# Patient Record
Sex: MALE | Race: OTHER | HISPANIC OR LATINO | URBAN - METROPOLITAN AREA
[De-identification: names, ages, dates, MRNs, and addresses within clinical notes are randomized per-mention and may not be internally consistent; named-entity substitution may affect disease eponyms.]

---

## 2019-01-12 ENCOUNTER — INPATIENT (INPATIENT)
Facility: HOSPITAL | Age: 59
LOS: 1 days | Discharge: HOME | DRG: 914 | End: 2019-01-14
Attending: SURGERY | Admitting: SURGERY
Payer: COMMERCIAL

## 2019-01-12 VITALS — HEART RATE: 91 BPM | OXYGEN SATURATION: 95 % | TEMPERATURE: 96 F | RESPIRATION RATE: 18 BRPM

## 2019-01-12 DIAGNOSIS — S09.90XA UNSPECIFIED INJURY OF HEAD, INITIAL ENCOUNTER: ICD-10-CM

## 2019-01-12 LAB
ALBUMIN SERPL ELPH-MCNC: 5 G/DL — SIGNIFICANT CHANGE UP (ref 3.3–5.2)
ALP SERPL-CCNC: 65 U/L — SIGNIFICANT CHANGE UP (ref 40–120)
ALT FLD-CCNC: 33 U/L — SIGNIFICANT CHANGE UP
ANION GAP SERPL CALC-SCNC: 11 MMOL/L — SIGNIFICANT CHANGE UP (ref 5–17)
APPEARANCE UR: CLEAR — SIGNIFICANT CHANGE UP
APTT BLD: 29.2 SEC — SIGNIFICANT CHANGE UP (ref 27.5–36.3)
AST SERPL-CCNC: 34 U/L — SIGNIFICANT CHANGE UP
BASE EXCESS BLDV CALC-SCNC: 2.1 MMOL/L — HIGH (ref -2–2)
BASOPHILS # BLD AUTO: 0 K/UL — SIGNIFICANT CHANGE UP (ref 0–0.2)
BASOPHILS NFR BLD AUTO: 0.4 % — SIGNIFICANT CHANGE UP (ref 0–2)
BILIRUB SERPL-MCNC: 0.4 MG/DL — SIGNIFICANT CHANGE UP (ref 0.4–2)
BILIRUB UR-MCNC: NEGATIVE — SIGNIFICANT CHANGE UP
BLD GP AB SCN SERPL QL: SIGNIFICANT CHANGE UP
BUN SERPL-MCNC: 16 MG/DL — SIGNIFICANT CHANGE UP (ref 8–20)
CA-I SERPL-SCNC: 1.15 MMOL/L — SIGNIFICANT CHANGE UP (ref 1.15–1.33)
CALCIUM SERPL-MCNC: 9.3 MG/DL — SIGNIFICANT CHANGE UP (ref 8.6–10.2)
CHLORIDE BLDV-SCNC: 102 MMOL/L — SIGNIFICANT CHANGE UP (ref 98–107)
CHLORIDE SERPL-SCNC: 100 MMOL/L — SIGNIFICANT CHANGE UP (ref 98–107)
CK MB CFR SERPL CALC: 8.6 NG/ML — HIGH (ref 0–6.7)
CK SERPL-CCNC: 2320 U/L — HIGH (ref 30–200)
CO2 SERPL-SCNC: 26 MMOL/L — SIGNIFICANT CHANGE UP (ref 22–29)
COLOR SPEC: YELLOW — SIGNIFICANT CHANGE UP
CREAT SERPL-MCNC: 0.9 MG/DL — SIGNIFICANT CHANGE UP (ref 0.5–1.3)
DIFF PNL FLD: NEGATIVE — SIGNIFICANT CHANGE UP
EOSINOPHIL # BLD AUTO: 0.3 K/UL — SIGNIFICANT CHANGE UP (ref 0–0.5)
EOSINOPHIL NFR BLD AUTO: 2.5 % — SIGNIFICANT CHANGE UP (ref 0–5)
GAS PNL BLDV: 138 MMOL/L — SIGNIFICANT CHANGE UP (ref 135–145)
GAS PNL BLDV: SIGNIFICANT CHANGE UP
GAS PNL BLDV: SIGNIFICANT CHANGE UP
GLUCOSE BLDV-MCNC: 121 MG/DL — HIGH (ref 70–99)
GLUCOSE SERPL-MCNC: 127 MG/DL — HIGH (ref 70–115)
GLUCOSE UR QL: NEGATIVE MG/DL — SIGNIFICANT CHANGE UP
HCO3 BLDV-SCNC: 26 MMOL/L — SIGNIFICANT CHANGE UP (ref 20–26)
HCT VFR BLD CALC: 45.7 % — SIGNIFICANT CHANGE UP (ref 42–52)
HCT VFR BLDA CALC: 50 — SIGNIFICANT CHANGE UP (ref 39–50)
HGB BLD CALC-MCNC: 16.5 G/DL — SIGNIFICANT CHANGE UP (ref 13–17)
HGB BLD-MCNC: 15.9 G/DL — SIGNIFICANT CHANGE UP (ref 14–18)
INR BLD: 1.13 RATIO — SIGNIFICANT CHANGE UP (ref 0.88–1.16)
KETONES UR-MCNC: NEGATIVE — SIGNIFICANT CHANGE UP
LACTATE BLDV-MCNC: 1.1 MMOL/L — SIGNIFICANT CHANGE UP (ref 0.5–2)
LEUKOCYTE ESTERASE UR-ACNC: NEGATIVE — SIGNIFICANT CHANGE UP
LYMPHOCYTES # BLD AUTO: 2.6 K/UL — SIGNIFICANT CHANGE UP (ref 1–4.8)
LYMPHOCYTES # BLD AUTO: 24.4 % — SIGNIFICANT CHANGE UP (ref 20–55)
MCHC RBC-ENTMCNC: 32.3 PG — HIGH (ref 27–31)
MCHC RBC-ENTMCNC: 34.8 G/DL — SIGNIFICANT CHANGE UP (ref 32–36)
MCV RBC AUTO: 92.7 FL — SIGNIFICANT CHANGE UP (ref 80–94)
MONOCYTES # BLD AUTO: 1 K/UL — HIGH (ref 0–0.8)
MONOCYTES NFR BLD AUTO: 9 % — SIGNIFICANT CHANGE UP (ref 3–10)
NEUTROPHILS # BLD AUTO: 6.8 K/UL — SIGNIFICANT CHANGE UP (ref 1.8–8)
NEUTROPHILS NFR BLD AUTO: 63.1 % — SIGNIFICANT CHANGE UP (ref 37–73)
NITRITE UR-MCNC: NEGATIVE — SIGNIFICANT CHANGE UP
OTHER CELLS CSF MANUAL: 20 ML/DL — SIGNIFICANT CHANGE UP (ref 18–22)
PCO2 BLDV: 46 MMHG — SIGNIFICANT CHANGE UP (ref 35–50)
PH BLDV: 7.39 — SIGNIFICANT CHANGE UP (ref 7.32–7.43)
PH UR: 7 — SIGNIFICANT CHANGE UP (ref 5–8)
PLATELET # BLD AUTO: 215 K/UL — SIGNIFICANT CHANGE UP (ref 150–400)
PO2 BLDV: 53 MMHG — HIGH (ref 25–45)
POTASSIUM BLDV-SCNC: 3.8 MMOL/L — SIGNIFICANT CHANGE UP (ref 3.4–4.5)
POTASSIUM SERPL-MCNC: 3.9 MMOL/L — SIGNIFICANT CHANGE UP (ref 3.5–5.3)
POTASSIUM SERPL-SCNC: 3.9 MMOL/L — SIGNIFICANT CHANGE UP (ref 3.5–5.3)
PROT SERPL-MCNC: 7.5 G/DL — SIGNIFICANT CHANGE UP (ref 6.6–8.7)
PROT UR-MCNC: NEGATIVE MG/DL — SIGNIFICANT CHANGE UP
PROTHROM AB SERPL-ACNC: 13 SEC — HIGH (ref 10–12.9)
RBC # BLD: 4.93 M/UL — SIGNIFICANT CHANGE UP (ref 4.6–6.2)
RBC # FLD: 13.5 % — SIGNIFICANT CHANGE UP (ref 11–15.6)
SAO2 % BLDV: 88 % — SIGNIFICANT CHANGE UP
SODIUM SERPL-SCNC: 137 MMOL/L — SIGNIFICANT CHANGE UP (ref 135–145)
SP GR SPEC: 1 — LOW (ref 1.01–1.02)
TYPE + AB SCN PNL BLD: SIGNIFICANT CHANGE UP
UROBILINOGEN FLD QL: NEGATIVE MG/DL — SIGNIFICANT CHANGE UP
WBC # BLD: 10.8 K/UL — SIGNIFICANT CHANGE UP (ref 4.8–10.8)
WBC # FLD AUTO: 10.8 K/UL — SIGNIFICANT CHANGE UP (ref 4.8–10.8)

## 2019-01-12 PROCEDURE — 74177 CT ABD & PELVIS W/CONTRAST: CPT | Mod: 26

## 2019-01-12 PROCEDURE — 72141 MRI NECK SPINE W/O DYE: CPT | Mod: 26

## 2019-01-12 PROCEDURE — 71045 X-RAY EXAM CHEST 1 VIEW: CPT | Mod: 26

## 2019-01-12 PROCEDURE — 71260 CT THORAX DX C+: CPT | Mod: 26

## 2019-01-12 PROCEDURE — 99285 EMERGENCY DEPT VISIT HI MDM: CPT

## 2019-01-12 PROCEDURE — 72125 CT NECK SPINE W/O DYE: CPT | Mod: 26

## 2019-01-12 PROCEDURE — 70450 CT HEAD/BRAIN W/O DYE: CPT | Mod: 26

## 2019-01-12 RX ORDER — KETOROLAC TROMETHAMINE 30 MG/ML
30 SYRINGE (ML) INJECTION ONCE
Qty: 0 | Refills: 0 | Status: DISCONTINUED | OUTPATIENT
Start: 2019-01-12 | End: 2019-01-12

## 2019-01-12 RX ORDER — SODIUM CHLORIDE 9 MG/ML
1000 INJECTION, SOLUTION INTRAVENOUS
Qty: 0 | Refills: 0 | Status: DISCONTINUED | OUTPATIENT
Start: 2019-01-12 | End: 2019-01-14

## 2019-01-12 RX ORDER — METHOCARBAMOL 500 MG/1
1500 TABLET, FILM COATED ORAL ONCE
Qty: 0 | Refills: 0 | Status: COMPLETED | OUTPATIENT
Start: 2019-01-12 | End: 2019-01-12

## 2019-01-12 RX ORDER — TETANUS TOXOID, REDUCED DIPHTHERIA TOXOID AND ACELLULAR PERTUSSIS VACCINE, ADSORBED 5; 2.5; 8; 8; 2.5 [IU]/.5ML; [IU]/.5ML; UG/.5ML; UG/.5ML; UG/.5ML
0.5 SUSPENSION INTRAMUSCULAR ONCE
Qty: 0 | Refills: 0 | Status: COMPLETED | OUTPATIENT
Start: 2019-01-12 | End: 2019-01-12

## 2019-01-12 RX ADMIN — SODIUM CHLORIDE 250 MILLILITER(S): 9 INJECTION, SOLUTION INTRAVENOUS at 13:43

## 2019-01-12 RX ADMIN — Medication 30 MILLIGRAM(S): at 14:17

## 2019-01-12 RX ADMIN — TETANUS TOXOID, REDUCED DIPHTHERIA TOXOID AND ACELLULAR PERTUSSIS VACCINE, ADSORBED 0.5 MILLILITER(S): 5; 2.5; 8; 8; 2.5 SUSPENSION INTRAMUSCULAR at 13:44

## 2019-01-12 RX ADMIN — METHOCARBAMOL 1500 MILLIGRAM(S): 500 TABLET, FILM COATED ORAL at 16:52

## 2019-01-12 RX ADMIN — SODIUM CHLORIDE 250 MILLILITER(S): 9 INJECTION, SOLUTION INTRAVENOUS at 21:33

## 2019-01-12 RX ADMIN — SODIUM CHLORIDE 1000 MILLILITER(S): 9 INJECTION, SOLUTION INTRAVENOUS at 16:17

## 2019-01-12 RX ADMIN — Medication 30 MILLIGRAM(S): at 13:42

## 2019-01-12 NOTE — H&P ADULT - ATTENDING COMMENTS
Trauma activation B.  57 yo Icelandic speaking male s/p large drying machine fell on him.  Hit his head and chest.  Fell to ground.  +LOC.  C/o HA, left chest wall pain and left arm pain.  GCS 15.  Answering all questions.  Airway patent.  Primary survey intact.  Secondary survey shows abrasion on vertex of scalp and left forearm.  Moving all extremities.  Hemodynamically normal.  Base excess (2).  CXR negative for traumatic injury.  CT head, C-spine, chest/A/P negative for traumatic injury.  CK level mildly elevated (about 2300), will hydrate and repeat.  Tertiary exam.

## 2019-01-12 NOTE — ED PROVIDER NOTE - PROGRESS NOTE DETAILS
According to [facility] in [place] which pt notes as his primary pharmacy, he is only on amlodipine; is not on any anticoagulants or antiplatelets.

## 2019-01-12 NOTE — ED ADULT NURSE REASSESSMENT NOTE - NS ED NURSE REASSESS COMMENT FT1
patient returned from MRI, awaitng admission orders spoke with Trauma team, bed available, VSS, patient remains with neck brace even thou cleared states pain to area

## 2019-01-12 NOTE — ED PROVIDER NOTE - MEDICAL DECISION MAKING DETAILS
well appearing male c/o crush injury/head injury with + LOC. Neuro intact . GCS 15. Primary survey notes mild tachycardia; 2nd survey notes evidence of head injury and abrasion. Management per trauma team

## 2019-01-12 NOTE — ED ADULT TRIAGE NOTE - CHIEF COMPLAINT QUOTE
Pt brought in by ambulance for eval of traum. Pt with possible crush injuries s/p large industrial dryer falling on total body while at work. Pt with C-Collar in place by EMS. Code Trauma B activated.

## 2019-01-12 NOTE — H&P ADULT - HISTORY OF PRESENT ILLNESS
58y Male alert trauma B that came in after a drying machine fell on top of his head and chest. Patient remembers the machine falling but had LOC after the event for a couple of minutes. Patient arrived AAOx3 with a GCS of 15 and in on acute distress. Patient has a mild headache, right lower rib cage pain, and left arm pain. Denies any visual changes and dizziness. Denies fever, chills, nausea, vomiting, diarrhea, urinary incontinence, and shortness of breath.     A airway intact, C collar placed, speaking full sentences  B equal breath sounds bilaterally  C radial/DP/femoral pulses intact bilaterally   D GCS15 E4V5M6, moving all fours, no focal deficits, pupils R 3mm reactive/L 3mm reactive  E patient fully exposed, no gross deformities or bleeding, provided warm blankets    CXR no fracture or hemopneumothorax    Initial vitals: BP: 159/84, HR: 95, T: 98.4F, RR: 16, Sat: 98%    Secondary survey unremarkable     ROS: 10-system review is otherwise negative except HPI above.      PAST MEDICAL & SURGICAL HISTORY:  Hypertension  No significant past surgical history    FAMILY HISTORY: Jeanine    SOCIAL HISTORY:  Denies smoking, and use of illicit drugs. Drinks alcohol socially     ALLERGIES: Penicillin       HOME MEDICATIONS: Norvasc 5mg  --------------------------------------------------------------------------------------------    PHYSICAL EXAM:   General: NAD, Lying in bed comfortably. C-spine collar in place.   Neuro: A+Ox3  HEENT: NC/AT, EOMI, No palpable head deformities. Minor 1cm laceration on superior aspect of scalp. No maxillary deformities.   Neck: Soft, supple. C-spine collar in place  Cardio: RRR  Resp: Good effort, CTA b/l  Thorax: No chest wall tenderness  Breast: No lesions/masses, no drainage  GI/Abd: Soft, NT/ND, no rebound/guarding, no masses palpated  Vascular: All 4 extremities warm and well perfused. Palpable 2+ B/L carotid, radial, femoral, and DP pulses.   Skin: 5cm superficial laceration of left posterior forearm with no active bleeding.   Lymphatic/Nodes: No palpable lymphadenopathy  Pelvis: stable  Musculoskeletal: All 4 extremities moving spontaneously, no limitations, no spinal tenderness or stepoffs  --------------------------------------------------------------------------------------------    LABS                 15.9   10.8   )----------(  215       ( 12 Jan 2019 10:55 )               45.7              PT/INR -  13.0 sec / 1.13 ratio   ( 12 Jan 2019 10:55 )       PTT -  29.2 sec   ( 12 Jan 2019 10:55 )  CAPILLARY BLOOD GLUCOSE            10:56 - VBG - pH: 7.39  | pCO2: 46    | pO2: 53    | Lactate: 1.1      --------------------------------------------------------------------------------------------  IMAGING  CT head:  CT C-spine:  CXR:   CT C/A/P:  CT T-spine:  CT L-spine:    ASSESSMENT: Patient is a 58y old male s/p machine fell on top of patient. Patient is HDM stable with no active complaints except for a mild headache and mild right lower chest tenderness.     PLAN:    - C collar until C spine is clear.  - f/u radiology for CT neck, head read  - NPO  - IVF  - pain control  - Clean wounds and apply bacitracin   - DVT ppx  - strict bedrest/OOB/ambulate  - Patient seen/examined with attending.  - Plan to be discussed with Attending,  58y Male alert trauma B that came in after a drying machine fell on top of his head and chest. Patient remembers the machine falling but had LOC after the event for a couple of minutes. Patient arrived AAOx3 with a GCS of 15 and in on acute distress. Patient has a mild headache, right lower rib cage pain, and left arm pain. Denies any visual changes and dizziness. Denies fever, chills, nausea, vomiting, diarrhea, urinary incontinence, and shortness of breath.     A airway intact, C collar placed, speaking full sentences  B equal breath sounds bilaterally  C radial/DP/femoral pulses intact bilaterally   D GCS15 E4V5M6, moving all fours, no focal deficits, pupils R 3mm reactive/L 3mm reactive  E patient fully exposed, no gross deformities or bleeding, provided warm blankets    CXR no fracture or hemopneumothorax    Initial vitals: BP: 159/84, HR: 95, T: 98.4F, RR: 16, Sat: 98%    Secondary survey unremarkable     ROS: 10-system review is otherwise negative except HPI above.      PAST MEDICAL & SURGICAL HISTORY:  Hypertension  No significant past surgical history    FAMILY HISTORY: Jeanine    SOCIAL HISTORY:  Denies smoking, and use of illicit drugs. Drinks alcohol socially     ALLERGIES: Penicillin       HOME MEDICATIONS: Norvasc 5mg  --------------------------------------------------------------------------------------------    PHYSICAL EXAM:   General: NAD, Lying in bed comfortably. C-spine collar in place.   Neuro: A+Ox3  HEENT: NC/AT, EOMI, No palpable head deformities. Minor abrasion on superior aspect of scalp. No maxillary deformities.   Neck: Soft, supple. C-spine collar in place  Cardio: RRR  Resp: Good effort, CTA b/l  Thorax: No chest wall tenderness  Breast: No lesions/masses, no drainage  GI/Abd: Soft, NT/ND, no rebound/guarding, no masses palpated  Vascular: All 4 extremities warm and well perfused. Palpable 2+ B/L carotid, radial, femoral, and DP pulses.   Skin: 5cm superficial laceration of left posterior forearm with no active bleeding.   Lymphatic/Nodes: No palpable lymphadenopathy  Pelvis: stable  Musculoskeletal: All 4 extremities moving spontaneously, no limitations, no spinal tenderness or stepoffs  --------------------------------------------------------------------------------------------    LABS                 15.9   10.8   )----------(  215       ( 12 Jan 2019 10:55 )               45.7              PT/INR -  13.0 sec / 1.13 ratio   ( 12 Jan 2019 10:55 )       PTT -  29.2 sec   ( 12 Jan 2019 10:55 )  CAPILLARY BLOOD GLUCOSE            10:56 - VBG - pH: 7.39  | pCO2: 46    | pO2: 53    | Lactate: 1.1      --------------------------------------------------------------------------------------------  IMAGING  CT head:  CT C-spine:  CXR:   CT C/A/P:  CT T-spine:  CT L-spine:    ASSESSMENT: Patient is a 58y old male s/p machine fell on top of patient. Patient is HDM stable with no active complaints except for a mild headache and mild right lower chest tenderness.     PLAN:    - C collar until C spine is clear.  - f/u radiology for CT neck, head read  - NPO  - IVF  - pain control  - Clean wounds and apply bacitracin   - DVT ppx  - strict bedrest/OOB/ambulate  - Patient seen/examined with attending.

## 2019-01-12 NOTE — ED PROVIDER NOTE - OBJECTIVE STATEMENT
Pt is a 58yoM with h/o HTN c/o head and diffuse body pain s/p having a large industrial sized, several hundred pound dryer fall on top of him at work. + LOC.  Pt amnestic to events after he was hit.  Trauma B called by nursing staff upon arrival.

## 2019-01-13 LAB
ANION GAP SERPL CALC-SCNC: 11 MMOL/L — SIGNIFICANT CHANGE UP (ref 5–17)
BUN SERPL-MCNC: 10 MG/DL — SIGNIFICANT CHANGE UP (ref 8–20)
CALCIUM SERPL-MCNC: 8.9 MG/DL — SIGNIFICANT CHANGE UP (ref 8.6–10.2)
CHLORIDE SERPL-SCNC: 100 MMOL/L — SIGNIFICANT CHANGE UP (ref 98–107)
CK MB CFR SERPL CALC: 4.3 NG/ML — SIGNIFICANT CHANGE UP (ref 0–6.7)
CK MB CFR SERPL CALC: 4.7 NG/ML — SIGNIFICANT CHANGE UP (ref 0–6.7)
CK SERPL-CCNC: 1137 U/L — HIGH (ref 30–200)
CK SERPL-CCNC: 1264 U/L — HIGH (ref 30–200)
CO2 SERPL-SCNC: 26 MMOL/L — SIGNIFICANT CHANGE UP (ref 22–29)
CREAT SERPL-MCNC: 0.69 MG/DL — SIGNIFICANT CHANGE UP (ref 0.5–1.3)
GLUCOSE SERPL-MCNC: 104 MG/DL — SIGNIFICANT CHANGE UP (ref 70–115)
MAGNESIUM SERPL-MCNC: 2.1 MG/DL — SIGNIFICANT CHANGE UP (ref 1.6–2.6)
PHOSPHATE SERPL-MCNC: 2.4 MG/DL — SIGNIFICANT CHANGE UP (ref 2.4–4.7)
POTASSIUM SERPL-MCNC: 3.9 MMOL/L — SIGNIFICANT CHANGE UP (ref 3.5–5.3)
POTASSIUM SERPL-SCNC: 3.9 MMOL/L — SIGNIFICANT CHANGE UP (ref 3.5–5.3)
SODIUM SERPL-SCNC: 137 MMOL/L — SIGNIFICANT CHANGE UP (ref 135–145)

## 2019-01-13 RX ORDER — HEPARIN SODIUM 5000 [USP'U]/ML
5000 INJECTION INTRAVENOUS; SUBCUTANEOUS EVERY 12 HOURS
Qty: 0 | Refills: 0 | Status: DISCONTINUED | OUTPATIENT
Start: 2019-01-13 | End: 2019-01-14

## 2019-01-13 RX ORDER — ACETAMINOPHEN 500 MG
650 TABLET ORAL EVERY 6 HOURS
Qty: 0 | Refills: 0 | Status: DISCONTINUED | OUTPATIENT
Start: 2019-01-13 | End: 2019-01-14

## 2019-01-13 RX ORDER — TRAMADOL HYDROCHLORIDE 50 MG/1
50 TABLET ORAL
Qty: 0 | Refills: 0 | Status: DISCONTINUED | OUTPATIENT
Start: 2019-01-13 | End: 2019-01-14

## 2019-01-13 RX ADMIN — Medication 650 MILLIGRAM(S): at 12:33

## 2019-01-13 RX ADMIN — TRAMADOL HYDROCHLORIDE 50 MILLIGRAM(S): 50 TABLET ORAL at 06:36

## 2019-01-13 RX ADMIN — SODIUM CHLORIDE 250 MILLILITER(S): 9 INJECTION, SOLUTION INTRAVENOUS at 20:41

## 2019-01-13 RX ADMIN — HEPARIN SODIUM 5000 UNIT(S): 5000 INJECTION INTRAVENOUS; SUBCUTANEOUS at 06:37

## 2019-01-13 RX ADMIN — TRAMADOL HYDROCHLORIDE 50 MILLIGRAM(S): 50 TABLET ORAL at 17:28

## 2019-01-13 RX ADMIN — TRAMADOL HYDROCHLORIDE 50 MILLIGRAM(S): 50 TABLET ORAL at 18:27

## 2019-01-13 RX ADMIN — SODIUM CHLORIDE 250 MILLILITER(S): 9 INJECTION, SOLUTION INTRAVENOUS at 17:21

## 2019-01-13 RX ADMIN — HEPARIN SODIUM 5000 UNIT(S): 5000 INJECTION INTRAVENOUS; SUBCUTANEOUS at 17:20

## 2019-01-13 RX ADMIN — TRAMADOL HYDROCHLORIDE 50 MILLIGRAM(S): 50 TABLET ORAL at 07:15

## 2019-01-13 RX ADMIN — Medication 650 MILLIGRAM(S): at 11:35

## 2019-01-13 NOTE — PROGRESS NOTE ADULT - ASSESSMENT
57yo M s/p blunt trauma to head who is clinically improving.    -monitor cervical tenderness  -continue regular diet  -IVF  -pain management   -possible discharge today.

## 2019-01-13 NOTE — PROGRESS NOTE ADULT - SUBJECTIVE AND OBJECTIVE BOX
[facility] - Mr Marshall is post op day 1 from sigmoid colectomy for diverticulitis. Cefoxitin day 2. NPO.     HPI/OVERNIGHT EVENTS: 59yo M that presented yesterday to the trauma bay after a heavy drying machine fell on his head and chest. Imaging were negative for any acute bleed or fracture. Patient was examined this morning at bedside and found resting comfortably. No events overnight. This morning his cervical collar was removed. Patient denies fever, chills, nausea, vomiting, headache, chest pain, and shortness of breath.     Denies fever, chills, nausea, vomitting, chest pain, SOB, dizziness, abd pain or any other concerning symptoms    Vital Signs Last 24 Hrs  T(C): 36.8 (2019 04:26), Max: 37.1 (2019 22:37)  T(F): 98.2 (2019 04:26), Max: 98.8 (2019 22:37)  HR: 81 (2019 04:26) (80 - 88)  BP: 139/80 (2019 04:26) (117/85 - 139/80)  BP(mean): 97 (2019 00:56) (97 - 97)  RR: 18 (2019 04:26) (16 - 18)  SpO2: 93% (2019 04:26) (93% - 99%)    I&O's Detail    2019 07:01  -  2019 07:00  --------------------------------------------------------  IN:    lactated ringers.: 1500 mL  Total IN: 1500 mL    OUT:    Voided: 1100 mL  Total OUT: 1100 mL    Total NET: 400 mL          Constitutional: patient resting comfortably in bed, in no acute distress  HEENT: EOMI / PERRL    Neck: No JVD, full ROM with mild para cervical tenderness  Respiratory: CTAB, respirations are unlabored, no accessory muscle use, no conversational dyspnea  Cardiovascular: regular rate & rhythm  Gastrointestinal: Abdomen soft, non-tender, non-distended, no rebound tenderness / guarding    LABS:                        15.9   10.8  )-----------( 215      ( 2019 10:55 )             45.7     01-13    137  |  100  |  10.0  ----------------------------<  104  3.9   |  26.0  |  0.69    Ca    8.9      2019 07:49  Phos  2.4     -  Mg     2.1         TPro  7.5  /  Alb  5.0  /  TBili  0.4  /  DBili  x   /  AST  34  /  ALT  33  /  AlkPhos  65  -12    PT/INR - ( 2019 10:55 )   PT: 13.0 sec;   INR: 1.13 ratio         PTT - ( 2019 10:55 )  PTT:29.2 sec  Urinalysis Basic - ( 2019 19:32 )    Color: Yellow / Appearance: Clear / S.005 / pH: x  Gluc: x / Ketone: Negative  / Bili: Negative / Urobili: Negative mg/dL   Blood: x / Protein: Negative mg/dL / Nitrite: Negative   Leuk Esterase: Negative / RBC: x / WBC x   Sq Epi: x / Non Sq Epi: x / Bacteria: x        MEDICATIONS  (STANDING):  heparin  Injectable 5000 Unit(s) SubCutaneous every 12 hours  lactated ringers. 1000 milliLiter(s) (250 mL/Hr) IV Continuous <Continuous>    MEDICATIONS  (PRN):  acetaminophen   Tablet .. 650 milliGRAM(s) Oral every 6 hours PRN Mild Pain (1 - 3)  traMADol 50 milliGRAM(s) Oral four times a day PRN Moderate Pain (4 - 6)      MICRO:   Cultures     STUDIES:   EKG, CXR, U/S, CT, MRI

## 2019-01-14 ENCOUNTER — TRANSCRIPTION ENCOUNTER (OUTPATIENT)
Age: 59
End: 2019-01-14

## 2019-01-14 VITALS
TEMPERATURE: 98 F | OXYGEN SATURATION: 97 % | SYSTOLIC BLOOD PRESSURE: 132 MMHG | DIASTOLIC BLOOD PRESSURE: 88 MMHG | RESPIRATION RATE: 18 BRPM | HEART RATE: 91 BPM

## 2019-01-14 PROCEDURE — 81003 URINALYSIS AUTO W/O SCOPE: CPT

## 2019-01-14 PROCEDURE — 70450 CT HEAD/BRAIN W/O DYE: CPT

## 2019-01-14 PROCEDURE — 85014 HEMATOCRIT: CPT

## 2019-01-14 PROCEDURE — 86901 BLOOD TYPING SEROLOGIC RH(D): CPT

## 2019-01-14 PROCEDURE — 72141 MRI NECK SPINE W/O DYE: CPT

## 2019-01-14 PROCEDURE — 82553 CREATINE MB FRACTION: CPT

## 2019-01-14 PROCEDURE — 99285 EMERGENCY DEPT VISIT HI MDM: CPT | Mod: 25

## 2019-01-14 PROCEDURE — 84295 ASSAY OF SERUM SODIUM: CPT

## 2019-01-14 PROCEDURE — 82330 ASSAY OF CALCIUM: CPT

## 2019-01-14 PROCEDURE — 96361 HYDRATE IV INFUSION ADD-ON: CPT | Mod: XU

## 2019-01-14 PROCEDURE — 85610 PROTHROMBIN TIME: CPT

## 2019-01-14 PROCEDURE — 84132 ASSAY OF SERUM POTASSIUM: CPT

## 2019-01-14 PROCEDURE — 72125 CT NECK SPINE W/O DYE: CPT

## 2019-01-14 PROCEDURE — 90715 TDAP VACCINE 7 YRS/> IM: CPT

## 2019-01-14 PROCEDURE — 86900 BLOOD TYPING SEROLOGIC ABO: CPT

## 2019-01-14 PROCEDURE — 82550 ASSAY OF CK (CPK): CPT

## 2019-01-14 PROCEDURE — 85027 COMPLETE CBC AUTOMATED: CPT

## 2019-01-14 PROCEDURE — 99222 1ST HOSP IP/OBS MODERATE 55: CPT

## 2019-01-14 PROCEDURE — 85730 THROMBOPLASTIN TIME PARTIAL: CPT

## 2019-01-14 PROCEDURE — 71045 X-RAY EXAM CHEST 1 VIEW: CPT

## 2019-01-14 PROCEDURE — 83735 ASSAY OF MAGNESIUM: CPT

## 2019-01-14 PROCEDURE — 74177 CT ABD & PELVIS W/CONTRAST: CPT

## 2019-01-14 PROCEDURE — 90471 IMMUNIZATION ADMIN: CPT

## 2019-01-14 PROCEDURE — 96374 THER/PROPH/DIAG INJ IV PUSH: CPT | Mod: XU

## 2019-01-14 PROCEDURE — 86850 RBC ANTIBODY SCREEN: CPT

## 2019-01-14 PROCEDURE — 83605 ASSAY OF LACTIC ACID: CPT

## 2019-01-14 PROCEDURE — 80053 COMPREHEN METABOLIC PANEL: CPT

## 2019-01-14 PROCEDURE — 82947 ASSAY GLUCOSE BLOOD QUANT: CPT

## 2019-01-14 PROCEDURE — 82435 ASSAY OF BLOOD CHLORIDE: CPT

## 2019-01-14 PROCEDURE — 71260 CT THORAX DX C+: CPT

## 2019-01-14 PROCEDURE — 80048 BASIC METABOLIC PNL TOTAL CA: CPT

## 2019-01-14 PROCEDURE — T1013: CPT

## 2019-01-14 PROCEDURE — 82803 BLOOD GASES ANY COMBINATION: CPT

## 2019-01-14 PROCEDURE — 84100 ASSAY OF PHOSPHORUS: CPT

## 2019-01-14 PROCEDURE — 36415 COLL VENOUS BLD VENIPUNCTURE: CPT

## 2019-01-14 RX ORDER — TRAMADOL HYDROCHLORIDE 50 MG/1
1 TABLET ORAL
Qty: 12 | Refills: 0
Start: 2019-01-14 | End: 2019-01-16

## 2019-01-14 RX ADMIN — TRAMADOL HYDROCHLORIDE 50 MILLIGRAM(S): 50 TABLET ORAL at 07:05

## 2019-01-14 RX ADMIN — SODIUM CHLORIDE 250 MILLILITER(S): 9 INJECTION, SOLUTION INTRAVENOUS at 06:05

## 2019-01-14 RX ADMIN — HEPARIN SODIUM 5000 UNIT(S): 5000 INJECTION INTRAVENOUS; SUBCUTANEOUS at 06:05

## 2019-01-14 RX ADMIN — TRAMADOL HYDROCHLORIDE 50 MILLIGRAM(S): 50 TABLET ORAL at 06:05

## 2019-01-14 NOTE — DISCHARGE NOTE ADULT - MEDICATION SUMMARY - MEDICATIONS TO TAKE
I will START or STAY ON the medications listed below when I get home from the hospital:    traMADol 50 mg oral tablet  -- 1 tab(s) by mouth every 6 hours, As Needed -for severe pain MDD:4   -- Caution federal law prohibits the transfer of this drug to any person other  than the person for whom it was prescribed.  May cause drowsiness.  Alcohol may intensify this effect.  Use care when operating dangerous machinery.  Obtain medical advice before taking any non-prescription drugs as some may affect the action of this medication.    -- Indication: For Pain

## 2019-01-14 NOTE — CONSULT NOTE ADULT - SUBJECTIVE AND OBJECTIVE BOX
58yM was admitted on  after a machine that weighs about 300lbs fell on his head while at work. Had LOC for unknown period of time. In ED, GCS=15 complaining of a MILD headache, left arm pain.       Imaging showed:  HEAD CT - No acute findings  CAP CT - No acute findings  C SPINE CT - DJD  MRI C SPINE - No fracture or evidence of cord edema.   Disc degeneration and spondylosis at C3-4 through C6/7 with loss of disc height and associated degenerative endplate changes most severe at C5-6 and C6-7. There is narrowing of the LEFT 3, BILATERAL C3-4 through C6-7 neural foramina due to uncovertebral spurring and facet osteophytic hypertrophy. Mild bulges seen at C3-4. Mild degenerative cord impingement is seen at C5-6 and C6-7 complexes.      Patient visited in room, walking around with cell phone in his hand, reading papers in the other hand.   When done was soon unable to ambulate as well. Reports a mild headache and dizziness - lightheaded.     REVIEW OF SYSTEMS  Constitutional - No fever, No weight loss, No fatigue  HEENT - No eye pain, No visual disturbances, No difficulty hearing, No tinnitus, No vertigo, No neck pain  Respiratory - No cough, No wheezing, No shortness of breath  Cardiovascular - No chest pain, No palpitations  Gastrointestinal - No abdominal pain, No nausea, No vomiting, No diarrhea, No constipation  Genitourinary - No dysuria, No frequency, No hematuria, No incontinence  Neurological - +headaches, +memory loss, +loss of strength, No numbness, No tremors  Skin - No itching, No rashes, +lesions   Endocrine - No temperature intolerance  Musculoskeletal - No joint pain, No joint swelling, No muscle pain  Psychiatric - No depression, No anxiety    VITALS  T(C): 36.9 (19 @ 08:38), Max: 36.9 (19 @ 08:38)  HR: 91 (19 @ 08:38) (81 - 91)  BP: 132/88 (19 @ 08:38) (108/80 - 156/96)  RR: 18 (19 @ 08:38) (17 - 18)  SpO2: 97% (19 @ 08:38) (92% - 97%)  Wt(kg): --    PAST MEDICAL & SURGICAL HISTORY  Hypertension  No pertinent past medical history  No significant past surgical history      SOCIAL HISTORY  Smoking - Denied  EtOH - Denied   Drugs - Denied    FUNCTIONAL HISTORY  Independent    CURRENT FUNCTIONAL STATUS    Transfer: Sit to Stand:     · Level of [place]	independent	    Transfer: Stand to Sit:     · Level of [place]	independent	    Gait Skills:     · Level of [place]	independent	  · Gait Distance	200 feet	    Gait Analysis:     · Gait Pattern Used	2-point gait	    Stair Negotiation:     · Level of [place]	independent	  · Assistive Device	right rail up; right rail down	  · Stair Pattern	step over step	  · Number of Stairs	4	      FAMILY HISTORY   NC    RECENT LABS/IMAGING        137  |  100  |  10.0  ----------------------------<  104  3.9   |  26.0  |  0.69    Ca    8.9      2019 07:49  Phos  2.4       Mg     2.1           Urinalysis Basic - ( 2019 19:32 )    Color: Yellow / Appearance: Clear / S.005 / pH: x  Gluc: x / Ketone: Negative  / Bili: Negative / Urobili: Negative mg/dL   Blood: x / Protein: Negative mg/dL / Nitrite: Negative   Leuk Esterase: Negative / RBC: x / WBC x   Sq Epi: x / Non Sq Epi: x / Bacteria: x        ALLERGIES  No Known Allergies      MEDICATIONS   acetaminophen   Tablet .. 650 milliGRAM(s) Oral every 6 hours PRN  heparin  Injectable 5000 Unit(s) SubCutaneous every 12 hours  traMADol 50 milliGRAM(s) Oral four times a day PRN      ----------------------------------------------------------------------------------------  PHYSICAL EXAM  Constitutional - NAD, Comfortable  HEENT - Small abrasion on top of his head  Neck - Supple, No limited ROM  Chest - Breathing comfortably, No wheezing  Cardiovascular - S1S2   Abdomen - Soft   Extremities - Left UE abrasion - healing  Neurologic Exam -                    Cognitive - Awake, Alert, AAO to self, place, date, year, situation     Communication - Fluent, No dysarthria     Cranial Nerves - CN 2-12 intact     Motor - No focal deficits **noted cogwheeling throughout and inconsistent PE findings eg. dropped foot on exam, but able to ambulate without evidence of dropped foot                    LEFT    UE - ShAB 5/5, EF 5/5, EE 5/5, WE 5/5,  5/5                    RIGHT UE - ShAB 5/5, EF 5/5, EE 5/5, WE 5/5,  5/5                    LEFT    LE - HF 5/5, KE 5/5, DF 5/5, PF 5/5                    RIGHT LE - HF 5/5, KE 5/5, DF 5/5, PF 5/5        Sensory - Intact to LT     OculoVestibular - No saccades, No nystagmus, VOR    WNL     Balance - WNL Static and standing/dynamic  Psychiatric - Mood stable, Affect Flat  ----------------------------------------------------------------------------------------  ASSESSMENT/PLAN  58yMale with functional deficits after a head injury with no neurological deficits	  Pain - Tylenol, Tramadol  DVT PPX - SCDs, Heparin  Rehab - Patient is independent. DC HOME.

## 2019-01-14 NOTE — DISCHARGE NOTE ADULT - CARE PLAN
Statement Selected Principal Discharge DX:	Head injury  Goal:	Alleviation of pain and symptoms  Assessment and plan of treatment:	Follow up: Please call and make an appointment with your primary care provider after discharge. If you do not have a primary care provider, contact information for the [facility] has been provided below - you can follow-up with them for all of your primary care needs.  Activity: May return to normal activities as tolerated.  Diet: May continue regular diet.  Medications: Please take all home medications as prescribed by your primary care doctor. Pain medication has been prescribed for you (tramadol). Please, take it as it has been prescribed, ONLY for severe breakthrough pain, and do not drive or operate heavy machinery while taking narcotics.  Patient is advised to RETURN TO THE EMERGENCY DEPARTMENT for any of the following - worsening pain, fever/chills, nausea/vomiting, altered mental status, chest pain, shortness of breath, or any other new / worsening symptom.

## 2019-01-14 NOTE — DISCHARGE NOTE ADULT - HOSPITAL COURSE
87 y/o M BIBEMS w/o c-collar in place, as a [facility] with [facility] Bleed 2/2 to a fall on ASA & Plavix, unknown LOC. Patient was found down for an unknown period of time at home. [facility] was [facility] and Head CT showed small amount of hemorrhage left lateral ventricle at the inferior horn. Denies nausea, vomiting, fever, chest pain, dizziness, headache or SOB.    Hospital Course: CT head, cspine, chest, abd & pelvis on admission revealed no acute traumatic injury.  Patient had persistent neck pain. Admitted to trauma service & MRI cspine performed which was read as no fracture or evidence of cord edema, disc degeneration and spondylosis at C3-4 through C6/7 with loss of disc height and associated degenerative endplate changes most severe at C5-6 and C6-7, there is narrowing of the LEFT 3, BILATERAL C3-4 through C6-7 neural foramina due to uncovertebral spurring and facet osteophytic hypertrophy, mild bulges seen at C3-4. Mild degenerative cord impingement is seen at C5-6 and C6-7 complexes. Pain improved and cervical collar able to be removed by confrontation. CKs in 1000s with no worsening renal function. He was evaluated by physical therapy who stated he was safe for d/c home. At time of d/c he remains hemodynamically well, OOB ambulating, tolerating diet, pain minimal and well controlled.    Patient is advised to RETURN TO THE EMERGENCY DEPARTMENT for any of the following - worsening pain, fever/chills, nausea/vomiting, altered mental status, chest pain, shortness of breath, or any other new / worsening symptom.

## 2019-01-14 NOTE — DISCHARGE NOTE ADULT - PLAN OF CARE
Alleviation of pain and symptoms Follow up: Please call and make an appointment with your primary care provider after discharge. If you do not have a primary care provider, contact information for the [facility] has been provided below - you can follow-up with them for all of your primary care needs.  Activity: May return to normal activities as tolerated.  Diet: May continue regular diet.  Medications: Please take all home medications as prescribed by your primary care doctor. Pain medication has been prescribed for you (tramadol). Please, take it as it has been prescribed, ONLY for severe breakthrough pain, and do not drive or operate heavy machinery while taking narcotics.  Patient is advised to RETURN TO THE EMERGENCY DEPARTMENT for any of the following - worsening pain, fever/chills, nausea/vomiting, altered mental status, chest pain, shortness of breath, or any other new / worsening symptom.

## 2019-01-14 NOTE — DISCHARGE NOTE ADULT - CARE PROVIDER_API CALL
[facility],   [address] [place] [address] - 1[address] [place] [address]  Phone: [phone]  Fax: (   )    -    [facility],   [address] [place] [address]  Phone: [phone]  Fax: [phone]

## 2019-01-14 NOTE — DISCHARGE NOTE ADULT - PROVIDER TOKENS
FREE:[LAST:[[facility]],PHONE:[phone]],FAX:[phone]    -],ADDRESS:[[address] [place] [address] - [address] [place] [address]]],FREE:[LAST:[[facility]],PHONE:[phone]],FAX:[phone]],ADDRESS:[[address] [place] [address]]]

## 2019-01-14 NOTE — DISCHARGE NOTE ADULT - PATIENT PORTAL LINK FT
You can access the [facility] Patient Portal, offered by [facility], by registering with the following website: [email or web address]

## 2019-10-22 PROBLEM — I10 ESSENTIAL (PRIMARY) HYPERTENSION: Chronic | Status: ACTIVE | Noted: 2019-01-12

## 2019-11-12 ENCOUNTER — APPOINTMENT (OUTPATIENT)
Age: 59
End: 2019-11-12
Payer: OTHER MISCELLANEOUS

## 2019-11-12 DIAGNOSIS — M75.42 IMPINGEMENT SYNDROME OF LEFT SHOULDER: ICD-10-CM

## 2019-11-12 DIAGNOSIS — M54.12 RADICULOPATHY, CERVICAL REGION: ICD-10-CM

## 2019-11-12 PROCEDURE — 73030 X-RAY EXAM OF SHOULDER: CPT | Mod: LT

## 2019-11-12 PROCEDURE — 99203 OFFICE O/P NEW LOW 30 MIN: CPT

## 2019-11-12 NOTE — DISCUSSION/SUMMARY
[de-identified] : JOSUE is a 59 year old male who presents today for initial evaluation of left shoulder pain.  His injury occurred on 1/2/19 when a drier fell on his head while at work.  He presented to [facility] on 1/14/2019 and was discharged and told to followup with his PCP.  Patient reports that he was referred to Dr. Lakhani who performed surgery to remove blood from his head.  Patient was also seen at [facility] in [place] where MRIs of the cervical and lumbar spine were performed.   \par \par Currently, the patient reports cervical neck pain that radiates down the arms and to the hand.  He denies numbness/tingling to the UE.  He also reports a feeling of weakness to the UE.  Patient performs physical [facility]par \par Based on pts imaging, mild and vague symptoms of shoulder impingement on today's exam with near perfect shoulder exam with true cervical radiculopathy on exam, I believe her primary complaint is more related to her neck issues. We referred her to see my partner, Dr Eisenbegr for this. He agrees with the above plan and all questions were answered.\par

## 2019-11-12 NOTE — PHYSICAL EXAM
[de-identified] : Physical Exam:\par General: Well appearing, no acute distress\par Neurologic: A&Ox3, No focal deficits\par Head: NCAT without abrasions, lacerations, or ecchymosis to head, face, or scalp\par Eyes: No scleral icterus, no gross abnormalities\par Respiratory: Equal chest wall expansion bilaterally, no accessory muscle use\par Lymphatic: No lymphadenopathy palpated\par Skin: Warm and dry\par Psychiatric: Normal mood and affect\par \par Left Shoulder\par ·	Inspection/Palpation: no tenderness, swelling or deformities\par ·	Range of Motion: no crepitus with ROM; Active/Passive FF 0-170; ER at side 0-45; IR to T7 \par ·	Strength: forward elevation in scapular plane [4/5], internal rotation [4/5], external rotation [4/5], adduction [4/5] and abduction [4/5]\par ·	Stability: no joint instability on provocative testing\par ·	Tests: Mckeon test negative, Neer negative, drop arm test negative, bear hug test negative, Napolean sign negative, cross arm adduction negative, lift off sign negative, hornblowers sign negative, speeds test POS, Yergason's test POS, no bicipital groove tenderness, Bajwa's Active Compression test POS, whipple test NEG, bicep's load II test negative\par \par RightShoulder\par ·	Inspection/Palpation: no tenderness, swelling or deformities\par ·	Range of Motion: full and painless in all planes, no crepitus\par ·	Strength: forward elevation in scapular plane 5/5, internal rotation 5/5, external rotation 5/5, adduction 5/5 and abduction 5/5\par ·	Stability: no joint instability on provocative testing\par ·	Tests: Mckeon test negative, Neer sign negative, negative drop arm test secondary to pain, bear hug test negative, Napolean sign negative, cross arm adduction negative, lift off sign positive, hornblowers sign negative, speeds test negative, Yergason's test negative, no bicipital groove tenderness, Bajwa's Active Compression test negative [de-identified] : The following radiographs were ordered and read by me during this patients visit. I reviewed each radiograph in detail with the patient and discussed the findings as highlighted below. \par \par 4 views of the left shoulder show no fracture, dislocation or bony lesions. There is no evidence of degenerative change in the glenohumeral and acromioclavicular joints with maintenance of the joint space. Otherwise unremarkable.

## 2019-11-12 NOTE — REASON FOR VISIT
[Initial Visit] : an initial visit for [Worker's Compensation] : This visit is related to worker's compensation [[employer] ] : provided by [employer]   [FreeTextEntry1] : [id number] [FreeTextEntry2] : Ayo [TWNoteComboBox1] : [place]

## 2019-11-12 NOTE — HISTORY OF PRESENT ILLNESS
[de-identified] : JOSUE is a 59 year old male who presents today for initial evaluation of left shoulder pain.  His injury occurred on 1/2/19 when a drier fell on his head while at work.  He presented to [facility] on 1/14/2019 and was discharged and told to followup with his PCP.  Patient reports that he was referred to Dr. Lakhani who performed surgery to remove blood from his head.  Patient was also seen at [facility] in [place] where MRIs of the cervical and lumbar spine were performed.   \par \par Currently, the patient reports cervical neck pain that radiates down the arms and to the hand.  He denies numbness/tingling to the UE.  He also reports a feeling of weakness to the UE.  [place] [facility] [place]

## 2019-11-12 NOTE — CONSULT LETTER
[Consult Letter:] : I had the pleasure of evaluating your patient, [unfilled]. [Dear  ___] : Dear  [unfilled], [Sincerely,] : Sincerely, [Consult Closing:] : Thank you very much for allowing me to participate in the care of this patient.  If you have any questions, please do not hesitate to contact me. [Please see my note below.] : Please see my note below. [FreeTextEntry2] : Nicole Diana MD [FreeTextEntry3] : Taqueria Espinoza DO.\par Sports Medicine \par \par Orthopaedic Surgery\par \par [facility]\par \par [address] \par [address]\[place] [address]\par \par [address] [facility] \[address] \[place] [address]\par \par Tel [phone]\par Fax [phone]\par \par

## 2020-02-20 NOTE — ED PROVIDER NOTE - PHYSICAL EXAMINATION
GCS 15  PT in C collar; switched to Svetlana with inline stabilization of neck.   Pelvis stable.   TTP at R knee;   No gross deformities noted.   2+ pulses to all four extremities. 6

## 2021-05-28 ENCOUNTER — APPOINTMENT (OUTPATIENT)
Dept: GASTROENTEROLOGY | Facility: CLINIC | Age: 61
End: 2021-05-28
Payer: MEDICAID

## 2021-05-28 VITALS
HEIGHT: 63 IN | BODY MASS INDEX: 32.6 KG/M2 | WEIGHT: 184 LBS | RESPIRATION RATE: 14 BRPM | DIASTOLIC BLOOD PRESSURE: 100 MMHG | TEMPERATURE: 97.4 F | OXYGEN SATURATION: 98 % | HEART RATE: 91 BPM | SYSTOLIC BLOOD PRESSURE: 157 MMHG

## 2021-05-28 DIAGNOSIS — I10 ESSENTIAL (PRIMARY) HYPERTENSION: ICD-10-CM

## 2021-05-28 DIAGNOSIS — E66.9 OBESITY, UNSPECIFIED: ICD-10-CM

## 2021-05-28 LAB
ALBUMIN SERPL ELPH-MCNC: 4.7 G/DL
ALP BLD-CCNC: 61 U/L
ALT SERPL-CCNC: 37 U/L
ANION GAP SERPL CALC-SCNC: 12 MMOL/L
AST SERPL-CCNC: 24 U/L
BASOPHILS # BLD AUTO: 0.06 K/UL
BASOPHILS NFR BLD AUTO: 0.8 %
BILIRUB SERPL-MCNC: 0.4 MG/DL
BUN SERPL-MCNC: 14 MG/DL
CALCIUM SERPL-MCNC: 9.6 MG/DL
CHLORIDE SERPL-SCNC: 94 MMOL/L
CO2 SERPL-SCNC: 28 MMOL/L
CREAT SERPL-MCNC: 0.91 MG/DL
EOSINOPHIL # BLD AUTO: 0.13 K/UL
EOSINOPHIL NFR BLD AUTO: 1.7 %
GLUCOSE SERPL-MCNC: 101 MG/DL
HCT VFR BLD CALC: 48.7 %
HGB BLD-MCNC: 16.6 G/DL
IMM GRANULOCYTES NFR BLD AUTO: 0.5 %
INR PPP: 1.06 RATIO
LYMPHOCYTES # BLD AUTO: 1.94 K/UL
LYMPHOCYTES NFR BLD AUTO: 25.5 %
MAN DIFF?: NORMAL
MCHC RBC-ENTMCNC: 31.3 PG
MCHC RBC-ENTMCNC: 34.1 GM/DL
MCV RBC AUTO: 91.9 FL
MONOCYTES # BLD AUTO: 0.62 K/UL
MONOCYTES NFR BLD AUTO: 8.1 %
NEUTROPHILS # BLD AUTO: 4.82 K/UL
NEUTROPHILS NFR BLD AUTO: 63.4 %
PLATELET # BLD AUTO: 226 K/UL
POTASSIUM SERPL-SCNC: 5.2 MMOL/L
PROT SERPL-MCNC: 7.2 G/DL
PT BLD: 12.5 SEC
RBC # BLD: 5.3 M/UL
RBC # FLD: 12.9 %
SODIUM SERPL-SCNC: 135 MMOL/L
WBC # FLD AUTO: 7.61 K/UL

## 2021-05-28 PROCEDURE — 99204 OFFICE O/P NEW MOD 45 MIN: CPT

## 2021-05-28 PROCEDURE — 82270 OCCULT BLOOD FECES: CPT

## 2021-05-28 RX ORDER — METFORMIN HYDROCHLORIDE 500 MG/1
500 TABLET, COATED ORAL
Refills: 0 | Status: ACTIVE | COMMUNITY

## 2021-05-28 NOTE — HISTORY OF PRESENT ILLNESS
[FreeTextEntry1] : 60-year-old  male non-English-speaking.  History via [employer]ISLINN who serves as .\par History of hypertension and hyperlipidemia.  Only known medication Metformin.  Takes another blood pressure medication.  No prior: Cancer screening.\par Relates that bowel movements are normal regular and daily.  No rectal bleeding.  No abdominal pain weight loss or change in pattern of bowel movements.  No anemia.  No upper abdominal pain nausea or vomiting.  Last set of blood work with PCP was okay 3 months ago.  Results are not available currently.\par No cardiopulmonary symptomatology.\par No recent hospitalizations.  Patient has received the first dose of the Pfizer vaccination for Covid.  He is due for dose #2 on 6/15.  Patient has not been stricken with Covid virus.\par No other medical issues.

## 2021-05-28 NOTE — PHYSICAL EXAM
[General Appearance - Alert] : alert [General Appearance - In No Acute Distress] : in no acute distress [Sclera] : the sclera and conjunctiva were normal [PERRL With Normal Accommodation] : pupils were equal in size, round, and reactive to light [Extraocular Movements] : extraocular movements were intact [Outer Ear] : the ears and nose were normal in appearance [Neck Appearance] : the appearance of the neck was normal [Oropharynx] : the oropharynx was normal [Neck Cervical Mass (___cm)] : no neck mass was observed [Jugular Venous Distention Increased] : there was no jugular-venous distention [Thyroid Nodule] : there were no palpable thyroid nodules [Thyroid Diffuse Enlargement] : the thyroid was not enlarged [Auscultation Breath Sounds / Voice Sounds] : lungs were clear to auscultation bilaterally [Heart Rate And Rhythm] : heart rate was normal and rhythm regular [Heart Sounds] : normal S1 and S2 [Heart Sounds Gallop] : no gallops [Murmurs] : no murmurs [Heart Sounds Pericardial Friction Rub] : no pericardial rub [Bowel Sounds] : normal bowel sounds [Abdomen Soft] : soft [Abdomen Tenderness] : non-tender [Abdomen Mass (___ Cm)] : no abdominal mass palpated [Normal Sphincter Tone] : normal sphincter tone [No Rectal Mass] : no rectal mass [Internal Hemorrhoid] : no internal hemorrhoids [External Hemorrhoid] : no external hemorrhoids [Occult Blood Positive] : stool was negative for occult blood [FreeTextEntry1] : No lesions.  Normal tone.  Normal prostate.  Brown stool.  Occult blood negative. [No CVA Tenderness] : no ~M costovertebral angle tenderness [No Spinal Tenderness] : no spinal tenderness [Abnormal Walk] : normal gait [Nail Clubbing] : no clubbing  or cyanosis of the fingernails [Musculoskeletal - Swelling] : no joint swelling seen [Motor Tone] : muscle strength and tone were normal [Skin Color & Pigmentation] : normal skin color and pigmentation [Skin Turgor] : normal skin turgor [] : no rash

## 2021-05-28 NOTE — ASSESSMENT
[FreeTextEntry1] : Family history is negative for colorectal neoplasia.  Patient is asymptomatic from a GI point of view.  Physical exam is normal.  Digital rectal exam is normal.  Stool for occult blood is negative.  Therefore patient is at average risk for development of colorectal neoplasia.  Therefore a screening colonoscopy was offered to the patient.  The procedure, its risks benefits and prep, were explained to the patient, who understands and is agreeable to proceed.  ASA #2.  Blood work will be performed prior to the procedure.  Old blood work will be reviewed when available.  MiraLAX prep will be utilized.  Patient was advised to hold his Metformin on the preparation day.  Patient is advised to take his blood pressure medications as usual on prep day and procedure day.  \par Patient is in optimal medical condition to undergo planned procedure.  Arrangements made.  Results to follow.  No clearance is required.

## 2021-07-12 ENCOUNTER — TRANSCRIPTION ENCOUNTER (OUTPATIENT)
Age: 61
End: 2021-07-12

## 2021-07-13 ENCOUNTER — RESULT REVIEW (OUTPATIENT)
Age: 61
End: 2021-07-13

## 2021-07-13 ENCOUNTER — OUTPATIENT (OUTPATIENT)
Dept: OUTPATIENT SERVICES | Facility: HOSPITAL | Age: 61
LOS: 1 days | End: 2021-07-13
Payer: COMMERCIAL

## 2021-07-13 ENCOUNTER — APPOINTMENT (OUTPATIENT)
Dept: GASTROENTEROLOGY | Facility: GI CENTER | Age: 61
End: 2021-07-13
Payer: MEDICAID

## 2021-07-13 DIAGNOSIS — E78.00 PURE HYPERCHOLESTEROLEMIA, UNSPECIFIED: ICD-10-CM

## 2021-07-13 DIAGNOSIS — Z12.11 ENCOUNTER FOR SCREENING FOR MALIGNANT NEOPLASM OF COLON: ICD-10-CM

## 2021-07-13 DIAGNOSIS — E11.9 TYPE 2 DIABETES MELLITUS W/OUT COMPLICATIONS: ICD-10-CM

## 2021-07-13 DIAGNOSIS — I10 ESSENTIAL (PRIMARY) HYPERTENSION: ICD-10-CM

## 2021-07-13 LAB — GLUCOSE BLDC GLUCOMTR-MCNC: 141 MG/DL — HIGH (ref 70–99)

## 2021-07-13 PROCEDURE — 88305 TISSUE EXAM BY PATHOLOGIST: CPT

## 2021-07-13 PROCEDURE — 45385 COLONOSCOPY W/LESION REMOVAL: CPT | Mod: 33

## 2021-07-13 PROCEDURE — 45380 COLONOSCOPY AND BIOPSY: CPT | Mod: 59,33

## 2021-07-13 PROCEDURE — 45380 COLONOSCOPY AND BIOPSY: CPT | Mod: PT,XS

## 2021-07-13 PROCEDURE — 45385 COLONOSCOPY W/LESION REMOVAL: CPT | Mod: PT

## 2021-07-13 PROCEDURE — 82962 GLUCOSE BLOOD TEST: CPT

## 2021-07-13 PROCEDURE — 88305 TISSUE EXAM BY PATHOLOGIST: CPT | Mod: 26

## 2021-07-13 NOTE — HISTORY OF PRESENT ILLNESS
[FreeTextEntry1] : 61 yo Hisp Male c Htn, DM2.  On meds.  Avg risk for Crca.  Remains asymptomatic.  All recent BW is normal.\par Covid vaccinated.  No new medical issues.

## 2021-07-13 NOTE — PHYSICAL EXAM
[General Appearance - Alert] : alert [General Appearance - In No Acute Distress] : in no acute distress [Sclera] : the sclera and conjunctiva were normal [PERRL With Normal Accommodation] : pupils were equal in size, round, and reactive to light [Extraocular Movements] : extraocular movements were intact [Outer Ear] : the ears and nose were normal in appearance [Oropharynx] : the oropharynx was normal [Neck Appearance] : the appearance of the neck was normal [Neck Cervical Mass (___cm)] : no neck mass was observed [Jugular Venous Distention Increased] : there was no jugular-venous distention [Thyroid Diffuse Enlargement] : the thyroid was not enlarged [Thyroid Nodule] : there were no palpable thyroid nodules [Auscultation Breath Sounds / Voice Sounds] : lungs were clear to auscultation bilaterally [Heart Rate And Rhythm] : heart rate was normal and rhythm regular [Heart Sounds] : normal S1 and S2 [Heart Sounds Gallop] : no gallops [Murmurs] : no murmurs [Heart Sounds Pericardial Friction Rub] : no pericardial rub [Bowel Sounds] : normal bowel sounds [Abdomen Soft] : soft [Abdomen Tenderness] : non-tender [Abdomen Mass (___ Cm)] : no abdominal mass palpated [Normal Sphincter Tone] : normal sphincter tone [No Rectal Mass] : no rectal mass [Internal Hemorrhoid] : no internal hemorrhoids [External Hemorrhoid] : no external hemorrhoids [Occult Blood Positive] : stool was negative for occult blood [FreeTextEntry1] : No lesions.  Normal tone.  Normal prostate.  Brown stool.  Occult blood negative. [No CVA Tenderness] : no ~M costovertebral angle tenderness [No Spinal Tenderness] : no spinal tenderness [Abnormal Walk] : normal gait [Nail Clubbing] : no clubbing  or cyanosis of the fingernails [Musculoskeletal - Swelling] : no joint swelling seen [Motor Tone] : muscle strength and tone were normal [Skin Color & Pigmentation] : normal skin color and pigmentation [Skin Turgor] : normal skin turgor [] : no rash

## 2021-07-16 LAB — SURGICAL PATHOLOGY STUDY: SIGNIFICANT CHANGE UP

## 2022-11-01 NOTE — DISCHARGE NOTE ADULT - NS AS DC PROVIDER CONTACT Y/N MULTI
Nutrition Assessment   Assessment Type: Initial assessment  Reason for Visit: MST  Referral Requested By: Nurse  Chart Medications Lab Results Reviewed:  Yes    Nutritional Risk Factors: Low BMI and Malnutrition    Current Diet Order: Regular  Diet Tolerance: tolerating  Food Allergies: no  Priority Points: Status 2    Demographic/Anthropometrics Information  Gender: female   Patient Age: 82 year old  Height:    Ht Readings from Last 1 Encounters:   10/31/22 5' 1\" (1.549 m)      Weight:   Wt Readings from Last 1 Encounters:   11/01/22 40.9 kg      BMI:   BMI Readings from Last 1 Encounters:   11/01/22 17.04 kg/m²     Ideal Body Wt: Ideal body weight: 47.8 kg  Usual Body Weight: 49.8 kg per EMR 9/26/2022  % Weight change: 18 % wt loss x 1 month  Reason for Weight Change: Decreased intake    Physical Appearance: Emaciated, Muscle wasting and Underweight  Weight Classification: Underweight (BMI < 18.5)    Nutrition Diagnosis (PES)  Malnutrition related to Decreased intake as evidenced by Fat pad wasting, Loss of fat mass, Loss of muscle mass, Temporal wasting and Weight loss over time    Nutrition Plan  Recommended Nutrition Intervention: Coordination of nutrition care by a nutrition professional, Meals and snacks  and nutrition supplemental therapy  Monitor: Biochemical data, medical tests, procedures, Food and beverage intake and Weight    Discharge Needs: Supplements  Care Plan Discussed With: Patient  Goals: Increase oral intake to >/=50%of meals and supplements, Maintain or improve weight and Meet >/= 75% of estimated needs  Goal Progress: Initiated  Timeframe to Achieve Goal: 1-3 days    Dietitian Notes/Impressions/Recommendations:  11/1/2022: Patient presents with altered mental status. Pt recently admitted and found to be malnourished at that time. Pt receives peritoneal dialysis. The patient has severe temporal, clavicle wasting, square shoulders and has upper body wasting. The patient agreed to take a magic  cup at lunch and a chocolate high protein low carbohydrate supplement with dinner.The patient has lost 20 pounds a little over the past month.  Weight Hx: significant in timeframe NFPE: severe muscle and fat depletion throughout    TREATMENT PLAN: Monitoring & Interventions   1. Diet: Recommend General diet  2. Oral Nutrition Supplement: Recommend  magic cup at lunch and a chocolate high protein low carbohydrate supplement with dinner.   3. RD monitoring intake trends, weight, labs. Further recommendations based on clinical course.     Malnutrition Status: Pt with Chronic Severe Protein Calorie Malnutrition as evidenced by </= to 75% of estimated requirements for >/= to 1 month, wt loss >5%/month, severe body fat depletion, severe muscle mass depletion, reduced functional capacity.   Yes

## 2023-08-27 ENCOUNTER — TRANSCRIPTION ENCOUNTER (OUTPATIENT)
Age: 63
End: 2023-08-27

## 2023-08-27 ENCOUNTER — INPATIENT (INPATIENT)
Facility: HOSPITAL | Age: 63
LOS: 51 days | Discharge: NURSING, REHAB OR LONG-TERM CARE | DRG: 23 | End: 2023-10-18
Attending: STUDENT IN AN ORGANIZED HEALTH CARE EDUCATION/TRAINING PROGRAM | Admitting: SPECIALIST
Payer: COMMERCIAL

## 2023-08-27 ENCOUNTER — APPOINTMENT (OUTPATIENT)
Dept: NEUROLOGY | Facility: HOSPITAL | Age: 63
End: 2023-08-27

## 2023-08-27 VITALS
TEMPERATURE: 98 F | DIASTOLIC BLOOD PRESSURE: 85 MMHG | WEIGHT: 179.02 LBS | RESPIRATION RATE: 20 BRPM | HEIGHT: 67 IN | OXYGEN SATURATION: 97 % | SYSTOLIC BLOOD PRESSURE: 159 MMHG | HEART RATE: 106 BPM

## 2023-08-27 DIAGNOSIS — R09.89 OTHER SPECIFIED SYMPTOMS AND SIGNS INVOLVING THE CIRCULATORY AND RESPIRATORY SYSTEMS: ICD-10-CM

## 2023-08-27 DIAGNOSIS — I69.30 UNSPECIFIED SEQUELAE OF CEREBRAL INFARCTION: ICD-10-CM

## 2023-08-27 LAB
ALBUMIN SERPL ELPH-MCNC: 4.5 G/DL — SIGNIFICANT CHANGE UP (ref 3.3–5.2)
ALP SERPL-CCNC: 62 U/L — SIGNIFICANT CHANGE UP (ref 40–120)
ALT FLD-CCNC: 38 U/L — SIGNIFICANT CHANGE UP
ANION GAP SERPL CALC-SCNC: 15 MMOL/L — SIGNIFICANT CHANGE UP (ref 5–17)
APTT BLD: 31.5 SEC — SIGNIFICANT CHANGE UP (ref 24.5–35.6)
AST SERPL-CCNC: 33 U/L — SIGNIFICANT CHANGE UP
BASOPHILS # BLD AUTO: 0.07 K/UL — SIGNIFICANT CHANGE UP (ref 0–0.2)
BASOPHILS NFR BLD AUTO: 0.4 % — SIGNIFICANT CHANGE UP (ref 0–2)
BILIRUB SERPL-MCNC: 0.3 MG/DL — LOW (ref 0.4–2)
BUN SERPL-MCNC: 16.3 MG/DL — SIGNIFICANT CHANGE UP (ref 8–20)
CALCIUM SERPL-MCNC: 10 MG/DL — SIGNIFICANT CHANGE UP (ref 8.4–10.5)
CHLORIDE SERPL-SCNC: 91 MMOL/L — LOW (ref 96–108)
CK MB CFR SERPL CALC: 6.6 NG/ML — SIGNIFICANT CHANGE UP (ref 0–6.7)
CK SERPL-CCNC: 795 U/L — HIGH (ref 30–200)
CO2 SERPL-SCNC: 25 MMOL/L — SIGNIFICANT CHANGE UP (ref 22–29)
CREAT SERPL-MCNC: 0.97 MG/DL — SIGNIFICANT CHANGE UP (ref 0.5–1.3)
EGFR: 88 ML/MIN/1.73M2 — SIGNIFICANT CHANGE UP
EOSINOPHIL # BLD AUTO: 0.05 K/UL — SIGNIFICANT CHANGE UP (ref 0–0.5)
EOSINOPHIL NFR BLD AUTO: 0.3 % — SIGNIFICANT CHANGE UP (ref 0–6)
GLUCOSE SERPL-MCNC: 179 MG/DL — HIGH (ref 70–99)
HCT VFR BLD CALC: 46.1 % — SIGNIFICANT CHANGE UP (ref 39–50)
HGB BLD-MCNC: 16.4 G/DL — SIGNIFICANT CHANGE UP (ref 13–17)
IMM GRANULOCYTES NFR BLD AUTO: 0.6 % — SIGNIFICANT CHANGE UP (ref 0–0.9)
INR BLD: 1.03 RATIO — SIGNIFICANT CHANGE UP (ref 0.85–1.18)
LYMPHOCYTES # BLD AUTO: 1.34 K/UL — SIGNIFICANT CHANGE UP (ref 1–3.3)
LYMPHOCYTES # BLD AUTO: 8.4 % — LOW (ref 13–44)
MCHC RBC-ENTMCNC: 31.9 PG — SIGNIFICANT CHANGE UP (ref 27–34)
MCHC RBC-ENTMCNC: 35.6 GM/DL — SIGNIFICANT CHANGE UP (ref 32–36)
MCV RBC AUTO: 89.7 FL — SIGNIFICANT CHANGE UP (ref 80–100)
MONOCYTES # BLD AUTO: 1.15 K/UL — HIGH (ref 0–0.9)
MONOCYTES NFR BLD AUTO: 7.2 % — SIGNIFICANT CHANGE UP (ref 2–14)
MRSA PCR RESULT.: SIGNIFICANT CHANGE UP
NEUTROPHILS # BLD AUTO: 13.21 K/UL — HIGH (ref 1.8–7.4)
NEUTROPHILS NFR BLD AUTO: 83.1 % — HIGH (ref 43–77)
PLATELET # BLD AUTO: 216 K/UL — SIGNIFICANT CHANGE UP (ref 150–400)
POTASSIUM SERPL-MCNC: 3.5 MMOL/L — SIGNIFICANT CHANGE UP (ref 3.5–5.3)
POTASSIUM SERPL-SCNC: 3.5 MMOL/L — SIGNIFICANT CHANGE UP (ref 3.5–5.3)
PROT SERPL-MCNC: 7.4 G/DL — SIGNIFICANT CHANGE UP (ref 6.6–8.7)
PROTHROM AB SERPL-ACNC: 11.4 SEC — SIGNIFICANT CHANGE UP (ref 9.5–13)
RBC # BLD: 5.14 M/UL — SIGNIFICANT CHANGE UP (ref 4.2–5.8)
RBC # FLD: 12.8 % — SIGNIFICANT CHANGE UP (ref 10.3–14.5)
S AUREUS DNA NOSE QL NAA+PROBE: SIGNIFICANT CHANGE UP
SODIUM SERPL-SCNC: 131 MMOL/L — LOW (ref 135–145)
TROPONIN T SERPL-MCNC: <0.01 NG/ML — SIGNIFICANT CHANGE UP (ref 0–0.06)
WBC # BLD: 15.91 K/UL — HIGH (ref 3.8–10.5)
WBC # FLD AUTO: 15.91 K/UL — HIGH (ref 3.8–10.5)

## 2023-08-27 PROCEDURE — 71045 X-RAY EXAM CHEST 1 VIEW: CPT | Mod: 26

## 2023-08-27 PROCEDURE — 70496 CT ANGIOGRAPHY HEAD: CPT | Mod: 26

## 2023-08-27 PROCEDURE — 70496 CT ANGIOGRAPHY HEAD: CPT | Mod: 26,MA

## 2023-08-27 PROCEDURE — 61645 PERQ ART M-THROMBECT &/NFS: CPT | Mod: LT

## 2023-08-27 PROCEDURE — 93010 ELECTROCARDIOGRAM REPORT: CPT

## 2023-08-27 PROCEDURE — 70498 CT ANGIOGRAPHY NECK: CPT | Mod: 26,MA

## 2023-08-27 PROCEDURE — 70450 CT HEAD/BRAIN W/O DYE: CPT | Mod: 26,MA,59

## 2023-08-27 PROCEDURE — 99291 CRITICAL CARE FIRST HOUR: CPT

## 2023-08-27 PROCEDURE — 0042T: CPT | Mod: MA

## 2023-08-27 RX ORDER — SODIUM CHLORIDE 9 MG/ML
1000 INJECTION INTRAMUSCULAR; INTRAVENOUS; SUBCUTANEOUS
Refills: 0 | Status: DISCONTINUED | OUTPATIENT
Start: 2023-08-27 | End: 2023-08-29

## 2023-08-27 RX ORDER — CHLORHEXIDINE GLUCONATE 213 G/1000ML
1 SOLUTION TOPICAL
Refills: 0 | Status: DISCONTINUED | OUTPATIENT
Start: 2023-08-27 | End: 2023-09-29

## 2023-08-27 RX ORDER — SODIUM CHLORIDE 9 MG/ML
10 INJECTION INTRAMUSCULAR; INTRAVENOUS; SUBCUTANEOUS ONCE
Refills: 0 | Status: COMPLETED | OUTPATIENT
Start: 2023-08-27 | End: 2023-08-27

## 2023-08-27 RX ORDER — HYDRALAZINE HCL 50 MG
10 TABLET ORAL
Refills: 0 | Status: DISCONTINUED | OUTPATIENT
Start: 2023-08-27 | End: 2023-09-29

## 2023-08-27 RX ORDER — TENECTEPLASE 50 MG
20 KIT INTRAVENOUS ONCE
Refills: 0 | Status: COMPLETED | OUTPATIENT
Start: 2023-08-27 | End: 2023-08-27

## 2023-08-27 RX ORDER — LABETALOL HCL 100 MG
10 TABLET ORAL
Refills: 0 | Status: DISCONTINUED | OUTPATIENT
Start: 2023-08-27 | End: 2023-09-29

## 2023-08-27 RX ADMIN — SODIUM CHLORIDE 10 MILLILITER(S): 9 INJECTION INTRAMUSCULAR; INTRAVENOUS; SUBCUTANEOUS at 12:06

## 2023-08-27 RX ADMIN — Medication 100 MILLIGRAM(S): at 21:16

## 2023-08-27 RX ADMIN — TENECTEPLASE 2880 MILLIGRAM(S): KIT at 12:15

## 2023-08-27 NOTE — ED PROVIDER NOTE - OBJECTIVE STATEMENT
JOSUE JOHNSON is a 64yo Male with PMH Hypertension, DM on orals who presents c/o stroke-like symptoms. Per son pt was walking outside around 10:15am this morning when he suddenly fell. His son helped him up and noted that he was weak on the right side and not acting normally which prompted him to come to the ED. On arrival pt w/ obvious right sided arm weakness, confusion, dysarthria, facial droop suggestive of acute stroke. PT taken immediately to CT scan.

## 2023-08-27 NOTE — DISCHARGE NOTE NURSING/CASE MANAGEMENT/SOCIAL WORK - NSDCPEFALRISK_GEN_ALL_CORE
For information on Fall & Injury Prevention, visit: [email or web address] OR  [email or web address] OR  https://www.cdc.gov/steadi/patient.html

## 2023-08-27 NOTE — ED PROVIDER NOTE - PHYSICAL EXAMINATION
Gen: altered  Head: NC/AT  Neck: trachea midline  Resp:  No distress  Abd: soft, nd, nt  Ext: no deformities  Neuro:  Alert. Not answering questions or following commands. +OBVIOUS RIGHT SIDED ARM WEAKNESS. +RIGHT SIDED FACIAL DROOP. +HEMINEGLECT   Skin:  no rash.     NIHSS: 12 as below on initial assessment  1A: 0, alert  1B: 2, neither correct  1C: 2, does not follow commands  2: 0, pt not participating  3: 0, pt not participating  4: 2, partial paralysis.   5A: 0, no drift  5B: 2, some effort  6A: 0 no drift  6B: 1, some drift  7: 0, could not assess  8: 0, could not assess  9: 1, some aphasia  10: 1, some dysarthria  11: 1, some inattention

## 2023-08-27 NOTE — DISCHARGE NOTE NURSING/CASE MANAGEMENT/SOCIAL WORK - PATIENT PORTAL LINK FT
You can access the FollowMyHealth Patient Portal offered by [facility] by registering at the following website: [email or web address]. By joining [employer]’s FollowMyHealth portal, you will also be able to view your health information using other applications (apps) compatible with our system.

## 2023-08-27 NOTE — CONSULT NOTE ADULT - SUBJECTIVE AND OBJECTIVE BOX
Preliminary note, official note pending attending review/signature.                               [facility] Stroke Team  CC: ride sided weakness and aphasia  HPI:  Patient is a 63 year old male with PMHx Hypertension, DM on oral medications who presented c/o stroke-like symptoms. Per son pt was walking outside around 10:15am on 8/27/23 when he suddenly fell. His son helped him up and noted that he was weak on the right side and not acting normally which prompted him to come to the ED. On arrival pt w/ obvious right sided arm weakness, confusion, dysarthria, facial droop suggestive of acute stroke. Code stroke initiated, found to have LM1 occlusion, given TNK @ 1215 and was taken for thrombectomy. Unable to provide ROS 2/2 aphasia.    PAST MEDICAL & SURGICAL HISTORY:  Hypertension  DM  No significant past surgical history    MEDICATIONS  (STANDING):  chlorhexidine 2% Cloths 1 Application(s) Topical <User Schedule>  clindamycin IVPB 600 milliGRAM(s) IV Intermittent every 8 hours  potassium chloride  10 mEq/100 mL IVPB 10 milliEquivalent(s) IV Intermittent every 1 hour  sodium chloride 0.9%. 1000 milliLiter(s) (75 mL/Hr) IV Continuous <Continuous>    MEDICATIONS  (PRN):  hydrALAZINE Injectable 10 milliGRAM(s) IV Push every 2 hours PRN SBP >140  labetalol Injectable 10 milliGRAM(s) IV Push every 2 hours PRN SBP >140      Allergies- No Known Allergies    SOCIAL HISTORY:  no tob,   no alcohol   no drugs    FAMILY HISTORY:    ROS: 14 point ROS negative other than what is present in HPI or below    Vital Signs Last 24 Hrs  T(C): 37.1 (28 Aug 2023 06:45), Max: 37.4 (27 Aug 2023 19:45)  T(F): 98.8 (28 Aug 2023 06:45), Max: 99.3 (27 Aug 2023 19:45)  HR: 87 (28 Aug 2023 06:45) (87 - 106)  BP: 101/68 (28 Aug 2023 06:45) (90/64 - 182/98)  BP(mean): 78 (28 Aug 2023 06:45) (72 - 106)  RR: 12 (28 Aug 2023 06:45) (8 - 21)  SpO2: 95% (28 Aug 2023 06:45) (93% - 100%)    Parameters below as of 28 Aug 2023 04:45  Patient On (Oxygen Delivery Method): nasal cannula  O2 Flow (L/min): 2    PENDING  General: NAD    Detailed Neurologic Exam:    Mental status: The patient is awake but not oriented to place, self, month, or year. Minimal verbal output and just generates sounds. Follows some commands (wiggles left toes, lifts left leg), however could be mimicking     Cranial nerves: Pupils equal and react symmetrically to light. Right gaze palsy that does not cross midline. No blink to threat on right side. Left eye ptosis. Right sided facial asymmetry     Motor:   LUE/LLE- spontaneously moves antigravity. Squeezes hand but not on command.   RUE- extends to pain, but does not spontaneously move  RLE- extends to pain, but does not spontaneously move    Sensation: Intact to light touch on LUE and LLE. Extends to noxious stimuli on RUE.     Cerebellar: Unable to assess, but appears to be no gross ataxia    Gait : deferred  [facility] SS: 23  DATE: 8/27/23  TIME: 12:50  1A: Level of consciousness (0-3): 1  1B: Questions (0-2): 2  1C: Commands (0-2): 2  2: Gaze (0-2): 1  3: Visual fields (0-3): 2  4: Facial palsy (0-3): 2  MOTOR:  5A: Left arm motor drift (0-4): 0  5B: Right arm motor drift (0-4): 3  6A: Left leg motor drift (0-4): 0  6B: Right leg motor drift (0-4): 3  7: Limb ataxia (0-2): 0  SENSORY:  8: Sensation (0-2): 0  SPEECH:  9: Language (0-3): 3  10: Dysarthria (0-2): 2  EXTINCTION:  11: Extinction/inattention (0-2): 2    TOTAL SCORE: 23    prehospital mRS=?      LABS:                         13.2   16.12 )-----------( 188      ( 28 Aug 2023 01:30 )             36.9       08-28    135  |  96  |  15.9  ----------------------------<  155<H>  3.7   |  25.0  |  0.91    Ca    8.3<L>      28 Aug 2023 01:30  Phos  4.0     08-28  Mg     1.5     08-28    TPro  7.4  /  Alb  4.5  /  TBili  0.3<L>  /  DBili  x   /  AST  33  /  ALT  38  /  AlkPhos  62  08-27      PT/INR - ( 27 Aug 2023 11:40 )   PT: 11.4 sec;   INR: 1.03 ratio         PTT - ( 27 Aug 2023 11:40 )  PTT:31.5 sec    Lipid panel: pending    HgbA1c: pending    RADIOLOGY & ADDITIONAL STUDIES (independently reviewed unless otherwise noted):   CT Brain Stroke Protocol (08.27.23 @ 11:53)   IMPRESSION:  Wedgelike low density in the left putamen and caudate (corpus striatum)   is new from the prior scan, and consistent with infarct that may be acute   given corresponding symptoms.  No acute intracranial hemorrhage.    The study was performed at 11:47 AM on 08/27/2023 and the above findings   were discussed with Dr. Betts at 12:02 PM.    CT Angio Brain Stroke Protocol  w/ IV Cont (08.27.23 @ 12:03)   IMPRESSION:  CTA head: Focal occlusion of left MCA distal M1 segment with patent but   small caliber filling of M2 segments.    CT perfusion indicates large mismatch between Tmax and CBF consistent   with ischemic penumbra.    CTA Neck: No steno-occlusive focus.    Case findings above discussed with Dr. Hu at 12:13 PM on 08/27/2023.    CT Head No Cont (08.28.23 @ 02:34)   ******PRELIMINARY REPORT******   INTERPRETATION:  Since prior CT dated 8/27/23 at 11:47 AM, interval   development of multifocal acute infarcts involving the left   parieto-temporal, left frontal, and right temporal lobes. Redemonstrated   left basal ganglia acute infarct. No evidence of hemorrhagic conversion   or herniation.    Findings discussed with MONIK Ariza by Dr. Dover via telephone at   2:52 AM on 8/28/23 with readback verification.       Preliminary note, official note pending attending review/signature.                               [facility] Stroke Team  CC: ride sided weakness and aphasia  HPI:  Patient is a 63 year old male with PMHx Hypertension, DM on oral medications who presented c/o stroke-like symptoms. Per son pt was walking outside around 10:15am on 8/27/23 when he suddenly fell. His son helped him up and noted that he was weak on the right side and not acting normally which prompted him to come to the ED. On arrival pt w/ obvious right sided arm weakness, confusion, dysarthria, facial droop suggestive of acute stroke. Code stroke initiated, found to have LM1 occlusion, given TNK @ 1215 and was taken for thrombectomy. Unable to provide ROS 2/2 aphasia.    PAST MEDICAL & SURGICAL HISTORY:  Hypertension  DM  No significant past surgical history    MEDICATIONS  (STANDING):  chlorhexidine 2% Cloths 1 Application(s) Topical <User Schedule>  clindamycin IVPB 600 milliGRAM(s) IV Intermittent every 8 hours  potassium chloride  10 mEq/100 mL IVPB 10 milliEquivalent(s) IV Intermittent every 1 hour  sodium chloride 0.9%. 1000 milliLiter(s) (75 mL/Hr) IV Continuous <Continuous>    MEDICATIONS  (PRN):  hydrALAZINE Injectable 10 milliGRAM(s) IV Push every 2 hours PRN SBP >140  labetalol Injectable 10 milliGRAM(s) IV Push every 2 hours PRN SBP >140      Allergies- No Known Allergies    SOCIAL HISTORY:  no tob,   no alcohol   no drugs    FAMILY HISTORY:    ROS: 14 point ROS negative other than what is present in HPI or below    Vital Signs Last 24 Hrs  T(C): 37.1 (28 Aug 2023 06:45), Max: 37.4 (27 Aug 2023 19:45)  T(F): 98.8 (28 Aug 2023 06:45), Max: 99.3 (27 Aug 2023 19:45)  HR: 87 (28 Aug 2023 06:45) (87 - 106)  BP: 101/68 (28 Aug 2023 06:45) (90/64 - 182/98)  BP(mean): 78 (28 Aug 2023 06:45) (72 - 106)  RR: 12 (28 Aug 2023 06:45) (8 - 21)  SpO2: 95% (28 Aug 2023 06:45) (93% - 100%)    Parameters below as of 28 Aug 2023 04:45  Patient On (Oxygen Delivery Method): nasal cannula  O2 Flow (L/min): 2    PENDING  General: NAD    Detailed Neurologic Exam:    Mental status: The patient is awake but not oriented to place, self, month, or year. Minimal verbal output and just generates sounds. Follows few simple commands(wiggles left toes), but does mimic examiner and keeps left upper extremity up).     Cranial nerves: Pupils equal and react symmetrically to light. Right gaze palsy that does not cross midline. No blink to threat on right side. Left eye ptosis. Right sided facial asymmetry     Motor:   LUE/LLE- spontaneously moves antigravity. Squeezes hand but not on command.   RUE- extends to pain, but does not spontaneously move  RLE- extends to pain, but does not spontaneously move    Sensation: Intact to light touch on LUE and LLE. Extends to noxious stimuli on RUE.     Cerebellar: Unable to assess, but appears to be no gross ataxia    Gait : deferred  [facility] SS: 23  DATE: 8/27/23  TIME: 12:50  1A: Level of consciousness (0-3): 1  1B: Questions (0-2): 2  1C: Commands (0-2): 2  2: Gaze (0-2): 1  3: Visual fields (0-3): 2  4: Facial palsy (0-3): 2  MOTOR:  5A: Left arm motor drift (0-4): 0  5B: Right arm motor drift (0-4): 3  6A: Left leg motor drift (0-4): 0  6B: Right leg motor drift (0-4): 3  7: Limb ataxia (0-2): 0  SENSORY:  8: Sensation (0-2): 0  SPEECH:  9: Language (0-3): 3  10: Dysarthria (0-2): 2  EXTINCTION:  11: Extinction/inattention (0-2): 2    TOTAL SCORE: 23    prehospital mRS=?      LABS:                         13.2   16.12 )-----------( 188      ( 28 Aug 2023 01:30 )             36.9       08-28    135  |  96  |  15.9  ----------------------------<  155<H>  3.7   |  25.0  |  0.91    Ca    8.3<L>      28 Aug 2023 01:30  Phos  4.0     08-28  Mg     1.5     08-28    TPro  7.4  /  Alb  4.5  /  TBili  0.3<L>  /  DBili  x   /  AST  33  /  ALT  38  /  AlkPhos  62  08-27      PT/INR - ( 27 Aug 2023 11:40 )   PT: 11.4 sec;   INR: 1.03 ratio         PTT - ( 27 Aug 2023 11:40 )  PTT:31.5 sec    Lipid panel: pending    HgbA1c: pending    RADIOLOGY & ADDITIONAL STUDIES (independently reviewed unless otherwise noted):   CT Brain Stroke Protocol (08.27.23 @ 11:53)   IMPRESSION:  Wedgelike low density in the left putamen and caudate (corpus striatum)   is new from the prior scan, and consistent with infarct that may be acute   given corresponding symptoms.  No acute intracranial hemorrhage.    The study was performed at 11:47 AM on 08/27/2023 and the above findings   were discussed with Dr. Betts at 12:02 PM.    CT Angio Brain Stroke Protocol  w/ IV Cont (08.27.23 @ 12:03)   IMPRESSION:  CTA head: Focal occlusion of left MCA distal M1 segment with patent but   small caliber filling of M2 segments.    CT perfusion indicates large mismatch between Tmax and CBF consistent   with ischemic penumbra.    CTA Neck: No steno-occlusive focus.    Case findings above discussed with Dr. Hu at 12:13 PM on 08/27/2023.    CT Head No Cont (08.28.23 @ 02:34)   ******PRELIMINARY REPORT******   INTERPRETATION:  Since prior CT dated 8/27/23 at 11:47 AM, interval   development of multifocal acute infarcts involving the left   parieto-temporal, left frontal, and right temporal lobes. Redemonstrated   left basal ganglia acute infarct. No evidence of hemorrhagic conversion   or herniation.    Findings discussed with MONIK Ariza by Dr. Dover via telephone at   2:52 AM on 8/28/23 with readback verification.       Preliminary note, official note pending attending review/signature.                               [facility] Stroke Team  CC: ride sided weakness and aphasia  HPI:  Patient is a 63 year old male with PMHx Hypertension, DM on oral medications who presented c/o stroke-like symptoms. Per son pt was walking outside around 10:15am on 8/27/23 when he suddenly fell. His son helped him up and noted that he was weak on the right side and not acting normally which prompted him to come to the ED. On arrival pt w/ obvious right sided arm weakness, confusion, dysarthria, facial droop suggestive of acute stroke. Code stroke initiated, found to have LM1 occlusion, given TNK @ 1215 and was taken for thrombectomy. Unable to provide ROS 2/2 aphasia.    PAST MEDICAL & SURGICAL HISTORY:  Hypertension  DM  No significant past surgical history    MEDICATIONS  (STANDING):  chlorhexidine 2% Cloths 1 Application(s) Topical <User Schedule>  clindamycin IVPB 600 milliGRAM(s) IV Intermittent every 8 hours  potassium chloride  10 mEq/100 mL IVPB 10 milliEquivalent(s) IV Intermittent every 1 hour  sodium chloride 0.9%. 1000 milliLiter(s) (75 mL/Hr) IV Continuous <Continuous>    MEDICATIONS  (PRN):  hydrALAZINE Injectable 10 milliGRAM(s) IV Push every 2 hours PRN SBP >140  labetalol Injectable 10 milliGRAM(s) IV Push every 2 hours PRN SBP >140      Allergies- No Known Allergies    SOCIAL HISTORY:  no tob,   no alcohol   no drugs    FAMILY HISTORY:    ROS: 14 point ROS negative other than what is present in HPI or below    Vital Signs Last 24 Hrs  T(C): 37.1 (28 Aug 2023 06:45), Max: 37.4 (27 Aug 2023 19:45)  T(F): 98.8 (28 Aug 2023 06:45), Max: 99.3 (27 Aug 2023 19:45)  HR: 87 (28 Aug 2023 06:45) (87 - 106)  BP: 101/68 (28 Aug 2023 06:45) (90/64 - 182/98)  BP(mean): 78 (28 Aug 2023 06:45) (72 - 106)  RR: 12 (28 Aug 2023 06:45) (8 - 21)  SpO2: 95% (28 Aug 2023 06:45) (93% - 100%)    Parameters below as of 28 Aug 2023 04:45  Patient On (Oxygen Delivery Method): nasal cannula  O2 Flow (L/min): 2    PENDING  General: NAD    Detailed Neurologic Exam:    Mental status: The patient is awake but not oriented to place, self, month, or year. Minimal verbal output and just generates sounds. Follows few simple commands(wiggles left toes), but does mimic examiner and keeps left upper extremity up).     Cranial nerves: Pupils equal and react symmetrically to light. Right gaze palsy that does not cross midline. No blink to threat on right side. Left eye ptosis. Right sided facial asymmetry     Motor:   LUE/LLE- spontaneously moves antigravity. Squeezes hand but not on command.   RUE- extends to pain, but does not spontaneously move  RLE- triple flexion    Sensation: Intact to light touch on LUE and LLE. Extends to noxious stimuli on RUE.     Cerebellar: Unable to assess, but appears to be no gross ataxia    Gait : deferred  [facility] SS: 23  DATE: 8/27/23  TIME: 12:50  1A: Level of consciousness (0-3): 1  1B: Questions (0-2): 2  1C: Commands (0-2): 2  2: Gaze (0-2): 1  3: Visual fields (0-3): 2  4: Facial palsy (0-3): 2  MOTOR:  5A: Left arm motor drift (0-4): 0  5B: Right arm motor drift (0-4): 3  6A: Left leg motor drift (0-4): 0  6B: Right leg motor drift (0-4): 3  7: Limb ataxia (0-2): 0  SENSORY:  8: Sensation (0-2): 0  SPEECH:  9: Language (0-3): 3  10: Dysarthria (0-2): 2  EXTINCTION:  11: Extinction/inattention (0-2): 2    TOTAL SCORE: 23    prehospital mRS=?      LABS:                         13.2   16.12 )-----------( 188      ( 28 Aug 2023 01:30 )             36.9       08-28    135  |  96  |  15.9  ----------------------------<  155<H>  3.7   |  25.0  |  0.91    Ca    8.3<L>      28 Aug 2023 01:30  Phos  4.0     08-28  Mg     1.5     08-28    TPro  7.4  /  Alb  4.5  /  TBili  0.3<L>  /  DBili  x   /  AST  33  /  ALT  38  /  AlkPhos  62  08-27      PT/INR - ( 27 Aug 2023 11:40 )   PT: 11.4 sec;   INR: 1.03 ratio         PTT - ( 27 Aug 2023 11:40 )  PTT:31.5 sec    Lipid panel: pending    HgbA1c: pending    RADIOLOGY & ADDITIONAL STUDIES (independently reviewed unless otherwise noted):   CT Brain Stroke Protocol (08.27.23 @ 11:53)   IMPRESSION:  Wedgelike low density in the left putamen and caudate (corpus striatum)   is new from the prior scan, and consistent with infarct that may be acute   given corresponding symptoms.  No acute intracranial hemorrhage.    The study was performed at 11:47 AM on 08/27/2023 and the above findings   were discussed with Dr. Betts at 12:02 PM.    CT Angio Brain Stroke Protocol  w/ IV Cont (08.27.23 @ 12:03)   IMPRESSION:  CTA head: Focal occlusion of left MCA distal M1 segment with patent but   small caliber filling of M2 segments.    CT perfusion indicates large mismatch between Tmax and CBF consistent   with ischemic penumbra.    CTA Neck: No steno-occlusive focus.    Case findings above discussed with Dr. Hu at 12:13 PM on 08/27/2023.    CT Head No Cont (08.28.23 @ 02:34)   ******PRELIMINARY REPORT******   INTERPRETATION:  Since prior CT dated 8/27/23 at 11:47 AM, interval   development of multifocal acute infarcts involving the left   parieto-temporal, left frontal, and right temporal lobes. Redemonstrated   left basal ganglia acute infarct. No evidence of hemorrhagic conversion   or herniation.    Findings discussed with MONIK Ariza by Dr. Dover via telephone at   2:52 AM on 8/28/23 with readback verification.       Preliminary note, official note pending attending review/signature.                               [facility] Stroke Team  CC: ride sided weakness and aphasia  HPI:  Patient is a 63 year old male with PMHx Hypertension, DM on oral medications who presented c/o stroke-like symptoms. Per son pt was walking outside around 10:15am on 8/27/23 when he suddenly fell. His son helped him up and noted that he was weak on the right side and not acting normally which prompted him to come to the ED. On arrival pt w/ obvious right sided arm weakness, confusion, dysarthria, facial droop suggestive of acute stroke. Code stroke initiated, found to have LM1 occlusion, given TNK @ 1215 and was taken for thrombectomy. Unable to provide ROS 2/2 aphasia.    PAST MEDICAL & SURGICAL HISTORY:  Hypertension  DM  No significant past surgical history    MEDICATIONS  (STANDING):  chlorhexidine 2% Cloths 1 Application(s) Topical <User Schedule>  clindamycin IVPB 600 milliGRAM(s) IV Intermittent every 8 hours  potassium chloride  10 mEq/100 mL IVPB 10 milliEquivalent(s) IV Intermittent every 1 hour  sodium chloride 0.9%. 1000 milliLiter(s) (75 mL/Hr) IV Continuous <Continuous>    MEDICATIONS  (PRN):  hydrALAZINE Injectable 10 milliGRAM(s) IV Push every 2 hours PRN SBP >140  labetalol Injectable 10 milliGRAM(s) IV Push every 2 hours PRN SBP >140      Allergies- No Known Allergies    SOCIAL HISTORY:  no tob,   no alcohol   no drugs    FAMILY HISTORY:    ROS: 14 point ROS negative other than what is present in HPI or below    Vital Signs Last 24 Hrs  T(C): 37.1 (28 Aug 2023 06:45), Max: 37.4 (27 Aug 2023 19:45)  T(F): 98.8 (28 Aug 2023 06:45), Max: 99.3 (27 Aug 2023 19:45)  HR: 87 (28 Aug 2023 06:45) (87 - 106)  BP: 101/68 (28 Aug 2023 06:45) (90/64 - 182/98)  BP(mean): 78 (28 Aug 2023 06:45) (72 - 106)  RR: 12 (28 Aug 2023 06:45) (8 - 21)  SpO2: 95% (28 Aug 2023 06:45) (93% - 100%)    Parameters below as of 28 Aug 2023 04:45  Patient On (Oxygen Delivery Method): nasal cannula  O2 Flow (L/min): 2    PENDING  General: NAD    Detailed Neurologic Exam:    Mental status: The patient is awake but not oriented to place, self, month, or year. Minimal verbal output and just generates sounds. Follows few simple commands(wiggles left toes), but does mimic examiner and keeps left upper extremity up). Mixed aphasia    Cranial nerves: Pupils equal and react symmetrically to light. Right gaze palsy that does not cross midline. No blink to threat on right side. Left eye ptosis. Right sided facial asymmetry     Motor:   LUE/LLE- spontaneously moves antigravity. Squeezes hand but not on command.   RUE- extends to pain, but does not spontaneously move  RLE- triple flexion    Sensation: Intact to light touch on LUE and LLE. Extends to noxious stimuli on RUE.     Cerebellar: Unable to assess, but appears to be no gross ataxia    Gait : deferred  [facility] SS: 23  DATE: 8/27/23  TIME: 12:50  1A: Level of consciousness (0-3): 1  1B: Questions (0-2): 2  1C: Commands (0-2): 2  2: Gaze (0-2): 1  3: Visual fields (0-3): 2  4: Facial palsy (0-3): 2  MOTOR:  5A: Left arm motor drift (0-4): 0  5B: Right arm motor drift (0-4): 3  6A: Left leg motor drift (0-4): 0  6B: Right leg motor drift (0-4): 3  7: Limb ataxia (0-2): 0  SENSORY:  8: Sensation (0-2): 0  SPEECH:  9: Language (0-3): 3  10: Dysarthria (0-2): 2  EXTINCTION:  11: Extinction/inattention (0-2): 2    TOTAL SCORE: 23    prehospital mRS=?      LABS:                         13.2   16.12 )-----------( 188      ( 28 Aug 2023 01:30 )             36.9       08-28    135  |  96  |  15.9  ----------------------------<  155<H>  3.7   |  25.0  |  0.91    Ca    8.3<L>      28 Aug 2023 01:30  Phos  4.0     08-28  Mg     1.5     08-28    TPro  7.4  /  Alb  4.5  /  TBili  0.3<L>  /  DBili  x   /  AST  33  /  ALT  38  /  AlkPhos  62  08-27      PT/INR - ( 27 Aug 2023 11:40 )   PT: 11.4 sec;   INR: 1.03 ratio         PTT - ( 27 Aug 2023 11:40 )  PTT:31.5 sec    Lipid panel: pending    HgbA1c: pending    RADIOLOGY & ADDITIONAL STUDIES (independently reviewed unless otherwise noted):   CT Brain Stroke Protocol (08.27.23 @ 11:53)   IMPRESSION:  Wedgelike low density in the left putamen and caudate (corpus striatum)   is new from the prior scan, and consistent with infarct that may be acute   given corresponding symptoms.  No acute intracranial hemorrhage.    The study was performed at 11:47 AM on 08/27/2023 and the above findings   were discussed with Dr. Betts at 12:02 PM.    CT Angio Brain Stroke Protocol  w/ IV Cont (08.27.23 @ 12:03)   IMPRESSION:  CTA head: Focal occlusion of left MCA distal M1 segment with patent but   small caliber filling of M2 segments.    CT perfusion indicates large mismatch between Tmax and CBF consistent   with ischemic penumbra.    CTA Neck: No steno-occlusive focus.    Case findings above discussed with Dr. Hu at 12:13 PM on 08/27/2023.    CT Head No Cont (08.28.23 @ 02:34)   ******PRELIMINARY REPORT******   INTERPRETATION:  Since prior CT dated 8/27/23 at 11:47 AM, interval   development of multifocal acute infarcts involving the left   parieto-temporal, left frontal, and right temporal lobes. Redemonstrated   left basal ganglia acute infarct. No evidence of hemorrhagic conversion   or herniation.    Findings discussed with MONIK Ariza by Dr. Dover via telephone at   2:52 AM on 8/28/23 with readback verification.                                  [facility] Stroke Team  CC: ride sided weakness and aphasia  HPI:  Patient is a 63 year old male with PMHx Hypertension, DM on oral medications who presented c/o stroke-like symptoms. Per son pt was walking outside around 10:15am on 8/27/23 when he suddenly fell. His son helped him up and noted that he was weak on the right side and not acting normally which prompted him to come to the ED. On arrival pt w/ obvious right sided arm weakness, confusion, dysarthria, facial droop suggestive of acute stroke. Code stroke initiated, found to have LM1 occlusion, given TNK @ 1215 and was taken for thrombectomy. Unable to provide ROS 2/2 aphasia.    PAST MEDICAL & SURGICAL HISTORY:  Hypertension  DM  No significant past surgical history    MEDICATIONS  (STANDING):  chlorhexidine 2% Cloths 1 Application(s) Topical <User Schedule>  clindamycin IVPB 600 milliGRAM(s) IV Intermittent every 8 hours  potassium chloride  10 mEq/100 mL IVPB 10 milliEquivalent(s) IV Intermittent every 1 hour  sodium chloride 0.9%. 1000 milliLiter(s) (75 mL/Hr) IV Continuous <Continuous>    MEDICATIONS  (PRN):  hydrALAZINE Injectable 10 milliGRAM(s) IV Push every 2 hours PRN SBP >140  labetalol Injectable 10 milliGRAM(s) IV Push every 2 hours PRN SBP >140      Allergies- No Known Allergies    SOCIAL HISTORY:  no tob,   no alcohol   no drugs    FAMILY HISTORY:    ROS: 14 point ROS negative other than what is present in HPI or below    Vital Signs Last 24 Hrs  T(C): 37.1 (28 Aug 2023 06:45), Max: 37.4 (27 Aug 2023 19:45)  T(F): 98.8 (28 Aug 2023 06:45), Max: 99.3 (27 Aug 2023 19:45)  HR: 87 (28 Aug 2023 06:45) (87 - 106)  BP: 101/68 (28 Aug 2023 06:45) (90/64 - 182/98)  BP(mean): 78 (28 Aug 2023 06:45) (72 - 106)  RR: 12 (28 Aug 2023 06:45) (8 - 21)  SpO2: 95% (28 Aug 2023 06:45) (93% - 100%)    Parameters below as of 28 Aug 2023 04:45  Patient On (Oxygen Delivery Method): nasal cannula  O2 Flow (L/min): 2    PENDING  General: NAD    Detailed Neurologic Exam:    Mental status: The patient is awake but not oriented to place, self, month, or year. Minimal verbal output and just generates sounds. Follows few simple commands(wiggles left toes), but does mimic examiner and keeps left upper extremity up). Mixed aphasia    Cranial nerves: Pupils equal and react symmetrically to light. Right gaze palsy that does not cross midline. No blink to threat on right side. Left eye ptosis. Right sided facial asymmetry     Motor:   LUE/LLE- spontaneously moves antigravity. Squeezes hand but not on command.   RUE- extends to pain, but does not spontaneously move  RLE- triple flexion    Sensation: Intact to light touch on LUE and LLE. Extends to noxious stimuli on RUE.     Cerebellar: Unable to assess, but appears to be no gross ataxia    Gait : deferred  [facility] SS: 23  DATE: 8/27/23  TIME: 12:50  1A: Level of consciousness (0-3): 1  1B: Questions (0-2): 2  1C: Commands (0-2): 2  2: Gaze (0-2): 1  3: Visual fields (0-3): 2  4: Facial palsy (0-3): 2  MOTOR:  5A: Left arm motor drift (0-4): 0  5B: Right arm motor drift (0-4): 3  6A: Left leg motor drift (0-4): 0  6B: Right leg motor drift (0-4): 3  7: Limb ataxia (0-2): 0  SENSORY:  8: Sensation (0-2): 0  SPEECH:  9: Language (0-3): 3  10: Dysarthria (0-2): 2  EXTINCTION:  11: Extinction/inattention (0-2): 2    TOTAL SCORE: 23    prehospital mRS=0      LABS:                         13.2   16.12 )-----------( 188      ( 28 Aug 2023 01:30 )             36.9       08-28    135  |  96  |  15.9  ----------------------------<  155<H>  3.7   |  25.0  |  0.91    Ca    8.3<L>      28 Aug 2023 01:30  Phos  4.0     08-28  Mg     1.5     08-28    TPro  7.4  /  Alb  4.5  /  TBili  0.3<L>  /  DBili  x   /  AST  33  /  ALT  38  /  AlkPhos  62  08-27      PT/INR - ( 27 Aug 2023 11:40 )   PT: 11.4 sec;   INR: 1.03 ratio         PTT - ( 27 Aug 2023 11:40 )  PTT:31.5 sec    Lipid panel: pending    HgbA1c: pending    RADIOLOGY & ADDITIONAL STUDIES (independently reviewed unless otherwise noted):   CT Brain Stroke Protocol (08.27.23 @ 11:53)   IMPRESSION:  Wedgelike low density in the left putamen and caudate (corpus striatum)   is new from the prior scan, and consistent with infarct that may be acute   given corresponding symptoms.  No acute intracranial hemorrhage.    The study was performed at 11:47 AM on 08/27/2023 and the above findings   were discussed with Dr. Betts at 12:02 PM.    CT Angio Brain Stroke Protocol  w/ IV Cont (08.27.23 @ 12:03)   IMPRESSION:  CTA head: Focal occlusion of left MCA distal M1 segment with patent but   small caliber filling of M2 segments.    CT perfusion indicates large mismatch between Tmax and CBF consistent   with ischemic penumbra.    CTA Neck: No steno-occlusive focus.    Case findings above discussed with Dr. Hu at 12:13 PM on 08/27/2023.    CT Head No Cont (08.28.23 @ 02:34)   ******PRELIMINARY REPORT******   INTERPRETATION:  Since prior CT dated 8/27/23 at 11:47 AM, interval   development of multifocal acute infarcts involving the left   parieto-temporal, left frontal, and right temporal lobes. Redemonstrated   left basal ganglia acute infarct. No evidence of hemorrhagic conversion   or herniation.    Findings discussed with MONIK Ariza by Dr. Dover via telephone at   2:52 AM on 8/28/23 with readback verification.

## 2023-08-27 NOTE — CHART NOTE - NSCHARTNOTEFT_GEN_A_CORE
Neurointerventional Surgery  Pre-Procedure Note       HPI:  64yo Male with PMH Hypertension, DM on orals who presents c/o stroke-like symptoms. Per son pt was walking outside around 10:15am this morning when he suddenly fell. His son helped him up and noted that he was weak on the right side and not acting normally which prompted him to come to the ED. On arrival pt w/ obvious right sided arm weakness, confusion, dysarthria, facial droop suggestive of acute stroke. Code stroke initiated, found to have LM1 occlusion, given TNK @ 1215 and was taken for thrombectomy. Unable to provide ROS 2/2 aphasia     Allergies: No Known Allergies      PAST MEDICAL & SURGICAL HISTORY:  Hypertension  DM      No significant past surgical history      Physical Exam:  Constitutional: NAD, lying in bed  Neuro  * Mental Status:  EO to voice, not following commands, moving L side spontaneously, global aphasia  * Cranial Nerves: PERRL, L gaze preference but able to cross midline some, R facial droop, R tongue deviation  * Motor: LUE/LLE AG without drift, RUE/RLE WD   * Sensory: Sensation grossly intact to noxious stimuli   * Reflexes: not assessed   Cardiovascular: Regular rate and rhythm.  Eyes: See neurologic examination with detailed examination of eyes.  ENT: No JVD, Trachea Midline  Respiratory: non labored breathing   Gastrointestinal: Soft, nontender, some distension  Genitourinary: [ ] Umaña, [ x ] No Umaña.   Musculoskeletal: No muscle wasting noted, (See neurologic assessment for full muscle strength assessment)   Skin:  no wounds, no redness, no abrasions noted  Hematologic / Lymph / Immunologic: No bleeding from IV sites or wounds      [facility] SS:  DATE: 8/27/23   TIME: 1250  1A: Level of consciousness (0-3): 1  1B: Questions (0-2): 2  1C: Commands (0-2): 2  2: Gaze (0-2): 1  3: Visual fields (0-3): 2  4: Facial palsy (0-3): 2  MOTOR:  5A: Left arm motor drift (0-4): 0  5B: Right arm motor drift (0-4): 3  6A: Left leg motor drift (0-4): 0  6B: Right leg motor drift (0-4): 3  7: Limb ataxia (0-2): 0  SENSORY:  8: Sensation (0-2): 0  SPEECH:  9: Language (0-3): 3  10: Dysarthria (0-2): 2  EXTINCTION:  11: Extinction/inattention (0-2): 2    TOTAL SCORE: 23      Labs:                         16.4   15.91 )-----------( 216      ( 27 Aug 2023 11:40 )             46.1       08-27    131<L>  |  91<L>  |  16.3  ----------------------------<  179<H>  3.5   |  25.0  |  0.97    Ca    10.0      27 Aug 2023 11:40    TPro  7.4  /  Alb  4.5  /  TBili  0.3<L>  /  DBili  x   /  AST  33  /  ALT  38  /  AlkPhos  62  08-27    PT/INR - ( 27 Aug 2023 11:40 )   PT: 11.4 sec;   INR: 1.03 ratio    PTT - ( 27 Aug 2023 11:40 )  PTT:31.5 sec      Assessment/Plan:   This is a 63y  year old Male  presents with LM1 occlusion. Patient presents to neuro-IR for mechanical thrombectomy.    Procedure, goals, risks, benefits and alternatives  were discussed with patient's nephew.  All questions were answered.  Risks include but are not limited to stroke, vessel injury, hemorrhage, and or groin hematoma.  Patient's nephew demonstrates understanding  of all risks involved with this procedure and wishes to continue.   Appropriate  consent was obtained from patient's nephew and consent is in the patient's chart.

## 2023-08-27 NOTE — PATIENT PROFILE ADULT - FUNCTIONAL ASSESSMENT - DAILY ACTIVITY 6.
"Subjective:       Patient ID: Jennifer Ortiz is a 65 y.o. female.    Vitals:  height is 5' 5" (1.651 m) and weight is 88 kg (194 lb). Her oral temperature is 98.1 °F (36.7 °C). Her blood pressure is 138/75 and her pulse is 97. Her respiration is 16 and oxygen saturation is 98%.     Chief Complaint: Nausea    Pt states that Saturday night she started to vomit and diarrhea with fever.      Nausea   This is a new problem. The current episode started in the past 7 days. The problem occurs constantly. The problem has been gradually worsening. Associated symptoms include nausea and vomiting. Pertinent negatives include no arthralgias, chest pain, chills, congestion, coughing, fatigue, fever, headaches, joint swelling, myalgias, rash, sore throat, vertigo or weakness. Nothing aggravates the symptoms. She has tried nothing for the symptoms. The treatment provided no relief.       Constitution: Negative for chills, fatigue and fever.   HENT: Negative for congestion and sore throat.    Neck: Negative for painful lymph nodes.   Cardiovascular: Negative for chest pain and leg swelling.   Eyes: Negative for double vision and blurred vision.   Respiratory: Negative for cough and shortness of breath.    Gastrointestinal: Positive for nausea and vomiting. Negative for diarrhea.   Genitourinary: Negative for dysuria, frequency, urgency and history of kidney stones.   Musculoskeletal: Negative for joint pain, joint swelling, muscle cramps and muscle ache.   Skin: Negative for color change, pale, rash and bruising.   Allergic/Immunologic: Negative for seasonal allergies.   Neurological: Negative for dizziness, history of vertigo, light-headedness, passing out and headaches.   Hematologic/Lymphatic: Negative for swollen lymph nodes.   Psychiatric/Behavioral: Negative for nervous/anxious, sleep disturbance and depression. The patient is not nervous/anxious.        Objective:      Physical Exam   Constitutional: She is oriented to " person, place, and time. She appears well-developed and well-nourished.   HENT:   Head: Normocephalic and atraumatic.   Right Ear: External ear normal.   Left Ear: External ear normal.   Nose: Nose normal.   Mouth/Throat: Mucous membranes are normal.   Eyes: Conjunctivae and lids are normal.   Neck: Trachea normal and full passive range of motion without pain. Neck supple.   Cardiovascular: Normal rate, regular rhythm and normal heart sounds.   Pulmonary/Chest: Effort normal and breath sounds normal. No respiratory distress.   Abdominal: Soft. Normal appearance and bowel sounds are normal. She exhibits no distension, no abdominal bruit, no pulsatile midline mass and no mass. There is tenderness.   Musculoskeletal: Normal range of motion. She exhibits no edema.   Neurological: She is alert and oriented to person, place, and time. She has normal strength.   Skin: Skin is warm, dry, intact, not diaphoretic and not pale.   Psychiatric: She has a normal mood and affect. Her speech is normal and behavior is normal. Judgment and thought content normal. Cognition and memory are normal.   Nursing note and vitals reviewed.        Assessment:       1. Vomiting, intractability of vomiting not specified, presence of nausea not specified, unspecified vomiting type        Plan:         Vomiting, intractability of vomiting not specified, presence of nausea not specified, unspecified vomiting type  -     POCT Influenza A/B    Other orders  -     ondansetron disintegrating tablet 4 mg  -     ondansetron (ZOFRAN-ODT) 4 MG TbDL; Take 1 tablet (4 mg total) by mouth every 8 (eight) hours as needed.  Dispense: 30 tablet; Refill: 1  -     sucralfate (CARAFATE) 1 gram tablet; Take 1 tablet (1 g total) by mouth 4 (four) times daily.  Dispense: 40 tablet; Refill: 1  -     dicyclomine (BENTYL) 10 MG capsule; Take 1 capsule (10 mg total) by mouth 3 (three) times daily as needed.  Dispense: 30 capsule; Refill: 0            4 = No assist / stand by assistance

## 2023-08-27 NOTE — ED ADULT NURSE NOTE - OBJECTIVE STATEMENT
pt brought to ED by son who reports pt was walking around the house when he suddenly became weak and fell, pt with no trauma but son reports he has been unable to speak, has right side facial droop, and cannot move his right arm or leg. pt unable to ambulate. pt awake, alert, but unable to follow commands, unable to communicate. pt able to answer some yes or no questions. even and unlabored resps present, code stroke activated y triage RN and pt brought to CT immediately.

## 2023-08-27 NOTE — PATIENT PROFILE ADULT - SPECIFY REASON UNABLE TO ASSESS:
[FreeTextEntry1] : Housing provided by [facility],  is [place], [phone]. Has lived at facility since April 2018. Lives with roommate.  nonverbal

## 2023-08-27 NOTE — H&P ADULT - NSHPPHYSICALEXAM_GEN_ALL_CORE
Constitutional: NAD, lying in bed  EO to voice, not following commands, moving L side spontaneously, global aphasia, PERRL, L gaze preference but able to cross midline some, R facial droop, R tongue deviation; LUE/LLE moves AG without drift, RUE/RLE WD  to noxious; Sensation grossly intact to noxious stimuli       [facility] SS:  DATE: 8/27/23   TIME: 1250  1A: Level of consciousness (0-3): 1  1B: Questions (0-2): 2  1C: Commands (0-2): 2  2: Gaze (0-2): 1  3: Visual fields (0-3): 2  4: Facial palsy (0-3): 2  MOTOR:  5A: Left arm motor drift (0-4): 0  5B: Right arm motor drift (0-4): 3  6A: Left leg motor drift (0-4): 0  6B: Right leg motor drift (0-4): 3  7: Limb ataxia (0-2): 0  SENSORY:  8: Sensation (0-2): 0  SPEECH:  9: Language (0-3): 3  10: Dysarthria (0-2): 2  EXTINCTION:  11: Extinction/inattention (0-2): 2    TOTAL SCORE: 23

## 2023-08-27 NOTE — ED PROVIDER NOTE - NS ED ROS FT
Review of Systems  SKIN: warm, dry w/ no rash or bleeding  RESPIRATORY: no cough or SOB  CARDIAC: no chest pain & no palpitations  GI: no abd pain, nausea, vomiting, diarrhea  MUSCULOSKELETAL:  no bony defects,  NEURO: Alert. +CONFUSED +FACIAL DROOP +RIGHT SIDED WEAKNESS

## 2023-08-27 NOTE — DISCHARGE NOTE NURSING/CASE MANAGEMENT/SOCIAL WORK - NSDCVIVACCINE_GEN_ALL_CORE_FT
Tdap; 12-Jan-2019 13:44; Kya Segovia ([place]); [employer]; [id number] (Exp. Date: 02-Nov-2020); IntraMuscular; Deltoid Left.; 0.5 milliLiter(s); VIS (VIS Published: 09-May-2013, VIS Presented: 12-Jan-2019);

## 2023-08-27 NOTE — CHART NOTE - NSCHARTNOTEFT_GEN_A_CORE
Neurointerventional Surgery Post Procedure Note    Procedure: Selective Cerebral Angiography     Pre- Procedure Diagnosis: LM1 occlusion   Post- Procedure Diagnosis: LM1 occlusion, TICI 2C after 3 passes    : Dr. Ag  Physician Assistant: Doreen Patrick  Nurse: Danielle Bowser  Anesthesiologist: Dr. Lund           Radiology Tech: Gary Xavier    Sheath:  6 [place] of [place] Sheath    I/Os: estimated blood loss less than 50cc,  IV fluids 600cc, Urine output 850cc, Contrast: Omnipaque 240  48 cc, clindamycin     Vitals:  /110   HR 95  Spo2  95%    Preliminary Report:  Under a 4 [place] short sheath via the left groin under general anesthesia via left internal carotid artery, a selective cerebral angiography  was performed and reveals LM1 occlusion, TICI 2C after 3 passes. ( Official note to follow).    Patient tolerated procedure well.  Patient remains hemodynamically stable  Results were discussed with patient, patient's family and Neurosurgery.  Left groin sheath was discontinued using angioseal device at 1438. Hemostasis was obtained with approximately 7 minutes of manual compression.     No active bleeding, no hematoma, no ecchymosis.   Quick clot and safeguard balloon dressing applied at 1445  Patient transferred to neuro ICU. Neurointerventional Surgery Post Procedure Note    Procedure: Selective Cerebral Angiography     Pre- Procedure Diagnosis: LM1 occlusion   Post- Procedure Diagnosis: LM1 occlusion, TICI 2C after 3 passes    : Dr. Ag  Physician Assistant: Doreen Patrick  Nurse: Danielle Bowser  Anesthesiologist: Dr. Lund           Radiology Tech: Gary Xavier    Sheath:  6 [place] Sheath    I/Os: estimated blood loss less than 50cc,  IV fluids 600cc, Urine output 850cc, Contrast: Omnipaque 240  48 cc, clindamycin     Vitals:  /110   HR 95  Spo2  95%    Preliminary Report:  Under a 6 [place] short sheath via the left groin under general anesthesia via left internal carotid artery, a selective cerebral angiography  was performed and reveals LM1 occlusion, TICI 2C after 3 passes. ( Official note to follow).    Patient tolerated procedure well.  Patient remains hemodynamically stable  Results were discussed with patient, patient's family and Neurosurgery.  Left groin sheath was discontinued using angioseal device at 1438. Hemostasis was obtained with approximately 7 minutes of manual compression.     No active bleeding, no hematoma, no ecchymosis.   Quick clot and safeguard balloon dressing applied at 1445  Patient transferred to neuro ICU.

## 2023-08-27 NOTE — H&P ADULT - NSHPLABSRESULTS_GEN_ALL_CORE
16.4   15.91 )-----------( 216      ( 27 Aug 2023 11:40 )             46.1   08-27    131<L>  |  91<L>  |  16.3  ----------------------------<  179<H>  3.5   |  25.0  |  0.97    Ca    10.0      27 Aug 2023 11:40    TPro  7.4  /  Alb  4.5  /  TBili  0.3<L>  /  DBili  x   /  AST  33  /  ALT  38  /  AlkPhos  62  08-27          CT Brain Perfusion Maps Stroke (08.27.23 @ 12:03)    IMPRESSION:    CTA head: Focal occlusion of left MCA distal M1 segment with patent but   small caliber filling of M2 segments.    CT perfusion indicates large mismatch between Tmax and CBF consistent   with ischemic penumbra.    CTA Neck: No steno-occlusive focus.    Case findings above discussed with Dr. Hu at 12:13 PM on 08/27/2023.    --- End of Report ---      NATACHA FIORE MD; Attending Radiologist  This document has been electronically signed. Aug 27 2023 12:31PM

## 2023-08-27 NOTE — H&P ADULT - HISTORY OF PRESENT ILLNESS
Patient is a 63 year old male with PMH Hypertension, DM on oral medications who presents c/o stroke-like symptoms. Per son pt was walking outside around 10:15am this morning when he suddenly fell. His son helped him up and noted that he was weak on the right side and not acting normally which prompted him to come to the ED. On arrival pt w/ obvious right sided arm weakness, confusion, dysarthria, facial droop suggestive of acute stroke. Code stroke initiated, found to have LM1 occlusion, given TNK @ 1215 and was taken for thrombectomy. Unable to provide ROS 2/2 aphasia

## 2023-08-27 NOTE — H&P ADULT - ASSESSMENT
Assessment: Patient is a 63 year old male with LM1 occlusion s/p IV tenecteplase @12:10 s/p cerebral angiogram for LT M1 mechanical thrombectomy.     Plan:   - Admit to NSICU  - Q1 hr neuro checks  - CT head in 24 hrs or sooner if concern for neurologic change  - MRI/A head/neck when able  - Start aspirin for secondary stroke prevention if CT head stable tomorrow  - Stroke education  - s/p TNK; follow TNK protocol  - Neurology follow up  - Goal normotension after thrombectomy   - TTE  - check lipid panel & A1C  - start statin   - aspiration precautions, dysphagia screen  - PT/OT/SLP eval

## 2023-08-27 NOTE — ED ADULT TRIAGE NOTE - CHIEF COMPLAINT QUOTE
pt unable to speak, as per family pt was walking and suddenly around 1 hour ago was unable to walk, has a right sided facial droop and is not acting his normal self. as per family pt usually walks and talks without assistance. Code stroke activated pt unable to speak, as per family pt was walking and suddenly around 1.5 hour ago was unable to walk, has a right sided facial droop and is not acting his normal self. as per family pt usually walks and talks without assistance. Code stroke activated

## 2023-08-27 NOTE — PATIENT PROFILE ADULT - FALL HARM RISK - HARM RISK INTERVENTIONS

## 2023-08-27 NOTE — CONSULT NOTE ADULT - ASSESSMENT
ASSESSMENT: Patient is a 63 year old male with PMHx Hypertension, DM on oral medications who presented c/o stroke-like symptoms. Per son pt was walking outside around 10:15am on 8/27/23 when he suddenly fell. His son helped him up and noted that he was weak on the right side and not acting normally which prompted him to come to the ED. On arrival patient came in with right sided arm weakness, global aphasia, and dysarthria, right sided facial droop. Initial head CT demonstrated wedgelike low density in the left putamen and caudate (corpus striatum) consistent with infarct that may be acute and no acute intracranial hemorrhage. Tenecteplase was administered at 12:15 pm. CTA head demonstrated occlusion of left MCA distal M1 segment and CTP showed large mismatch. found to have LM1 occlusion. Patient taken for mechanical thrombectomy where he was found to have LM1 occlusion with TICI 2C recanalization.     NEURO:   -Neurologically with right sided hemiparesis and aphasia  -Continue close monitoring for neurologic deterioration    - Stroke neuro checks q 1. As per neuro ICU  -24 hour post tenecteplase CT head   - Permissive HTN up to . Avoid hypotension and rapid fluctuations    -ANTITHROMBOTIC THERAPY: Pending findings of post tenecteplase and cerebral angiogram 24 hour head CT.   -titrate statin to LDL goal less than 70  -Obtain MRI Brain w/o  -Dysphagia screen: pass/fail   -Physical therapy/OT/Speech eval/treatment.     -CARDIOVASCULAR: TTE pending, cardiac monitoring w/ telemetry for now, further evaluation pending findings of noted workup                              -HEMATOLOGY: H/H 16.4/46.1. Platelets 216. patient should have all age and risk appropriate malignancy screenings with PCP or sooner if clinically suspected      DVT ppx: Heparin s.c [] LMWH [] SCD for now. Chemo ppx pending findings of post tenecteplase and cerebral angiogram 24 hour head CT.     PULMONARY: CXR clear.  protecting airway, saturating well     RENAL: BUN/Cr 16.3/0.97. monitor urine output, maintain adequate hydration       Na Goal:  135-145    ID: afebrile, leukocytosis, monitor for si/sx of infection     OTHER:  condition and plan of care d/w patient, questions and concerns addressed.     DISPOSITION: Rehab or home depending on PT eval once stable and workup is complete      CORE MEASURES:        Admission NIHSS: 23     Tenecteplase : [x] YES [] NO      LDL/HDL/A1C: p     Depression Screen- if depression hx and/or present      Statin Therapy: p     Dysphagia Screen: [] PASS [] FAIL- pending     Smoking [] YES [] NO      Afib [] YES [] NO     Stroke Education [] YES [] NO- ordered and pending    Obtain screening lower extremity venous ultrasound in patients who meet 1 or more of the following criteria as patient is high risk for DVT/PE on admission:   [] History of DVT/PE  []Hypercoagulable states (Factor V Leiden, Cancer, OCP, etc. )  []Prolonged immobility (hemiplegia/hemiparesis/post operative or any other extended immobilization)  [] Transferred from outside facility (Rehab or Long term care)  [] Age </= to 50 ASSESSMENT: Patient is a 63 year old male with PMHx Hypertension, DM on oral medications who presented c/o stroke-like symptoms. Per son pt was walking outside around 10:15am on 8/27/23 when he suddenly fell. His son helped him up and noted that he was weak on the right side and not acting normally which prompted him to come to the ED. On arrival patient came in with right sided arm weakness, mixed aphasia, and dysarthria, right sided facial droop. Initial head CT demonstrated wedgelike low density in the left putamen and caudate (corpus striatum) consistent with infarct that may be acute and no acute intracranial hemorrhage. Tenecteplase was administered at 12:15 pm. CTA head demonstrated occlusion of left MCA distal M1 segment and CTP showed large mismatch. found to have LM1 occlusion. Patient taken for mechanical thrombectomy where he was found to have LM1 occlusion with TICI 2C recanalization.     NEURO:   -Neurologically with right sided hemiparesis and aphasia  -Continue close monitoring for neurologic deterioration    - Stroke neuro checks q 1. As per neuro ICU  -24 hour post tenecteplase CT head   - Permissive HTN up to . Avoid hypotension and rapid fluctuations    -ANTITHROMBOTIC THERAPY: Pending findings of post tenecteplase and cerebral angiogram 24 hour head CT.   -titrate statin to LDL goal less than 70  -Obtain MRI Brain w/o  -Dysphagia screen: pass/fail   -Physical therapy/OT/Speech eval/treatment.     -CARDIOVASCULAR: TTE pending, cardiac monitoring w/ telemetry for now, further evaluation pending findings of noted workup                              -HEMATOLOGY: H/H 16.4/46.1. Platelets 216. patient should have all age and risk appropriate malignancy screenings with PCP or sooner if clinically suspected      DVT ppx: Heparin s.c [] LMWH [] SCD for now. Chemo ppx pending findings of post tenecteplase and cerebral angiogram 24 hour head CT.     PULMONARY: CXR clear.  protecting airway, saturating well     RENAL: BUN/Cr 16.3/0.97. monitor urine output, maintain adequate hydration       Na Goal:  135-145    ID: afebrile, leukocytosis, monitor for si/sx of infection     OTHER:  condition and plan of care d/w patient, questions and concerns addressed.     DISPOSITION: Rehab or home depending on PT eval once stable and workup is complete      CORE MEASURES:        Admission NIHSS: 23     Tenecteplase : [x] YES [] NO      LDL/HDL/A1C: p     Depression Screen- if depression hx and/or present      Statin Therapy: p     Dysphagia Screen: [] PASS [] FAIL- pending     Smoking [] YES [] NO      Afib [] YES [] NO     Stroke Education [] YES [] NO- ordered and pending    Obtain screening lower extremity venous ultrasound in patients who meet 1 or more of the following criteria as patient is high risk for DVT/PE on admission:   [] History of DVT/PE  []Hypercoagulable states (Factor V Leiden, Cancer, OCP, etc. )  []Prolonged immobility (hemiplegia/hemiparesis/post operative or any other extended immobilization)  [] Transferred from outside facility (Rehab or Long term care)  [] Age </= to 50 ASSESSMENT: Patient is a 63 year old male with PMHx Hypertension, DM on oral medications who presented c/o stroke-like symptoms. Per son pt was walking outside around 10:15am on 8/27/23 when he suddenly fell. His son helped him up and noted that he was weak on the right side and not acting normally which prompted him to come to the ED. On arrival patient came in with right sided arm weakness, mixed aphasia, and dysarthria, right sided facial droop. Initial head CT demonstrated wedgelike low density in the left putamen and caudate (corpus striatum) consistent with infarct that may be acute and no acute intracranial hemorrhage. Tenecteplase was administered at 12:15 pm. CTA head demonstrated occlusion of left MCA distal M1 segment and CTP showed large mismatch. found to have LM1 occlusion. Patient taken for mechanical thrombectomy where he was found to have LM1 occlusion with TICI 2C recanalization.     NEURO:   -Neurologically with right sided hemiparesis and aphasia  -Continue close monitoring for neurologic deterioration    - Stroke neuro checks q 1. As per neuro ICU  -24 hour post tenecteplase CT head   - Permissive HTN up to . Avoid hypotension and rapid fluctuations    -ANTITHROMBOTIC THERAPY: Pending findings of post tenecteplase and cerebral angiogram 24 hour head CT.   -titrate statin to LDL goal less than 70  -Obtain MRI Brain w/o  -Dysphagia screen: pass/fail   -Physical therapy/OT/Speech eval/treatment.     -CARDIOVASCULAR: TTE pending, cardiac monitoring w/ telemetry for now, further evaluation pending findings of noted workup                              -HEMATOLOGY: H/H 16.4/46.1. Platelets 216. patient should have all age and risk appropriate malignancy screenings with PCP or sooner if clinically suspected      DVT ppx: Heparin s.c [] LMWH [] SCD for now. Can start lovenox if no evidence of hemorrgage on 24 hour CT scan post tenecteplase/ mechanical thrombectomy    PULMONARY: CXR clear.  protecting airway, saturating well     RENAL: BUN/Cr 16.3/0.97. monitor urine output, maintain adequate hydration       Na Goal:  135-145    ID: afebrile, leukocytosis, monitor for si/sx of infection     OTHER:  condition and plan of care d/w patient, questions and concerns addressed.     DISPOSITION: Rehab or home depending on PT eval once stable and workup is complete      CORE MEASURES:        Admission NIHSS: 23     Tenecteplase : [x] YES [] NO      LDL/HDL/A1C: p     Depression Screen- if depression hx and/or present      Statin Therapy: p     Dysphagia Screen: [] PASS [] FAIL- pending     Smoking [] YES [] NO      Afib [] YES [] NO     Stroke Education [] YES [] NO- ordered and pending    Obtain screening lower extremity venous ultrasound in patients who meet 1 or more of the following criteria as patient is high risk for DVT/PE on admission:   [] History of DVT/PE  []Hypercoagulable states (Factor V Leiden, Cancer, OCP, etc. )  []Prolonged immobility (hemiplegia/hemiparesis/post operative or any other extended immobilization)  [] Transferred from outside facility (Rehab or Long term care)  [] Age </= to 50 ASSESSMENT: Patient is a 63 year old male with PMHx Hypertension, DM on oral medications who presented c/o stroke-like symptoms. Per son pt was walking outside around 10:15am on 8/27/23 when he suddenly fell. His son helped him up and noted that he was weak on the right side and not acting normally which prompted him to come to the ED. On arrival patient came in with right sided arm weakness, mixed aphasia, and dysarthria, right sided facial droop. Initial head CT demonstrated wedgelike low density in the left putamen and caudate (corpus striatum) consistent with infarct that may be acute and no acute intracranial hemorrhage. Tenecteplase was administered at 12:15 pm. CTA head demonstrated occlusion of left MCA distal M1 segment and CTP showed large mismatch. Found to have LM1 occlusion. Patient taken for mechanical thrombectomy, three passes with TICI 2C recanalization.     NEURO:   -Neurologically with right sided hemiparesis and aphasia  -Continue close monitoring for neurologic deterioration    - Stroke neuro checks q 1. As per neuro ICU  -24 hour post tenecteplase CT head   - Permissive HTN up to . Avoid hypotension and rapid fluctuations    -ANTITHROMBOTIC THERAPY: Pending findings of post tenecteplase and cerebral angiogram 24 hour head CT.   -titrate statin to LDL goal less than 70  -Obtain MRI Brain w/o  -Dysphagia screen: pass/fail   -Physical therapy/OT/Speech eval/treatment.     -CARDIOVASCULAR: TTE pending, cardiac monitoring w/ telemetry for now, further evaluation pending findings of noted workup                              -HEMATOLOGY: H/H 16.4/46.1. Platelets 216. patient should have all age and risk appropriate malignancy screenings with PCP or sooner if clinically suspected      DVT ppx: Heparin s.c [] LMWH [] SCD for now. Can start lovenox if no evidence of hemorrgage on 24 hour CT scan post tenecteplase/ mechanical thrombectomy    PULMONARY: CXR clear.  protecting airway, saturating well     RENAL: BUN/Cr 16.3/0.97. monitor urine output, maintain adequate hydration       Na Goal:  135-145    ID: afebrile, leukocytosis, monitor for si/sx of infection     OTHER:  condition and plan of care d/w patient, questions and concerns addressed.     DISPOSITION: Rehab or home depending on PT eval once stable and workup is complete      CORE MEASURES:        Admission NIHSS: 23     Tenecteplase : [x] YES [] NO      LDL/HDL/A1C: p     Depression Screen- if depression hx and/or present      Statin Therapy: p     Dysphagia Screen: [] PASS [] FAIL- pending     Smoking [] YES [] NO      Afib [] YES [] NO     Stroke Education [] YES [] NO- ordered and pending    Obtain screening lower extremity venous ultrasound in patients who meet 1 or more of the following criteria as patient is high risk for DVT/PE on admission:   [] History of DVT/PE  []Hypercoagulable states (Factor V Leiden, Cancer, OCP, etc. )  []Prolonged immobility (hemiplegia/hemiparesis/post operative or any other extended immobilization)  [] Transferred from outside facility (Rehab or Long term care)  [] Age </= to 50

## 2023-08-27 NOTE — ED ADULT NURSE NOTE - NSFALLUNIVINTERV_ED_ALL_ED
Bed/Stretcher in lowest position, wheels locked, appropriate side rails in place/Call bell, personal items and telephone in reach/Instruct patient to call for assistance before getting out of bed/chair/stretcher/Non-slip footwear applied when patient is off stretcher/[place] to call system/Physically safe environment - no spills, clutter or unnecessary equipment/Purposeful proactive rounding/Room/bathroom lighting operational, light cord in reach

## 2023-08-27 NOTE — ED ADULT NURSE NOTE - CHIEF COMPLAINT QUOTE
pt unable to speak, as per family pt was walking and suddenly around 1.5 hour ago was unable to walk, has a right sided facial droop and is not acting his normal self. as per family pt usually walks and talks without assistance. Code stroke activated

## 2023-08-27 NOTE — ED PROVIDER NOTE - PROGRESS NOTE DETAILS
Mayte: Pt altered due to acute stroke unable to provide PMH, medications. Son bedside, denies recent surgeries. Denies any history of neurosurgery, ich. Unsure if pt take a blood thinner.   Called pts pharmacy, [facility] [address], [place]. Per pharmacy pt only takes HCTZ, Metformin. No blood thinners. Mayte: TNK administered. PT taken for thrombectomy. Mayte: No change in NIH Mayte: TNK administered. Planned thrombectomy per neuro Jon: the noted discussion with pt son included the risks and benefits of tnk administration prior to administration. I pushed the tnk after 2 person verification with ROMEO Kemp.

## 2023-08-27 NOTE — ED PROVIDER NOTE - ATTENDING CONTRIBUTION TO CARE
Jon: I performed a face to face evaluation of this patient and performed a full history and physical examination on the patient.  I agree with the resident's history, physical examination, and plan of the patient unless otherwise noted. My brief assessment is as follows: hx htn p/w 1.5 hours of confusion, right sided weakness. usual state of health, then suddenly couldn't walk/hold self up. pt arrives aphasic, right hemineglect, right arm/leg droop. code stroke activated on arrival nihss:15, ctab, rrr, abd benign, Dr. Ag bedside, fs wnl, bp below tnk threshold, with stroke on right basal ganglia but 1.5 hours of acute symptoms, tnk given and code biplane activated after discussion with pt/son. slight delay in tnk d/t difficulty confirming eligibility and pharmacy preparing tnk.

## 2023-08-27 NOTE — ED PROVIDER NOTE - CLINICAL SUMMARY MEDICAL DECISION MAKING FREE TEXT BOX
ASSESSMENT:   JOSUE JOHNSON is a 62yo M who presented with WEAKNESS/ FACIAL DROOP x 1.5 hours. Vitals stable. Physical exam w/ significant right sided deficits suggestive of acute stroke.     PLAN: Code stroke. CT imaging. TNK. NS and Neuro consult.

## 2023-08-27 NOTE — ED ADULT NURSE REASSESSMENT NOTE - NS ED NURSE REASSESS COMMENT FT1
neuro at bedside, pt to be taken to cath lab for code biplane. consent signed by family and neuro. 2 PC consent.

## 2023-08-28 LAB
ANION GAP SERPL CALC-SCNC: 14 MMOL/L — SIGNIFICANT CHANGE UP (ref 5–17)
APPEARANCE UR: CLEAR — SIGNIFICANT CHANGE UP
BACTERIA # UR AUTO: ABNORMAL
BILIRUB UR-MCNC: NEGATIVE — SIGNIFICANT CHANGE UP
BUN SERPL-MCNC: 15.9 MG/DL — SIGNIFICANT CHANGE UP (ref 8–20)
CALCIUM SERPL-MCNC: 8.3 MG/DL — LOW (ref 8.4–10.5)
CHLORIDE SERPL-SCNC: 96 MMOL/L — SIGNIFICANT CHANGE UP (ref 96–108)
CO2 SERPL-SCNC: 25 MMOL/L — SIGNIFICANT CHANGE UP (ref 22–29)
COLOR SPEC: YELLOW — SIGNIFICANT CHANGE UP
CREAT SERPL-MCNC: 0.91 MG/DL — SIGNIFICANT CHANGE UP (ref 0.5–1.3)
DIFF PNL FLD: ABNORMAL
EGFR: 95 ML/MIN/1.73M2 — SIGNIFICANT CHANGE UP
EPI CELLS # UR: SIGNIFICANT CHANGE UP
GLUCOSE BLDC GLUCOMTR-MCNC: 110 MG/DL — HIGH (ref 70–99)
GLUCOSE BLDC GLUCOMTR-MCNC: 129 MG/DL — HIGH (ref 70–99)
GLUCOSE BLDC GLUCOMTR-MCNC: 158 MG/DL — HIGH (ref 70–99)
GLUCOSE SERPL-MCNC: 155 MG/DL — HIGH (ref 70–99)
GLUCOSE UR QL: NEGATIVE MG/DL — SIGNIFICANT CHANGE UP
HCT VFR BLD CALC: 36.9 % — LOW (ref 39–50)
HCV AB S/CO SERPL IA: 0.06 S/CO — SIGNIFICANT CHANGE UP (ref 0–0.99)
HCV AB SERPL-IMP: SIGNIFICANT CHANGE UP
HGB BLD-MCNC: 13.2 G/DL — SIGNIFICANT CHANGE UP (ref 13–17)
KETONES UR-MCNC: NEGATIVE — SIGNIFICANT CHANGE UP
LEUKOCYTE ESTERASE UR-ACNC: NEGATIVE — SIGNIFICANT CHANGE UP
MAGNESIUM SERPL-MCNC: 1.5 MG/DL — LOW (ref 1.6–2.6)
MCHC RBC-ENTMCNC: 32.1 PG — SIGNIFICANT CHANGE UP (ref 27–34)
MCHC RBC-ENTMCNC: 35.8 GM/DL — SIGNIFICANT CHANGE UP (ref 32–36)
MCV RBC AUTO: 89.8 FL — SIGNIFICANT CHANGE UP (ref 80–100)
NITRITE UR-MCNC: NEGATIVE — SIGNIFICANT CHANGE UP
PH UR: 8 — SIGNIFICANT CHANGE UP (ref 5–8)
PHOSPHATE SERPL-MCNC: 4 MG/DL — SIGNIFICANT CHANGE UP (ref 2.4–4.7)
PLATELET # BLD AUTO: 188 K/UL — SIGNIFICANT CHANGE UP (ref 150–400)
POTASSIUM SERPL-MCNC: 3.7 MMOL/L — SIGNIFICANT CHANGE UP (ref 3.5–5.3)
POTASSIUM SERPL-SCNC: 3.7 MMOL/L — SIGNIFICANT CHANGE UP (ref 3.5–5.3)
PROT UR-MCNC: NEGATIVE — SIGNIFICANT CHANGE UP
RBC # BLD: 4.11 M/UL — LOW (ref 4.2–5.8)
RBC # FLD: 13.3 % — SIGNIFICANT CHANGE UP (ref 10.3–14.5)
RBC CASTS # UR COMP ASSIST: ABNORMAL /HPF (ref 0–4)
SODIUM SERPL-SCNC: 135 MMOL/L — SIGNIFICANT CHANGE UP (ref 135–145)
SP GR SPEC: 1.01 — SIGNIFICANT CHANGE UP (ref 1.01–1.02)
UROBILINOGEN FLD QL: NEGATIVE MG/DL — SIGNIFICANT CHANGE UP
WBC # BLD: 16.12 K/UL — HIGH (ref 3.8–10.5)
WBC # FLD AUTO: 16.12 K/UL — HIGH (ref 3.8–10.5)
WBC UR QL: SIGNIFICANT CHANGE UP /HPF (ref 0–5)

## 2023-08-28 PROCEDURE — 99291 CRITICAL CARE FIRST HOUR: CPT

## 2023-08-28 PROCEDURE — 99232 SBSQ HOSP IP/OBS MODERATE 35: CPT

## 2023-08-28 PROCEDURE — 70450 CT HEAD/BRAIN W/O DYE: CPT | Mod: 26

## 2023-08-28 PROCEDURE — 74177 CT ABD & PELVIS W/CONTRAST: CPT | Mod: 26

## 2023-08-28 RX ORDER — PIPERACILLIN AND TAZOBACTAM 4; .5 G/20ML; G/20ML
3.38 INJECTION, POWDER, LYOPHILIZED, FOR SOLUTION INTRAVENOUS EVERY 8 HOURS
Refills: 0 | Status: DISCONTINUED | OUTPATIENT
Start: 2023-08-28 | End: 2023-08-29

## 2023-08-28 RX ORDER — DEXTROSE 50 % IN WATER 50 %
25 SYRINGE (ML) INTRAVENOUS ONCE
Refills: 0 | Status: DISCONTINUED | OUTPATIENT
Start: 2023-08-28 | End: 2023-09-29

## 2023-08-28 RX ORDER — GLUCAGON INJECTION, SOLUTION 0.5 MG/.1ML
1 INJECTION, SOLUTION SUBCUTANEOUS ONCE
Refills: 0 | Status: DISCONTINUED | OUTPATIENT
Start: 2023-08-28 | End: 2023-08-30

## 2023-08-28 RX ORDER — SODIUM CHLORIDE 9 MG/ML
500 INJECTION INTRAMUSCULAR; INTRAVENOUS; SUBCUTANEOUS ONCE
Refills: 0 | Status: COMPLETED | OUTPATIENT
Start: 2023-08-28 | End: 2023-08-28

## 2023-08-28 RX ORDER — PIPERACILLIN AND TAZOBACTAM 4; .5 G/20ML; G/20ML
3.38 INJECTION, POWDER, LYOPHILIZED, FOR SOLUTION INTRAVENOUS ONCE
Refills: 0 | Status: COMPLETED | OUTPATIENT
Start: 2023-08-28 | End: 2023-08-28

## 2023-08-28 RX ORDER — DEXTROSE 50 % IN WATER 50 %
12.5 SYRINGE (ML) INTRAVENOUS ONCE
Refills: 0 | Status: DISCONTINUED | OUTPATIENT
Start: 2023-08-28 | End: 2023-09-29

## 2023-08-28 RX ORDER — METFORMIN HYDROCHLORIDE 850 MG/1
1 TABLET ORAL
Refills: 0 | DISCHARGE

## 2023-08-28 RX ORDER — HYDROCHLOROTHIAZIDE 25 MG
1 TABLET ORAL
Refills: 0 | DISCHARGE

## 2023-08-28 RX ORDER — MAGNESIUM SULFATE 500 MG/ML
2 VIAL (ML) INJECTION ONCE
Refills: 0 | Status: COMPLETED | OUTPATIENT
Start: 2023-08-28 | End: 2023-08-28

## 2023-08-28 RX ORDER — SODIUM CHLORIDE 9 MG/ML
1000 INJECTION, SOLUTION INTRAVENOUS
Refills: 0 | Status: DISCONTINUED | OUTPATIENT
Start: 2023-08-28 | End: 2023-08-30

## 2023-08-28 RX ORDER — DEXTROSE 50 % IN WATER 50 %
15 SYRINGE (ML) INTRAVENOUS ONCE
Refills: 0 | Status: DISCONTINUED | OUTPATIENT
Start: 2023-08-28 | End: 2023-08-30

## 2023-08-28 RX ORDER — POTASSIUM CHLORIDE 20 MEQ
10 PACKET (EA) ORAL
Refills: 0 | Status: COMPLETED | OUTPATIENT
Start: 2023-08-28 | End: 2023-08-28

## 2023-08-28 RX ORDER — INSULIN LISPRO 100/ML
VIAL (ML) SUBCUTANEOUS EVERY 6 HOURS
Refills: 0 | Status: DISCONTINUED | OUTPATIENT
Start: 2023-08-28 | End: 2023-09-03

## 2023-08-28 RX ADMIN — PIPERACILLIN AND TAZOBACTAM 200 GRAM(S): 4; .5 INJECTION, POWDER, LYOPHILIZED, FOR SOLUTION INTRAVENOUS at 12:11

## 2023-08-28 RX ADMIN — Medication 25 GRAM(S): at 06:00

## 2023-08-28 RX ADMIN — SODIUM CHLORIDE 1000 MILLILITER(S): 9 INJECTION INTRAMUSCULAR; INTRAVENOUS; SUBCUTANEOUS at 00:58

## 2023-08-28 RX ADMIN — Medication 100 MILLIEQUIVALENT(S): at 08:17

## 2023-08-28 RX ADMIN — Medication 100 MILLIEQUIVALENT(S): at 09:40

## 2023-08-28 RX ADMIN — SODIUM CHLORIDE 1000 MILLILITER(S): 9 INJECTION INTRAMUSCULAR; INTRAVENOUS; SUBCUTANEOUS at 06:50

## 2023-08-28 RX ADMIN — CHLORHEXIDINE GLUCONATE 1 APPLICATION(S): 213 SOLUTION TOPICAL at 06:40

## 2023-08-28 RX ADMIN — SODIUM CHLORIDE 75 MILLILITER(S): 9 INJECTION INTRAMUSCULAR; INTRAVENOUS; SUBCUTANEOUS at 20:51

## 2023-08-28 RX ADMIN — SODIUM CHLORIDE 75 MILLILITER(S): 9 INJECTION INTRAMUSCULAR; INTRAVENOUS; SUBCUTANEOUS at 09:40

## 2023-08-28 RX ADMIN — PIPERACILLIN AND TAZOBACTAM 25 GRAM(S): 4; .5 INJECTION, POWDER, LYOPHILIZED, FOR SOLUTION INTRAVENOUS at 20:51

## 2023-08-28 RX ADMIN — Medication 100 MILLIGRAM(S): at 06:00

## 2023-08-28 NOTE — PROGRESS NOTE ADULT - ASSESSMENT
INCOMPLETE  ASSESSMENT: Patient is a 63 year old male with PMHx Hypertension, DM on oral medications who presented c/o stroke-like symptoms. Per son pt was walking outside around 10:15am on 8/27/23 when he suddenly fell. His son helped him up and noted that he was weak on the right side and not acting normally which prompted him to come to the ED. On arrival patient came in with right sided arm weakness, global aphasia, and dysarthria, right sided facial droop. Initial head CT demonstrated wedgelike low density in the left putamen and caudate (corpus striatum) consistent with infarct that may be acute and no acute intracranial hemorrhage. Tenecteplase was administered at 12:15 pm. CTA head demonstrated occlusion of left MCA distal M1 segment and CTP showed large mismatch. found to have LM1 occlusion. Patient taken for mechanical thrombectomy where he was found to have LM1 occlusion with TICI 2C recanalization.     NEURO:   -Neurologically with right sided hemiparesis and aphasia  -Continue close monitoring for neurologic deterioration    - Stroke neuro checks q 1. As per neuro ICU  -24 hour post tenecteplase CT head   - Permissive HTN up to . Avoid hypotension and rapid fluctuations    -ANTITHROMBOTIC THERAPY: Pending findings of post tenecteplase and cerebral angiogram 24 hour head CT.   -titrate statin to LDL goal less than 70  -Obtain MRI Brain w/o  -Dysphagia screen: pass/fail   -Physical therapy/OT/Speech eval/treatment.     -CARDIOVASCULAR: TTE pending, cardiac monitoring w/ telemetry for now, further evaluation pending findings of noted workup                              -HEMATOLOGY: H/H 16.4/46.1. Platelets 216. patient should have all age and risk appropriate malignancy screenings with PCP or sooner if clinically suspected      DVT ppx: Heparin s.c [] LMWH [] SCD for now. Chemo ppx pending findings of post tenecteplase and cerebral angiogram 24 hour head CT.     PULMONARY: CXR clear.  protecting airway, saturating well     RENAL: BUN/Cr 16.3/0.97. monitor urine output, maintain adequate hydration       Na Goal:  135-145    ID: afebrile, leukocytosis, monitor for si/sx of infection     OTHER:  condition and plan of care d/w patient, questions and concerns addressed.     DISPOSITION: Rehab or home depending on PT eval once stable and workup is complete      CORE MEASURES:        Admission NIHSS: 23     Tenecteplase : [x] YES [] NO      LDL/HDL/A1C: p     Depression Screen- if depression hx and/or present      Statin Therapy: p     Dysphagia Screen: [] PASS [] FAIL- pending     Smoking [] YES [] NO      Afib [] YES [] NO     Stroke Education [] YES [] NO- ordered and pending    Obtain screening lower extremity venous ultrasound in patients who meet 1 or more of the following criteria as patient is high risk for DVT/PE on admission:   [] History of DVT/PE  []Hypercoagulable states (Factor V Leiden, Cancer, OCP, etc. )  []Prolonged immobility (hemiplegia/hemiparesis/post operative or any other extended immobilization)  [] Transferred from outside facility (Rehab or Long term care)  [] Age </= to 50 INCOMPLETE  ASSESSMENT: Patient is a 63 year old male with PMHx Hypertension, DM on oral medications who presented c/o stroke-like symptoms. Per son pt was walking outside around 10:15am on 8/27/23 when he suddenly fell. His son helped him up and noted that he was weak on the right side and not acting normally which prompted him to come to the ED. On arrival patient came in with right sided arm weakness, global aphasia, and dysarthria, right sided facial droop. Initial head CT demonstrated wedgelike low density in the left putamen and caudate (corpus striatum) consistent with infarct that may be acute and no acute intracranial hemorrhage. Tenecteplase was administered at 12:15 pm. CTA head demonstrated occlusion of left MCA distal M1 segment and CTP showed large mismatch. found to have LM1 occlusion. Patient taken for mechanical thrombectomy where he was found to have LM1 occlusion with TICI 2C recanalization.     NEURO:   -Neurologically with right sided hemiparesis and aphasia  -Continue close monitoring for neurologic deterioration    - Stroke neuro checks q 1. As per neuro ICU  -24 hour post tenecteplase CT head   - Permissive HTN up to . Avoid hypotension and rapid fluctuations    -ANTITHROMBOTIC THERAPY: Pending findings of post tenecteplase and cerebral angiogram 24 hour head CT.   -titrate statin to LDL goal less than 70  -Obtain MRI Brain w/o  -Dysphagia screen: pass/fail   -Physical therapy/OT/Speech eval/treatment.     -CARDIOVASCULAR: TTE pending, cardiac monitoring w/ telemetry for now, further evaluation pending findings of noted workup                              -HEMATOLOGY: H/H 13.2/36.9. Monitor for signs and symptoms of further anemia. Platelets 216. patient should have all age and risk appropriate malignancy screenings with PCP or sooner if clinically suspected      DVT ppx: Heparin s.c [] LMWH [] SCD for now. Chemo ppx pending findings of post tenecteplase and cerebral angiogram 24 hour head CT.     PULMONARY: CXR clear.  protecting airway, saturating well     RENAL: BUN/Cr 15.9/0.91. monitor urine output, maintain adequate hydration       Na Goal:  135-145    ID: afebrile, leukocytosis with uptrend, monitor for si/sx of infection     OTHER:  condition and plan of care d/w patient, questions and concerns addressed.     DISPOSITION: Rehab or home depending on PT eval once stable and workup is complete      CORE MEASURES:        Admission NIHSS: 23     Tenecteplase : [x] YES [] NO      LDL/HDL/A1C: p     Depression Screen- if depression hx and/or present      Statin Therapy: p     Dysphagia Screen: [] PASS [] FAIL- pending     Smoking [] YES [] NO      Afib [] YES [] NO     Stroke Education [] YES [] NO- ordered and pending    Obtain screening lower extremity venous ultrasound in patients who meet 1 or more of the following criteria as patient is high risk for DVT/PE on admission:   [] History of DVT/PE  []Hypercoagulable states (Factor V Leiden, Cancer, OCP, etc. )  []Prolonged immobility (hemiplegia/hemiparesis/post operative or any other extended immobilization)  [] Transferred from outside facility (Rehab or Long term care)  [] Age </= to 50 ASSESSMENT: Patient is a 63 year old male with PMHx Hypertension, DM on oral medications who presented c/o stroke-like symptoms. Per son pt was walking outside around 10:15am on 8/27/23 when he suddenly fell. His son helped him up and noted that he was weak on the right side and not acting normally which prompted him to come to the ED. On arrival patient came in with right sided arm weakness, global aphasia, and dysarthria, right sided facial droop. Initial head CT demonstrated wedge like low density in the left putamen and caudate (corpus striatum) consistent with infarct that may be acute and no acute intracranial hemorrhage. Tenecteplase was administered at 12:15 pm. CTA head demonstrated occlusion of left MCA distal M1 segment and CTP showed large mismatch. found to have LM1 occlusion. Patient taken for mechanical thrombectomy where he was found to have LM1 occlusion with TICI 2C recanalization.     NEURO:   -Neurologically with right sided hemiparesis and aphasia. No verbal output  -Repeat head CT demonstrated   -Continue close monitoring for neurologic deterioration    - Stroke neuro checks q 1. As per neuro ICU  -24 hour post mechanical thrombectomy CT head   - Permissive HTN up to . Avoid hypotension and rapid fluctuations    -ANTITHROMBOTIC THERAPY: Pending findings of post tenecteplase and cerebral angiogram 24 hour head CT.   -titrate statin to LDL goal less than 70  -Obtain MRI Brain w/o  -Dysphagia screen: pass/fail   -Physical therapy/OT/Speech eval/treatment.     -CARDIOVASCULAR: TTE pending, cardiac monitoring w/ telemetry for now, further evaluation pending findings of noted workup                              -HEMATOLOGY: H/H 13.2/36.9. Monitor for signs and symptoms of further anemia. Platelets 216. patient should have all age and risk appropriate malignancy screenings with PCP or sooner if clinically suspected      DVT ppx: Heparin s.c [] LMWH [] SCD for now. Chemo ppx pending findings of post tenecteplase and cerebral angiogram 24 hour head CT.     PULMONARY: CXR clear.  protecting airway, saturating well     RENAL: BUN/Cr 15.9/0.91. monitor urine output, maintain adequate hydration       Na Goal:  135-145    ID: afebrile, leukocytosis with uptrend, monitor for si/sx of infection     OTHER:  condition and plan of care d/w patient, questions and concerns addressed.     DISPOSITION: Rehab or home depending on PT eval once stable and workup is complete      CORE MEASURES:        Admission NIHSS: 23     Tenecteplase : [x] YES [] NO      LDL/HDL/A1C: p     Depression Screen- if depression hx and/or present      Statin Therapy: p     Dysphagia Screen: [] PASS [] FAIL- pending     Smoking [] YES [] NO      Afib [] YES [] NO     Stroke Education [] YES [] NO- ordered and pending    Obtain screening lower extremity venous ultrasound in patients who meet 1 or more of the following criteria as patient is high risk for DVT/PE on admission:   [] History of DVT/PE  []Hypercoagulable states (Factor V Leiden, Cancer, OCP, etc. )  []Prolonged immobility (hemiplegia/hemiparesis/post operative or any other extended immobilization)  [] Transferred from outside facility (Rehab or Long term care)  [] Age </= to 50 ASSESSMENT: Patient is a 63 year old male with PMHx Hypertension, DM on oral medications who presented c/o stroke-like symptoms. Per son pt was walking outside around 10:15am on 8/27/23 when he suddenly fell. His son helped him up and noted that he was weak on the right side and not acting normally which prompted him to come to the ED. On arrival patient came in with right sided arm weakness, global aphasia, and dysarthria, right sided facial droop. Initial head CT demonstrated wedge like low density in the left putamen and caudate (corpus striatum) consistent with infarct that may be acute and no acute intracranial hemorrhage. Tenecteplase was administered at 12:15 pm. CTA head demonstrated occlusion of left MCA distal M1 segment and CTP showed large mismatch. found to have LM1 occlusion. Patient taken for mechanical thrombectomy where he was found to have LM1 occlusion with TICI 2C recanalization.   Etiology: embolic stroke of undetermined source    NEURO:   -Neurologically with right sided hemiparesis and aphasia. No verbal output  -Repeat head CT demonstrated acute left MCA infarct. Possible acute infarct involving the right temporal region.   -Continue close monitoring for neurologic deterioration    - Stroke neuro checks q 1. As per neuro ICU  -24 hour post mechanical thrombectomy CT head   - Permissive HTN up to . Avoid hypotension and rapid fluctuations    -ANTITHROMBOTIC THERAPY: Pending findings of post tenecteplase and cerebral angiogram 24 hour head CT. If no hemorrhage found can start ASA 81mg daily  -titrate statin to LDL goal less than 70  -Obtain MRI Brain w/o  -Dysphagia screen: pass/fail   -Physical therapy/OT/Speech eval/treatment.     -CARDIOVASCULAR: TTE pending, cardiac monitoring w/ telemetry for now, further evaluation pending findings of noted workup                              -HEMATOLOGY: H/H 13.2/36.9. Monitor for signs and symptoms of further anemia. Platelets 216. patient should have all age and risk appropriate malignancy screenings with PCP or sooner if clinically suspected      DVT ppx: Heparin s.c [] LMWH [] SCD for now. If no hemorrhage found on 24 hour post mechanical thrombectomy can start lovenox.     PULMONARY: CXR clear.  protecting airway, saturating well     RENAL: BUN/Cr 15.9/0.91. monitor urine output, maintain adequate hydration. Noted fullness around right thigh. CT abdomen pelvis ruled out bladder injury.      Na Goal:  135-145    ID: afebrile, leukocytosis with uptrend, monitor for si/sx of infection. Started on Zosyn per primary team.      OTHER:  condition and plan of care d/w patient, questions and concerns addressed.     DISPOSITION: Rehab or home depending on PT eval once stable and workup is complete      CORE MEASURES:        Admission NIHSS: 23     Tenecteplase : [x] YES [] NO      LDL/HDL/A1C: p     Depression Screen- if depression hx and/or present      Statin Therapy: p     Dysphagia Screen: [] PASS [] FAIL- pending     Smoking [] YES [] NO      Afib [] YES [] NO     Stroke Education [] YES [] NO- ordered and pending    Obtain screening lower extremity venous ultrasound in patients who meet 1 or more of the following criteria as patient is high risk for DVT/PE on admission:   [] History of DVT/PE  []Hypercoagulable states (Factor V Leiden, Cancer, OCP, etc. )  []Prolonged immobility (hemiplegia/hemiparesis/post operative or any other extended immobilization)  [] Transferred from outside facility (Rehab or Long term care)  [] Age </= to 50

## 2023-08-28 NOTE — PROGRESS NOTE ADULT - ASSESSMENT
Assessment: Patient is a 63 year old male with LM1 occlusion s/p IV tenecteplase @12:10 s/p cerebral angiogram for LT M1 mechanical thrombectomy.     Neuro:   - Q1 hr neuro checks  - Stroke neurology following, appreciate recs  - CT head at 24h post-TNK -> if stable, start ASA  - MRI/A head/neck when able  - Neurology follow up  - PT/OT - evaluate  - SLP eval    CV:  TTE  start statin     Resp:  wean NC as tolerated  SpO2>92%    GI:  Pending SLP  Will likely need NGT  Then order bowel regimen    ID: Concern for evolving sepsis with tachycardia, hypoTN, and leukocytosis  Empiric zosyn for 3d  Send UA  If fever -> send BCx, lactate, procal, CXR    Renal/:  maintain Umaña today  Monitor UOP  IV fluids while awaiting SLP    Endocrine:  F/u HgbA1c and TSH  FS q6h, ISS    Heme:  CTH @24h -> then lovenox    Dispo: stays in ICU for close neurologic and hemodynamic monitoring  Family update today

## 2023-08-28 NOTE — PROGRESS NOTE ADULT - SUBJECTIVE AND OBJECTIVE BOX
Patient is a 63y old  Male who presents with a chief complaint of LT M1 Occlusion (28 Aug 2023 08:58)    HPI:  Patient is a 63 year old male with PMH Hypertension, DM on oral medications who presents c/o stroke-like symptoms. Per son pt was walking outside around 10:15am this morning when he suddenly fell. His son helped him up and noted that he was weak on the right side and not acting normally which prompted him to come to the ED. On arrival pt w/ obvious right sided arm weakness, confusion, dysarthria, facial droop suggestive of acute stroke. Code stroke initiated, found to have LM1 occlusion, given TNK @ 1215 and was taken for thrombectomy. Unable to provide ROS 2/2 aphasia  (27 Aug 2023 16:45)      Interval history:  Patient seen and examined by neuro IR team. Patient POD 1 from mechanical thrombectomy for LM1 occlusion. Patient's exam slightly improved. S/p CT A/P overnight to r/o bladder injury, negative. No other acute events reported by staff.       Vital Signs Last 24 Hrs  T(C): 36.9 (28 Aug 2023 09:45), Max: 37.4 (27 Aug 2023 19:45)  T(F): 98.4 (28 Aug 2023 09:45), Max: 99.3 (27 Aug 2023 19:45)  HR: 91 (28 Aug 2023 09:45) (83 - 106)  BP: 92/71 (28 Aug 2023 09:45) (90/64 - 182/98)  BP(mean): 80 (28 Aug 2023 09:45) (72 - 106)  RR: 17 (28 Aug 2023 09:45) (8 - 21)  SpO2: 100% (28 Aug 2023 09:45) (93% - 100%)    Parameters below as of 28 Aug 2023 09:45  Patient On (Oxygen Delivery Method): nasal cannula  O2 Flow (L/min): 2      Physical Exam:  Constitutional: NAD, lying in bed  Neuro  * Mental Status: EO spontaneously, mimicking, garbled sounds, expressive + receptive aphasia   * Cranial Nerves: PERRL, L gaze preference cannot cross midline, R facial droop, no blink to threat R side  * Motor: RUE extensor posturing, RLE TF, LUE/LLE 5/5 AG   * Sensory: Sensation grossly intact to noxious stimuli   * Reflexes: not assessed   Groins: R groin full, nontender, no hematoma, no ecchymoses; L groin soft, nontender, no hematoma       LABS:                        13.2   16.12 )-----------( 188      ( 28 Aug 2023 01:30 )             36.9     08-28    135  |  96  |  15.9  ----------------------------<  155<H>  3.7   |  25.0  |  0.91    Ca    8.3<L>      28 Aug 2023 01:30  Phos  4.0     08-28  Mg     1.5     08-28    TPro  7.4  /  Alb  4.5  /  TBili  0.3<L>  /  DBili  x   /  AST  33  /  ALT  38  /  AlkPhos  62  08-27    PT/INR - ( 27 Aug 2023 11:40 )   PT: 11.4 sec;   INR: 1.03 ratio    PTT - ( 27 Aug 2023 11:40 )  PTT:31.5 sec    Urinalysis Basic - ( 28 Aug 2023 01:30 )  Color: x / Appearance: x / SG: x / pH: x  Gluc: 155 mg/dL / Ketone: x  / Bili: x / Urobili: x   Blood: x / Protein: x / Nitrite: x   Leuk Esterase: x / RBC: x / WBC x   Sq Epi: x / Non Sq Epi: x / Bacteria: x      Medications:  MEDICATIONS  (STANDING):  chlorhexidine 2% Cloths 1 Application(s) Topical <User Schedule>  dextrose 5%. 1000 milliLiter(s) (50 mL/Hr) IV Continuous <Continuous>  dextrose 5%. 1000 milliLiter(s) (100 mL/Hr) IV Continuous <Continuous>  dextrose 50% Injectable 25 Gram(s) IV Push once  dextrose 50% Injectable 12.5 Gram(s) IV Push once  dextrose 50% Injectable 25 Gram(s) IV Push once  glucagon  Injectable 1 milliGRAM(s) IntraMuscular once  insulin lispro (ADMELOG) corrective regimen sliding scale   SubCutaneous every 6 hours  piperacillin/tazobactam IVPB. 3.375 Gram(s) IV Intermittent once  piperacillin/tazobactam IVPB.- 3.375 Gram(s) IV Intermittent once  piperacillin/tazobactam IVPB.. 3.375 Gram(s) IV Intermittent every 8 hours  sodium chloride 0.9%. 1000 milliLiter(s) (75 mL/Hr) IV Continuous <Continuous>    MEDICATIONS  (PRN):  dextrose Oral Gel 15 Gram(s) Oral once PRN Blood Glucose LESS THAN 70 milliGRAM(s)/deciliter  hydrALAZINE Injectable 10 milliGRAM(s) IV Push every 2 hours PRN SBP >140  labetalol Injectable 10 milliGRAM(s) IV Push every 2 hours PRN SBP >140      RADIOLOGY & ADDITIONAL STUDIES:  < from: CT Abdomen and Pelvis w/ IV Cont (08.28.23 @ 02:53) >  IMPRESSION:    No evidence for bladder rupture.    This study was preliminary reported by the ED radiologist on 8/28/2023,   3:06 AM.    --- End of Report ---    DAI NEWELL MD; Attending Radiologist  This document has been electronically signed. Aug 28 2023 11:05AM    < end of copied text >    < from: CT Head No Cont (08.28.23 @ 02:34) >  IMPRESSION: Acute left MCA infarct. Possible acute infarct involving the   right temporal region. This finding can be better evaluated with MRI if   there are no contraindications.    --- End of Report ---  LUNA BERRY MD; Attending Radiologist  This document has been electronically signed. Aug 28 2023  8:16AM    < end of copied text >

## 2023-08-28 NOTE — PROGRESS NOTE ADULT - SUBJECTIVE AND OBJECTIVE BOX
Preliminary note, offical recommendations pending attending review/signature   [facility] Stroke Team  Progress Note     HPI:  Patient is a 63 year old male with PMH Hypertension, DM on oral medications who presents c/o stroke-like symptoms. Per son pt was walking outside around 10:15am this morning when he suddenly fell. His son helped him up and noted that he was weak on the right side and not acting normally which prompted him to come to the ED. On arrival pt w/ obvious right sided arm weakness, confusion, dysarthria, facial droop suggestive of acute stroke. Code stroke initiated, found to have LM1 occlusion, given TNK @ 1215 and was taken for thrombectomy. Unable to provide ROS 2/2 aphasia  (27 Aug 2023 16:45)      Admission NIHSS  Pre-MRS  ICH Score (if applicable)    SUBJECTIVE: No events overnight.  No new neurologic complaints.  ROS reported negative unless otherwise noted.    chlorhexidine 2% Cloths 1 Application(s) Topical <User Schedule>  hydrALAZINE Injectable 10 milliGRAM(s) IV Push every 2 hours PRN  labetalol Injectable 10 milliGRAM(s) IV Push every 2 hours PRN  piperacillin/tazobactam IVPB. 3.375 Gram(s) IV Intermittent once  piperacillin/tazobactam IVPB.- 3.375 Gram(s) IV Intermittent once  piperacillin/tazobactam IVPB.. 3.375 Gram(s) IV Intermittent every 8 hours  potassium chloride  10 mEq/100 mL IVPB 10 milliEquivalent(s) IV Intermittent every 1 hour  sodium chloride 0.9%. 1000 milliLiter(s) IV Continuous <Continuous>      PHYSICAL EXAM:   Vital Signs Last 24 Hrs  T(C): 36.9 (28 Aug 2023 07:45), Max: 37.4 (27 Aug 2023 19:45)  T(F): 98.4 (28 Aug 2023 07:45), Max: 99.3 (27 Aug 2023 19:45)  HR: 83 (28 Aug 2023 07:45) (83 - 106)  BP: 96/66 (28 Aug 2023 07:45) (90/64 - 182/98)  BP(mean): 76 (28 Aug 2023 07:45) (72 - 106)  RR: 19 (28 Aug 2023 07:45) (8 - 21)  SpO2: 98% (28 Aug 2023 07:45) (93% - 100%)    Parameters below as of 28 Aug 2023 08:45  Patient On (Oxygen Delivery Method): nasal cannula  O2 Flow (L/min): 2      General: No acute distress    NEUROLOGICAL EXAM:  Mental status: Awake, alert, oriented x3, speech fluent, follows commands, no neglect, normal memory   Cranial Nerves: No facial asymmetry, no nystagmus, no dysarthria,  tongue midline  Motor exam: Normal tone, no drift, 5/5 RUE, 5/5 RLE, 5/5 LUE, 5/5 LLE, normal fine finger movements.  Sensation: Intact to light touch   Coordination/ Gait: No dysmetria, gait not tested    LABS:                        13.2   16.12 )-----------( 188      ( 28 Aug 2023 01:30 )             36.9    08-28    135  |  96  |  15.9  ----------------------------<  155<H>  3.7   |  25.0  |  0.91    Ca    8.3<L>      28 Aug 2023 01:30  Phos  4.0     08-28  Mg     1.5     08-28    TPro  7.4  /  Alb  4.5  /  TBili  0.3<L>  /  DBili  x   /  AST  33  /  ALT  38  /  AlkPhos  62  08-27  PT/INR - ( 27 Aug 2023 11:40 )   PT: 11.4 sec;   INR: 1.03 ratio         PTT - ( 27 Aug 2023 11:40 )  PTT:31.5 sec      IMAGING: Reviewed by me.      Preliminary note, offical recommendations pending attending review/signature   [facility] Stroke Team  Progress Note     HPI:  Patient is a 63 year old male with PMHx Hypertension, DM on oral medications who presented c/o stroke-like symptoms. Per son pt was walking outside around 10:15am on 8/27/23 when he suddenly fell. His son helped him up and noted that he was weak on the right side and not acting normally which prompted him to come to the ED. On arrival pt w/ obvious right sided arm weakness, confusion, dysarthria, facial droop suggestive of acute stroke. Code stroke initiated, found to have LM1 occlusion, given TNK @ 1215 and was taken for thrombectomy. Unable to provide ROS 2/2 aphasia.    SUBJECTIVE: No events overnight.  No new neurologic complaints.  ROS reported negative unless otherwise noted.    chlorhexidine 2% Cloths 1 Application(s) Topical <User Schedule>  hydrALAZINE Injectable 10 milliGRAM(s) IV Push every 2 hours PRN  labetalol Injectable 10 milliGRAM(s) IV Push every 2 hours PRN  piperacillin/tazobactam IVPB. 3.375 Gram(s) IV Intermittent once  piperacillin/tazobactam IVPB.- 3.375 Gram(s) IV Intermittent once  piperacillin/tazobactam IVPB.. 3.375 Gram(s) IV Intermittent every 8 hours  potassium chloride  10 mEq/100 mL IVPB 10 milliEquivalent(s) IV Intermittent every 1 hour  sodium chloride 0.9%. 1000 milliLiter(s) IV Continuous <Continuous>      PHYSICAL EXAM:   Vital Signs Last 24 Hrs  T(C): 36.9 (28 Aug 2023 07:45), Max: 37.4 (27 Aug 2023 19:45)  T(F): 98.4 (28 Aug 2023 07:45), Max: 99.3 (27 Aug 2023 19:45)  HR: 83 (28 Aug 2023 07:45) (83 - 106)  BP: 96/66 (28 Aug 2023 07:45) (90/64 - 182/98)  BP(mean): 76 (28 Aug 2023 07:45) (72 - 106)  RR: 19 (28 Aug 2023 07:45) (8 - 21)  SpO2: 98% (28 Aug 2023 07:45) (93% - 100%)    Parameters below as of 28 Aug 2023 08:45  Patient On (Oxygen Delivery Method): nasal cannula  O2 Flow (L/min): 2      PENDING  General: NAD    Detailed Neurologic Exam:    Mental status: The patient is awake but not oriented to place, self, month, or year. Minimal verbal output and just generates sounds. Follows few simple commands(wiggles left toes), but does mimic examiner and keeps left upper extremity up).     Cranial nerves: Pupils equal and react symmetrically to light. Right gaze palsy that does not cross midline. No blink to threat on right side. Left eye ptosis. Right sided facial asymmetry     Motor:   LUE/LLE- spontaneously moves antigravity. Squeezes hand but not on command.   RUE- extends to pain, but does not spontaneously move  RLE- extends to pain, but does not spontaneously move    Sensation: Intact to light touch on LUE and LLE. Extends to noxious stimuli on RUE.     Cerebellar: Unable to assess, but appears to be no gross ataxia    LABS:                        13.2   16.12 )-----------( 188      ( 28 Aug 2023 01:30 )             36.9    08-28    135  |  96  |  15.9  ----------------------------<  155<H>  3.7   |  25.0  |  0.91    Ca    8.3<L>      28 Aug 2023 01:30  Phos  4.0     08-28  Mg     1.5     08-28    TPro  7.4  /  Alb  4.5  /  TBili  0.3<L>  /  DBili  x   /  AST  33  /  ALT  38  /  AlkPhos  62  08-27  PT/INR - ( 27 Aug 2023 11:40 )   PT: 11.4 sec;   INR: 1.03 ratio         PTT - ( 27 Aug 2023 11:40 )  PTT:31.5 sec      RADIOLOGY & ADDITIONAL STUDIES (independently reviewed unless otherwise noted):   CT Brain Stroke Protocol (08.27.23 @ 11:53)   IMPRESSION:  Wedgelike low density in the left putamen and caudate (corpus striatum)   is new from the prior scan, and consistent with infarct that may be acute   given corresponding symptoms.  No acute intracranial hemorrhage.    The study was performed at 11:47 AM on 08/27/2023 and the above findings   were discussed with Dr. Betts at 12:02 PM.    CT Angio Brain Stroke Protocol  w/ IV Cont (08.27.23 @ 12:03)   IMPRESSION:  CTA head: Focal occlusion of left MCA distal M1 segment with patent but   small caliber filling of M2 segments.    CT perfusion indicates large mismatch between Tmax and CBF consistent   with ischemic penumbra.    CTA Neck: No steno-occlusive focus.    Case findings above discussed with Dr. Hu at 12:13 PM on 08/27/2023.    CT Head No Cont (08.28.23 @ 02:34)   ******PRELIMINARY REPORT******   INTERPRETATION:  Since prior CT dated 8/27/23 at 11:47 AM, interval   development of multifocal acute infarcts involving the left   parieto-temporal, left frontal, and right temporal lobes. Redemonstrated   left basal ganglia acute infarct. No evidence of hemorrhagic conversion   or herniation.    Findings discussed with MONIK Ariza by Dr. Dover via telephone at   2:52 AM on 8/28/23 with readback verification.         Preliminary note, offical recommendations pending attending review/signature   [facility] Stroke Team  Progress Note     HPI:  Patient is a 63 year old male with PMHx Hypertension, DM on oral medications who presented c/o stroke-like symptoms. Per son pt was walking outside around 10:15am on 8/27/23 when he suddenly fell. His son helped him up and noted that he was weak on the right side and not acting normally which prompted him to come to the ED. On arrival pt w/ obvious right sided arm weakness, confusion, dysarthria, facial droop suggestive of acute stroke. Code stroke initiated, found to have LM1 occlusion, given TNK @ 1215 and was taken for thrombectomy. Unable to provide ROS 2/2 aphasia.    SUBJECTIVE: No events overnight.  No new neurologic complaints.  ROS reported negative unless otherwise noted.    chlorhexidine 2% Cloths 1 Application(s) Topical <User Schedule>  hydrALAZINE Injectable 10 milliGRAM(s) IV Push every 2 hours PRN  labetalol Injectable 10 milliGRAM(s) IV Push every 2 hours PRN  piperacillin/tazobactam IVPB. 3.375 Gram(s) IV Intermittent once  piperacillin/tazobactam IVPB.- 3.375 Gram(s) IV Intermittent once  piperacillin/tazobactam IVPB.. 3.375 Gram(s) IV Intermittent every 8 hours  potassium chloride  10 mEq/100 mL IVPB 10 milliEquivalent(s) IV Intermittent every 1 hour  sodium chloride 0.9%. 1000 milliLiter(s) IV Continuous <Continuous>      PHYSICAL EXAM:   Vital Signs Last 24 Hrs  T(C): 36.9 (28 Aug 2023 07:45), Max: 37.4 (27 Aug 2023 19:45)  T(F): 98.4 (28 Aug 2023 07:45), Max: 99.3 (27 Aug 2023 19:45)  HR: 83 (28 Aug 2023 07:45) (83 - 106)  BP: 96/66 (28 Aug 2023 07:45) (90/64 - 182/98)  BP(mean): 76 (28 Aug 2023 07:45) (72 - 106)  RR: 19 (28 Aug 2023 07:45) (8 - 21)  SpO2: 98% (28 Aug 2023 07:45) (93% - 100%)    Parameters below as of 28 Aug 2023 08:45  Patient On (Oxygen Delivery Method): nasal cannula  O2 Flow (L/min): 2        General: NAD    Detailed Neurologic Exam:    Mental status: The patient is awake but not oriented to place, self, month, or year. Minimal verbal output and just generates sounds. Follows few simple commands (lift left arm up), but appears to be mimicking examiner. Unable to repeat.    Cranial nerves: Pupils equal and react symmetrically to light. Right gaze palsy that does not cross midline. No blink to threat on right side. Left eye ptosis. Right sided facial asymmetry     Motor:   LUE/LLE- spontaneously moves antigravity. Squeezes hand but not on command.   RUE- extends to pain, but does not spontaneously move  RLE- triple flexion    Sensation: Intact to light touch on LUE and LLE. Extends to noxious stimuli on RUE. Triple flexion RLE    Cerebellar: Unable to assess, but appears to be no gross ataxia    LABS:                        13.2   16.12 )-----------( 188      ( 28 Aug 2023 01:30 )             36.9    08-28    135  |  96  |  15.9  ----------------------------<  155<H>  3.7   |  25.0  |  0.91    Ca    8.3<L>      28 Aug 2023 01:30  Phos  4.0     08-28  Mg     1.5     08-28    TPro  7.4  /  Alb  4.5  /  TBili  0.3<L>  /  DBili  x   /  AST  33  /  ALT  38  /  AlkPhos  62  08-27  PT/INR - ( 27 Aug 2023 11:40 )   PT: 11.4 sec;   INR: 1.03 ratio         PTT - ( 27 Aug 2023 11:40 )  PTT:31.5 sec      RADIOLOGY & ADDITIONAL STUDIES (independently reviewed unless otherwise noted):   CT Abdomen and Pelvis w/ IV Cont (08.28.23 @ 02:53)   IMPRESSION:  No evidence for bladder rupture.    CT Brain Stroke Protocol (08.27.23 @ 11:53)   IMPRESSION:  Wedgelike low density in the left putamen and caudate (corpus striatum)   is new from the prior scan, and consistent with infarct that may be acute   given corresponding symptoms.  No acute intracranial hemorrhage.    The study was performed at 11:47 AM on 08/27/2023 and the above findings   were discussed with Dr. Betts at 12:02 PM.    CT Angio Brain Stroke Protocol  w/ IV Cont (08.27.23 @ 12:03)   IMPRESSION:  CTA head: Focal occlusion of left MCA distal M1 segment with patent but   small caliber filling of M2 segments.    CT perfusion indicates large mismatch between Tmax and CBF consistent   with ischemic penumbra.    CTA Neck: No steno-occlusive focus.    Case findings above discussed with Dr. Hu at 12:13 PM on 08/27/2023.     CT Head No Cont (08.28.23 @ 02:34)   IMPRESSION: Acute left MCA infarct. Possible acute infarct involving the   right temporal region. This finding can be better evaluated with MRI if   there are no contraindications.           Preliminary note, offical recommendations pending attending review/signature   [facility] Stroke Team  Progress Note     HPI:  Patient is a 63 year old male with PMHx Hypertension, DM on oral medications who presented c/o stroke-like symptoms. Per son pt was walking outside around 10:15am on 8/27/23 when he suddenly fell. His son helped him up and noted that he was weak on the right side and not acting normally which prompted him to come to the ED. On arrival pt w/ obvious right sided arm weakness, confusion, dysarthria, facial droop suggestive of acute stroke. Code stroke initiated, found to have LM1 occlusion, given TNK @ 1215 and was taken for thrombectomy. Unable to provide ROS 2/2 aphasia.    SUBJECTIVE: Noted to have hypotension refractory to bolus this morning. Resolved by time of examination. No events overnight.  No new neurologic complaints.  ROS reported negative unless otherwise noted.    chlorhexidine 2% Cloths 1 Application(s) Topical <User Schedule>  hydrALAZINE Injectable 10 milliGRAM(s) IV Push every 2 hours PRN  labetalol Injectable 10 milliGRAM(s) IV Push every 2 hours PRN  piperacillin/tazobactam IVPB. 3.375 Gram(s) IV Intermittent once  piperacillin/tazobactam IVPB.- 3.375 Gram(s) IV Intermittent once  piperacillin/tazobactam IVPB.. 3.375 Gram(s) IV Intermittent every 8 hours  potassium chloride  10 mEq/100 mL IVPB 10 milliEquivalent(s) IV Intermittent every 1 hour  sodium chloride 0.9%. 1000 milliLiter(s) IV Continuous <Continuous>      PHYSICAL EXAM:   Vital Signs Last 24 Hrs  T(C): 36.9 (28 Aug 2023 07:45), Max: 37.4 (27 Aug 2023 19:45)  T(F): 98.4 (28 Aug 2023 07:45), Max: 99.3 (27 Aug 2023 19:45)  HR: 83 (28 Aug 2023 07:45) (83 - 106)  BP: 96/66 (28 Aug 2023 07:45) (90/64 - 182/98)  BP(mean): 76 (28 Aug 2023 07:45) (72 - 106)  RR: 19 (28 Aug 2023 07:45) (8 - 21)  SpO2: 98% (28 Aug 2023 07:45) (93% - 100%)    Parameters below as of 28 Aug 2023 08:45  Patient On (Oxygen Delivery Method): nasal cannula  O2 Flow (L/min): 2        General: NAD    Detailed Neurologic Exam:    Mental status: The patient is awake but not oriented to place, self, month, or year. Minimal verbal output and just generates sounds. Follows few simple commands (lift left arm up), but appears to be mimicking examiner. Unable to repeat.    Cranial nerves: Pupils equal and react symmetrically to light. Right gaze palsy that does not cross midline. No blink to threat on right side. Left eye ptosis. Right sided facial asymmetry     Motor:   LUE/LLE- spontaneously moves antigravity. Squeezes hand but not on command.   RUE- extends to pain, but does not spontaneously move  RLE- triple flexion    Sensation: Intact to light touch on LUE and LLE. Extends to noxious stimuli on RUE. Triple flexion RLE    Cerebellar: Unable to assess, but appears to be no gross ataxia    Groins: R groin full, nontender, no hematoma, no ecchymoses; L groin soft, nontender, no hematoma     LABS:                        13.2   16.12 )-----------( 188      ( 28 Aug 2023 01:30 )             36.9    08-28    135  |  96  |  15.9  ----------------------------<  155<H>  3.7   |  25.0  |  0.91    Ca    8.3<L>      28 Aug 2023 01:30  Phos  4.0     08-28  Mg     1.5     08-28    TPro  7.4  /  Alb  4.5  /  TBili  0.3<L>  /  DBili  x   /  AST  33  /  ALT  38  /  AlkPhos  62  08-27  PT/INR - ( 27 Aug 2023 11:40 )   PT: 11.4 sec;   INR: 1.03 ratio         PTT - ( 27 Aug 2023 11:40 )  PTT:31.5 sec      RADIOLOGY & ADDITIONAL STUDIES (independently reviewed unless otherwise noted):   CT Abdomen and Pelvis w/ IV Cont (08.28.23 @ 02:53)   IMPRESSION:  No evidence for bladder rupture.    CT Brain Stroke Protocol (08.27.23 @ 11:53)   IMPRESSION:  Wedgelike low density in the left putamen and caudate (corpus striatum)   is new from the prior scan, and consistent with infarct that may be acute   given corresponding symptoms.  No acute intracranial hemorrhage.    The study was performed at 11:47 AM on 08/27/2023 and the above findings   were discussed with Dr. Betts at 12:02 PM.    CT Angio Brain Stroke Protocol  w/ IV Cont (08.27.23 @ 12:03)   IMPRESSION:  CTA head: Focal occlusion of left MCA distal M1 segment with patent but   small caliber filling of M2 segments.    CT perfusion indicates large mismatch between Tmax and CBF consistent   with ischemic penumbra.    CTA Neck: No steno-occlusive focus.    Case findings above discussed with Dr. Hu at 12:13 PM on 08/27/2023.     CT Head No Cont (08.28.23 @ 02:34)   IMPRESSION: Acute left MCA infarct. Possible acute infarct involving the   right temporal region. This finding can be better evaluated with MRI if   there are no contraindications.           Preliminary note, offical recommendations pending attending review/signature   [facility] Stroke Team  Progress Note     Nurse agreeable to translate  HPI:  Patient is a 63 year old male with PMHx Hypertension, DM on oral medications who presented c/o stroke-like symptoms. Per son pt was walking outside around 10:15am on 8/27/23 when he suddenly fell. His son helped him up and noted that he was weak on the right side and not acting normally which prompted him to come to the ED. On arrival pt w/ obvious right sided arm weakness, confusion, dysarthria, facial droop suggestive of acute stroke. Code stroke initiated, found to have LM1 occlusion, given TNK @ 1215 and was taken for thrombectomy. Unable to provide ROS 2/2 aphasia.    SUBJECTIVE: Noted to have hypotension refractory to bolus this morning. Resolved by time of examination. No events overnight.  No new neurologic complaints.  ROS reported negative unless otherwise noted.    chlorhexidine 2% Cloths 1 Application(s) Topical <User Schedule>  hydrALAZINE Injectable 10 milliGRAM(s) IV Push every 2 hours PRN  labetalol Injectable 10 milliGRAM(s) IV Push every 2 hours PRN  piperacillin/tazobactam IVPB. 3.375 Gram(s) IV Intermittent once  piperacillin/tazobactam IVPB.- 3.375 Gram(s) IV Intermittent once  piperacillin/tazobactam IVPB.. 3.375 Gram(s) IV Intermittent every 8 hours  potassium chloride  10 mEq/100 mL IVPB 10 milliEquivalent(s) IV Intermittent every 1 hour  sodium chloride 0.9%. 1000 milliLiter(s) IV Continuous <Continuous>      PHYSICAL EXAM:   Vital Signs Last 24 Hrs  T(C): 36.9 (28 Aug 2023 07:45), Max: 37.4 (27 Aug 2023 19:45)  T(F): 98.4 (28 Aug 2023 07:45), Max: 99.3 (27 Aug 2023 19:45)  HR: 83 (28 Aug 2023 07:45) (83 - 106)  BP: 96/66 (28 Aug 2023 07:45) (90/64 - 182/98)  BP(mean): 76 (28 Aug 2023 07:45) (72 - 106)  RR: 19 (28 Aug 2023 07:45) (8 - 21)  SpO2: 98% (28 Aug 2023 07:45) (93% - 100%)    Parameters below as of 28 Aug 2023 08:45  Patient On (Oxygen Delivery Method): nasal cannula  O2 Flow (L/min): 2        General: NAD    Detailed Neurologic Exam:    Mental status: The patient is awake but not oriented to place, self, month, or year. Minimal verbal output and just generates sounds. Follows few simple commands (lift left arm up), but appears to be mimicking examiner. Unable to repeat.    Cranial nerves: Pupils equal and react symmetrically to light. Right gaze palsy that does not cross midline. No blink to threat on right side. Left eye ptosis. Right sided facial asymmetry     Motor:   LUE/LLE- spontaneously moves antigravity. Squeezes hand but not on command.   RUE- extends to pain, but does not spontaneously move  RLE- triple flexion    Sensation: Intact to light touch on LUE and LLE. Extends to noxious stimuli on RUE. Triple flexion RLE    Cerebellar: Unable to assess, but appears to be no gross ataxia    Groins: R groin full, nontender, no hematoma, no ecchymoses; L groin soft, nontender, no hematoma     LABS:                        13.2   16.12 )-----------( 188      ( 28 Aug 2023 01:30 )             36.9    08-28    135  |  96  |  15.9  ----------------------------<  155<H>  3.7   |  25.0  |  0.91    Ca    8.3<L>      28 Aug 2023 01:30  Phos  4.0     08-28  Mg     1.5     08-28    TPro  7.4  /  Alb  4.5  /  TBili  0.3<L>  /  DBili  x   /  AST  33  /  ALT  38  /  AlkPhos  62  08-27  PT/INR - ( 27 Aug 2023 11:40 )   PT: 11.4 sec;   INR: 1.03 ratio         PTT - ( 27 Aug 2023 11:40 )  PTT:31.5 sec      RADIOLOGY & ADDITIONAL STUDIES (independently reviewed unless otherwise noted):   CT Abdomen and Pelvis w/ IV Cont (08.28.23 @ 02:53)   IMPRESSION:  No evidence for bladder rupture.    CT Brain Stroke Protocol (08.27.23 @ 11:53)   IMPRESSION:  Wedgelike low density in the left putamen and caudate (corpus striatum)   is new from the prior scan, and consistent with infarct that may be acute   given corresponding symptoms.  No acute intracranial hemorrhage.    The study was performed at 11:47 AM on 08/27/2023 and the above findings   were discussed with Dr. Betts at 12:02 PM.    CT Angio Brain Stroke Protocol  w/ IV Cont (08.27.23 @ 12:03)   IMPRESSION:  CTA head: Focal occlusion of left MCA distal M1 segment with patent but   small caliber filling of M2 segments.    CT perfusion indicates large mismatch between Tmax and CBF consistent   with ischemic penumbra.    CTA Neck: No steno-occlusive focus.    Case findings above discussed with Dr. Hu at 12:13 PM on 08/27/2023.     CT Head No Cont (08.28.23 @ 02:34)   IMPRESSION: Acute left MCA infarct. Possible acute infarct involving the   right temporal region. This finding can be better evaluated with MRI if   there are no contraindications.           [facility] Stroke Team  Progress Note     Nurse agreeable to translate  HPI:  Patient is a 63 year old male with PMHx Hypertension, DM on oral medications who presented c/o stroke-like symptoms. Per son pt was walking outside around 10:15am on 8/27/23 when he suddenly fell. His son helped him up and noted that he was weak on the right side and not acting normally which prompted him to come to the ED. On arrival pt w/ obvious right sided arm weakness, confusion, dysarthria, facial droop suggestive of acute stroke. Code stroke initiated, found to have LM1 occlusion, given TNK @ 1215 and was taken for thrombectomy. Unable to provide ROS 2/2 aphasia.    SUBJECTIVE: Noted to have hypotension refractory to bolus this morning. Resolved by time of examination. No events overnight.  No new neurologic complaints.  ROS reported negative unless otherwise noted.    chlorhexidine 2% Cloths 1 Application(s) Topical <User Schedule>  hydrALAZINE Injectable 10 milliGRAM(s) IV Push every 2 hours PRN  labetalol Injectable 10 milliGRAM(s) IV Push every 2 hours PRN  piperacillin/tazobactam IVPB. 3.375 Gram(s) IV Intermittent once  piperacillin/tazobactam IVPB.- 3.375 Gram(s) IV Intermittent once  piperacillin/tazobactam IVPB.. 3.375 Gram(s) IV Intermittent every 8 hours  potassium chloride  10 mEq/100 mL IVPB 10 milliEquivalent(s) IV Intermittent every 1 hour  sodium chloride 0.9%. 1000 milliLiter(s) IV Continuous <Continuous>      PHYSICAL EXAM:   Vital Signs Last 24 Hrs  T(C): 36.9 (28 Aug 2023 07:45), Max: 37.4 (27 Aug 2023 19:45)  T(F): 98.4 (28 Aug 2023 07:45), Max: 99.3 (27 Aug 2023 19:45)  HR: 83 (28 Aug 2023 07:45) (83 - 106)  BP: 96/66 (28 Aug 2023 07:45) (90/64 - 182/98)  BP(mean): 76 (28 Aug 2023 07:45) (72 - 106)  RR: 19 (28 Aug 2023 07:45) (8 - 21)  SpO2: 98% (28 Aug 2023 07:45) (93% - 100%)    Parameters below as of 28 Aug 2023 08:45  Patient On (Oxygen Delivery Method): nasal cannula  O2 Flow (L/min): 2        General: NAD    Detailed Neurologic Exam:    Mental status: The patient is awake but not oriented to place, self, month, or year. Minimal verbal output and just generates sounds. Follows few simple commands (lift left arm up), but appears to be mimicking examiner. Unable to repeat.    Cranial nerves: Pupils equal and react symmetrically to light. Right gaze palsy that does not cross midline. No blink to threat on right side. Left eye ptosis. Right sided facial asymmetry     Motor:   LUE/LLE- spontaneously moves antigravity. Squeezes hand but not on command.   RUE- extends to pain, but does not spontaneously move  RLE- triple flexion    Sensation: Intact to light touch on LUE and LLE. Extends to noxious stimuli on RUE. Triple flexion RLE    Cerebellar: Unable to assess, but appears to be no gross ataxia    Groins: R groin full, nontender, no hematoma, no ecchymoses; L groin soft, nontender, no hematoma     LABS:                        13.2   16.12 )-----------( 188      ( 28 Aug 2023 01:30 )             36.9    08-28    135  |  96  |  15.9  ----------------------------<  155<H>  3.7   |  25.0  |  0.91    Ca    8.3<L>      28 Aug 2023 01:30  Phos  4.0     08-28  Mg     1.5     08-28    TPro  7.4  /  Alb  4.5  /  TBili  0.3<L>  /  DBili  x   /  AST  33  /  ALT  38  /  AlkPhos  62  08-27  PT/INR - ( 27 Aug 2023 11:40 )   PT: 11.4 sec;   INR: 1.03 ratio         PTT - ( 27 Aug 2023 11:40 )  PTT:31.5 sec      RADIOLOGY & ADDITIONAL STUDIES (independently reviewed unless otherwise noted):   CT Abdomen and Pelvis w/ IV Cont (08.28.23 @ 02:53)   IMPRESSION:  No evidence for bladder rupture.    CT Brain Stroke Protocol (08.27.23 @ 11:53)   IMPRESSION:  Wedgelike low density in the left putamen and caudate (corpus striatum)   is new from the prior scan, and consistent with infarct that may be acute   given corresponding symptoms.  No acute intracranial hemorrhage.    The study was performed at 11:47 AM on 08/27/2023 and the above findings   were discussed with Dr. Betts at 12:02 PM.    CT Angio Brain Stroke Protocol  w/ IV Cont (08.27.23 @ 12:03)   IMPRESSION:  CTA head: Focal occlusion of left MCA distal M1 segment with patent but   small caliber filling of M2 segments.    CT perfusion indicates large mismatch between Tmax and CBF consistent   with ischemic penumbra.    CTA Neck: No steno-occlusive focus.    Case findings above discussed with Dr. Hu at 12:13 PM on 08/27/2023.     CT Head No Cont (08.28.23 @ 02:34)   IMPRESSION: Acute left MCA infarct. Possible acute infarct involving the   right temporal region. This finding can be better evaluated with MRI if   there are no contraindications.

## 2023-08-28 NOTE — PROGRESS NOTE ADULT - ASSESSMENT
Assessment:  63M with PMH HTN, DM who presented with acute onset R sided weakness and aphasia, found to have LM1 occlusion, s/p TNK @ 1215. Taken for mechanical thrombectomy 8/27, TICI 2c after 3 passes. Case complicated by b/l groin access concerns and hypotension during procedure.   - POD 1  - Vitals stable  - CT A/P no signs of bladder injury      Plan:  - Discussed with Dr. Ag  - Q1 hour neuro checks  - SBP goal   - MRI pending  - If 24 hour CTH shows no hemorrhage, can start ASA and lovenox  - Stroke core measures pending  - TTE pending  - Monitor R groin 2/2 signs of fullness  - PT/OT/ST   - Further plan pending stroke w/u

## 2023-08-28 NOTE — PROGRESS NOTE ADULT - SUBJECTIVE AND OBJECTIVE BOX
Chief complaint:   Patient is a 63y old  Male who presents with a chief complaint of LT M1 Occlusion (28 Aug 2023 08:58)    HPI:  Patient is a 63 year old male with PMH Hypertension, DM on oral medications who presents c/o stroke-like symptoms. Per son pt was walking outside around 10:15am this morning when he suddenly fell. His son helped him up and noted that he was weak on the right side and not acting normally which prompted him to come to the ED. On arrival pt w/ obvious right sided arm weakness, confusion, dysarthria, facial droop suggestive of acute stroke. Code stroke initiated, found to have LM1 occlusion, given TNK @ 1215 and was taken for thrombectomy. Unable to provide ROS 2/2 aphasia  (27 Aug 2023 16:45)    24hr EVENTS:  - hypotensive to SBP 80s overnight, improved with 1L bolus  - empiric zosyn for possible sepsis  - CTAP - no e/o bladder rupture    ROS: [x]  Unable to assess due to aphasia    ----------------------------------------------------------------------------------------------------  PHYSICAL EXAM:  General: Calm, intubated, sedated for vent synchrony  HEENT: MMM  Neuro:  -Mental status- opens eyes spontaneously, intermittent midline FC, mimicking, nodding but not appropriately  -CN- PERRL 3mm, L gaze, no BTT on R, R facial, tongue midline  -LUE maintains antigravity and spontaneously, LLE maintains antigravity  -RUE extensor posturing, RLE TF    CV: regular rate, intermittent hypotension  Pulm: diminished to auscultation, no accessory muscle use  Abd: Soft, nontender, nondistended; BM pending  Ext: R groin site castillo than L but soft and no bruising, pedal pulses palpable but L>R  : clear urine  Skin: warm, dry    ---------------------------------------------------------------------------------------------------  ICU Vital Signs Last 24 Hrs  T(C): 36.9 (28 Aug 2023 09:45), Max: 37.4 (27 Aug 2023 19:45)  T(F): 98.4 (28 Aug 2023 09:45), Max: 99.3 (27 Aug 2023 19:45)  HR: 91 (28 Aug 2023 09:45) (83 - 106)  BP: 92/71 (28 Aug 2023 09:45) (90/64 - 182/98)  BP(mean): 80 (28 Aug 2023 09:45) (72 - 106)  RR: 17 (28 Aug 2023 09:45) (8 - 21)  SpO2: 100% (28 Aug 2023 09:45) (93% - 100%)    O2 Parameters below as of 28 Aug 2023 09:45  Patient On (Oxygen Delivery Method): nasal cannula  O2 Flow (L/min): 2          I&O's Summary    27 Aug 2023 07:01  -  28 Aug 2023 07:00  --------------------------------------------------------  IN: 2250 mL / OUT: 1050 mL / NET: 1200 mL    28 Aug 2023 07:01  -  28 Aug 2023 11:31  --------------------------------------------------------  IN: 500 mL / OUT: 205 mL / NET: 295 mL        MEDICATIONS  (STANDING):  chlorhexidine 2% Cloths 1 Application(s) Topical <User Schedule>  piperacillin/tazobactam IVPB. 3.375 Gram(s) IV Intermittent once  piperacillin/tazobactam IVPB.- 3.375 Gram(s) IV Intermittent once  piperacillin/tazobactam IVPB.. 3.375 Gram(s) IV Intermittent every 8 hours  sodium chloride 0.9%. 1000 milliLiter(s) (75 mL/Hr) IV Continuous <Continuous>    IMAGING:   Recent imaging studies were reviewed as above    LAB RESULTS:             13.2   16.12 )-----------( 188      ( 28 Aug 2023 01:30 )             36.9       PT/INR - ( 27 Aug 2023 11:40 )   PT: 11.4 sec;   INR: 1.03 ratio         PTT - ( 27 Aug 2023 11:40 )  PTT:31.5 sec    08-28    135  |  96  |  15.9  ----------------------------<  155<H>  3.7   |  25.0  |  0.91    Ca    8.3<L>      28 Aug 2023 01:30  Phos  4.0     08-28  Mg     1.5     08-28    TPro  7.4  /  Alb  4.5  /  TBili  0.3<L>  /  DBili  x   /  AST  33  /  ALT  38  /  AlkPhos  62  08-27                -----------------------------------------------------------------------------------------------------------------------------------------------------------------------------------

## 2023-08-29 ENCOUNTER — TRANSCRIPTION ENCOUNTER (OUTPATIENT)
Age: 63
End: 2023-08-29

## 2023-08-29 LAB
A1C WITH ESTIMATED AVERAGE GLUCOSE RESULT: 6.8 % — HIGH (ref 4–5.6)
ANION GAP SERPL CALC-SCNC: 11 MMOL/L — SIGNIFICANT CHANGE UP (ref 5–17)
ANION GAP SERPL CALC-SCNC: 12 MMOL/L — SIGNIFICANT CHANGE UP (ref 5–17)
BUN SERPL-MCNC: 12.1 MG/DL — SIGNIFICANT CHANGE UP (ref 8–20)
BUN SERPL-MCNC: 14.5 MG/DL — SIGNIFICANT CHANGE UP (ref 8–20)
CALCIUM SERPL-MCNC: 7.9 MG/DL — LOW (ref 8.4–10.5)
CALCIUM SERPL-MCNC: 8.2 MG/DL — LOW (ref 8.4–10.5)
CHLORIDE SERPL-SCNC: 101 MMOL/L — SIGNIFICANT CHANGE UP (ref 96–108)
CHLORIDE SERPL-SCNC: 99 MMOL/L — SIGNIFICANT CHANGE UP (ref 96–108)
CHOLEST SERPL-MCNC: 145 MG/DL — SIGNIFICANT CHANGE UP
CO2 SERPL-SCNC: 22 MMOL/L — SIGNIFICANT CHANGE UP (ref 22–29)
CO2 SERPL-SCNC: 25 MMOL/L — SIGNIFICANT CHANGE UP (ref 22–29)
CREAT SERPL-MCNC: 0.61 MG/DL — SIGNIFICANT CHANGE UP (ref 0.5–1.3)
CREAT SERPL-MCNC: 0.77 MG/DL — SIGNIFICANT CHANGE UP (ref 0.5–1.3)
EGFR: 101 ML/MIN/1.73M2 — SIGNIFICANT CHANGE UP
EGFR: 109 ML/MIN/1.73M2 — SIGNIFICANT CHANGE UP
ESTIMATED AVERAGE GLUCOSE: 148 MG/DL — HIGH (ref 68–114)
GLUCOSE BLDC GLUCOMTR-MCNC: 126 MG/DL — HIGH (ref 70–99)
GLUCOSE BLDC GLUCOMTR-MCNC: 127 MG/DL — HIGH (ref 70–99)
GLUCOSE BLDC GLUCOMTR-MCNC: 131 MG/DL — HIGH (ref 70–99)
GLUCOSE BLDC GLUCOMTR-MCNC: 133 MG/DL — HIGH (ref 70–99)
GLUCOSE BLDC GLUCOMTR-MCNC: 140 MG/DL — HIGH (ref 70–99)
GLUCOSE SERPL-MCNC: 134 MG/DL — HIGH (ref 70–99)
GLUCOSE SERPL-MCNC: 140 MG/DL — HIGH (ref 70–99)
HCT VFR BLD CALC: 32.6 % — LOW (ref 39–50)
HCT VFR BLD CALC: 33.8 % — LOW (ref 39–50)
HCT VFR BLD CALC: 34.4 % — LOW (ref 39–50)
HDLC SERPL-MCNC: 39 MG/DL — LOW
HGB BLD-MCNC: 11.4 G/DL — LOW (ref 13–17)
HGB BLD-MCNC: 11.7 G/DL — LOW (ref 13–17)
HGB BLD-MCNC: 12 G/DL — LOW (ref 13–17)
LIPID PNL WITH DIRECT LDL SERPL: 80 MG/DL — SIGNIFICANT CHANGE UP
MAGNESIUM SERPL-MCNC: 2 MG/DL — SIGNIFICANT CHANGE UP (ref 1.6–2.6)
MCHC RBC-ENTMCNC: 32 PG — SIGNIFICANT CHANGE UP (ref 27–34)
MCHC RBC-ENTMCNC: 32 PG — SIGNIFICANT CHANGE UP (ref 27–34)
MCHC RBC-ENTMCNC: 32.3 PG — SIGNIFICANT CHANGE UP (ref 27–34)
MCHC RBC-ENTMCNC: 34.6 GM/DL — SIGNIFICANT CHANGE UP (ref 32–36)
MCHC RBC-ENTMCNC: 34.9 GM/DL — SIGNIFICANT CHANGE UP (ref 32–36)
MCHC RBC-ENTMCNC: 35 GM/DL — SIGNIFICANT CHANGE UP (ref 32–36)
MCV RBC AUTO: 91.7 FL — SIGNIFICANT CHANGE UP (ref 80–100)
MCV RBC AUTO: 92.3 FL — SIGNIFICANT CHANGE UP (ref 80–100)
MCV RBC AUTO: 92.4 FL — SIGNIFICANT CHANGE UP (ref 80–100)
NON HDL CHOLESTEROL: 106 MG/DL — SIGNIFICANT CHANGE UP
PHOSPHATE SERPL-MCNC: 2 MG/DL — LOW (ref 2.4–4.7)
PLATELET # BLD AUTO: 171 K/UL — SIGNIFICANT CHANGE UP (ref 150–400)
PLATELET # BLD AUTO: 173 K/UL — SIGNIFICANT CHANGE UP (ref 150–400)
PLATELET # BLD AUTO: 177 K/UL — SIGNIFICANT CHANGE UP (ref 150–400)
POTASSIUM SERPL-MCNC: 3.5 MMOL/L — SIGNIFICANT CHANGE UP (ref 3.5–5.3)
POTASSIUM SERPL-MCNC: 3.8 MMOL/L — SIGNIFICANT CHANGE UP (ref 3.5–5.3)
POTASSIUM SERPL-SCNC: 3.5 MMOL/L — SIGNIFICANT CHANGE UP (ref 3.5–5.3)
POTASSIUM SERPL-SCNC: 3.8 MMOL/L — SIGNIFICANT CHANGE UP (ref 3.5–5.3)
RBC # BLD: 3.53 M/UL — LOW (ref 4.2–5.8)
RBC # BLD: 3.66 M/UL — LOW (ref 4.2–5.8)
RBC # BLD: 3.75 M/UL — LOW (ref 4.2–5.8)
RBC # FLD: 13.2 % — SIGNIFICANT CHANGE UP (ref 10.3–14.5)
RBC # FLD: 13.2 % — SIGNIFICANT CHANGE UP (ref 10.3–14.5)
RBC # FLD: 13.5 % — SIGNIFICANT CHANGE UP (ref 10.3–14.5)
SODIUM SERPL-SCNC: 135 MMOL/L — SIGNIFICANT CHANGE UP (ref 135–145)
SODIUM SERPL-SCNC: 135 MMOL/L — SIGNIFICANT CHANGE UP (ref 135–145)
TRIGL SERPL-MCNC: 128 MG/DL — SIGNIFICANT CHANGE UP
TSH SERPL-MCNC: 0.88 UIU/ML — SIGNIFICANT CHANGE UP (ref 0.27–4.2)
WBC # BLD: 13.18 K/UL — HIGH (ref 3.8–10.5)
WBC # BLD: 13.57 K/UL — HIGH (ref 3.8–10.5)
WBC # BLD: 15.37 K/UL — HIGH (ref 3.8–10.5)
WBC # FLD AUTO: 13.18 K/UL — HIGH (ref 3.8–10.5)
WBC # FLD AUTO: 13.57 K/UL — HIGH (ref 3.8–10.5)
WBC # FLD AUTO: 15.37 K/UL — HIGH (ref 3.8–10.5)

## 2023-08-29 PROCEDURE — 71045 X-RAY EXAM CHEST 1 VIEW: CPT | Mod: 26,77

## 2023-08-29 PROCEDURE — 99291 CRITICAL CARE FIRST HOUR: CPT

## 2023-08-29 PROCEDURE — 71045 X-RAY EXAM CHEST 1 VIEW: CPT | Mod: 26,76

## 2023-08-29 PROCEDURE — 70450 CT HEAD/BRAIN W/O DYE: CPT | Mod: 26

## 2023-08-29 PROCEDURE — 99232 SBSQ HOSP IP/OBS MODERATE 35: CPT

## 2023-08-29 PROCEDURE — 70450 CT HEAD/BRAIN W/O DYE: CPT | Mod: 26,77

## 2023-08-29 PROCEDURE — 93306 TTE W/DOPPLER COMPLETE: CPT | Mod: 26

## 2023-08-29 PROCEDURE — 93926 LOWER EXTREMITY STUDY: CPT | Mod: 26,RT

## 2023-08-29 RX ORDER — ENOXAPARIN SODIUM 100 MG/ML
40 INJECTION SUBCUTANEOUS
Refills: 0 | Status: DISCONTINUED | OUTPATIENT
Start: 2023-08-29 | End: 2023-08-29

## 2023-08-29 RX ORDER — POTASSIUM CHLORIDE 20 MEQ
10 PACKET (EA) ORAL
Refills: 0 | Status: DISCONTINUED | OUTPATIENT
Start: 2023-08-29 | End: 2023-08-29

## 2023-08-29 RX ORDER — ASPIRIN/CALCIUM CARB/MAGNESIUM 324 MG
81 TABLET ORAL DAILY
Refills: 0 | Status: DISCONTINUED | OUTPATIENT
Start: 2023-08-29 | End: 2023-08-30

## 2023-08-29 RX ORDER — POTASSIUM PHOSPHATE, MONOBASIC POTASSIUM PHOSPHATE, DIBASIC 236; 224 MG/ML; MG/ML
30 INJECTION, SOLUTION INTRAVENOUS ONCE
Refills: 0 | Status: COMPLETED | OUTPATIENT
Start: 2023-08-29 | End: 2023-08-29

## 2023-08-29 RX ORDER — SODIUM CHLORIDE 5 G/100ML
1000 INJECTION, SOLUTION INTRAVENOUS
Refills: 0 | Status: DISCONTINUED | OUTPATIENT
Start: 2023-08-29 | End: 2023-08-30

## 2023-08-29 RX ORDER — POTASSIUM CHLORIDE 20 MEQ
40 PACKET (EA) ORAL ONCE
Refills: 0 | Status: COMPLETED | OUTPATIENT
Start: 2023-08-29 | End: 2023-08-30

## 2023-08-29 RX ORDER — DOXAZOSIN MESYLATE 4 MG
4 TABLET ORAL AT BEDTIME
Refills: 0 | Status: DISCONTINUED | OUTPATIENT
Start: 2023-08-29 | End: 2023-08-31

## 2023-08-29 RX ORDER — ENOXAPARIN SODIUM 100 MG/ML
40 INJECTION SUBCUTANEOUS EVERY 24 HOURS
Refills: 0 | Status: DISCONTINUED | OUTPATIENT
Start: 2023-08-29 | End: 2023-08-30

## 2023-08-29 RX ORDER — ATORVASTATIN CALCIUM 80 MG/1
80 TABLET, FILM COATED ORAL AT BEDTIME
Refills: 0 | Status: DISCONTINUED | OUTPATIENT
Start: 2023-08-29 | End: 2023-09-15

## 2023-08-29 RX ORDER — SODIUM CHLORIDE 9 MG/ML
1 INJECTION INTRAMUSCULAR; INTRAVENOUS; SUBCUTANEOUS THREE TIMES A DAY
Refills: 0 | Status: DISCONTINUED | OUTPATIENT
Start: 2023-08-29 | End: 2023-08-30

## 2023-08-29 RX ORDER — ASPIRIN/CALCIUM CARB/MAGNESIUM 324 MG
300 TABLET ORAL DAILY
Refills: 0 | Status: DISCONTINUED | OUTPATIENT
Start: 2023-08-29 | End: 2023-08-29

## 2023-08-29 RX ADMIN — ENOXAPARIN SODIUM 40 MILLIGRAM(S): 100 INJECTION SUBCUTANEOUS at 22:48

## 2023-08-29 RX ADMIN — SODIUM CHLORIDE 75 MILLILITER(S): 5 INJECTION, SOLUTION INTRAVENOUS at 22:48

## 2023-08-29 RX ADMIN — SODIUM CHLORIDE 75 MILLILITER(S): 5 INJECTION, SOLUTION INTRAVENOUS at 13:40

## 2023-08-29 RX ADMIN — Medication 81 MILLIGRAM(S): at 19:57

## 2023-08-29 RX ADMIN — SODIUM CHLORIDE 75 MILLILITER(S): 9 INJECTION INTRAMUSCULAR; INTRAVENOUS; SUBCUTANEOUS at 07:49

## 2023-08-29 RX ADMIN — PIPERACILLIN AND TAZOBACTAM 25 GRAM(S): 4; .5 INJECTION, POWDER, LYOPHILIZED, FOR SOLUTION INTRAVENOUS at 05:37

## 2023-08-29 RX ADMIN — POTASSIUM PHOSPHATE, MONOBASIC POTASSIUM PHOSPHATE, DIBASIC 83.33 MILLIMOLE(S): 236; 224 INJECTION, SOLUTION INTRAVENOUS at 07:48

## 2023-08-29 RX ADMIN — ATORVASTATIN CALCIUM 80 MILLIGRAM(S): 80 TABLET, FILM COATED ORAL at 22:48

## 2023-08-29 RX ADMIN — CHLORHEXIDINE GLUCONATE 1 APPLICATION(S): 213 SOLUTION TOPICAL at 05:37

## 2023-08-29 RX ADMIN — Medication 10 MILLIGRAM(S): at 16:15

## 2023-08-29 NOTE — PROGRESS NOTE ADULT - SUBJECTIVE AND OBJECTIVE BOX
Chief complaint:   Patient is a 63y old  Male who presents with a chief complaint of LT M1 Occlusion (29 Aug 2023 07:57)    HPI:  Patient is a 63 year old male with PMH Hypertension, DM on oral medications who presents c/o stroke-like symptoms. Per son pt was walking outside around 10:15am this morning when he suddenly fell. His son helped him up and noted that he was weak on the right side and not acting normally which prompted him to come to the ED. On arrival pt w/ obvious right sided arm weakness, confusion, dysarthria, facial droop suggestive of acute stroke. Code stroke initiated, found to have LM1 occlusion, given TNK @ 1215 and was taken for thrombectomy. Unable to provide ROS 2/2 aphasia  (27 Aug 2023 16:45)        24hr EVENTS:      ROS: [ ]  Unable to assess due to mental status   All other systems negative    -----------------------------------------------------------------------------------------------------------------------------------------------------------------------------------  ICU Vital Signs Last 24 Hrs  T(C): 36.8 (29 Aug 2023 08:00), Max: 37.2 (28 Aug 2023 21:00)  T(F): 98.2 (29 Aug 2023 08:00), Max: 99 (28 Aug 2023 21:00)  HR: 66 (29 Aug 2023 08:00) (66 - 91)  BP: 139/95 (29 Aug 2023 08:00) (91/66 - 140/75)  BP(mean): 107 (29 Aug 2023 08:00) (72 - 107)  ABP: --  ABP(mean): --  RR: 26 (29 Aug 2023 08:00) (11 - 26)  SpO2: 98% (29 Aug 2023 08:00) (93% - 100%)    O2 Parameters below as of 29 Aug 2023 08:00  Patient On (Oxygen Delivery Method): room air            I&O's Summary    28 Aug 2023 07:01  -  29 Aug 2023 07:00  --------------------------------------------------------  IN: 2275 mL / OUT: 1610 mL / NET: 665 mL    29 Aug 2023 07:01  -  29 Aug 2023 08:16  --------------------------------------------------------  IN: 341.6 mL / OUT: 45 mL / NET: 296.6 mL        MEDICATIONS  (STANDING):  atorvastatin 80 milliGRAM(s) Oral at bedtime  chlorhexidine 2% Cloths 1 Application(s) Topical <User Schedule>  dextrose 5%. 1000 milliLiter(s) (50 mL/Hr) IV Continuous <Continuous>  dextrose 5%. 1000 milliLiter(s) (100 mL/Hr) IV Continuous <Continuous>  dextrose 50% Injectable 25 Gram(s) IV Push once  dextrose 50% Injectable 12.5 Gram(s) IV Push once  dextrose 50% Injectable 25 Gram(s) IV Push once  glucagon  Injectable 1 milliGRAM(s) IntraMuscular once  insulin lispro (ADMELOG) corrective regimen sliding scale   SubCutaneous every 6 hours  piperacillin/tazobactam IVPB.. 3.375 Gram(s) IV Intermittent every 8 hours  sodium chloride 0.9%. 1000 milliLiter(s) (75 mL/Hr) IV Continuous <Continuous>      RESPIRATORY:        IMAGING:   Recent imaging studies were reviewed.    LAB RESULTS:                          11.4   13.18 )-----------( 171      ( 29 Aug 2023 03:05 )             32.6       PT/INR - ( 27 Aug 2023 11:40 )   PT: 11.4 sec;   INR: 1.03 ratio         PTT - ( 27 Aug 2023 11:40 )  PTT:31.5 sec    08-29    135  |  99  |  14.5  ----------------------------<  134<H>  3.8   |  25.0  |  0.77    Ca    7.9<L>      29 Aug 2023 03:05  Phos  2.0     08-29  Mg     2.0     08-29    TPro  7.4  /  Alb  4.5  /  TBili  0.3<L>  /  DBili  x   /  AST  33  /  ALT  38  /  AlkPhos  62  08-27                -----------------------------------------------------------------------------------------------------------------------------------------------------------------------------------    PHYSICAL EXAM:  General: Calm  HEENT: MMM  Neuro:  -Mental status- No acute distress  -CN- PERRL 3mm, EOMI, tongue midline, face symmetric    CV: RRR  Pulm: clear to auscultation  Abd: Soft, nontender, nondistended  Ext: no noted edema in lower ext  Skin: warm, dry       Chief complaint:   Patient is a 63y old  Male who presents with a chief complaint of LT M1 Occlusion (29 Aug 2023 07:57)    HPI:  Patient is a 63 year old male with PMH Hypertension, DM on oral medications who presents c/o stroke-like symptoms. Per son pt was walking outside around 10:15am this morning when he suddenly fell. His son helped him up and noted that he was weak on the right side and not acting normally which prompted him to come to the ED. On arrival pt w/ obvious right sided arm weakness, confusion, dysarthria, facial droop suggestive of acute stroke. Code stroke initiated, found to have LM1 occlusion, given TNK @ 1215 and was taken for thrombectomy. Unable to provide ROS 2/2 aphasia  (27 Aug 2023 16:45)    24hr EVENTS:  NIH 18 to 20 fluctuating    ROS: [ x]  Unable to assess due to mental status   All other systems negative    -----------------------------------------------------------------------------------------------------------------------------------------------------------------------------------  ICU Vital Signs Last 24 Hrs  T(C): 36.8 (29 Aug 2023 08:00), Max: 37.2 (28 Aug 2023 21:00)  T(F): 98.2 (29 Aug 2023 08:00), Max: 99 (28 Aug 2023 21:00)  HR: 66 (29 Aug 2023 08:00) (66 - 91)  BP: 139/95 (29 Aug 2023 08:00) (91/66 - 140/75)  BP(mean): 107 (29 Aug 2023 08:00) (72 - 107)  ABP: --  ABP(mean): --  RR: 26 (29 Aug 2023 08:00) (11 - 26)  SpO2: 98% (29 Aug 2023 08:00) (93% - 100%)    O2 Parameters below as of 29 Aug 2023 08:00  Patient On (Oxygen Delivery Method): room air            I&O's Summary    28 Aug 2023 07:01  -  29 Aug 2023 07:00  --------------------------------------------------------  IN: 2275 mL / OUT: 1610 mL / NET: 665 mL    29 Aug 2023 07:01  -  29 Aug 2023 08:16  --------------------------------------------------------  IN: 341.6 mL / OUT: 45 mL / NET: 296.6 mL      MEDICATIONS  (STANDING):  atorvastatin 80 milliGRAM(s) Oral at bedtime  chlorhexidine 2% Cloths 1 Application(s) Topical <User Schedule>  dextrose 5%. 1000 milliLiter(s) (50 mL/Hr) IV Continuous <Continuous>  dextrose 5%. 1000 milliLiter(s) (100 mL/Hr) IV Continuous <Continuous>  dextrose 50% Injectable 25 Gram(s) IV Push once  dextrose 50% Injectable 12.5 Gram(s) IV Push once  dextrose 50% Injectable 25 Gram(s) IV Push once  glucagon  Injectable 1 milliGRAM(s) IntraMuscular once  insulin lispro (ADMELOG) corrective regimen sliding scale   SubCutaneous every 6 hours  piperacillin/tazobactam IVPB.. 3.375 Gram(s) IV Intermittent every 8 hours  sodium chloride 0.9%. 1000 milliLiter(s) (75 mL/Hr) IV Continuous <Continuous>      IMAGING:   Recent imaging studies were reviewed.    LAB RESULTS:                          11.4   13.18 )-----------( 171      ( 29 Aug 2023 03:05 )             32.6       PT/INR - ( 27 Aug 2023 11:40 )   PT: 11.4 sec;   INR: 1.03 ratio         PTT - ( 27 Aug 2023 11:40 )  PTT:31.5 sec    08-29    135  |  99  |  14.5  ----------------------------<  134<H>  3.8   |  25.0  |  0.77    Ca    7.9<L>      29 Aug 2023 03:05  Phos  2.0     08-29  Mg     2.0     08-29    TPro  7.4  /  Alb  4.5  /  TBili  0.3<L>  /  DBili  x   /  AST  33  /  ALT  38  /  AlkPhos  62  08-27    -----------------------------------------------------------------------------------------------------------------------------------------------------------------------------------    PHYSICAL EXAM:  General: Calm  HEENT: MMM  Neuro:  -Mental status- No acute distress, follows some commands on L, approximates  expressive aphasia  -CN- PERRL 3mm, EOMI, tongue midline, face symmetric, R HH? R neglect  L gaze pref  RUE ext  RLE TF  LUE 4/5  LLE 4/5    CV: RRR  Pulm: clear to auscultation  Abd: Soft, nontender, nondistended  Ext: no noted edema in lower ext  Skin: warm, dry

## 2023-08-29 NOTE — DIETITIAN INITIAL EVALUATION ADULT - PERTINENT MEDS FT
MEDICATIONS  (STANDING):  atorvastatin 80 milliGRAM(s) Oral at bedtime  chlorhexidine 2% Cloths 1 Application(s) Topical <User Schedule>  dextrose 5%. 1000 milliLiter(s) (50 mL/Hr) IV Continuous <Continuous>  dextrose 5%. 1000 milliLiter(s) (100 mL/Hr) IV Continuous <Continuous>  dextrose 50% Injectable 25 Gram(s) IV Push once  dextrose 50% Injectable 12.5 Gram(s) IV Push once  dextrose 50% Injectable 25 Gram(s) IV Push once  glucagon  Injectable 1 milliGRAM(s) IntraMuscular once  insulin lispro (ADMELOG) corrective regimen sliding scale   SubCutaneous every 6 hours  piperacillin/tazobactam IVPB.. 3.375 Gram(s) IV Intermittent every 8 hours  sodium chloride 0.9%. 1000 milliLiter(s) (75 mL/Hr) IV Continuous <Continuous>    MEDICATIONS  (PRN):  dextrose Oral Gel 15 Gram(s) Oral once PRN Blood Glucose LESS THAN 70 milliGRAM(s)/deciliter  hydrALAZINE Injectable 10 milliGRAM(s) IV Push every 2 hours PRN SBP >140  labetalol Injectable 10 milliGRAM(s) IV Push every 2 hours PRN SBP >140

## 2023-08-29 NOTE — DIETITIAN INITIAL EVALUATION ADULT - OTHER INFO
63 year old male with LM1 occlusion s/p IV tenecteplase @12:10 s/p cerebral angiogram for LT M1 mechanical thrombectomy.  63 year old male with LM1 occlusion s/p IV tenecteplase @12:10 s/p cerebral angiogram for LT M1 mechanical thrombectomy.     Nutrition assessment completed. Pt nonverbal. Aware seen by SLP 8/28 with recommendations for NPO. Pt remains NPO at this time. RD to follow up.

## 2023-08-29 NOTE — PROGRESS NOTE ADULT - ASSESSMENT
Assessment:  63M with PMH HTN, DM who presented with acute onset R sided weakness and aphasia, found to have LM1 occlusion, s/p TNK @ 1215. Taken for mechanical thrombectomy 8/27, TICI 2c after 3 passes. Case complicated by b/l groin access concerns and hypotension during procedure.   - POD 2  - Gluteus ecchymoses, RLE arterial dopplers pending read      Plan:  - Discussed with Dr. Ag  - Q2 hour neuro checks  - SBP goal   - MRI pending  - ASA 81 for stroke prevention  - A1C 6.8%, LDL 80  - TTE pending read   - Monitor R groin, arterial doppler read pending   - DVT prophylaxis: SCDs, lovenox   - Neurosurgery consulted for hemicrani watch, hypertonic saline initiated to prevent cerebral edema   - PT/OT/ST   - Further care per primary team

## 2023-08-29 NOTE — DIETITIAN INITIAL EVALUATION ADULT - ENTERAL
Consider initiation of enteral nutrition via NGT; recommend Glucerna 1.5 @ 20 ml/hr and advance 10 ml/hr q4 hrs until goal rate of 55 ml/hr (x20 hrs) to provide 1100 ml, 1650 kcal, 91g protein, 835 ml free water, and >100% of RDIs for vitamins/minerals. Additional free water per MD discretion.

## 2023-08-29 NOTE — PROGRESS NOTE ADULT - ASSESSMENT
Assessment: Patient is a 63 year old male with LM1 occlusion s/p IV tenecteplase @12:10 s/p cerebral angiogram for LT M1 mechanical thrombectomy.     Neuro:   - Q1 hr neuro checks  - Stroke neurology following, appreciate recs  - CT head at 24h post-TNK -> if stable, start ASA  - MRI/A head/neck when able  - Neurology follow up  - PT/OT - evaluate  - SLP eval    CV:  TTE  start statin     Resp:  wean NC as tolerated  SpO2>92%    GI:  Pending SLP  Will likely need NGT  Then order bowel regimen    ID: Concern for evolving sepsis with tachycardia, hypoTN, and leukocytosis  Empiric zosyn for 3d  Send UA  If fever -> send BCx, lactate, procal, CXR    Renal/:  maintain Umaña today  Monitor UOP  IV fluids while awaiting SLP    Endocrine:  F/u HgbA1c and TSH  FS q6h, ISS    Heme:  CTH @24h -> then lovenox                        Assessment: Patient is a 63 year old male with LM1 occlusion s/p IV tenecteplase @12:10 s/p cerebral angiogram for LT M1 mechanical thrombectomy.   sleep apnea?    Neuro:   - Q2hr neurochecks  - Stroke neurology following, appreciate recs  - MRI/A head/neck when able  - Neurology follow up  - PT/OT - evaluate  - SLP eval    CV:  SBP   TTE   consult cardiology  statin and ASA    Resp:  room air  wean NC as tolerated  SpO2>92%    GI:  failed sp/sw due to lethargy  place NGT to start TF  Then order bowel regimen    ID:    stop zosyn with stable BP, HR, and improved leukocytosis  afebrile  If fever -> send BCx, lactate, procal, CXR    Renal/:  dc Umaña   Monitor UOP  IV fluids while NPO  Na goal 140-150    Endocrine:  HgbA1c 6.8    FS q6h, ISS  ISS    Heme:  start lovenox  SCDs

## 2023-08-29 NOTE — CONSULT NOTE ADULT - SUBJECTIVE AND OBJECTIVE BOX
Patient is a 63y old  Male who presents with a chief complaint of Sequelae of cerebral infarction     (29 Aug 2023 09:57)    HPI:  Patient is a 63 year old male with PMH Hypertension, DM on oral medications who presents c/o stroke-like symptoms. Per son pt was walking outside around 10:15am this morning when he suddenly fell. His son helped him up and noted that he was weak on the right side and not acting normally which prompted him to come to the ED. On arrival pt w/ obvious right sided arm weakness, confusion, dysarthria, facial droop suggestive of acute stroke. Code stroke initiated, found to have LM1 occlusion, given TNK @ 1215 and was taken for thrombectomy. Unable to provide ROS 2/2 aphasia  (27 Aug 2023 16:45)    Interval: Patient had CTH showed a large left MCA stroke with mass effect and MLS and thus neurosurgery was consulted.     PAST MEDICAL & SURGICAL HISTORY:  Hypertension    Allergies: No Known Allergies    REVIEW OF SYSTEMS  Negative except as noted in HPI  CONSTITUTIONAL: No fever, weight loss, or fatigue  EYES: No eye pain, visual disturbances, or discharge  ENMT:  No difficulty hearing, tinnitus, vertigo; No sinus or throat pain  NECK: No pain or stiffness  BREASTS: No pain, masses, or nipple discharge  RESPIRATORY: No cough, wheezing, chills or hemoptysis; No shortness of breath  CARDIOVASCULAR: No chest pain, palpitations, dizziness, or leg swelling  GASTROINTESTINAL: No abdominal or epigastric pain. No nausea, vomiting, or hematemesis; No diarrhea or constipation. No melena or hematochezia.  GENITOURINARY: No dysuria, frequency, hematuria, or incontinence  NEUROLOGICAL: No new weakness   SKIN: No itching, burning, rashes, or lesions   LYMPH NODES: No enlarged glands  ENDOCRINE: No heat or cold intolerance; No hair loss  MUSCULOSKELETAL: No joint pain or swelling; No muscle, back, or extremity pain  PSYCHIATRIC: No depression, anxiety, mood swings, or difficulty sleeping  HEME/LYMPH: No easy bruising, or bleeding gums  ALLERGY AND IMMUNOLOGIC: No hives or eczema    HOME MEDICATIONS:  Home Medications:  Flexeril 5 mg oral tablet: 1 tab(s) orally 2 times a day as needed for  muscle spasm (28 Aug 2023 07:55)  hydroCHLOROthiazide 25 mg oral tablet: 1 tab(s) orally once a day (28 Aug 2023 07:54)  metFORMIN 500 mg oral tablet: 1 tab(s) orally 2 times a day (28 Aug 2023 07:55)    MEDICATIONS:  Anticoagulation:  aspirin  chewable 81 milliGRAM(s) Oral daily  enoxaparin Injectable 40 milliGRAM(s) SubCutaneous every 24 hours    OTHER:  atorvastatin 80 milliGRAM(s) Oral at bedtime  chlorhexidine 2% Cloths 1 Application(s) Topical <User Schedule>  dextrose 50% Injectable 25 Gram(s) IV Push once  dextrose 50% Injectable 12.5 Gram(s) IV Push once  dextrose 50% Injectable 25 Gram(s) IV Push once  dextrose Oral Gel 15 Gram(s) Oral once PRN  glucagon  Injectable 1 milliGRAM(s) IntraMuscular once  hydrALAZINE Injectable 10 milliGRAM(s) IV Push every 2 hours PRN  insulin lispro (ADMELOG) corrective regimen sliding scale   SubCutaneous every 6 hours  labetalol Injectable 10 milliGRAM(s) IV Push every 2 hours PRN    IVF:  dextrose 5%. 1000 milliLiter(s) IV Continuous <Continuous>  dextrose 5%. 1000 milliLiter(s) IV Continuous <Continuous>  sodium chloride 2% . 1000 milliLiter(s) IV Continuous <Continuous>      Vital Signs Last 24 Hrs  T(C): 36.8 (29 Aug 2023 14:00), Max: 37.2 (28 Aug 2023 21:00)  T(F): 98.2 (29 Aug 2023 14:00), Max: 99 (28 Aug 2023 21:00)  HR: 64 (29 Aug 2023 14:00) (64 - 81)  BP: 117/92 (29 Aug 2023 14:00) (91/66 - 140/82)  BP(mean): 102 (29 Aug 2023 14:00) (72 - 110)  RR: 12 (29 Aug 2023 14:00) (11 - 26)  SpO2: 99% (29 Aug 2023 14:00) (93% - 100%)    Parameters below as of 29 Aug 2023 14:00  Patient On (Oxygen Delivery Method): room air    PHYSICAL EXAM:  General: Calm  HEENT: MMM  Neuro:  -Mental status- No acute distress, follows some commands on L  expressive aphasia  -CN- PERRL 3mm, EOMI, tongue midline, face symmetric, R HH? R neglect  L gaze pref  RUE ext  RLE TF  LUE 4/5  LLE 4/5    CV: RRR  Pulm: clear to auscultation  Abd: Soft, nontender, nondistended  Ext: no noted edema in lower ext  Skin: warm, dry    LABS:                        11.4   13.18 )-----------( 171      ( 29 Aug 2023 03:05 )             32.6     08-29    135  |  99  |  14.5  ----------------------------<  134<H>  3.8   |  25.0  |  0.77    Ca    7.9<L>      29 Aug 2023 03:05  Phos  2.0     08-29  Mg     2.0     08-29    Urinalysis Basic - ( 29 Aug 2023 03:05 )    Color: x / Appearance: x / SG: x / pH: x  Gluc: 134 mg/dL / Ketone: x  / Bili: x / Urobili: x   Blood: x / Protein: x / Nitrite: x   Leuk Esterase: x / RBC: x / WBC x   Sq Epi: x / Non Sq Epi: x / Bacteria: x    RADIOLOGY & ADDITIONAL STUDIES:  < from: CT Head No Cont (08.29.23 @ 00:31) >  IMPRESSION:  Continued evolution of large acute left MCA territory infarct.   Left-to-right midline shift measuring 6 mm, increased. No hydrocephalus   or effacement of basal cisterns. Curvilinear foci of hyperdensity within   the area of infarct, for which petechial hemorrhage cannot be excluded.   No large parenchymal hemorrhage.    --- End of Report ---    JAVIER EASON MD; Attending Radiologist  This document has been electronically signed. Aug 29 2023  9:21AM    < end of copied text >      CAPRINI SCORE [CLOT]:  Patient has an estimated Caprini score of greater than 5.  However, the patient's unique clinical situation will be addressed in an individual manner to determine appropriate anticoagulation treatment, if any.

## 2023-08-29 NOTE — PROGRESS NOTE ADULT - SUBJECTIVE AND OBJECTIVE BOX
Preliminary note, offical recommendations pending attending review/signature   [facility] Stroke Team  Progress Note     HPI:  Patient is a 63 year old male with PMHx Hypertension, DM on oral medications who presented c/o stroke-like symptoms. Per son pt was walking outside around 10:15am on 8/27/23 when he suddenly fell. His son helped him up and noted that he was weak on the right side and not acting normally which prompted him to come to the ED. On arrival pt w/ obvious right sided arm weakness, confusion, dysarthria, facial droop suggestive of acute stroke. Code stroke initiated, found to have LM1 occlusion, given TNK @ 1215 and was taken for thrombectomy. Unable to provide ROS 2/2 aphasia.    SUBJECTIVE: No events overnight.  No new neurologic complaints.  ROS reported negative unless otherwise noted.    atorvastatin 80 milliGRAM(s) Oral at bedtime  chlorhexidine 2% Cloths 1 Application(s) Topical <User Schedule>  dextrose 5%. 1000 milliLiter(s) IV Continuous <Continuous>  dextrose 5%. 1000 milliLiter(s) IV Continuous <Continuous>  dextrose 50% Injectable 25 Gram(s) IV Push once  dextrose 50% Injectable 12.5 Gram(s) IV Push once  dextrose 50% Injectable 25 Gram(s) IV Push once  dextrose Oral Gel 15 Gram(s) Oral once PRN  glucagon  Injectable 1 milliGRAM(s) IntraMuscular once  hydrALAZINE Injectable 10 milliGRAM(s) IV Push every 2 hours PRN  insulin lispro (ADMELOG) corrective regimen sliding scale   SubCutaneous every 6 hours  labetalol Injectable 10 milliGRAM(s) IV Push every 2 hours PRN  piperacillin/tazobactam IVPB.. 3.375 Gram(s) IV Intermittent every 8 hours  sodium chloride 0.9%. 1000 milliLiter(s) IV Continuous <Continuous>      PHYSICAL EXAM:   Vital Signs Last 24 Hrs  T(C): 36.8 (29 Aug 2023 07:00), Max: 37.2 (28 Aug 2023 21:00)  T(F): 98.2 (29 Aug 2023 07:00), Max: 99 (28 Aug 2023 21:00)  HR: 72 (29 Aug 2023 07:00) (68 - 91)  BP: 107/72 (29 Aug 2023 07:00) (91/66 - 140/75)  BP(mean): 84 (29 Aug 2023 07:00) (72 - 101)  RR: 18 (29 Aug 2023 07:00) (11 - 22)  SpO2: 97% (29 Aug 2023 07:00) (93% - 100%)    Parameters below as of 29 Aug 2023 06:00  Patient On (Oxygen Delivery Method): room air    PENDING  General: NAD    Detailed Neurologic Exam:    Mental status: The patient is awake but not oriented to place, self, month, or year. Minimal verbal output and just generates sounds. Follows few simple commands (lift left arm up), but appears to be mimicking examiner. Unable to repeat.    Cranial nerves: Pupils equal and react symmetrically to light. Right gaze palsy that does not cross midline. No blink to threat on right side. Left eye ptosis. Right sided facial asymmetry     Motor:   LUE/LLE- spontaneously moves antigravity. Squeezes hand but not on command.   RUE- extends to pain, but does not spontaneously move  RLE- triple flexion    Sensation: Intact to light touch on LUE and LLE. Extends to noxious stimuli on RUE. Triple flexion RLE    Cerebellar: Unable to assess, but appears to be no gross ataxia    Groins: R groin full, nontender, no hematoma, no ecchymoses; L groin soft, nontender, no hematoma       LABS:                        11.4   13.18 )-----------( 171      ( 29 Aug 2023 03:05 )             32.6    08-29    135  |  99  |  14.5  ----------------------------<  134<H>  3.8   |  25.0  |  0.77    Ca    7.9<L>      29 Aug 2023 03:05  Phos  2.0     08-29  Mg     2.0     08-29    TPro  7.4  /  Alb  4.5  /  TBili  0.3<L>  /  DBili  x   /  AST  33  /  ALT  38  /  AlkPhos  62  08-27  PT/INR - ( 27 Aug 2023 11:40 )   PT: 11.4 sec;   INR: 1.03 ratio         PTT - ( 27 Aug 2023 11:40 )  PTT:31.5 sec    RADIOLOGY & ADDITIONAL STUDIES (independently reviewed unless otherwise noted):         CT Abdomen and Pelvis w/ IV Cont (08.28.23 @ 02:53)   IMPRESSION:  No evidence for bladder rupture.    CT Brain Stroke Protocol (08.27.23 @ 11:53)   IMPRESSION:  Wedgelike low density in the left putamen and caudate (corpus striatum)   is new from the prior scan, and consistent with infarct that may be acute   given corresponding symptoms.  No acute intracranial hemorrhage.    The study was performed at 11:47 AM on 08/27/2023 and the above findings   were discussed with Dr. Betts at 12:02 PM.    CT Angio Brain Stroke Protocol  w/ IV Cont (08.27.23 @ 12:03)   IMPRESSION:  CTA head: Focal occlusion of left MCA distal M1 segment with patent but   small caliber filling of M2 segments.    CT perfusion indicates large mismatch between Tmax and CBF consistent   with ischemic penumbra.    CTA Neck: No steno-occlusive focus.    Case findings above discussed with Dr. Hu at 12:13 PM on 08/27/2023.     CT Head No Cont (08.28.23 @ 02:34)   IMPRESSION: Acute left MCA infarct. Possible acute infarct involving the   right temporal region. This finding can be better evaluated with MRI if   there are no contraindications.         Preliminary note, offical recommendations pending attending review/signature   [facility] Stroke Team  Progress Note     HPI:  Patient is a 63 year old male with PMHx Hypertension, DM on oral medications who presented c/o stroke-like symptoms. Per son pt was walking outside around 10:15am on 8/27/23 when he suddenly fell. His son helped him up and noted that he was weak on the right side and not acting normally which prompted him to come to the ED. On arrival pt w/ obvious right sided arm weakness, confusion, dysarthria, facial droop suggestive of acute stroke. Code stroke initiated, found to have LM1 occlusion, given TNK @ 1215 and was taken for thrombectomy. Unable to provide ROS 2/2 aphasia.    SUBJECTIVE: No events overnight.  No new neurologic complaints.  ROS reported negative unless otherwise noted.    atorvastatin 80 milliGRAM(s) Oral at bedtime  chlorhexidine 2% Cloths 1 Application(s) Topical <User Schedule>  dextrose 5%. 1000 milliLiter(s) IV Continuous <Continuous>  dextrose 5%. 1000 milliLiter(s) IV Continuous <Continuous>  dextrose 50% Injectable 25 Gram(s) IV Push once  dextrose 50% Injectable 12.5 Gram(s) IV Push once  dextrose 50% Injectable 25 Gram(s) IV Push once  dextrose Oral Gel 15 Gram(s) Oral once PRN  glucagon  Injectable 1 milliGRAM(s) IntraMuscular once  hydrALAZINE Injectable 10 milliGRAM(s) IV Push every 2 hours PRN  insulin lispro (ADMELOG) corrective regimen sliding scale   SubCutaneous every 6 hours  labetalol Injectable 10 milliGRAM(s) IV Push every 2 hours PRN  piperacillin/tazobactam IVPB.. 3.375 Gram(s) IV Intermittent every 8 hours  sodium chloride 0.9%. 1000 milliLiter(s) IV Continuous <Continuous>      PHYSICAL EXAM:   Vital Signs Last 24 Hrs  T(C): 36.8 (29 Aug 2023 07:00), Max: 37.2 (28 Aug 2023 21:00)  T(F): 98.2 (29 Aug 2023 07:00), Max: 99 (28 Aug 2023 21:00)  HR: 72 (29 Aug 2023 07:00) (68 - 91)  BP: 107/72 (29 Aug 2023 07:00) (91/66 - 140/75)  BP(mean): 84 (29 Aug 2023 07:00) (72 - 101)  RR: 18 (29 Aug 2023 07:00) (11 - 22)  SpO2: 97% (29 Aug 2023 07:00) (93% - 100%)    Parameters below as of 29 Aug 2023 06:00  Patient On (Oxygen Delivery Method): room air    PENDING  General: NAD    Detailed Neurologic Exam:    Mental status: The patient is awake but not oriented to place, self, month, or year. Minimal verbal output and just generates sounds. Follows few simple commands (lift left arm up), but appears to be mimicking examiner. Unable to repeat.    Cranial nerves: Pupils equal and react symmetrically to light. Right gaze palsy that does not cross midline. No blink to threat on right side. Left eye ptosis. Right sided facial asymmetry     Motor:   LUE/LLE- spontaneously moves antigravity. Squeezes hand but not on command.   RUE- extends to pain, but does not spontaneously move  RLE- triple flexion    Sensation: Intact to light touch on LUE and LLE. Extends to noxious stimuli on RUE. Triple flexion RLE    Cerebellar: Unable to assess, but appears to be no gross ataxia    Groins: R groin full, nontender, no hematoma, no ecchymoses; L groin soft, nontender, no hematoma       LABS:                        11.4   13.18 )-----------( 171      ( 29 Aug 2023 03:05 )             32.6    08-29    135  |  99  |  14.5  ----------------------------<  134<H>  3.8   |  25.0  |  0.77    Ca    7.9<L>      29 Aug 2023 03:05  Phos  2.0     08-29  Mg     2.0     08-29    TPro  7.4  /  Alb  4.5  /  TBili  0.3<L>  /  DBili  x   /  AST  33  /  ALT  38  /  AlkPhos  62  08-27  PT/INR - ( 27 Aug 2023 11:40 )   PT: 11.4 sec;   INR: 1.03 ratio         PTT - ( 27 Aug 2023 11:40 )  PTT:31.5 sec    RADIOLOGY & ADDITIONAL STUDIES (independently reviewed unless otherwise noted):   CT Head No Cont (08.29.23 @ 00:31)   IMPRESSION:  Continued evolution of large acute left MCA territory infarct.   Left-to-right midline shift measuring 6 mm, increased. No hydrocephalus   or effacement of basal cisterns. Curvilinear foci of hyperdensity within   the area of infarct, for which petechial hemorrhage cannot be excluded.   No large parenchymal hemorrhage.    CT Abdomen and Pelvis w/ IV Cont (08.28.23 @ 02:53)   IMPRESSION:  No evidence for bladder rupture.    CT Brain Stroke Protocol (08.27.23 @ 11:53)   IMPRESSION:  Wedgelike low density in the left putamen and caudate (corpus striatum)   is new from the prior scan, and consistent with infarct that may be acute   given corresponding symptoms.  No acute intracranial hemorrhage.    The study was performed at 11:47 AM on 08/27/2023 and the above findings   were discussed with Dr. Betts at 12:02 PM.    CT Angio Brain Stroke Protocol  w/ IV Cont (08.27.23 @ 12:03)   IMPRESSION:  CTA head: Focal occlusion of left MCA distal M1 segment with patent but   small caliber filling of M2 segments.    CT perfusion indicates large mismatch between Tmax and CBF consistent   with ischemic penumbra.    CTA Neck: No steno-occlusive focus.    Case findings above discussed with Dr. Hu at 12:13 PM on 08/27/2023.     CT Head No Cont (08.28.23 @ 02:34)   IMPRESSION: Acute left MCA infarct. Possible acute infarct involving the   right temporal region. This finding can be better evaluated with MRI if   there are no contraindications.         Preliminary note, offical recommendations pending attending review/signature   [facility] Stroke Team  Progress Note      ID: [id number]  HPI:  Patient is a 63 year old male with PMHx Hypertension, DM on oral medications who presented c/o stroke-like symptoms. Per son pt was walking outside around 10:15am on 8/27/23 when he suddenly fell. His son helped him up and noted that he was weak on the right side and not acting normally which prompted him to come to the ED. On arrival pt w/ obvious right sided arm weakness, confusion, dysarthria, facial droop suggestive of acute stroke. Code stroke initiated, found to have LM1 occlusion, given TNK @ 1215 and was taken for thrombectomy. Unable to provide ROS 2/2 aphasia.    SUBJECTIVE: No events overnight.  No new neurologic complaints.  ROS reported negative unless otherwise noted.    atorvastatin 80 milliGRAM(s) Oral at bedtime  chlorhexidine 2% Cloths 1 Application(s) Topical <User Schedule>  dextrose 5%. 1000 milliLiter(s) IV Continuous <Continuous>  dextrose 5%. 1000 milliLiter(s) IV Continuous <Continuous>  dextrose 50% Injectable 25 Gram(s) IV Push once  dextrose 50% Injectable 12.5 Gram(s) IV Push once  dextrose 50% Injectable 25 Gram(s) IV Push once  dextrose Oral Gel 15 Gram(s) Oral once PRN  glucagon  Injectable 1 milliGRAM(s) IntraMuscular once  hydrALAZINE Injectable 10 milliGRAM(s) IV Push every 2 hours PRN  insulin lispro (ADMELOG) corrective regimen sliding scale   SubCutaneous every 6 hours  labetalol Injectable 10 milliGRAM(s) IV Push every 2 hours PRN  piperacillin/tazobactam IVPB.. 3.375 Gram(s) IV Intermittent every 8 hours  sodium chloride 0.9%. 1000 milliLiter(s) IV Continuous <Continuous>      PHYSICAL EXAM:   Vital Signs Last 24 Hrs  T(C): 36.8 (29 Aug 2023 07:00), Max: 37.2 (28 Aug 2023 21:00)  T(F): 98.2 (29 Aug 2023 07:00), Max: 99 (28 Aug 2023 21:00)  HR: 72 (29 Aug 2023 07:00) (68 - 91)  BP: 107/72 (29 Aug 2023 07:00) (91/66 - 140/75)  BP(mean): 84 (29 Aug 2023 07:00) (72 - 101)  RR: 18 (29 Aug 2023 07:00) (11 - 22)  SpO2: 97% (29 Aug 2023 07:00) (93% - 100%)    Parameters below as of 29 Aug 2023 06:00  Patient On (Oxygen Delivery Method): room air    General: NAD. Appears to be a little drowsy, as patient just woke up.    Detailed Neurologic Exam:    Mental status: The patient is awake but not oriented to place, self, month, or year. Minimal verbal output and just generates sounds.. Unable to repeat. Follows some simple midline commands (sticks out tongue and touches nose), however unable to follow more complex commands. Appears to be mimicking examiner as well.     Cranial nerves: Pupils equal and react symmetrically to light. Right gaze palsy that does not cross midline. No blink to threat on right side. Left eye ptosis. Right sided facial asymmetry     Motor:   LUE/LLE- spontaneously moves antigravity. Squeezes hand but not on command.   RUE- extends and internally rotates to pain, but does not spontaneously move  RLE- triple flexion    Sensation: Intact to light touch on LUE and LLE. Extends to noxious stimuli on RUE. Triple flexion RLE    Cerebellar: Unable to assess, but appears to be no gross ataxia        LABS:                        11.4   13.18 )-----------( 171      ( 29 Aug 2023 03:05 )             32.6    08-29    135  |  99  |  14.5  ----------------------------<  134<H>  3.8   |  25.0  |  0.77    Ca    7.9<L>      29 Aug 2023 03:05  Phos  2.0     08-29  Mg     2.0     08-29    TPro  7.4  /  Alb  4.5  /  TBili  0.3<L>  /  DBili  x   /  AST  33  /  ALT  38  /  AlkPhos  62  08-27  PT/INR - ( 27 Aug 2023 11:40 )   PT: 11.4 sec;   INR: 1.03 ratio         PTT - ( 27 Aug 2023 11:40 )  PTT:31.5 sec    RADIOLOGY & ADDITIONAL STUDIES (independently reviewed unless otherwise noted):   CT Head No Cont (08.29.23 @ 00:31)   IMPRESSION:  Continued evolution of large acute left MCA territory infarct.   Left-to-right midline shift measuring 6 mm, increased. No hydrocephalus   or effacement of basal cisterns. Curvilinear foci of hyperdensity within   the area of infarct, for which petechial hemorrhage cannot be excluded.   No large parenchymal hemorrhage.    CT Abdomen and Pelvis w/ IV Cont (08.28.23 @ 02:53)   IMPRESSION:  No evidence for bladder rupture.    CT Brain Stroke Protocol (08.27.23 @ 11:53)   IMPRESSION:  Wedgelike low density in the left putamen and caudate (corpus striatum)   is new from the prior scan, and consistent with infarct that may be acute   given corresponding symptoms.  No acute intracranial hemorrhage.    The study was performed at 11:47 AM on 08/27/2023 and the above findings   were discussed with Dr. Betts at 12:02 PM.    CT Angio Brain Stroke Protocol  w/ IV Cont (08.27.23 @ 12:03)   IMPRESSION:  CTA head: Focal occlusion of left MCA distal M1 segment with patent but   small caliber filling of M2 segments.    CT perfusion indicates large mismatch between Tmax and CBF consistent   with ischemic penumbra.    CTA Neck: No steno-occlusive focus.    Case findings above discussed with Dr. Hu at 12:13 PM on 08/27/2023.     CT Head No Cont (08.28.23 @ 02:34)   IMPRESSION: Acute left MCA infarct. Possible acute infarct involving the   right temporal region. This finding can be better evaluated with MRI if   there are no contraindications.           [facility] Stroke Team  Progress Note      ID: [id number]  HPI:  Patient is a 63 year old male with PMHx Hypertension, DM on oral medications who presented c/o stroke-like symptoms. Per son pt was walking outside around 10:15am on 8/27/23 when he suddenly fell. His son helped him up and noted that he was weak on the right side and not acting normally which prompted him to come to the ED. On arrival pt w/ obvious right sided arm weakness, confusion, dysarthria, facial droop suggestive of acute stroke. Code stroke initiated, found to have LM1 occlusion, given TNK @ 1215 and was taken for thrombectomy. Unable to provide ROS 2/2 aphasia.    SUBJECTIVE: No events overnight.  No new neurologic complaints.  ROS reported negative unless otherwise noted.    atorvastatin 80 milliGRAM(s) Oral at bedtime  chlorhexidine 2% Cloths 1 Application(s) Topical <User Schedule>  dextrose 5%. 1000 milliLiter(s) IV Continuous <Continuous>  dextrose 5%. 1000 milliLiter(s) IV Continuous <Continuous>  dextrose 50% Injectable 25 Gram(s) IV Push once  dextrose 50% Injectable 12.5 Gram(s) IV Push once  dextrose 50% Injectable 25 Gram(s) IV Push once  dextrose Oral Gel 15 Gram(s) Oral once PRN  glucagon  Injectable 1 milliGRAM(s) IntraMuscular once  hydrALAZINE Injectable 10 milliGRAM(s) IV Push every 2 hours PRN  insulin lispro (ADMELOG) corrective regimen sliding scale   SubCutaneous every 6 hours  labetalol Injectable 10 milliGRAM(s) IV Push every 2 hours PRN  piperacillin/tazobactam IVPB.. 3.375 Gram(s) IV Intermittent every 8 hours  sodium chloride 0.9%. 1000 milliLiter(s) IV Continuous <Continuous>      PHYSICAL EXAM:   Vital Signs Last 24 Hrs  T(C): 36.8 (29 Aug 2023 07:00), Max: 37.2 (28 Aug 2023 21:00)  T(F): 98.2 (29 Aug 2023 07:00), Max: 99 (28 Aug 2023 21:00)  HR: 72 (29 Aug 2023 07:00) (68 - 91)  BP: 107/72 (29 Aug 2023 07:00) (91/66 - 140/75)  BP(mean): 84 (29 Aug 2023 07:00) (72 - 101)  RR: 18 (29 Aug 2023 07:00) (11 - 22)  SpO2: 97% (29 Aug 2023 07:00) (93% - 100%)    Parameters below as of 29 Aug 2023 06:00  Patient On (Oxygen Delivery Method): room air    General: NAD. Appears to be a little drowsy, as patient just woke up.    Detailed Neurologic Exam:    Mental status: The patient is awake but not oriented to place, self, month, or year. Minimal verbal output and just generates sounds.. Unable to repeat. Follows some simple midline commands (sticks out tongue and touches nose), however unable to follow more complex commands. Appears to be mimicking examiner as well.     Cranial nerves: Pupils equal and react symmetrically to light. Right gaze palsy that does not cross midline. No blink to threat on right side. Left eye ptosis. Right sided facial asymmetry     Motor:   LUE/LLE- spontaneously moves antigravity. Squeezes hand but not on command.   RUE- extends and internally rotates to pain, but does not spontaneously move  RLE- triple flexion    Sensation: Intact to light touch on LUE and LLE. Extends to noxious stimuli on RUE. Triple flexion RLE    Cerebellar: Unable to assess, but appears to be no gross ataxia        LABS:                        11.4   13.18 )-----------( 171      ( 29 Aug 2023 03:05 )             32.6    08-29    135  |  99  |  14.5  ----------------------------<  134<H>  3.8   |  25.0  |  0.77    Ca    7.9<L>      29 Aug 2023 03:05  Phos  2.0     08-29  Mg     2.0     08-29    TPro  7.4  /  Alb  4.5  /  TBili  0.3<L>  /  DBili  x   /  AST  33  /  ALT  38  /  AlkPhos  62  08-27  PT/INR - ( 27 Aug 2023 11:40 )   PT: 11.4 sec;   INR: 1.03 ratio         PTT - ( 27 Aug 2023 11:40 )  PTT:31.5 sec    RADIOLOGY & ADDITIONAL STUDIES (independently reviewed unless otherwise noted):   CT Head No Cont (08.29.23 @ 00:31)   IMPRESSION:  Continued evolution of large acute left MCA territory infarct.   Left-to-right midline shift measuring 6 mm, increased. No hydrocephalus   or effacement of basal cisterns. Curvilinear foci of hyperdensity within   the area of infarct, for which petechial hemorrhage cannot be excluded.   No large parenchymal hemorrhage.    CT Abdomen and Pelvis w/ IV Cont (08.28.23 @ 02:53)   IMPRESSION:  No evidence for bladder rupture.    CT Brain Stroke Protocol (08.27.23 @ 11:53)   IMPRESSION:  Wedgelike low density in the left putamen and caudate (corpus striatum)   is new from the prior scan, and consistent with infarct that may be acute   given corresponding symptoms.  No acute intracranial hemorrhage.    The study was performed at 11:47 AM on 08/27/2023 and the above findings   were discussed with Dr. Betts at 12:02 PM.    CT Angio Brain Stroke Protocol  w/ IV Cont (08.27.23 @ 12:03)   IMPRESSION:  CTA head: Focal occlusion of left MCA distal M1 segment with patent but   small caliber filling of M2 segments.    CT perfusion indicates large mismatch between Tmax and CBF consistent   with ischemic penumbra.    CTA Neck: No steno-occlusive focus.    Case findings above discussed with Dr. Hu at 12:13 PM on 08/27/2023.     CT Head No Cont (08.28.23 @ 02:34)   IMPRESSION: Acute left MCA infarct. Possible acute infarct involving the   right temporal region. This finding can be better evaluated with MRI if   there are no contraindications.

## 2023-08-29 NOTE — PROGRESS NOTE ADULT - SUBJECTIVE AND OBJECTIVE BOX
Patient is a 63y old  Male who presents with a chief complaint of LT M1 Occlusion (29 Aug 2023 14:03)    HPI:  Patient is a 63 year old male with PMH Hypertension, DM on oral medications who presents c/o stroke-like symptoms. Per son pt was walking outside around 10:15am this morning when he suddenly fell. His son helped him up and noted that he was weak on the right side and not acting normally which prompted him to come to the ED. On arrival pt w/ obvious right sided arm weakness, confusion, dysarthria, facial droop suggestive of acute stroke. Code stroke initiated, found to have LM1 occlusion, given TNK @ 1215 and was taken for thrombectomy. Unable to provide ROS 2/2 aphasia  (27 Aug 2023 16:45)      Interval history:  Patient seen and examined by neuro IR team. Patient POD2 from mechanical thrombectomy. Patient with R gluteus hematoma and R thigh fullness, arterial dopplers performed pending official read. CTH this am shows large acute LMCA infarct with incr MLS now 6mm. No other acute events reported.       Vital Signs Last 24 Hrs  T(C): 36.8 (29 Aug 2023 17:00), Max: 37.2 (28 Aug 2023 21:00)  T(F): 98.2 (29 Aug 2023 17:00), Max: 99 (28 Aug 2023 21:00)  HR: 73 (29 Aug 2023 17:00) (64 - 81)  BP: 128/74 (29 Aug 2023 17:00) (94/71 - 145/113)  BP(mean): 91 (29 Aug 2023 17:00) (74 - 125)  RR: 11 (29 Aug 2023 17:00) (11 - 26)  SpO2: 99% (29 Aug 2023 17:00) (93% - 100%)    Parameters below as of 29 Aug 2023 16:00  Patient On (Oxygen Delivery Method): room air      Physical Exam:  Constitutional: NAD, lying in bed  Neuro  * Mental Status: EO to voice, more lethargic, ? FC vs mimicking, A&O x1-2 with choices, expressive > receptive aphasia   * Cranial Nerves: PERRL, L gaze preference, R facial droop  * Motor: LUE/LLE AG, RUE WD, LLE WD   * Sensory: Sensation grossly intact to noxious stimuli   * Reflexes: not assessed   Groin: R groin full but no hardness, no grimacing to touch, R gluteus ecchymoses with demarcated border; L groin soft, nontender, no hematoma, no ecchymoses       LABS:                        11.7   13.57 )-----------( 173      ( 29 Aug 2023 15:12 )             33.8     08-29    135  |  99  |  14.5  ----------------------------<  134<H>  3.8   |  25.0  |  0.77    Ca    7.9<L>      29 Aug 2023 03:05  Phos  2.0     08-29  Mg     2.0     08-29    Urinalysis Basic - ( 29 Aug 2023 03:05 )  Color: x / Appearance: x / SG: x / pH: x  Gluc: 134 mg/dL / Ketone: x  / Bili: x / Urobili: x   Blood: x / Protein: x / Nitrite: x   Leuk Esterase: x / RBC: x / WBC x   Sq Epi: x / Non Sq Epi: x / Bacteria: x      Medications:  MEDICATIONS  (STANDING):  aspirin  chewable 81 milliGRAM(s) Oral daily  atorvastatin 80 milliGRAM(s) Oral at bedtime  chlorhexidine 2% Cloths 1 Application(s) Topical <User Schedule>  dextrose 5%. 1000 milliLiter(s) (50 mL/Hr) IV Continuous <Continuous>  dextrose 5%. 1000 milliLiter(s) (100 mL/Hr) IV Continuous <Continuous>  dextrose 50% Injectable 25 Gram(s) IV Push once  dextrose 50% Injectable 12.5 Gram(s) IV Push once  dextrose 50% Injectable 25 Gram(s) IV Push once  enoxaparin Injectable 40 milliGRAM(s) SubCutaneous every 24 hours  glucagon  Injectable 1 milliGRAM(s) IntraMuscular once  insulin lispro (ADMELOG) corrective regimen sliding scale   SubCutaneous every 6 hours  sodium chloride 2% . 1000 milliLiter(s) (75 mL/Hr) IV Continuous <Continuous>    MEDICATIONS  (PRN):  dextrose Oral Gel 15 Gram(s) Oral once PRN Blood Glucose LESS THAN 70 milliGRAM(s)/deciliter  hydrALAZINE Injectable 10 milliGRAM(s) IV Push every 2 hours PRN SBP >140  labetalol Injectable 10 milliGRAM(s) IV Push every 2 hours PRN SBP >140      RADIOLOGY & ADDITIONAL STUDIES:  < from: CT Head No Cont (08.29.23 @ 00:31) >  IMPRESSION:  Continued evolution of large acute left MCA territory infarct.   Left-to-right midline shift measuring 6 mm, increased. No hydrocephalus   or effacement of basal cisterns. Curvilinear foci of hyperdensity within   the area of infarct, for which petechial hemorrhage cannot be excluded.   No large parenchymal hemorrhage.    --- End of Report ---  JAVIER EASON MD; Attending Radiologist  This document has been electronically signed. Aug 29 2023  9:21AM    < end of copied text >   Patient is a 63y old  Male who presents with a chief complaint of LT M1 Occlusion (29 Aug 2023 14:03)    HPI:  Patient is a 63 year old male with PMH Hypertension, DM on oral medications who presents c/o stroke-like symptoms. Per son pt was walking outside around 10:15am this morning when he suddenly fell. His son helped him up and noted that he was weak on the right side and not acting normally which prompted him to come to the ED. On arrival pt w/ obvious right sided arm weakness, confusion, dysarthria, facial droop suggestive of acute stroke. Code stroke initiated, found to have LM1 occlusion, given TNK @ 1215 and was taken for thrombectomy. Unable to provide ROS 2/2 aphasia  (27 Aug 2023 16:45)      Interval history:  Patient seen and examined by neuro IR team. Patient POD2 from mechanical thrombectomy. Patient with R gluteus hematoma and R thigh fullness, arterial dopplers performed pending official read. CTH this am shows large acute LMCA infarct with incr MLS now 6mm. No other acute events reported.       Vital Signs Last 24 Hrs  T(C): 36.8 (29 Aug 2023 17:00), Max: 37.2 (28 Aug 2023 21:00)  T(F): 98.2 (29 Aug 2023 17:00), Max: 99 (28 Aug 2023 21:00)  HR: 73 (29 Aug 2023 17:00) (64 - 81)  BP: 128/74 (29 Aug 2023 17:00) (94/71 - 145/113)  BP(mean): 91 (29 Aug 2023 17:00) (74 - 125)  RR: 11 (29 Aug 2023 17:00) (11 - 26)  SpO2: 99% (29 Aug 2023 17:00) (93% - 100%)    Parameters below as of 29 Aug 2023 16:00  Patient On (Oxygen Delivery Method): room air      Physical Exam:  Constitutional: NAD, lying in bed  Neuro  * Mental Status: EO to voice, more lethargic, ? FC vs mimicking, A&O x1-2 with choices, expressive > receptive aphasia, followed simple commands with left UE and LE  * Cranial Nerves: PERRL, L gaze preference, R facial droop  * Motor: LUE/LLE AG, RUE WD, LLE WD   * Sensory: Sensation grossly intact to noxious stimuli   * Reflexes: not assessed   Groin: R groin full but no hardness, no grimacing to touch, R gluteus ecchymoses with demarcated border; L groin soft, nontender, no hematoma, no ecchymoses       LABS:                        11.7   13.57 )-----------( 173      ( 29 Aug 2023 15:12 )             33.8     08-29    135  |  99  |  14.5  ----------------------------<  134<H>  3.8   |  25.0  |  0.77    Ca    7.9<L>      29 Aug 2023 03:05  Phos  2.0     08-29  Mg     2.0     08-29    Urinalysis Basic - ( 29 Aug 2023 03:05 )  Color: x / Appearance: x / SG: x / pH: x  Gluc: 134 mg/dL / Ketone: x  / Bili: x / Urobili: x   Blood: x / Protein: x / Nitrite: x   Leuk Esterase: x / RBC: x / WBC x   Sq Epi: x / Non Sq Epi: x / Bacteria: x      Medications:  MEDICATIONS  (STANDING):  aspirin  chewable 81 milliGRAM(s) Oral daily  atorvastatin 80 milliGRAM(s) Oral at bedtime  chlorhexidine 2% Cloths 1 Application(s) Topical <User Schedule>  dextrose 5%. 1000 milliLiter(s) (50 mL/Hr) IV Continuous <Continuous>  dextrose 5%. 1000 milliLiter(s) (100 mL/Hr) IV Continuous <Continuous>  dextrose 50% Injectable 25 Gram(s) IV Push once  dextrose 50% Injectable 12.5 Gram(s) IV Push once  dextrose 50% Injectable 25 Gram(s) IV Push once  enoxaparin Injectable 40 milliGRAM(s) SubCutaneous every 24 hours  glucagon  Injectable 1 milliGRAM(s) IntraMuscular once  insulin lispro (ADMELOG) corrective regimen sliding scale   SubCutaneous every 6 hours  sodium chloride 2% . 1000 milliLiter(s) (75 mL/Hr) IV Continuous <Continuous>    MEDICATIONS  (PRN):  dextrose Oral Gel 15 Gram(s) Oral once PRN Blood Glucose LESS THAN 70 milliGRAM(s)/deciliter  hydrALAZINE Injectable 10 milliGRAM(s) IV Push every 2 hours PRN SBP >140  labetalol Injectable 10 milliGRAM(s) IV Push every 2 hours PRN SBP >140      RADIOLOGY & ADDITIONAL STUDIES:  < from: CT Head No Cont (08.29.23 @ 00:31) >  IMPRESSION:  Continued evolution of large acute left MCA territory infarct.   Left-to-right midline shift measuring 6 mm, increased. No hydrocephalus   or effacement of basal cisterns. Curvilinear foci of hyperdensity within   the area of infarct, for which petechial hemorrhage cannot be excluded.   No large parenchymal hemorrhage.    --- End of Report ---  JAVIER EASON MD; Attending Radiologist  This document has been electronically signed. Aug 29 2023  9:21AM    < end of copied text >

## 2023-08-29 NOTE — CONSULT NOTE ADULT - ASSESSMENT
62 yo male s/p a Left M1 occlusion s/p TNK and cerebral angiogram for left M1 mechanical thrombectomy, for which neurosurgery was consulted for a hemicraniectomy watch     Plan:  - Case and plan discussed with Dr. Bah attending on call  - Patient seen on AM rounds  - No role acute neurosurgical intervention at this time  - Medical management with hyperosmolar solutions, patient currently on 2% NaCl  - BMP q8  -Ok for Stroke ppx ASA 81mg and Lovenox  - Rpt CTH in AM  -Neurosurgery will continue to follow

## 2023-08-29 NOTE — DIETITIAN INITIAL EVALUATION ADULT - PERTINENT LABORATORY DATA
08-29    135  |  99  |  14.5  ----------------------------<  134<H>  3.8   |  25.0  |  0.77    Ca    7.9<L>      29 Aug 2023 03:05  Phos  2.0     08-29  Mg     2.0     08-29    TPro  7.4  /  Alb  4.5  /  TBili  0.3<L>  /  DBili  x   /  AST  33  /  ALT  38  /  AlkPhos  62  08-27  POCT Blood Glucose.: 133 mg/dL (08-29-23 @ 05:38)  A1C with Estimated Average Glucose Result: 6.8 % (08-29-23 @ 03:05)

## 2023-08-29 NOTE — DIETITIAN INITIAL EVALUATION ADULT - ADD RECOMMEND
SLP swallow re-evaluation as feasible/appropriate. Rx: MVI daily as feasible. Obtain daily weights to monitor trends.

## 2023-08-30 ENCOUNTER — TRANSCRIPTION ENCOUNTER (OUTPATIENT)
Age: 63
End: 2023-08-30

## 2023-08-30 LAB
ALBUMIN SERPL ELPH-MCNC: 3.3 G/DL — SIGNIFICANT CHANGE UP (ref 3.3–5.2)
ALP SERPL-CCNC: 45 U/L — SIGNIFICANT CHANGE UP (ref 40–120)
ALT FLD-CCNC: 24 U/L — SIGNIFICANT CHANGE UP
ANION GAP SERPL CALC-SCNC: 10 MMOL/L — SIGNIFICANT CHANGE UP (ref 5–17)
ANION GAP SERPL CALC-SCNC: 12 MMOL/L — SIGNIFICANT CHANGE UP (ref 5–17)
ANION GAP SERPL CALC-SCNC: 13 MMOL/L — SIGNIFICANT CHANGE UP (ref 5–17)
ANION GAP SERPL CALC-SCNC: 15 MMOL/L — SIGNIFICANT CHANGE UP (ref 5–17)
APTT BLD: 28.5 SEC — SIGNIFICANT CHANGE UP (ref 24.5–35.6)
AST SERPL-CCNC: 24 U/L — SIGNIFICANT CHANGE UP
BASE EXCESS BLDA CALC-SCNC: -1.1 MMOL/L — SIGNIFICANT CHANGE UP (ref -2–3)
BILIRUB SERPL-MCNC: 0.8 MG/DL — SIGNIFICANT CHANGE UP (ref 0.4–2)
BLD GP AB SCN SERPL QL: SIGNIFICANT CHANGE UP
BLOOD GAS COMMENTS ARTERIAL: SIGNIFICANT CHANGE UP
BUN SERPL-MCNC: 13.3 MG/DL — SIGNIFICANT CHANGE UP (ref 8–20)
BUN SERPL-MCNC: 15.4 MG/DL — SIGNIFICANT CHANGE UP (ref 8–20)
BUN SERPL-MCNC: 15.6 MG/DL — SIGNIFICANT CHANGE UP (ref 8–20)
BUN SERPL-MCNC: 16.5 MG/DL — SIGNIFICANT CHANGE UP (ref 8–20)
CALCIUM SERPL-MCNC: 7.6 MG/DL — LOW (ref 8.4–10.5)
CALCIUM SERPL-MCNC: 8 MG/DL — LOW (ref 8.4–10.5)
CALCIUM SERPL-MCNC: 8.4 MG/DL — SIGNIFICANT CHANGE UP (ref 8.4–10.5)
CALCIUM SERPL-MCNC: 8.6 MG/DL — SIGNIFICANT CHANGE UP (ref 8.4–10.5)
CHLORIDE SERPL-SCNC: 104 MMOL/L — SIGNIFICANT CHANGE UP (ref 96–108)
CHLORIDE SERPL-SCNC: 105 MMOL/L — SIGNIFICANT CHANGE UP (ref 96–108)
CHLORIDE SERPL-SCNC: 106 MMOL/L — SIGNIFICANT CHANGE UP (ref 96–108)
CHLORIDE SERPL-SCNC: 109 MMOL/L — HIGH (ref 96–108)
CO2 SERPL-SCNC: 20 MMOL/L — LOW (ref 22–29)
CO2 SERPL-SCNC: 20 MMOL/L — LOW (ref 22–29)
CO2 SERPL-SCNC: 21 MMOL/L — LOW (ref 22–29)
CO2 SERPL-SCNC: 23 MMOL/L — SIGNIFICANT CHANGE UP (ref 22–29)
CREAT SERPL-MCNC: 0.64 MG/DL — SIGNIFICANT CHANGE UP (ref 0.5–1.3)
CREAT SERPL-MCNC: 0.73 MG/DL — SIGNIFICANT CHANGE UP (ref 0.5–1.3)
CREAT SERPL-MCNC: 0.74 MG/DL — SIGNIFICANT CHANGE UP (ref 0.5–1.3)
CREAT SERPL-MCNC: 0.8 MG/DL — SIGNIFICANT CHANGE UP (ref 0.5–1.3)
EGFR: 100 ML/MIN/1.73M2 — SIGNIFICANT CHANGE UP
EGFR: 102 ML/MIN/1.73M2 — SIGNIFICANT CHANGE UP
EGFR: 103 ML/MIN/1.73M2 — SIGNIFICANT CHANGE UP
EGFR: 107 ML/MIN/1.73M2 — SIGNIFICANT CHANGE UP
GAS PNL BLDA: SIGNIFICANT CHANGE UP
GAS PNL BLDA: SIGNIFICANT CHANGE UP
GLUCOSE BLDC GLUCOMTR-MCNC: 143 MG/DL — HIGH (ref 70–99)
GLUCOSE BLDC GLUCOMTR-MCNC: 148 MG/DL — HIGH (ref 70–99)
GLUCOSE BLDC GLUCOMTR-MCNC: 200 MG/DL — HIGH (ref 70–99)
GLUCOSE BLDC GLUCOMTR-MCNC: 234 MG/DL — HIGH (ref 70–99)
GLUCOSE SERPL-MCNC: 149 MG/DL — HIGH (ref 70–99)
GLUCOSE SERPL-MCNC: 157 MG/DL — HIGH (ref 70–99)
GLUCOSE SERPL-MCNC: 193 MG/DL — HIGH (ref 70–99)
GLUCOSE SERPL-MCNC: 233 MG/DL — HIGH (ref 70–99)
HCO3 BLDA-SCNC: 23 MMOL/L — SIGNIFICANT CHANGE UP (ref 21–28)
HCT VFR BLD CALC: 28.5 % — LOW (ref 39–50)
HCT VFR BLD CALC: 34.9 % — LOW (ref 39–50)
HGB BLD-MCNC: 11.8 G/DL — LOW (ref 13–17)
HGB BLD-MCNC: 9.9 G/DL — LOW (ref 13–17)
HOROWITZ INDEX BLDA+IHG-RTO: 21 — SIGNIFICANT CHANGE UP
INR BLD: 1.18 RATIO — SIGNIFICANT CHANGE UP (ref 0.85–1.18)
MAGNESIUM SERPL-MCNC: 2 MG/DL — SIGNIFICANT CHANGE UP (ref 1.6–2.6)
MAGNESIUM SERPL-MCNC: 2 MG/DL — SIGNIFICANT CHANGE UP (ref 1.6–2.6)
MAGNESIUM SERPL-MCNC: 2 MG/DL — SIGNIFICANT CHANGE UP (ref 1.8–2.6)
MCHC RBC-ENTMCNC: 31.4 PG — SIGNIFICANT CHANGE UP (ref 27–34)
MCHC RBC-ENTMCNC: 32.2 PG — SIGNIFICANT CHANGE UP (ref 27–34)
MCHC RBC-ENTMCNC: 33.8 GM/DL — SIGNIFICANT CHANGE UP (ref 32–36)
MCHC RBC-ENTMCNC: 34.7 GM/DL — SIGNIFICANT CHANGE UP (ref 32–36)
MCV RBC AUTO: 92.8 FL — SIGNIFICANT CHANGE UP (ref 80–100)
MCV RBC AUTO: 92.8 FL — SIGNIFICANT CHANGE UP (ref 80–100)
PCO2 BLDA: 36 MMHG — SIGNIFICANT CHANGE UP (ref 35–48)
PH BLDA: 7.42 — SIGNIFICANT CHANGE UP (ref 7.35–7.45)
PHOSPHATE SERPL-MCNC: 1.8 MG/DL — LOW (ref 2.4–4.7)
PHOSPHATE SERPL-MCNC: 2.1 MG/DL — LOW (ref 2.4–4.7)
PHOSPHATE SERPL-MCNC: 3.5 MG/DL — SIGNIFICANT CHANGE UP (ref 2.4–4.7)
PLATELET # BLD AUTO: 179 K/UL — SIGNIFICANT CHANGE UP (ref 150–400)
PLATELET # BLD AUTO: 201 K/UL — SIGNIFICANT CHANGE UP (ref 150–400)
PO2 BLDA: 98 MMHG — SIGNIFICANT CHANGE UP (ref 83–108)
POTASSIUM SERPL-MCNC: 3.5 MMOL/L — SIGNIFICANT CHANGE UP (ref 3.5–5.3)
POTASSIUM SERPL-MCNC: 3.9 MMOL/L — SIGNIFICANT CHANGE UP (ref 3.5–5.3)
POTASSIUM SERPL-MCNC: 4.4 MMOL/L — SIGNIFICANT CHANGE UP (ref 3.5–5.3)
POTASSIUM SERPL-MCNC: 4.6 MMOL/L — SIGNIFICANT CHANGE UP (ref 3.5–5.3)
POTASSIUM SERPL-SCNC: 3.5 MMOL/L — SIGNIFICANT CHANGE UP (ref 3.5–5.3)
POTASSIUM SERPL-SCNC: 3.9 MMOL/L — SIGNIFICANT CHANGE UP (ref 3.5–5.3)
POTASSIUM SERPL-SCNC: 4.4 MMOL/L — SIGNIFICANT CHANGE UP (ref 3.5–5.3)
POTASSIUM SERPL-SCNC: 4.6 MMOL/L — SIGNIFICANT CHANGE UP (ref 3.5–5.3)
PROT SERPL-MCNC: 5.7 G/DL — LOW (ref 6.6–8.7)
PROTHROM AB SERPL-ACNC: 13 SEC — SIGNIFICANT CHANGE UP (ref 9.5–13)
RBC # BLD: 3.07 M/UL — LOW (ref 4.2–5.8)
RBC # BLD: 3.76 M/UL — LOW (ref 4.2–5.8)
RBC # FLD: 13.2 % — SIGNIFICANT CHANGE UP (ref 10.3–14.5)
RBC # FLD: 13.7 % — SIGNIFICANT CHANGE UP (ref 10.3–14.5)
SAO2 % BLDA: 97.9 % — SIGNIFICANT CHANGE UP (ref 94–98)
SODIUM SERPL-SCNC: 137 MMOL/L — SIGNIFICANT CHANGE UP (ref 135–145)
SODIUM SERPL-SCNC: 138 MMOL/L — SIGNIFICANT CHANGE UP (ref 135–145)
SODIUM SERPL-SCNC: 141 MMOL/L — SIGNIFICANT CHANGE UP (ref 135–145)
SODIUM SERPL-SCNC: 142 MMOL/L — SIGNIFICANT CHANGE UP (ref 135–145)
WBC # BLD: 14.46 K/UL — HIGH (ref 3.8–10.5)
WBC # BLD: 20.6 K/UL — HIGH (ref 3.8–10.5)
WBC # FLD AUTO: 14.46 K/UL — HIGH (ref 3.8–10.5)
WBC # FLD AUTO: 20.6 K/UL — HIGH (ref 3.8–10.5)

## 2023-08-30 PROCEDURE — 99291 CRITICAL CARE FIRST HOUR: CPT

## 2023-08-30 PROCEDURE — 61322 CRNEC/CRNOT DCMPRV W/O LOBEC: CPT

## 2023-08-30 PROCEDURE — 71045 X-RAY EXAM CHEST 1 VIEW: CPT | Mod: 26

## 2023-08-30 PROCEDURE — 70450 CT HEAD/BRAIN W/O DYE: CPT | Mod: 26

## 2023-08-30 PROCEDURE — 99232 SBSQ HOSP IP/OBS MODERATE 35: CPT

## 2023-08-30 DEVICE — FLOSEAL FAST PREP 10ML: Type: IMPLANTABLE DEVICE | Site: LEFT | Status: FUNCTIONAL

## 2023-08-30 DEVICE — KIT A-LINE 1LUM 20G X 12CM SAFE KIT: Type: IMPLANTABLE DEVICE | Site: LEFT | Status: FUNCTIONAL

## 2023-08-30 RX ORDER — PHENYLEPHRINE HYDROCHLORIDE 10 MG/ML
0.1 INJECTION INTRAVENOUS
Qty: 40 | Refills: 0 | Status: DISCONTINUED | OUTPATIENT
Start: 2023-08-30 | End: 2023-08-31

## 2023-08-30 RX ORDER — POTASSIUM CHLORIDE 20 MEQ
10 PACKET (EA) ORAL
Refills: 0 | Status: COMPLETED | OUTPATIENT
Start: 2023-08-30 | End: 2023-08-30

## 2023-08-30 RX ORDER — LEVETIRACETAM 250 MG/1
1000 TABLET, FILM COATED ORAL EVERY 12 HOURS
Refills: 0 | Status: DISCONTINUED | OUTPATIENT
Start: 2023-08-30 | End: 2023-09-01

## 2023-08-30 RX ORDER — CHLORHEXIDINE GLUCONATE 213 G/1000ML
15 SOLUTION TOPICAL EVERY 12 HOURS
Refills: 0 | Status: DISCONTINUED | OUTPATIENT
Start: 2023-08-30 | End: 2023-09-01

## 2023-08-30 RX ORDER — POTASSIUM PHOSPHATE, MONOBASIC POTASSIUM PHOSPHATE, DIBASIC 236; 224 MG/ML; MG/ML
30 INJECTION, SOLUTION INTRAVENOUS ONCE
Refills: 0 | Status: COMPLETED | OUTPATIENT
Start: 2023-08-30 | End: 2023-08-30

## 2023-08-30 RX ORDER — CEFAZOLIN SODIUM 1 G
2000 VIAL (EA) INJECTION EVERY 8 HOURS
Refills: 0 | Status: DISCONTINUED | OUTPATIENT
Start: 2023-08-30 | End: 2023-08-31

## 2023-08-30 RX ORDER — SENNA PLUS 8.6 MG/1
2 TABLET ORAL AT BEDTIME
Refills: 0 | Status: DISCONTINUED | OUTPATIENT
Start: 2023-08-30 | End: 2023-09-01

## 2023-08-30 RX ORDER — ACETAMINOPHEN 500 MG
1000 TABLET ORAL ONCE
Refills: 0 | Status: COMPLETED | OUTPATIENT
Start: 2023-08-30 | End: 2023-08-30

## 2023-08-30 RX ORDER — POLYETHYLENE GLYCOL 3350 17 G/17G
17 POWDER, FOR SOLUTION ORAL AT BEDTIME
Refills: 0 | Status: DISCONTINUED | OUTPATIENT
Start: 2023-08-30 | End: 2023-09-01

## 2023-08-30 RX ORDER — CEFAZOLIN SODIUM 1 G
2000 VIAL (EA) INJECTION EVERY 8 HOURS
Refills: 0 | Status: DISCONTINUED | OUTPATIENT
Start: 2023-08-30 | End: 2023-08-30

## 2023-08-30 RX ORDER — CHLORHEXIDINE GLUCONATE 213 G/1000ML
15 SOLUTION TOPICAL EVERY 12 HOURS
Refills: 0 | Status: DISCONTINUED | OUTPATIENT
Start: 2023-08-30 | End: 2023-08-30

## 2023-08-30 RX ORDER — SODIUM CHLORIDE 5 G/100ML
500 INJECTION, SOLUTION INTRAVENOUS
Refills: 0 | Status: DISCONTINUED | OUTPATIENT
Start: 2023-08-30 | End: 2023-08-31

## 2023-08-30 RX ORDER — SODIUM CHLORIDE 5 G/100ML
500 INJECTION, SOLUTION INTRAVENOUS
Refills: 0 | Status: DISCONTINUED | OUTPATIENT
Start: 2023-08-30 | End: 2023-08-30

## 2023-08-30 RX ADMIN — Medication 1: at 17:03

## 2023-08-30 RX ADMIN — Medication 2000 MILLIGRAM(S): at 14:00

## 2023-08-30 RX ADMIN — Medication 4 MILLIGRAM(S): at 00:03

## 2023-08-30 RX ADMIN — POTASSIUM PHOSPHATE, MONOBASIC POTASSIUM PHOSPHATE, DIBASIC 83.33 MILLIMOLE(S): 236; 224 INJECTION, SOLUTION INTRAVENOUS at 06:54

## 2023-08-30 RX ADMIN — Medication 1000 MILLIGRAM(S): at 12:51

## 2023-08-30 RX ADMIN — SODIUM CHLORIDE 20 MILLILITER(S): 5 INJECTION, SOLUTION INTRAVENOUS at 03:54

## 2023-08-30 RX ADMIN — Medication 2000 MILLIGRAM(S): at 21:52

## 2023-08-30 RX ADMIN — CHLORHEXIDINE GLUCONATE 15 MILLILITER(S): 213 SOLUTION TOPICAL at 17:00

## 2023-08-30 RX ADMIN — Medication 40 MILLIEQUIVALENT(S): at 00:03

## 2023-08-30 RX ADMIN — SODIUM CHLORIDE 30 MILLILITER(S): 5 INJECTION, SOLUTION INTRAVENOUS at 03:55

## 2023-08-30 RX ADMIN — Medication 100 MILLIEQUIVALENT(S): at 04:32

## 2023-08-30 RX ADMIN — SODIUM CHLORIDE 30 MILLILITER(S): 5 INJECTION, SOLUTION INTRAVENOUS at 20:01

## 2023-08-30 RX ADMIN — Medication 100 MILLIEQUIVALENT(S): at 05:27

## 2023-08-30 RX ADMIN — Medication 2: at 11:41

## 2023-08-30 RX ADMIN — PHENYLEPHRINE HYDROCHLORIDE 3.05 MICROGRAM(S)/KG/MIN: 10 INJECTION INTRAVENOUS at 18:37

## 2023-08-30 RX ADMIN — ATORVASTATIN CALCIUM 80 MILLIGRAM(S): 80 TABLET, FILM COATED ORAL at 21:52

## 2023-08-30 RX ADMIN — SODIUM CHLORIDE 1 GRAM(S): 9 INJECTION INTRAMUSCULAR; INTRAVENOUS; SUBCUTANEOUS at 00:03

## 2023-08-30 RX ADMIN — LEVETIRACETAM 400 MILLIGRAM(S): 250 TABLET, FILM COATED ORAL at 17:04

## 2023-08-30 RX ADMIN — Medication 400 MILLIGRAM(S): at 12:36

## 2023-08-30 RX ADMIN — CHLORHEXIDINE GLUCONATE 1 APPLICATION(S): 213 SOLUTION TOPICAL at 05:26

## 2023-08-30 RX ADMIN — PHENYLEPHRINE HYDROCHLORIDE 3.05 MICROGRAM(S)/KG/MIN: 10 INJECTION INTRAVENOUS at 20:01

## 2023-08-30 RX ADMIN — POLYETHYLENE GLYCOL 3350 17 GRAM(S): 17 POWDER, FOR SOLUTION ORAL at 21:52

## 2023-08-30 RX ADMIN — SENNA PLUS 2 TABLET(S): 8.6 TABLET ORAL at 21:52

## 2023-08-30 RX ADMIN — SODIUM CHLORIDE 30 MILLILITER(S): 5 INJECTION, SOLUTION INTRAVENOUS at 11:44

## 2023-08-30 NOTE — CHART NOTE - NSCHARTNOTEFT_GEN_A_CORE
HPI:    64 y/o M with PMHx significant for HTN, DM who presents with stroke-like symptoms and was found to have L M1 occlusion s/p TNK and thrombectomy with TICI 2c revascularization. Despite hypertonic therapy and medical management CT Head w/o contrast demonstrates increased L to R midline shift now 1.2cm. On exam patient waxes and wanes but at best, opens eyes to stim, not following commands, localizes in LUE, withdraws in LLE, extends in RUE, triple flexes in RLE. GIven worsening exam and midline shift patient would benefit from L decompressive craniectomy for decompression of the brain. I had a detailed discussion with next of kin nephew Fortunato Foy.    We discussed risks and benefits of surgery with family. Benefits include decompression of the brain. Risks include but are not limited to bleeding, pain, infection, motor/sensory/cognitive deficits, stroke, coma, paralysis, death and need for repeat or further surgery. Family expressed understanding of the risks and benefits and wish to proceed.    Given unclear medical proxy status and emergent need for surgical intervention, case discussed with Dr. Ida Pfeifefr for 2 physician consent who agrees with plan for surgery.    Plan:  -to OR for L decompressive craniectomy    -Kiera Bah MD HPI:    64 y/o M with PMHx significant for HTN, DM who presents with stroke-like symptoms and was found to have L M1 occlusion s/p TNK and thrombectomy with TICI 2c revascularization. Despite hypertonic therapy and medical management CT Head w/o contrast demonstrates increased L to R midline shift now 1.2cm. On exam patient waxes and wanes but at best, opens eyes to stim, not following commands, localizes in LUE, withdraws in LLE, extends in RUE, triple flexes in RLE. GIven worsening exam and midline shift patient would benefit from L decompressive craniectomy for decompression of the brain. I had a detailed discussion with next of kin nephew Fortunato Foy.    We discussed risks and benefits of surgery with family. Benefits include decompression of the brain. Risks include but are not limited to bleeding, pain, infection, motor/sensory/cognitive deficits, stroke, coma, paralysis, death and need for repeat or further surgery. Family expressed understanding of the risks and benefits and wish to proceed.    Given unclear medical proxy status and emergent need for surgical intervention, case discussed with Dr. Ida Pfeiffer for 2 physician consent who agrees with medical necessity and plan for surgery.    Plan:  -to OR for L decompressive craniectomy    -Kiera Bah MD

## 2023-08-30 NOTE — PROGRESS NOTE ADULT - SUBJECTIVE AND OBJECTIVE BOX
Chief complaint:   Patient is a 63y old  Male who presents with a chief complaint of LT M1 Occlusion (30 Aug 2023 07:09)    HPI:  Patient is a 63 year old male with PMH Hypertension, DM on oral medications who presents c/o stroke-like symptoms. Per son pt was walking outside around 10:15am this morning when he suddenly fell. His son helped him up and noted that he was weak on the right side and not acting normally which prompted him to come to the ED. On arrival pt w/ obvious right sided arm weakness, confusion, dysarthria, facial droop suggestive of acute stroke. Code stroke initiated, found to have LM1 occlusion, given TNK @ 1215 and was taken for thrombectomy. Unable to provide ROS 2/2 aphasia  (27 Aug 2023 16:45)        24hr EVENTS:      ROS: [ ]  Unable to assess due to mental status   All other systems negative    -----------------------------------------------------------------------------------------------------------------------------------------------------------------------------------  ICU Vital Signs Last 24 Hrs  T(C): 37.4 (30 Aug 2023 05:00), Max: 37.4 (30 Aug 2023 05:00)  T(F): 99.3 (30 Aug 2023 05:00), Max: 99.3 (30 Aug 2023 05:00)  HR: 97 (30 Aug 2023 05:00) (64 - 106)  BP: 133/68 (30 Aug 2023 05:00) (101/84 - 145/113)  BP(mean): 88 (30 Aug 2023 05:00) (80 - 125)  ABP: --  ABP(mean): --  RR: 29 (30 Aug 2023 05:00) (11 - 29)  SpO2: 96% (30 Aug 2023 05:00) (94% - 100%)    O2 Parameters below as of 30 Aug 2023 04:00  Patient On (Oxygen Delivery Method): room air            I&O's Summary    29 Aug 2023 07:01  -  30 Aug 2023 07:00  --------------------------------------------------------  IN: 2469.8 mL / OUT: 1920 mL / NET: 549.8 mL        MEDICATIONS  (STANDING):  atorvastatin 80 milliGRAM(s) Oral at bedtime  chlorhexidine 2% Cloths 1 Application(s) Topical <User Schedule>  dextrose 5%. 1000 milliLiter(s) (50 mL/Hr) IV Continuous <Continuous>  dextrose 5%. 1000 milliLiter(s) (100 mL/Hr) IV Continuous <Continuous>  dextrose 50% Injectable 25 Gram(s) IV Push once  dextrose 50% Injectable 12.5 Gram(s) IV Push once  dextrose 50% Injectable 25 Gram(s) IV Push once  doxazosin 4 milliGRAM(s) Oral at bedtime  glucagon  Injectable 1 milliGRAM(s) IntraMuscular once  insulin lispro (ADMELOG) corrective regimen sliding scale   SubCutaneous every 6 hours  sodium chloride 3%. 500 milliLiter(s) (30 mL/Hr) IV Continuous <Continuous>  sodium chloride 3%. 500 milliLiter(s) (20 mL/Hr) IV Continuous <Continuous>      RESPIRATORY:        IMAGING:   Recent imaging studies were reviewed.    LAB RESULTS:                          11.8   14.46 )-----------( 179      ( 30 Aug 2023 03:02 )             34.9       PT/INR - ( 30 Aug 2023 06:30 )   PT: 13.0 sec;   INR: 1.18 ratio         PTT - ( 30 Aug 2023 06:30 )  PTT:28.5 sec    08-30    141  |  106  |  13.3  ----------------------------<  157<H>  3.5   |  20.0<L>  |  0.64    Ca    8.0<L>      30 Aug 2023 03:02  Phos  1.8     08-30  Mg     2.0     08-30            ABG - ( 30 Aug 2023 02:16 )  pH, Arterial: 7.420 pH, Blood: x     /  pCO2: 36    /  pO2: 98    / HCO3: 23    / Base Excess: -1.1  /  SaO2: 97.9                  -----------------------------------------------------------------------------------------------------------------------------------------------------------------------------------    PHYSICAL EXAM:  General: Calm, intubated  HEENT: MMM  Neuro:  -Mental status- No acute distress  -CN- PERRL 3mm, EOMI, tongue midline, face symmetric    CV: RRR  Pulm: clear to auscultation  Abd: Soft, nontender, nondistended  Ext: no noted edema in lower ext  Skin: warm, dry       Chief complaint:   Patient is a 63y old  Male who presents with a chief complaint of LT M1 Occlusion (30 Aug 2023 07:09)    HPI:  Patient is a 63 year old male with PMH Hypertension, DM on oral medications who presents c/o stroke-like symptoms. Per son pt was walking outside around 10:15am this morning when he suddenly fell. His son helped him up and noted that he was weak on the right side and not acting normally which prompted him to come to the ED. On arrival pt w/ obvious right sided arm weakness, confusion, dysarthria, facial droop suggestive of acute stroke. Code stroke initiated, found to have LM1 occlusion, given TNK @ 1215 and was taken for thrombectomy. Unable to provide ROS 2/2 aphasia  (27 Aug 2023 16:45)    24hr EVENTS:  worsening lethargy, taken to OR for [facility]    ROS: [x ]  Unable to assess due to mental status   All other systems negative    -----------------------------------------------------------------------------------------------------------------------------------------------------------------------------------  ICU Vital Signs Last 24 Hrs  T(C): 37.4 (30 Aug 2023 05:00), Max: 37.4 (30 Aug 2023 05:00)  T(F): 99.3 (30 Aug 2023 05:00), Max: 99.3 (30 Aug 2023 05:00)  HR: 97 (30 Aug 2023 05:00) (64 - 106)  BP: 133/68 (30 Aug 2023 05:00) (101/84 - 145/113)  BP(mean): 88 (30 Aug 2023 05:00) (80 - 125)  ABP: --  ABP(mean): --  RR: 29 (30 Aug 2023 05:00) (11 - 29)  SpO2: 96% (30 Aug 2023 05:00) (94% - 100%)    O2 Parameters below as of 30 Aug 2023 04:00  Patient On (Oxygen Delivery Method): room air            I&O's Summary    29 Aug 2023 07:01  -  30 Aug 2023 07:00  --------------------------------------------------------  IN: 2469.8 mL / OUT: 1920 mL / NET: 549.8 mL        MEDICATIONS  (STANDING):  atorvastatin 80 milliGRAM(s) Oral at bedtime  chlorhexidine 2% Cloths 1 Application(s) Topical <User Schedule>  dextrose 5%. 1000 milliLiter(s) (50 mL/Hr) IV Continuous <Continuous>  dextrose 5%. 1000 milliLiter(s) (100 mL/Hr) IV Continuous <Continuous>  dextrose 50% Injectable 25 Gram(s) IV Push once  dextrose 50% Injectable 12.5 Gram(s) IV Push once  dextrose 50% Injectable 25 Gram(s) IV Push once  doxazosin 4 milliGRAM(s) Oral at bedtime  glucagon  Injectable 1 milliGRAM(s) IntraMuscular once  insulin lispro (ADMELOG) corrective regimen sliding scale   SubCutaneous every 6 hours  sodium chloride 3%. 500 milliLiter(s) (30 mL/Hr) IV Continuous <Continuous>  sodium chloride 3%. 500 milliLiter(s) (20 mL/Hr) IV Continuous <Continuous>      RESPIRATORY:        IMAGING:   Recent imaging studies were reviewed.    LAB RESULTS:                          11.8   14.46 )-----------( 179      ( 30 Aug 2023 03:02 )             34.9       PT/INR - ( 30 Aug 2023 06:30 )   PT: 13.0 sec;   INR: 1.18 ratio         PTT - ( 30 Aug 2023 06:30 )  PTT:28.5 sec    08-30    141  |  106  |  13.3  ----------------------------<  157<H>  3.5   |  20.0<L>  |  0.64    Ca    8.0<L>      30 Aug 2023 03:02  Phos  1.8     08-30  Mg     2.0     08-30    ABG - ( 30 Aug 2023 02:16 )  pH, Arterial: 7.420 pH, Blood: x     /  pCO2: 36    /  pO2: 98    / HCO3: 23    / Base Excess: -1.1  /  SaO2: 97.9        -----------------------------------------------------------------------------------------------------------------------------------------------------------------------------------    PHYSICAL EXAM: pre-op  General unarousable  HEENT: MMM  Neuro:  -Mental status- No acute distress, no following commands, no EO  -CN- PERRL 3mm, EOMI, tongue midline, L facial droop  RUE ext  RLE TF  LUE WD  LLE WD    CV: RRR  Pulm: clear to auscultation  Abd: Soft, nontender, nondistended  Ext: no noted edema in lower ext  R buttock ecchymosis/hematoma  Skin: warm, dry

## 2023-08-30 NOTE — PROGRESS NOTE ADULT - SUBJECTIVE AND OBJECTIVE BOX
HPI:  Patient is a 63 year old male with PMH Hypertension, DM on oral medications who presents c/o stroke-like symptoms. Per son pt was walking outside around 10:15am this morning when he suddenly fell. His son helped him up and noted that he was weak on the right side and not acting normally which prompted him to come to the ED. On arrival pt w/ obvious right sided arm weakness, confusion, dysarthria, facial droop suggestive of acute stroke. Code stroke initiated, found to have LM1 occlusion, given TNK @ 1215 and was taken for thrombectomy. Unable to provide ROS 2/2 aphasia  (27 Aug 2023 16:45)      INTERVAL HPI/OVERNIGHT EVENTS:  62 y/o male s/p TNK on 8/27 @ 1215 and mechanical thrombectomy TICI 2C POD#3 for L M1 occlusion. Patient had CTH showed a large left MCA stroke with mass effect and MLS and thus neurosurgery was consulted. Patient pending repeat CTH this am. Exam remains stable. Patient being medically treated with hypertonics goal Na 140-150.       Vital Signs Last 24 Hrs  T(C): 37.2 (30 Aug 2023 00:00), Max: 37.2 (30 Aug 2023 00:00)  T(F): 99 (30 Aug 2023 00:00), Max: 99 (30 Aug 2023 00:00)  HR: 91 (30 Aug 2023 00:00) (64 - 93)  BP: 129/95 (30 Aug 2023 00:00) (96/74 - 148/78)  BP(mean): 106 (30 Aug 2023 00:00) (83 - 125)  RR: 22 (30 Aug 2023 00:00) (0 - 26)  SpO2: 99% (30 Aug 2023 00:00) (93% - 100%)    Parameters below as of 30 Aug 2023 00:00  Patient On (Oxygen Delivery Method): room air        PHYSICAL EXAM:  General: Calm  HEENT: MMM, NGT in place   Neuro:  -Mental status- No acute distress, follows some commands on L intermittently   expressive aphasia  -CN- PERRL 3mm, EOMI, tongue midline, face symmetric, R HH? R neglect  L gaze pref  RUE ext  RLE TF  LUE 4+/5  LLE 4/5    CV: RRR  Pulm: clear to auscultation  Abd: Soft, nontender, nondistended  Ext: no noted edema in lower ext  Skin: warm, dry    LABS:                        12.0   15.37 )-----------( 177      ( 29 Aug 2023 20:02 )             34.4     08-29    135  |  101  |  12.1  ----------------------------<  140<H>  3.5   |  22.0  |  0.61    Ca    8.2<L>      29 Aug 2023 20:02  Phos  2.0     08-29  Mg     2.0     08-29        Urinalysis Basic - ( 29 Aug 2023 20:02 )    Color: x / Appearance: x / SG: x / pH: x  Gluc: 140 mg/dL / Ketone: x  / Bili: x / Urobili: x   Blood: x / Protein: x / Nitrite: x   Leuk Esterase: x / RBC: x / WBC x   Sq Epi: x / Non Sq Epi: x / Bacteria: x        08-28 @ 07:01 - 08-29 @ 07:00  --------------------------------------------------------  IN: 2275 mL / OUT: 1610 mL / NET: 665 mL    08-29 @ 07:01  -  08-30 @ 01:52  --------------------------------------------------------  IN: 2019.8 mL / OUT: 1520 mL / NET: 499.8 mL        RADIOLOGY & ADDITIONAL TESTS:  < from: CT Head No Cont (08.29.23 @ 00:31) >  IMPRESSION:  Continued evolution of large acute left MCA territory infarct.   Left-to-right midline shift measuring 6 mm, increased. No hydrocephalus   or effacement of basal cisterns. Curvilinear foci of hyperdensity within   the area of infarct, for which petechial hemorrhage cannot be excluded.   No large parenchymal hemorrhage.    --- End of Report ---    JAVIER EASON MD; Attending Radiologist  This document has been electronically signed. Aug 29 2023  9:21AM

## 2023-08-30 NOTE — PROGRESS NOTE ADULT - ASSESSMENT
INCOMPLETE  ASSESSMENT: Patient is a 63 year old male with PMHx Hypertension, DM on oral medications who presented c/o stroke-like symptoms. Per son pt was walking outside around 10:15am on 8/27/23 when he suddenly fell. His son helped him up and noted that he was weak on the right side and not acting normally which prompted him to come to the ED. On arrival patient came in with right sided arm weakness, global aphasia, and dysarthria, right sided facial droop. Initial head CT demonstrated wedge like low density in the left putamen and caudate (corpus striatum) consistent with infarct that may be acute and no acute intracranial hemorrhage. Tenecteplase was administered at 12:15 pm. CTA head demonstrated occlusion of left MCA distal M1 segment and CTP showed large mismatch. found to have LM1 occlusion. Patient taken for mechanical thrombectomy where he was found to have LM1 occlusion with TICI 2C recanalization. Patient under care in the neuro ICU for blaine crani watch in setting of sizable infarction, however neurosx stated that patient is not a surgical candidate at this time.   Etiology: embolic stroke of undetermined source    NEURO:   -Neurologically  -Repeat head CT on 8/29/23 demonstrated continued evolution of large acute left MCA infarct with increased left to right midline shift of(6mm). Petechial hemorrhage cannot be excluded, however there is no large parenchymal hemorrhage   -Continue close monitoring for neurologic deterioration    - Stroke neuro checks q 1 as per neuro ICU  - Permissive HTN up to  as per neuro ICU. Avoid hypotension and rapid fluctuations    -ANTITHROMBOTIC THERAPY: ASA 81mg daily   -titrate statin to LDL goal less than 70. suggest high intensity statin when medically optimized and no medical contraindications in setting of acute stroke and LDL of 80.  -Obtain MRI Brain w/o  -Dysphagia screen: Fail. NGT will be placed.  -Physical therapy/OT/Speech eval/treatment.     -CARDIOVASCULAR: TTE pending, cardiac monitoring w/ telemetry for now, further evaluation pending findings of noted workup                              -HEMATOLOGY: H/H 11.8/34.9. Monitor for signs and symptoms of further anemia. Platelets 179. patient should have all age and risk appropriate malignancy screenings with PCP or sooner if clinically suspected      DVT ppx: Heparin s.c [] LMWH [x] repeat head CT showed no large parenchymal hemorrhage and neuro sx cleared patient for LMWH as well.     PULMONARY: CXR clear.  Protecting airway, saturating well     RENAL: BUN/Cr 13.3/0.64. monitor urine output, maintain adequate hydration. Noted fullness on 8/28/23 physical exam around right thigh. CT abdomen pelvis ruled out bladder injury.      Na Goal:  140-150. Avoid hyponatremia and rapid fluctuations. Currently on 3% NaCl     ID: afebrile, leukocytosis 14.46. monitor for si/sx of infection. Zosyn regimen stopped per primary team.    OTHER:  A1C is 6.8%. Suggest endocrine consult. condition and plan of care d/w patient, questions and concerns addressed.     DISPOSITION: Rehab or home depending on PT eval once stable and workup is complete      CORE MEASURES:        Admission NIHSS: 23     Tenecteplase : [x] YES [] NO      LDL/HDL/A1C: 80/39/6.8     Depression Screen- if depression hx and/or present      Statin Therapy: as noted     Dysphagia Screen: [] PASS [] FAIL     Smoking [] YES [x] NO      Afib [] YES [x] NO     Stroke Education [] YES [] NO- ordered and pending    Obtain screening lower extremity venous ultrasound in patients who meet 1 or more of the following criteria as patient is high risk for DVT/PE on admission:   [] History of DVT/PE  []Hypercoagulable states (Factor V Leiden, Cancer, OCP, etc. )  []Prolonged immobility (hemiplegia/hemiparesis/post operative or any other extended immobilization)  [] Transferred from outside facility (Rehab or Long term care)  [] Age </= to 50 INCOMPLETE  ASSESSMENT: Patient is a 63 year old male with PMHx Hypertension, DM on oral medications who presented c/o stroke-like symptoms. Per son pt was walking outside around 10:15am on 8/27/23 when he suddenly fell. His son helped him up and noted that he was weak on the right side and not acting normally which prompted him to come to the ED. On arrival patient came in with right sided arm weakness, global aphasia, and dysarthria, right sided facial droop. Initial head CT demonstrated wedge like low density in the left putamen and caudate (corpus striatum) consistent with infarct that may be acute and no acute intracranial hemorrhage. Tenecteplase was administered at 12:15 pm. CTA head demonstrated occlusion of left MCA distal M1 segment and CTP showed large mismatch. found to have LM1 occlusion. Patient taken for mechanical thrombectomy where he was found to have LM1 occlusion with TICI 2C recanalization. Due to worsening neuroligcal exam and increase in midline shift patient was taken to the OR on 8/30/23 for decompressive hemicraniectomy.   Etiology: embolic stroke of undetermined source    NEURO:   -Neurologically  -Repeat head CT on 8/29/23 demonstrated continued evolution of large acute left MCA infarct with increased left to right midline shift of(6mm). Petechial hemorrhage cannot be excluded, however there is no large parenchymal hemorrhage   -Continue close monitoring for neurologic deterioration    - Stroke neuro checks q 1 as per neuro ICU  - Permissive HTN up to  as per neuro ICU. Avoid hypotension and rapid fluctuations    -ANTITHROMBOTIC THERAPY: ASA 81mg daily   -titrate statin to LDL goal less than 70. suggest high intensity statin when medically optimized and no medical contraindications in setting of acute stroke and LDL of 80.  -Obtain MRI Brain w/o  -Dysphagia screen: Fail. NGT will be placed.  -Physical therapy/OT/Speech eval/treatment.     -CARDIOVASCULAR: TTE pending, cardiac monitoring w/ telemetry for now, further evaluation pending findings of noted workup                              -HEMATOLOGY: H/H 11.8/34.9. Monitor for signs and symptoms of further anemia. Platelets 179. patient should have all age and risk appropriate malignancy screenings with PCP or sooner if clinically suspected      DVT ppx: Heparin s.c [] LMWH [x] repeat head CT showed no large parenchymal hemorrhage and neuro sx cleared patient for LMWH as well.     PULMONARY: CXR clear.  Protecting airway, saturating well     RENAL: BUN/Cr 13.3/0.64. monitor urine output, maintain adequate hydration. Noted fullness on 8/28/23 physical exam around right thigh. CT abdomen pelvis ruled out bladder injury.      Na Goal:  140-150. Avoid hyponatremia and rapid fluctuations. Currently on 3% NaCl     ID: afebrile, leukocytosis 14.46. monitor for si/sx of infection. Zosyn regimen stopped per primary team.    OTHER:  A1C is 6.8%. Suggest endocrine consult. condition and plan of care d/w patient, questions and concerns addressed.     DISPOSITION: Rehab or home depending on PT eval once stable and workup is complete      CORE MEASURES:        Admission NIHSS: 23     Tenecteplase : [x] YES [] NO      LDL/HDL/A1C: 80/39/6.8     Depression Screen- if depression hx and/or present      Statin Therapy: as noted     Dysphagia Screen: [] PASS [] FAIL     Smoking [] YES [x] NO      Afib [] YES [x] NO     Stroke Education [] YES [] NO- ordered and pending    Obtain screening lower extremity venous ultrasound in patients who meet 1 or more of the following criteria as patient is high risk for DVT/PE on admission:   [] History of DVT/PE  []Hypercoagulable states (Factor V Leiden, Cancer, OCP, etc. )  []Prolonged immobility (hemiplegia/hemiparesis/post operative or any other extended immobilization)  [] Transferred from outside facility (Rehab or Long term care)  [] Age </= to 50 INCOMPLETE  ASSESSMENT: Patient is a 63 year old male with PMHx Hypertension, DM on oral medications who presented c/o stroke-like symptoms. Per son pt was walking outside around 10:15am on 8/27/23 when he suddenly fell. His son helped him up and noted that he was weak on the right side and not acting normally which prompted him to come to the ED. On arrival patient came in with right sided arm weakness, global aphasia, and dysarthria, right sided facial droop. Initial head CT demonstrated wedge like low density in the left putamen and caudate (corpus striatum) consistent with infarct that may be acute and no acute intracranial hemorrhage. Tenecteplase was administered at 12:15 pm. CTA head demonstrated occlusion of left MCA distal M1 segment and CTP showed large mismatch. found to have LM1 occlusion. Patient taken for mechanical thrombectomy where he was found to have LM1 occlusion with TICI 2C recanalization. Due to worsening neuroligcal exam and increase in midline shift patient was taken to the OR on 8/30/23 for decompressive hemicraniectomy.   Etiology: embolic stroke of undetermined source     worse midline shift on CTH  left to right shift ~ 9.5 mm compared to ~ 5.2 mm compared to last CTH    NEURO:   -Neurologically  -Repeat head CT on 8/29/23 demonstrated continued evolution of large acute left MCA infarct with increased left to right midline shift of(6mm). Petechial hemorrhage cannot be excluded, however there is no large parenchymal hemorrhage   -Continue close monitoring for neurologic deterioration    - Stroke neuro checks q 1 as per neuro ICU  - Permissive HTN up to  as per neuro ICU. Avoid hypotension and rapid fluctuations    -ANTITHROMBOTIC THERAPY: ASA 81mg daily   -titrate statin to LDL goal less than 70. suggest high intensity statin when medically optimized and no medical contraindications in setting of acute stroke and LDL of 80.  -Obtain MRI Brain w/o  -Dysphagia screen: Fail. NGT will be placed.  -Physical therapy/OT/Speech eval/treatment.     -CARDIOVASCULAR: TTE pending, cardiac monitoring w/ telemetry for now, further evaluation pending findings of noted workup                              -HEMATOLOGY: H/H 11.8/34.9. Monitor for signs and symptoms of further anemia. Platelets 179. patient should have all age and risk appropriate malignancy screenings with PCP or sooner if clinically suspected      DVT ppx: Heparin s.c [] LMWH [x] repeat head CT showed no large parenchymal hemorrhage and neuro sx cleared patient for LMWH as well.     PULMONARY: CXR clear.  Protecting airway, saturating well     RENAL: BUN/Cr 13.3/0.64. monitor urine output, maintain adequate hydration. Noted fullness on 8/28/23 physical exam around right thigh. CT abdomen pelvis ruled out bladder injury.      Na Goal:  140-150. Avoid hyponatremia and rapid fluctuations. Currently on 3% NaCl     ID: afebrile, leukocytosis 14.46. monitor for si/sx of infection. Zosyn regimen stopped per primary team.    OTHER:  A1C is 6.8%. Suggest endocrine consult. condition and plan of care d/w patient, questions and concerns addressed.     DISPOSITION: Rehab or home depending on PT eval once stable and workup is complete      CORE MEASURES:        Admission NIHSS: 23     Tenecteplase : [x] YES [] NO      LDL/HDL/A1C: 80/39/6.8     Depression Screen- if depression hx and/or present      Statin Therapy: as noted     Dysphagia Screen: [] PASS [] FAIL     Smoking [] YES [x] NO      Afib [] YES [x] NO     Stroke Education [] YES [] NO- ordered and pending    Obtain screening lower extremity venous ultrasound in patients who meet 1 or more of the following criteria as patient is high risk for DVT/PE on admission:   [] History of DVT/PE  []Hypercoagulable states (Factor V Leiden, Cancer, OCP, etc. )  []Prolonged immobility (hemiplegia/hemiparesis/post operative or any other extended immobilization)  [] Transferred from outside facility (Rehab or Long term care)  [] Age </= to 50 INCOMPLETE  ASSESSMENT: Patient is a 63 year old male with PMHx Hypertension, DM on oral medications who presented c/o stroke-like symptoms. Per son pt was walking outside around 10:15am on 8/27/23 when he suddenly fell. His son helped him up and noted that he was weak on the right side and not acting normally which prompted him to come to the ED. On arrival patient came in with right sided arm weakness, global aphasia, and dysarthria, right sided facial droop. Initial head CT demonstrated wedge like low density in the left putamen and caudate (corpus striatum) consistent with infarct that may be acute and no acute intracranial hemorrhage. Tenecteplase was administered at 12:15 pm. CTA head demonstrated occlusion of left MCA distal M1 segment and CTP showed large mismatch. found to have LM1 occlusion. Patient taken for mechanical thrombectomy where he was found to have LM1 occlusion with TICI 2C recanalization. Due to worsening of neuroligical exam and increase in midline shift found on CT head on 8/29/23 patient was taken to the OR on 8/30/23 for decompressive hemicraniectomy.   Etiology: embolic stroke of undetermined source     worse midline shift on CTH  left to right shift ~ 9.5 mm compared to ~ 5.2 mm compared to last CTH    NEURO:   -Neurologically  -Repeat head CT head on 8/29/23 at 3:00 am demonstrated increased in left to right shift of around 9.5mm (previously was 5.2mm) with evolving acute left MCA infarct  -Continue close monitoring for neurologic deterioration    - Stroke neuro checks q 1 as per neuro ICU  - Permissive HTN up to  as per neuro ICU. Avoid hypotension and rapid fluctuations    -ANTITHROMBOTIC THERAPY: ASA 81mg daily   -titrate statin to LDL goal less than 70. suggest high intensity statin when medically optimized and no medical contraindications in setting of acute stroke and LDL of 80.  -Obtain MRI Brain w/o  -Dysphagia screen: Fail. NGT will be placed.  -Physical therapy/OT/Speech eval/treatment.     -CARDIOVASCULAR: TTE pending, cardiac monitoring w/ telemetry for now, further evaluation pending findings of noted workup                              -HEMATOLOGY: H/H 11.8/34.9. Monitor for signs and symptoms of further anemia. Platelets 179. patient should have all age and risk appropriate malignancy screenings with PCP or sooner if clinically suspected      DVT ppx: Heparin s.c [] LMWH [x] repeat head CT showed no large parenchymal hemorrhage and neuro sx cleared patient for LMWH as well.     PULMONARY: CXR clear.  Protecting airway, saturating well     RENAL: BUN/Cr 13.3/0.64. monitor urine output, maintain adequate hydration. Noted fullness on 8/28/23 physical exam around right thigh. CT abdomen pelvis ruled out bladder injury.      Na Goal:  145-155. Avoid hyponatremia and rapid fluctuations. Currently on 3% NaCl     ID: afebrile, leukocytosis 14.46. monitor for si/sx of infection. Zosyn regimen stopped per primary team.    OTHER:  A1C is 6.8%. Suggest endocrine consult. Condition and plan of care d/w patient, questions and concerns addressed.     DISPOSITION: Rehab or home depending on PT eval once stable and workup is complete      CORE MEASURES:        Admission NIHSS: 23     Tenecteplase : [x] YES [] NO      LDL/HDL/A1C: 80/39/6.8     Depression Screen- if depression hx and/or present      Statin Therapy: as noted     Dysphagia Screen: [] PASS [] FAIL     Smoking [] YES [x] NO      Afib [] YES [x] NO     Stroke Education [] YES [] NO- ordered and pending    Obtain screening lower extremity venous ultrasound in patients who meet 1 or more of the following criteria as patient is high risk for DVT/PE on admission:   [] History of DVT/PE  []Hypercoagulable states (Factor V Leiden, Cancer, OCP, etc. )  []Prolonged immobility (hemiplegia/hemiparesis/post operative or any other extended immobilization)  [] Transferred from outside facility (Rehab or Long term care)  [] Age </= to 50 INCOMPLETE  ASSESSMENT: Patient is a 63 year old male with PMHx Hypertension, DM on oral medications who presented c/o stroke-like symptoms. Per son pt was walking outside around 10:15am on 8/27/23 when he suddenly fell. His son helped him up and noted that he was weak on the right side and not acting normally which prompted him to come to the ED. On arrival patient came in with right sided arm weakness, global aphasia, and dysarthria, right sided facial droop. Initial head CT demonstrated wedge like low density in the left putamen and caudate (corpus striatum) consistent with infarct that may be acute and no acute intracranial hemorrhage. Tenecteplase was administered at 12:15 pm. CTA head demonstrated occlusion of left MCA distal M1 segment and CTP showed large mismatch. found to have LM1 occlusion. Patient taken for mechanical thrombectomy where he was found to have LM1 occlusion with TICI 2C recanalization. Due to worsening of neuroligical exam and increase in midline shift found on CT head on 8/29/23 patient was taken to the OR on 8/30/23 for decompressive hemicraniectomy.   Etiology: embolic stroke of undetermined source    NEURO:   -Neurologically  -Repeat head CT head on 8/29/23 at 3:00 am demonstrated increased in left to right shift of around 9.5mm (previously was 5.2mm) with evolving acute left MCA infarct  -Continue close monitoring for neurologic deterioration    - Stroke neuro checks q 1 as per neuro ICU  - Permissive HTN up to  as per neuro ICU. Avoid hypotension and rapid fluctuations    -ANTITHROMBOTIC THERAPY: ASA 81mg daily   -titrate statin to LDL goal less than 70. suggest high intensity statin when medically optimized and no medical contraindications in setting of acute stroke and LDL of 80.  -Obtain MRI Brain w/o  -Dysphagia screen: Fail. NGT will be placed.  -Physical therapy/OT/Speech eval/treatment.     -CARDIOVASCULAR: TTE pending, cardiac monitoring w/ telemetry for now, further evaluation pending findings of noted workup                              -HEMATOLOGY: H/H 11.8/34.9. Monitor for signs and symptoms of further anemia. Platelets 179. patient should have all age and risk appropriate malignancy screenings with PCP or sooner if clinically suspected      DVT ppx: Heparin s.c [] LMWH [x] repeat head CT showed no large parenchymal hemorrhage and neuro sx cleared patient for LMWH as well.     PULMONARY: CXR clear.  Protecting airway, saturating well     RENAL: BUN/Cr 13.3/0.64. monitor urine output, maintain adequate hydration. Noted fullness on 8/28/23 physical exam around right thigh. CT abdomen pelvis ruled out bladder injury.      Na Goal:  145-155. Avoid hyponatremia and rapid fluctuations. Currently on 3% NaCl     ID: afebrile, leukocytosis 14.46. monitor for si/sx of infection. Zosyn regimen stopped per primary team.    OTHER:  A1C is 6.8%. Suggest endocrine consult. Condition and plan of care d/w patient, questions and concerns addressed.     DISPOSITION: Rehab or home depending on PT eval once stable and workup is complete      CORE MEASURES:        Admission NIHSS: 23     Tenecteplase : [x] YES [] NO      LDL/HDL/A1C: 80/39/6.8     Depression Screen- if depression hx and/or present      Statin Therapy: as noted     Dysphagia Screen: [] PASS [] FAIL     Smoking [] YES [x] NO      Afib [] YES [x] NO     Stroke Education [] YES [] NO- ordered and pending    Obtain screening lower extremity venous ultrasound in patients who meet 1 or more of the following criteria as patient is high risk for DVT/PE on admission:   [] History of DVT/PE  []Hypercoagulable states (Factor V Leiden, Cancer, OCP, etc. )  []Prolonged immobility (hemiplegia/hemiparesis/post operative or any other extended immobilization)  [] Transferred from outside facility (Rehab or Long term care)  [] Age </= to 50 ASSESSMENT: Patient is a 63 year old male with PMHx Hypertension, DM on oral medications who presented c/o stroke-like symptoms. Per son pt was walking outside around 10:15am on 8/27/23 when he suddenly fell. His son helped him up and noted that he was weak on the right side and not acting normally which prompted him to come to the ED. On arrival patient came in with right sided arm weakness, global aphasia, and dysarthria, right sided facial droop. Initial head CT demonstrated wedge like low density in the left putamen and caudate (corpus striatum) consistent with infarct that may be acute and no acute intracranial hemorrhage. Tenecteplase was administered at 12:15 pm. CTA head demonstrated occlusion of left MCA distal M1 segment and CTP showed large mismatch. found to have LM1 occlusion. Patient taken for mechanical thrombectomy where he was found to have LM1 occlusion with TICI 2C recanalization. Due to worsening of neurological exam and increase in midline shift found on CT head on 8/29/23(increased in left to right shift of around 9.5mm (previously was 5.2mm)) patient was taken to the OR on 8/30/23 for decompressive hemicraniectomy.   Etiology: embolic stroke of undetermined source     NEURO:   - Patient was in the OR for decompressive hemicraniectomy, therefore unable to asses and evaluate. Will round on patient during AM rounds tomorrow.   -Continue close monitoring for neurologic deterioration    - Stroke neuro checks q 1 as per neuro ICU  - Permissive HTN up to  as per neuro ICU. Avoid hypotension and rapid fluctuations    -ANTITHROMBOTIC THERAPY: On hold at this time due to going into the OR. Defer to neurosx when to restart ASA 81mg daily  -titrate statin to LDL goal less than 70. suggest high intensity statin when medically optimized and no medical contraindications in setting of acute stroke and LDL of 80.  -Obtain MRI Brain w/o  -Dysphagia screen: Fail. NGT will be placed.  -Physical therapy/OT/Speech eval/treatment.     -CARDIOVASCULAR: TTE pending, cardiac monitoring w/ telemetry for now, further evaluation pending findings of noted workup                              -HEMATOLOGY: H/H 11.8/34.9. Monitor for signs and symptoms of further anemia. Platelets 179. patient should have all age and risk appropriate malignancy screenings with PCP or sooner if clinically suspected      DVT ppx: Heparin s.c [] LMWH [] SCD for now. Pharmacological dvt ppx is on hold at this time due to being in the OR. Defer to neurosx when to restart.    PULMONARY: CXR clear.  Protecting airway, saturating well     RENAL: BUN/Cr 13.3/0.64. monitor urine output, maintain adequate hydration. Noted fullness on 8/28/23 physical exam around right thigh. CT abdomen pelvis ruled out bladder injury.      Na Goal:  145-155. Avoid hyponatremia and rapid fluctuations. Currently on 3% NaCl     ID: afebrile, leukocytosis 14.46. monitor for si/sx of infection. Zosyn regimen stopped per primary team.    OTHER:  A1C is 6.8%. Suggest endocrine consult. Condition and plan of care d/w patient, questions and concerns addressed.     DISPOSITION: Rehab or home depending on PT eval once stable and workup is complete      CORE MEASURES:        Admission NIHSS: 23     Tenecteplase : [x] YES [] NO      LDL/HDL/A1C: 80/39/6.8     Depression Screen- if depression hx and/or present      Statin Therapy: as noted     Dysphagia Screen: [] PASS [] FAIL     Smoking [] YES [x] NO      Afib [] YES [x] NO     Stroke Education [] YES [] NO- ordered and pending    Obtain screening lower extremity venous ultrasound in patients who meet 1 or more of the following criteria as patient is high risk for DVT/PE on admission:   [] History of DVT/PE  []Hypercoagulable states (Factor V Leiden, Cancer, OCP, etc. )  []Prolonged immobility (hemiplegia/hemiparesis/post operative or any other extended immobilization)  [] Transferred from outside facility (Rehab or Long term care)  [] Age </= to 50

## 2023-08-30 NOTE — PROVIDER CONTACT NOTE (CHANGE IN STATUS NOTIFICATION) - ASSESSMENT
Pt unarousable to deep stimulation. Pupilometer used to assess: pupils 2mm & brisk   Pt restless moving left upper & left lower extremities spontaneously, diaphoretic, tachypenic, not opening his eyes to command, GCS 8, snoring loudly with multiple periods of apnea & a swollen/large tongue

## 2023-08-30 NOTE — PROGRESS NOTE ADULT - ASSESSMENT
63M w/ PMHX HTN, DM who presented with acute onset R sided weakness and aphasia, found to have LM1 occlusion, s/p TNK @ 1215. Taken for mechanical thrombectomy 8/27, TICI 2c after 3 passes.  s/p L craniectomy POD#0      Plan  - Q1 neuro checks  - Pain control PRN; avoid oversedation  - Keppra 1g BID  - SBP<160  - NPO  - Strict ins/outs s/p mannitol  - Record BMs; MIralax/Senna  - Epidural SHUN x 1 to full suction  - Ancef while drain in  - Sodium goal 145, continue 3%  - b/l SCDs; hold AC/AP/chemical DVT prophylaxis   - CTH 4 hours   - Post op labs  - PT/OT  - No left side of skull, avoid prolong pressure on Left side, careful w/ positioning/changing/turning   - Intubated  - Neurology/NeuroIR following  - Medical management/supportive care per NSICU  - D/w Dr. Bah  63M w/ PMHX HTN, DM who presented with acute onset R sided weakness and aphasia, found to have LM1 occlusion, s/p TNK @ 1215. Taken for mechanical thrombectomy 8/27, TICI 2c after 3 passes.  s/p L craniectomy POD#0      Plan  - Q1 neuro checks  - Pain control PRN; avoid oversedation  - Keppra 1g BID  - SBP<160  - NPO  - Strict ins/outs s/p mannitol  - Record BMs; Miralax/Senna  - Epidural SHUN x 1 to full suction  - Ancef while drain in  - Sodium goal 145, continue 3%  - b/l SCDs; hold AC/AP/chemical DVT prophylaxis   - CTH 4 hours   - Post op labs  - PT/OT  - No left side of skull, avoid prolong pressure on Left side, careful w/ positioning/changing/turning   - Intubated  - Neurology/NeuroIR following  - Medical management/supportive care per NSICU  - D/w Dr. Bah

## 2023-08-30 NOTE — PROGRESS NOTE ADULT - ASSESSMENT
Assessment: Patient is a 63 year old male with LM1 occlusion s/p IV tenecteplase @12:10 s/p cerebral angiogram for LT M1 mechanical thrombectomy.   sleep apnea?    Neuro:   - Q2hr neurochecks  - Stroke neurology following, appreciate recs  - MRI/A head/neck when able  - worsening lethargy overnight- to OR for DHC today  - PT/OT     CV:  SBP   TTE   consult cardiology  statin and ASA    Resp:  room air  wean NC as tolerated  SpO2>92%    GI:  failed sp/sw due to lethargy  place NGT to start TF  Then order bowel regimen    ID:    afebrile  If fever -> send BCx, lactate, procal, CXR    Renal/:  longo for OR  Monitor UOP  IV fluids while NPO  Na goal 140-150  cardura    Endocrine:  HgbA1c 6.8    FS q6h, ISS  ISS    Heme:  start lovenox  SCDs                        Assessment: Patient is a 63 year old male with LM1 occlusion s/p IV tenecteplase @12:10 s/p cerebral angiogram for LT M1 mechanical thrombectomy.   sleep apnea?    Neuro:   - Q1hr neurochecks  - Stroke neurology following, appreciate recs  - MRI/A head/neck when able  - worsening lethargy overnight- to OR for DHC today    CV:  SBP   TTE- 55-60%  consult cardiology  statin     Resp:  room air  SpO2>92%    GI:  failed sp/sw due to lethargy  placed NGT, NPO for procedures  bowel regimen  LBM PTA    ID:    afebrile  leukocytosis, monitor    Renal/:  longo for OR  Monitor UOP  IV fluids while NPO  Na goal 140-150  cardura    Endocrine:  HgbA1c 6.8    FS q6h, ISS    Heme:  hold lovenox due to OR  SCDs                        Assessment: Patient is a 63 year old male with LM1 occlusion s/p IV tenecteplase @12:10 s/p cerebral angiogram for LT M1 mechanical thrombectomy.   sleep apnea?    Neuro:   - Q1hr neurochecks  - Stroke neurology following, appreciate recs  - MRI/A head/neck when able  - worsening lethargy overnight- to OR for DHC today    CV:  SBP   TTE- 55-60%  consult cardiology  statin     Resp:  room air  SpO2>92%    GI:  failed sp/sw due to lethargy  placed NGT, NPO for procedures  bowel regimen  LBM PTA    ID:    afebrile  leukocytosis, monitor    Renal/:  longo for OR  Monitor UOP  IV fluids while NPO  Na goal 140-150, BMP q8h  cardura    Endocrine:  HgbA1c 6.8    FS q6h, ISS    Heme:  hold lovenox due to OR  SCDs

## 2023-08-30 NOTE — CHART NOTE - NSCHARTNOTEFT_GEN_A_CORE
Called to bedside to start CPAP on patient for potential obstructive sleep apnea. Pt noted to be minimally responsive. Concerned expressed that CPAP would not be a safe option due to patient mental status. Decision made to not put patient on CPAP at this time. ROMEO Haji updated, will cont to monitor.

## 2023-08-30 NOTE — CHART NOTE - NSCHARTNOTEFT_GEN_A_CORE
Called by NSICU due to mildly worsened neurologic exam with worse midline shift on CTH  left to right shift ~ 9.5 mm compared to ~ 5.2 mm compared to last CTH    exam:  GCS E-2-3 V-1 M-6= 9-10  mildly labored breathing  OE to voice/noxious. nonverbal. follows simple commands on left with repetitive prompting  left gaze preference with right sided neglect. PERRL. speech unable to assess  RUE extensor posturing RLE triple flexing. LUE follows commands shows 2 fingers and at least 4/5, LLE withdraws to noxious    plan:  d/w Dr. Bah  no surgical intervention at this time  continue to push Na, recommend goal 145-155, on 2% @75, repeat BMP this AM pending  airway watch per NSICU  will continue to follow, if exam worsens repeat CTH and will consider discussion of surgical intervention for craniectomy for decompression  d/w NSICU

## 2023-08-30 NOTE — CHART NOTE - NSCHARTNOTEFT_GEN_A_CORE
Neuro ICU Event Note:     Called by RN for increased lethargy and snoring. Patient has been lethargic all day but progressively getting worse. During straight cath x1 patient without eye opening. ABG order and patient seen at bedside. Patient had gotten stability scan which was reviewed with NeuroSx PA and then discussed with Dr. Bah. Other than lethargy patient neuro exam unchanged.     Call Dr. Pfeiffer to discuss possible intubation. Given O2 saturation 96-98% on RA and no resp distress. ABG with normal pH and no retention. Will hold off on intubation.   Will continue to push Na to goal of 140-150. Change IV fluids to 3% NaCl 50ml/hr. GCS [id number]= 10 therefore will hold off on emergent surgery but remains close hemicrani watch.

## 2023-08-30 NOTE — PROGRESS NOTE ADULT - SUBJECTIVE AND OBJECTIVE BOX
Preliminary note, offical recommendations pending attending review/signature   [facility] Stroke Team  Progress Note     HPI:  Patient is a 63 year old male with PMHx Hypertension, DM on oral medications who presented c/o stroke-like symptoms. Per son pt was walking outside around 10:15am on 8/27/23 when he suddenly fell. His son helped him up and noted that he was weak on the right side and not acting normally which prompted him to come to the ED. On arrival pt w/ obvious right sided arm weakness, confusion, dysarthria, facial droop suggestive of acute stroke. Code stroke initiated, found to have LM1 occlusion, given TNK @ 1215 and was taken for thrombectomy. Unable to provide ROS 2/2 aphasia.    SUBJECTIVE: Overnight noted to have worsening neurological exam with increased lethargy and increased snoring. No new neurologic complaints.  ROS reported negative unless otherwise noted.    atorvastatin 80 milliGRAM(s) Oral at bedtime  chlorhexidine 2% Cloths 1 Application(s) Topical <User Schedule>  dextrose 5%. 1000 milliLiter(s) IV Continuous <Continuous>  dextrose 5%. 1000 milliLiter(s) IV Continuous <Continuous>  dextrose 50% Injectable 25 Gram(s) IV Push once  dextrose 50% Injectable 12.5 Gram(s) IV Push once  dextrose 50% Injectable 25 Gram(s) IV Push once  dextrose Oral Gel 15 Gram(s) Oral once PRN  doxazosin 4 milliGRAM(s) Oral at bedtime  glucagon  Injectable 1 milliGRAM(s) IntraMuscular once  hydrALAZINE Injectable 10 milliGRAM(s) IV Push every 2 hours PRN  insulin lispro (ADMELOG) corrective regimen sliding scale   SubCutaneous every 6 hours  labetalol Injectable 10 milliGRAM(s) IV Push every 2 hours PRN  sodium chloride 3%. 500 milliLiter(s) IV Continuous <Continuous>  sodium chloride 3%. 500 milliLiter(s) IV Continuous <Continuous>      PHYSICAL EXAM:   Vital Signs Last 24 Hrs  T(C): 37.4 (30 Aug 2023 05:00), Max: 37.4 (30 Aug 2023 05:00)  T(F): 99.3 (30 Aug 2023 05:00), Max: 99.3 (30 Aug 2023 05:00)  HR: 97 (30 Aug 2023 05:00) (64 - 106)  BP: 133/68 (30 Aug 2023 05:00) (101/84 - 148/78)  BP(mean): 88 (30 Aug 2023 05:00) (80 - 125)  RR: 29 (30 Aug 2023 05:00) (0 - 29)  SpO2: 96% (30 Aug 2023 05:00) (94% - 100%)    Parameters below as of 30 Aug 2023 04:00  Patient On (Oxygen Delivery Method): room air    PENDING  General: NAD.     Detailed Neurologic Exam:    Mental status: The patient is awake but not oriented to place, self, month, or year. Minimal verbal output and just generates sounds.. Unable to repeat. Follows some simple midline commands (sticks out tongue and touches nose), however unable to follow more complex commands. Appears to be mimicking examiner as well.     Cranial nerves: Pupils equal and react symmetrically to light. Right gaze palsy that does not cross midline. No blink to threat on right side. Left eye ptosis. Right sided facial asymmetry     Motor:   LUE/LLE- spontaneously moves antigravity. Squeezes hand but not on command.   RUE- extends and internally rotates to pain, but does not spontaneously move  RLE- triple flexion    Sensation: Intact to light touch on LUE and LLE. Extends to noxious stimuli on RUE. Triple flexion RLE    Cerebellar: Unable to assess, but appears to be no gross ataxia  LABS:                        11.8   14.46 )-----------( 179      ( 30 Aug 2023 03:02 )             34.9    08-30    141  |  106  |  13.3  ----------------------------<  157<H>  3.5   |  20.0<L>  |  0.64    Ca    8.0<L>      30 Aug 2023 03:02  Phos  1.8     08-30  Mg     2.0     08-30    RADIOLOGY & ADDITIONAL STUDIES (independently reviewed unless otherwise noted):   CT Head No Cont (08.29.23 @ 00:31)   IMPRESSION:  Continued evolution of large acute left MCA territory infarct.   Left-to-right midline shift measuring 6 mm, increased. No hydrocephalus   or effacement of basal cisterns. Curvilinear foci of hyperdensity within   the area of infarct, for which petechial hemorrhage cannot be excluded.   No large parenchymal hemorrhage.    CT Abdomen and Pelvis w/ IV Cont (08.28.23 @ 02:53)   IMPRESSION:  No evidence for bladder rupture.    CT Brain Stroke Protocol (08.27.23 @ 11:53)   IMPRESSION:  Wedgelike low density in the left putamen and caudate (corpus striatum)   is new from the prior scan, and consistent with infarct that may be acute   given corresponding symptoms.  No acute intracranial hemorrhage.    The study was performed at 11:47 AM on 08/27/2023 and the above findings   were discussed with Dr. Betts at 12:02 PM.    CT Angio Brain Stroke Protocol  w/ IV Cont (08.27.23 @ 12:03)   IMPRESSION:  CTA head: Focal occlusion of left MCA distal M1 segment with patent but   small caliber filling of M2 segments.    CT perfusion indicates large mismatch between Tmax and CBF consistent   with ischemic penumbra.    CTA Neck: No steno-occlusive focus.    Case findings above discussed with Dr. Hu at 12:13 PM on 08/27/2023.     CT Head No Cont (08.28.23 @ 02:34)   IMPRESSION: Acute left MCA infarct. Possible acute infarct involving the   right temporal region. This finding can be better evaluated with MRI if   there are no contraindications.           Preliminary note, offical recommendations pending attending review/signature   [facility] Stroke Team  Progress Note     HPI:  Patient is a 63 year old male with PMHx Hypertension, DM on oral medications who presented c/o stroke-like symptoms. Per son pt was walking outside around 10:15am on 8/27/23 when he suddenly fell. His son helped him up and noted that he was weak on the right side and not acting normally which prompted him to come to the ED. On arrival pt w/ obvious right sided arm weakness, confusion, dysarthria, facial droop suggestive of acute stroke. Code stroke initiated, found to have LM1 occlusion, given TNK @ 1215 and was taken for thrombectomy. Unable to provide ROS 2/2 aphasia.    SUBJECTIVE: Overnight noted to have worsening neurological exam with increased lethargy and increased snoring. Taken to OR today for decompressive blaine craniectomy. No new neurologic complaints.  ROS reported negative unless otherwise noted.    atorvastatin 80 milliGRAM(s) Oral at bedtime  chlorhexidine 2% Cloths 1 Application(s) Topical <User Schedule>  dextrose 5%. 1000 milliLiter(s) IV Continuous <Continuous>  dextrose 5%. 1000 milliLiter(s) IV Continuous <Continuous>  dextrose 50% Injectable 25 Gram(s) IV Push once  dextrose 50% Injectable 12.5 Gram(s) IV Push once  dextrose 50% Injectable 25 Gram(s) IV Push once  dextrose Oral Gel 15 Gram(s) Oral once PRN  doxazosin 4 milliGRAM(s) Oral at bedtime  glucagon  Injectable 1 milliGRAM(s) IntraMuscular once  hydrALAZINE Injectable 10 milliGRAM(s) IV Push every 2 hours PRN  insulin lispro (ADMELOG) corrective regimen sliding scale   SubCutaneous every 6 hours  labetalol Injectable 10 milliGRAM(s) IV Push every 2 hours PRN  sodium chloride 3%. 500 milliLiter(s) IV Continuous <Continuous>  sodium chloride 3%. 500 milliLiter(s) IV Continuous <Continuous>      PHYSICAL EXAM:   Vital Signs Last 24 Hrs  T(C): 37.4 (30 Aug 2023 05:00), Max: 37.4 (30 Aug 2023 05:00)  T(F): 99.3 (30 Aug 2023 05:00), Max: 99.3 (30 Aug 2023 05:00)  HR: 97 (30 Aug 2023 05:00) (64 - 106)  BP: 133/68 (30 Aug 2023 05:00) (101/84 - 148/78)  BP(mean): 88 (30 Aug 2023 05:00) (80 - 125)  RR: 29 (30 Aug 2023 05:00) (0 - 29)  SpO2: 96% (30 Aug 2023 05:00) (94% - 100%)    Parameters below as of 30 Aug 2023 04:00  Patient On (Oxygen Delivery Method): room air    PENDING  General: NAD.     Detailed Neurologic Exam:    Mental status: The patient is awake but not oriented to place, self, month, or year. Minimal verbal output and just generates sounds.. Unable to repeat. Follows some simple midline commands (sticks out tongue and touches nose), however unable to follow more complex commands. Appears to be mimicking examiner as well.     Cranial nerves: Pupils equal and react symmetrically to light. Right gaze palsy that does not cross midline. No blink to threat on right side. Left eye ptosis. Right sided facial asymmetry     Motor:   LUE/LLE- spontaneously moves antigravity. Squeezes hand but not on command.   RUE- extends and internally rotates to pain, but does not spontaneously move  RLE- triple flexion    Sensation: Intact to light touch on LUE and LLE. Extends to noxious stimuli on RUE. Triple flexion RLE    Cerebellar: Unable to assess, but appears to be no gross ataxia  LABS:                        11.8   14.46 )-----------( 179      ( 30 Aug 2023 03:02 )             34.9    08-30    141  |  106  |  13.3  ----------------------------<  157<H>  3.5   |  20.0<L>  |  0.64    Ca    8.0<L>      30 Aug 2023 03:02  Phos  1.8     08-30  Mg     2.0     08-30    RADIOLOGY & ADDITIONAL STUDIES (independently reviewed unless otherwise noted):   CT Head No Cont (08.29.23 @ 00:31)   IMPRESSION:  Continued evolution of large acute left MCA territory infarct.   Left-to-right midline shift measuring 6 mm, increased. No hydrocephalus   or effacement of basal cisterns. Curvilinear foci of hyperdensity within   the area of infarct, for which petechial hemorrhage cannot be excluded.   No large parenchymal hemorrhage.    CT Abdomen and Pelvis w/ IV Cont (08.28.23 @ 02:53)   IMPRESSION:  No evidence for bladder rupture.    CT Brain Stroke Protocol (08.27.23 @ 11:53)   IMPRESSION:  Wedgelike low density in the left putamen and caudate (corpus striatum)   is new from the prior scan, and consistent with infarct that may be acute   given corresponding symptoms.  No acute intracranial hemorrhage.    The study was performed at 11:47 AM on 08/27/2023 and the above findings   were discussed with Dr. Betts at 12:02 PM.    CT Angio Brain Stroke Protocol  w/ IV Cont (08.27.23 @ 12:03)   IMPRESSION:  CTA head: Focal occlusion of left MCA distal M1 segment with patent but   small caliber filling of M2 segments.    CT perfusion indicates large mismatch between Tmax and CBF consistent   with ischemic penumbra.    CTA Neck: No steno-occlusive focus.    Case findings above discussed with Dr. Hu at 12:13 PM on 08/27/2023.     CT Head No Cont (08.28.23 @ 02:34)   IMPRESSION: Acute left MCA infarct. Possible acute infarct involving the   right temporal region. This finding can be better evaluated with MRI if   there are no contraindications.           Preliminary note, offical recommendations pending attending review/signature   [facility] Stroke Team  Progress Note     HPI:  Patient is a 63 year old male with PMHx Hypertension, DM on oral medications who presented c/o stroke-like symptoms. Per son pt was walking outside around 10:15am on 8/27/23 when he suddenly fell. His son helped him up and noted that he was weak on the right side and not acting normally which prompted him to come to the ED. On arrival pt w/ obvious right sided arm weakness, confusion, dysarthria, facial droop suggestive of acute stroke. Code stroke initiated, found to have LM1 occlusion, given TNK @ 1215 and was taken for thrombectomy. Unable to provide ROS 2/2 aphasia.    SUBJECTIVE: Overnight noted to have worsening neurological exam with increased lethargy and increased snoring. Taken to OR today for decompressive blaine craniectomy.     atorvastatin 80 milliGRAM(s) Oral at bedtime  chlorhexidine 2% Cloths 1 Application(s) Topical <User Schedule>  dextrose 5%. 1000 milliLiter(s) IV Continuous <Continuous>  dextrose 5%. 1000 milliLiter(s) IV Continuous <Continuous>  dextrose 50% Injectable 25 Gram(s) IV Push once  dextrose 50% Injectable 12.5 Gram(s) IV Push once  dextrose 50% Injectable 25 Gram(s) IV Push once  dextrose Oral Gel 15 Gram(s) Oral once PRN  doxazosin 4 milliGRAM(s) Oral at bedtime  glucagon  Injectable 1 milliGRAM(s) IntraMuscular once  hydrALAZINE Injectable 10 milliGRAM(s) IV Push every 2 hours PRN  insulin lispro (ADMELOG) corrective regimen sliding scale   SubCutaneous every 6 hours  labetalol Injectable 10 milliGRAM(s) IV Push every 2 hours PRN  sodium chloride 3%. 500 milliLiter(s) IV Continuous <Continuous>  sodium chloride 3%. 500 milliLiter(s) IV Continuous <Continuous>      PHYSICAL EXAM:   Vital Signs Last 24 Hrs  T(C): 37.4 (30 Aug 2023 05:00), Max: 37.4 (30 Aug 2023 05:00)  T(F): 99.3 (30 Aug 2023 05:00), Max: 99.3 (30 Aug 2023 05:00)  HR: 97 (30 Aug 2023 05:00) (64 - 106)  BP: 133/68 (30 Aug 2023 05:00) (101/84 - 148/78)  BP(mean): 88 (30 Aug 2023 05:00) (80 - 125)  RR: 29 (30 Aug 2023 05:00) (0 - 29)  SpO2: 96% (30 Aug 2023 05:00) (94% - 100%)    Parameters below as of 30 Aug 2023 04:00  Patient On (Oxygen Delivery Method): room air    PENDING  General: NAD.     Detailed Neurologic Exam:    Mental status: The patient is awake but not oriented to place, self, month, or year. Minimal verbal output and just generates sounds.. Unable to repeat. Follows some simple midline commands (sticks out tongue and touches nose), however unable to follow more complex commands. Appears to be mimicking examiner as well.     Cranial nerves: Pupils equal and react symmetrically to light. Right gaze palsy that does not cross midline. No blink to threat on right side. Left eye ptosis. Right sided facial asymmetry     Motor:   LUE/LLE- spontaneously moves antigravity. Squeezes hand but not on command.   RUE- extends and internally rotates to pain, but does not spontaneously move  RLE- triple flexion    Sensation: Intact to light touch on LUE and LLE. Extends to noxious stimuli on RUE. Triple flexion RLE    Cerebellar: Unable to assess, but appears to be no gross ataxia  LABS:                        11.8   14.46 )-----------( 179      ( 30 Aug 2023 03:02 )             34.9    08-30    141  |  106  |  13.3  ----------------------------<  157<H>  3.5   |  20.0<L>  |  0.64    Ca    8.0<L>      30 Aug 2023 03:02  Phos  1.8     08-30  Mg     2.0     08-30    RADIOLOGY & ADDITIONAL STUDIES (independently reviewed unless otherwise noted):  CT Head No Cont (08.29.23 @ 23:39)   IMPRESSION: Evolving acute left MCA infarct is identified.    CT Head No Cont (08.29.23 @ 00:31)   IMPRESSION:  Continued evolution of large acute left MCA territory infarct.   Left-to-right midline shift measuring 6 mm, increased. No hydrocephalus   or effacement of basal cisterns. Curvilinear foci of hyperdensity within   the area of infarct, for which petechial hemorrhage cannot be excluded.   No large parenchymal hemorrhage.    CT Abdomen and Pelvis w/ IV Cont (08.28.23 @ 02:53)   IMPRESSION:  No evidence for bladder rupture.    CT Brain Stroke Protocol (08.27.23 @ 11:53)   IMPRESSION:  Wedgelike low density in the left putamen and caudate (corpus striatum)   is new from the prior scan, and consistent with infarct that may be acute   given corresponding symptoms.  No acute intracranial hemorrhage.    The study was performed at 11:47 AM on 08/27/2023 and the above findings   were discussed with Dr. Betts at 12:02 PM.    CT Angio Brain Stroke Protocol  w/ IV Cont (08.27.23 @ 12:03)   IMPRESSION:  CTA head: Focal occlusion of left MCA distal M1 segment with patent but   small caliber filling of M2 segments.    CT perfusion indicates large mismatch between Tmax and CBF consistent   with ischemic penumbra.    CTA Neck: No steno-occlusive focus.    Case findings above discussed with Dr. uH at 12:13 PM on 08/27/2023.     CT Head No Cont (08.28.23 @ 02:34)   IMPRESSION: Acute left MCA infarct. Possible acute infarct involving the   right temporal region. This finding can be better evaluated with MRI if   there are no contraindications.           Preliminary note, offical recommendations pending attending review/signature   [facility] Stroke Team  Progress Note     HPI:  Patient is a 63 year old male with PMHx Hypertension, DM on oral medications who presented c/o stroke-like symptoms. Per son pt was walking outside around 10:15am on 8/27/23 when he suddenly fell. His son helped him up and noted that he was weak on the right side and not acting normally which prompted him to come to the ED. On arrival pt w/ obvious right sided arm weakness, confusion, dysarthria, facial droop suggestive of acute stroke. Code stroke initiated, found to have LM1 occlusion, given TNK @ 1215 and was taken for thrombectomy. Unable to provide ROS 2/2 aphasia.    SUBJECTIVE: Overnight noted to have worsening neurological exam with increased lethargy and increased snoring. Taken to OR today for decompressive blaine craniectomy.     atorvastatin 80 milliGRAM(s) Oral at bedtime  chlorhexidine 2% Cloths 1 Application(s) Topical <User Schedule>  dextrose 5%. 1000 milliLiter(s) IV Continuous <Continuous>  dextrose 5%. 1000 milliLiter(s) IV Continuous <Continuous>  dextrose 50% Injectable 25 Gram(s) IV Push once  dextrose 50% Injectable 12.5 Gram(s) IV Push once  dextrose 50% Injectable 25 Gram(s) IV Push once  dextrose Oral Gel 15 Gram(s) Oral once PRN  doxazosin 4 milliGRAM(s) Oral at bedtime  glucagon  Injectable 1 milliGRAM(s) IntraMuscular once  hydrALAZINE Injectable 10 milliGRAM(s) IV Push every 2 hours PRN  insulin lispro (ADMELOG) corrective regimen sliding scale   SubCutaneous every 6 hours  labetalol Injectable 10 milliGRAM(s) IV Push every 2 hours PRN  sodium chloride 3%. 500 milliLiter(s) IV Continuous <Continuous>  sodium chloride 3%. 500 milliLiter(s) IV Continuous <Continuous>      PHYSICAL EXAM:   Vital Signs Last 24 Hrs  T(C): 37.4 (30 Aug 2023 05:00), Max: 37.4 (30 Aug 2023 05:00)  T(F): 99.3 (30 Aug 2023 05:00), Max: 99.3 (30 Aug 2023 05:00)  HR: 97 (30 Aug 2023 05:00) (64 - 106)  BP: 133/68 (30 Aug 2023 05:00) (101/84 - 148/78)  BP(mean): 88 (30 Aug 2023 05:00) (80 - 125)  RR: 29 (30 Aug 2023 05:00) (0 - 29)  SpO2: 96% (30 Aug 2023 05:00) (94% - 100%)    Parameters below as of 30 Aug 2023 04:00  Patient On (Oxygen Delivery Method): room air    PENDING  General: NAD.     Detailed Neurologic Exam:    Mental status: The patient is awake but not oriented to place, self, month, or year. Minimal verbal output and just generates sounds.. Unable to repeat. Follows some simple midline commands (sticks out tongue and touches nose), however unable to follow more complex commands. Appears to be mimicking examiner as well.     Cranial nerves: Pupils equal and react symmetrically to light. Right gaze palsy that does not cross midline. No blink to threat on right side. Left eye ptosis. Right sided facial asymmetry     Motor:   LUE/LLE- spontaneously moves antigravity. Squeezes hand but not on command.   RUE- extends and internally rotates to pain, but does not spontaneously move  RLE- triple flexion    Sensation: Intact to light touch on LUE and LLE. Extends to noxious stimuli on RUE. Triple flexion RLE    Cerebellar: Unable to assess, but appears to be no gross ataxia  LABS:                        11.8   14.46 )-----------( 179      ( 30 Aug 2023 03:02 )             34.9    08-30    141  |  106  |  13.3  ----------------------------<  157<H>  3.5   |  20.0<L>  |  0.64    Ca    8.0<L>      30 Aug 2023 03:02  Phos  1.8     08-30  Mg     2.0     08-30    RADIOLOGY & ADDITIONAL STUDIES (independently reviewed unless otherwise noted):  CT Head No Cont (08.29.23 @ 23:39)   IMPRESSION: Evolving acute left MCA infarct is identified.   Mass effect on the left lateral ventricle is   again seen. Slight increased left-to-right shift (1.2 cm) is seen.      CT Head No Cont (08.29.23 @ 00:31)   IMPRESSION:  Continued evolution of large acute left MCA territory infarct.   Left-to-right midline shift measuring 6 mm, increased. No hydrocephalus   or effacement of basal cisterns. Curvilinear foci of hyperdensity within   the area of infarct, for which petechial hemorrhage cannot be excluded.   No large parenchymal hemorrhage.    CT Abdomen and Pelvis w/ IV Cont (08.28.23 @ 02:53)   IMPRESSION:  No evidence for bladder rupture.    CT Brain Stroke Protocol (08.27.23 @ 11:53)   IMPRESSION:  Wedgelike low density in the left putamen and caudate (corpus striatum)   is new from the prior scan, and consistent with infarct that may be acute   given corresponding symptoms.  No acute intracranial hemorrhage.    The study was performed at 11:47 AM on 08/27/2023 and the above findings   were discussed with Dr. Betts at 12:02 PM.    CT Angio Brain Stroke Protocol  w/ IV Cont (08.27.23 @ 12:03)   IMPRESSION:  CTA head: Focal occlusion of left MCA distal M1 segment with patent but   small caliber filling of M2 segments.    CT perfusion indicates large mismatch between Tmax and CBF consistent   with ischemic penumbra.    CTA Neck: No steno-occlusive focus.    Case findings above discussed with Dr. Hu at 12:13 PM on 08/27/2023.     CT Head No Cont (08.28.23 @ 02:34)   IMPRESSION: Acute left MCA infarct. Possible acute infarct involving the   right temporal region. This finding can be better evaluated with MRI if   there are no contraindications.           Preliminary note, offical recommendations pending attending review/signature   [facility] Stroke Team  Progress Note     HPI:  Patient is a 63 year old male with PMHx Hypertension, DM on oral medications who presented c/o stroke-like symptoms. Per son pt was walking outside around 10:15am on 8/27/23 when he suddenly fell. His son helped him up and noted that he was weak on the right side and not acting normally which prompted him to come to the ED. On arrival pt w/ obvious right sided arm weakness, confusion, dysarthria, facial droop suggestive of acute stroke. Code stroke initiated, found to have LM1 occlusion, given TNK @ 1215 and was taken for thrombectomy. Unable to provide ROS 2/2 aphasia.    SUBJECTIVE: Overnight noted to have worsening neurological exam with increased lethargy and increased snoring. Taken to OR today for decompressive blaine craniectomy.     atorvastatin 80 milliGRAM(s) Oral at bedtime  chlorhexidine 2% Cloths 1 Application(s) Topical <User Schedule>  dextrose 5%. 1000 milliLiter(s) IV Continuous <Continuous>  dextrose 5%. 1000 milliLiter(s) IV Continuous <Continuous>  dextrose 50% Injectable 25 Gram(s) IV Push once  dextrose 50% Injectable 12.5 Gram(s) IV Push once  dextrose 50% Injectable 25 Gram(s) IV Push once  dextrose Oral Gel 15 Gram(s) Oral once PRN  doxazosin 4 milliGRAM(s) Oral at bedtime  glucagon  Injectable 1 milliGRAM(s) IntraMuscular once  hydrALAZINE Injectable 10 milliGRAM(s) IV Push every 2 hours PRN  insulin lispro (ADMELOG) corrective regimen sliding scale   SubCutaneous every 6 hours  labetalol Injectable 10 milliGRAM(s) IV Push every 2 hours PRN  sodium chloride 3%. 500 milliLiter(s) IV Continuous <Continuous>  sodium chloride 3%. 500 milliLiter(s) IV Continuous <Continuous>      PHYSICAL EXAM:   Vital Signs Last 24 Hrs  T(C): 37.4 (30 Aug 2023 05:00), Max: 37.4 (30 Aug 2023 05:00)  T(F): 99.3 (30 Aug 2023 05:00), Max: 99.3 (30 Aug 2023 05:00)  HR: 97 (30 Aug 2023 05:00) (64 - 106)  BP: 133/68 (30 Aug 2023 05:00) (101/84 - 148/78)  BP(mean): 88 (30 Aug 2023 05:00) (80 - 125)  RR: 29 (30 Aug 2023 05:00) (0 - 29)  SpO2: 96% (30 Aug 2023 05:00) (94% - 100%)    Parameters below as of 30 Aug 2023 04:00  Patient On (Oxygen Delivery Method): room air    Physical Exam- unable to perform due to patient being in the OR for decompressive hemicraniectomy    Cerebellar: Unable to assess, but appears to be no gross ataxia  LABS:                        11.8   14.46 )-----------( 179      ( 30 Aug 2023 03:02 )             34.9    08-30    141  |  106  |  13.3  ----------------------------<  157<H>  3.5   |  20.0<L>  |  0.64    Ca    8.0<L>      30 Aug 2023 03:02  Phos  1.8     08-30  Mg     2.0     08-30    RADIOLOGY & ADDITIONAL STUDIES (independently reviewed unless otherwise noted):      CT Head No Cont (08.29.23 @ 23:39)   IMPRESSION: Evolving acute left MCA infarct is identified.   Mass effect on the left lateral ventricle is   again seen. Slight increased left-to-right shift (1.2 cm) is seen.      CT Head No Cont (08.29.23 @ 00:31)   IMPRESSION:  Continued evolution of large acute left MCA territory infarct.   Left-to-right midline shift measuring 6 mm, increased. No hydrocephalus   or effacement of basal cisterns. Curvilinear foci of hyperdensity within   the area of infarct, for which petechial hemorrhage cannot be excluded.   No large parenchymal hemorrhage.    CT Abdomen and Pelvis w/ IV Cont (08.28.23 @ 02:53)   IMPRESSION:  No evidence for bladder rupture.    CT Brain Stroke Protocol (08.27.23 @ 11:53)   IMPRESSION:  Wedgelike low density in the left putamen and caudate (corpus striatum)   is new from the prior scan, and consistent with infarct that may be acute   given corresponding symptoms.  No acute intracranial hemorrhage.    The study was performed at 11:47 AM on 08/27/2023 and the above findings   were discussed with Dr. Betts at 12:02 PM.    CT Angio Brain Stroke Protocol  w/ IV Cont (08.27.23 @ 12:03)   IMPRESSION:  CTA head: Focal occlusion of left MCA distal M1 segment with patent but   small caliber filling of M2 segments.    CT perfusion indicates large mismatch between Tmax and CBF consistent   with ischemic penumbra.    CTA Neck: No steno-occlusive focus.    Case findings above discussed with Dr. Hu at 12:13 PM on 08/27/2023.     CT Head No Cont (08.28.23 @ 02:34)   IMPRESSION: Acute left MCA infarct. Possible acute infarct involving the   right temporal region. This finding can be better evaluated with MRI if   there are no contraindications.

## 2023-08-30 NOTE — PROGRESS NOTE ADULT - SUBJECTIVE AND OBJECTIVE BOX
POST-OPERATIVE NOTE  Procedure: Left craniectomy  Diagnosis/Indication: L MCA CVA  Surgeon: Dr. Olvin Qureshi    INTERVAL HPI/ACUTE EVENTS:  63M w/ PMHX HTN, DM who presented with acute onset R sided weakness and aphasia, found to have LM1 occlusion, s/p TNK @ 1215. Taken for mechanical thrombectomy 8/27, TICI 2c after 3 passes.  s/p L craniectomy POD#0  Patient tolerated procedure well. S/p mannitol, large amount of swelling noted, dural opening, epidural SHUN x 1 placed w/ bone flap discarded. Remained intubated and transported to NSICU.      VITALS:  T(C): 37.2 (08-30-23 @ 11:40), Max: 37.4 (08-30-23 @ 05:00)  HR: 116 (08-30-23 @ 11:40) (64 - 116)  BP: 114/89 (08-30-23 @ 11:40) (103/69 - 145/113)  RR: 15 (08-30-23 @ 11:40) (11 - 30)  SpO2: 100% (08-30-23 @ 11:40) (94% - 100%)  Wt(kg): --    PHYSICAL EXAM:  GENERAL: NAD, well-groomed, well-developed  HEAD:  S/p L crani. Dressing clean, dry, intact. Dried blood noted, not fully saturated.  DRAINS: Subgaleal/epidural/subdural drain to bulb suction/thumbprint suction/gravity. Serosanguinous drainage noted.  NECK: C-collar in place. Dressing clean, dry, intact  WOUND: Dressing clean dry intact  MARQUES COMA SCORE: E- V- M- =       E: 4= opens eyes spontaneously 3= to voice 2= to noxious 1= no opening       V: 5= oriented 4= confused 3= inappropriate words 2= incomprehensible sounds 1= nonverbal 1T= intubated       M: 6= follows commands 5= localizes 4= withdraws 3= flexor posturing 2= extensor posturing 1= no movement  MENTAL STATUS: AAO x3; Awake/Comatose; Opens eyes spontaneously/to voice/to light touch/to noxious stimuli; Appropriately conversant without aphasia/Nonverbal; following simple commands/mimicking/not following commands  CRANIAL NERVES: Visual acuity normal for age, visual fields full to confrontation, PERRL. EOMI without nystagmus. Facial sensation intact V1-3 distribution b/l. Face symmetric w/ normal eye closure and smile, tongue midline. Hearing grossly intact. Speech clear. Head turning and shoulder shrug intact.   REFLEXES: PERRL. Corneals intact b/l. Gag intact. Cough intact. Oculocephalic reflex intact (Doll's eye). Negative Hernandez's b/l. Negative clonus b/l  MOTOR: strength 5/5 b/l upper and lower extremities  Uppers     Delt (C5/6)     Bicep (C5/6)     Wrist Extend (C6)     Tricep (C7)     HG (C8/T1)  R                     5/5                 5/5                         5/5                           5/5                   5/5  L                      5/5                 5/5                         5/5                           5/5                   5/5  Lowers      HF(L1/L2)     KE (L3)     DF (L4)     EHL (L5)     PF (S1)      R                     5/5              5/5           5/5           5/5            5/5  L                     5/5               5/5          5/5            5/5            5/5  SENSATION: grossly intact to light touch all extremities  COORDINATION: Gait intact; rapid alternating movements intact; heel to shin intact; no upper extremity dysmetria  CHEST/LUNG: Clear to auscultation bilaterally; no rales, rhonchi, wheezing, or rubs  HEART: +S1/+S2; Regular rate and rhythm; no murmurs, rubs, or gallops  ABDOMEN: Soft, nontender, nondistended; bowel sounds present all four quadrants  EXTREMITIES:  2+ peripheral pulses, no clubbing, cyanosis, or edema  SKIN: Warm, dry; no rashes or lesions    LABS:             11.8   14.46 )-----------( 179      ( 30 Aug 2023 03:02 )             34.9     08-30    137  |  104  |  15.6  ----------------------------<  149<H>  3.9   |  23.0  |  0.73    Ca    7.6<L>      30 Aug 2023 08:30  Phos  2.1     08-30  Mg     2.0     08-30      RADIOLOGY/OTHER:  CT Head No Cont (08.29.23 @ 23:39)  IMPRESSION:   Evolving acute left MCA infarct is identified.     POST-OPERATIVE NOTE  Procedure: Left craniectomy  Diagnosis/Indication: L MCA CVA  Surgeon: Dr. Olvin Qureshi    INTERVAL HPI/ACUTE EVENTS:  63M w/ PMHX HTN, DM who presented with acute onset R sided weakness and aphasia, found to have LM1 occlusion, s/p TNK @ 1215. Taken for mechanical thrombectomy 8/27, TICI 2c after 3 passes.  s/p L craniectomy POD#0  Patient tolerated procedure well. S/p mannitol, large amount of swelling noted, dural opening, epidural SHUN x 1 placed w/ bone flap discarded. Remained intubated and transported to NSICU.      VITALS:  T(C): 37.2 (08-30-23 @ 11:40), Max: 37.4 (08-30-23 @ 05:00)  HR: 116 (08-30-23 @ 11:40) (64 - 116)  BP: 114/89 (08-30-23 @ 11:40) (103/69 - 145/113)  RR: 15 (08-30-23 @ 11:40) (11 - 30)  SpO2: 100% (08-30-23 @ 11:40) (94% - 100%)  Wt(kg): --    PHYSICAL EXAM:  GENERAL: NAD, well-groomed, well-developed. limited 2/2 anesthesia weening   HEAD: S/p L crani. Dressing clean, dry, intact. Dried blood noted, not fully saturated.  DRAINS: Epidural SHUN x 1 to full suction. Bloody drainage in bulb  WOUND: Dressing clean dry intact  MENTAL STATUS: AAO x 0. Nonverbal; ?possible following commands w/ LUE  CRANIAL NERVES: PERRL. Left sided gaze preference. Face grossly symmetric  REFLEXES: PERRL  MOTOR/SENSATION: LUE spontaneous. RUE extending. RLE WD vs triple flex. LLE WD.  CHEST/LUNG: Intubated  SKIN: Warm, dry    LABS:             11.8   14.46 )-----------( 179      ( 30 Aug 2023 03:02 )             34.9     08-30    137  |  104  |  15.6  ----------------------------<  149<H>  3.9   |  23.0  |  0.73    Ca    7.6<L>      30 Aug 2023 08:30  Phos  2.1     08-30  Mg     2.0     08-30      RADIOLOGY/OTHER:  CT Head No Cont (08.29.23 @ 23:39)  IMPRESSION:   Evolving acute left MCA infarct is identified.

## 2023-08-30 NOTE — PROGRESS NOTE ADULT - NS ATTEND AMEND GEN_ALL_CORE FT
History and plan as documented above by PA/NP was confirmed by me.  Agree with plan as outlined above.

## 2023-08-31 DIAGNOSIS — I63.9 CEREBRAL INFARCTION, UNSPECIFIED: ICD-10-CM

## 2023-08-31 DIAGNOSIS — I95.9 HYPOTENSION, UNSPECIFIED: ICD-10-CM

## 2023-08-31 LAB
ANION GAP SERPL CALC-SCNC: 10 MMOL/L — SIGNIFICANT CHANGE UP (ref 5–17)
ANION GAP SERPL CALC-SCNC: 11 MMOL/L — SIGNIFICANT CHANGE UP (ref 5–17)
APPEARANCE UR: CLEAR — SIGNIFICANT CHANGE UP
BACTERIA # UR AUTO: ABNORMAL
BILIRUB UR-MCNC: NEGATIVE — SIGNIFICANT CHANGE UP
BUN SERPL-MCNC: 15.1 MG/DL — SIGNIFICANT CHANGE UP (ref 8–20)
BUN SERPL-MCNC: 16.6 MG/DL — SIGNIFICANT CHANGE UP (ref 8–20)
BUN SERPL-MCNC: 17.2 MG/DL — SIGNIFICANT CHANGE UP (ref 8–20)
BUN SERPL-MCNC: 18.3 MG/DL — SIGNIFICANT CHANGE UP (ref 8–20)
CA-I BLD-SCNC: 1.18 MMOL/L — SIGNIFICANT CHANGE UP (ref 1.15–1.33)
CALCIUM SERPL-MCNC: 6.8 MG/DL — LOW (ref 8.4–10.5)
CALCIUM SERPL-MCNC: 8.1 MG/DL — LOW (ref 8.4–10.5)
CALCIUM SERPL-MCNC: 8.1 MG/DL — LOW (ref 8.4–10.5)
CALCIUM SERPL-MCNC: 8.3 MG/DL — LOW (ref 8.4–10.5)
CHLORIDE SERPL-SCNC: 112 MMOL/L — HIGH (ref 96–108)
CHLORIDE SERPL-SCNC: 113 MMOL/L — HIGH (ref 96–108)
CHLORIDE SERPL-SCNC: 115 MMOL/L — HIGH (ref 96–108)
CHLORIDE SERPL-SCNC: 118 MMOL/L — HIGH (ref 96–108)
CO2 SERPL-SCNC: 20 MMOL/L — LOW (ref 22–29)
CO2 SERPL-SCNC: 22 MMOL/L — SIGNIFICANT CHANGE UP (ref 22–29)
CO2 SERPL-SCNC: 23 MMOL/L — SIGNIFICANT CHANGE UP (ref 22–29)
CO2 SERPL-SCNC: 23 MMOL/L — SIGNIFICANT CHANGE UP (ref 22–29)
COLOR SPEC: YELLOW — SIGNIFICANT CHANGE UP
CREAT SERPL-MCNC: 0.65 MG/DL — SIGNIFICANT CHANGE UP (ref 0.5–1.3)
CREAT SERPL-MCNC: 0.76 MG/DL — SIGNIFICANT CHANGE UP (ref 0.5–1.3)
CREAT SERPL-MCNC: 0.83 MG/DL — SIGNIFICANT CHANGE UP (ref 0.5–1.3)
CREAT SERPL-MCNC: 0.83 MG/DL — SIGNIFICANT CHANGE UP (ref 0.5–1.3)
DIFF PNL FLD: ABNORMAL
EGFR: 102 ML/MIN/1.73M2 — SIGNIFICANT CHANGE UP
EGFR: 107 ML/MIN/1.73M2 — SIGNIFICANT CHANGE UP
EGFR: 99 ML/MIN/1.73M2 — SIGNIFICANT CHANGE UP
EGFR: 99 ML/MIN/1.73M2 — SIGNIFICANT CHANGE UP
EPI CELLS # UR: SIGNIFICANT CHANGE UP
GLUCOSE BLDC GLUCOMTR-MCNC: 139 MG/DL — HIGH (ref 70–99)
GLUCOSE BLDC GLUCOMTR-MCNC: 140 MG/DL — HIGH (ref 70–99)
GLUCOSE BLDC GLUCOMTR-MCNC: 148 MG/DL — HIGH (ref 70–99)
GLUCOSE BLDC GLUCOMTR-MCNC: 151 MG/DL — HIGH (ref 70–99)
GLUCOSE BLDC GLUCOMTR-MCNC: 154 MG/DL — HIGH (ref 70–99)
GLUCOSE BLDC GLUCOMTR-MCNC: 87 MG/DL — SIGNIFICANT CHANGE UP (ref 70–99)
GLUCOSE SERPL-MCNC: 139 MG/DL — HIGH (ref 70–99)
GLUCOSE SERPL-MCNC: 139 MG/DL — HIGH (ref 70–99)
GLUCOSE SERPL-MCNC: 152 MG/DL — HIGH (ref 70–99)
GLUCOSE SERPL-MCNC: 155 MG/DL — HIGH (ref 70–99)
GLUCOSE UR QL: NEGATIVE MG/DL — SIGNIFICANT CHANGE UP
HCT VFR BLD CALC: 21.1 % — LOW (ref 39–50)
HCT VFR BLD CALC: 24.6 % — LOW (ref 39–50)
HCT VFR BLD CALC: 25.1 % — LOW (ref 39–50)
HCT VFR BLD CALC: 25.6 % — LOW (ref 39–50)
HGB BLD-MCNC: 7 G/DL — CRITICAL LOW (ref 13–17)
HGB BLD-MCNC: 8.3 G/DL — LOW (ref 13–17)
HGB BLD-MCNC: 8.5 G/DL — LOW (ref 13–17)
HGB BLD-MCNC: 8.6 G/DL — LOW (ref 13–17)
KETONES UR-MCNC: NEGATIVE — SIGNIFICANT CHANGE UP
LEUKOCYTE ESTERASE UR-ACNC: NEGATIVE — SIGNIFICANT CHANGE UP
MAGNESIUM SERPL-MCNC: 2.1 MG/DL — SIGNIFICANT CHANGE UP (ref 1.6–2.6)
MAGNESIUM SERPL-MCNC: 2.2 MG/DL — SIGNIFICANT CHANGE UP (ref 1.6–2.6)
MCHC RBC-ENTMCNC: 31.7 PG — SIGNIFICANT CHANGE UP (ref 27–34)
MCHC RBC-ENTMCNC: 31.7 PG — SIGNIFICANT CHANGE UP (ref 27–34)
MCHC RBC-ENTMCNC: 31.8 PG — SIGNIFICANT CHANGE UP (ref 27–34)
MCHC RBC-ENTMCNC: 32 PG — SIGNIFICANT CHANGE UP (ref 27–34)
MCHC RBC-ENTMCNC: 33.2 GM/DL — SIGNIFICANT CHANGE UP (ref 32–36)
MCHC RBC-ENTMCNC: 33.6 GM/DL — SIGNIFICANT CHANGE UP (ref 32–36)
MCHC RBC-ENTMCNC: 33.7 GM/DL — SIGNIFICANT CHANGE UP (ref 32–36)
MCHC RBC-ENTMCNC: 33.9 GM/DL — SIGNIFICANT CHANGE UP (ref 32–36)
MCV RBC AUTO: 94.3 FL — SIGNIFICANT CHANGE UP (ref 80–100)
MCV RBC AUTO: 94.4 FL — SIGNIFICANT CHANGE UP (ref 80–100)
MCV RBC AUTO: 94.5 FL — SIGNIFICANT CHANGE UP (ref 80–100)
MCV RBC AUTO: 95.5 FL — SIGNIFICANT CHANGE UP (ref 80–100)
NITRITE UR-MCNC: NEGATIVE — SIGNIFICANT CHANGE UP
PH UR: 6 — SIGNIFICANT CHANGE UP (ref 5–8)
PHOSPHATE SERPL-MCNC: 1.9 MG/DL — LOW (ref 2.4–4.7)
PHOSPHATE SERPL-MCNC: 1.9 MG/DL — LOW (ref 2.4–4.7)
PLATELET # BLD AUTO: 120 K/UL — LOW (ref 150–400)
PLATELET # BLD AUTO: 146 K/UL — LOW (ref 150–400)
PLATELET # BLD AUTO: 154 K/UL — SIGNIFICANT CHANGE UP (ref 150–400)
PLATELET # BLD AUTO: 180 K/UL — SIGNIFICANT CHANGE UP (ref 150–400)
POTASSIUM SERPL-MCNC: 3.2 MMOL/L — LOW (ref 3.5–5.3)
POTASSIUM SERPL-MCNC: 3.7 MMOL/L — SIGNIFICANT CHANGE UP (ref 3.5–5.3)
POTASSIUM SERPL-MCNC: 4 MMOL/L — SIGNIFICANT CHANGE UP (ref 3.5–5.3)
POTASSIUM SERPL-MCNC: 4.2 MMOL/L — SIGNIFICANT CHANGE UP (ref 3.5–5.3)
POTASSIUM SERPL-SCNC: 3.2 MMOL/L — LOW (ref 3.5–5.3)
POTASSIUM SERPL-SCNC: 3.7 MMOL/L — SIGNIFICANT CHANGE UP (ref 3.5–5.3)
POTASSIUM SERPL-SCNC: 4 MMOL/L — SIGNIFICANT CHANGE UP (ref 3.5–5.3)
POTASSIUM SERPL-SCNC: 4.2 MMOL/L — SIGNIFICANT CHANGE UP (ref 3.5–5.3)
PROT UR-MCNC: 15
RBC # BLD: 2.21 M/UL — LOW (ref 4.2–5.8)
RBC # BLD: 2.61 M/UL — LOW (ref 4.2–5.8)
RBC # BLD: 2.66 M/UL — LOW (ref 4.2–5.8)
RBC # BLD: 2.71 M/UL — LOW (ref 4.2–5.8)
RBC # FLD: 13.9 % — SIGNIFICANT CHANGE UP (ref 10.3–14.5)
RBC # FLD: 15.1 % — HIGH (ref 10.3–14.5)
RBC # FLD: 15.2 % — HIGH (ref 10.3–14.5)
RBC # FLD: 15.2 % — HIGH (ref 10.3–14.5)
RBC CASTS # UR COMP ASSIST: ABNORMAL /HPF (ref 0–4)
SODIUM SERPL-SCNC: 145 MMOL/L — SIGNIFICANT CHANGE UP (ref 135–145)
SODIUM SERPL-SCNC: 145 MMOL/L — SIGNIFICANT CHANGE UP (ref 135–145)
SODIUM SERPL-SCNC: 147 MMOL/L — HIGH (ref 135–145)
SODIUM SERPL-SCNC: 148 MMOL/L — HIGH (ref 135–145)
SP GR SPEC: 1.01 — SIGNIFICANT CHANGE UP (ref 1.01–1.02)
UROBILINOGEN FLD QL: NEGATIVE MG/DL — SIGNIFICANT CHANGE UP
WBC # BLD: 10.93 K/UL — HIGH (ref 3.8–10.5)
WBC # BLD: 12.23 K/UL — HIGH (ref 3.8–10.5)
WBC # BLD: 13.15 K/UL — HIGH (ref 3.8–10.5)
WBC # BLD: 16.95 K/UL — HIGH (ref 3.8–10.5)
WBC # FLD AUTO: 10.93 K/UL — HIGH (ref 3.8–10.5)
WBC # FLD AUTO: 12.23 K/UL — HIGH (ref 3.8–10.5)
WBC # FLD AUTO: 13.15 K/UL — HIGH (ref 3.8–10.5)
WBC # FLD AUTO: 16.95 K/UL — HIGH (ref 3.8–10.5)
WBC UR QL: SIGNIFICANT CHANGE UP /HPF (ref 0–5)

## 2023-08-31 PROCEDURE — 99223 1ST HOSP IP/OBS HIGH 75: CPT

## 2023-08-31 PROCEDURE — 70450 CT HEAD/BRAIN W/O DYE: CPT | Mod: 26

## 2023-08-31 PROCEDURE — 99291 CRITICAL CARE FIRST HOUR: CPT

## 2023-08-31 PROCEDURE — 71045 X-RAY EXAM CHEST 1 VIEW: CPT | Mod: 26

## 2023-08-31 PROCEDURE — 99232 SBSQ HOSP IP/OBS MODERATE 35: CPT

## 2023-08-31 PROCEDURE — 36556 INSERT NON-TUNNEL CV CATH: CPT

## 2023-08-31 RX ORDER — SODIUM CHLORIDE 5 G/100ML
1000 INJECTION, SOLUTION INTRAVENOUS
Refills: 0 | Status: DISCONTINUED | OUTPATIENT
Start: 2023-08-31 | End: 2023-09-01

## 2023-08-31 RX ORDER — TAMSULOSIN HYDROCHLORIDE 0.4 MG/1
0.4 CAPSULE ORAL AT BEDTIME
Refills: 0 | Status: DISCONTINUED | OUTPATIENT
Start: 2023-08-31 | End: 2023-09-06

## 2023-08-31 RX ORDER — CALCIUM GLUCONATE 100 MG/ML
1 VIAL (ML) INTRAVENOUS ONCE
Refills: 0 | Status: COMPLETED | OUTPATIENT
Start: 2023-08-31 | End: 2023-08-31

## 2023-08-31 RX ORDER — IPRATROPIUM/ALBUTEROL SULFATE 18-103MCG
3 AEROSOL WITH ADAPTER (GRAM) INHALATION EVERY 6 HOURS
Refills: 0 | Status: DISCONTINUED | OUTPATIENT
Start: 2023-08-31 | End: 2023-09-03

## 2023-08-31 RX ORDER — PHENYLEPHRINE HYDROCHLORIDE 10 MG/ML
0.1 INJECTION INTRAVENOUS
Qty: 40 | Refills: 0 | Status: DISCONTINUED | OUTPATIENT
Start: 2023-08-31 | End: 2023-08-31

## 2023-08-31 RX ORDER — ACETAMINOPHEN 500 MG
1000 TABLET ORAL ONCE
Refills: 0 | Status: COMPLETED | OUTPATIENT
Start: 2023-08-31 | End: 2023-08-31

## 2023-08-31 RX ORDER — SODIUM,POTASSIUM PHOSPHATES 278-250MG
1 POWDER IN PACKET (EA) ORAL ONCE
Refills: 0 | Status: COMPLETED | OUTPATIENT
Start: 2023-08-31 | End: 2023-08-31

## 2023-08-31 RX ORDER — FENTANYL CITRATE 50 UG/ML
25 INJECTION INTRAVENOUS ONCE
Refills: 0 | Status: DISCONTINUED | OUTPATIENT
Start: 2023-08-31 | End: 2023-08-31

## 2023-08-31 RX ORDER — SODIUM CHLORIDE 9 MG/ML
500 INJECTION INTRAMUSCULAR; INTRAVENOUS; SUBCUTANEOUS ONCE
Refills: 0 | Status: COMPLETED | OUTPATIENT
Start: 2023-08-31 | End: 2023-08-31

## 2023-08-31 RX ORDER — NOREPINEPHRINE BITARTRATE/D5W 8 MG/250ML
0.05 PLASTIC BAG, INJECTION (ML) INTRAVENOUS
Qty: 8 | Refills: 0 | Status: DISCONTINUED | OUTPATIENT
Start: 2023-08-31 | End: 2023-08-31

## 2023-08-31 RX ORDER — SODIUM,POTASSIUM PHOSPHATES 278-250MG
1 POWDER IN PACKET (EA) ORAL ONCE
Refills: 0 | Status: DISCONTINUED | OUTPATIENT
Start: 2023-08-31 | End: 2023-08-31

## 2023-08-31 RX ORDER — ACETYLCYSTEINE 200 MG/ML
4 VIAL (ML) MISCELLANEOUS EVERY 6 HOURS
Refills: 0 | Status: DISCONTINUED | OUTPATIENT
Start: 2023-08-31 | End: 2023-09-03

## 2023-08-31 RX ORDER — ACETAMINOPHEN 500 MG
975 TABLET ORAL EVERY 6 HOURS
Refills: 0 | Status: DISCONTINUED | OUTPATIENT
Start: 2023-08-31 | End: 2023-09-15

## 2023-08-31 RX ORDER — CALCIUM GLUCONATE 100 MG/ML
2 VIAL (ML) INTRAVENOUS ONCE
Refills: 0 | Status: COMPLETED | OUTPATIENT
Start: 2023-08-31 | End: 2023-08-31

## 2023-08-31 RX ADMIN — Medication 1000 MILLIGRAM(S): at 16:00

## 2023-08-31 RX ADMIN — Medication 200 GRAM(S): at 12:47

## 2023-08-31 RX ADMIN — SODIUM CHLORIDE 60 MILLILITER(S): 5 INJECTION, SOLUTION INTRAVENOUS at 12:47

## 2023-08-31 RX ADMIN — LEVETIRACETAM 400 MILLIGRAM(S): 250 TABLET, FILM COATED ORAL at 17:34

## 2023-08-31 RX ADMIN — PHENYLEPHRINE HYDROCHLORIDE 3.05 MICROGRAM(S)/KG/MIN: 10 INJECTION INTRAVENOUS at 04:26

## 2023-08-31 RX ADMIN — Medication 100 GRAM(S): at 22:32

## 2023-08-31 RX ADMIN — Medication 1: at 12:47

## 2023-08-31 RX ADMIN — CHLORHEXIDINE GLUCONATE 1 APPLICATION(S): 213 SOLUTION TOPICAL at 05:23

## 2023-08-31 RX ADMIN — Medication 2000 MILLIGRAM(S): at 13:04

## 2023-08-31 RX ADMIN — Medication 2000 MILLIGRAM(S): at 05:23

## 2023-08-31 RX ADMIN — Medication 1 PACKET(S): at 22:28

## 2023-08-31 RX ADMIN — LEVETIRACETAM 400 MILLIGRAM(S): 250 TABLET, FILM COATED ORAL at 05:23

## 2023-08-31 RX ADMIN — FENTANYL CITRATE 25 MICROGRAM(S): 50 INJECTION INTRAVENOUS at 04:23

## 2023-08-31 RX ADMIN — Medication 2000 MILLIGRAM(S): at 22:31

## 2023-08-31 RX ADMIN — Medication 1000 MILLIGRAM(S): at 01:59

## 2023-08-31 RX ADMIN — CHLORHEXIDINE GLUCONATE 15 MILLILITER(S): 213 SOLUTION TOPICAL at 05:23

## 2023-08-31 RX ADMIN — Medication 1: at 06:38

## 2023-08-31 RX ADMIN — Medication 85 MILLIMOLE(S): at 05:23

## 2023-08-31 RX ADMIN — TAMSULOSIN HYDROCHLORIDE 0.4 MILLIGRAM(S): 0.4 CAPSULE ORAL at 22:29

## 2023-08-31 RX ADMIN — PHENYLEPHRINE HYDROCHLORIDE 3.05 MICROGRAM(S)/KG/MIN: 10 INJECTION INTRAVENOUS at 01:11

## 2023-08-31 RX ADMIN — Medication 400 MILLIGRAM(S): at 15:33

## 2023-08-31 RX ADMIN — ATORVASTATIN CALCIUM 80 MILLIGRAM(S): 80 TABLET, FILM COATED ORAL at 22:36

## 2023-08-31 RX ADMIN — Medication 7.61 MICROGRAM(S)/KG/MIN: at 04:23

## 2023-08-31 RX ADMIN — POLYETHYLENE GLYCOL 3350 17 GRAM(S): 17 POWDER, FOR SOLUTION ORAL at 22:28

## 2023-08-31 RX ADMIN — Medication 400 MILLIGRAM(S): at 01:03

## 2023-08-31 RX ADMIN — CHLORHEXIDINE GLUCONATE 15 MILLILITER(S): 213 SOLUTION TOPICAL at 17:34

## 2023-08-31 RX ADMIN — SODIUM CHLORIDE 500 MILLILITER(S): 9 INJECTION INTRAMUSCULAR; INTRAVENOUS; SUBCUTANEOUS at 02:00

## 2023-08-31 RX ADMIN — Medication 4 MILLIGRAM(S): at 00:18

## 2023-08-31 RX ADMIN — SENNA PLUS 2 TABLET(S): 8.6 TABLET ORAL at 22:29

## 2023-08-31 RX ADMIN — FENTANYL CITRATE 25 MICROGRAM(S): 50 INJECTION INTRAVENOUS at 04:30

## 2023-08-31 NOTE — CONSULT NOTE ADULT - SUBJECTIVE AND OBJECTIVE BOX
[facility]                                              CARDIOLOGY AT [facility]                                                   [address], [place] [address]                                             Telephone: [phone]. Fax:[phone]                                                       CARDIOLOGY CONSULTATION NOTE                                                                                             History obtained by: Patient and medical record  Community Cardiologist:   Unknown   obtained: Yes [x  ] No [  ]  Reason for Consultation:   Stroke etilogy  Available out pt records reviewed: Yes [  ] No [ x ]    Chief complaint:    Patient is a 63y old  Male who presents with a chief complaint of LT M1 Occlusion.        HPI:  64 yo, M: [facility] Hypertension, DM on oral medications who presents c/o stroke-like symptoms.    Per (chart) son pt was walking outside around 10:15am this morning when he suddenly fell. His son helped him up and noted that he was weak on the right side and not acting normally which prompted him to come to the ED. On arrival pt w/ obvious right sided arm weakness, confusion, dysarthria, facial droop suggestive of acute stroke. Code stroke initiated, found to have LM1 occlusion, given TNK @ 1215 and was taken for thrombectomy on 9/27/2023, then on 8/29 on CT was noted left to right shift and was sent to OR on 8/30/2023 for decompression hemicraniotomy.   Patient on exam, vented, obtunded, as discussed with Ever (nephew) patient did not have any cardiac diease was not any cardiac medications other than htn medications.  Patient is a non smoker, has no family history of heart disease, and drinks beer monthly.  ROS - unable      CARDIAC TESTING   ECHO:  Summary:   1. Left ventricular ejection fraction, by visual estimation, is 55 to   60%.   2. Normal global left ventricular systolic function.   3. Normal right ventricular size and function.   4. Trace mitral valve regurgitation.   5. Mild aortic regurgitation.   6. Sclerotic aortic valve with normal opening.   7. There is no evidence of pericardial effusion.    STRESS:    CATH:     ELECTROPHYSIOLOGY:     PAST MEDICAL HISTORY  No pertinent past medical history  Hypertension    PAST SURGICAL HISTORY  No significant past surgical history    SOCIAL HISTORY:  Denies smoking/alcohol/drugs  CIGARETTES:     ALCOHOL:  Beers 3-5 monthly  DRUGS:    FAMILY HISTORY:  Family History of Cardiovascular Disease:  Yes [  ] No [x  ]  Coronary Artery Disease in first degree relative: Yes [  ] No [x  ]  Sudden Cardiac Death in First degree relative: Yes [  ] No [x  ]    HOME MEDICATIONS:  Flexeril 5 mg oral tablet: 1 tab(s) orally 2 times a day as needed for  muscle spasm (28 Aug 2023 07:55)  hydroCHLOROthiazide 25 mg oral tablet: 1 tab(s) orally once a day (28 Aug 2023 07:54)  metFORMIN 500 mg oral tablet: 1 tab(s) orally 2 times a day (28 Aug 2023 07:55)    CURRENT CARDIAC MEDICATIONS:  hydrALAZINE Injectable 10 milliGRAM(s) IV Push every 2 hours PRN SBP >140  labetalol Injectable 10 milliGRAM(s) IV Push every 2 hours PRN SBP >140  phenylephrine    Infusion 0.095 MICROgram(s)/kG/Min IV Continuous <Continuous>    CURRENT OTHER MEDICATIONS:  acetaminophen     Tablet .. 975 milliGRAM(s) Oral every 6 hours PRN Mild Pain (1 - 3)  levETIRAcetam  IVPB 1000 milliGRAM(s) IV Intermittent every 12 hours  polyethylene glycol 3350 17 Gram(s) Oral at bedtime  senna 2 Tablet(s) Oral at bedtime  atorvastatin 80 milliGRAM(s) Oral at bedtime  ceFAZolin  Injectable. 2000 milliGRAM(s) IV Push every 8 hours  chlorhexidine 0.12% Liquid 15 milliLiter(s) Oral Mucosa every 12 hours  insulin lispro (ADMELOG) corrective regimen sliding scale   SubCutaneous every 6 hours  sodium chloride 2% . 1000 milliLiter(s) (60 mL/Hr) IV Continuous <Continuous>  tamsulosin 0.4 milliGRAM(s) Oral at bedtime    ALLERGIES:   No Known Allergies    REVIEW OF SYMPTOMS - unable to attain    VITAL SIGNS:  T(C): 38.4 (08-31-23 @ 15:45), Max: 38.5 (08-31-23 @ 00:00)  T(F): 101.1 (08-31-23 @ 15:45), Max: 101.3 (08-31-23 @ 00:00)  HR: 94 (08-31-23 @ 16:03) (65 - 115)  BP: 109/75 (08-31-23 @ 15:45) (81/65 - 141/75)  RR: 27 (08-31-23 @ 15:45) (7 - 31)  SpO2: 100% (08-31-23 @ 16:03) (99% - 100%)    INTAKE AND OUTPUT:   08-30 @ 07:01  -  08-31 @ 07:00  --------------------------------------------------------  IN: 3580.8 mL / OUT: 2055 mL / NET: 1525.8 mL  08-31 @ 07:01  -  08-31 @ 17:08  --------------------------------------------------------  IN: 722 mL / OUT: 985 mL / NET: -263 mL    PHYSICAL EXAM:  Constitutional: Obtunded  HEENT: Atraumatic and normocephalic , neck is supple . no JVD. No carotid bruit.  CNS: A&Ox 0, opens eyes to verbal stimuli, left eye swollen and had warping dry and intact  .   Respiratory: CTAB, unlabored - vented  Cardiovascular: RRR normal s1 s2. No murmur. No rubs or gallop.  Gastrointestinal: Soft, non-tender. +Bowel sounds.   Extremities: + Peripheral Pulses, No clubbing, cyanosis, or edema  Psychiatric:  Obtunded.     Skin: Warm and dry, no ulcers on extremities     LABS:  ( 27 Aug 2023 11:40 )  Troponin T  <0.01,  CPK  795<H>, CKMB  X    , BNP X                       8.5    12.23 )-----------( 146      ( 31 Aug 2023 13:57 )             25.1     08-31    147<H>  |  118<H>  |  15.1  ----------------------------<  139<H>  3.2<L>   |  20.0<L>  |  0.65    Ca    6.8<L>      31 Aug 2023 09:15  Phos  1.9     08-31  Mg     2.2     08-31    TPro  5.7<L>  /  Alb  3.3  /  TBili  0.8  /  DBili  x   /  AST  24  /  ALT  24  /  AlkPhos  45  08-30    PT/INR - ( 30 Aug 2023 06:30 )   PT: 13.0 sec;   INR: 1.18 ratio         PTT - ( 30 Aug 2023 06:30 )  PTT:28.5 sec  Urinalysis Basic - ( 31 Aug 2023 09:15 )    Color: x / Appearance: x / SG: x / pH: x  Gluc: 139 mg/dL / Ketone: x  / Bili: x / Urobili: x   Blood: x / Protein: x / Nitrite: x   Leuk Esterase: x / RBC: x / WBC x   Sq Epi: x / Non Sq Epi: x / Bacteria: x    INTERPRETATION OF TELEMETRY:   Sr, no acute alarms noted.      ECG:    Ventricular Rate 99 BPM  Atrial Rate 99 BPM  P-R Interval 144 ms  QRS Duration 94 ms  Q-T Interval 366 ms  QTC Calculation(Bazett) 469 ms  P Axis 36 degrees  R Axis 100 degrees  T Axis 42 degrees  Diagnosis Line *** Poor data quality, interpretation may be adversely affected  Sinus rhythm with Fusion complexes  Prior ECG: Yes [  ] No [ x ]

## 2023-08-31 NOTE — CONSULT NOTE ADULT - ASSESSMENT
62 yo, M: [facility] Hypertension, DM on oral medications who presents c/o stroke-like symptoms.    Per (chart) son pt was walking outside around 10:15am this morning when he suddenly fell. His son helped him up and noted that he was weak on the right side and not acting normally which prompted him to come to the ED. On arrival pt w/ obvious right sided arm weakness, confusion, dysarthria, facial droop suggestive of acute stroke. Code stroke initiated, found to have LM1 occlusion, given TNK @ 1215 and was taken for thrombectomy on 9/27/2023, then on 8/29 on CT was noted left to right shift and was sent to OR on 8/30/2023 for decompression hemicraniotomy.   Patient on exam, vented, obtunded, as discussed with Ever (nephew) patient did not have any cardiac diease was not any cardiac medications other than htn medications.  Patient is a non smoker, has no family history of heart disease, and drinks beer monthly.  ROS - unable

## 2023-08-31 NOTE — PROGRESS NOTE ADULT - SUBJECTIVE AND OBJECTIVE BOX
HPI:  Patient is a 63 year old male with PMH Hypertension, DM on oral medications who presents c/o stroke-like symptoms. Per son pt was walking outside around 10:15am this morning when he suddenly fell. His son helped him up and noted that he was weak on the right side and not acting normally which prompted him to come to the ED. On arrival pt w/ obvious right sided arm weakness, confusion, dysarthria, facial droop suggestive of acute stroke. Code stroke initiated, found to have LM1 occlusion, given TNK @ 1215 and was taken for thrombectomy. Unable to provide ROS 2/2 aphasia  (27 Aug 2023 16:45)      INTERVAL HPI/OVERNIGHT EVENTS:  63y Male s/p urgent L craniectomy POD#1 for decompression. Patient tolerated procedure well. S/p mannitol, large amount of swelling noted, intraop with dural opening, epidural SHUN x 1 placed w/ bone flap discarded. Remained intubated and transported to NSICU. Overnight patient with increasing Liam requirements to maintain BP parameters, STAT repeat CBC obtained and patient given 500cc bolus NS. H/H 8.3/24.6 from 11.8/34.9 preop. EOL during case was 200ml. 1unit PRBC ordered.     Vital Signs Last 24 Hrs  T(C): 38.3 (31 Aug 2023 02:45), Max: 38.5 (31 Aug 2023 00:00)  T(F): 100.9 (31 Aug 2023 02:45), Max: 101.3 (31 Aug 2023 00:00)  HR: 87 (31 Aug 2023 02:45) (81 - 116)  BP: 91/67 (31 Aug 2023 02:45) (81/65 - 133/68)  BP(mean): 76 (31 Aug 2023 02:45) (71 - 109)  RR: 17 (31 Aug 2023 02:45) (12 - 30)  SpO2: 100% (31 Aug 2023 02:45) (94% - 100%)    Parameters below as of 30 Aug 2023 20:00  Patient On (Oxygen Delivery Method): BiPAP/CPAP    O2 Concentration (%): 30    PHYSICAL EXAM:  General: Calm/Intubated on CPAP  HEENT: MMM, NGT in place   Neuro:  -Mental status- No acute distress, follows some commands on L intermittently   expressive aphasia/nonverbal  -CN- PERRL 3mm, EOMI, tongue midline, face symmetric, R neglect  L gaze pref  RUE ext  RLE TF  LUE 4+/5 spont  LLE 4-/5, wiggles toes    CV: RRR  Pulm: intubated tolerating CPAP  Abd: Soft, nontender, nondistended  Ext: no noted edema in lower ext  Skin: warm, dry  Incision: Dressing in place with epidural SHUN drain to full suction with serosanguinous drainage. Dried blood noted, not fully saturated.        LABS:                        8.3    16.95 )-----------( 180      ( 31 Aug 2023 02:38 )             24.6     08-31    145  |  112<H>  |  17.2  ----------------------------<  152<H>  4.2   |  22.0  |  0.83    Ca    8.3<L>      31 Aug 2023 00:25  Phos  3.5     08-30  Mg     2.0     08-30    TPro  5.7<L>  /  Alb  3.3  /  TBili  0.8  /  DBili  x   /  AST  24  /  ALT  24  /  AlkPhos  45  08-30    PT/INR - ( 30 Aug 2023 06:30 )   PT: 13.0 sec;   INR: 1.18 ratio         PTT - ( 30 Aug 2023 06:30 )  PTT:28.5 sec  Urinalysis Basic - ( 31 Aug 2023 00:25 )    Color: x / Appearance: x / SG: x / pH: x  Gluc: 152 mg/dL / Ketone: x  / Bili: x / Urobili: x   Blood: x / Protein: x / Nitrite: x   Leuk Esterase: x / RBC: x / WBC x   Sq Epi: x / Non Sq Epi: x / Bacteria: x        08-29 @ 07:01 - 08-30 @ 07:00  --------------------------------------------------------  IN: 2736.5 mL / OUT: 1920 mL / NET: 816.5 mL    08-30 @ 07:01 - 08-31 @ 02:56  --------------------------------------------------------  IN: 2860.8 mL / OUT: 1610 mL / NET: 1250.8 mL        RADIOLOGY & ADDITIONAL TESTS:  CTH 8/29/23 @ 2332:    Abnormal low-attenuation is again seen involving the left temporal frontal parietal region with involving left basal ganglia corona radiata and centrum semiovale region. This is slightly more conspicuous when compared with the prior exam and is compatible with expected evolutionary changes of the previously noted acute left MCA infarct. Previously noted curvilinear areas of high attenuation are again seen and slightly less conspicuous. This could be compatible with areas of hemorrhage. Localized mass effect is identified. Mass effect on the left lateral ventricle is again seen. Slight increased left-to-right shift (1.2 cm) is seen.    Evaluation of the osseous structures with appropriate window appears unremarkable    The visualized paranasal sinuses mastoid and middle ear regions appear clear.    Right-sided NG tube is seen.    IMPRESSION: Evolving acute left MCA infarct is identified.    CTH 8/30/23 1543    Postop changes compatible with a left temporal frontal craniectomy is identified. There is some extension of the parenchyma through the craniectomy defect identified. There is abnormal low-attenuation again seen involving left temporal frontal parietal region involving the left basal ganglia and corona radiata region. This is compatible with an evolving acute left MCA infarct. Previously noted areas of high attenuation is less conspicuous which could be compatible with evolving areas of hemorrhage. Mass effect on the left lateral ventricle is seen. Left-to-right shift (3.1 mm) is seen.    There is extra-axial hematoma fluid and soft tissue swelling seen seen in the postop region with extra-axial drain comedies findings are compatible with postoperative changes.    The visualized paranasal sinuses mastoid and middle regions appear clear.    IMPRESSION:  New changes as described above.

## 2023-08-31 NOTE — PROGRESS NOTE ADULT - SUBJECTIVE AND OBJECTIVE BOX
Chief complaint:   Patient is a 63y old  Male who presents with a chief complaint of LT M1 Occlusion (30 Aug 2023 07:09)    HPI:  Patient is a 63 year old male with PMH Hypertension, DM on oral medications who presents c/o stroke-like symptoms. Per son pt was walking outside around 10:15am this morning when he suddenly fell. His son helped him up and noted that he was weak on the right side and not acting normally which prompted him to come to the ED. On arrival pt w/ obvious right sided arm weakness, confusion, dysarthria, facial droop suggestive of acute stroke. Code stroke initiated, found to have LM1 occlusion, given TNK @ 1215 and was taken for thrombectomy. Unable to provide ROS 2/2 aphasia  (27 Aug 2023 16:45)    24hr EVENTS:  8/30 - worsening lethargy, taken to OR for DHC. Placed on pressors for SBP >100  8/31 - placed on Pressure Support ventilation with minimal settings, doing fine    ROS: [x ]  Unable to assess due to mental status   All other systems negative    -----------------------------------------------------------------------------------------------------------------------------------------------------------------------------------    PHYSICAL EXAM: pre-op  General unarousable  HEENT: MMM  Neuro:  -Mental status- No acute distress, no following commands, no EO  -CN- PERRL 3mm, EOMI, tongue midline, L facial droop  RUE ext  RLE TF  LUE Localizing with purposeful movements  LLE WD    CV: RRR  Pulm: clear to auscultation  Abd: Soft, nontender, nondistended  Ext: no noted edema in lower ext  R buttock ecchymosis/hematoma  Skin: warm, dry    ------------------------------------------------------------------------------------------------------  ICU Vital Signs Last 24 Hrs  T(C): 37.9 (31 Aug 2023 13:00), Max: 38.5 (31 Aug 2023 00:00)  T(F): 100.2 (31 Aug 2023 13:00), Max: 101.3 (31 Aug 2023 00:00)  HR: 91 (31 Aug 2023 13:15) (65 - 115)  BP: 100/66 (31 Aug 2023 13:15) (81/65 - 141/75)  BP(mean): 77 (31 Aug 2023 13:15) (69 - 104)  ABP: 94/65 (31 Aug 2023 13:15) (65/56 - 144/128)  ABP(mean): 80 (31 Aug 2023 13:15) (59 - 135)  RR: 28 (31 Aug 2023 13:15) (7 - 31)  SpO2: 100% (31 Aug 2023 13:15) (100% - 100%)    O2 Parameters below as of 31 Aug 2023 12:00  Patient On (Oxygen Delivery Method): ventilator    O2 Concentration (%): 30        I&O's Summary    30 Aug 2023 07:01  -  31 Aug 2023 07:00  --------------------------------------------------------  IN: 3580.8 mL / OUT: 2055 mL / NET: 1525.8 mL    31 Aug 2023 07:01  -  31 Aug 2023 14:17  --------------------------------------------------------  IN: 722 mL / OUT: 985 mL / NET: -263 mL        MEDICATIONS  (STANDING):  acetaminophen   IVPB .. 1000 milliGRAM(s) IV Intermittent once  atorvastatin 80 milliGRAM(s) Oral at bedtime  ceFAZolin  Injectable. 2000 milliGRAM(s) IV Push every 8 hours  chlorhexidine 0.12% Liquid 15 milliLiter(s) Oral Mucosa every 12 hours  chlorhexidine 2% Cloths 1 Application(s) Topical <User Schedule>  dextrose 50% Injectable 25 Gram(s) IV Push once  dextrose 50% Injectable 12.5 Gram(s) IV Push once  dextrose 50% Injectable 25 Gram(s) IV Push once  insulin lispro (ADMELOG) corrective regimen sliding scale   SubCutaneous every 6 hours  levETIRAcetam  IVPB 1000 milliGRAM(s) IV Intermittent every 12 hours  phenylephrine    Infusion 0.095 MICROgram(s)/kG/Min (2.89 mL/Hr) IV Continuous <Continuous>  polyethylene glycol 3350 17 Gram(s) Oral at bedtime  senna 2 Tablet(s) Oral at bedtime  sodium chloride 2% . 1000 milliLiter(s) (60 mL/Hr) IV Continuous <Continuous>  tamsulosin 0.4 milliGRAM(s) Oral at bedtime      RESPIRATORY:  Mode: CPAP with PS  FiO2: 30  PEEP: 6  PS: 5  MAP: 9  PIP: 16      IMAGING:   Recent imaging studies were reviewed.    LAB RESULTS:                          8.3    16.95 )-----------( 180      ( 31 Aug 2023 02:38 )             24.6       PT/INR - ( 30 Aug 2023 06:30 )   PT: 13.0 sec;   INR: 1.18 ratio         PTT - ( 30 Aug 2023 06:30 )  PTT:28.5 sec    08-31    147<H>  |  118<H>  |  15.1  ----------------------------<  139<H>  3.2<L>   |  20.0<L>  |  0.65    Ca    6.8<L>      31 Aug 2023 09:15  Phos  1.9     08-31  Mg     2.2     08-31    TPro  5.7<L>  /  Alb  3.3  /  TBili  0.8  /  DBili  x   /  AST  24  /  ALT  24  /  AlkPhos  45  08-30          ABG - ( 30 Aug 2023 08:41 )  pH, Arterial: 7.390 pH, Blood: x     /  pCO2: 38    /  pO2: 298   / HCO3: 23    / Base Excess: -2.0  /  SaO2: 100.0

## 2023-08-31 NOTE — CONSULT NOTE ADULT - PROBLEM SELECTOR RECOMMENDATION 9
Presents with stroke like symptoms.   S/P witnessed fall by son with right sided weakness and facial droop noted.   Ct showing LM1 occlusion, given TNK @ and now S/P Thrombectomy on 9/27/2023; then on 8/29 on CT was noted left to right shift and was sent to OR on 8/30/2023 for decompression hemicraniotomy.  Cardiac consult called due to stroke possible concerning for embolic source.    SAMARA bedside tomorrow  Continue tele monitoring  outpatient [facility] is SAMARA is negative  NPO overnight starting at midnight  Discussed with neuro PA. L MCA Stroke  Neurologic Encephalopathy  Cerebral Edema with brain herniation    Neurology on the case.   On statin, ASA  Cardiac consult called due to stroke possible concerning for embolic source.    SAMARA bedside tomorrow  Continue tele monitoring  ILR or outpatient [facility] if SAMARA  negative  NPO overnight starting at midnight    Respiratory Failure: Continued on ventilator  Undifferentiated Shock: Maintained on pressors

## 2023-08-31 NOTE — PROGRESS NOTE ADULT - SUBJECTIVE AND OBJECTIVE BOX
Preliminary note, offical recommendations pending attending review/signature   [facility] Stroke Team  Progress Note     HPI:  Patient is a 63 year old male with PMHx Hypertension, DM on oral medications who presented c/o stroke-like symptoms. Per son pt was walking outside around 10:15am on 8/27/23 when he suddenly fell. His son helped him up and noted that he was weak on the right side and not acting normally which prompted him to come to the ED. On arrival pt w/ obvious right sided arm weakness, confusion, dysarthria, facial droop suggestive of acute stroke. Code stroke initiated, found to have LM1 occlusion, given TNK @ 1215 and was taken for thrombectomy. Unable to provide ROS 2/2 aphasia.    SUBJECTIVE: Noted anemia overnight.  No new neurologic complaints.  ROS reported negative unless otherwise noted.    atorvastatin 80 milliGRAM(s) Oral at bedtime  ceFAZolin  Injectable. 2000 milliGRAM(s) IV Push every 8 hours  chlorhexidine 0.12% Liquid 15 milliLiter(s) Oral Mucosa every 12 hours  chlorhexidine 2% Cloths 1 Application(s) Topical <User Schedule>  dextrose 50% Injectable 25 Gram(s) IV Push once  dextrose 50% Injectable 12.5 Gram(s) IV Push once  dextrose 50% Injectable 25 Gram(s) IV Push once  doxazosin 4 milliGRAM(s) Oral at bedtime  hydrALAZINE Injectable 10 milliGRAM(s) IV Push every 2 hours PRN  insulin lispro (ADMELOG) corrective regimen sliding scale   SubCutaneous every 6 hours  labetalol Injectable 10 milliGRAM(s) IV Push every 2 hours PRN  levETIRAcetam  IVPB 1000 milliGRAM(s) IV Intermittent every 12 hours  phenylephrine    Infusion 0.1 MICROgram(s)/kG/Min IV Continuous <Continuous>  polyethylene glycol 3350 17 Gram(s) Oral at bedtime  senna 2 Tablet(s) Oral at bedtime  sodium chloride 3%. 500 milliLiter(s) IV Continuous <Continuous>      PHYSICAL EXAM:   Vital Signs Last 24 Hrs  T(C): 37.6 (31 Aug 2023 06:45), Max: 38.5 (31 Aug 2023 00:00)  T(F): 99.7 (31 Aug 2023 06:45), Max: 101.3 (31 Aug 2023 00:00)  HR: 79 (31 Aug 2023 06:45) (75 - 116)  BP: 129/82 (31 Aug 2023 06:45) (81/65 - 129/82)  BP(mean): 96 (31 Aug 2023 06:45) (69 - 109)  RR: 21 (31 Aug 2023 06:45) (7 - 31)  SpO2: 100% (31 Aug 2023 06:45) (97% - 100%)    Parameters below as of 30 Aug 2023 20:00  Patient On (Oxygen Delivery Method): BiPAP/CPAP    O2 Concentration (%): 30      EXAM PENDING   General: No acute distress    NEUROLOGICAL EXAM:  Mental status: Awake, alert, oriented x3, speech fluent, follows commands, no neglect, normal memory   Cranial Nerves: No facial asymmetry, no nystagmus, no dysarthria,  tongue midline  Motor exam: Normal tone, no drift, 5/5 RUE, 5/5 RLE, 5/5 LUE, 5/5 LLE, normal fine finger movements.  Sensation: Intact to light touch   Coordination/ Gait: No dysmetria, gait not tested    LABS:                        8.3    16.95 )-----------( 180      ( 31 Aug 2023 02:38 )             24.6    08-31    145  |  113<H>  |  18.3  ----------------------------<  155<H>  4.0   |  23.0  |  0.83    Ca    8.1<L>      31 Aug 2023 02:38  Phos  1.9     08-31  Mg     2.2     08-31    TPro  5.7<L>  /  Alb  3.3  /  TBili  0.8  /  DBili  x   /  AST  24  /  ALT  24  /  AlkPhos  45  08-30  PT/INR - ( 30 Aug 2023 06:30 )   PT: 13.0 sec;   INR: 1.18 ratio         PTT - ( 30 Aug 2023 06:30 )  PTT:28.5 sec      IMAGING: Reviewed by me.   CT Head No Cont (08.30.23 @ 15:44)   Postop changes compatible with a left temporal frontal craniectomy is   identified. There is some extension of the parenchyma through the   craniectomy defect identified. There is abnormal low-attenuation again   seen involving left temporal frontal parietal region involving the left   basal ganglia and corona radiata region. This is compatible with an   evolving acute left MCA infarct. Previously noted areas of high   attenuation is less conspicuous which could be compatible with evolving   areas of hemorrhage. Mass effect on the left lateral ventricle is seen.   Left-to-right shift (3.1 mm) is seen.    There is extra-axial hematoma fluid and soft tissue swelling seen seen in   the postop region with extra-axial drain comedies findings are compatible   with postoperative changes.    The visualized paranasal sinuses mastoid andmiddle regions appear clear.    IMPRESSION:  New changes as described above.      CT Head No Cont (08.29.23 @ 23:39)   IMPRESSION: Evolving acute left MCA infarct is identified.   Mass effect on the left lateral ventricle is   again seen. Slight increased left-to-right shift (1.2 cm) is seen.      CT Head No Cont (08.29.23 @ 00:31)   IMPRESSION:  Continued evolution of large acute left MCA territory infarct.   Left-to-right midline shift measuring 6 mm, increased. No hydrocephalus   or effacement of basal cisterns. Curvilinear foci of hyperdensity within   the area of infarct, for which petechial hemorrhage cannot be excluded.   No large parenchymal hemorrhage.    CT Abdomen and Pelvis w/ IV Cont (08.28.23 @ 02:53)   IMPRESSION:  No evidence for bladder rupture.    CT Brain Stroke Protocol (08.27.23 @ 11:53)   IMPRESSION:  Wedgelike low density in the left putamen and caudate (corpus striatum)   is new from the prior scan, and consistent with infarct that may be acute   given corresponding symptoms.  No acute intracranial hemorrhage.    The study was performed at 11:47 AM on 08/27/2023 and the above findings   were discussed with Dr. Betts at 12:02 PM.    CT Angio Brain Stroke Protocol  w/ IV Cont (08.27.23 @ 12:03)   IMPRESSION:  CTA head: Focal occlusion of left MCA distal M1 segment with patent but   small caliber filling of M2 segments.    CT perfusion indicates large mismatch between Tmax and CBF consistent   with ischemic penumbra.    CTA Neck: No steno-occlusive focus.    Case findings above discussed with Dr. Hu at 12:13 PM on 08/27/2023.     CT Head No Cont (08.28.23 @ 02:34)   IMPRESSION: Acute left MCA infarct. Possible acute infarct involving the   right temporal region. This finding can be better evaluated with MRI if   there are no contraindications.    TTE Echo Complete w/o Contrast w/ Doppler (08.29.23 @ 12:03)   Summary:   1. Left ventricular ejection fraction, by visual estimation, is 55 to   60%.   2. Normal global left ventricular systolic function.   3. Normal right ventricular size and function.   4. Trace mitral valve regurgitation.   5. Mild aortic regurgitation.   6. Sclerotic aortic valve with normal opening.   7. There is no evidence of pericardial effusion.           Preliminary note, offical recommendations pending attending review/signature   [facility] Stroke Team  Progress Note     HPI:  Patient is a 63 year old male with PMHx Hypertension, DM on oral medications who presented c/o stroke-like symptoms. Per son pt was walking outside around 10:15am on 8/27/23 when he suddenly fell. His son helped him up and noted that he was weak on the right side and not acting normally which prompted him to come to the ED. On arrival pt w/ obvious right sided arm weakness, confusion, dysarthria, facial droop suggestive of acute stroke. Code stroke initiated, found to have LM1 occlusion, given TNK @ 1215 and was taken for thrombectomy. Unable to provide ROS 2/2 aphasia.    SUBJECTIVE: Noted anemia overnight.  No new neurologic complaints.  ROS reported negative unless otherwise noted.    atorvastatin 80 milliGRAM(s) Oral at bedtime  ceFAZolin  Injectable. 2000 milliGRAM(s) IV Push every 8 hours  chlorhexidine 0.12% Liquid 15 milliLiter(s) Oral Mucosa every 12 hours  chlorhexidine 2% Cloths 1 Application(s) Topical <User Schedule>  dextrose 50% Injectable 25 Gram(s) IV Push once  dextrose 50% Injectable 12.5 Gram(s) IV Push once  dextrose 50% Injectable 25 Gram(s) IV Push once  doxazosin 4 milliGRAM(s) Oral at bedtime  hydrALAZINE Injectable 10 milliGRAM(s) IV Push every 2 hours PRN  insulin lispro (ADMELOG) corrective regimen sliding scale   SubCutaneous every 6 hours  labetalol Injectable 10 milliGRAM(s) IV Push every 2 hours PRN  levETIRAcetam  IVPB 1000 milliGRAM(s) IV Intermittent every 12 hours  phenylephrine    Infusion 0.1 MICROgram(s)/kG/Min IV Continuous <Continuous>  polyethylene glycol 3350 17 Gram(s) Oral at bedtime  senna 2 Tablet(s) Oral at bedtime  sodium chloride 3%. 500 milliLiter(s) IV Continuous <Continuous>      PHYSICAL EXAM:   Vital Signs Last 24 Hrs  T(C): 37.6 (31 Aug 2023 06:45), Max: 38.5 (31 Aug 2023 00:00)  T(F): 99.7 (31 Aug 2023 06:45), Max: 101.3 (31 Aug 2023 00:00)  HR: 79 (31 Aug 2023 06:45) (75 - 116)  BP: 129/82 (31 Aug 2023 06:45) (81/65 - 129/82)  BP(mean): 96 (31 Aug 2023 06:45) (69 - 109)  RR: 21 (31 Aug 2023 06:45) (7 - 31)  SpO2: 100% (31 Aug 2023 06:45) (97% - 100%)    Parameters below as of 30 Aug 2023 20:00  Patient On (Oxygen Delivery Method): BiPAP/CPAP    O2 Concentration (%): 30         General: No acute distress    NEUROLOGICAL EXAM:  Mental status: eyes closed, opens briefly to voice, extends out left hand, no verbal output , following some simple commands., perseverates   Cranial Nerves: pupils 3mm and reactive, right gaze palsy, RHHA to threat, right facial palsy, few beats of right gaze nystagmus   Motor exam: Normal tone, right hemiplegia with extensor posturing in arm and slight triple flexion in leg,  5/5 LUE, 5/5 LLE   Sensation: Intact to noxious stimuli    Coordination/ Gait: gait not tested    LABS:                        8.3    16.95 )-----------( 180      ( 31 Aug 2023 02:38 )             24.6    08-31    145  |  113<H>  |  18.3  ----------------------------<  155<H>  4.0   |  23.0  |  0.83    Ca    8.1<L>      31 Aug 2023 02:38  Phos  1.9     08-31  Mg     2.2     08-31    TPro  5.7<L>  /  Alb  3.3  /  TBili  0.8  /  DBili  x   /  AST  24  /  ALT  24  /  AlkPhos  45  08-30  PT/INR - ( 30 Aug 2023 06:30 )   PT: 13.0 sec;   INR: 1.18 ratio         PTT - ( 30 Aug 2023 06:30 )  PTT:28.5 sec      IMAGING: Reviewed by me.   CT Head No Cont (08.30.23 @ 15:44)   Postop changes compatible with a left temporal frontal craniectomy is   identified. There is some extension of the parenchyma through the   craniectomy defect identified. There is abnormal low-attenuation again   seen involving left temporal frontal parietal region involving the left   basal ganglia and corona radiata region. This is compatible with an   evolving acute left MCA infarct. Previously noted areas of high   attenuation is less conspicuous which could be compatible with evolving   areas of hemorrhage. Mass effect on the left lateral ventricle is seen.   Left-to-right shift (3.1 mm) is seen.    There is extra-axial hematoma fluid and soft tissue swelling seen seen in   the postop region with extra-axial drain comedies findings are compatible   with postoperative changes.    The visualized paranasal sinuses mastoid andmiddle regions appear clear.    IMPRESSION:  New changes as described above.      CT Head No Cont (08.29.23 @ 23:39)   IMPRESSION: Evolving acute left MCA infarct is identified.   Mass effect on the left lateral ventricle is   again seen. Slight increased left-to-right shift (1.2 cm) is seen.      CT Head No Cont (08.29.23 @ 00:31)   IMPRESSION:  Continued evolution of large acute left MCA territory infarct.   Left-to-right midline shift measuring 6 mm, increased. No hydrocephalus   or effacement of basal cisterns. Curvilinear foci of hyperdensity within   the area of infarct, for which petechial hemorrhage cannot be excluded.   No large parenchymal hemorrhage.    CT Abdomen and Pelvis w/ IV Cont (08.28.23 @ 02:53)   IMPRESSION:  No evidence for bladder rupture.    CT Brain Stroke Protocol (08.27.23 @ 11:53)   IMPRESSION:  Wedgelike low density in the left putamen and caudate (corpus striatum)   is new from the prior scan, and consistent with infarct that may be acute   given corresponding symptoms.  No acute intracranial hemorrhage.    The study was performed at 11:47 AM on 08/27/2023 and the above findings   were discussed with Dr. Betts at 12:02 PM.    CT Angio Brain Stroke Protocol  w/ IV Cont (08.27.23 @ 12:03)   IMPRESSION:  CTA head: Focal occlusion of left MCA distal M1 segment with patent but   small caliber filling of M2 segments.    CT perfusion indicates large mismatch between Tmax and CBF consistent   with ischemic penumbra.    CTA Neck: No steno-occlusive focus.    Case findings above discussed with Dr. Hu at 12:13 PM on 08/27/2023.     CT Head No Cont (08.28.23 @ 02:34)   IMPRESSION: Acute left MCA infarct. Possible acute infarct involving the   right temporal region. This finding can be better evaluated with MRI if   there are no contraindications.    TTE Echo Complete w/o Contrast w/ Doppler (08.29.23 @ 12:03)   Summary:   1. Left ventricular ejection fraction, by visual estimation, is 55 to   60%.   2. Normal global left ventricular systolic function.   3. Normal right ventricular size and function.   4. Trace mitral valve regurgitation.   5. Mild aortic regurgitation.   6. Sclerotic aortic valve with normal opening.   7. There is no evidence of pericardial effusion.             [facility] Stroke Team  Progress Note     HPI:  Patient is a 63 year old male with PMHx Hypertension, DM on oral medications who presented c/o stroke-like symptoms. Per son pt was walking outside around 10:15am on 8/27/23 when he suddenly fell. His son helped him up and noted that he was weak on the right side and not acting normally which prompted him to come to the ED. On arrival pt w/ obvious right sided arm weakness, confusion, dysarthria, facial droop suggestive of acute stroke. Code stroke initiated, found to have LM1 occlusion, given TNK @ 1215 and was taken for thrombectomy. Unable to provide ROS 2/2 aphasia.    SUBJECTIVE: Noted anemia overnight.  No new neurologic complaints.  ROS reported negative unless otherwise noted.    atorvastatin 80 milliGRAM(s) Oral at bedtime  ceFAZolin  Injectable. 2000 milliGRAM(s) IV Push every 8 hours  chlorhexidine 0.12% Liquid 15 milliLiter(s) Oral Mucosa every 12 hours  chlorhexidine 2% Cloths 1 Application(s) Topical <User Schedule>  dextrose 50% Injectable 25 Gram(s) IV Push once  dextrose 50% Injectable 12.5 Gram(s) IV Push once  dextrose 50% Injectable 25 Gram(s) IV Push once  doxazosin 4 milliGRAM(s) Oral at bedtime  hydrALAZINE Injectable 10 milliGRAM(s) IV Push every 2 hours PRN  insulin lispro (ADMELOG) corrective regimen sliding scale   SubCutaneous every 6 hours  labetalol Injectable 10 milliGRAM(s) IV Push every 2 hours PRN  levETIRAcetam  IVPB 1000 milliGRAM(s) IV Intermittent every 12 hours  phenylephrine    Infusion 0.1 MICROgram(s)/kG/Min IV Continuous <Continuous>  polyethylene glycol 3350 17 Gram(s) Oral at bedtime  senna 2 Tablet(s) Oral at bedtime  sodium chloride 3%. 500 milliLiter(s) IV Continuous <Continuous>      PHYSICAL EXAM:   Vital Signs Last 24 Hrs  T(C): 37.6 (31 Aug 2023 06:45), Max: 38.5 (31 Aug 2023 00:00)  T(F): 99.7 (31 Aug 2023 06:45), Max: 101.3 (31 Aug 2023 00:00)  HR: 79 (31 Aug 2023 06:45) (75 - 116)  BP: 129/82 (31 Aug 2023 06:45) (81/65 - 129/82)  BP(mean): 96 (31 Aug 2023 06:45) (69 - 109)  RR: 21 (31 Aug 2023 06:45) (7 - 31)  SpO2: 100% (31 Aug 2023 06:45) (97% - 100%)    Parameters below as of 30 Aug 2023 20:00  Patient On (Oxygen Delivery Method): BiPAP/CPAP    O2 Concentration (%): 30         General: No acute distress    NEUROLOGICAL EXAM:  Mental status: eyes closed, opens briefly to voice, extends out left hand, no verbal output , following some simple commands., perseverates   Cranial Nerves: pupils 3mm and reactive, right gaze palsy, RHHA to threat, right facial palsy, few beats of right gaze nystagmus   Motor exam: Normal tone, right hemiplegia with extensor posturing in arm and slight triple flexion in leg,  5/5 LUE, 5/5 LLE   Sensation: Intact to noxious stimuli    Coordination/ Gait: gait not tested    LABS:                        8.3    16.95 )-----------( 180      ( 31 Aug 2023 02:38 )             24.6    08-31    145  |  113<H>  |  18.3  ----------------------------<  155<H>  4.0   |  23.0  |  0.83    Ca    8.1<L>      31 Aug 2023 02:38  Phos  1.9     08-31  Mg     2.2     08-31    TPro  5.7<L>  /  Alb  3.3  /  TBili  0.8  /  DBili  x   /  AST  24  /  ALT  24  /  AlkPhos  45  08-30  PT/INR - ( 30 Aug 2023 06:30 )   PT: 13.0 sec;   INR: 1.18 ratio         PTT - ( 30 Aug 2023 06:30 )  PTT:28.5 sec      IMAGING: Reviewed by me.   CT Head No Cont (08.30.23 @ 15:44)   Postop changes compatible with a left temporal frontal craniectomy is   identified. There is some extension of the parenchyma through the   craniectomy defect identified. There is abnormal low-attenuation again   seen involving left temporal frontal parietal region involving the left   basal ganglia and corona radiata region. This is compatible with an   evolving acute left MCA infarct. Previously noted areas of high   attenuation is less conspicuous which could be compatible with evolving   areas of hemorrhage. Mass effect on the left lateral ventricle is seen.   Left-to-right shift (3.1 mm) is seen.    There is extra-axial hematoma fluid and soft tissue swelling seen seen in   the postop region with extra-axial drain comedies findings are compatible   with postoperative changes.    The visualized paranasal sinuses mastoid andmiddle regions appear clear.    IMPRESSION:  New changes as described above.      CT Head No Cont (08.29.23 @ 23:39)   IMPRESSION: Evolving acute left MCA infarct is identified.   Mass effect on the left lateral ventricle is   again seen. Slight increased left-to-right shift (1.2 cm) is seen.      CT Head No Cont (08.29.23 @ 00:31)   IMPRESSION:  Continued evolution of large acute left MCA territory infarct.   Left-to-right midline shift measuring 6 mm, increased. No hydrocephalus   or effacement of basal cisterns. Curvilinear foci of hyperdensity within   the area of infarct, for which petechial hemorrhage cannot be excluded.   No large parenchymal hemorrhage.    CT Abdomen and Pelvis w/ IV Cont (08.28.23 @ 02:53)   IMPRESSION:  No evidence for bladder rupture.    CT Brain Stroke Protocol (08.27.23 @ 11:53)   IMPRESSION:  Wedgelike low density in the left putamen and caudate (corpus striatum)   is new from the prior scan, and consistent with infarct that may be acute   given corresponding symptoms.  No acute intracranial hemorrhage.    The study was performed at 11:47 AM on 08/27/2023 and the above findings   were discussed with Dr. Betts at 12:02 PM.    CT Angio Brain Stroke Protocol  w/ IV Cont (08.27.23 @ 12:03)   IMPRESSION:  CTA head: Focal occlusion of left MCA distal M1 segment with patent but   small caliber filling of M2 segments.    CT perfusion indicates large mismatch between Tmax and CBF consistent   with ischemic penumbra.    CTA Neck: No steno-occlusive focus.    Case findings above discussed with Dr. Hu at 12:13 PM on 08/27/2023.     CT Head No Cont (08.28.23 @ 02:34)   IMPRESSION: Acute left MCA infarct. Possible acute infarct involving the   right temporal region. This finding can be better evaluated with MRI if   there are no contraindications.    TTE Echo Complete w/o Contrast w/ Doppler (08.29.23 @ 12:03)   Summary:   1. Left ventricular ejection fraction, by visual estimation, is 55 to   60%.   2. Normal global left ventricular systolic function.   3. Normal right ventricular size and function.   4. Trace mitral valve regurgitation.   5. Mild aortic regurgitation.   6. Sclerotic aortic valve with normal opening.   7. There is no evidence of pericardial effusion.

## 2023-08-31 NOTE — CONSULT NOTE ADULT - NS ATTEND AMEND GEN_ALL_CORE FT
-    63M hx HTN  presents with stroke 8/27 with right sided arm weakness, confusion, dysarthria, facial droop suggestive of acute stroke.  Code stroke was initiated, found to have LM1 occlusion, given TNK  and mechanical thrombectomy. Pt developed worsening lethargy on 8/30, taken to OR for decompression hemicraniotomy. Placed on pressors for SBP >100. Remained on ventilator on 8/31     L MCA Stroke  Neurologic Encephalopathy  Cerebral Edema with brain herniation  Neurology on the case.   On statin, ASA  Cardiac consult called due to stroke possible concerning for embolic source.    SAMARA bedside tomorrow  Continue tele monitoring  ILR or outpatient MCOT if SAMARA  negative  NPO overnight starting at midnight    Respiratory Failure: Continued on ventilator  Undifferentiated Shock: Maintained on pressors
Agree with PA's assessment and plan.

## 2023-08-31 NOTE — PROGRESS NOTE ADULT - ASSESSMENT
63M w/ PMHX HTN, DM who presented with acute onset R sided weakness and aphasia, found to have LM1 occlusion, s/p TNK @ 1215. Taken for mechanical thrombectomy 8/27, TICI 2c after 3 passes.  s/p L craniectomy POD#0      Plan  - Q1 neuro checks  - Pain control PRN; avoid oversedation  - Keppra 1g BID  - SBP<160  - NPO  - Strict ins/outs s/p mannitol  - Record BMs; Miralax/Senna  - Epidural SHUN x 1 to full suction. Record output   - Ancef while drain in  - Sodium goal 145, continue 3%  - b/l SCDs; hold AC/AP/chemical DVT prophylaxis   - CTH 4 hours reviewed. MLS improved  - PT/OT  - No left side of skull, avoid prolong pressure on Left side, careful w/ positioning/changing/turning   - Intubated  - Neurology/NeuroIR following  - Medical management/supportive care per NSICU  - Will discuss with attending on am rounds

## 2023-08-31 NOTE — PROGRESS NOTE ADULT - ASSESSMENT
Assessment: Patient is a 63 year old male with LM1 occlusion s/p IV tenecteplase @12:10 s/p cerebral angiogram for LT M1 mechanical thrombectomy.     The patient is critically ill due to the following disease processes:  - Neurologic Encephalopathy  - Cerebral Edema with brain herniation  - Respiratory Failure  - Undifferentiated Shock      Neuro:   #L MCA Stroke  ESUS  - Q1->2hr neurochecks  - Stroke neurology following, appreciate recs  - MRI Brain  - Non-urgent CT Chest when stable to assess occult malignancy  - SAMARA as below  - Statin, ASA POD7  - HyperCoag Panel    CV:  Liberalize MAP >65, SBP <160  Remove IJ if able to d/c pressors  Appreciate cardiology for SAMARA  Statin     Resp:  room air  SpO2>92%    GI:  failed sp/sw due to lethargy  TF via NGT  bowel regimen    ID:    afebrile  leukocytosis, monitor    Renal/:  d/c longo   Monitor UOP  IV fluids while NPO  d/c 3%, Na goal normal  Cardura -> Flomax    Endocrine:  HgbA1c 6.8    FS q6h, ISS    Heme:  SQL POD2  SCDs    My full attention was spent providing medically necessary critical care to the patient with details documented in my note above.   Critical care time spent examining patient, reviewing vitals, labs, medications, imaging and discussing with the team goals of care   The combined critical care time provided to the patient was between 40 - 110 minutes  This time does not include bedside procedures that are documented separately.

## 2023-08-31 NOTE — CHART NOTE - NSCHARTNOTEFT_GEN_A_CORE
JOSUE [id number]      Drain type: L epidural SHUN drain    Drain was taken off of suction and removed while patient was laying supine and flat. Patient tolerated well. No complications. Exit site was secured with 2 staples.

## 2023-08-31 NOTE — PROGRESS NOTE ADULT - ASSESSMENT
INCOMPLETE - VISIT PENDING   ASSESSMENT: Patient is a 63 year old male with PMHx Hypertension, DM on oral medications who presented c/o stroke-like symptoms. Per son pt was walking outside around 10:15am on 8/27/23 when he suddenly fell. His son helped him up and noted that he was weak on the right side and not acting normally which prompted him to come to the ED. On arrival patient came in with right sided arm weakness, global aphasia, and dysarthria, right sided facial droop. Initial head CT demonstrated wedge like low density in the left putamen and caudate (corpus striatum) consistent with infarct that may be acute and no acute intracranial hemorrhage. Tenecteplase was administered at 12:15 pm. CTA head demonstrated occlusion of left MCA distal M1 segment and CTP showed large mismatch. found to have LM1 occlusion. Patient taken for mechanical thrombectomy where he was found to have LM1 occlusion with TICI 2C recanalization. Due to worsening of neurological exam and increase in midline shift found on CT head on 8/29/23(increased in left to right shift of around 9.5mm (previously was 5.2mm)) patient was taken to the OR on 8/30/23 for decompressive hemicraniectomy.   Etiology: embolic stroke of undetermined source     NEURO:   -    -Continue close monitoring for neurologic deterioration in setting of cerebral edema, mass effect, and brain compression  -Avoid hyponatremia and rapid fluctuations in serum Na     - Stroke neuro checks q 1 as per neuro ICU  - Permissive HTN up to  as per neuro ICU. Avoid hypotension and rapid fluctuations    -ANTITHROMBOTIC THERAPY: On hold at this time due to going into the OR. Defer to neurosx when to restart ASA 81mg daily  -AED in place per nsx   -titrate statin to LDL goal less than 70.  LFT monitoring.  -Obtain MRI Brain w/o  -Dysphagia screen: Fail. NGT/TF per primary team , SLP follow up when stable   -Physical therapy/OT/Speech eval/treatment.     -CARDIOVASCULAR: TTE as noted , cardiac monitoring w/ telemetry for now, further evaluation pending findings of noted workup, eventual consideration in SAMARA once stable given undetermined source at this time                              -HEMATOLOGY: H/H with anemia, Monitor for signs and symptoms of bleeding, transfusion per primary team to Hgb > 8. Platelets 180. patient should have all age and risk appropriate malignancy screenings with PCP or sooner if clinically suspected, hypercoagulation panel, pending findings - consideration in CT Chest, had abdomen/pelvic imaging- noted enlarged prostate- confirm there is  no evidence of malignancy.     DVT ppx: Heparin s.c [] LMWH [] SCD for now. Pharmacological dvt ppx when cleared by nsx     PULMONARY: vent care per ICU    RENAL: BUN/Cr without acute changemonitor urine output, maintain adequate hydration. Noted fullness on 8/28/23 physical exam around right thigh. CT abdomen pelvis ruled out bladder injury. No evidence of hematoma on US.      Na Goal:   Avoid hyponatremia and rapid fluctuations. Currently on 3% NaCl - goal pe ICU/nsx    ID: afebrile, leukocytosis 14.46. monitor for si/sx of infection.     OTHER:  A1C is 6.8%. Suggest endocrine follow up for overall long term glycemic control. Condition and plan of care d/w patient, questions and concerns addressed.     DISPOSITION: Rehab or home depending on PT eval once stable and workup is complete      CORE MEASURES:        Admission NIHSS: 23     Tenecteplase : [x] YES [] NO      LDL/HDL/A1C: 80/39/6.8     Depression Screen- if depression hx and/or present      Statin Therapy: as noted     Dysphagia Screen: [] PASS [] FAIL     Smoking [] YES [x] NO      Afib [] YES [x] NO     Stroke Education [] YES [] NO- ordered and pending    Obtain screening lower extremity venous ultrasound in patients who meet 1 or more of the following criteria as patient is high risk for DVT/PE on admission:   [] History of DVT/PE  []Hypercoagulable states (Factor V Leiden, Cancer, OCP, etc. )  []Prolonged immobility (hemiplegia/hemiparesis/post operative or any other extended immobilization)  [] Transferred from outside facility (Rehab or Long term care)  [] Age </= to 50 INCOMPLETE -     ASSESSMENT: Patient is a 63 year old male with PMHx Hypertension, DM on oral medications who presented c/o stroke-like symptoms. Per son pt was walking outside around 10:15am on 8/27/23 when he suddenly fell. His son helped him up and noted that he was weak on the right side and not acting normally which prompted him to come to the ED. On arrival patient came in with right sided arm weakness, global aphasia, and dysarthria, right sided facial droop. Initial head CT demonstrated wedge like low density in the left putamen and caudate (corpus striatum) consistent with infarct that may be acute and no acute intracranial hemorrhage. Tenecteplase was administered at 12:15 pm. CTA head demonstrated occlusion of left MCA distal M1 segment and CTP showed large mismatch. found to have LM1 occlusion. Patient taken for mechanical thrombectomy where he was found to have LM1 occlusion with TICI 2C recanalization. Due to worsening of neurological exam and increase in midline shift found on CT head on 8/29/23(increased in left to right shift of around 9.5mm (previously was 5.2mm)) patient was taken to the OR on 8/30/23 for decompressive hemicraniectomy.   Etiology: embolic stroke of undetermined source     NEURO:   -    -Continue close monitoring for neurologic deterioration in setting of cerebral edema, mass effect, and brain compression  -Avoid hyponatremia and rapid fluctuations in serum Na     - Stroke neuro checks q 1 as per neuro ICU  - Permissive HTN up to  as per neuro ICU. Avoid hypotension and rapid fluctuations    -ANTITHROMBOTIC THERAPY: On hold at this time due to going into the OR. Defer to neurosx when to restart ASA 81mg daily  -AED in place per nsx   -titrate statin to LDL goal less than 70.  LFT monitoring.  -Obtain MRI Brain w/o  -Dysphagia screen: Fail. NGT/TF per primary team , SLP follow up when stable   -Physical therapy/OT/Speech eval/treatment.     -CARDIOVASCULAR: TTE as noted , cardiac monitoring w/ telemetry for now, further evaluation pending findings of noted workup, eventual consideration in SAMARA once stable given undetermined source at this time                              -HEMATOLOGY: H/H with anemia, Monitor for signs and symptoms of bleeding, transfusion per primary team to Hgb > 8. Platelets 180. patient should have all age and risk appropriate malignancy screenings with PCP or sooner if clinically suspected, hypercoagulation panel, pending findings - consideration in CT Chest, had abdomen/pelvic imaging- noted enlarged prostate- confirm there is  no evidence of malignancy.     DVT ppx: Heparin s.c [] LMWH [] SCD for now. Pharmacological dvt ppx when cleared by nsx     PULMONARY: vent care per ICU    RENAL: BUN/Cr without acute changemonitor urine output, maintain adequate hydration. Noted fullness on 8/28/23 physical exam around right thigh. CT abdomen pelvis ruled out bladder injury. No evidence of hematoma on US.      Na Goal:   Avoid hyponatremia and rapid fluctuations. Currently on 3% NaCl - goal pe ICU/nsx    ID: afebrile, leukocytosis 14.46. monitor for si/sx of infection.     OTHER:  A1C is 6.8%. Suggest endocrine follow up for overall long term glycemic control. Condition and plan of care d/w patient, questions and concerns addressed.     DISPOSITION: Rehab or home depending on PT eval once stable and workup is complete      CORE MEASURES:        Admission NIHSS: 23     Tenecteplase : [x] YES [] NO      LDL/HDL/A1C: 80/39/6.8     Depression Screen- if depression hx and/or present      Statin Therapy: as noted     Dysphagia Screen: [] PASS [] FAIL     Smoking [] YES [x] NO      Afib [] YES [x] NO     Stroke Education [] YES [] NO- ordered and pending    Obtain screening lower extremity venous ultrasound in patients who meet 1 or more of the following criteria as patient is high risk for DVT/PE on admission:   [] History of DVT/PE  []Hypercoagulable states (Factor V Leiden, Cancer, OCP, etc. )  []Prolonged immobility (hemiplegia/hemiparesis/post operative or any other extended immobilization)  [] Transferred from outside facility (Rehab or Long term care)  [] Age </= to 50 -     ASSESSMENT: Patient is a 63 year old male with PMHx Hypertension, DM on oral medications who presented c/o stroke-like symptoms. Per son pt was walking outside around 10:15am on 8/27/23 when he suddenly fell. His son helped him up and noted that he was weak on the right side and not acting normally which prompted him to come to the ED. On arrival patient came in with right sided arm weakness, global aphasia, and dysarthria, right sided facial droop. Initial head CT demonstrated wedge like low density in the left putamen and caudate (corpus striatum) consistent with infarct that may be acute and no acute intracranial hemorrhage. Tenecteplase was administered at 12:15 pm. CTA head demonstrated occlusion of left MCA distal M1 segment and CTP showed large mismatch. found to have LM1 occlusion. Patient taken for mechanical thrombectomy where he was found to have LM1 occlusion with TICI 2C recanalization. Due to worsening of neurological exam and increase in midline shift found on CT head on 8/29/23(increased in left to right shift of around 9.5mm (previously was 5.2mm)) patient was taken to the OR on 8/30/23 for decompressive hemicraniectomy.   Etiology: embolic stroke of undetermined source     NEURO:   - neurologically appears to be more interactive vs. prior   -Repeat CT head as noted   -Continue close monitoring for neurologic deterioration in setting of cerebral edema, mass effect, and brain compression  -Avoid hyponatremia and rapid fluctuations in serum Na   , Na goal per ICU   - Stroke neuro checks q 1 as per neuro ICU  - SBP currently 110-140mmHg, avoid rapid fluctuations and further hypotension as appears to be neurologically tolerating    -ANTITHROMBOTIC THERAPY: On hold at this time, initiation pending clinical course, repeat neurological imaging, and nsx input.   -AED in place per nsx   -titrate statin to LDL goal less than 70.  LFT monitoring.  -Obtain MRI Brain w/o  -Dysphagia screen: Fail. NGT/TF per primary team , SLP follow up when stable   -Physical therapy/OT/Speech eval/treatment.     -CARDIOVASCULAR: TTE as noted , cardiac monitoring w/ telemetry for now, further evaluation pending findings of noted workup, eventual consideration in SAMARA once stable given undetermined source at this time                              -HEMATOLOGY: H/H with anemia, Monitor for signs and symptoms of bleeding, transfusion per primary team to Hgb > 8. Platelets 180. patient should have all age and risk appropriate malignancy screenings with PCP or sooner if clinically suspected, hypercoagulable panel,  consideration in CT Chest, had abdomen/pelvic imaging- noted enlarged prostate- confirm there is  no evidence of malignancy.     DVT ppx: Heparin s.c [] LMWH [] SCD for now. Pharmacological dvt ppx when cleared by nsx     PULMONARY: vent care per ICU    RENAL: BUN/Cr without acute change, monitor urine output, maintain adequate hydration. Noted fullness on 8/28/23 physical exam around right thigh. CT abdomen pelvis ruled out bladder injury. No evidence of hematoma on US.      Na Goal:   Avoid hyponatremia and rapid fluctuations. Currently on 2% NaCl - goal pe ICU/nsx    ID: afebrile, leukocytosis 14.46. monitor for si/sx of infection. Cefazolin post op ordered.    OTHER:  A1C is 6.8%. Suggest endocrine follow up for overall long term glycemic control. Condition and plan of care d/w ICU, questions and concerns addressed.     DISPOSITION: Rehab or home depending on PT eval once stable and workup is complete      CORE MEASURES:        Admission NIHSS: 23     Tenecteplase : [x] YES [] NO      LDL/HDL/A1C: 80/39/6.8     Depression Screen- if depression hx and/or present      Statin Therapy: as noted     Dysphagia Screen: [] PASS [] FAIL     Smoking [] YES [x] NO      Afib [] YES [x] NO     Stroke Education [] YES [] NO- ordered and pending    Obtain screening lower extremity venous ultrasound in patients who meet 1 or more of the following criteria as patient is high risk for DVT/PE on admission:   [] History of DVT/PE  []Hypercoagulable states (Factor V Leiden, Cancer, OCP, etc. )  []Prolonged immobility (hemiplegia/hemiparesis/post operative or any other extended immobilization)  [] Transferred from outside facility (Rehab or Long term care)  [] Age </= to 50

## 2023-08-31 NOTE — CHART NOTE - NSCHARTNOTEFT_GEN_A_CORE
Patient's Hb decreased which was to be expected from operation and blood transfusion consent covered from operative consent. Ok to give blood transfusion.

## 2023-09-01 LAB
ANION GAP SERPL CALC-SCNC: 8 MMOL/L — SIGNIFICANT CHANGE UP (ref 5–17)
ANION GAP SERPL CALC-SCNC: 8 MMOL/L — SIGNIFICANT CHANGE UP (ref 5–17)
ANION GAP SERPL CALC-SCNC: 9 MMOL/L — SIGNIFICANT CHANGE UP (ref 5–17)
BASE EXCESS BLDV CALC-SCNC: 4.6 MMOL/L — HIGH (ref -2–3)
BUN SERPL-MCNC: 16 MG/DL — SIGNIFICANT CHANGE UP (ref 8–20)
BUN SERPL-MCNC: 16.6 MG/DL — SIGNIFICANT CHANGE UP (ref 8–20)
BUN SERPL-MCNC: 16.9 MG/DL — SIGNIFICANT CHANGE UP (ref 8–20)
CA-I SERPL-SCNC: 1.13 MMOL/L — LOW (ref 1.15–1.33)
CALCIUM SERPL-MCNC: 8.2 MG/DL — LOW (ref 8.4–10.5)
CALCIUM SERPL-MCNC: 8.4 MG/DL — SIGNIFICANT CHANGE UP (ref 8.4–10.5)
CALCIUM SERPL-MCNC: 8.5 MG/DL — SIGNIFICANT CHANGE UP (ref 8.4–10.5)
CHLORIDE BLDV-SCNC: 114 MMOL/L — HIGH (ref 96–108)
CHLORIDE SERPL-SCNC: 113 MMOL/L — HIGH (ref 96–108)
CHLORIDE SERPL-SCNC: 113 MMOL/L — HIGH (ref 96–108)
CHLORIDE SERPL-SCNC: 114 MMOL/L — HIGH (ref 96–108)
CO2 SERPL-SCNC: 25 MMOL/L — SIGNIFICANT CHANGE UP (ref 22–29)
CO2 SERPL-SCNC: 26 MMOL/L — SIGNIFICANT CHANGE UP (ref 22–29)
CO2 SERPL-SCNC: 27 MMOL/L — SIGNIFICANT CHANGE UP (ref 22–29)
CREAT SERPL-MCNC: 0.65 MG/DL — SIGNIFICANT CHANGE UP (ref 0.5–1.3)
CREAT SERPL-MCNC: 0.66 MG/DL — SIGNIFICANT CHANGE UP (ref 0.5–1.3)
CREAT SERPL-MCNC: 0.69 MG/DL — SIGNIFICANT CHANGE UP (ref 0.5–1.3)
EGFR: 105 ML/MIN/1.73M2 — SIGNIFICANT CHANGE UP
EGFR: 106 ML/MIN/1.73M2 — SIGNIFICANT CHANGE UP
EGFR: 107 ML/MIN/1.73M2 — SIGNIFICANT CHANGE UP
GAS PNL BLDV: 146 MMOL/L — HIGH (ref 136–145)
GAS PNL BLDV: SIGNIFICANT CHANGE UP
GLUCOSE BLDC GLUCOMTR-MCNC: 119 MG/DL — HIGH (ref 70–99)
GLUCOSE BLDC GLUCOMTR-MCNC: 126 MG/DL — HIGH (ref 70–99)
GLUCOSE BLDC GLUCOMTR-MCNC: 138 MG/DL — HIGH (ref 70–99)
GLUCOSE BLDV-MCNC: 120 MG/DL — HIGH (ref 70–99)
GLUCOSE SERPL-MCNC: 133 MG/DL — HIGH (ref 70–99)
GLUCOSE SERPL-MCNC: 134 MG/DL — HIGH (ref 70–99)
GLUCOSE SERPL-MCNC: 138 MG/DL — HIGH (ref 70–99)
HCO3 BLDV-SCNC: 29 MMOL/L — SIGNIFICANT CHANGE UP (ref 22–29)
HCT VFR BLD CALC: 25.7 % — LOW (ref 39–50)
HCT VFR BLD CALC: 26.1 % — LOW (ref 39–50)
HCT VFR BLDA CALC: 35 % — SIGNIFICANT CHANGE UP
HCYS SERPL-MCNC: 7.5 UMOL/L — SIGNIFICANT CHANGE UP
HGB BLD CALC-MCNC: 11.5 G/DL — LOW (ref 12.6–17.4)
HGB BLD-MCNC: 8.5 G/DL — LOW (ref 13–17)
HGB BLD-MCNC: 8.6 G/DL — LOW (ref 13–17)
LACTATE BLDV-MCNC: 1.1 MMOL/L — SIGNIFICANT CHANGE UP (ref 0.5–2)
MAGNESIUM SERPL-MCNC: 2.1 MG/DL — SIGNIFICANT CHANGE UP (ref 1.6–2.6)
MCHC RBC-ENTMCNC: 31.6 PG — SIGNIFICANT CHANGE UP (ref 27–34)
MCHC RBC-ENTMCNC: 31.7 PG — SIGNIFICANT CHANGE UP (ref 27–34)
MCHC RBC-ENTMCNC: 33 GM/DL — SIGNIFICANT CHANGE UP (ref 32–36)
MCHC RBC-ENTMCNC: 33.1 GM/DL — SIGNIFICANT CHANGE UP (ref 32–36)
MCV RBC AUTO: 95.5 FL — SIGNIFICANT CHANGE UP (ref 80–100)
MCV RBC AUTO: 96.3 FL — SIGNIFICANT CHANGE UP (ref 80–100)
PCO2 BLDV: 46 MMHG — SIGNIFICANT CHANGE UP (ref 42–55)
PH BLDV: 7.41 — SIGNIFICANT CHANGE UP (ref 7.32–7.43)
PHOSPHATE SERPL-MCNC: 2 MG/DL — LOW (ref 2.4–4.7)
PLATELET # BLD AUTO: 164 K/UL — SIGNIFICANT CHANGE UP (ref 150–400)
PLATELET # BLD AUTO: 172 K/UL — SIGNIFICANT CHANGE UP (ref 150–400)
PO2 BLDV: 102 MMHG — HIGH (ref 25–45)
POTASSIUM BLDV-SCNC: 3.7 MMOL/L — SIGNIFICANT CHANGE UP (ref 3.5–5.1)
POTASSIUM SERPL-MCNC: 3.5 MMOL/L — SIGNIFICANT CHANGE UP (ref 3.5–5.3)
POTASSIUM SERPL-MCNC: 3.5 MMOL/L — SIGNIFICANT CHANGE UP (ref 3.5–5.3)
POTASSIUM SERPL-MCNC: 3.6 MMOL/L — SIGNIFICANT CHANGE UP (ref 3.5–5.3)
POTASSIUM SERPL-SCNC: 3.5 MMOL/L — SIGNIFICANT CHANGE UP (ref 3.5–5.3)
POTASSIUM SERPL-SCNC: 3.5 MMOL/L — SIGNIFICANT CHANGE UP (ref 3.5–5.3)
POTASSIUM SERPL-SCNC: 3.6 MMOL/L — SIGNIFICANT CHANGE UP (ref 3.5–5.3)
RBC # BLD: 2.69 M/UL — LOW (ref 4.2–5.8)
RBC # BLD: 2.71 M/UL — LOW (ref 4.2–5.8)
RBC # FLD: 15 % — HIGH (ref 10.3–14.5)
RBC # FLD: 15 % — HIGH (ref 10.3–14.5)
SAO2 % BLDV: 99.1 % — SIGNIFICANT CHANGE UP
SODIUM SERPL-SCNC: 146 MMOL/L — HIGH (ref 135–145)
SODIUM SERPL-SCNC: 148 MMOL/L — HIGH (ref 135–145)
SODIUM SERPL-SCNC: 149 MMOL/L — HIGH (ref 135–145)
WBC # BLD: 12.66 K/UL — HIGH (ref 3.8–10.5)
WBC # BLD: 13.1 K/UL — HIGH (ref 3.8–10.5)
WBC # FLD AUTO: 12.66 K/UL — HIGH (ref 3.8–10.5)
WBC # FLD AUTO: 13.1 K/UL — HIGH (ref 3.8–10.5)

## 2023-09-01 PROCEDURE — 71045 X-RAY EXAM CHEST 1 VIEW: CPT | Mod: 26

## 2023-09-01 PROCEDURE — 99232 SBSQ HOSP IP/OBS MODERATE 35: CPT

## 2023-09-01 PROCEDURE — 93312 ECHO TRANSESOPHAGEAL: CPT | Mod: 26

## 2023-09-01 PROCEDURE — 93325 DOPPLER ECHO COLOR FLOW MAPG: CPT | Mod: 26

## 2023-09-01 PROCEDURE — 93320 DOPPLER ECHO COMPLETE: CPT | Mod: 26

## 2023-09-01 PROCEDURE — 99291 CRITICAL CARE FIRST HOUR: CPT

## 2023-09-01 RX ORDER — POTASSIUM PHOSPHATE, MONOBASIC POTASSIUM PHOSPHATE, DIBASIC 236; 224 MG/ML; MG/ML
15 INJECTION, SOLUTION INTRAVENOUS ONCE
Refills: 0 | Status: COMPLETED | OUTPATIENT
Start: 2023-09-01 | End: 2023-09-01

## 2023-09-01 RX ORDER — SODIUM CHLORIDE 5 G/100ML
1000 INJECTION, SOLUTION INTRAVENOUS
Refills: 0 | Status: DISCONTINUED | OUTPATIENT
Start: 2023-09-01 | End: 2023-09-03

## 2023-09-01 RX ORDER — CALCIUM GLUCONATE 100 MG/ML
1 VIAL (ML) INTRAVENOUS ONCE
Refills: 0 | Status: COMPLETED | OUTPATIENT
Start: 2023-09-01 | End: 2023-09-01

## 2023-09-01 RX ORDER — ACETAMINOPHEN 500 MG
1000 TABLET ORAL ONCE
Refills: 0 | Status: COMPLETED | OUTPATIENT
Start: 2023-09-01 | End: 2023-09-02

## 2023-09-01 RX ORDER — LEVETIRACETAM 250 MG/1
500 TABLET, FILM COATED ORAL EVERY 12 HOURS
Refills: 0 | Status: DISCONTINUED | OUTPATIENT
Start: 2023-09-01 | End: 2023-09-29

## 2023-09-01 RX ORDER — POTASSIUM CHLORIDE 20 MEQ
40 PACKET (EA) ORAL ONCE
Refills: 0 | Status: COMPLETED | OUTPATIENT
Start: 2023-09-01 | End: 2023-09-01

## 2023-09-01 RX ORDER — ACETAMINOPHEN 500 MG
1000 TABLET ORAL ONCE
Refills: 0 | Status: COMPLETED | OUTPATIENT
Start: 2023-09-01 | End: 2023-09-01

## 2023-09-01 RX ORDER — SENNA PLUS 8.6 MG/1
2 TABLET ORAL AT BEDTIME
Refills: 0 | Status: DISCONTINUED | OUTPATIENT
Start: 2023-09-01 | End: 2023-09-29

## 2023-09-01 RX ORDER — SODIUM CHLORIDE 5 G/100ML
1000 INJECTION, SOLUTION INTRAVENOUS
Refills: 0 | Status: DISCONTINUED | OUTPATIENT
Start: 2023-09-01 | End: 2023-09-01

## 2023-09-01 RX ORDER — PROPOFOL 10 MG/ML
10 INJECTION, EMULSION INTRAVENOUS ONCE
Refills: 0 | Status: COMPLETED | OUTPATIENT
Start: 2023-09-01 | End: 2023-09-01

## 2023-09-01 RX ORDER — POLYETHYLENE GLYCOL 3350 17 G/17G
17 POWDER, FOR SOLUTION ORAL
Refills: 0 | Status: DISCONTINUED | OUTPATIENT
Start: 2023-09-01 | End: 2023-09-29

## 2023-09-01 RX ORDER — POTASSIUM CHLORIDE 20 MEQ
40 PACKET (EA) ORAL ONCE
Refills: 0 | Status: DISCONTINUED | OUTPATIENT
Start: 2023-09-01 | End: 2023-09-01

## 2023-09-01 RX ADMIN — POTASSIUM PHOSPHATE, MONOBASIC POTASSIUM PHOSPHATE, DIBASIC 62.5 MILLIMOLE(S): 236; 224 INJECTION, SOLUTION INTRAVENOUS at 08:34

## 2023-09-01 RX ADMIN — SODIUM CHLORIDE 75 MILLILITER(S): 5 INJECTION, SOLUTION INTRAVENOUS at 06:45

## 2023-09-01 RX ADMIN — Medication 3 MILLILITER(S): at 16:13

## 2023-09-01 RX ADMIN — LEVETIRACETAM 400 MILLIGRAM(S): 250 TABLET, FILM COATED ORAL at 05:28

## 2023-09-01 RX ADMIN — PROPOFOL 10 MILLIGRAM(S): 10 INJECTION, EMULSION INTRAVENOUS at 13:58

## 2023-09-01 RX ADMIN — TAMSULOSIN HYDROCHLORIDE 0.4 MILLIGRAM(S): 0.4 CAPSULE ORAL at 22:20

## 2023-09-01 RX ADMIN — SENNA PLUS 2 TABLET(S): 8.6 TABLET ORAL at 22:20

## 2023-09-01 RX ADMIN — Medication 3 MILLILITER(S): at 20:18

## 2023-09-01 RX ADMIN — Medication 4 MILLILITER(S): at 04:35

## 2023-09-01 RX ADMIN — CHLORHEXIDINE GLUCONATE 1 APPLICATION(S): 213 SOLUTION TOPICAL at 05:30

## 2023-09-01 RX ADMIN — CHLORHEXIDINE GLUCONATE 15 MILLILITER(S): 213 SOLUTION TOPICAL at 05:28

## 2023-09-01 RX ADMIN — Medication 100 GRAM(S): at 11:02

## 2023-09-01 RX ADMIN — ATORVASTATIN CALCIUM 80 MILLIGRAM(S): 80 TABLET, FILM COATED ORAL at 22:19

## 2023-09-01 RX ADMIN — SODIUM CHLORIDE 60 MILLILITER(S): 5 INJECTION, SOLUTION INTRAVENOUS at 17:20

## 2023-09-01 RX ADMIN — LEVETIRACETAM 400 MILLIGRAM(S): 250 TABLET, FILM COATED ORAL at 17:21

## 2023-09-01 RX ADMIN — Medication 40 MILLIEQUIVALENT(S): at 11:02

## 2023-09-01 RX ADMIN — Medication 3 MILLILITER(S): at 09:03

## 2023-09-01 RX ADMIN — POLYETHYLENE GLYCOL 3350 17 GRAM(S): 17 POWDER, FOR SOLUTION ORAL at 17:20

## 2023-09-01 RX ADMIN — Medication 400 MILLIGRAM(S): at 14:24

## 2023-09-01 RX ADMIN — Medication 4 MILLILITER(S): at 20:18

## 2023-09-01 RX ADMIN — Medication 3 MILLILITER(S): at 04:35

## 2023-09-01 RX ADMIN — Medication 4 MILLILITER(S): at 09:03

## 2023-09-01 RX ADMIN — Medication 1000 MILLIGRAM(S): at 15:00

## 2023-09-01 NOTE — PROGRESS NOTE ADULT - ASSESSMENT
ASSESSMENT:   62 y/o M with PMHx of HTN, DM presented to ED 8/27 with R arm weakness, confusion, dysarthria, R facial droop. Given tenecteplase 8/27 @ 12:15, s/p cerebral angiogram mechanical thrombectomy for L M1 occlusion TICI 2C. Worsening neurological exam, s/p emergent L hemicraniectomy 8/30.     PLAN:   - q1hr neuro checks  - Pain control PRN; avoid oversedation  - Keppra 1g BID  - SBP<140  - NPO  - bowel regimen: Miralax/Senna  - 2% @60mL/hr   - DVT ppx: SCDs  - Medical management/supportive care per NSICU  - final plan pend discussion with surgeon in AM

## 2023-09-01 NOTE — CHART NOTE - NSCHARTNOTEFT_GEN_A_CORE
Removal of left IJ central venous catheter    Patient placed Trendelenburg. Central line was removed under standard fashion. Catheter intact upon removal. Direct pressure was applied to stop bleeding and tegaderm dressing was applied after hemostasis achieved. Patient tolerated procedure well. Vitals stable during the procedure. No complications.

## 2023-09-01 NOTE — PROGRESS NOTE ADULT - SUBJECTIVE AND OBJECTIVE BOX
Chief complaint:   Patient is a 63y old  Male who presents with a chief complaint of LT M1 Occlusion (30 Aug 2023 07:09)    HPI:  Patient is a 63 year old male with PMH Hypertension, DM on oral medications who presents c/o stroke-like symptoms. Per son pt was walking outside around 10:15am this morning when he suddenly fell. His son helped him up and noted that he was weak on the right side and not acting normally which prompted him to come to the ED. On arrival pt w/ obvious right sided arm weakness, confusion, dysarthria, facial droop suggestive of acute stroke. Code stroke initiated, found to have LM1 occlusion, given TNK @ 1215 and was taken for thrombectomy. Unable to provide ROS 2/2 aphasia  (27 Aug 2023 16:45)    24hr EVENTS:  8/30 - worsening lethargy, taken to OR for DHC. Placed on pressors for SBP >100  8/31 - placed on Pressure Support ventilation with minimal settings, doing fine. CTH with extra-axial blood and worsening MLS. LMCA infarct completed  9/1 - SAMRAA today    ROS: [x ]  Unable to assess due to mental status   All other systems negative    -----------------------------------------------------------------------------------------------------------------------------------------------------------------------------------    ------------------------------------------------------------------------------------------------------  ICU Vital Signs Last 24 Hrs  T(C): 37.7 (01 Sep 2023 12:00), Max: 38.4 (31 Aug 2023 15:45)  T(F): 99.9 (01 Sep 2023 12:00), Max: 101.1 (31 Aug 2023 15:45)  HR: 94 (01 Sep 2023 12:38) (77 - 110)  BP: 135/84 (01 Sep 2023 12:00) (100/66 - 144/104)  BP(mean): 100 (01 Sep 2023 12:00) (77 - 115)  ABP: 91/69 (31 Aug 2023 20:00) (91/57 - 118/90)  ABP(mean): 72 (31 Aug 2023 20:00) (63 - 104)  RR: 21 (01 Sep 2023 12:00) (14 - 31)  SpO2: 100% (01 Sep 2023 12:38) (97% - 100%)    O2 Parameters below as of 01 Sep 2023 12:00  Patient On (Oxygen Delivery Method): ventilator, cpap    O2 Concentration (%): 30        I&O's Summary    31 Aug 2023 07:01  -  01 Sep 2023 07:00  --------------------------------------------------------  IN: 2497 mL / OUT: 2600 mL / NET: -103 mL    01 Sep 2023 07:01  -  01 Sep 2023 12:58  --------------------------------------------------------  IN: 752.5 mL / OUT: 550 mL / NET: 202.5 mL        MEDICATIONS  (STANDING):  acetylcysteine 10%  Inhalation 4 milliLiter(s) Inhalation every 6 hours  albuterol/ipratropium for Nebulization 3 milliLiter(s) Nebulizer every 6 hours  atorvastatin 80 milliGRAM(s) Oral at bedtime  chlorhexidine 0.12% Liquid 15 milliLiter(s) Oral Mucosa every 12 hours  chlorhexidine 2% Cloths 1 Application(s) Topical <User Schedule>  dextrose 50% Injectable 25 Gram(s) IV Push once  dextrose 50% Injectable 12.5 Gram(s) IV Push once  dextrose 50% Injectable 25 Gram(s) IV Push once  insulin lispro (ADMELOG) corrective regimen sliding scale   SubCutaneous every 6 hours  levETIRAcetam  IVPB 500 milliGRAM(s) IV Intermittent every 12 hours  polyethylene glycol 3350 17 Gram(s) Oral two times a day  propofol Injectable 10 milliGRAM(s) IV Push once  senna 2 Tablet(s) Oral at bedtime  sodium chloride 2% . 1000 milliLiter(s) (60 mL/Hr) IV Continuous <Continuous>  tamsulosin 0.4 milliGRAM(s) Oral at bedtime      RESPIRATORY:  Mode: CPAP with PS  FiO2: 30  PEEP: 6  PS: 10  MAP: 10      IMAGING:   Recent imaging studies were reviewed.    LAB RESULTS:                          8.6    13.10 )-----------( 164      ( 01 Sep 2023 05:50 )             26.1           09-01    148<H>  |  113<H>  |  16.6  ----------------------------<  138<H>  3.5   |  26.0  |  0.69    Ca    8.2<L>      01 Sep 2023 05:50  Phos  2.0     09-01  Mg     2.1     09-01                PHYSICAL EXAM: pre-op  General unarousable  HEENT: MMM  Neuro:  -Mental status- No acute distress, no following commands, no EO  -CN- PERRL 3mm, EOMI, tongue midline, L facial droop  RUE ext  RLE TF  LUE Localizing with purposeful movements  LLE WD    CV: RRR  Pulm: clear to auscultation  Abd: Soft, nontender, nondistended  Ext: no noted edema in lower ext  R buttock ecchymosis/hematoma  Skin: warm, dry

## 2023-09-01 NOTE — PROGRESS NOTE ADULT - ASSESSMENT
Assessment: Patient is a 63 year old male with LM1 occlusion s/p IV tenecteplase @12:10 s/p cerebral angiogram for LT M1 mechanical thrombectomy.     The patient is critically ill due to the following disease processes:  - Neurologic Encephalopathy  - Cerebral Edema with brain herniation  - Respiratory Failure  - Undifferentiated Shock      Neuro:   #L MCA Stroke  ESUS  - Q2hr neurochecks  - Stroke neurology following, appreciate recs  - MRI Brain  - Non-urgent CT Chest when stable to assess occult malignancy  - Keppra Ppx for 7 days following C  - SAMARA today  - Statin, ASA POD7  - HyperCoag Panel    CV:  Liberalize MAP >65, SBP <160  SAMARA  Statin     Resp:  #VDRF  - minimal settings  - assess for extubation following SAMARA    GI:  TF via NGT  bowel regimen    ID:    afebrile  leukocytosis, monitor    Renal/:  Monitor UOP  IV fluids while NPO  Flomax    Endocrine:  HgbA1c 6.8    FS q6h, ISS    Heme:  SQL after 24h stability of extra-axial blood  SCDs    My full attention was spent providing medically necessary critical care to the patient with details documented in my note above.   Critical care time spent examining patient, reviewing vitals, labs, medications, imaging and discussing with the team goals of care   The combined critical care time provided to the patient was between 40 - 110 minutes  This time does not include bedside procedures that are documented separately.         Assessment: Patient is a 63 year old male with LM1 occlusion s/p IV tenecteplase @12:10 s/p cerebral angiogram for LT M1 mechanical thrombectomy.     The patient is critically ill due to the following disease processes:  - Neurologic Encephalopathy  - Cerebral Edema with brain herniation  - Respiratory Failure  - Undifferentiated Shock      Neuro:   #L MCA Stroke  ESUS  - Q2hr neurochecks  - Stroke neurology following, appreciate recs  - MRI Brain  - Non-urgent CT Chest when stable to assess occult malignancy  - Keppra Ppx for 7 days following C  - SAMARA today  - Statin, ASA POD7  - HyperCoag Panel    CV:  Liberalize MAP >65, SBP <160  SAMARA  Statin     Resp:  #VDRF  - minimal settings  - assess for extubation following SAMARA    GI:  TF via NGT  bowel regimen    ID:    afebrile  leukocytosis, monitor    Renal/:  Monitor UOP  IV fluids while NPO  Flomax    Endocrine:  Na goal 145 - 155 given worsening MLS on 2%  HgbA1c 6.8    FS q6h, ISS    Heme:  SQL after 24h stability of extra-axial blood  SCDs    My full attention was spent providing medically necessary critical care to the patient with details documented in my note above.   Critical care time spent examining patient, reviewing vitals, labs, medications, imaging and discussing with the team goals of care   The combined critical care time provided to the patient was between 40 - 110 minutes  This time does not include bedside procedures that are documented separately.

## 2023-09-01 NOTE — PROGRESS NOTE ADULT - SUBJECTIVE AND OBJECTIVE BOX
[facility]                                                         CARDIOLOGY AT [address], [place] [address]                                                         Telephone: [phone]. Fax:[phone]                                                                             PROGRESS NOTE    Reason for follow up:   Stroke etilogy  Update:   Pending bedside SAMARA  Review of symptoms:   Cardiac:  No chest pain. No dyspnea. No palpitations.  Respiratory: no cough. No dyspnea  Gastrointestinal: No diarrhea. No abdominal pain. No bleeding.   Neuro: No focal neuro complaints.    Vitals:  T(C): 37.5 (09-01-23 @ 08:00), Max: 38.4 (08-31-23 @ 15:45)  HR: 79 (09-01-23 @ 08:45) (65 - 110)  BP: 144/104 (09-01-23 @ 08:00) (95/66 - 144/104)  RR: 19 (09-01-23 @ 08:00) (15 - 31)  SpO2: 100% (09-01-23 @ 08:45) (97% - 100%)  I&O's Summary    31 Aug 2023 07:01  -  01 Sep 2023 07:00  --------------------------------------------------------  IN: 2497 mL / OUT: 2600 mL / NET: -103 mL  01 Sep 2023 07:01  -  01 Sep 2023 09:37  --------------------------------------------------------  IN: 212.5 mL / OUT: 0 mL / NET: 212.5 mL  Weight (kg): 81.2 (08-27 @ 11:52)    PHYSICAL EXAM:  Appearance:  Larthagic, opens eyes  HEENT:  Atraumatic. Normocephalic.  Normal oral mucosa  Neurologic: A & O x 0,  follows simple commands.    Cardiovascular: RRR S1 S2, No murmur, no rubs/gallops. No JVD  Respiratory: Lungs clear to auscultation, unlabored   Gastrointestinal:  Soft, Non-tender, + BS  Lower Extremities:  Peripheral Pulses, No clubbing, cyanosis, or edema  Psychiatry: Patient is calm. No agitation.   Skin: warm and dry.    CURRENT CARDIAC MEDICATIONS:  hydrALAZINE Injectable 10 milliGRAM(s) IV Push every 2 hours PRN  labetalol Injectable 10 milliGRAM(s) IV Push every 2 hours PRN    CURRENT OTHER MEDICATIONS:  acetylcysteine 10%  Inhalation 4 milliLiter(s) Inhalation every 6 hours  albuterol/ipratropium for Nebulization 3 milliLiter(s) Nebulizer every 6 hours  acetaminophen     Tablet .. 975 milliGRAM(s) Oral every 6 hours PRN Mild Pain (1 - 3)  levETIRAcetam  IVPB 1000 milliGRAM(s) IV Intermittent every 12 hours  polyethylene glycol 3350 17 Gram(s) Oral at bedtime  senna 2 Tablet(s) Oral at bedtime  atorvastatin 80 milliGRAM(s) Oral at bedtime  insulin lispro (ADMELOG) corrective regimen sliding scale   SubCutaneous every 6 hours  calcium gluconate IVPB 1 Gram(s) IV Intermittent once, Stop order after: 1 Doses  chlorhexidine 0.12% Liquid 15 milliLiter(s) Oral Mucosa every 12 hours  chlorhexidine 2% Cloths 1 Application(s) Topical <User Schedule>  potassium chloride   Powder 40 milliEquivalent(s) Enteral Tube once, Stop order after: 1 Doses  tamsulosin 0.4 milliGRAM(s) Oral at bedtime    LABS:	 	  ( 27 Aug 2023 11:40 )  Troponin T  <0.01,  CPK  795<H>, CKMB  X    , BNP X                              8.6    13.10 )-----------( 164      ( 01 Sep 2023 05:50 )             26.1     09-01    148<H>  |  113<H>  |  16.6  ----------------------------<  138<H>  3.5   |  26.0  |  0.69    Ca    8.2<L>      01 Sep 2023 05:50  Phos  2.0     09-01  Mg     2.1     09-01    TPro  5.7<L>  /  Alb  3.3  /  TBili  0.8  /  DBili  x   /  AST  24  /  ALT  24  /  AlkPhos  45  08-30    PT/INR/PTT ( 30 Aug 2023 06:30 )                       :                       :      13.0         :       28.5                  .        .                   .              .           .       1.18        .                                       Lipid Profile: Date: 08-29 @ 03:05  Total cholesterol 145; Direct LDL: --; HDL: 39; Triglycerides:128    HgA1c:   TSH: Thyroid Stimulating Hormone, Serum: 0.88 uIU/mL      TELEMETRY:  Sr 80's,                                                               [facility]                                                         CARDIOLOGY AT [place] [address]                                                         Telephone: [phone]. Fax:[phone]                                                                             PROGRESS NOTE    Reason for follow up:   Stroke etiology  Update:   Pending bedside SAMARA  Review of symptoms:   Cardiac:  No chest pain. No dyspnea. No palpitations.  Respiratory: no cough. No dyspnea  Gastrointestinal: No diarrhea. No abdominal pain. No bleeding.   Neuro: No focal neuro complaints.    Vitals:  T(C): 37.5 (09-01-23 @ 08:00), Max: 38.4 (08-31-23 @ 15:45)  HR: 79 (09-01-23 @ 08:45) (65 - 110)  BP: 144/104 (09-01-23 @ 08:00) (95/66 - 144/104)  RR: 19 (09-01-23 @ 08:00) (15 - 31)  SpO2: 100% (09-01-23 @ 08:45) (97% - 100%)  I&O's Summary    31 Aug 2023 07:01  -  01 Sep 2023 07:00  --------------------------------------------------------  IN: 2497 mL / OUT: 2600 mL / NET: -103 mL  01 Sep 2023 07:01  -  01 Sep 2023 09:37  --------------------------------------------------------  IN: 212.5 mL / OUT: 0 mL / NET: 212.5 mL  Weight (kg): 81.2 (08-27 @ 11:52)    PHYSICAL EXAM:  Appearance:  Larthagic, opens eyes  HEENT:  Atraumatic. Normocephalic.  Normal oral mucosa  Neurologic: A & O x 0,  follows simple commands.    Cardiovascular: RRR S1 S2, No murmur, no rubs/gallops. No JVD  Respiratory: Lungs clear to auscultation, unlabored   Gastrointestinal:  Soft, Non-tender, + BS  Lower Extremities:  Peripheral Pulses, No clubbing, cyanosis, or edema  Psychiatry: Patient is calm. No agitation.   Skin: warm and dry.    CURRENT CARDIAC MEDICATIONS:  hydrALAZINE Injectable 10 milliGRAM(s) IV Push every 2 hours PRN  labetalol Injectable 10 milliGRAM(s) IV Push every 2 hours PRN    CURRENT OTHER MEDICATIONS:  acetylcysteine 10%  Inhalation 4 milliLiter(s) Inhalation every 6 hours  albuterol/ipratropium for Nebulization 3 milliLiter(s) Nebulizer every 6 hours  acetaminophen     Tablet .. 975 milliGRAM(s) Oral every 6 hours PRN Mild Pain (1 - 3)  levETIRAcetam  IVPB 1000 milliGRAM(s) IV Intermittent every 12 hours  polyethylene glycol 3350 17 Gram(s) Oral at bedtime  senna 2 Tablet(s) Oral at bedtime  atorvastatin 80 milliGRAM(s) Oral at bedtime  insulin lispro (ADMELOG) corrective regimen sliding scale   SubCutaneous every 6 hours  calcium gluconate IVPB 1 Gram(s) IV Intermittent once, Stop order after: 1 Doses  chlorhexidine 0.12% Liquid 15 milliLiter(s) Oral Mucosa every 12 hours  chlorhexidine 2% Cloths 1 Application(s) Topical <User Schedule>  potassium chloride   Powder 40 milliEquivalent(s) Enteral Tube once, Stop order after: 1 Doses  tamsulosin 0.4 milliGRAM(s) Oral at bedtime    LABS:	 	  ( 27 Aug 2023 11:40 )  Troponin T  <0.01,  CPK  795<H>, CKMB  X    , BNP X                              8.6    13.10 )-----------( 164      ( 01 Sep 2023 05:50 )             26.1     09-01    148<H>  |  113<H>  |  16.6  ----------------------------<  138<H>  3.5   |  26.0  |  0.69    Ca    8.2<L>      01 Sep 2023 05:50  Phos  2.0     09-01  Mg     2.1     09-01    TPro  5.7<L>  /  Alb  3.3  /  TBili  0.8  /  DBili  x   /  AST  24  /  ALT  24  /  AlkPhos  45  08-30    PT/INR/PTT ( 30 Aug 2023 06:30 )                       :                       :      13.0         :       28.5                  .        .                   .              .           .       1.18        .                                       Lipid Profile: Date: 08-29 @ 03:05  Total cholesterol 145; Direct LDL: --; HDL: 39; Triglycerides:128    HgA1c:   TSH: Thyroid Stimulating Hormone, Serum: 0.88 uIU/mL      TELEMETRY:  Sr 80's,

## 2023-09-01 NOTE — PROGRESS NOTE ADULT - NS ATTEND AMEND GEN_ALL_CORE FT
Patient presented with L MCA Stroke Neurologic Encephalopathy Cerebral Edema with brain herniation, plan for SAMARA   ILR or MCOT in the outpatient   Telemetry reviewed sinus tachycardia NSR     pending SAMARA     Brianna Cruz D.O. [facility]  Cardiology/Vascular Cardiology -[facility] Cardiology   Telephone # [phone]

## 2023-09-01 NOTE — PROGRESS NOTE ADULT - SUBJECTIVE AND OBJECTIVE BOX
HPI:  Patient is a 63 year old male with PMH Hypertension, DM on oral medications who presents c/o stroke-like symptoms. Per son pt was walking outside around 10:15am this morning when he suddenly fell. His son helped him up and noted that he was weak on the right side and not acting normally which prompted him to come to the ED. On arrival pt w/ obvious right sided arm weakness, confusion, dysarthria, facial droop suggestive of acute stroke. Code stroke initiated, found to have LM1 occlusion, given TNK @ 1215 and was taken for thrombectomy. Unable to provide ROS 2/2 aphasia  (27 Aug 2023 16:45)    OVERNIGHT EVENTS: Patient doing well, repeat CBC Hgb 8.6, remains on 2%. Neuro exam stable, CTH stable.     Vital Signs Last 24 Hrs  T(C): 37.9 (31 Aug 2023 23:00), Max: 38.5 (31 Aug 2023 00:30)  T(F): 100.2 (31 Aug 2023 23:00), Max: 101.3 (31 Aug 2023 00:30)  HR: 110 (01 Sep 2023 00:16) (65 - 110)  BP: 129/83 (31 Aug 2023 23:00) (81/65 - 141/75)  BP(mean): 97 (31 Aug 2023 23:00) (69 - 102)  RR: 26 (31 Aug 2023 23:00) (7 - 31)  SpO2: 100% (01 Sep 2023 00:16) (99% - 100%)    Parameters below as of 31 Aug 2023 23:00  Patient On (Oxygen Delivery Method): ventilator    O2 Concentration (%): 30    I&O's Summary    30 Aug 2023 07:01  -  31 Aug 2023 07:00  --------------------------------------------------------  IN: 3580.8 mL / OUT: 2055 mL / NET: 1525.8 mL    31 Aug 2023 07:01  -  01 Sep 2023 00:29  --------------------------------------------------------  IN: 1962 mL / OUT: 1870 mL / NET: 92 mL    PHYSICAL EXAM:  General: NAD, pt is comfortably sitting up in bed  HEENT: EOMI b/l, L periorbital edema  Cardiovascular: Regular rate and rhythm  Respiratory: nonlabored breathing, normal chest rise, intubated  GI: abdomen soft, nontender, nondistended  Neuro: opens eyes to voice, PERRL 3mm brisk b/l  follows commands, LUE 5/5, LLE 4/5, RUE extensor, RLE TF  Extremities: distal pulses 2+ x4  Wound/incision: hemicrani incision c/d/i    DIET:  [x] NPO  [] Mechanical  [] Tube feeds    LABS:                        8.6    13.15 )-----------( 154      ( 31 Aug 2023 19:10 )             25.6     08-31    148<H>  |  115<H>  |  16.6  ----------------------------<  139<H>  3.7   |  23.0  |  0.76    Ca    8.1<L>      31 Aug 2023 18:30  Phos  1.9     08-31  Mg     2.1     08-31  TPro  5.7<L>  /  Alb  3.3  /  TBili  0.8  /  DBili  x   /  AST  24  /  ALT  24  /  AlkPhos  45  08-30  PT/INR - ( 30 Aug 2023 06:30 )   PT: 13.0 sec;   INR: 1.18 ratio    PTT - ( 30 Aug 2023 06:30 )  PTT:28.5 sec  Urinalysis Basic - ( 31 Aug 2023 18:30 )  Color: x / Appearance: x / SG: x / pH: x  Gluc: 139 mg/dL / Ketone: x  / Bili: x / Urobili: x   Blood: x / Protein: x / Nitrite: x   Leuk Esterase: x / RBC: x / WBC x   Sq Epi: x / Non Sq Epi: x / Bacteria: x    CAPILLARY BLOOD GLUCOSE  POCT Blood Glucose.: 148 mg/dL (31 Aug 2023 23:50)  POCT Blood Glucose.: 87 mg/dL (31 Aug 2023 23:49)  POCT Blood Glucose.: 140 mg/dL (31 Aug 2023 17:33)  POCT Blood Glucose.: 151 mg/dL (31 Aug 2023 12:44)  POCT Blood Glucose.: 154 mg/dL (31 Aug 2023 06:37)    Allergies  No Known Allergies    MEDICATIONS:  acetaminophen     Tablet .. 975 milliGRAM(s) Oral every 6 hours PRN  levETIRAcetam  IVPB 1000 milliGRAM(s) IV Intermittent every 12 hours  acetylcysteine 10%  Inhalation 4 milliLiter(s) Inhalation every 6 hours  albuterol/ipratropium for Nebulization 3 milliLiter(s) Nebulizer every 6 hours  atorvastatin 80 milliGRAM(s) Oral at bedtime  chlorhexidine 0.12% Liquid 15 milliLiter(s) Oral Mucosa every 12 hours  chlorhexidine 2% Cloths 1 Application(s) Topical <User Schedule>  dextrose 50% Injectable 25 Gram(s) IV Push once  dextrose 50% Injectable 12.5 Gram(s) IV Push once  dextrose 50% Injectable 25 Gram(s) IV Push once  hydrALAZINE Injectable 10 milliGRAM(s) IV Push every 2 hours PRN  insulin lispro (ADMELOG) corrective regimen sliding scale   SubCutaneous every 6 hours  labetalol Injectable 10 milliGRAM(s) IV Push every 2 hours PRN  polyethylene glycol 3350 17 Gram(s) Oral at bedtime  senna 2 Tablet(s) Oral at bedtime  tamsulosin 0.4 milliGRAM(s) Oral at bedtime  sodium chloride 2% . 1000 milliLiter(s) IV Continuous <Continuous>      RADIOLOGY & ADDITIONAL TESTS:  < from: CT Head No Cont (08.30.23 @ 15:44) >  IMPRESSION:  New changes as described above.    < from: CT Head No Cont (08.29.23 @ 23:39) >  IMPRESSION: Evolving acute left MCA infarct is identified.

## 2023-09-01 NOTE — PROGRESS NOTE ADULT - ASSESSMENT
62 yo, M: PMH Hypertension, DM on oral medications who presents c/o stroke-like symptoms.   Code stroke initiated, found to have LM1 occlusion, given TNK @ 1215 and was taken for thrombectomy on 9/27/2023, then on 8/29 on CT was noted left to right shift and was sent to OR on 8/30/2023 for decompression hemicraniotomy.

## 2023-09-02 LAB
ANION GAP SERPL CALC-SCNC: 10 MMOL/L — SIGNIFICANT CHANGE UP (ref 5–17)
ANION GAP SERPL CALC-SCNC: 11 MMOL/L — SIGNIFICANT CHANGE UP (ref 5–17)
ANION GAP SERPL CALC-SCNC: 11 MMOL/L — SIGNIFICANT CHANGE UP (ref 5–17)
ANION GAP SERPL CALC-SCNC: 13 MMOL/L — SIGNIFICANT CHANGE UP (ref 5–17)
APPEARANCE UR: CLEAR — SIGNIFICANT CHANGE UP
B2 GLYCOPROT1 AB SER QL: NEGATIVE — SIGNIFICANT CHANGE UP
BACTERIA # UR AUTO: NEGATIVE — SIGNIFICANT CHANGE UP
BASE EXCESS BLDA CALC-SCNC: 4.9 MMOL/L — HIGH (ref -2–3)
BASE EXCESS BLDV CALC-SCNC: 6.1 MMOL/L — HIGH (ref -2–3)
BILIRUB UR-MCNC: NEGATIVE — SIGNIFICANT CHANGE UP
BLOOD GAS COMMENTS ARTERIAL: SIGNIFICANT CHANGE UP
BLOOD GAS COMMENTS, VENOUS: SIGNIFICANT CHANGE UP
BUN SERPL-MCNC: 15.7 MG/DL — SIGNIFICANT CHANGE UP (ref 8–20)
BUN SERPL-MCNC: 16.1 MG/DL — SIGNIFICANT CHANGE UP (ref 8–20)
BUN SERPL-MCNC: 17 MG/DL — SIGNIFICANT CHANGE UP (ref 8–20)
BUN SERPL-MCNC: 17.5 MG/DL — SIGNIFICANT CHANGE UP (ref 8–20)
CALCIUM SERPL-MCNC: 8.3 MG/DL — LOW (ref 8.4–10.5)
CALCIUM SERPL-MCNC: 8.4 MG/DL — SIGNIFICANT CHANGE UP (ref 8.4–10.5)
CALCIUM SERPL-MCNC: 8.5 MG/DL — SIGNIFICANT CHANGE UP (ref 8.4–10.5)
CALCIUM SERPL-MCNC: 8.5 MG/DL — SIGNIFICANT CHANGE UP (ref 8.4–10.5)
CARDIOLIPIN AB SER-ACNC: NEGATIVE — SIGNIFICANT CHANGE UP
CHLORIDE SERPL-SCNC: 110 MMOL/L — HIGH (ref 96–108)
CHLORIDE SERPL-SCNC: 112 MMOL/L — HIGH (ref 96–108)
CHLORIDE SERPL-SCNC: 112 MMOL/L — HIGH (ref 96–108)
CHLORIDE SERPL-SCNC: 113 MMOL/L — HIGH (ref 96–108)
CO2 SERPL-SCNC: 25 MMOL/L — SIGNIFICANT CHANGE UP (ref 22–29)
CO2 SERPL-SCNC: 25 MMOL/L — SIGNIFICANT CHANGE UP (ref 22–29)
CO2 SERPL-SCNC: 26 MMOL/L — SIGNIFICANT CHANGE UP (ref 22–29)
CO2 SERPL-SCNC: 26 MMOL/L — SIGNIFICANT CHANGE UP (ref 22–29)
COLOR SPEC: YELLOW — SIGNIFICANT CHANGE UP
CREAT SERPL-MCNC: 0.61 MG/DL — SIGNIFICANT CHANGE UP (ref 0.5–1.3)
CREAT SERPL-MCNC: 0.62 MG/DL — SIGNIFICANT CHANGE UP (ref 0.5–1.3)
CREAT SERPL-MCNC: 0.64 MG/DL — SIGNIFICANT CHANGE UP (ref 0.5–1.3)
CREAT SERPL-MCNC: 0.64 MG/DL — SIGNIFICANT CHANGE UP (ref 0.5–1.3)
DIFF PNL FLD: NEGATIVE — SIGNIFICANT CHANGE UP
EGFR: 107 ML/MIN/1.73M2 — SIGNIFICANT CHANGE UP
EGFR: 107 ML/MIN/1.73M2 — SIGNIFICANT CHANGE UP
EGFR: 108 ML/MIN/1.73M2 — SIGNIFICANT CHANGE UP
EGFR: 109 ML/MIN/1.73M2 — SIGNIFICANT CHANGE UP
EPI CELLS # UR: SIGNIFICANT CHANGE UP
GAS PNL BLDA: SIGNIFICANT CHANGE UP
GAS PNL BLDV: SIGNIFICANT CHANGE UP
GLUCOSE BLDC GLUCOMTR-MCNC: 117 MG/DL — HIGH (ref 70–99)
GLUCOSE BLDC GLUCOMTR-MCNC: 120 MG/DL — HIGH (ref 70–99)
GLUCOSE BLDC GLUCOMTR-MCNC: 122 MG/DL — HIGH (ref 70–99)
GLUCOSE BLDC GLUCOMTR-MCNC: 141 MG/DL — HIGH (ref 70–99)
GLUCOSE SERPL-MCNC: 118 MG/DL — HIGH (ref 70–99)
GLUCOSE SERPL-MCNC: 120 MG/DL — HIGH (ref 70–99)
GLUCOSE SERPL-MCNC: 121 MG/DL — HIGH (ref 70–99)
GLUCOSE SERPL-MCNC: 121 MG/DL — HIGH (ref 70–99)
GLUCOSE UR QL: NEGATIVE MG/DL — SIGNIFICANT CHANGE UP
HCO3 BLDA-SCNC: 28 MMOL/L — SIGNIFICANT CHANGE UP (ref 21–28)
HCO3 BLDV-SCNC: 30 MMOL/L — HIGH (ref 22–29)
HCT VFR BLD CALC: 26.5 % — LOW (ref 39–50)
HGB BLD-MCNC: 8.9 G/DL — LOW (ref 13–17)
HOROWITZ INDEX BLDA+IHG-RTO: 0.35 — SIGNIFICANT CHANGE UP
HOROWITZ INDEX BLDV+IHG-RTO: 0.4 — SIGNIFICANT CHANGE UP
KETONES UR-MCNC: ABNORMAL
LEUKOCYTE ESTERASE UR-ACNC: NEGATIVE — SIGNIFICANT CHANGE UP
MAGNESIUM SERPL-MCNC: 2.2 MG/DL — SIGNIFICANT CHANGE UP (ref 1.6–2.6)
MCHC RBC-ENTMCNC: 31.8 PG — SIGNIFICANT CHANGE UP (ref 27–34)
MCHC RBC-ENTMCNC: 33.6 GM/DL — SIGNIFICANT CHANGE UP (ref 32–36)
MCV RBC AUTO: 94.6 FL — SIGNIFICANT CHANGE UP (ref 80–100)
NITRITE UR-MCNC: NEGATIVE — SIGNIFICANT CHANGE UP
PCO2 BLDA: 35 MMHG — SIGNIFICANT CHANGE UP (ref 35–48)
PCO2 BLDV: 41 MMHG — LOW (ref 42–55)
PH BLDA: 7.51 — HIGH (ref 7.35–7.45)
PH BLDV: 7.47 — HIGH (ref 7.32–7.43)
PH UR: 7 — SIGNIFICANT CHANGE UP (ref 5–8)
PHOSPHATE SERPL-MCNC: 2.2 MG/DL — LOW (ref 2.4–4.7)
PLATELET # BLD AUTO: 187 K/UL — SIGNIFICANT CHANGE UP (ref 150–400)
PO2 BLDA: 88 MMHG — SIGNIFICANT CHANGE UP (ref 83–108)
PO2 BLDV: 73 MMHG — HIGH (ref 25–45)
POTASSIUM SERPL-MCNC: 3.5 MMOL/L — SIGNIFICANT CHANGE UP (ref 3.5–5.3)
POTASSIUM SERPL-MCNC: 3.6 MMOL/L — SIGNIFICANT CHANGE UP (ref 3.5–5.3)
POTASSIUM SERPL-MCNC: 3.8 MMOL/L — SIGNIFICANT CHANGE UP (ref 3.5–5.3)
POTASSIUM SERPL-MCNC: 3.8 MMOL/L — SIGNIFICANT CHANGE UP (ref 3.5–5.3)
POTASSIUM SERPL-SCNC: 3.5 MMOL/L — SIGNIFICANT CHANGE UP (ref 3.5–5.3)
POTASSIUM SERPL-SCNC: 3.6 MMOL/L — SIGNIFICANT CHANGE UP (ref 3.5–5.3)
POTASSIUM SERPL-SCNC: 3.8 MMOL/L — SIGNIFICANT CHANGE UP (ref 3.5–5.3)
POTASSIUM SERPL-SCNC: 3.8 MMOL/L — SIGNIFICANT CHANGE UP (ref 3.5–5.3)
PROT UR-MCNC: 15
RBC # BLD: 2.8 M/UL — LOW (ref 4.2–5.8)
RBC # FLD: 14.7 % — HIGH (ref 10.3–14.5)
RBC CASTS # UR COMP ASSIST: SIGNIFICANT CHANGE UP /HPF (ref 0–4)
SAO2 % BLDA: 100 % — HIGH (ref 94–98)
SAO2 % BLDV: 98.4 % — SIGNIFICANT CHANGE UP
SODIUM SERPL-SCNC: 148 MMOL/L — HIGH (ref 135–145)
SODIUM SERPL-SCNC: 150 MMOL/L — HIGH (ref 135–145)
SP GR SPEC: 1.01 — SIGNIFICANT CHANGE UP (ref 1.01–1.02)
UROBILINOGEN FLD QL: 4 MG/DL
WBC # BLD: 13.2 K/UL — HIGH (ref 3.8–10.5)
WBC # FLD AUTO: 13.2 K/UL — HIGH (ref 3.8–10.5)
WBC UR QL: NEGATIVE /HPF — SIGNIFICANT CHANGE UP (ref 0–5)

## 2023-09-02 PROCEDURE — 99291 CRITICAL CARE FIRST HOUR: CPT

## 2023-09-02 PROCEDURE — 70450 CT HEAD/BRAIN W/O DYE: CPT | Mod: 26

## 2023-09-02 RX ORDER — POTASSIUM CHLORIDE 20 MEQ
10 PACKET (EA) ORAL
Refills: 0 | Status: DISCONTINUED | OUTPATIENT
Start: 2023-09-02 | End: 2023-09-03

## 2023-09-02 RX ORDER — ACETAMINOPHEN 500 MG
1000 TABLET ORAL ONCE
Refills: 0 | Status: COMPLETED | OUTPATIENT
Start: 2023-09-02 | End: 2023-09-02

## 2023-09-02 RX ORDER — ENOXAPARIN SODIUM 100 MG/ML
40 INJECTION SUBCUTANEOUS EVERY 24 HOURS
Refills: 0 | Status: DISCONTINUED | OUTPATIENT
Start: 2023-09-02 | End: 2023-09-07

## 2023-09-02 RX ORDER — POTASSIUM CHLORIDE 20 MEQ
40 PACKET (EA) ORAL ONCE
Refills: 0 | Status: DISCONTINUED | OUTPATIENT
Start: 2023-09-02 | End: 2023-09-02

## 2023-09-02 RX ORDER — SODIUM,POTASSIUM PHOSPHATES 278-250MG
1 POWDER IN PACKET (EA) ORAL ONCE
Refills: 0 | Status: DISCONTINUED | OUTPATIENT
Start: 2023-09-02 | End: 2023-09-02

## 2023-09-02 RX ADMIN — Medication 4 MILLILITER(S): at 20:09

## 2023-09-02 RX ADMIN — Medication 4 MILLILITER(S): at 09:18

## 2023-09-02 RX ADMIN — LEVETIRACETAM 400 MILLIGRAM(S): 250 TABLET, FILM COATED ORAL at 17:45

## 2023-09-02 RX ADMIN — Medication 3 MILLILITER(S): at 03:07

## 2023-09-02 RX ADMIN — Medication 3 MILLILITER(S): at 20:09

## 2023-09-02 RX ADMIN — Medication 400 MILLIGRAM(S): at 01:08

## 2023-09-02 RX ADMIN — CHLORHEXIDINE GLUCONATE 1 APPLICATION(S): 213 SOLUTION TOPICAL at 17:45

## 2023-09-02 RX ADMIN — POLYETHYLENE GLYCOL 3350 17 GRAM(S): 17 POWDER, FOR SOLUTION ORAL at 05:21

## 2023-09-02 RX ADMIN — Medication 4 MILLILITER(S): at 15:15

## 2023-09-02 RX ADMIN — Medication 1000 MILLIGRAM(S): at 01:30

## 2023-09-02 RX ADMIN — SODIUM CHLORIDE 60 MILLILITER(S): 5 INJECTION, SOLUTION INTRAVENOUS at 05:21

## 2023-09-02 RX ADMIN — Medication 4 MILLILITER(S): at 03:07

## 2023-09-02 RX ADMIN — LEVETIRACETAM 400 MILLIGRAM(S): 250 TABLET, FILM COATED ORAL at 05:22

## 2023-09-02 RX ADMIN — Medication 3 MILLILITER(S): at 09:17

## 2023-09-02 RX ADMIN — Medication 3 MILLILITER(S): at 15:15

## 2023-09-02 NOTE — SWALLOW BEDSIDE ASSESSMENT ADULT - ASR SWALLOW LABIAL MOBILITY
impaired retraction/impaired pursing/impaired seal/impaired coordination
impaired retraction/impaired pursing

## 2023-09-02 NOTE — PROGRESS NOTE ADULT - SUBJECTIVE AND OBJECTIVE BOX
Chief complaint:   Patient is a 63y old  Male who presents with a chief complaint of LT M1 Occlusion (30 Aug 2023 07:09)    HPI:  Patient is a 63 year old male with PMH Hypertension, DM on oral medications who presents c/o stroke-like symptoms. Per son pt was walking outside around 10:15am this morning when he suddenly fell. His son helped him up and noted that he was weak on the right side and not acting normally which prompted him to come to the ED. On arrival pt w/ obvious right sided arm weakness, confusion, dysarthria, facial droop suggestive of acute stroke. Code stroke initiated, found to have LM1 occlusion, given TNK @ 1215 and was taken for thrombectomy. Unable to provide ROS 2/2 aphasia  (27 Aug 2023 16:45)    24hr EVENTS:  8/30 - worsening lethargy, taken to OR for DHC. Placed on pressors for SBP >100  8/31 - placed on Pressure Support ventilation with minimal settings, doing fine. CTH with extra-axial blood and worsening MLS. LMCA infarct completed  9/1 - SAMARA without vegetations. Extubated to HiFlo  9/2 - HiFlo -> N#FEver. Spiked fever, sent work-up    ROS: [x ]  Unable to assess due to mental status   All other systems negative    PHYSICAL EXAM: pre-op  General: Awake, alert  HEENT: MMM  Neuro:  -Mental status- No acute distress, following simple commands on L  -CN- PERRL 3mm, EOMI, tongue midline, L facial droop  RUE ext  RLE TF  LUE Localizing with purposeful movements  LLE WD    CV: RRR  Pulm: clear to auscultation  Abd: Soft, nontender, nondistended  Ext: no noted edema in lower ext  R buttock ecchymosis/hematoma  Skin: warm, dry    ------------------------------------------------------------------------------------------------------  ICU Vital Signs Last 24 Hrs  T(C): 37.6 (02 Sep 2023 14:00), Max: 38.3 (02 Sep 2023 01:00)  T(F): 99.7 (02 Sep 2023 14:00), Max: 100.9 (02 Sep 2023 01:00)  HR: 89 (02 Sep 2023 15:15) (87 - 109)  BP: 138/89 (02 Sep 2023 14:00) (117/70 - 155/87)  BP(mean): 102 (02 Sep 2023 14:00) (84 - 110)  ABP: --  ABP(mean): --  RR: 19 (02 Sep 2023 14:00) (13 - 30)  SpO2: 99% (02 Sep 2023 15:15) (90% - 100%)    O2 Parameters below as of 02 Sep 2023 15:15  Patient On (Oxygen Delivery Method): nasal cannula, 2            I&O's Summary    01 Sep 2023 07:01  -  02 Sep 2023 07:00  --------------------------------------------------------  IN: 2552.5 mL / OUT: 3550 mL / NET: -997.5 mL    02 Sep 2023 07:01  -  02 Sep 2023 17:17  --------------------------------------------------------  IN: 300 mL / OUT: 690 mL / NET: -390 mL        MEDICATIONS  (STANDING):  acetaminophen   IVPB .. 1000 milliGRAM(s) IV Intermittent once  acetylcysteine 10%  Inhalation 4 milliLiter(s) Inhalation every 6 hours  albuterol/ipratropium for Nebulization 3 milliLiter(s) Nebulizer every 6 hours  atorvastatin 80 milliGRAM(s) Oral at bedtime  chlorhexidine 2% Cloths 1 Application(s) Topical <User Schedule>  dextrose 50% Injectable 12.5 Gram(s) IV Push once  dextrose 50% Injectable 25 Gram(s) IV Push once  dextrose 50% Injectable 25 Gram(s) IV Push once  enoxaparin Injectable 40 milliGRAM(s) SubCutaneous every 24 hours  insulin lispro (ADMELOG) corrective regimen sliding scale   SubCutaneous every 6 hours  levETIRAcetam  IVPB 500 milliGRAM(s) IV Intermittent every 12 hours  polyethylene glycol 3350 17 Gram(s) Oral two times a day  potassium chloride   Powder 40 milliEquivalent(s) Oral once  senna 2 Tablet(s) Oral at bedtime  sodium chloride 2% . 1000 milliLiter(s) (60 mL/Hr) IV Continuous <Continuous>  tamsulosin 0.4 milliGRAM(s) Oral at bedtime      RESPIRATORY:      IMAGING:   Recent imaging studies were reviewed.    LAB RESULTS:                          8.9    13.20 )-----------( 187      ( 02 Sep 2023 00:25 )             26.5           09-02    148<H>  |  112<H>  |  17.0  ----------------------------<  121<H>  3.8   |  26.0  |  0.61    Ca    8.4      02 Sep 2023 11:00  Phos  2.2     09-02  Mg     2.2     09-02            ABG - ( 02 Sep 2023 02:50 )  pH, Arterial: 7.510 pH, Blood: x     /  pCO2: 35    /  pO2: 88    / HCO3: 28    / Base Excess: 4.9   /  SaO2: 100.0

## 2023-09-02 NOTE — PROGRESS NOTE ADULT - ASSESSMENT
64 y/o M with PMHx of HTN, DM presented to ED 8/27 with R arm weakness, confusion, dysarthria, R facial droop. Given tenecteplase 8/27 @ 12:15, s/p cerebral angiogram mechanical thrombectomy for L M1 occlusion TICI 2C. Worsening neurological exam, s/p emergent L hemicraniectomy 8/30.     PLAN:   - q1hr neuro checks  - Pain control PRN; avoid oversedation  - Keppra 1g BID  - SBP<140  - NPO  - bowel regimen: Miralax/Senna  - 2% @60mL/hr Sodium goal : 145-150  - DVT ppx: SCDs , lovenox today.   - Medical management/supportive care per NSICU  - final plan pend discussion with surgeon in AM

## 2023-09-02 NOTE — SWALLOW BEDSIDE ASSESSMENT ADULT - SLP GENERAL OBSERVATIONS
Pt recd a&a, lethargic, reduced cognition, tolerating RA, SpO2 @ 99%, NAD, 0/10 nonverbal pain scale pre/post,  # [id number] "Miri" utilized
Pt received asleep in bed, easily arousable and A&A throughout, +02 via nc, Sp02 98-99%, [place] speaking, however non-verbal throughout, language line utilized,  following some simple commands, reduced cognition, pain 0/10 pre/post eval.

## 2023-09-02 NOTE — SWALLOW BEDSIDE ASSESSMENT ADULT - ORAL PREPARATORY PHASE
reduced stripping of spoon, minimal improvement as trials progressed with some anterior loss/Reduced oral grading reduced stripping of spoon, improved as trials progressed/Reduced oral grading

## 2023-09-02 NOTE — PROGRESS NOTE ADULT - SUBJECTIVE AND OBJECTIVE BOX
HPI:  HPI:  Patient is a 63 year old male with PMH Hypertension, DM on oral medications who presents c/o stroke-like symptoms. Per son pt was walking outside around 10:15am this morning when he suddenly fell. His son helped him up and noted that he was weak on the right side and not acting normally which prompted him to come to the ED. On arrival pt w/ obvious right sided arm weakness, confusion, dysarthria, facial droop suggestive of acute stroke. Code stroke initiated, found to have LM1 occlusion, given TNK @ 1215 and was taken for thrombectomy. Unable to provide ROS 2/2 aphasia  (27 Aug 2023 16:45)        INTERVAL HPI/OVERNIGHT EVENTS:  63y Male seen and examined a bedside. Patient appeared more lethargic overnight. repeat CT head was performed which reported preliminary stable. Currently on HIgh flow, will attempt to wean down high flow today. start lovenox today.     Vital Signs Last 24 Hrs  T(C): 37.5 (02 Sep 2023 07:00), Max: 38.3 (02 Sep 2023 01:00)  T(F): 99.5 (02 Sep 2023 07:00), Max: 100.9 (02 Sep 2023 01:00)  HR: 97 (02 Sep 2023 07:00) (79 - 112)  BP: 135/84 (02 Sep 2023 07:00) (63/49 - 155/87)  BP(mean): 100 (02 Sep 2023 07:00) (56 - 115)  RR: 30 (02 Sep 2023 07:00) (13 - 33)  SpO2: 100% (02 Sep 2023 07:00) (90% - 100%)    Parameters below as of 02 Sep 2023 06:00  Patient On (Oxygen Delivery Method): nasal cannula, high flow  O2 Flow (L/min): 40  O2 Concentration (%): 40    PHYSICAL EXAM:  General: NAD, pt is comfortably sitting up in bed  HEENT: EOMI b/l, L periorbital edema  Cardiovascular: Regular rate and rhythm  Respiratory: nonlabored breathing, normal chest rise, intubated  GI: abdomen soft, nontender, nondistended  Neuro: opens eyes to voice, PERRL 3mm brisk b/l  follows commands, LUE 5/5, LLE 4/5, RUE extensor, RLE TF  Extremities: distal pulses 2+ x4  Wound/incision: hemicrani incision c/d/i        LABS:                        8.9    13.20 )-----------( 187      ( 02 Sep 2023 00:25 )             26.5     09-02    148<H>  |  112<H>  |  15.7  ----------------------------<  121<H>  3.5   |  25.0  |  0.64    Ca    8.3<L>      02 Sep 2023 00:25  Phos  2.2     09-02  Mg     2.2     09-02        Urinalysis Basic - ( 02 Sep 2023 00:25 )    Color: x / Appearance: x / SG: x / pH: x  Gluc: 121 mg/dL / Ketone: x  / Bili: x / Urobili: x   Blood: x / Protein: x / Nitrite: x   Leuk Esterase: x / RBC: x / WBC x   Sq Epi: x / Non Sq Epi: x / Bacteria: x        09-01 @ 07:01  -  09-02 @ 07:00  --------------------------------------------------------  IN: 2552.5 mL / OUT: 3550 mL / NET: -997.5 mL        RADIOLOGY & ADDITIONAL TESTS:

## 2023-09-02 NOTE — SWALLOW BEDSIDE ASSESSMENT ADULT - ASR SWALLOW LINGUAL MOBILITY
impaired protrusion/impaired anterior elevation/impaired left lateral movement/impaired right lateral movement
impaired anterior elevation/impaired left lateral movement/impaired right lateral movement

## 2023-09-02 NOTE — SWALLOW BEDSIDE ASSESSMENT ADULT - SWALLOW EVAL: DIAGNOSIS
Moderate oral dysphagia w/ puree marked by reduced oral grading w/ reduced attention to bolus w/ delayed A-P transit and intermittent bolus holding. suspect pharyngeal dysphagia marked by multiple swallows and delayed throat clear. No further trials provided 2' overt s/s w/ most conservative consistency
Mild oral dysphagia, confounded by reduced cognition nad marked by reduced oral grading (improved as trials progressed), delayed oral transit time and suspected posterior loss of thickened fluid. Suspect pharyngeal dysphagia with most conservative consistencies marked by multiple swallows and delayed throat clearing.

## 2023-09-02 NOTE — PROGRESS NOTE ADULT - ASSESSMENT
Assessment: Patient is a 63 year old male with LM1 occlusion s/p IV tenecteplase @12:10 s/p cerebral angiogram for LT M1 mechanical thrombectomy.     The patient is critically ill due to the following disease processes:  - Neurologic Encephalopathy  - Cerebral Edema with brain herniation    Neuro:   #L MCA Stroke  ESUS  - Q2hr neurochecks  - Stroke neurology following, appreciate recs  - MRI Brain  - Non-urgent CT Chest when stable to assess occult malignancy  - Keppra Ppx for 7 days following DHC  - SAMARA today  - Statin, ASA POD7  - HyperCoag Panel    CV:  MAP >65, SBP <160  Statin     Resp:  SpO2 > 92% on NC    GI:  TF via NGT  bowel regimen    ID:    #Fevers  Possible aspirations v central  - No Abx for now  - UA, CXR, BCxs  - Trend Fever/WBC    Renal/:  Monitor UOP  IV fluids while NPO  Flomax    Endocrine:  Na goal 145 - 155 given worsening MLS on 2%  HgbA1c 6.8    FS q6h, ISS    Heme:  SQL after 24h stability of extra-axial blood  SCDs    My full attention was spent providing medically necessary critical care to the patient with details documented in my note above.   Critical care time spent examining patient, reviewing vitals, labs, medications, imaging and discussing with the team goals of care   The combined critical care time provided to the patient was between 40 - 110 minutes  This time does not include bedside procedures that are documented separately.

## 2023-09-02 NOTE — PROVIDER CONTACT NOTE (CHANGE IN STATUS NOTIFICATION) - RECOMMENDATIONS
As per RYAN Hill, call EICU for stat ABG order.
PA to bedside to assess, I recommend to intubate the patient & bring him to the OR for a craniotomy

## 2023-09-02 NOTE — SWALLOW BEDSIDE ASSESSMENT ADULT - ASR SWALLOW ASPIRATION MONITOR
change of breathing pattern/oral hygiene/position upright (90Y)/cough/gurgly voice/fever/pneumonia/throat clearing
change of breathing pattern/oral hygiene/position upright (90Y)/cough/gurgly voice/fever/pneumonia/throat clearing/upper respiratory infection

## 2023-09-02 NOTE — SWALLOW BEDSIDE ASSESSMENT ADULT - PHARYNGEAL PHASE
Delayed throat clear post oral intake/Multiple swallows
Throat clear post oral intake/Multiple swallows

## 2023-09-02 NOTE — PROVIDER CONTACT NOTE (CHANGE IN STATUS NOTIFICATION) - ACTION/TREATMENT ORDERED:
CT ordered. Pt transported to CT with Neuro ICU MONIK Marquez in attendance.
Stat ABG and Urgent CT head ordered.
MONIK Chaves made aware of changes in mental status as well as urine retention.   PA recommending obtaining an ABG, performing a straight cath for retention, & placing pt on CPAP 2/2 apnea.   Respiratory to the bedside recommending intubation as well & refusing to place pt on CPAP 2/2 lack of a gag reflex, restraints, & unresponsiveness.

## 2023-09-02 NOTE — PROGRESS NOTE ADULT - CRITICAL CARE ATTENDING COMMENT
I spent 60 minutes of critical care time examining patient, reviewing vitals, labs, medications, imaging and discussing with the team goals of care to prevent life-threatening in this patient who is at high risk for deterioration or death due to:  stroke
17-Feb-2022 21:20
I spent 60 minutes of critical care time examining patient, reviewing vitals, labs, medications, imaging and discussing with the team goals of care to prevent life-threatening in this patient who is at high risk for deterioration or death due to:  stroke
I have personally provided the above mentioned minutes of critical care time including review of laboratory values, imaging, interdisciplinary care coordination, and frequent monitoring for decompensation.    Based on my personal evaluation, this patient has a high probability of imminent or life-threatening deterioration due to the presence of: stroke, hypotension, tachycardia, monitoring for post-operative complications -  which required my direct attention, intervention, and personal management. Other billable procedures, if performed, are documented separately.
I spent 60 minutes of critical care time examining patient, reviewing vitals, labs, medications, imaging and discussing with the team goals of care to prevent life-threatening in this patient who is at high risk for deterioration or death due to:  stroke
I spent 45 minutes of critical care time examining patient, reviewing vitals, labs, medications, imaging and discussing with the team goals of care to prevent life-threatening in this patient who is at high risk for deterioration or death due to:  stroke
I spent 60 minutes of critical care time examining patient, reviewing vitals, labs, medications, imaging and discussing with the team goals of care to prevent life-threatening in this patient who is at high risk for deterioration or death due to:  stroke

## 2023-09-02 NOTE — PROVIDER CONTACT NOTE (CHANGE IN STATUS NOTIFICATION) - SITUATION
Change in neuro status, pt increased lethargy.
Neuro ICU MONIK Marquez at bedside. On scheduled thrombectomy checks, Pt noted with firmness on palpation near R groin angio site.
Pt unresponsive to deep stimulation & retaining >400 cc of urine at this time.

## 2023-09-02 NOTE — SWALLOW BEDSIDE ASSESSMENT ADULT - SLP PERTINENT HISTORY OF CURRENT PROBLEM
As per H&P: "Patient is a 63 year old male with PMH Hypertension, DM on oral medications who presents c/o stroke-like symptoms. Per son pt was walking outside around 10:15am this morning when he suddenly fell. His son helped him up and noted that he was weak on the right side and not acting normally which prompted him to come to the ED. On arrival pt w/ obvious right sided arm weakness, confusion, dysarthria, facial droop suggestive of acute stroke. Code stroke initiated, found to have LM1 occlusion, given TNK @ 1215 and was taken for thrombectomy. Unable to provide ROS 2/2 aphasia"
64 y/o M with PMHx of HTN, DM presented to ED 8/27 with R arm weakness, confusion, dysarthria, R facial droop. Given tenecteplase 8/27 @ 12:15, s/p cerebral angiogram mechanical thrombectomy for L M1 occlusion TICI 2C. Worsening neurological exam, s/p emergent L hemicraniectomy 8/30.

## 2023-09-03 LAB
ANION GAP SERPL CALC-SCNC: 13 MMOL/L — SIGNIFICANT CHANGE UP (ref 5–17)
ANION GAP SERPL CALC-SCNC: 13 MMOL/L — SIGNIFICANT CHANGE UP (ref 5–17)
BUN SERPL-MCNC: 16.6 MG/DL — SIGNIFICANT CHANGE UP (ref 8–20)
BUN SERPL-MCNC: 16.7 MG/DL — SIGNIFICANT CHANGE UP (ref 8–20)
CALCIUM SERPL-MCNC: 8.6 MG/DL — SIGNIFICANT CHANGE UP (ref 8.4–10.5)
CALCIUM SERPL-MCNC: 8.6 MG/DL — SIGNIFICANT CHANGE UP (ref 8.4–10.5)
CHLORIDE SERPL-SCNC: 110 MMOL/L — HIGH (ref 96–108)
CHLORIDE SERPL-SCNC: 113 MMOL/L — HIGH (ref 96–108)
CO2 SERPL-SCNC: 24 MMOL/L — SIGNIFICANT CHANGE UP (ref 22–29)
CO2 SERPL-SCNC: 24 MMOL/L — SIGNIFICANT CHANGE UP (ref 22–29)
CREAT SERPL-MCNC: 0.59 MG/DL — SIGNIFICANT CHANGE UP (ref 0.5–1.3)
CREAT SERPL-MCNC: 0.62 MG/DL — SIGNIFICANT CHANGE UP (ref 0.5–1.3)
EGFR: 108 ML/MIN/1.73M2 — SIGNIFICANT CHANGE UP
EGFR: 110 ML/MIN/1.73M2 — SIGNIFICANT CHANGE UP
GLUCOSE BLDC GLUCOMTR-MCNC: 104 MG/DL — HIGH (ref 70–99)
GLUCOSE BLDC GLUCOMTR-MCNC: 120 MG/DL — HIGH (ref 70–99)
GLUCOSE BLDC GLUCOMTR-MCNC: 122 MG/DL — HIGH (ref 70–99)
GLUCOSE SERPL-MCNC: 118 MG/DL — HIGH (ref 70–99)
GLUCOSE SERPL-MCNC: 119 MG/DL — HIGH (ref 70–99)
HCT VFR BLD CALC: 28 % — LOW (ref 39–50)
HGB BLD-MCNC: 9.5 G/DL — LOW (ref 13–17)
MAGNESIUM SERPL-MCNC: 2.4 MG/DL — SIGNIFICANT CHANGE UP (ref 1.6–2.6)
MCHC RBC-ENTMCNC: 32.1 PG — SIGNIFICANT CHANGE UP (ref 27–34)
MCHC RBC-ENTMCNC: 33.9 GM/DL — SIGNIFICANT CHANGE UP (ref 32–36)
MCV RBC AUTO: 94.6 FL — SIGNIFICANT CHANGE UP (ref 80–100)
PHOSPHATE SERPL-MCNC: 2.2 MG/DL — LOW (ref 2.4–4.7)
PLATELET # BLD AUTO: 244 K/UL — SIGNIFICANT CHANGE UP (ref 150–400)
POTASSIUM SERPL-MCNC: 3.7 MMOL/L — SIGNIFICANT CHANGE UP (ref 3.5–5.3)
POTASSIUM SERPL-MCNC: 3.9 MMOL/L — SIGNIFICANT CHANGE UP (ref 3.5–5.3)
POTASSIUM SERPL-SCNC: 3.7 MMOL/L — SIGNIFICANT CHANGE UP (ref 3.5–5.3)
POTASSIUM SERPL-SCNC: 3.9 MMOL/L — SIGNIFICANT CHANGE UP (ref 3.5–5.3)
RBC # BLD: 2.96 M/UL — LOW (ref 4.2–5.8)
RBC # FLD: 14.3 % — SIGNIFICANT CHANGE UP (ref 10.3–14.5)
SODIUM SERPL-SCNC: 146 MMOL/L — HIGH (ref 135–145)
SODIUM SERPL-SCNC: 150 MMOL/L — HIGH (ref 135–145)
WBC # BLD: 12.54 K/UL — HIGH (ref 3.8–10.5)
WBC # FLD AUTO: 12.54 K/UL — HIGH (ref 3.8–10.5)

## 2023-09-03 PROCEDURE — 99232 SBSQ HOSP IP/OBS MODERATE 35: CPT

## 2023-09-03 PROCEDURE — 71045 X-RAY EXAM CHEST 1 VIEW: CPT | Mod: 26

## 2023-09-03 PROCEDURE — 70450 CT HEAD/BRAIN W/O DYE: CPT | Mod: 26

## 2023-09-03 PROCEDURE — 99233 SBSQ HOSP IP/OBS HIGH 50: CPT

## 2023-09-03 RX ORDER — GLUCAGON INJECTION, SOLUTION 0.5 MG/.1ML
1 INJECTION, SOLUTION SUBCUTANEOUS ONCE
Refills: 0 | Status: DISCONTINUED | OUTPATIENT
Start: 2023-09-03 | End: 2023-09-29

## 2023-09-03 RX ORDER — POTASSIUM PHOSPHATE, MONOBASIC POTASSIUM PHOSPHATE, DIBASIC 236; 224 MG/ML; MG/ML
15 INJECTION, SOLUTION INTRAVENOUS ONCE
Refills: 0 | Status: COMPLETED | OUTPATIENT
Start: 2023-09-03 | End: 2023-09-03

## 2023-09-03 RX ORDER — INSULIN LISPRO 100/ML
VIAL (ML) SUBCUTANEOUS
Refills: 0 | Status: DISCONTINUED | OUTPATIENT
Start: 2023-09-03 | End: 2023-09-29

## 2023-09-03 RX ORDER — SODIUM CHLORIDE 9 MG/ML
1000 INJECTION, SOLUTION INTRAVENOUS
Refills: 0 | Status: DISCONTINUED | OUTPATIENT
Start: 2023-09-03 | End: 2023-09-29

## 2023-09-03 RX ORDER — IPRATROPIUM/ALBUTEROL SULFATE 18-103MCG
3 AEROSOL WITH ADAPTER (GRAM) INHALATION EVERY 6 HOURS
Refills: 0 | Status: DISCONTINUED | OUTPATIENT
Start: 2023-09-03 | End: 2023-09-29

## 2023-09-03 RX ORDER — DEXTROSE 50 % IN WATER 50 %
15 SYRINGE (ML) INTRAVENOUS ONCE
Refills: 0 | Status: DISCONTINUED | OUTPATIENT
Start: 2023-09-03 | End: 2023-09-29

## 2023-09-03 RX ORDER — ASPIRIN/CALCIUM CARB/MAGNESIUM 324 MG
81 TABLET ORAL DAILY
Refills: 0 | Status: DISCONTINUED | OUTPATIENT
Start: 2023-09-03 | End: 2023-09-18

## 2023-09-03 RX ADMIN — LEVETIRACETAM 400 MILLIGRAM(S): 250 TABLET, FILM COATED ORAL at 18:11

## 2023-09-03 RX ADMIN — POTASSIUM PHOSPHATE, MONOBASIC POTASSIUM PHOSPHATE, DIBASIC 62.5 MILLIMOLE(S): 236; 224 INJECTION, SOLUTION INTRAVENOUS at 07:03

## 2023-09-03 RX ADMIN — SENNA PLUS 2 TABLET(S): 8.6 TABLET ORAL at 22:02

## 2023-09-03 RX ADMIN — TAMSULOSIN HYDROCHLORIDE 0.4 MILLIGRAM(S): 0.4 CAPSULE ORAL at 22:02

## 2023-09-03 RX ADMIN — Medication 3 MILLILITER(S): at 04:17

## 2023-09-03 RX ADMIN — Medication 10 MILLIGRAM(S): at 18:11

## 2023-09-03 RX ADMIN — CHLORHEXIDINE GLUCONATE 1 APPLICATION(S): 213 SOLUTION TOPICAL at 06:58

## 2023-09-03 RX ADMIN — Medication 975 MILLIGRAM(S): at 21:46

## 2023-09-03 RX ADMIN — Medication 975 MILLIGRAM(S): at 20:46

## 2023-09-03 RX ADMIN — Medication 81 MILLIGRAM(S): at 13:15

## 2023-09-03 RX ADMIN — Medication 4 MILLILITER(S): at 09:01

## 2023-09-03 RX ADMIN — POLYETHYLENE GLYCOL 3350 17 GRAM(S): 17 POWDER, FOR SOLUTION ORAL at 18:11

## 2023-09-03 RX ADMIN — Medication 4 MILLILITER(S): at 04:17

## 2023-09-03 RX ADMIN — Medication 3 MILLILITER(S): at 09:00

## 2023-09-03 RX ADMIN — LEVETIRACETAM 400 MILLIGRAM(S): 250 TABLET, FILM COATED ORAL at 05:59

## 2023-09-03 RX ADMIN — ENOXAPARIN SODIUM 40 MILLIGRAM(S): 100 INJECTION SUBCUTANEOUS at 10:00

## 2023-09-03 RX ADMIN — ATORVASTATIN CALCIUM 80 MILLIGRAM(S): 80 TABLET, FILM COATED ORAL at 22:02

## 2023-09-03 RX ADMIN — SODIUM CHLORIDE 60 MILLILITER(S): 5 INJECTION, SOLUTION INTRAVENOUS at 10:00

## 2023-09-03 NOTE — PROGRESS NOTE ADULT - SUBJECTIVE AND OBJECTIVE BOX
Patient is a 63 year old male with PMH Hypertension, DM on oral medications who presents c/o stroke-like symptoms. Per son pt was walking outside around 10:15am this morning when he suddenly fell. His son helped him up and noted that he was weak on the right side and not acting normally which prompted him to come to the ED. On arrival pt w/ obvious right sided arm weakness, confusion, dysarthria, facial droop suggestive of acute stroke. Code stroke initiated, found to have LM1 occlusion, given TNK @ 1215 and was taken for thrombectomy. Unable to provide ROS 2/2 aphasia  (27 Aug 2023 16:45)    INTERVAL HPI/OVERNIGHT EVENTS:  63y Male seen and examined a bedside. Neurologically improved, CTH stable yesterday. Weaned off hiflow to NC.     PHYSICAL EXAM:  General: NAD, pt is comfortably resting in bed  HEENT: EOMI b/l, L periorbital edema  Cardiovascular: Regular rate and rhythm  Respiratory: nonlabored breathing, normal chest rise, intubated  GI: abdomen soft, nontender, nondistended  Neuro: opens eyes to voice, PERRL 3mm brisk b/l  follows commands, LUE 5/5, LLE 4/5, RUE extensor, RLE TF  Extremities: distal pulses 2+ x4  Wound/incision: hemicrani incision c/d/i, flap full and soft    Vital Signs Last 24 Hrs  T(C): 37.7 (02 Sep 2023 23:00), Max: 38.3 (02 Sep 2023 01:00)  T(F): 99.9 (02 Sep 2023 23:00), Max: 100.9 (02 Sep 2023 01:00)  HR: 96 (02 Sep 2023 23:00) (86 - 107)  BP: 130/84 (02 Sep 2023 23:00) (117/88 - 149/96)  BP(mean): 99 (02 Sep 2023 23:00) (92 - 110)  RR: 20 (02 Sep 2023 23:00) (12 - 30)  SpO2: 100% (02 Sep 2023 23:00) (90% - 100%)    I&O's Summary  01 Sep 2023 07:01  -  02 Sep 2023 07:00  --------------------------------------------------------  IN: 2552.5 mL / OUT: 3550 mL / NET: -997.5 mL    02 Sep 2023 07:01  -  03 Sep 2023 00:50  --------------------------------------------------------  IN: 1020 mL / OUT: 1590 mL / NET: -570 mL    LABS:             8.9    13.20 )-----------( 187      ( 02 Sep 2023 00:25 )             26.5     148<H>  |  110<H>  |  16.1  ----------------------------<  118<H>  3.6   |  25.0  |  0.62    Ca    8.5      02 Sep 2023 22:45  Phos  2.2     09-02  Mg     2.2     09-02

## 2023-09-03 NOTE — PROGRESS NOTE ADULT - ASSESSMENT
62 yo M w/ hx DM2, HTN presents with fall, right sided weakness, dysarthria and right facial droop, Found to have LM1 occlusion,  s/p TNK in ER, subsequent mechanical thrombectomy and decompressive craniectomy for midline shift. SAMARA negative for thrombus. cardiology recommending ILR /MCOT as outpatient. Extubated on 9/1/23, monitored in ICU and transferred to medicine for further management     CVA:     c/w lipitor    aspirin when cleared by neurosurgery    SAMARA negative for thrombus, +atheroma of aortic arch and aorta    needs mcot/ILR on dc    pt/ot/speech/ PM&R    keppra until 9/6    f/u hypercoagulable workup    febrile episode 9/2/23: hold off on IV abx.    f/u blood cultures    negative ua/ cxr    dysphagia: speech following, pureed diet with thick liquids    hypernatremia: PO fluid intake    dm2: sliding scale    ppx: lovenox

## 2023-09-03 NOTE — PROGRESS NOTE ADULT - ASSESSMENT
62 y/o M with PMHx of HTN, DM presented to ED 8/27 with R arm weakness, confusion, dysarthria, R facial droop. Given tenecteplase 8/27 @ 12:15, s/p cerebral angiogram mechanical thrombectomy for L M1 occlusion TICI 2C. Worsening neurological exam, s/p emergent L hemicraniectomy 8/30.     PLAN:   - q4hr neuro checks  - Pain control PRN; avoid oversedation  - Keppra 1g BID  - SBP<140  - NPO pending rpt SLP in AM   - bowel regimen: Miralax/Senna  - 2% @60mL/hr Sodium goal : 145-150  - DVT ppx: SCDs , lovenox today.   - Medical management/supportive care per NSICU  - final plan pend discussion with surgeon in AM

## 2023-09-03 NOTE — CHART NOTE - NSCHARTNOTEFT_GEN_A_CORE
Patient was evaluated and measured for a cranial helmet. We will be back tomorrow to fit helmet. Didn't have the proper size. [facility] [phone]

## 2023-09-03 NOTE — CHART NOTE - NSCHARTNOTEFT_GEN_A_CORE
HPI:  HPI:  Patient is a 63 year old male with PMH Hypertension, DM on oral medications who presents c/o stroke-like symptoms. Per son pt was walking outside around 10:15am this morning when he suddenly fell. His son helped him up and noted that he was weak on the right side and not acting normally which prompted him to come to the ED. On arrival pt w/ obvious right sided arm weakness, confusion, dysarthria, facial droop suggestive of acute stroke. Code stroke initiated, found to have LM1 occlusion, given TNK @ 1215 and was taken for thrombectomy. Unable to provide ROS 2/2 aphasia  (27 Aug 2023 16:45)      Interval Events:   08:27: TNK: 12: 15, thrombectomy 2c.     Vital Signs Last 24 Hrs  T(C): 36.9 (03 Sep 2023 04:00), Max: 37.7 (02 Sep 2023 10:00)  T(F): 98.5 (03 Sep 2023 04:00), Max: 99.9 (02 Sep 2023 10:00)  HR: 92 (03 Sep 2023 09:04) (85 - 101)  BP: 138/84 (03 Sep 2023 09:00) (114/86 - 145/86)  BP(mean): 100 (03 Sep 2023 09:00) (92 - 110)  RR: 17 (03 Sep 2023 09:00) (12 - 26)  SpO2: 97% (03 Sep 2023 09:04) (94% - 100%)    Parameters below as of 03 Sep 2023 09:04  Patient On (Oxygen Delivery Method): room air          PHYSICAL EXAM:  GENERAL: NAD, well-groomed, well-developed  HEAD:  Atraumatic, normocephalic  DRAINS:   WOUND: Dressing clean dry intact  MENTAL STATUS: AAO x3; Awake/Comatose; Opens eyes spontaneously/to voice/to light touch/to noxious stimuli; Appropriately conversant without aphasia/Nonverbal; following simple commands/mimicking/not following commands  CRANIAL NERVES: PERRL; EOMI; corneals intact b/l; Dolls sign positive; blinks to threat b/l; no facial asymmetry; facial sensation grossly intact to light touch b/l;  tongue midline; palate rises symmetrically; gag reflex intact  MOTOR: strength 5/5 B/L upper and lower extremities; sensation grossly intact all extremities; DTRs 2+ intact and symmetric; negative Hernandez's b/l; negative clonus b/l  CHEST/LUNG: Clear to auscultation bilaterally; no rales, rhonchi, wheezing, or rubs  HEART: +S1/+S2; Regular rate and rhythm; no murmurs, rubs, or gallops  ABDOMEN: Soft, nontender, nondistended; bowel sounds present all four quadrants  EXTREMITIES:  2+ peripheral pulses, no clubbing, cyanosis, or edema  SKIN: Warm, dry; no rashes or lesions      LABS:                        9.5    12.54 )-----------( 244      ( 03 Sep 2023 05:20 )             28.0     09-03    150<H>  |  113<H>  |  16.6  ----------------------------<  118<H>  3.7   |  24.0  |  0.62    Ca    8.6      03 Sep 2023 05:20  Phos  2.2     09-03  Mg     2.4     09-03        Urinalysis Basic - ( 03 Sep 2023 05:20 )    Color: x / Appearance: x / SG: x / pH: x  Gluc: 118 mg/dL / Ketone: x  / Bili: x / Urobili: x   Blood: x / Protein: x / Nitrite: x   Leuk Esterase: x / RBC: x / WBC x   Sq Epi: x / Non Sq Epi: x / Bacteria: x        09-02 @ 07:01 - 09-03 @ 07:00  --------------------------------------------------------  IN: 1605 mL / OUT: 1765 mL / NET: -160 mL    09-03 @ 07:01  -  09-03 @ 09:32  --------------------------------------------------------  IN: 305 mL / OUT: 350 mL / NET: -45 mL        RADIOLOGY & ADDITIONAL TESTS: HPI:  HPI:  Patient is a 63 year old male with PMH Hypertension, DM on oral medications who presents c/o stroke-like symptoms. Per son pt was walking outside around 10:15am this morning when he suddenly fell. His son helped him up and noted that he was weak on the right side and not acting normally which prompted him to come to the ED. On arrival pt w/ obvious right sided arm weakness, confusion, dysarthria, facial droop suggestive of acute stroke. Code stroke initiated, found to have LM1 occlusion, given TNK @ 1215 and was taken for thrombectomy. Unable to provide ROS 2/2 aphasia  (27 Aug 2023 16:45)      Interval Events:   08/27: TNK: 12: 15, thrombectomy 2c.   08/29: Increasing R hip hematoma, arterial dopplers ordered: no significant abnormality noted. CTH showed worsening MLS. ON: patient lethargic  08/30: taken to OR for worsening Midline shift, received mannitol intraop  8/31: SHUN Removed  9/1: SAMARA negative:   9/2: Subqlovenox started.   Vital Signs Last 24 Hrs  T(C): 36.9 (03 Sep 2023 04:00), Max: 37.7 (02 Sep 2023 10:00)  T(F): 98.5 (03 Sep 2023 04:00), Max: 99.9 (02 Sep 2023 10:00)  HR: 92 (03 Sep 2023 09:04) (85 - 101)  BP: 138/84 (03 Sep 2023 09:00) (114/86 - 145/86)  BP(mean): 100 (03 Sep 2023 09:00) (92 - 110)  RR: 17 (03 Sep 2023 09:00) (12 - 26)  SpO2: 97% (03 Sep 2023 09:04) (94% - 100%)    Parameters below as of 03 Sep 2023 09:04  Patient On (Oxygen Delivery Method): room air          PHYSICAL EXAM:  GENERAL: NAD, well-groomed, well-developed  HEAD:  Atraumatic, normocephalic  DRAINS:   WOUND: Dressing clean dry intact  MENTAL STATUS: AAO x3; Awake/Comatose; Opens eyes spontaneously/to voice/to light touch/to noxious stimuli; Appropriately conversant without aphasia/Nonverbal; following simple commands/mimicking/not following commands  CRANIAL NERVES: PERRL; EOMI; corneals intact b/l; Dolls sign positive; blinks to threat b/l; no facial asymmetry; facial sensation grossly intact to light touch b/l;  tongue midline; palate rises symmetrically; gag reflex intact  MOTOR: strength 5/5 B/L upper and lower extremities; sensation grossly intact all extremities; DTRs 2+ intact and symmetric; negative Hernandez's b/l; negative clonus b/l  CHEST/LUNG: Clear to auscultation bilaterally; no rales, rhonchi, wheezing, or rubs  HEART: +S1/+S2; Regular rate and rhythm; no murmurs, rubs, or gallops  ABDOMEN: Soft, nontender, nondistended; bowel sounds present all four quadrants  EXTREMITIES:  2+ peripheral pulses, no clubbing, cyanosis, or edema  SKIN: Warm, dry; no rashes or lesions      LABS:                        9.5    12.54 )-----------( 244      ( 03 Sep 2023 05:20 )             28.0     09-03    150<H>  |  113<H>  |  16.6  ----------------------------<  118<H>  3.7   |  24.0  |  0.62    Ca    8.6      03 Sep 2023 05:20  Phos  2.2     09-03  Mg     2.4     09-03        Urinalysis Basic - ( 03 Sep 2023 05:20 )    Color: x / Appearance: x / SG: x / pH: x  Gluc: 118 mg/dL / Ketone: x  / Bili: x / Urobili: x   Blood: x / Protein: x / Nitrite: x   Leuk Esterase: x / RBC: x / WBC x   Sq Epi: x / Non Sq Epi: x / Bacteria: x        09-02 @ 07:01 - 09-03 @ 07:00  --------------------------------------------------------  IN: 1605 mL / OUT: 1765 mL / NET: -160 mL    09-03 @ 07:01  -  09-03 @ 09:32  --------------------------------------------------------  IN: 305 mL / OUT: 350 mL / NET: -45 mL        RADIOLOGY & ADDITIONAL TESTS: HPI:  HPI:  Patient is a 63 year old male with PMH Hypertension, DM on oral medications who presents c/o stroke-like symptoms. Per son pt was walking outside around 10:15am this morning when he suddenly fell. His son helped him up and noted that he was weak on the right side and not acting normally which prompted him to come to the ED. On arrival pt w/ obvious right sided arm weakness, confusion, dysarthria, facial droop suggestive of acute stroke. Code stroke initiated, found to have LM1 occlusion, given TNK @ 1215 and was taken for thrombectomy. Unable to provide ROS 2/2 aphasia  (27 Aug 2023 16:45)      Interval Events:   08/27: TNK: 12: 15, thrombectomy 2c.   08/29: Increasing R hip hematoma, arterial dopplers ordered: no significant abnormality noted. CTH showed worsening MLS. ON: patient lethargic  08/30: taken to OR for worsening Midline shift, received mannitol intraop  8/31: SHUN Removed  9/1: SAMARA negative:   9/2: Subq lovenox started.     Vital Signs Last 24 Hrs  T(C): 36.9 (03 Sep 2023 04:00), Max: 37.7 (02 Sep 2023 10:00)  T(F): 98.5 (03 Sep 2023 04:00), Max: 99.9 (02 Sep 2023 10:00)  HR: 92 (03 Sep 2023 09:04) (85 - 101)  BP: 138/84 (03 Sep 2023 09:00) (114/86 - 145/86)  BP(mean): 100 (03 Sep 2023 09:00) (92 - 110)  RR: 17 (03 Sep 2023 09:00) (12 - 26)  SpO2: 97% (03 Sep 2023 09:04) (94% - 100%)    Parameters below as of 03 Sep 2023 09:04  Patient On (Oxygen Delivery Method): room air      LABS:                        9.5    12.54 )-----------( 244      ( 03 Sep 2023 05:20 )             28.0     09-03    150<H>  |  113<H>  |  16.6  ----------------------------<  118<H>  3.7   |  24.0  |  0.62    Ca    8.6      03 Sep 2023 05:20  Phos  2.2     09-03  Mg     2.4     09-03        Urinalysis Basic - ( 03 Sep 2023 05:20 )    Color: x / Appearance: x / SG: x / pH: x  Gluc: 118 mg/dL / Ketone: x  / Bili: x / Urobili: x   Blood: x / Protein: x / Nitrite: x   Leuk Esterase: x / RBC: x / WBC x   Sq Epi: x / Non Sq Epi: x / Bacteria: x        09-02 @ 07:01  -  09-03 @ 07:00  --------------------------------------------------------  IN: 1605 mL / OUT: 1765 mL / NET: -160 mL    09-03 @ 07:01  -  09-03 @ 09:32  --------------------------------------------------------  IN: 305 mL / OUT: 350 mL / NET: -45 mL    PHYSICAL EXAM:  General: NAD, pt is comfortably resting in bed  HEENT: EOMI b/l, L periorbital edema  Cardiovascular: Regular rate and rhythm  Respiratory: nonlabored breathing, normal chest rise, intubated  GI: abdomen soft, nontender, nondistended  Neuro: opens eyes to voice, PERRL 3mm brisk b/l  follows commands, LUE 5/5, LLE 4/5, RUE extensor, RLE TF  Extremities: distal pulses 2+ x4  Wound/incision: hemicrani incision c/d/i, flap full and soft      RADIOLOGY & ADDITIONAL TESTS:  < from: CT Head No Cont (09.02.23 @ 04:01) >      IMPRESSION: Redemonstration of large left MCA territoryinfarct.   Left-to-right midline shift measures 3 mm, unchanged.    --- End of Report ---        < end of copied text >    < from: CT Head No Cont (08.31.23 @ 20:43) >      IMPRESSION:  Interval removal of subcutaneous drainage catheter with new acute   extradural broad products identified, withincreased rightward midline   shift which now measures 6 mm (previously 3 mm on 8/30/2023).    Additionally, there is increased hypoattenuation of the left   frontoparietal cortex within the known MCA territory infarct, which may   represent new areas of acute/subacute ischemia versus evolving gliosis.   Hemorrhagic conversion is not suspected at this time. Close attention on   follow-up is advised.    Diffuse scalp edema extending to the left periorbital soft tissues has   significantly increased since the prior study, likely due to evolving   subcutaneous blood products.    Critical findings above were discussed directly by telephone by the ED   radiologist on call, Fei Hebert MD, with the neurosurgical ICU   physicians assistant, Casi RUGGIERO, at 3:40 AM on 9/1/2023.    < end of copied text >    < from: CT Head No Cont (08.30.23 @ 15:44) >    IMPRESSION:    New changes as described above.    --- End of Report ---  < from: CT Head No Cont (08.29.23 @ 23:39) >    IMPRESSION: Evolving acute left MCA infarct is identified.    --- End of Report ---      < end of copied text >    Assessment:  64 y/o M with PMHx of HTN, DM presented to ED 8/27 with R arm weakness, confusion, dysarthria, R facial droop. Given tenecteplase 8/27 @ 12:15, s/p cerebral angiogram mechanical thrombectomy for L M1 occlusion TICI 2C. Worsening neurological exam, s/p emergent L hemicraniectomy 8/30.     Neuro:   #L MCA Stroke  ESUS  - Q2hr stroke checks/vital checks   - Stroke neurology following, appreciate recs  - MRI Brain pending   - Keppra Ppx for 7 days following [facility]  - SAMARA today  - Statin, ASA POD7  - HyperCoag Panel    CV:  MAP >65, SBP <160  Statin     Resp:  SpO2 > 92% on NC    GI:  TF via NGT  bowel regimen    ID:    #Fevers  Possible aspirations v central  - No Abx for now  - UA, CXR, BCxs  - Trend Fever/WBC    Renal/:  Monitor UOP  IV fluids while NPO  Flomax    Endocrine:  Na goal 145 - 155 given worsening MLS on 2%  HgbA1c 6.8    FS q6h, ISS    Heme:  SQL after 24h stability of extra-axial blood  SCDs HPI:  HPI:  Patient is a 63 year old male with PMH Hypertension, DM on oral medications who presents c/o stroke-like symptoms. Per son pt was walking outside around 10:15am this morning when he suddenly fell. His son helped him up and noted that he was weak on the right side and not acting normally which prompted him to come to the ED. On arrival pt w/ obvious right sided arm weakness, confusion, dysarthria, facial droop suggestive of acute stroke. Code stroke initiated, found to have LM1 occlusion, given TNK @ 1215 and was taken for thrombectomy. Unable to provide ROS 2/2 aphasia  (27 Aug 2023 16:45)      Interval Events:   08/27: TNK: 12: 15, thrombectomy 2c.   08/29: Increasing R hip hematoma, arterial dopplers ordered: no significant abnormality noted. CTH showed worsening MLS. ON: patient lethargic  08/30: taken to OR for worsening Midline shift, received mannitol intraop  8/31: SHUN Removed  9/1: SAMARA negative:   9/2: Subq lovenox started.     Vital Signs Last 24 Hrs  T(C): 36.9 (03 Sep 2023 04:00), Max: 37.7 (02 Sep 2023 10:00)  T(F): 98.5 (03 Sep 2023 04:00), Max: 99.9 (02 Sep 2023 10:00)  HR: 92 (03 Sep 2023 09:04) (85 - 101)  BP: 138/84 (03 Sep 2023 09:00) (114/86 - 145/86)  BP(mean): 100 (03 Sep 2023 09:00) (92 - 110)  RR: 17 (03 Sep 2023 09:00) (12 - 26)  SpO2: 97% (03 Sep 2023 09:04) (94% - 100%)    Parameters below as of 03 Sep 2023 09:04  Patient On (Oxygen Delivery Method): room air      LABS:                        9.5    12.54 )-----------( 244      ( 03 Sep 2023 05:20 )             28.0     09-03    150<H>  |  113<H>  |  16.6  ----------------------------<  118<H>  3.7   |  24.0  |  0.62    Ca    8.6      03 Sep 2023 05:20  Phos  2.2     09-03  Mg     2.4     09-03        Urinalysis Basic - ( 03 Sep 2023 05:20 )    Color: x / Appearance: x / SG: x / pH: x  Gluc: 118 mg/dL / Ketone: x  / Bili: x / Urobili: x   Blood: x / Protein: x / Nitrite: x   Leuk Esterase: x / RBC: x / WBC x   Sq Epi: x / Non Sq Epi: x / Bacteria: x        09-02 @ 07:01  -  09-03 @ 07:00  --------------------------------------------------------  IN: 1605 mL / OUT: 1765 mL / NET: -160 mL    09-03 @ 07:01  -  09-03 @ 09:32  --------------------------------------------------------  IN: 305 mL / OUT: 350 mL / NET: -45 mL    PHYSICAL EXAM:  General: NAD, pt is comfortably resting in bed  HEENT: EOMI b/l, L periorbital edema  Cardiovascular: Regular rate and rhythm  Respiratory: nonlabored breathing, normal chest rise, intubated  GI: abdomen soft, nontender, nondistended  Neuro: opens eyes to voice, PERRL 3mm brisk b/l  follows commands, LUE 5/5, LLE 4/5, RUE extensor, RLE TF  Extremities: distal pulses 2+ x4  Wound/incision: hemicrani incision c/d/i, flap full and soft      RADIOLOGY & ADDITIONAL TESTS:  < from: CT Head No Cont (09.02.23 @ 04:01) >      IMPRESSION: Redemonstration of large left MCA territoryinfarct.   Left-to-right midline shift measures 3 mm, unchanged.    --- End of Report ---        < end of copied text >    < from: CT Head No Cont (08.31.23 @ 20:43) >      IMPRESSION:  Interval removal of subcutaneous drainage catheter with new acute   extradural broad products identified, withincreased rightward midline   shift which now measures 6 mm (previously 3 mm on 8/30/2023).    Additionally, there is increased hypoattenuation of the left   frontoparietal cortex within the known MCA territory infarct, which may   represent new areas of acute/subacute ischemia versus evolving gliosis.   Hemorrhagic conversion is not suspected at this time. Close attention on   follow-up is advised.    Diffuse scalp edema extending to the left periorbital soft tissues has   significantly increased since the prior study, likely due to evolving   subcutaneous blood products.    Critical findings above were discussed directly by telephone by the ED   radiologist on call, Fei Hebert MD, with the neurosurgical ICU   physicians assistant, Casi RUGGIERO, at 3:40 AM on 9/1/2023.    < end of copied text >    < from: CT Head No Cont (08.30.23 @ 15:44) >    IMPRESSION:    New changes as described above.    --- End of Report ---  < from: CT Head No Cont (08.29.23 @ 23:39) >    IMPRESSION: Evolving acute left MCA infarct is identified.    --- End of Report ---      < end of copied text >    Assessment:  64 y/o M with PMHx of HTN, DM presented to ED 8/27 with R arm weakness, confusion, dysarthria, R facial droop. Given tenecteplase 8/27 @ 12:15, s/p cerebral angiogram mechanical thrombectomy for L M1 occlusion TICI 2C. Worsening neurological exam, s/p emergent L hemicraniectomy 8/30.     Neuro:   #L MCA Stroke  ESUS  - Q2hr stroke checks/vital checks   - Stroke neurology following, appreciate recs  - MRI Brain pending   - Keppra 500 BID Ppx for 7 days following [facility] End date: 9/6  - CT head 9/3, if stable may restart ASA 81.   CV:   - -140  - SAMARA: negative.   - Statin  CV:  SBP: 100-160  Statin 80    Resp:  SpO2 > 92% on NC  duoneb/mucomyst     GI:  - SLP evaluation today, if fails NGT   bowel regimen    ID:    #Fevers  - f/u with Pan cx, blood cx, UA: neg.   - Trend Fever/WBC    Renal/:  Monitor UOP  - voiding   IV fluids while NPO  Flomax    Endocrine:  Sodium goal <145  HgbA1c 6.8    FS q6h, ISS    Heme:  CT head if stable, May restart ASA 81  - c/w lovenox   SCDs HPI:  HPI:  Patient is a 63 year old male with PMH Hypertension, DM on oral medications who presents c/o stroke-like symptoms. Per son pt was walking outside around 10:15am this morning when he suddenly fell. His son helped him up and noted that he was weak on the right side and not acting normally which prompted him to come to the ED. On arrival pt w/ obvious right sided arm weakness, confusion, dysarthria, facial droop suggestive of acute stroke. Code stroke initiated, found to have LM1 occlusion, given TNK @ 1215 and was taken for thrombectomy. Unable to provide ROS 2/2 aphasia  (27 Aug 2023 16:45)      Interval Events:   08/27: TNK: 12: 15, thrombectomy 2c.   08/29: Increasing R hip hematoma, arterial dopplers ordered: no significant abnormality noted. CTH showed worsening MLS. ON: patient lethargic  08/30: taken to OR for worsening Midline shift, received mannitol intraop  8/31: SHUN Removed  9/1: SAMARA negative:   9/2: Subq lovenox started.     Vital Signs Last 24 Hrs  T(C): 36.9 (03 Sep 2023 04:00), Max: 37.7 (02 Sep 2023 10:00)  T(F): 98.5 (03 Sep 2023 04:00), Max: 99.9 (02 Sep 2023 10:00)  HR: 92 (03 Sep 2023 09:04) (85 - 101)  BP: 138/84 (03 Sep 2023 09:00) (114/86 - 145/86)  BP(mean): 100 (03 Sep 2023 09:00) (92 - 110)  RR: 17 (03 Sep 2023 09:00) (12 - 26)  SpO2: 97% (03 Sep 2023 09:04) (94% - 100%)    Parameters below as of 03 Sep 2023 09:04  Patient On (Oxygen Delivery Method): room air      LABS:                        9.5    12.54 )-----------( 244      ( 03 Sep 2023 05:20 )             28.0     09-03    150<H>  |  113<H>  |  16.6  ----------------------------<  118<H>  3.7   |  24.0  |  0.62    Ca    8.6      03 Sep 2023 05:20  Phos  2.2     09-03  Mg     2.4     09-03        Urinalysis Basic - ( 03 Sep 2023 05:20 )    Color: x / Appearance: x / SG: x / pH: x  Gluc: 118 mg/dL / Ketone: x  / Bili: x / Urobili: x   Blood: x / Protein: x / Nitrite: x   Leuk Esterase: x / RBC: x / WBC x   Sq Epi: x / Non Sq Epi: x / Bacteria: x        09-02 @ 07:01  -  09-03 @ 07:00  --------------------------------------------------------  IN: 1605 mL / OUT: 1765 mL / NET: -160 mL    09-03 @ 07:01  -  09-03 @ 09:32  --------------------------------------------------------  IN: 305 mL / OUT: 350 mL / NET: -45 mL    PHYSICAL EXAM:  General: NAD, pt is comfortably resting in bed  HEENT: EOMI b/l, L periorbital edema  Cardiovascular: Regular rate and rhythm  Respiratory: nonlabored breathing, normal chest rise, intubated  GI: abdomen soft, nontender, nondistended  Neuro: opens eyes to voice, PERRL 3mm brisk b/l  follows commands, LUE 5/5, LLE 4/5, RUE extensor, RLE TF  Extremities: distal pulses 2+ x4  Wound/incision: hemicrani incision c/d/i, flap full and soft      RADIOLOGY & ADDITIONAL TESTS:  < from: CT Head No Cont (09.02.23 @ 04:01) >      IMPRESSION: Redemonstration of large left MCA territoryinfarct.   Left-to-right midline shift measures 3 mm, unchanged.    --- End of Report ---        < end of copied text >    < from: CT Head No Cont (08.31.23 @ 20:43) >      IMPRESSION:  Interval removal of subcutaneous drainage catheter with new acute   extradural broad products identified, withincreased rightward midline   shift which now measures 6 mm (previously 3 mm on 8/30/2023).    Additionally, there is increased hypoattenuation of the left   frontoparietal cortex within the known MCA territory infarct, which may   represent new areas of acute/subacute ischemia versus evolving gliosis.   Hemorrhagic conversion is not suspected at this time. Close attention on   follow-up is advised.    Diffuse scalp edema extending to the left periorbital soft tissues has   significantly increased since the prior study, likely due to evolving   subcutaneous blood products.    Critical findings above were discussed directly by telephone by the ED   radiologist on call, Fei Hebert MD, with the neurosurgical ICU   physicians assistant, Casi RUGGIERO, at 3:40 AM on 9/1/2023.    < end of copied text >    < from: CT Head No Cont (08.30.23 @ 15:44) >    IMPRESSION:    New changes as described above.    --- End of Report ---  < from: CT Head No Cont (08.29.23 @ 23:39) >    IMPRESSION: Evolving acute left MCA infarct is identified.    --- End of Report ---      < end of copied text >    Assessment:  62 y/o M with PMHx of HTN, DM presented to ED 8/27 with R arm weakness, confusion, dysarthria, R facial droop. Given tenecteplase 8/27 @ 12:15, s/p cerebral angiogram mechanical thrombectomy for L M1 occlusion TICI 2C. Worsening neurological exam, s/p emergent L hemicraniectomy 8/30.     Neuro:   #L MCA Stroke  ESUS  - Q2hr stroke checks/vital checks   - Stroke neurology following, appreciate recs  - MRI Brain pending   - Keppra 500 BID Ppx for 7 days following [facility] End date: 9/6  - CT head 9/3 [id number], may restart ASA 81.   CV:   - -140  - SAMARA: negative.   - Statin  CV:  SBP: 100-160  Statin 80    Resp:  SpO2 > 92% on NC  duoneb/mucomyst     GI:  - SLP evaluation: recommended puree with mildly thick liquids with teaspoon  bowel regimen    ID:    #Fevers  - f/u with Pan cx, blood cx, UA: neg.   - Trend Fever/WBC    Renal/:  Monitor UOP  - voiding   IV fluids while NPO  Flomax    Endocrine:  Sodium goal <145  HgbA1c 6.8    FS q6h, ISS    Heme:  restart ASA 81  - c/w lovenox   SCDs

## 2023-09-03 NOTE — PROGRESS NOTE ADULT - SUBJECTIVE AND OBJECTIVE BOX
ICU transfer    seen for cva  ros limited 2/2 aphasia/dysarthria  no events per RN    MEDICATIONS  (STANDING):  aspirin  chewable 81 milliGRAM(s) Oral daily  atorvastatin 80 milliGRAM(s) Oral at bedtime  chlorhexidine 2% Cloths 1 Application(s) Topical <User Schedule>  dextrose 50% Injectable 25 Gram(s) IV Push once  dextrose 50% Injectable 12.5 Gram(s) IV Push once  dextrose 50% Injectable 25 Gram(s) IV Push once  enoxaparin Injectable 40 milliGRAM(s) SubCutaneous every 24 hours  insulin lispro (ADMELOG) corrective regimen sliding scale   SubCutaneous every 6 hours  levETIRAcetam  IVPB 500 milliGRAM(s) IV Intermittent every 12 hours  polyethylene glycol 3350 17 Gram(s) Oral two times a day  senna 2 Tablet(s) Oral at bedtime  tamsulosin 0.4 milliGRAM(s) Oral at bedtime    MEDICATIONS  (PRN):  acetaminophen     Tablet .. 975 milliGRAM(s) Oral every 6 hours PRN Mild Pain (1 - 3)  albuterol/ipratropium for Nebulization 3 milliLiter(s) Nebulizer every 6 hours PRN Shortness of Breath and/or Wheezing  hydrALAZINE Injectable 10 milliGRAM(s) IV Push every 2 hours PRN SBP >140  labetalol Injectable 10 milliGRAM(s) IV Push every 2 hours PRN SBP >140      Allergies    No Known Allergies      Vital Signs Last 24 Hrs  T(C): 37.6 (03 Sep 2023 12:00), Max: 37.7 (02 Sep 2023 23:00)  T(F): 99.7 (03 Sep 2023 12:00), Max: 99.9 (02 Sep 2023 23:00)  HR: 94 (03 Sep 2023 12:00) (85 - 101)  BP: 138/87 (03 Sep 2023 12:00) (114/86 - 145/86)  BP(mean): 116 (03 Sep 2023 12:00) (95 - 116)  RR: 18 (03 Sep 2023 12:00) (12 - 25)  SpO2: 96% (03 Sep 2023 12:00) (94% - 100%)    Parameters below as of 03 Sep 2023 12:00  Patient On (Oxygen Delivery Method): room air        PHYSICAL EXAM:    GENERAL: NAD  HEAD:  intact sutures left side   CHEST/LUNG: Clear to ausculation bilaterally  HEART: Regular rate and rhythm; S1 S2  ABDOMEN: Soft,  Bowel sounds present  EXTREMITIES:  no edema  [place] catheter   NERVOUS SYSTEM: awake, follows simple commands. right hemiparesis.     LABS:                        9.5    12.54 )-----------( 244      ( 03 Sep 2023 05:20 )             28.0     09-03    146<H>  |  110<H>  |  16.7  ----------------------------<  119<H>  3.9   |  24.0  |  0.59    Ca    8.6      03 Sep 2023 11:00  Phos  2.2     09-03  Mg     2.4     09-03        Urinalysis Basic - ( 03 Sep 2023 11:00 )    Color: x / Appearance: x / SG: x / pH: x  Gluc: 119 mg/dL / Ketone: x  / Bili: x / Urobili: x   Blood: x / Protein: x / Nitrite: x   Leuk Esterase: x / RBC: x / WBC x   Sq Epi: x / Non Sq Epi: x / Bacteria: x        CAPILLARY BLOOD GLUCOSE      POCT Blood Glucose.: 120 mg/dL (03 Sep 2023 06:02)  POCT Blood Glucose.: 117 mg/dL (02 Sep 2023 17:47)        RADIOLOGY & ADDITIONAL TESTS:

## 2023-09-04 LAB
ANION GAP SERPL CALC-SCNC: 13 MMOL/L — SIGNIFICANT CHANGE UP (ref 5–17)
AT III ACT/NOR PPP CHRO: 88 % — SIGNIFICANT CHANGE UP (ref 85–135)
BUN SERPL-MCNC: 20.1 MG/DL — HIGH (ref 8–20)
CALCIUM SERPL-MCNC: 9 MG/DL — SIGNIFICANT CHANGE UP (ref 8.4–10.5)
CHLORIDE SERPL-SCNC: 108 MMOL/L — SIGNIFICANT CHANGE UP (ref 96–108)
CO2 SERPL-SCNC: 23 MMOL/L — SIGNIFICANT CHANGE UP (ref 22–29)
CREAT SERPL-MCNC: 0.69 MG/DL — SIGNIFICANT CHANGE UP (ref 0.5–1.3)
DRVVT RATIO: 1.67 RATIO — HIGH (ref 0–1.21)
DRVVT SCREEN TO CONFIRM RATIO: ABNORMAL
EGFR: 105 ML/MIN/1.73M2 — SIGNIFICANT CHANGE UP
GLUCOSE BLDC GLUCOMTR-MCNC: 113 MG/DL — HIGH (ref 70–99)
GLUCOSE BLDC GLUCOMTR-MCNC: 128 MG/DL — HIGH (ref 70–99)
GLUCOSE BLDC GLUCOMTR-MCNC: 138 MG/DL — HIGH (ref 70–99)
GLUCOSE BLDC GLUCOMTR-MCNC: 167 MG/DL — HIGH (ref 70–99)
GLUCOSE SERPL-MCNC: 125 MG/DL — HIGH (ref 70–99)
HCT VFR BLD CALC: 32.1 % — LOW (ref 39–50)
HGB BLD-MCNC: 10.6 G/DL — LOW (ref 13–17)
MAGNESIUM SERPL-MCNC: 2.4 MG/DL — SIGNIFICANT CHANGE UP (ref 1.6–2.6)
MCHC RBC-ENTMCNC: 31.3 PG — SIGNIFICANT CHANGE UP (ref 27–34)
MCHC RBC-ENTMCNC: 33 GM/DL — SIGNIFICANT CHANGE UP (ref 32–36)
MCV RBC AUTO: 94.7 FL — SIGNIFICANT CHANGE UP (ref 80–100)
PHOSPHATE SERPL-MCNC: 2.9 MG/DL — SIGNIFICANT CHANGE UP (ref 2.4–4.7)
PLATELET # BLD AUTO: 308 K/UL — SIGNIFICANT CHANGE UP (ref 150–400)
POTASSIUM SERPL-MCNC: 3.8 MMOL/L — SIGNIFICANT CHANGE UP (ref 3.5–5.3)
POTASSIUM SERPL-SCNC: 3.8 MMOL/L — SIGNIFICANT CHANGE UP (ref 3.5–5.3)
PROT C ACT/NOR PPP: 91 % — SIGNIFICANT CHANGE UP (ref 74–150)
PROT S FREE PPP-ACNC: 57 % — LOW (ref 63–140)
RBC # BLD: 3.39 M/UL — LOW (ref 4.2–5.8)
RBC # FLD: 14.1 % — SIGNIFICANT CHANGE UP (ref 10.3–14.5)
SODIUM SERPL-SCNC: 144 MMOL/L — SIGNIFICANT CHANGE UP (ref 135–145)
WBC # BLD: 11.56 K/UL — HIGH (ref 3.8–10.5)
WBC # FLD AUTO: 11.56 K/UL — HIGH (ref 3.8–10.5)

## 2023-09-04 PROCEDURE — 99232 SBSQ HOSP IP/OBS MODERATE 35: CPT

## 2023-09-04 RX ADMIN — POLYETHYLENE GLYCOL 3350 17 GRAM(S): 17 POWDER, FOR SOLUTION ORAL at 05:03

## 2023-09-04 RX ADMIN — SENNA PLUS 2 TABLET(S): 8.6 TABLET ORAL at 22:34

## 2023-09-04 RX ADMIN — ATORVASTATIN CALCIUM 80 MILLIGRAM(S): 80 TABLET, FILM COATED ORAL at 22:34

## 2023-09-04 RX ADMIN — LEVETIRACETAM 400 MILLIGRAM(S): 250 TABLET, FILM COATED ORAL at 18:22

## 2023-09-04 RX ADMIN — Medication 10 MILLIGRAM(S): at 04:13

## 2023-09-04 RX ADMIN — LEVETIRACETAM 400 MILLIGRAM(S): 250 TABLET, FILM COATED ORAL at 05:03

## 2023-09-04 RX ADMIN — TAMSULOSIN HYDROCHLORIDE 0.4 MILLIGRAM(S): 0.4 CAPSULE ORAL at 22:35

## 2023-09-04 RX ADMIN — POLYETHYLENE GLYCOL 3350 17 GRAM(S): 17 POWDER, FOR SOLUTION ORAL at 18:24

## 2023-09-04 RX ADMIN — Medication 81 MILLIGRAM(S): at 11:28

## 2023-09-04 RX ADMIN — ENOXAPARIN SODIUM 40 MILLIGRAM(S): 100 INJECTION SUBCUTANEOUS at 11:28

## 2023-09-04 RX ADMIN — CHLORHEXIDINE GLUCONATE 1 APPLICATION(S): 213 SOLUTION TOPICAL at 05:03

## 2023-09-04 NOTE — PROVIDER CONTACT NOTE (OTHER) - ACTION/TREATMENT ORDERED:
Ofirmiv ordered and given
PA made aware  2L NC applied while asleep
500ml bolus ordered
team assessed pt- cbc and ct ordered
PA instructed RN to use cuff pressures

## 2023-09-04 NOTE — PROVIDER CONTACT NOTE (OTHER) - REASON
periods of sleep apnea
A-Line not correlating with cuff
Continued firmness in R groin angio site, and drop in BP
Temp 101.3
[facility]

## 2023-09-04 NOTE — PROGRESS NOTE ADULT - SUBJECTIVE AND OBJECTIVE BOX
[facility] Division of Hospital Medicine    SUBJECTIVE / OVERNIGHT EVENTS:  No events    Patient denies chest pain, SOB, abd pain, N/V, fever, chills, dysuria or any other complaints. All remainder ROS negative.     MEDICATIONS  (STANDING):  aspirin  chewable 81 milliGRAM(s) Oral daily  atorvastatin 80 milliGRAM(s) Oral at bedtime  chlorhexidine 2% Cloths 1 Application(s) Topical <User Schedule>  dextrose 5%. 1000 milliLiter(s) (50 mL/Hr) IV Continuous <Continuous>  dextrose 5%. 1000 milliLiter(s) (100 mL/Hr) IV Continuous <Continuous>  dextrose 50% Injectable 25 Gram(s) IV Push once  dextrose 50% Injectable 25 Gram(s) IV Push once  dextrose 50% Injectable 12.5 Gram(s) IV Push once  enoxaparin Injectable 40 milliGRAM(s) SubCutaneous every 24 hours  glucagon  Injectable 1 milliGRAM(s) IntraMuscular once  insulin lispro (ADMELOG) corrective regimen sliding scale   SubCutaneous three times a day before meals  levETIRAcetam  IVPB 500 milliGRAM(s) IV Intermittent every 12 hours  polyethylene glycol 3350 17 Gram(s) Oral two times a day  senna 2 Tablet(s) Oral at bedtime  tamsulosin 0.4 milliGRAM(s) Oral at bedtime    MEDICATIONS  (PRN):  acetaminophen     Tablet .. 975 milliGRAM(s) Oral every 6 hours PRN Mild Pain (1 - 3)  albuterol/ipratropium for Nebulization 3 milliLiter(s) Nebulizer every 6 hours PRN Shortness of Breath and/or Wheezing  dextrose Oral Gel 15 Gram(s) Oral once PRN Blood Glucose LESS THAN 70 milliGRAM(s)/deciliter  hydrALAZINE Injectable 10 milliGRAM(s) IV Push every 2 hours PRN SBP >140  labetalol Injectable 10 milliGRAM(s) IV Push every 2 hours PRN SBP >140        I&O's Summary    03 Sep 2023 07:01  -  04 Sep 2023 07:00  --------------------------------------------------------  IN: 685 mL / OUT: 2160 mL / NET: -1475 mL        PHYSICAL EXAM:  Vital Signs Last 24 Hrs  T(C): 37.8 (04 Sep 2023 12:00), Max: 37.8 (03 Sep 2023 18:00)  T(F): 100 (04 Sep 2023 12:00), Max: 100 (03 Sep 2023 18:00)  HR: 101 (04 Sep 2023 12:00) (90 - 109)  BP: 125/97 (04 Sep 2023 12:00) (111/81 - 143/93)  BP(mean): 106 (04 Sep 2023 12:00) (91 - 108)  RR: 17 (04 Sep 2023 12:00) (13 - 18)  SpO2: 100% (04 Sep 2023 12:00) (93% - 100%)    Parameters below as of 04 Sep 2023 12:00  Patient On (Oxygen Delivery Method): room air        CONSTITUTIONAL: NAD, appears stated age  ENMT: Moist oral mucosa, no pharyngeal injection or exudates; normal dentition  RESPIRATORY: Normal respiratory effort; clear to auscultation bilaterally  CARDIOVASCULAR: Regular rate and rhythm, normal S1 and S2, no murmur/rub/gallop; Peripheral pulses are 2+ bilaterally  ABDOMEN: Nontender to palpation, normoactive bowel sounds, no rebound/guarding;   MUSCLOSKELETAL:  No clubbing or cyanosis of digits; no joint swelling or tenderness to palpation  PSYCH: A+O to person, place, and time; affect appropriate  NEUROLOGY: right tongue deviation, Left upper extremity moves spontaneously   SKIN: No rashes; no palpable lesions    LABS:                        10.6   11.56 )-----------( 308      ( 04 Sep 2023 07:36 )             32.1     09-04    144  |  108  |  20.1<H>  ----------------------------<  125<H>  3.8   |  23.0  |  0.69    Ca    9.0      04 Sep 2023 07:36  Phos  2.9     09-04  Mg     2.4     09-04            Urinalysis Basic - ( 04 Sep 2023 07:36 )    Color: x / Appearance: x / SG: x / pH: x  Gluc: 125 mg/dL / Ketone: x  / Bili: x / Urobili: x   Blood: x / Protein: x / Nitrite: x   Leuk Esterase: x / RBC: x / WBC x   Sq Epi: x / Non Sq Epi: x / Bacteria: x        Culture - Blood (collected 02 Sep 2023 16:00)  Source: .Blood Blood  Preliminary Report (03 Sep 2023 22:02):    No growth at 24 hours    Culture - Blood (collected 02 Sep 2023 16:00)  Source: .Blood Blood  Preliminary Report (03 Sep 2023 22:02):    No growth at 24 hours      CAPILLARY BLOOD GLUCOSE      POCT Blood Glucose.: 113 mg/dL (04 Sep 2023 08:38)  POCT Blood Glucose.: 122 mg/dL (03 Sep 2023 21:35)  POCT Blood Glucose.: 104 mg/dL (03 Sep 2023 17:22)        RADIOLOGY & ADDITIONAL TESTS:  Results Reviewed:   Imaging Personally Reviewed:  Electrocardiogram Personally Reviewed:

## 2023-09-04 NOTE — PROGRESS NOTE ADULT - ASSESSMENT
63M s/p L MCA stroke s/p thrombectomy and subsequent L hemicraniectomy POD 5     Recommendations:   - q4 neuro checks   - Okay for ASA 82 for stroke / Lovenox for dvt ppx   - Will continue to follow for timing of cranioplasty, however, flap remains too full at this time   - Helmet when OOB   - No contraindication to patient being discharged and coming back for cranioplasty when able

## 2023-09-04 NOTE — PROGRESS NOTE ADULT - ASSESSMENT
INCOMPLETE     ASSESSMENT: Patient is a 63 year old male with PMHx Hypertension, DM on oral medications who presented c/o stroke-like symptoms. Per son pt was walking outside around 10:15am on 8/27/23 when he suddenly fell. His son helped him up and noted that he was weak on the right side and not acting normally which prompted him to come to the ED. On arrival patient came in with right sided arm weakness, global aphasia, and dysarthria, right sided facial droop. Initial head CT demonstrated wedge like low density in the left putamen and caudate (corpus striatum) consistent with infarct that may be acute and no acute intracranial hemorrhage. Tenecteplase was administered at 12:15 pm. CTA head demonstrated occlusion of left MCA distal M1 segment and CTP showed large mismatch. found to have LM1 occlusion. Patient taken for mechanical thrombectomy where he was found to have LM1 occlusion with TICI 2C recanalization. Due to worsening of neurological exam and increase in midline shift found on CT head on 8/29/23(increased in left to right shift of around 9.5mm (previously was 5.2mm)) patient was taken to the OR on 8/30/23 for decompressive hemicraniectomy.   Etiology: embolic stroke of undetermined source     NEURO:   - neurologically  -Repeat CT head as noted   -Continue close monitoring for neurologic deterioration in setting of cerebral edema, mass effect, and brain compression  -Avoid hyponatremia and rapid fluctuations in serum Na  - Stroke neuro checks q 2  - SBP currently 110-140mmHg, avoid rapid fluctuations and further hypotension as appears to be neurologically tolerating    -ANTITHROMBOTIC THERAPY: ASA 81mg daily (was cleared by neurosx)  -AED in place per nsx   -titrate statin to LDL goal less than 70.  LFT monitoring.  -Obtain MRI Brain w/o  -Dysphagia screen: Fail. NGT/TF per primary team , SLP follow up when stable   -Physical therapy/OT/Speech eval/treatment.     -CARDIOVASCULAR: TTE as noted , cardiac monitoring w/ telemetry for now, further evaluation pending findings of noted workup, eventual consideration in [facility] once stable given undetermined source at this time                              -HEMATOLOGY: H/H with anemia, Monitor for signs and symptoms of bleeding, transfusion per primary team to Hgb > 8. Platelets 308. patient should have all age and risk appropriate malignancy screenings with PCP or sooner if clinically suspected, hypercoagulable panel,  consideration in CT Chest, had abdomen/pelvic imaging- noted enlarged prostate- confirm there is  no evidence of malignancy.     DVT ppx: Heparin s.c [] LMWH [x] - was cleared by neurosx    PULMONARY: saturating well    RENAL: BUN/Cr without acute change, monitor urine output, maintain adequate hydration. Noted fullness on 8/28/23 physical exam around right thigh. CT abdomen pelvis ruled out bladder injury. No evidence of hematoma on US.      Na Goal: 135-145.  Avoid hyponatremia and rapid fluctuations.     ID: afebrile, leukocytosis 11.56. monitor for si/sx of infection.     OTHER:  A1C is 6.8%. Suggest endocrine follow up for overall long term glycemic control. Condition and plan of care d/w primary team and patient. questions and concerns addressed.     DISPOSITION: Rehab or home depending on PT eval once stable and workup is complete      CORE MEASURES:        Admission NIHSS: 23     Tenecteplase : [x] YES [] NO      LDL/HDL/A1C: 80/39/6.8     Depression Screen- if depression hx and/or present      Statin Therapy: as noted     Dysphagia Screen: [] PASS [] FAIL     Smoking [] YES [x] NO      Afib [] YES [x] NO     Stroke Education [] YES [] NO- ordered and pending    Obtain screening lower extremity venous ultrasound in patients who meet 1 or more of the following criteria as patient is high risk for DVT/PE on admission:   [] History of DVT/PE  []Hypercoagulable states (Factor V Leiden, Cancer, OCP, etc. )  []Prolonged immobility (hemiplegia/hemiparesis/post operative or any other extended immobilization)  [] Transferred from outside facility (Rehab or Long term care)  [] Age </= to 50  ASSESSMENT: Patient is a 63 year old male with PMHx Hypertension, DM on oral medications who presented c/o stroke-like symptoms. Per son pt was walking outside around 10:15am on 8/27/23 when he suddenly fell. His son helped him up and noted that he was weak on the right side and not acting normally which prompted him to come to the ED. On arrival patient came in with right sided arm weakness, global aphasia, and dysarthria, right sided facial droop. Initial head CT demonstrated wedge like low density in the left putamen and caudate (corpus striatum) consistent with infarct that may be acute and no acute intracranial hemorrhage. Tenecteplase was administered at 12:15 pm. CTA head demonstrated occlusion of left MCA distal M1 segment and CTP showed large mismatch. found to have LM1 occlusion. Patient taken for mechanical thrombectomy where he was found to have LM1 occlusion with TICI 2C recanalization. Due to worsening of neurological exam and increase in midline shift found on CT head on 8/29/23(increased in left to right shift of around 9.5mm (previously was 5.2mm)) patient was taken to the OR on 8/30/23 for decompressive hemicraniectomy.   Etiology: embolic stroke of undetermined source     NEURO:   - neurologically more awake and appears stable  - Awaiting post decompressive hemicraniectomy helmet fitting  -Repeat CT head as noted   -Continue close monitoring for neurologic deterioration in setting of cerebral edema, mass effect, and brain compression  -Avoid hyponatremia and rapid fluctuations in serum Na  - Stroke neuro checks q 2  - SBP currently 110-140mmHg, avoid rapid fluctuations and further hypotension as appears to be neurologically tolerating    -ANTITHROMBOTIC THERAPY: ASA 81mg daily (was cleared by neurosx)  -AED in place per nsx   -titrate statin to LDL goal less than 70.  LFT monitoring.  -Obtain MRI Brain w/o when medically optimized  -Dysphagia screen: Fail. NGT/TF per primary team , SLP follow up when stable   -Physical therapy/OT/Speech eval/treatment.     -CARDIOVASCULAR: TTE as noted , cardiac monitoring w/ telemetry for now, further evaluation pending findings of noted workup, eventual consideration in [facility] once stable given undetermined source at this time                              -HEMATOLOGY: H/H with anemia, Monitor for signs and symptoms of bleeding, transfusion per primary team to Hgb > 8. Platelets 308. patient should have all age and risk appropriate malignancy screenings with PCP or sooner if clinically suspected, hypercoagulable panel,  consideration in CT Chest, had abdomen/pelvic imaging- noted enlarged prostate- confirm there is  no evidence of malignancy.     DVT ppx: Heparin s.c [] LMWH [x] - was cleared by neurosx    PULMONARY: saturating well    RENAL: BUN/Cr without acute change, monitor urine output, maintain adequate hydration. Noted fullness on 8/28/23 physical exam around right thigh. CT abdomen pelvis ruled out bladder injury. No evidence of hematoma on US.      Na Goal: 135-145.  Avoid hyponatremia and rapid fluctuations.     ID: afebrile, leukocytosis 11.56. monitor for si/sx of infection.     OTHER:  A1C is 6.8%. Suggest endocrine follow up for overall long term glycemic control. Condition and plan of care d/w primary team and patient. questions and concerns addressed.     DISPOSITION: Rehab or home depending on PT eval once stable and workup is complete      CORE MEASURES:        Admission NIHSS: 23     Tenecteplase : [x] YES [] NO      LDL/HDL/A1C: 80/39/6.8     Depression Screen- if depression hx and/or present      Statin Therapy: as noted     Dysphagia Screen: [] PASS [] FAIL     Smoking [] YES [x] NO      Afib [] YES [x] NO     Stroke Education [] YES [] NO- ordered and pending    Obtain screening lower extremity venous ultrasound in patients who meet 1 or more of the following criteria as patient is high risk for DVT/PE on admission:   [] History of DVT/PE  []Hypercoagulable states (Factor V Leiden, Cancer, OCP, etc. )  []Prolonged immobility (hemiplegia/hemiparesis/post operative or any other extended immobilization)  [] Transferred from outside facility (Rehab or Long term care)  [] Age </= to 50

## 2023-09-04 NOTE — PROGRESS NOTE ADULT - SUBJECTIVE AND OBJECTIVE BOX
HPI: Patient is a 63 year old male with PMH Hypertension, DM on oral medications who presents c/o stroke-like symptoms. Per son pt was walking outside around 10:15am this morning when he suddenly fell. His son helped him up and noted that he was weak on the right side and not acting normally which prompted him to come to the ED. On arrival pt w/ obvious right sided arm weakness, confusion, dysarthria, facial droop suggestive of acute stroke. Code stroke initiated, found to have LM1 occlusion, given TNK @ 1215 and was taken for thrombectomy. Unable to provide ROS 2/2 aphasia  (27 Aug 2023 16:45)    Interval History: patient stable, helmet ordered     Vital Signs Last 24 Hrs  T(C): 37.6 (04 Sep 2023 08:00), Max: 37.8 (03 Sep 2023 18:00)  T(F): 99.7 (04 Sep 2023 08:00), Max: 100 (03 Sep 2023 18:00)  HR: 103 (04 Sep 2023 08:00) (90 - 109)  BP: 111/81 (04 Sep 2023 08:00) (111/81 - 143/93)  BP(mean): 91 (04 Sep 2023 08:00) (91 - 116)  RR: 15 (04 Sep 2023 08:00) (13 - 21)  SpO2: 95% (04 Sep 2023 08:00) (93% - 99%)    Parameters below as of 04 Sep 2023 05:28  Patient On (Oxygen Delivery Method): nasal cannula  O2 Flow (L/min): 2    PHYSICAL EXAM:  General: NAD, pt is comfortably resting in bed  Neuro: opens eyes to voice, PERRL 3mm brisk b/l  follows commands, LUE 5/5, LLE 4/5, RUE extensor, RLE TF  Cardiovascular: Regular rate and rhythm  Respiratory: nonlabored breathing  GI: abdomen soft, nontender  Wound/incision: hemicrani incision c/d/i, flap full and soft

## 2023-09-04 NOTE — PROGRESS NOTE ADULT - SUBJECTIVE AND OBJECTIVE BOX
Preliminary note, offical recommendations pending attending review/signature   [facility] Stroke Team  Progress Note     HPI:  Patient is a 63 year old male with PMHx Hypertension, DM on oral medications who presented c/o stroke-like symptoms. Per son pt was walking outside around 10:15am on 8/27/23 when he suddenly fell. His son helped him up and noted that he was weak on the right side and not acting normally which prompted him to come to the ED. On arrival pt w/ obvious right sided arm weakness, confusion, dysarthria, facial droop suggestive of acute stroke. Code stroke initiated, found to have LM1 occlusion, given TNK @ 1215 and was taken for thrombectomy. Unable to provide ROS 2/2 aphasia.      SUBJECTIVE: Downgraded to step down. No new neurologic complaints.  ROS reported negative unless otherwise noted.    acetaminophen     Tablet .. 975 milliGRAM(s) Oral every 6 hours PRN  albuterol/ipratropium for Nebulization 3 milliLiter(s) Nebulizer every 6 hours PRN  aspirin  chewable 81 milliGRAM(s) Oral daily  atorvastatin 80 milliGRAM(s) Oral at bedtime  chlorhexidine 2% Cloths 1 Application(s) Topical <User Schedule>  dextrose 5%. 1000 milliLiter(s) IV Continuous <Continuous>  dextrose 5%. 1000 milliLiter(s) IV Continuous <Continuous>  dextrose 50% Injectable 12.5 Gram(s) IV Push once  dextrose 50% Injectable 25 Gram(s) IV Push once  dextrose 50% Injectable 25 Gram(s) IV Push once  dextrose Oral Gel 15 Gram(s) Oral once PRN  enoxaparin Injectable 40 milliGRAM(s) SubCutaneous every 24 hours  glucagon  Injectable 1 milliGRAM(s) IntraMuscular once  hydrALAZINE Injectable 10 milliGRAM(s) IV Push every 2 hours PRN  insulin lispro (ADMELOG) corrective regimen sliding scale   SubCutaneous three times a day before meals  labetalol Injectable 10 milliGRAM(s) IV Push every 2 hours PRN  levETIRAcetam  IVPB 500 milliGRAM(s) IV Intermittent every 12 hours  polyethylene glycol 3350 17 Gram(s) Oral two times a day  senna 2 Tablet(s) Oral at bedtime  tamsulosin 0.4 milliGRAM(s) Oral at bedtime      PHYSICAL EXAM:   Vital Signs Last 24 Hrs  T(C): 37.6 (04 Sep 2023 08:00), Max: 37.8 (03 Sep 2023 18:00)  T(F): 99.7 (04 Sep 2023 08:00), Max: 100 (03 Sep 2023 18:00)  HR: 103 (04 Sep 2023 08:00) (90 - 109)  BP: 111/81 (04 Sep 2023 08:00) (111/81 - 143/93)  BP(mean): 91 (04 Sep 2023 08:00) (91 - 116)  RR: 15 (04 Sep 2023 08:00) (13 - 22)  SpO2: 95% (04 Sep 2023 08:00) (93% - 99%)    Parameters below as of 04 Sep 2023 05:28  Patient On (Oxygen Delivery Method): nasal cannula  O2 Flow (L/min): 2    PENDING  General: No acute distress    NEUROLOGICAL EXAM:  Mental status: eyes closed, opens briefly to voice, extends out left hand, no verbal output , following some simple commands., perseverates   Cranial Nerves: pupils 3mm and reactive, right gaze palsy, RHHA to threat, right facial palsy, few beats of right gaze nystagmus   Motor exam: Normal tone, right hemiplegia with extensor posturing in arm and slight triple flexion in leg,  5/5 LUE, 5/5 LLE   Sensation: Intact to noxious stimuli    Coordination/ Gait: gait not tested    LABS:                        10.6   11.56 )-----------( 308      ( 04 Sep 2023 07:36 )             32.1    09-04    144  |  108  |  20.1<H>  ----------------------------<  125<H>  3.8   |  23.0  |  0.69    Ca    9.0      04 Sep 2023 07:36  Phos  2.9     09-04  Mg     2.4     09-04    IMAGING: Reviewed by me.   CT Head No Cont (09.03.23 @ 11:20)   Impression:  No significant change from the previous exam performed yesterday.   Surgical site is unchanged as is the brain parenchyma changes from left   middle cerebral artery stroke. Mass effect is unchanged.    CT Head No Cont (09.02.23 @ 04:01)   IMPRESSION: Redemonstration of large left MCA territoryinfarct.   Left-to-right midline shift measures 3 mm, unchanged.    CT Head No Cont (08.31.23 @ 20:43)   IMPRESSION:  Interval removal of subcutaneous drainage catheter with new acute   extradural broad products identified, withincreased rightward midline   shift which now measures 6 mm (previously 3 mm on 8/30/2023).    Additionally, there is increased hypoattenuation of the left   frontoparietal cortex within the known MCA territory infarct, which may   represent new areas of acute/subacute ischemia versus evolving gliosis.   Hemorrhagic conversion is not suspected at this time. Close attention on   follow-up is advised.    Diffuse scalp edema extending to the left periorbital soft tissues has   significantly increased since the prior study, likely due to evolving   subcutaneous blood products.    Critical findings above were discussed directly by telephone by the ED   radiologist on call, Fei Hebert MD, with the neurosurgical ICU   physicians assistant, Casi RUGGIERO, at 3:40 AM on 9/1/2023.    CT Head No Cont (08.30.23 @ 15:44)   Postop changes compatible with a left temporal frontal craniectomy is   identified. There is some extension of the parenchyma through the   craniectomy defect identified. There is abnormal low-attenuation again   seen involving left temporal frontal parietal region involving the left   basal ganglia and corona radiata region. This is compatible with an   evolving acute left MCA infarct. Previously noted areas of high   attenuation is less conspicuous which could be compatible with evolving   areas of hemorrhage. Mass effect on the left lateral ventricle is seen.   Left-to-right shift (3.1 mm) is seen.    There is extra-axial hematoma fluid and soft tissue swelling seen seen in   the postop region with extra-axial drain comedies findings are compatible   with postoperative changes.    The visualized paranasal sinuses mastoid andmiddle regions appear clear.  IMPRESSION:  New changes as described above.    CT Head No Cont (08.29.23 @ 23:39)   IMPRESSION: Evolving acute left MCA infarct is identified.   Mass effect on the left lateral ventricle is   again seen. Slight increased left-to-right shift (1.2 cm) is seen.    CT Head No Cont (08.29.23 @ 00:31)   IMPRESSION:  Continued evolution of large acute left MCA territory infarct.   Left-to-right midline shift measuring 6 mm, increased. No hydrocephalus   or effacement of basal cisterns. Curvilinear foci of hyperdensity within   the area of infarct, for which petechial hemorrhage cannot be excluded.   No large parenchymal hemorrhage.    CT Abdomen and Pelvis w/ IV Cont (08.28.23 @ 02:53)   IMPRESSION:  No evidence for bladder rupture.    CT Brain Stroke Protocol (08.27.23 @ 11:53)   IMPRESSION:  Wedgelike low density in the left putamen and caudate (corpus striatum)   is new from the prior scan, and consistent with infarct that may be acute   given corresponding symptoms.  No acute intracranial hemorrhage.    The study was performed at 11:47 AM on 08/27/2023 and the above findings   were discussed with Dr. Betts at 12:02 PM.    CT Angio Brain Stroke Protocol  w/ IV Cont (08.27.23 @ 12:03)   IMPRESSION:  CTA head: Focal occlusion of left MCA distal M1 segment with patent but   small caliber filling of M2 segments.    CT perfusion indicates large mismatch between Tmax and CBF consistent   with ischemic penumbra.    CTA Neck: No steno-occlusive focus.    Case findings above discussed with Dr. Hu at 12:13 PM on 08/27/2023.     CT Head No Cont (08.28.23 @ 02:34)   IMPRESSION: Acute left MCA infarct. Possible acute infarct involving the   right temporal region. This finding can be better evaluated with MRI if   there are no contraindications.    TTE Echo Complete w/o Contrast w/ Doppler (08.29.23 @ 12:03)   Summary:   1. Left ventricular ejection fraction, by visual estimation, is 55 to   60%.   2. Normal global left ventricular systolic function.   3. Normal right ventricular size and function.   4. Trace mitral valve regurgitation.   5. Mild aortic regurgitation.   6. Sclerotic aortic valve with normal opening.   7. There is no evidence of pericardial effusion.             Preliminary note, offical recommendations pending attending review/signature   [facility] Stroke Team  Progress Note     HPI:  Patient is a 63 year old male with PMHx Hypertension, DM on oral medications who presented c/o stroke-like symptoms. Per son pt was walking outside around 10:15am on 8/27/23 when he suddenly fell. His son helped him up and noted that he was weak on the right side and not acting normally which prompted him to come to the ED. On arrival pt w/ obvious right sided arm weakness, confusion, dysarthria, facial droop suggestive of acute stroke. Code stroke initiated, found to have LM1 occlusion, given TNK @ 1215 and was taken for thrombectomy. Unable to provide ROS 2/2 aphasia.      SUBJECTIVE: Downgraded to step down. No new neurologic complaints.  ROS reported negative unless otherwise noted.    acetaminophen     Tablet .. 975 milliGRAM(s) Oral every 6 hours PRN  albuterol/ipratropium for Nebulization 3 milliLiter(s) Nebulizer every 6 hours PRN  aspirin  chewable 81 milliGRAM(s) Oral daily  atorvastatin 80 milliGRAM(s) Oral at bedtime  chlorhexidine 2% Cloths 1 Application(s) Topical <User Schedule>  dextrose 5%. 1000 milliLiter(s) IV Continuous <Continuous>  dextrose 5%. 1000 milliLiter(s) IV Continuous <Continuous>  dextrose 50% Injectable 12.5 Gram(s) IV Push once  dextrose 50% Injectable 25 Gram(s) IV Push once  dextrose 50% Injectable 25 Gram(s) IV Push once  dextrose Oral Gel 15 Gram(s) Oral once PRN  enoxaparin Injectable 40 milliGRAM(s) SubCutaneous every 24 hours  glucagon  Injectable 1 milliGRAM(s) IntraMuscular once  hydrALAZINE Injectable 10 milliGRAM(s) IV Push every 2 hours PRN  insulin lispro (ADMELOG) corrective regimen sliding scale   SubCutaneous three times a day before meals  labetalol Injectable 10 milliGRAM(s) IV Push every 2 hours PRN  levETIRAcetam  IVPB 500 milliGRAM(s) IV Intermittent every 12 hours  polyethylene glycol 3350 17 Gram(s) Oral two times a day  senna 2 Tablet(s) Oral at bedtime  tamsulosin 0.4 milliGRAM(s) Oral at bedtime      PHYSICAL EXAM:   Vital Signs Last 24 Hrs  T(C): 37.6 (04 Sep 2023 08:00), Max: 37.8 (03 Sep 2023 18:00)  T(F): 99.7 (04 Sep 2023 08:00), Max: 100 (03 Sep 2023 18:00)  HR: 103 (04 Sep 2023 08:00) (90 - 109)  BP: 111/81 (04 Sep 2023 08:00) (111/81 - 143/93)  BP(mean): 91 (04 Sep 2023 08:00) (91 - 116)  RR: 15 (04 Sep 2023 08:00) (13 - 22)  SpO2: 95% (04 Sep 2023 08:00) (93% - 99%)    Parameters below as of 04 Sep 2023 05:28  Patient On (Oxygen Delivery Method): nasal cannula  O2 Flow (L/min): 2       General: No acute distress    NEUROLOGICAL EXAM:  Mental status: eyes open, awake, alert, attends to examiner briskly on left, generates sounds, follows some simple commands, perseverates, mimics at times   Cranial Nerves: pupils 3mm and reactive, right gaze palsy- slightly crosses midline, RHHA to threat, right facial palsy   Motor exam: Normal tone, right hemiplegia, RLE trace hip flexion   ,  5/5 LUE, 5/5 LLE   Sensation: Intact to noxious stimuli  throughout   Coordination/ Gait: gait not tested    LABS:                        10.6   11.56 )-----------( 308      ( 04 Sep 2023 07:36 )             32.1    09-04    144  |  108  |  20.1<H>  ----------------------------<  125<H>  3.8   |  23.0  |  0.69    Ca    9.0      04 Sep 2023 07:36  Phos  2.9     09-04  Mg     2.4     09-04    IMAGING: Reviewed by me.   CT Head No Cont (09.03.23 @ 11:20)   Impression:  No significant change from the previous exam performed yesterday.   Surgical site is unchanged as is the brain parenchyma changes from left   middle cerebral artery stroke. Mass effect is unchanged.    CT Head No Cont (09.02.23 @ 04:01)   IMPRESSION: Redemonstration of large left MCA territoryinfarct.   Left-to-right midline shift measures 3 mm, unchanged.    CT Head No Cont (08.31.23 @ 20:43)   IMPRESSION:  Interval removal of subcutaneous drainage catheter with new acute   extradural broad products identified, withincreased rightward midline   shift which now measures 6 mm (previously 3 mm on 8/30/2023).    Additionally, there is increased hypoattenuation of the left   frontoparietal cortex within the known MCA territory infarct, which may   represent new areas of acute/subacute ischemia versus evolving gliosis.   Hemorrhagic conversion is not suspected at this time. Close attention on   follow-up is advised.    Diffuse scalp edema extending to the left periorbital soft tissues has   significantly increased since the prior study, likely due to evolving   subcutaneous blood products.    Critical findings above were discussed directly by telephone by the ED   radiologist on call, Fei Hebert MD, with the neurosurgical ICU   physicians assistant, Casi RUGGIERO, at 3:40 AM on 9/1/2023.    CT Head No Cont (08.30.23 @ 15:44)   Postop changes compatible with a left temporal frontal craniectomy is   identified. There is some extension of the parenchyma through the   craniectomy defect identified. There is abnormal low-attenuation again   seen involving left temporal frontal parietal region involving the left   basal ganglia and corona radiata region. This is compatible with an   evolving acute left MCA infarct. Previously noted areas of high   attenuation is less conspicuous which could be compatible with evolving   areas of hemorrhage. Mass effect on the left lateral ventricle is seen.   Left-to-right shift (3.1 mm) is seen.    There is extra-axial hematoma fluid and soft tissue swelling seen seen in   the postop region with extra-axial drain comedies findings are compatible   with postoperative changes.    The visualized paranasal sinuses mastoid andmiddle regions appear clear.  IMPRESSION:  New changes as described above.    CT Head No Cont (08.29.23 @ 23:39)   IMPRESSION: Evolving acute left MCA infarct is identified.   Mass effect on the left lateral ventricle is   again seen. Slight increased left-to-right shift (1.2 cm) is seen.    CT Head No Cont (08.29.23 @ 00:31)   IMPRESSION:  Continued evolution of large acute left MCA territory infarct.   Left-to-right midline shift measuring 6 mm, increased. No hydrocephalus   or effacement of basal cisterns. Curvilinear foci of hyperdensity within   the area of infarct, for which petechial hemorrhage cannot be excluded.   No large parenchymal hemorrhage.    CT Abdomen and Pelvis w/ IV Cont (08.28.23 @ 02:53)   IMPRESSION:  No evidence for bladder rupture.    CT Brain Stroke Protocol (08.27.23 @ 11:53)   IMPRESSION:  Wedgelike low density in the left putamen and caudate (corpus striatum)   is new from the prior scan, and consistent with infarct that may be acute   given corresponding symptoms.  No acute intracranial hemorrhage.    The study was performed at 11:47 AM on 08/27/2023 and the above findings   were discussed with Dr. Betts at 12:02 PM.    CT Angio Brain Stroke Protocol  w/ IV Cont (08.27.23 @ 12:03)   IMPRESSION:  CTA head: Focal occlusion of left MCA distal M1 segment with patent but   small caliber filling of M2 segments.    CT perfusion indicates large mismatch between Tmax and CBF consistent   with ischemic penumbra.    CTA Neck: No steno-occlusive focus.    Case findings above discussed with Dr. Hu at 12:13 PM on 08/27/2023.     CT Head No Cont (08.28.23 @ 02:34)   IMPRESSION: Acute left MCA infarct. Possible acute infarct involving the   right temporal region. This finding can be better evaluated with MRI if   there are no contraindications.    TTE Echo Complete w/o Contrast w/ Doppler (08.29.23 @ 12:03)   Summary:   1. Left ventricular ejection fraction, by visual estimation, is 55 to   60%.   2. Normal global left ventricular systolic function.   3. Normal right ventricular size and function.   4. Trace mitral valve regurgitation.   5. Mild aortic regurgitation.   6. Sclerotic aortic valve with normal opening.   7. There is no evidence of pericardial effusion.               [facility] Stroke Team  Progress Note     HPI:  Patient is a 63 year old male with PMHx Hypertension, DM on oral medications who presented c/o stroke-like symptoms. Per son pt was walking outside around 10:15am on 8/27/23 when he suddenly fell. His son helped him up and noted that he was weak on the right side and not acting normally which prompted him to come to the ED. On arrival pt w/ obvious right sided arm weakness, confusion, dysarthria, facial droop suggestive of acute stroke. Code stroke initiated, found to have LM1 occlusion, given TNK @ 1215 and was taken for thrombectomy. Unable to provide ROS 2/2 aphasia.      SUBJECTIVE: Downgraded to step down. No new neurologic complaints.  ROS reported negative unless otherwise noted.    acetaminophen     Tablet .. 975 milliGRAM(s) Oral every 6 hours PRN  albuterol/ipratropium for Nebulization 3 milliLiter(s) Nebulizer every 6 hours PRN  aspirin  chewable 81 milliGRAM(s) Oral daily  atorvastatin 80 milliGRAM(s) Oral at bedtime  chlorhexidine 2% Cloths 1 Application(s) Topical <User Schedule>  dextrose 5%. 1000 milliLiter(s) IV Continuous <Continuous>  dextrose 5%. 1000 milliLiter(s) IV Continuous <Continuous>  dextrose 50% Injectable 12.5 Gram(s) IV Push once  dextrose 50% Injectable 25 Gram(s) IV Push once  dextrose 50% Injectable 25 Gram(s) IV Push once  dextrose Oral Gel 15 Gram(s) Oral once PRN  enoxaparin Injectable 40 milliGRAM(s) SubCutaneous every 24 hours  glucagon  Injectable 1 milliGRAM(s) IntraMuscular once  hydrALAZINE Injectable 10 milliGRAM(s) IV Push every 2 hours PRN  insulin lispro (ADMELOG) corrective regimen sliding scale   SubCutaneous three times a day before meals  labetalol Injectable 10 milliGRAM(s) IV Push every 2 hours PRN  levETIRAcetam  IVPB 500 milliGRAM(s) IV Intermittent every 12 hours  polyethylene glycol 3350 17 Gram(s) Oral two times a day  senna 2 Tablet(s) Oral at bedtime  tamsulosin 0.4 milliGRAM(s) Oral at bedtime      PHYSICAL EXAM:   Vital Signs Last 24 Hrs  T(C): 37.6 (04 Sep 2023 08:00), Max: 37.8 (03 Sep 2023 18:00)  T(F): 99.7 (04 Sep 2023 08:00), Max: 100 (03 Sep 2023 18:00)  HR: 103 (04 Sep 2023 08:00) (90 - 109)  BP: 111/81 (04 Sep 2023 08:00) (111/81 - 143/93)  BP(mean): 91 (04 Sep 2023 08:00) (91 - 116)  RR: 15 (04 Sep 2023 08:00) (13 - 22)  SpO2: 95% (04 Sep 2023 08:00) (93% - 99%)    Parameters below as of 04 Sep 2023 05:28  Patient On (Oxygen Delivery Method): nasal cannula  O2 Flow (L/min): 2       General: No acute distress    NEUROLOGICAL EXAM:  Mental status: eyes open, awake, alert, attends to examiner briskly on left, generates sounds, follows some simple commands, perseverates, mimics at times   Cranial Nerves: pupils 3mm and reactive, right gaze palsy- slightly crosses midline, RHHA to threat, right facial palsy   Motor exam: Normal tone, right hemiplegia, RLE trace hip flexion   ,  5/5 LUE, 5/5 LLE   Sensation: Intact to noxious stimuli  throughout   Coordination/ Gait: gait not tested    LABS:                        10.6   11.56 )-----------( 308      ( 04 Sep 2023 07:36 )             32.1    09-04    144  |  108  |  20.1<H>  ----------------------------<  125<H>  3.8   |  23.0  |  0.69    Ca    9.0      04 Sep 2023 07:36  Phos  2.9     09-04  Mg     2.4     09-04    IMAGING: Reviewed by me.   CT Head No Cont (09.03.23 @ 11:20)   Impression:  No significant change from the previous exam performed yesterday.   Surgical site is unchanged as is the brain parenchyma changes from left   middle cerebral artery stroke. Mass effect is unchanged.    CT Head No Cont (09.02.23 @ 04:01)   IMPRESSION: Redemonstration of large left MCA territoryinfarct.   Left-to-right midline shift measures 3 mm, unchanged.    CT Head No Cont (08.31.23 @ 20:43)   IMPRESSION:  Interval removal of subcutaneous drainage catheter with new acute   extradural broad products identified, withincreased rightward midline   shift which now measures 6 mm (previously 3 mm on 8/30/2023).    Additionally, there is increased hypoattenuation of the left   frontoparietal cortex within the known MCA territory infarct, which may   represent new areas of acute/subacute ischemia versus evolving gliosis.   Hemorrhagic conversion is not suspected at this time. Close attention on   follow-up is advised.    Diffuse scalp edema extending to the left periorbital soft tissues has   significantly increased since the prior study, likely due to evolving   subcutaneous blood products.    Critical findings above were discussed directly by telephone by the ED   radiologist on call, Fei Hebert MD, with the neurosurgical ICU   physicians assistant, Casi RUGGIERO, at 3:40 AM on 9/1/2023.    CT Head No Cont (08.30.23 @ 15:44)   Postop changes compatible with a left temporal frontal craniectomy is   identified. There is some extension of the parenchyma through the   craniectomy defect identified. There is abnormal low-attenuation again   seen involving left temporal frontal parietal region involving the left   basal ganglia and corona radiata region. This is compatible with an   evolving acute left MCA infarct. Previously noted areas of high   attenuation is less conspicuous which could be compatible with evolving   areas of hemorrhage. Mass effect on the left lateral ventricle is seen.   Left-to-right shift (3.1 mm) is seen.    There is extra-axial hematoma fluid and soft tissue swelling seen seen in   the postop region with extra-axial drain comedies findings are compatible   with postoperative changes.    The visualized paranasal sinuses mastoid andmiddle regions appear clear.  IMPRESSION:  New changes as described above.    CT Head No Cont (08.29.23 @ 23:39)   IMPRESSION: Evolving acute left MCA infarct is identified.   Mass effect on the left lateral ventricle is   again seen. Slight increased left-to-right shift (1.2 cm) is seen.    CT Head No Cont (08.29.23 @ 00:31)   IMPRESSION:  Continued evolution of large acute left MCA territory infarct.   Left-to-right midline shift measuring 6 mm, increased. No hydrocephalus   or effacement of basal cisterns. Curvilinear foci of hyperdensity within   the area of infarct, for which petechial hemorrhage cannot be excluded.   No large parenchymal hemorrhage.    CT Abdomen and Pelvis w/ IV Cont (08.28.23 @ 02:53)   IMPRESSION:  No evidence for bladder rupture.    CT Brain Stroke Protocol (08.27.23 @ 11:53)   IMPRESSION:  Wedgelike low density in the left putamen and caudate (corpus striatum)   is new from the prior scan, and consistent with infarct that may be acute   given corresponding symptoms.  No acute intracranial hemorrhage.    The study was performed at 11:47 AM on 08/27/2023 and the above findings   were discussed with Dr. Betts at 12:02 PM.    CT Angio Brain Stroke Protocol  w/ IV Cont (08.27.23 @ 12:03)   IMPRESSION:  CTA head: Focal occlusion of left MCA distal M1 segment with patent but   small caliber filling of M2 segments.    CT perfusion indicates large mismatch between Tmax and CBF consistent   with ischemic penumbra.    CTA Neck: No steno-occlusive focus.    Case findings above discussed with Dr. Hu at 12:13 PM on 08/27/2023.     CT Head No Cont (08.28.23 @ 02:34)   IMPRESSION: Acute left MCA infarct. Possible acute infarct involving the   right temporal region. This finding can be better evaluated with MRI if   there are no contraindications.    TTE Echo Complete w/o Contrast w/ Doppler (08.29.23 @ 12:03)   Summary:   1. Left ventricular ejection fraction, by visual estimation, is 55 to   60%.   2. Normal global left ventricular systolic function.   3. Normal right ventricular size and function.   4. Trace mitral valve regurgitation.   5. Mild aortic regurgitation.   6. Sclerotic aortic valve with normal opening.   7. There is no evidence of pericardial effusion.

## 2023-09-04 NOTE — PROGRESS NOTE ADULT - ASSESSMENT
63M PMH of DM2, HTN presents with fall, right sided weakness, dysarthria and right facial droop found to have LM1 occlusion s/p TNK in ER subsequent mechanical thrombectomy and decompressive craniectomy for midline shift. SAMARA negative for thrombus. cardiology recommending ILR /MCOT as outpatient. Extubated on 9/1/23, monitored in ICU and transferred to medicine for further management.     CVA:     c/w lipitor    aspirin when cleared by neurosurgery    SAMARA negative for thrombus, +atheroma of aortic arch and aorta    mcot/ILR on dc    pt/ot/speech/ PM&R    keppra until 9/6    f/u hypercoagulable workup - LA + on DRVVT will consult heme    febrile episode 9/2/23: hold off on IV abx.    f/u blood cultures    negative ua/ cxr    Cx NTD    dysphagia:   pureed diet with thick liquids  aspiration precautions    hypernatremia:   PO fluid intake    dm2: sliding scale    ppx: lovenox

## 2023-09-04 NOTE — CHART NOTE - NSCHARTNOTEFT_GEN_A_CORE
Patient was awake and alert during evaluation, measurement and fitting of a cranial helmet. Add custom padding for comfort. Fit was good. Showed patient how to don and doff helmet. Provided written and verbal instructions. [facility] [phone]

## 2023-09-04 NOTE — PROVIDER CONTACT NOTE (OTHER) - SITUATION
Pt A-Line not correlating with cuff pressure and very positonal
NIH increased to 23 due to lethargy-
Pt temp 101.3 via bladder probe
Neuro ICU MONIK Marquez notified that patients R groin site is still firm. BP less than 90.
transient desaturations on RA while sleeping

## 2023-09-05 LAB
ANION GAP SERPL CALC-SCNC: 12 MMOL/L — SIGNIFICANT CHANGE UP (ref 5–17)
BASOPHILS # BLD AUTO: 0.07 K/UL — SIGNIFICANT CHANGE UP (ref 0–0.2)
BASOPHILS NFR BLD AUTO: 0.5 % — SIGNIFICANT CHANGE UP (ref 0–2)
BUN SERPL-MCNC: 22.2 MG/DL — HIGH (ref 8–20)
CALCIUM SERPL-MCNC: 8.9 MG/DL — SIGNIFICANT CHANGE UP (ref 8.4–10.5)
CHLORIDE SERPL-SCNC: 107 MMOL/L — SIGNIFICANT CHANGE UP (ref 96–108)
CO2 SERPL-SCNC: 24 MMOL/L — SIGNIFICANT CHANGE UP (ref 22–29)
CREAT SERPL-MCNC: 0.68 MG/DL — SIGNIFICANT CHANGE UP (ref 0.5–1.3)
CULTURE RESULTS: SIGNIFICANT CHANGE UP
CULTURE RESULTS: SIGNIFICANT CHANGE UP
EGFR: 105 ML/MIN/1.73M2 — SIGNIFICANT CHANGE UP
EOSINOPHIL # BLD AUTO: 0.31 K/UL — SIGNIFICANT CHANGE UP (ref 0–0.5)
EOSINOPHIL NFR BLD AUTO: 2.4 % — SIGNIFICANT CHANGE UP (ref 0–6)
GLUCOSE BLDC GLUCOMTR-MCNC: 116 MG/DL — HIGH (ref 70–99)
GLUCOSE BLDC GLUCOMTR-MCNC: 118 MG/DL — HIGH (ref 70–99)
GLUCOSE BLDC GLUCOMTR-MCNC: 128 MG/DL — HIGH (ref 70–99)
GLUCOSE BLDC GLUCOMTR-MCNC: 139 MG/DL — HIGH (ref 70–99)
GLUCOSE BLDC GLUCOMTR-MCNC: 184 MG/DL — HIGH (ref 70–99)
GLUCOSE SERPL-MCNC: 135 MG/DL — HIGH (ref 70–99)
HCT VFR BLD CALC: 33.5 % — LOW (ref 39–50)
HGB BLD-MCNC: 11 G/DL — LOW (ref 13–17)
IMM GRANULOCYTES NFR BLD AUTO: 1.2 % — HIGH (ref 0–0.9)
LYMPHOCYTES # BLD AUTO: 1.86 K/UL — SIGNIFICANT CHANGE UP (ref 1–3.3)
LYMPHOCYTES # BLD AUTO: 14.4 % — SIGNIFICANT CHANGE UP (ref 13–44)
MAGNESIUM SERPL-MCNC: 2.3 MG/DL — SIGNIFICANT CHANGE UP (ref 1.6–2.6)
MCHC RBC-ENTMCNC: 31 PG — SIGNIFICANT CHANGE UP (ref 27–34)
MCHC RBC-ENTMCNC: 32.8 GM/DL — SIGNIFICANT CHANGE UP (ref 32–36)
MCV RBC AUTO: 94.4 FL — SIGNIFICANT CHANGE UP (ref 80–100)
MONOCYTES # BLD AUTO: 0.98 K/UL — HIGH (ref 0–0.9)
MONOCYTES NFR BLD AUTO: 7.6 % — SIGNIFICANT CHANGE UP (ref 2–14)
NEUTROPHILS # BLD AUTO: 9.56 K/UL — HIGH (ref 1.8–7.4)
NEUTROPHILS NFR BLD AUTO: 73.9 % — SIGNIFICANT CHANGE UP (ref 43–77)
PHOSPHATE SERPL-MCNC: 2.9 MG/DL — SIGNIFICANT CHANGE UP (ref 2.4–4.7)
PLATELET # BLD AUTO: 339 K/UL — SIGNIFICANT CHANGE UP (ref 150–400)
POTASSIUM SERPL-MCNC: 3.7 MMOL/L — SIGNIFICANT CHANGE UP (ref 3.5–5.3)
POTASSIUM SERPL-SCNC: 3.7 MMOL/L — SIGNIFICANT CHANGE UP (ref 3.5–5.3)
RBC # BLD: 3.55 M/UL — LOW (ref 4.2–5.8)
RBC # FLD: 13.8 % — SIGNIFICANT CHANGE UP (ref 10.3–14.5)
SODIUM SERPL-SCNC: 143 MMOL/L — SIGNIFICANT CHANGE UP (ref 135–145)
SPECIMEN SOURCE: SIGNIFICANT CHANGE UP
SPECIMEN SOURCE: SIGNIFICANT CHANGE UP
WBC # BLD: 12.93 K/UL — HIGH (ref 3.8–10.5)
WBC # FLD AUTO: 12.93 K/UL — HIGH (ref 3.8–10.5)

## 2023-09-05 PROCEDURE — 99232 SBSQ HOSP IP/OBS MODERATE 35: CPT

## 2023-09-05 PROCEDURE — 99222 1ST HOSP IP/OBS MODERATE 55: CPT

## 2023-09-05 RX ADMIN — ATORVASTATIN CALCIUM 80 MILLIGRAM(S): 80 TABLET, FILM COATED ORAL at 22:09

## 2023-09-05 RX ADMIN — POLYETHYLENE GLYCOL 3350 17 GRAM(S): 17 POWDER, FOR SOLUTION ORAL at 18:56

## 2023-09-05 RX ADMIN — LEVETIRACETAM 400 MILLIGRAM(S): 250 TABLET, FILM COATED ORAL at 05:27

## 2023-09-05 RX ADMIN — Medication 2: at 11:41

## 2023-09-05 RX ADMIN — CHLORHEXIDINE GLUCONATE 1 APPLICATION(S): 213 SOLUTION TOPICAL at 05:27

## 2023-09-05 RX ADMIN — ENOXAPARIN SODIUM 40 MILLIGRAM(S): 100 INJECTION SUBCUTANEOUS at 10:13

## 2023-09-05 RX ADMIN — LEVETIRACETAM 400 MILLIGRAM(S): 250 TABLET, FILM COATED ORAL at 18:56

## 2023-09-05 RX ADMIN — Medication 81 MILLIGRAM(S): at 18:57

## 2023-09-05 RX ADMIN — TAMSULOSIN HYDROCHLORIDE 0.4 MILLIGRAM(S): 0.4 CAPSULE ORAL at 22:09

## 2023-09-05 RX ADMIN — POLYETHYLENE GLYCOL 3350 17 GRAM(S): 17 POWDER, FOR SOLUTION ORAL at 05:27

## 2023-09-05 RX ADMIN — SENNA PLUS 2 TABLET(S): 8.6 TABLET ORAL at 22:10

## 2023-09-05 NOTE — PROGRESS NOTE ADULT - ASSESSMENT
63M PMH of DM2, HTN presents with fall, right sided weakness, dysarthria and right facial droop found to have LM1 occlusion s/p TNK in ER subsequent mechanical thrombectomy and decompressive craniectomy for midline shift. SAMARA negative for thrombus. cardiology recommending ILR /MCOT as outpatient. Extubated on 9/1/23, monitored in ICU and transferred to medicine for further management.     CVA:     c/w lipitor    ASA    SAMARA negative for thrombus, +atheroma of aortic arch and aorta    mcot/ILR on dc    pt/ot/speech/ PM&R    keppra until 9/6    Discussed +LA result with neurology. Result may be abnormal as patient was already receiving lovenox. Will consult hematology to eval.    febrile episode 9/2/23: hold off on IV abx.    f/u blood cultures    negative ua/ cxr    Cx NTD    dysphagia:   pureed diet with thick liquids  aspiration precautions    hypernatremia:   PO fluid intake    dm2: sliding scale    ppx: lovenox

## 2023-09-05 NOTE — CONSULT NOTE ADULT - SUBJECTIVE AND OBJECTIVE BOX
63yM was admitted on 08-27    Patient is a 63y old  Male who presents with a chief complaint of LT M1 Occlusion (05 Sep 2023 09:35)    HPI:  Patient is a 63 year old male with PMH Hypertension, DM on oral medications who presents c/o stroke-like symptoms.  Noted that he was weak on the right side and not acting normally which prompted him to come to the ED. On arrival pt w/ obvious right sided arm weakness, confusion, dysarthria, facial droop suggestive of acute stroke. Code stroke initiated, found to have LM1 occlusion, given TNK @ 1215 and was taken for thrombectomy, decompressive craniectomy for midline shift.     Imaging reviewed showed:    ACC: [id number] EXAM:  CT BRAIN   ORDERED BY: KARLIE REYES     PROCEDURE DATE:  09/03/2023          INTERPRETATION:  STUDY:  CT HEAD    REASON FOR EXAM:   Male, 63 years old.  CT HEAD    TECHNIQUE: CT imaging of the brain was performed without the   administration of intravenous contrast. Coronal and sagittal images were   reformatted from the axial data.    Individualized dose optimization techniques were used for this CT.    COMPARISON:   Head CT dated 9/2/2023.    Findings:    As seen on prior exam patient is post left craniectomy. Underlying   subdural hyperdensities likely represent subdural hematoma and or   postsurgical material. This is unchanged from the prior exam. Large area   of hypoattenuation involves the left middle cerebral artery territory as   seen on the prior exam representing cysts acute vascular insult. Edema   causes mass effect on the left lateral ventricle as seen on the prior   exam. Ventricle size has not significantly changed from the previous   exam. Left-to-right shift is unchanged from the prior exam.    Subdural air has decreased from prior.    Impression:    No significant change from the previous exam performed yesterday.   Surgical site is unchanged as is the brain parenchyma changes from left   middle cerebral artery stroke. Mass effect is unchanged    REVIEW OF SYSTEMS  Difficult to obtain    VITALS  T(C): 37.5 (09-05-23 @ 11:05), Max: 37.9 (09-04-23 @ 16:00)  HR: 99 (09-05-23 @ 11:05) (98 - 105)  BP: 137/82 (09-05-23 @ 11:05) (93/66 - 137/82)  RR: 19 (09-05-23 @ 11:05) (17 - 20)  SpO2: 99% (09-05-23 @ 11:05) (93% - 100%)  Wt(kg): --    PAST MEDICAL & SURGICAL HISTORY  No pertinent past medical history    Hypertension    No significant past surgical history        SOCIAL HISTORY - as per documentation/history  Smoking - None  EtOH - None  Drugs - None    FUNCTIONAL HISTORY  Independent    CURRENT FUNCTIONAL STATUS  Pending evaluation      RECENT LABS - Reviewed  CBC Full  -  ( 05 Sep 2023 06:26 )  WBC Count : 12.93 K/uL  RBC Count : 3.55 M/uL  Hemoglobin : 11.0 g/dL  Hematocrit : 33.5 %  Platelet Count - Automated : 339 K/uL  Mean Cell Volume : 94.4 fl  Mean Cell Hemoglobin : 31.0 pg  Mean Cell Hemoglobin Concentration : 32.8 gm/dL  Auto Neutrophil # : 9.56 K/uL  Auto Lymphocyte # : 1.86 K/uL  Auto Monocyte # : 0.98 K/uL  Auto Eosinophil # : 0.31 K/uL  Auto Basophil # : 0.07 K/uL  Auto Neutrophil % : 73.9 %  Auto Lymphocyte % : 14.4 %  Auto Monocyte % : 7.6 %  Auto Eosinophil % : 2.4 %  Auto Basophil % : 0.5 %    09-05    143  |  107  |  22.2<H>  ----------------------------<  135<H>  3.7   |  24.0  |  0.68    Ca    8.9      05 Sep 2023 06:26  Phos  2.9     09-05  Mg     2.3     09-05      Urinalysis Basic - ( 05 Sep 2023 06:26 )    Color: x / Appearance: x / SG: x / pH: x  Gluc: 135 mg/dL / Ketone: x  / Bili: x / Urobili: x   Blood: x / Protein: x / Nitrite: x   Leuk Esterase: x / RBC: x / WBC x   Sq Epi: x / Non Sq Epi: x / Bacteria: x        ALLERGIES  No Known Allergies      MEDICATIONS   acetaminophen     Tablet .. 975 milliGRAM(s) Oral every 6 hours PRN  albuterol/ipratropium for Nebulization 3 milliLiter(s) Nebulizer every 6 hours PRN  aspirin  chewable 81 milliGRAM(s) Oral daily  atorvastatin 80 milliGRAM(s) Oral at bedtime  chlorhexidine 2% Cloths 1 Application(s) Topical <User Schedule>  dextrose 5%. 1000 milliLiter(s) IV Continuous <Continuous>  dextrose 5%. 1000 milliLiter(s) IV Continuous <Continuous>  dextrose 50% Injectable 12.5 Gram(s) IV Push once  dextrose 50% Injectable 25 Gram(s) IV Push once  dextrose 50% Injectable 25 Gram(s) IV Push once  dextrose Oral Gel 15 Gram(s) Oral once PRN  enoxaparin Injectable 40 milliGRAM(s) SubCutaneous every 24 hours  glucagon  Injectable 1 milliGRAM(s) IntraMuscular once  hydrALAZINE Injectable 10 milliGRAM(s) IV Push every 2 hours PRN  insulin lispro (ADMELOG) corrective regimen sliding scale   SubCutaneous three times a day before meals  labetalol Injectable 10 milliGRAM(s) IV Push every 2 hours PRN  levETIRAcetam  IVPB 500 milliGRAM(s) IV Intermittent every 12 hours  polyethylene glycol 3350 17 Gram(s) Oral two times a day  senna 2 Tablet(s) Oral at bedtime  tamsulosin 0.4 milliGRAM(s) Oral at bedtime      ----------------------------------------------------------------------------------------  PHYSICAL EXAM  Constitutional - NAD, Comfortable  HEENT - NCAT, EOMI  Neck - Supple  Chest - Breathing comfortably, No wheezing  Cardiovascular - No cyanosis   Abdomen - Soft   Extremities - No C/C/E, No calf tenderness   Neurologic Exam -                    Cognitive - Awake, Alert to voice     Communication - aphasic      Motor - anti-gravity movements in LUE and LLE     Sensory - appears intact in LUE     Reflexes - No clonus   Psychiatric - Mood stable, Affect WNL  ----------------------------------------------------------------------------------------  ASSESSMENT/PLAN  63yMale with functional deficits after CVA  CVA - s/p thrombectomy and subsequent L hemicraniectomy, helmet when OOB  Dysphagia- SLP  Pain - Tylenol  DVT PPX - SCDs, lovenox  Rehab/Impaired mobility - Consult PT/OT for evaluation and treatment.  Support RUE to prevent shoulder subluxation, heel lift boot to prevent ankle contracture.  Rehabilitation team will continue to follow as patients condition progresses.      Will continue to follow. Functional progress will determine ongoing rehab dispo recommendations, which may change.    Continue bedside therapy as well as OOB throughout the day with mobilization by staff to maintain cardiopulmonary function and prevention of secondary complications related to debility.

## 2023-09-05 NOTE — PROGRESS NOTE ADULT - ASSESSMENT
63M s/p L MCA stroke s/p thrombectomy and subsequent L hemicraniectomy POD 6    Recommendations:   - q4 neuro checks   - Okay for ASA 82 for stroke / Lovenox for dvt ppx   - Will continue to follow for timing of cranioplasty, however, flap remains too full at this time   - Helmet when OOB   - No contraindication to patient being discharged and coming back for cranioplasty when able

## 2023-09-05 NOTE — PROGRESS NOTE ADULT - SUBJECTIVE AND OBJECTIVE BOX
HPI: Patient is a 63 year old male with PMH Hypertension, DM on oral medications who presents c/o stroke-like symptoms. Per son pt was walking outside around 10:15am this morning when he suddenly fell. His son helped him up and noted that he was weak on the right side and not acting normally which prompted him to come to the ED. On arrival pt w/ obvious right sided arm weakness, confusion, dysarthria, facial droop suggestive of acute stroke. Code stroke initiated, found to have LM1 occlusion, given TNK @ 1215 and was taken for thrombectomy. Unable to provide ROS 2/2 aphasia  (27 Aug 2023 16:45)    Interval History: patient stable, incision looks good, helmet received     Vital Signs Last 24 Hrs  T(C): 37.4 (05 Sep 2023 08:00), Max: 37.9 (04 Sep 2023 16:00)  T(F): 99.3 (05 Sep 2023 08:00), Max: 100.2 (04 Sep 2023 16:00)  HR: 98 (05 Sep 2023 08:00) (98 - 105)  BP: 118/96 (05 Sep 2023 08:00) (93/66 - 135/95)  BP(mean): 104 (05 Sep 2023 08:00) (73 - 109)  RR: 20 (05 Sep 2023 08:00) (17 - 20)  SpO2: 99% (05 Sep 2023 08:00) (93% - 100%)    Parameters below as of 05 Sep 2023 08:00  Patient On (Oxygen Delivery Method): room air    PHYSICAL EXAM:  General: NAD, pt is comfortably resting in bed  Neuro: opens eyes to voice, PERRL 3mm brisk b/l  follows commands, LUE 5/5, LLE 4/5, RUE weak extensor, RLE TF, expressive aphasia   Wound/incision: hemicrani incision c/d/i, flap full and soft

## 2023-09-05 NOTE — PROGRESS NOTE ADULT - SUBJECTIVE AND OBJECTIVE BOX
[facility] Division of Hospital Medicine    SUBJECTIVE / OVERNIGHT EVENTS:  No events    Patient denies chest pain, SOB, abd pain, N/V, fever, chills, dysuria or any other complaints. All remainder ROS negative.     MEDICATIONS  (STANDING):  aspirin  chewable 81 milliGRAM(s) Oral daily  atorvastatin 80 milliGRAM(s) Oral at bedtime  chlorhexidine 2% Cloths 1 Application(s) Topical <User Schedule>  dextrose 5%. 1000 milliLiter(s) (100 mL/Hr) IV Continuous <Continuous>  dextrose 5%. 1000 milliLiter(s) (50 mL/Hr) IV Continuous <Continuous>  dextrose 50% Injectable 25 Gram(s) IV Push once  dextrose 50% Injectable 12.5 Gram(s) IV Push once  dextrose 50% Injectable 25 Gram(s) IV Push once  enoxaparin Injectable 40 milliGRAM(s) SubCutaneous every 24 hours  glucagon  Injectable 1 milliGRAM(s) IntraMuscular once  insulin lispro (ADMELOG) corrective regimen sliding scale   SubCutaneous three times a day before meals  levETIRAcetam  IVPB 500 milliGRAM(s) IV Intermittent every 12 hours  polyethylene glycol 3350 17 Gram(s) Oral two times a day  senna 2 Tablet(s) Oral at bedtime  tamsulosin 0.4 milliGRAM(s) Oral at bedtime    MEDICATIONS  (PRN):  acetaminophen     Tablet .. 975 milliGRAM(s) Oral every 6 hours PRN Mild Pain (1 - 3)  albuterol/ipratropium for Nebulization 3 milliLiter(s) Nebulizer every 6 hours PRN Shortness of Breath and/or Wheezing  dextrose Oral Gel 15 Gram(s) Oral once PRN Blood Glucose LESS THAN 70 milliGRAM(s)/deciliter  hydrALAZINE Injectable 10 milliGRAM(s) IV Push every 2 hours PRN SBP >140  labetalol Injectable 10 milliGRAM(s) IV Push every 2 hours PRN SBP >140        I&O's Summary    04 Sep 2023 07:01  -  05 Sep 2023 07:00  --------------------------------------------------------  IN: 460 mL / OUT: 2200 mL / NET: -1740 mL        PHYSICAL EXAM:  Vital Signs Last 24 Hrs  T(C): 36.8 (05 Sep 2023 12:10), Max: 37.9 (04 Sep 2023 16:00)  T(F): 98.3 (05 Sep 2023 12:10), Max: 100.2 (04 Sep 2023 16:00)  HR: 94 (05 Sep 2023 12:10) (94 - 105)  BP: 121/79 (05 Sep 2023 12:10) (93/66 - 137/82)  BP(mean): 92 (05 Sep 2023 11:05) (73 - 109)  RR: 18 (05 Sep 2023 12:10) (18 - 20)  SpO2: 91% (05 Sep 2023 12:10) (91% - 99%)    Parameters below as of 05 Sep 2023 11:05  Patient On (Oxygen Delivery Method): room air            CONSTITUTIONAL: NAD, appears stated age  ENMT: Moist oral mucosa, no pharyngeal injection or exudates; normal dentition  RESPIRATORY: Normal respiratory effort; clear to auscultation bilaterally  CARDIOVASCULAR: Regular rate and rhythm, normal S1 and S2, no murmur/rub/gallop; Peripheral pulses are 2+ bilaterally  ABDOMEN: Nontender to palpation, normoactive bowel sounds, no rebound/guarding;   MUSCLOSKELETAL:  No clubbing or cyanosis of digits; no joint swelling or tenderness to palpation  PSYCH: Alert to person, place  NEUROLOGY: rt tongue deviation, alert, moves left upper extremity against gravity    SKIN: No rashes; no palpable lesions    LABS:                        11.0   12.93 )-----------( 339      ( 05 Sep 2023 06:26 )             33.5     09-05    143  |  107  |  22.2<H>  ----------------------------<  135<H>  3.7   |  24.0  |  0.68    Ca    8.9      05 Sep 2023 06:26  Phos  2.9     09-05  Mg     2.3     09-05            Urinalysis Basic - ( 05 Sep 2023 06:26 )    Color: x / Appearance: x / SG: x / pH: x  Gluc: 135 mg/dL / Ketone: x  / Bili: x / Urobili: x   Blood: x / Protein: x / Nitrite: x   Leuk Esterase: x / RBC: x / WBC x   Sq Epi: x / Non Sq Epi: x / Bacteria: x        Culture - Blood (collected 02 Sep 2023 16:00)  Source: .Blood Blood  Preliminary Report (04 Sep 2023 22:02):    No growth at 48 Hours    Culture - Blood (collected 02 Sep 2023 16:00)  Source: .Blood Blood  Preliminary Report (04 Sep 2023 22:02):    No growth at 48 Hours      CAPILLARY BLOOD GLUCOSE      POCT Blood Glucose.: 118 mg/dL (05 Sep 2023 13:35)  POCT Blood Glucose.: 184 mg/dL (05 Sep 2023 11:39)  POCT Blood Glucose.: 128 mg/dL (05 Sep 2023 08:39)  POCT Blood Glucose.: 167 mg/dL (04 Sep 2023 22:31)  POCT Blood Glucose.: 128 mg/dL (04 Sep 2023 17:34)        RADIOLOGY & ADDITIONAL TESTS:  Results Reviewed:   Imaging Personally Reviewed:  Electrocardiogram Personally Reviewed:

## 2023-09-06 LAB
ANION GAP SERPL CALC-SCNC: 15 MMOL/L — SIGNIFICANT CHANGE UP (ref 5–17)
BASOPHILS # BLD AUTO: 0.05 K/UL — SIGNIFICANT CHANGE UP (ref 0–0.2)
BASOPHILS NFR BLD AUTO: 0.4 % — SIGNIFICANT CHANGE UP (ref 0–2)
BUN SERPL-MCNC: 24.9 MG/DL — HIGH (ref 8–20)
CALCIUM SERPL-MCNC: 9.2 MG/DL — SIGNIFICANT CHANGE UP (ref 8.4–10.5)
CHLORIDE SERPL-SCNC: 108 MMOL/L — SIGNIFICANT CHANGE UP (ref 96–108)
CO2 SERPL-SCNC: 23 MMOL/L — SIGNIFICANT CHANGE UP (ref 22–29)
CREAT SERPL-MCNC: 0.79 MG/DL — SIGNIFICANT CHANGE UP (ref 0.5–1.3)
EGFR: 100 ML/MIN/1.73M2 — SIGNIFICANT CHANGE UP
EOSINOPHIL # BLD AUTO: 0.29 K/UL — SIGNIFICANT CHANGE UP (ref 0–0.5)
EOSINOPHIL NFR BLD AUTO: 2 % — SIGNIFICANT CHANGE UP (ref 0–6)
GLUCOSE BLDC GLUCOMTR-MCNC: 102 MG/DL — HIGH (ref 70–99)
GLUCOSE BLDC GLUCOMTR-MCNC: 141 MG/DL — HIGH (ref 70–99)
GLUCOSE BLDC GLUCOMTR-MCNC: 212 MG/DL — HIGH (ref 70–99)
GLUCOSE SERPL-MCNC: 128 MG/DL — HIGH (ref 70–99)
HCT VFR BLD CALC: 33.6 % — LOW (ref 39–50)
HGB BLD-MCNC: 11 G/DL — LOW (ref 13–17)
IMM GRANULOCYTES NFR BLD AUTO: 1.4 % — HIGH (ref 0–0.9)
LYMPHOCYTES # BLD AUTO: 14.7 % — SIGNIFICANT CHANGE UP (ref 13–44)
LYMPHOCYTES # BLD AUTO: 2.08 K/UL — SIGNIFICANT CHANGE UP (ref 1–3.3)
MAGNESIUM SERPL-MCNC: 2.3 MG/DL — SIGNIFICANT CHANGE UP (ref 1.6–2.6)
MCHC RBC-ENTMCNC: 31 PG — SIGNIFICANT CHANGE UP (ref 27–34)
MCHC RBC-ENTMCNC: 32.7 GM/DL — SIGNIFICANT CHANGE UP (ref 32–36)
MCV RBC AUTO: 94.6 FL — SIGNIFICANT CHANGE UP (ref 80–100)
MONOCYTES # BLD AUTO: 1.06 K/UL — HIGH (ref 0–0.9)
MONOCYTES NFR BLD AUTO: 7.5 % — SIGNIFICANT CHANGE UP (ref 2–14)
NEUTROPHILS # BLD AUTO: 10.51 K/UL — HIGH (ref 1.8–7.4)
NEUTROPHILS NFR BLD AUTO: 74 % — SIGNIFICANT CHANGE UP (ref 43–77)
PHOSPHATE SERPL-MCNC: 3.4 MG/DL — SIGNIFICANT CHANGE UP (ref 2.4–4.7)
PLATELET # BLD AUTO: 379 K/UL — SIGNIFICANT CHANGE UP (ref 150–400)
POTASSIUM SERPL-MCNC: 3.9 MMOL/L — SIGNIFICANT CHANGE UP (ref 3.5–5.3)
POTASSIUM SERPL-SCNC: 3.9 MMOL/L — SIGNIFICANT CHANGE UP (ref 3.5–5.3)
RBC # BLD: 3.55 M/UL — LOW (ref 4.2–5.8)
RBC # FLD: 14.2 % — SIGNIFICANT CHANGE UP (ref 10.3–14.5)
SODIUM SERPL-SCNC: 146 MMOL/L — HIGH (ref 135–145)
WBC # BLD: 14.19 K/UL — HIGH (ref 3.8–10.5)
WBC # FLD AUTO: 14.19 K/UL — HIGH (ref 3.8–10.5)

## 2023-09-06 PROCEDURE — 99232 SBSQ HOSP IP/OBS MODERATE 35: CPT

## 2023-09-06 PROCEDURE — 99223 1ST HOSP IP/OBS HIGH 75: CPT

## 2023-09-06 RX ORDER — TAMSULOSIN HYDROCHLORIDE 0.4 MG/1
0.8 CAPSULE ORAL AT BEDTIME
Refills: 0 | Status: DISCONTINUED | OUTPATIENT
Start: 2023-09-06 | End: 2023-09-29

## 2023-09-06 RX ADMIN — POLYETHYLENE GLYCOL 3350 17 GRAM(S): 17 POWDER, FOR SOLUTION ORAL at 21:43

## 2023-09-06 RX ADMIN — SENNA PLUS 2 TABLET(S): 8.6 TABLET ORAL at 21:44

## 2023-09-06 RX ADMIN — ENOXAPARIN SODIUM 40 MILLIGRAM(S): 100 INJECTION SUBCUTANEOUS at 10:26

## 2023-09-06 RX ADMIN — CHLORHEXIDINE GLUCONATE 1 APPLICATION(S): 213 SOLUTION TOPICAL at 05:49

## 2023-09-06 RX ADMIN — Medication 10 MILLIGRAM(S): at 12:09

## 2023-09-06 RX ADMIN — TAMSULOSIN HYDROCHLORIDE 0.8 MILLIGRAM(S): 0.4 CAPSULE ORAL at 21:43

## 2023-09-06 RX ADMIN — Medication 81 MILLIGRAM(S): at 10:26

## 2023-09-06 RX ADMIN — ATORVASTATIN CALCIUM 80 MILLIGRAM(S): 80 TABLET, FILM COATED ORAL at 21:44

## 2023-09-06 RX ADMIN — LEVETIRACETAM 400 MILLIGRAM(S): 250 TABLET, FILM COATED ORAL at 05:52

## 2023-09-06 RX ADMIN — POLYETHYLENE GLYCOL 3350 17 GRAM(S): 17 POWDER, FOR SOLUTION ORAL at 05:51

## 2023-09-06 RX ADMIN — Medication 4: at 12:02

## 2023-09-06 RX ADMIN — LEVETIRACETAM 400 MILLIGRAM(S): 250 TABLET, FILM COATED ORAL at 17:13

## 2023-09-06 NOTE — PROGRESS NOTE ADULT - ASSESSMENT
ASSESSMENT: Patient is a 63 year old male with PMHx Hypertension, DM on oral medications who presented c/o stroke-like symptoms. Per son pt was walking outside around 10:15am on 8/27/23 when he suddenly fell. His son helped him up and noted that he was weak on the right side and not acting normally which prompted him to come to the ED. On arrival patient came in with right sided arm weakness, global aphasia, and dysarthria, right sided facial droop. Initial head CT demonstrated wedge like low density in the left putamen and caudate (corpus striatum) consistent with infarct that may be acute and no acute intracranial hemorrhage. Tenecteplase was administered at 12:15 pm. CTA head demonstrated occlusion of left MCA distal M1 segment and CTP showed large mismatch. found to have LM1 occlusion. Patient taken for mechanical thrombectomy where he was found to have LM1 occlusion with TICI 2C recanalization. Due to worsening of neurological exam and increase in midline shift found on CT head on 8/29/23(increased in left to right shift of around 9.5mm (previously was 5.2mm)) patient was taken to the OR on 8/30/23 for decompressive hemicraniectomy.   Etiology: embolic stroke of undetermined source     NEURO:   - neurologically more awake and appears stable  - Awaiting post decompressive hemicraniectomy helmet fitting  -Repeat CT head as noted   -Continue close monitoring for neurologic deterioration in setting of cerebral edema, mass effect, and brain compression  -Avoid hyponatremia and rapid fluctuations in serum Na  - Stroke neuro checks q 4 hour  - SBP currently 110-140mmHg, avoid rapid fluctuations and further hypotension as appears to be neurologically tolerating    -ANTITHROMBOTIC THERAPY: ASA 81mg daily (was cleared by neurosx)  -AED in place per nsx   -titrate statin to LDL goal less than 70.  LFT monitoring.  -Obtain MRI Brain w/o when medically optimized  -Dysphagia screen: passed with pureed diet, advance as per SLP  -Physical therapy/OT/Speech eval/treatment.     -CARDIOVASCULAR: TTE as noted , cardiac monitoring w/ telemetry for now, further evaluation pending findings of noted workup, eventual consideration in SAMARA once stable given undetermined source at this time                              -HEMATOLOGY: H/H with anemia, Monitor for signs and symptoms of bleeding, transfusion per primary team to Hgb > 8. Platelets 379. patient should have all age and risk appropriate malignancy screenings with PCP or sooner if clinically suspected, hypercoagulable panel, consideration in CT Chest, had abdomen/pelvic imaging- noted enlarged prostate- confirm there is  no evidence of malignancy.     DVT ppx: Heparin s.c [] LMWH [x] - was cleared by neurosx    PULMONARY: saturating well on room air    RENAL: BUN/Cr without acute change, monitor urine output, maintain adequate hydration. Noted fullness on 8/28/23 physical exam around right thigh. CT abdomen pelvis w/o bladder rupture. No evidence of hematoma on US.      Na Goal: 135-145.  Avoid hyponatremia and rapid fluctuations.     ID: afebrile, leukocytosis 14,19, screen for si/sx of infection. Trend CBC.     OTHER:  A1C is 6.8%. Suggest endocrine follow up for overall long term glycemic control. Condition and plan of care d/w primary team and patient. questions and concerns addressed.     DISPOSITION: Rehab or home depending on PT eval once stable and workup is complete      CORE MEASURES:        Admission NIHSS: 23     Tenecteplase : [x] YES [] NO      LDL/HDL/A1C: 80/39/6.8     Depression Screen- if depression hx and/or present      Statin Therapy: as noted     Dysphagia Screen: [x] PASS [] FAIL     Smoking [] YES [x] NO      Afib [] YES [x] NO     Stroke Education [] YES [] NO- ordered and pending    Obtain screening lower extremity venous ultrasound in patients who meet 1 or more of the following criteria as patient is high risk for DVT/PE on admission:   [] History of DVT/PE  []Hypercoagulable states (Factor V Leiden, Cancer, OCP, etc. )  []Prolonged immobility (hemiplegia/hemiparesis/post operative or any other extended immobilization)  [] Transferred from outside facility (Rehab or Long term care)  [] Age </= to 50 ASSESSMENT: Patient is a 63 year old male with PMHx Hypertension, DM on oral medications who presented c/o stroke-like symptoms. Per son pt was walking outside around 10:15am on 8/27/23 when he suddenly fell. His son helped him up and noted that he was weak on the right side and not acting normally which prompted him to come to the ED. On arrival patient came in with right sided arm weakness, global aphasia, and dysarthria, right sided facial droop. Initial head CT demonstrated wedge like low density in the left putamen and caudate (corpus striatum) consistent with infarct that may be acute and no acute intracranial hemorrhage. Tenecteplase was administered at 12:15 pm. CTA head demonstrated occlusion of left MCA distal M1 segment and CTP showed large mismatch. found to have LM1 occlusion. Patient taken for mechanical thrombectomy where he was found to have LM1 occlusion with TICI 2C recanalization. Due to worsening of neurological exam and increase in midline shift found on CT head on 8/29/23(increased in left to right shift of around 9.5mm (previously was 5.2mm)) patient was taken to the OR on 8/30/23 for decompressive hemicraniectomy.   Etiology: embolic stroke of undetermined source     NEURO:   - neurologically more awake and appears stable  - Awaiting post decompressive hemicraniectomy helmet fitting  -Repeat CT head as noted   -Continue close monitoring for neurologic deterioration in setting of cerebral edema, mass effect, and brain compression  -Avoid hyponatremia and rapid fluctuations in serum Na  - Stroke neuro checks q 4 hour  - SBP currently 110-140mmHg, avoid rapid fluctuations and further hypotension as appears to be neurologically tolerating    -ANTITHROMBOTIC THERAPY: ASA 81mg daily (was cleared by neurosx)  -AED in place per nsx   -titrate statin to LDL goal less than 70.  LFT monitoring.  -Obtain MRI Brain w/o when medically optimized  -Dysphagia screen: passed with pureed diet, advance as per SLP  -Physical therapy/OT/Speech eval/treatment.     -CARDIOVASCULAR: TTE as noted , SAMARA revealed moderate complex atheromas at the aortic arch and descending aorta, would clarify with cardiology on possible contribution to embolic stroke   cardiac monitoring w/ telemetry for now, further evaluation pending findings of noted workup, eventual consideration in SAMARA once stable given undetermined source at this time                              -HEMATOLOGY: H/H with anemia, Monitor for signs and symptoms of bleeding, transfusion per primary team to Hgb > 8. Platelets 379. patient should have all age and risk appropriate malignancy screenings with PCP or sooner if clinically suspected, hypercoagulable panel with LA +, consideration in CT Chest, had abdomen/pelvic imaging- noted enlarged prostate- confirm there is  no evidence of malignancy. Hematology consult.      DVT ppx: Heparin s.c [] LMWH [x] - was cleared by neurosx    PULMONARY: saturating well on room air    RENAL: BUN/Cr without acute change, monitor urine output, maintain adequate hydration. Noted fullness on 8/28/23 physical exam around right thigh. CT abdomen pelvis w/o bladder rupture. No evidence of hematoma on US.      Na Goal: 135-145.  Avoid hyponatremia and rapid fluctuations.     ID: afebrile, leukocytosis 14,19, screen for si/sx of infection. Trend CBC.     OTHER:  A1C is 6.8%. Suggest endocrine follow up for overall long term glycemic control. Condition and plan of care d/w primary team and patient. questions and concerns addressed.     DISPOSITION: Rehab or home depending on PT eval once stable and workup is complete      CORE MEASURES:        Admission NIHSS: 23     Tenecteplase : [x] YES [] NO      LDL/HDL/A1C: 80/39/6.8     Depression Screen- if depression hx and/or present      Statin Therapy: as noted     Dysphagia Screen: [x] PASS [] FAIL     Smoking [] YES [x] NO      Afib [] YES [x] NO     Stroke Education [] YES [] NO- ordered and pending    Obtain screening lower extremity venous ultrasound in patients who meet 1 or more of the following criteria as patient is high risk for DVT/PE on admission:   [] History of DVT/PE  []Hypercoagulable states (Factor V Leiden, Cancer, OCP, etc. )  []Prolonged immobility (hemiplegia/hemiparesis/post operative or any other extended immobilization)  [] Transferred from outside facility (Rehab or Long term care)  [] Age </= to 50 ASSESSMENT: Patient is a 63 year old male with PMHx Hypertension, DM on oral medications who presented c/o stroke-like symptoms. Per son pt was walking outside around 10:15am on 8/27/23 when he suddenly fell. His son helped him up and noted that he was weak on the right side and not acting normally which prompted him to come to the ED. On arrival patient came in with right sided arm weakness, global aphasia, and dysarthria, right sided facial droop. Initial head CT demonstrated wedge like low density in the left putamen and caudate (corpus striatum) consistent with infarct that may be acute and no acute intracranial hemorrhage. Tenecteplase was administered at 12:15 pm. CTA head demonstrated occlusion of left MCA distal M1 segment and CTP showed large mismatch. found to have LM1 occlusion. Patient taken for mechanical thrombectomy where he was found to have LM1 occlusion with TICI 2C recanalization. Due to worsening of neurological exam and increase in midline shift found on CT head on 8/29/23(increased in left to right shift of around 9.5mm (previously was 5.2mm)) patient was taken to the OR on 8/30/23 for decompressive hemicraniectomy.   Etiology: embolic stroke of undetermined source     NEURO:   - neurologically more awake and appears stable  - Awaiting post decompressive hemicraniectomy helmet fitting  -Repeat CT head as noted   -Continue close monitoring for neurologic deterioration in setting of cerebral edema, mass effect, and brain compression  -Avoid hyponatremia and rapid fluctuations in serum Na  - Stroke neuro checks q 4 hour  - SBP currently 110-140mmHg, avoid rapid fluctuations and further hypotension as appears to be neurologically tolerating    -ANTITHROMBOTIC THERAPY: ASA 81mg daily (was cleared by neurosx)  -AED in place per nsx   -titrate statin to LDL goal less than 70.  LFT monitoring.  -Obtain MRI Brain w/o when medically optimized  -Dysphagia screen: passed with pureed diet, advance as per SLP  -Physical therapy/OT/Speech eval/treatment.     -CARDIOVASCULAR: TTE as noted , SAMARA revealed moderate complex atheromas at the aortic arch and descending aorta, would clarify with cardiology on possible contribution to embolic stroke   cardiac monitoring w/ telemetry for now, further evaluation pending findings of noted workup                             -HEMATOLOGY: H/H with anemia, Monitor for signs and symptoms of bleeding, transfusion per primary team to Hgb > 8. Platelets 379. patient should have all age and risk appropriate malignancy screenings with PCP or sooner if clinically suspected, hypercoagulable panel with LA +, consideration in CT Chest, had abdomen/pelvic imaging- noted enlarged prostate- confirm there is  no evidence of malignancy. Hematology consult.      DVT ppx: Heparin s.c [] LMWH [x] - was cleared by neurosx    PULMONARY: saturating well on room air    RENAL: BUN/Cr without acute change, monitor urine output, maintain adequate hydration. Noted fullness on 8/28/23 physical exam around right thigh. CT abdomen pelvis w/o bladder rupture. No evidence of hematoma on US.      Na Goal: 135-145.  Avoid hyponatremia and rapid fluctuations.     ID: afebrile, leukocytosis 14,19, screen for si/sx of infection. Trend CBC.     OTHER:  A1C is 6.8%. Suggest endocrine follow up for overall long term glycemic control. Condition and plan of care d/w primary team and patient. questions and concerns addressed.     DISPOSITION: Rehab or home depending on PT eval once stable and workup is complete      CORE MEASURES:        Admission NIHSS: 23     Tenecteplase : [x] YES [] NO      LDL/HDL/A1C: 80/39/6.8     Depression Screen- if depression hx and/or present      Statin Therapy: as noted     Dysphagia Screen: [x] PASS [] FAIL     Smoking [] YES [x] NO      Afib [] YES [x] NO     Stroke Education [] YES [] NO- ordered and pending    Obtain screening lower extremity venous ultrasound in patients who meet 1 or more of the following criteria as patient is high risk for DVT/PE on admission:   [] History of DVT/PE  []Hypercoagulable states (Factor V Leiden, Cancer, OCP, etc. )  []Prolonged immobility (hemiplegia/hemiparesis/post operative or any other extended immobilization)  [] Transferred from outside facility (Rehab or Long term care)  [] Age </= to 50 ASSESSMENT: Patient is a 63 year old male with PMHx Hypertension, DM on oral medications who presented c/o stroke-like symptoms. Per son pt was walking outside around 10:15am on 8/27/23 when he suddenly fell. His son helped him up and noted that he was weak on the right side and not acting normally which prompted him to come to the ED. On arrival patient came in with right sided arm weakness, global aphasia, and dysarthria, right sided facial droop. Initial head CT demonstrated wedge like low density in the left putamen and caudate (corpus striatum) consistent with infarct that may be acute and no acute intracranial hemorrhage. Tenecteplase was administered at 12:15 pm. CTA head demonstrated occlusion of left MCA distal M1 segment and CTP showed large mismatch. found to have LM1 occlusion. Patient taken for mechanical thrombectomy where he was found to have LM1 occlusion with TICI 2C recanalization. Due to worsening of neurological exam and increase in midline shift found on CT head on 8/29/23(increased in left to right shift of around 9.5mm (previously was 5.2mm)) patient was taken to the OR on 8/30/23 for decompressive hemicraniectomy.   Etiology: embolic stroke of undetermined source.    NEURO:   - neurologically more awake and appears stable  - Awaiting post decompressive hemicraniectomy helmet fitting  -Repeat CT head as noted   -Continue close monitoring for neurologic deterioration in setting of cerebral edema, mass effect, and brain compression  -Avoid hyponatremia and rapid fluctuations in serum Na  - Stroke neuro checks q 4 hour  - SBP currently 110-140mmHg, avoid rapid fluctuations and further hypotension as appears to be neurologically tolerating    -ANTITHROMBOTIC THERAPY: ASA 81mg daily (was cleared by neurosx)  -AED in place per nsx   -titrate statin to LDL goal less than 70.  LFT monitoring.  -Obtain MRI Brain w/o when medically optimized  -Dysphagia screen: passed with pureed diet, advance as per SLP  -Physical therapy/OT/Speech eval/treatment.     -CARDIOVASCULAR: TTE as noted , SAMARA revealed moderate complex atheromas at the aortic arch and descending aorta, would clarify with cardiology on possible contribution to embolic stroke   cardiac monitoring w/ telemetry for now, further evaluation pending findings of noted workup                             -HEMATOLOGY: H/H with anemia, Monitor for signs and symptoms of bleeding, transfusion per primary team to Hgb > 8. Platelets 379. patient should have all age and risk appropriate malignancy screenings with PCP or sooner if clinically suspected, hypercoagulable panel with LA +, consideration in CT Chest, had abdomen/pelvic imaging- noted enlarged prostate- confirm there is  no evidence of malignancy. Hematology consult appreciated.      DVT ppx: Heparin s.c [] LMWH [x] - was cleared by neurosx    PULMONARY: saturating well on room air    RENAL: BUN/Cr without acute change, monitor urine output, maintain adequate hydration. Noted fullness on 8/28/23 physical exam around right thigh. CT abdomen pelvis w/o bladder rupture. No evidence of hematoma on US.      Na Goal: 135-145.  Avoid hyponatremia and rapid fluctuations.     ID: afebrile, leukocytosis 14,19, screen for si/sx of infection. Trend CBC.     OTHER:  A1C is 6.8%. Suggest endocrine follow up for overall long term glycemic control. Condition and plan of care d/w primary team and patient. questions and concerns addressed.     DISPOSITION: Rehab or home depending on PT eval once stable and workup is complete      CORE MEASURES:        Admission NIHSS: 23     Tenecteplase : [x] YES [] NO      LDL/HDL/A1C: 80/39/6.8     Depression Screen- if depression hx and/or present      Statin Therapy: as noted     Dysphagia Screen: [x] PASS [] FAIL     Smoking [] YES [x] NO      Afib [] YES [x] NO     Stroke Education [] YES [] NO- ordered and pending    Obtain screening lower extremity venous ultrasound in patients who meet 1 or more of the following criteria as patient is high risk for DVT/PE on admission:   [] History of DVT/PE  []Hypercoagulable states (Factor V Leiden, Cancer, OCP, etc. )  []Prolonged immobility (hemiplegia/hemiparesis/post operative or any other extended immobilization)  [] Transferred from outside facility (Rehab or Long term care)  [] Age </= to 50

## 2023-09-06 NOTE — CONSULT NOTE ADULT - ASSESSMENT
63 year old male with PMH Hypertension, DM on oral medications who presents c/o stroke-like symptoms. On arrival pt w/ obvious right sided arm weakness, confusion, dysarthria, facial droop suggestive of acute stroke. Code stroke initiated, on CTA have LM1 occlusion, given TNK @ 1215 and was taken for thrombectomy, with TICI 2 recanalization. Due to worsening in neuro exam, CT head done which showed midline shift from left to right --> went to OR for decompressive hemicraniotomy on 8/30/23.   S/p SAMARA on 9/1/23 - showed no left atrial appendage thrombus and normal left atrial appendage   velocities,  intact intra atrial septum, and Moderate complex atheromas at the aortic arch and descending aorta.  Patient extubated and transferred to medicine on 9/4/23.    9/1/23 anticardiolipin ab negative, B2g protein ab - negative.  lupus anticoagulant positive  AT III - 88  Protein C functional assay - 91  Protein S free activity - 57 (low)    IN PROGRESS       63 year old male with PMH Hypertension, DM on oral medications who presents c/o stroke-like symptoms. On arrival pt w/ obvious right sided arm weakness, confusion, dysarthria, facial droop suggestive of acute stroke. Code stroke initiated, on CTA have LM1 occlusion, given TNK @ 1215 and was taken for thrombectomy, with TICI 2 recanalization. Due to worsening in neuro exam, CT head done which showed midline shift from left to right --> went to OR for decompressive hemicraniotomy on 8/30/23.   CTA Neck: No steno-occlusive focus.  S/p SAMARA on 9/1/23 - showed no left atrial appendage thrombus and normal left atrial appendage velocities,  intact intra atrial septum, and Moderate complex atheromas at the aortic arch and descending aorta.  Patient extubated and transferred to medicine on 9/4/23.    9/1/23   anticardiolipin ab negative, B2g protein ab - negative.  lupus anticoagulant positive DRVVT Ratio: 1.67 Ratio (0-1.21)  AT III - 88  Protein C functional assay - 91  Protein S free activity - 57 (low)      Plan:  No clear source for embolic stroke at this time. No significant occlusive disease on CT neck.  SAMARA negative for PFO, atrial thrombus.  Lupus anticoagulant is positive with  normal ACL and B2G antibodies. Given no other clear source, would be reasonable to do Eliquis BID at this time if okay be neuro/neurosurgery.  Plan for repeat LA testing in 12 weeks.  He will also be getting ILR per cardiology.   D/w neuro.

## 2023-09-06 NOTE — OCCUPATIONAL THERAPY INITIAL EVALUATION ADULT - PERTINENT HX OF CURRENT PROBLEM, REHAB EVAL
As per MD note: 63M PMH of DM2, HTN presents with fall, right sided weakness, dysarthria and right facial droop found to have LM1 occlusion s/p TNK in ER subsequent mechanical thrombectomy and decompressive craniectomy for midline shift. SAMARA negative for thrombus. Cardiology recommending ILR /MCOT as outpatient. Extubated on 9/1/23, monitored in ICU and transferred to medicine for further management.

## 2023-09-06 NOTE — CONSULT NOTE ADULT - SUBJECTIVE AND OBJECTIVE BOX
REASON FOR CONSULTATION:     HPI:  63 year old male with PMH Hypertension, DM on oral medications who presents c/o stroke-like symptoms. On arrival pt w/ obvious right sided arm weakness, confusion, dysarthria, facial droop suggestive of acute stroke. Code stroke initiated, on CTA have LM1 occlusion, given TNK @ 1215 and was taken for thrombectomy, with TICI 2 recanalization. Due to worsening in neuro exam, CT head done which showed midline shift from left to right --> went to OR for decompressive hemicraniotomy on 8/30/23.   S/p SAMARA on 9/1/23 - showed no left atrial appendage thrombus and normal left atrial appendage   velocities,  intact intra atrial septum, and Moderate complex atheromas at the aortic arch and descending aorta.  Patient extubated and transferred to medicine on 9/4/23.        REVIEW OF SYSTEMS:  Constitutional, Eyes, ENT, Cardiovascular, Respiratory, Gastrointestinal, Genitourinary, Musculoskeletal, Integumentary, Neurological, Psychiatric, Endocrine, Heme/Lymph, and Allergic/Immunologic review of systems are otherwise negative except as noted in the HPI.    PAST MEDICAL & SURGICAL HISTORY:  Hypertension      No significant past surgical history          FAMILY HISTORY:      SOCIAL HISTORY:    Allergies    No Known Allergies    Intolerances        MEDICATIONS  (STANDING):  aspirin  chewable 81 milliGRAM(s) Oral daily  atorvastatin 80 milliGRAM(s) Oral at bedtime  bisacodyl Suppository 10 milliGRAM(s) Rectal once  chlorhexidine 2% Cloths 1 Application(s) Topical <User Schedule>  dextrose 5%. 1000 milliLiter(s) (50 mL/Hr) IV Continuous <Continuous>  dextrose 5%. 1000 milliLiter(s) (100 mL/Hr) IV Continuous <Continuous>  dextrose 50% Injectable 12.5 Gram(s) IV Push once  dextrose 50% Injectable 25 Gram(s) IV Push once  dextrose 50% Injectable 25 Gram(s) IV Push once  enoxaparin Injectable 40 milliGRAM(s) SubCutaneous every 24 hours  glucagon  Injectable 1 milliGRAM(s) IntraMuscular once  insulin lispro (ADMELOG) corrective regimen sliding scale   SubCutaneous three times a day before meals  levETIRAcetam  IVPB 500 milliGRAM(s) IV Intermittent every 12 hours  polyethylene glycol 3350 17 Gram(s) Oral two times a day  senna 2 Tablet(s) Oral at bedtime  tamsulosin 0.4 milliGRAM(s) Oral at bedtime    MEDICATIONS  (PRN):  acetaminophen     Tablet .. 975 milliGRAM(s) Oral every 6 hours PRN Mild Pain (1 - 3)  albuterol/ipratropium for Nebulization 3 milliLiter(s) Nebulizer every 6 hours PRN Shortness of Breath and/or Wheezing  dextrose Oral Gel 15 Gram(s) Oral once PRN Blood Glucose LESS THAN 70 milliGRAM(s)/deciliter  hydrALAZINE Injectable 10 milliGRAM(s) IV Push every 2 hours PRN SBP >140  labetalol Injectable 10 milliGRAM(s) IV Push every 2 hours PRN SBP >140      Vital Signs Last 24 Hrs  T(C): 37.6 (06 Sep 2023 04:32), Max: 37.6 (06 Sep 2023 04:32)  T(F): 99.6 (06 Sep 2023 04:32), Max: 99.6 (06 Sep 2023 04:32)  HR: 110 (06 Sep 2023 04:32) (94 - 110)  BP: 104/73 (06 Sep 2023 04:32) (104/73 - 139/85)  BP(mean): 92 (05 Sep 2023 11:05) (92 - 92)  RR: 18 (06 Sep 2023 04:32) (18 - 19)  SpO2: 94% (06 Sep 2023 04:32) (91% - 99%)    Parameters below as of 05 Sep 2023 20:38  Patient On (Oxygen Delivery Method): room air        PHYSICAL EXAM:    GENERAL: NAD, well-groomed, well-developed  HEAD:  Atraumatic, Normocephalic  EYES: EOMI, PERRLA, conjunctiva and sclera clear  ENMT: No tonsillar erythema, exudates, or enlargement; Moist mucous membranes, Good dentition, No lesions  NECK: Supple, No JVD, Normal thyroid  NERVOUS SYSTEM:  Alert & Oriented X3, Good concentration; Motor Strength 5/5 B/L upper and lower extremities; DTRs 2+ intact and symmetric  CHEST/LUNG: Clear to auscultation bilaterally; No rales, rhonchi, wheezing, or rubs  HEART: Regular rate and rhythm; No murmurs, rubs, or gallops  ABDOMEN: Soft, Nontender, Nondistended; Bowel sounds present  EXTREMITIES:  2+ Peripheral Pulses, No clubbing, cyanosis, or edema  LYMPH: No lymphadenopathy noted  SKIN: No rashes or lesions      LABS:                        11.0   14.19 )-----------( 379      ( 06 Sep 2023 06:09 )             33.6     09-06    146<H>  |  108  |  24.9<H>  ----------------------------<  128<H>  3.9   |  23.0  |  0.79    Ca    9.2      06 Sep 2023 06:09  Phos  3.4     09-06  Mg     2.3     09-06        Urinalysis Basic - ( 06 Sep 2023 06:09 )    Color: x / Appearance: x / SG: x / pH: x  Gluc: 128 mg/dL / Ketone: x  / Bili: x / Urobili: x   Blood: x / Protein: x / Nitrite: x   Leuk Esterase: x / RBC: x / WBC x   Sq Epi: x / Non Sq Epi: x / Bacteria: x          RADIOLOGY & ADDITIONAL STUDIES:  < from: CT Brain Stroke Protocol (08.27.23 @ 11:53) >    IMPRESSION:    Wedgelike low density in the left putamen andcaudate (corpus striatum)   is new from the prior scan, and consistent with infarct that may be acute   given corresponding symptoms.  No acute intracranial hemorrhage.    The study was performed at 11:47 AM on 08/27/2023 and the above findings   were discussed with Dr. Betts at 12:02 PM.    < end of copied text >    < from: CT Angio Brain Stroke Protocol  w/ IV Cont (08.27.23 @ 12:03) >  IMPRESSION:    CTA head: Focal occlusion of left MCA distal M1 segment with patent but   small caliber filling of M2 segments.    CT perfusion indicates large mismatch between Tmax and CBF consistent   with ischemic penumbra.    CTA Neck: No steno-occlusive focus.    Case findings above discussed with Dr. Hu at 12:13 PM on 08/27/2023.    --- End of Report ---    < end of copied text >      < from: CT Abdomen and Pelvis w/ IV Cont (08.28.23 @ 02:53) >    ACC: [id number] EXAM:  CT ABDOMEN AND PELVIS IC   ORDERED BY: AMAYA STREETER     PROCEDURE DATE:  08/28/2023          INTERPRETATION:  CLINICAL INFORMATION: Evaluate for bladder perforation.  Right hemiparesis. Status post mechanical thrombectomy.    COMPARISON: CT scan abdomen pelvis 1/12/2019.    CONTRAST/COMPLICATIONS:  IV Contrast: NONE  Oral Contrast: None.  Complications: None reported at time of study completion    PROCEDURE:  CT of the abdomen and pelvis was performed (CT Cystogram).  Precontrast and postcontrast images were obtained through the abdomen and   pelvis followed by imaging after the installation of a dilute mixture of   [id number] via a Umaña catheter.  Sagittal and coronal reformats were performed.    FINDINGS:    BLADDER:  There is mild circumferential bladder wall thickening.  No extraluminal extravasation of bladder contrast is noted.    REPRODUCTIVE ORGANS: Prostate is enlarged.  LYMPH NODES: No pelvic lymphadenopathy.    VISUALIZED PORTIONS:    ABDOMINAL ORGANS:   Vicarious excretion within gallbladder.  Minimal retained contrast within the renal collecting systems.    BOWEL: Within normal limits.  PERITONEUM: No ascites.  No free intraperitoneal air.    VESSELS: Atherosclerotic changes.  Retroaortic renal vein.  ABDOMINAL WALL:  Tiny fat-containing umbilical hernia.  No subcutaneous fluid collection.    BONES:  Posterior spinal fusion L4-5 and L5-S1.    IMPRESSION:    No evidence for bladder rupture.    This study was preliminary reported by the ED radiologist on 8/28/2023,   3:06 AM.    --- End of Report ---    < end of copied text >       REASON FOR CONSULTATION:     HPI:  63 year old male with PMH Hypertension, DM on oral medications who presents c/o stroke-like symptoms. On arrival pt w/ obvious right sided arm weakness, confusion, dysarthria, facial droop suggestive of acute stroke. Code stroke initiated, on CTA have LM1 occlusion, given TNK @ 1215 and was taken for thrombectomy, with TICI 2 recanalization. Due to worsening in neuro exam, CT head done which showed midline shift from left to right --> went to OR for decompressive hemicraniotomy on 8/30/23.   S/p SAMARA on 9/1/23 - showed no left atrial appendage thrombus and normal left atrial appendage   velocities,  intact intra atrial septum, and Moderate complex atheromas at the aortic arch and descending aorta.  Patient extubated and transferred to medicine on 9/4/23.        REVIEW OF SYSTEMS:  Constitutional, Eyes, ENT, Cardiovascular, Respiratory, Gastrointestinal, Genitourinary, Musculoskeletal, Integumentary, Neurological, Psychiatric, Endocrine, Heme/Lymph, and Allergic/Immunologic review of systems are otherwise negative except as noted in the HPI.    PAST MEDICAL & SURGICAL HISTORY:  Hypertension      No significant past surgical history          FAMILY HISTORY:      SOCIAL HISTORY:    Allergies    No Known Allergies    Intolerances        MEDICATIONS  (STANDING):  aspirin  chewable 81 milliGRAM(s) Oral daily  atorvastatin 80 milliGRAM(s) Oral at bedtime  bisacodyl Suppository 10 milliGRAM(s) Rectal once  chlorhexidine 2% Cloths 1 Application(s) Topical <User Schedule>  dextrose 5%. 1000 milliLiter(s) (50 mL/Hr) IV Continuous <Continuous>  dextrose 5%. 1000 milliLiter(s) (100 mL/Hr) IV Continuous <Continuous>  dextrose 50% Injectable 12.5 Gram(s) IV Push once  dextrose 50% Injectable 25 Gram(s) IV Push once  dextrose 50% Injectable 25 Gram(s) IV Push once  enoxaparin Injectable 40 milliGRAM(s) SubCutaneous every 24 hours  glucagon  Injectable 1 milliGRAM(s) IntraMuscular once  insulin lispro (ADMELOG) corrective regimen sliding scale   SubCutaneous three times a day before meals  levETIRAcetam  IVPB 500 milliGRAM(s) IV Intermittent every 12 hours  polyethylene glycol 3350 17 Gram(s) Oral two times a day  senna 2 Tablet(s) Oral at bedtime  tamsulosin 0.4 milliGRAM(s) Oral at bedtime    MEDICATIONS  (PRN):  acetaminophen     Tablet .. 975 milliGRAM(s) Oral every 6 hours PRN Mild Pain (1 - 3)  albuterol/ipratropium for Nebulization 3 milliLiter(s) Nebulizer every 6 hours PRN Shortness of Breath and/or Wheezing  dextrose Oral Gel 15 Gram(s) Oral once PRN Blood Glucose LESS THAN 70 milliGRAM(s)/deciliter  hydrALAZINE Injectable 10 milliGRAM(s) IV Push every 2 hours PRN SBP >140  labetalol Injectable 10 milliGRAM(s) IV Push every 2 hours PRN SBP >140      Vital Signs Last 24 Hrs  T(C): 37.6 (06 Sep 2023 04:32), Max: 37.6 (06 Sep 2023 04:32)  T(F): 99.6 (06 Sep 2023 04:32), Max: 99.6 (06 Sep 2023 04:32)  HR: 110 (06 Sep 2023 04:32) (94 - 110)  BP: 104/73 (06 Sep 2023 04:32) (104/73 - 139/85)  BP(mean): 92 (05 Sep 2023 11:05) (92 - 92)  RR: 18 (06 Sep 2023 04:32) (18 - 19)  SpO2: 94% (06 Sep 2023 04:32) (91% - 99%)    Parameters below as of 05 Sep 2023 20:38  Patient On (Oxygen Delivery Method): room air        PHYSICAL EXAM:    GENERAL: NAD,   HEAD: crani scar  NECK: Supple, No JVD, Normal thyroid  NERVOUS SYSTEM:  awake, aphasic, right hemiplegia  CHEST/LUNG: Clear to auscultation bilaterally  HEART: Regular rate and rhythm;   ABDOMEN: Soft, Nontender,  EXTREMITIES:  no edema  LYMPH: No lymphadenopathy noted  SKIN: No rashes or lesions      LABS:                        11.0   14.19 )-----------( 379      ( 06 Sep 2023 06:09 )             33.6     09-06    146<H>  |  108  |  24.9<H>  ----------------------------<  128<H>  3.9   |  23.0  |  0.79    Ca    9.2      06 Sep 2023 06:09  Phos  3.4     09-06  Mg     2.3     09-06        Urinalysis Basic - ( 06 Sep 2023 06:09 )    Color: x / Appearance: x / SG: x / pH: x  Gluc: 128 mg/dL / Ketone: x  / Bili: x / Urobili: x   Blood: x / Protein: x / Nitrite: x   Leuk Esterase: x / RBC: x / WBC x   Sq Epi: x / Non Sq Epi: x / Bacteria: x          RADIOLOGY & ADDITIONAL STUDIES:  < from: CT Brain Stroke Protocol (08.27.23 @ 11:53) >    IMPRESSION:    Wedgelike low density in the left putamen andcaudate (corpus striatum)   is new from the prior scan, and consistent with infarct that may be acute   given corresponding symptoms.  No acute intracranial hemorrhage.    The study was performed at 11:47 AM on 08/27/2023 and the above findings   were discussed with Dr. Betts at 12:02 PM.    < end of copied text >    < from: CT Angio Brain Stroke Protocol  w/ IV Cont (08.27.23 @ 12:03) >  IMPRESSION:    CTA head: Focal occlusion of left MCA distal M1 segment with patent but   small caliber filling of M2 segments.    CT perfusion indicates large mismatch between Tmax and CBF consistent   with ischemic penumbra.    CTA Neck: No steno-occlusive focus.    Case findings above discussed with Dr. Hu at 12:13 PM on 08/27/2023.    --- End of Report ---    < end of copied text >      < from: CT Abdomen and Pelvis w/ IV Cont (08.28.23 @ 02:53) >    ACC: [id number] EXAM:  CT ABDOMEN AND PELVIS IC   ORDERED BY: AMAYA STREETER     PROCEDURE DATE:  08/28/2023          INTERPRETATION:  CLINICAL INFORMATION: Evaluate for bladder perforation.  Right hemiparesis. Status post mechanical thrombectomy.    COMPARISON: CT scan abdomen pelvis 1/12/2019.    CONTRAST/COMPLICATIONS:  IV Contrast: NONE  Oral Contrast: None.  Complications: None reported at time of study completion    PROCEDURE:  CT of the abdomen and pelvis was performed (CT Cystogram).  Precontrast and postcontrast images were obtained through the abdomen and   pelvis followed by imaging after the installation of a dilute mixture of   [id number] via a Umaña catheter.  Sagittal and coronal reformats were performed.    FINDINGS:    BLADDER:  There is mild circumferential bladder wall thickening.  No extraluminal extravasation of bladder contrast is noted.    REPRODUCTIVE ORGANS: Prostate is enlarged.  LYMPH NODES: No pelvic lymphadenopathy.    VISUALIZED PORTIONS:    ABDOMINAL ORGANS:   Vicarious excretion within gallbladder.  Minimal retained contrast within the renal collecting systems.    BOWEL: Within normal limits.  PERITONEUM: No ascites.  No free intraperitoneal air.    VESSELS: Atherosclerotic changes.  Retroaortic renal vein.  ABDOMINAL WALL:  Tiny fat-containing umbilical hernia.  No subcutaneous fluid collection.    BONES:  Posterior spinal fusion L4-5 and L5-S1.    IMPRESSION:    No evidence for bladder rupture.    This study was preliminary reported by the ED radiologist on 8/28/2023,   3:06 AM.    --- End of Report ---    < end of copied text >

## 2023-09-06 NOTE — PROGRESS NOTE ADULT - ASSESSMENT
63M PMH of DM2, HTN presents with fall, right sided weakness, dysarthria and right facial droop found to have LM1 occlusion s/p TNK in ER subsequent mechanical thrombectomy and decompressive craniectomy for midline shift. SAMARA negative for thrombus. Cardiology recommending ILR /MCOT as outpatient. Extubated on 9/1/23, monitored in ICU and transferred to medicine for further management.     CVA:     c/w lipitor    ASA    SAMARA negative for thrombus, +atheroma of aortic arch and aorta    mcot/ILR on dc    pt/ot/speech/ PM&R    keppra until 9/6    Discussed +LA result with neurology. Result may be abnormal as patient was already receiving lovenox. Hematology consulted.    febrile episode 9/2/23: hold off on IV abx.    blood cultures negative    negative ua/ cxr    urinary retention  longo placed 9/6/23  flomax increased to 0.8mg po daily  bowel regimen    dysphagia:   pureed diet with thick liquids  aspiration precautions    hypernatremia:   PO fluid intake    dm2: sliding scale    ppx: lovenox 63M PMH of DM2, HTN presents with fall, right sided weakness, dysarthria and right facial droop found to have LM1 occlusion s/p TNK in ER subsequent mechanical thrombectomy and decompressive craniectomy for midline shift. SAMARA negative for thrombus. Cardiology recommending ILR /MCOT as outpatient. Extubated on 9/1/23, monitored in ICU and transferred to medicine for further management.     CVA:     c/w lipitor    ASA    SAMARA negative for thrombus, +atheroma of aortic arch and aorta    mcot/ILR on dc    pt/ot/speech/ PM&R    keppra until 9/6    Discussed +LA result with neurology. Result may be abnormal as patient was already receiving lovenox. Hematology consulted.    febrile episode 9/2/23: hold off on IV abx.    blood cultures negative    negative ua/ cxr    urinary retention  longo placed 9/6/23  flomax increased to 0.8mg po daily  bowel regimen    Constipation  miralax/senna  add dulcolax suppos      dysphagia:   pureed diet with thick liquids  aspiration precautions    hypernatremia:   PO fluid intake    dm2: sliding scale    ppx: lovenox

## 2023-09-06 NOTE — PROGRESS NOTE ADULT - SUBJECTIVE AND OBJECTIVE BOX
Preliminary note, offical recommendations pending attending review/signature     [facility] Stroke Team Progress Note      #[id number]    HPI:  Patient is a 63 year old male with PMHx Hypertension, DM on oral medications who presented c/o stroke-like symptoms. Per son pt was walking outside around 10:15am on 8/27/23 when he suddenly fell. His son helped him up and noted that he was weak on the right side and not acting normally which prompted him to come to the ED. On arrival pt w/ obvious right sided arm weakness, confusion, dysarthria, facial droop suggestive of acute stroke. Code stroke initiated, found to have LM1 occlusion, given TNK @ 1215 and was taken for thrombectomy. Unable to provide ROS 2/2 aphasia.      SUBJECTIVE: No events overnight.  No new neurologic complaints.  ROS reported negative unless otherwise noted.    acetaminophen     Tablet .. 975 milliGRAM(s) Oral every 6 hours PRN  albuterol/ipratropium for Nebulization 3 milliLiter(s) Nebulizer every 6 hours PRN  aspirin  chewable 81 milliGRAM(s) Oral daily  atorvastatin 80 milliGRAM(s) Oral at bedtime  chlorhexidine 2% Cloths 1 Application(s) Topical <User Schedule>  dextrose 5%. 1000 milliLiter(s) IV Continuous <Continuous>  dextrose 5%. 1000 milliLiter(s) IV Continuous <Continuous>  dextrose 50% Injectable 25 Gram(s) IV Push once  dextrose 50% Injectable 12.5 Gram(s) IV Push once  dextrose 50% Injectable 25 Gram(s) IV Push once  dextrose Oral Gel 15 Gram(s) Oral once PRN  enoxaparin Injectable 40 milliGRAM(s) SubCutaneous every 24 hours  glucagon  Injectable 1 milliGRAM(s) IntraMuscular once  hydrALAZINE Injectable 10 milliGRAM(s) IV Push every 2 hours PRN  insulin lispro (ADMELOG) corrective regimen sliding scale   SubCutaneous three times a day before meals  labetalol Injectable 10 milliGRAM(s) IV Push every 2 hours PRN  levETIRAcetam  IVPB 500 milliGRAM(s) IV Intermittent every 12 hours  polyethylene glycol 3350 17 Gram(s) Oral two times a day  senna 2 Tablet(s) Oral at bedtime  tamsulosin 0.8 milliGRAM(s) Oral at bedtime      PHYSICAL EXAM:   Vital Signs Last 24 Hrs  T(C): 36.7 (06 Sep 2023 10:40), Max: 37.6 (06 Sep 2023 04:32)  T(F): 98 (06 Sep 2023 10:40), Max: 99.6 (06 Sep 2023 04:32)  HR: 65 (06 Sep 2023 10:40) (65 - 110)  BP: 112/78 (06 Sep 2023 10:40) (104/73 - 139/85)  BP(mean): --  RR: 18 (06 Sep 2023 10:40) (18 - 18)  SpO2: 92% (06 Sep 2023 10:40) (92% - 97%)    Parameters below as of 06 Sep 2023 10:40  Patient On (Oxygen Delivery Method): room air      General: No acute distress    NEUROLOGICAL EXAM:  Mental status: eyes open, awake, alert, attends to examiner briskly on left, smiles, generates sounds, follows some simple commands, perseverates, mimics at times   Cranial Nerves: pupils 3mm and reactive, crosses midline, RHHA to threat, right facial palsy   Motor exam: Normal tone, right hemiplegia, RLE trace hip flexion;   5/5 LUE, 5/5 LLE, no drift   Sensation: Intact to noxious stimuli  throughout   Coordination/ Gait: gait not tested          LABS:                        11.0   14.19 )-----------( 379      ( 06 Sep 2023 06:09 )             33.6    09-06    146<H>  |  108  |  24.9<H>  ----------------------------<  128<H>  3.9   |  23.0  |  0.79    Ca    9.2      06 Sep 2023 06:09  Phos  3.4     09-06  Mg     2.3     09-06        IMAGING: Reviewed by me.   CT Head No Cont (09.03.23 @ 11:20)   Impression:  No significant change from the previous exam performed yesterday.   Surgical site is unchanged as is the brain parenchyma changes from left   middle cerebral artery stroke. Mass effect is unchanged.    CT Head No Cont (09.02.23 @ 04:01)   IMPRESSION: Redemonstration of large left MCA territoryinfarct.   Left-to-right midline shift measures 3 mm, unchanged.    CT Head No Cont (08.31.23 @ 20:43)   IMPRESSION:  Interval removal of subcutaneous drainage catheter with new acute   extradural broad products identified, withincreased rightward midline   shift which now measures 6 mm (previously 3 mm on 8/30/2023).    Additionally, there is increased hypoattenuation of the left   frontoparietal cortex within the known MCA territory infarct, which may   represent new areas of acute/subacute ischemia versus evolving gliosis.   Hemorrhagic conversion is not suspected at this time. Close attention on   follow-up is advised.    Diffuse scalp edema extending to the left periorbital soft tissues has   significantly increased since the prior study, likely due to evolving   subcutaneous blood products.    Critical findings above were discussed directly by telephone by the ED   radiologist on call, Fei Hebert MD, with the neurosurgical ICU   physicians assistant, Casi RUGGIERO, at 3:40 AM on 9/1/2023.    CT Head No Cont (08.30.23 @ 15:44)   Postop changes compatible with a left temporal frontal craniectomy is   identified. There is some extension of the parenchyma through the   craniectomy defect identified. There is abnormal low-attenuation again   seen involving left temporal frontal parietal region involving the left   basal ganglia and corona radiata region. This is compatible with an   evolving acute left MCA infarct. Previously noted areas of high   attenuation is less conspicuous which could be compatible with evolving   areas of hemorrhage. Mass effect on the left lateral ventricle is seen.   Left-to-right shift (3.1 mm) is seen.    There is extra-axial hematoma fluid and soft tissue swelling seen seen in   the postop region with extra-axial drain comedies findings are compatible   with postoperative changes.    The visualized paranasal sinuses mastoid andmiddle regions appear clear.  IMPRESSION:  New changes as described above.    CT Head No Cont (08.29.23 @ 23:39)   IMPRESSION: Evolving acute left MCA infarct is identified.   Mass effect on the left lateral ventricle is   again seen. Slight increased left-to-right shift (1.2 cm) is seen.    CT Head No Cont (08.29.23 @ 00:31)   IMPRESSION:  Continued evolution of large acute left MCA territory infarct.   Left-to-right midline shift measuring 6 mm, increased. No hydrocephalus   or effacement of basal cisterns. Curvilinear foci of hyperdensity within   the area of infarct, for which petechial hemorrhage cannot be excluded.   No large parenchymal hemorrhage.    CT Abdomen and Pelvis w/ IV Cont (08.28.23 @ 02:53)   IMPRESSION:  No evidence for bladder rupture.    CT Brain Stroke Protocol (08.27.23 @ 11:53)   IMPRESSION:  Wedgelike low density in the left putamen and caudate (corpus striatum)   is new from the prior scan, and consistent with infarct that may be acute   given corresponding symptoms.  No acute intracranial hemorrhage.    The study was performed at 11:47 AM on 08/27/2023 and the above findings   were discussed with Dr. Betts at 12:02 PM.    CT Angio Brain Stroke Protocol  w/ IV Cont (08.27.23 @ 12:03)   IMPRESSION:  CTA head: Focal occlusion of left MCA distal M1 segment with patent but   small caliber filling of M2 segments.    CT perfusion indicates large mismatch between Tmax and CBF consistent   with ischemic penumbra.    CTA Neck: No steno-occlusive focus.    Case findings above discussed with Dr. Hu at 12:13 PM on 08/27/2023.    CT Head No Cont (08.28.23 @ 02:34)   IMPRESSION: Acute left MCA infarct. Possible acute infarct involving the   right temporal region. This finding can be better evaluated with MRI if   there are no contraindications.    TTE Echo Complete w/o Contrast w/ Doppler (08.29.23 @ 12:03)   Summary:   1. Left ventricular ejection fraction, by visual estimation, is 55 to   60%.   2. Normal global left ventricular systolic function.   3. Normal right ventricular size and function.   4. Trace mitral valve regurgitation.   5. Mild aortic regurgitation.   6. Sclerotic aortic valve with normal opening.   7. There is no evidence of pericardial effusion.             Preliminary note, offical recommendations pending attending review/signature     [facility] Stroke Team Progress Note      #[id number]    HPI:  Patient is a 63 year old male with PMHx Hypertension, DM on oral medications who presented c/o stroke-like symptoms. Per son pt was walking outside around 10:15am on 8/27/23 when he suddenly fell. His son helped him up and noted that he was weak on the right side and not acting normally which prompted him to come to the ED. On arrival pt w/ obvious right sided arm weakness, confusion, dysarthria, facial droop suggestive of acute stroke. Code stroke initiated, found to have LM1 occlusion, given TNK @ 1215 and was taken for thrombectomy. Unable to provide ROS 2/2 aphasia.      SUBJECTIVE: No events overnight.  No new neurologic complaints.  ROS reported negative unless otherwise noted.    acetaminophen     Tablet .. 975 milliGRAM(s) Oral every 6 hours PRN  albuterol/ipratropium for Nebulization 3 milliLiter(s) Nebulizer every 6 hours PRN  aspirin  chewable 81 milliGRAM(s) Oral daily  atorvastatin 80 milliGRAM(s) Oral at bedtime  chlorhexidine 2% Cloths 1 Application(s) Topical <User Schedule>  dextrose 5%. 1000 milliLiter(s) IV Continuous <Continuous>  dextrose 5%. 1000 milliLiter(s) IV Continuous <Continuous>  dextrose 50% Injectable 25 Gram(s) IV Push once  dextrose 50% Injectable 12.5 Gram(s) IV Push once  dextrose 50% Injectable 25 Gram(s) IV Push once  dextrose Oral Gel 15 Gram(s) Oral once PRN  enoxaparin Injectable 40 milliGRAM(s) SubCutaneous every 24 hours  glucagon  Injectable 1 milliGRAM(s) IntraMuscular once  hydrALAZINE Injectable 10 milliGRAM(s) IV Push every 2 hours PRN  insulin lispro (ADMELOG) corrective regimen sliding scale   SubCutaneous three times a day before meals  labetalol Injectable 10 milliGRAM(s) IV Push every 2 hours PRN  levETIRAcetam  IVPB 500 milliGRAM(s) IV Intermittent every 12 hours  polyethylene glycol 3350 17 Gram(s) Oral two times a day  senna 2 Tablet(s) Oral at bedtime  tamsulosin 0.8 milliGRAM(s) Oral at bedtime      PHYSICAL EXAM:   Vital Signs Last 24 Hrs  T(C): 36.7 (06 Sep 2023 10:40), Max: 37.6 (06 Sep 2023 04:32)  T(F): 98 (06 Sep 2023 10:40), Max: 99.6 (06 Sep 2023 04:32)  HR: 65 (06 Sep 2023 10:40) (65 - 110)  BP: 112/78 (06 Sep 2023 10:40) (104/73 - 139/85)  BP(mean): --  RR: 18 (06 Sep 2023 10:40) (18 - 18)  SpO2: 92% (06 Sep 2023 10:40) (92% - 97%)    Parameters below as of 06 Sep 2023 10:40  Patient On (Oxygen Delivery Method): room air      General: No acute distress    NEUROLOGICAL EXAM:  Mental status: eyes open, awake, alert, attends to examiner briskly on left, smiles, generates sounds, follows some simple commands, perseverates, mimics at times   Cranial Nerves: pupils 3mm and reactive, crosses midline, RHHA to threat, right facial palsy   Motor exam: Normal tone, right hemiplegia, RLE trace hip flexion;   5/5 LUE, 5/5 LLE, no drift   Sensation: Intact to noxious stimuli  throughout   Coordination/ Gait: gait not tested          LABS:                        11.0   14.19 )-----------( 379      ( 06 Sep 2023 06:09 )             33.6    09-06    146<H>  |  108  |  24.9<H>  ----------------------------<  128<H>  3.9   |  23.0  |  0.79    Ca    9.2      06 Sep 2023 06:09  Phos  3.4     09-06  Mg     2.3     09-06        IMAGING: Reviewed by me.   SAMARA Echo Doppler (09.01.23 @ 12:52)    1. Left ventricular ejection fraction, by visual estimation, is >75%.   2. Normal global left ventricular systolic function.   3. Normal right ventricular size and function.   4. Normal left atrial size.   5. No left atrial appendage thrombus and normal left atrial appendage   velocities.   6. Trace mitral valve regurgitation.   7. Color flow doppler and intravenous injection of agitated saline   demonstrates the presence of an intact intra atrial septum.   8. Mobile intra-atrial septum with lipomatous hypertrophy.   9. Moderate complex atheromas at the aortic arch and descending aorta.  10. There is no evidence of pericardial effusion.      CT Head No Cont (09.03.23 @ 11:20)   Impression:  No significant change from the previous exam performed yesterday.   Surgical site is unchanged as is the brain parenchyma changes from left   middle cerebral artery stroke. Mass effect is unchanged.    CT Head No Cont (09.02.23 @ 04:01)   IMPRESSION: Redemonstration of large left MCA territoryinfarct.   Left-to-right midline shift measures 3 mm, unchanged.    CT Head No Cont (08.31.23 @ 20:43)   IMPRESSION:  Interval removal of subcutaneous drainage catheter with new acute   extradural broad products identified, withincreased rightward midline   shift which now measures 6 mm (previously 3 mm on 8/30/2023).    Additionally, there is increased hypoattenuation of the left   frontoparietal cortex within the known MCA territory infarct, which may   represent new areas of acute/subacute ischemia versus evolving gliosis.   Hemorrhagic conversion is not suspected at this time. Close attention on   follow-up is advised.    Diffuse scalp edema extending to the left periorbital soft tissues has   significantly increased since the prior study, likely due to evolving   subcutaneous blood products.    Critical findings above were discussed directly by telephone by the ED   radiologist on call, Fei Hebert MD, with the neurosurgical ICU   physicians assistant, Casi RUGGIERO, at 3:40 AM on 9/1/2023.    CT Head No Cont (08.30.23 @ 15:44)   Postop changes compatible with a left temporal frontal craniectomy is   identified. There is some extension of the parenchyma through the   craniectomy defect identified. There is abnormal low-attenuation again   seen involving left temporal frontal parietal region involving the left   basal ganglia and corona radiata region. This is compatible with an   evolving acute left MCA infarct. Previously noted areas of high   attenuation is less conspicuous which could be compatible with evolving   areas of hemorrhage. Mass effect on the left lateral ventricle is seen.   Left-to-right shift (3.1 mm) is seen.    There is extra-axial hematoma fluid and soft tissue swelling seen seen in   the postop region with extra-axial drain comedies findings are compatible   with postoperative changes.    The visualized paranasal sinuses mastoid andmiddle regions appear clear.  IMPRESSION:  New changes as described above.    CT Head No Cont (08.29.23 @ 23:39)   IMPRESSION: Evolving acute left MCA infarct is identified.   Mass effect on the left lateral ventricle is   again seen. Slight increased left-to-right shift (1.2 cm) is seen.    CT Head No Cont (08.29.23 @ 00:31)   IMPRESSION:  Continued evolution of large acute left MCA territory infarct.   Left-to-right midline shift measuring 6 mm, increased. No hydrocephalus   or effacement of basal cisterns. Curvilinear foci of hyperdensity within   the area of infarct, for which petechial hemorrhage cannot be excluded.   No large parenchymal hemorrhage.    CT Abdomen and Pelvis w/ IV Cont (08.28.23 @ 02:53)   IMPRESSION:  No evidence for bladder rupture.    CT Brain Stroke Protocol (08.27.23 @ 11:53)   IMPRESSION:  Wedgelike low density in the left putamen and caudate (corpus striatum)   is new from the prior scan, and consistent with infarct that may be acute   given corresponding symptoms.  No acute intracranial hemorrhage.    The study was performed at 11:47 AM on 08/27/2023 and the above findings   were discussed with Dr. Betts at 12:02 PM.    CT Angio Brain Stroke Protocol  w/ IV Cont (08.27.23 @ 12:03)   IMPRESSION:  CTA head: Focal occlusion of left MCA distal M1 segment with patent but   small caliber filling of M2 segments.    CT perfusion indicates large mismatch between Tmax and CBF consistent   with ischemic penumbra.    CTA Neck: No steno-occlusive focus.    Case findings above discussed with Dr. Hu at 12:13 PM on 08/27/2023.    CT Head No Cont (08.28.23 @ 02:34)   IMPRESSION: Acute left MCA infarct. Possible acute infarct involving the   right temporal region. This finding can be better evaluated with MRI if   there are no contraindications.    TTE Echo Complete w/o Contrast w/ Doppler (08.29.23 @ 12:03)   Summary:   1. Left ventricular ejection fraction, by visual estimation, is 55 to   60%.   2. Normal global left ventricular systolic function.   3. Normal right ventricular size and function.   4. Trace mitral valve regurgitation.   5. Mild aortic regurgitation.   6. Sclerotic aortic valve with normal opening.   7. There is no evidence of pericardial effusion.                 [facility] Stroke Team Progress Note      #[id number]    HPI:  Patient is a 63 year old male with PMHx Hypertension, DM on oral medications who presented c/o stroke-like symptoms. Per son pt was walking outside around 10:15am on 8/27/23 when he suddenly fell. His son helped him up and noted that he was weak on the right side and not acting normally which prompted him to come to the ED. On arrival pt w/ obvious right sided arm weakness, confusion, dysarthria, facial droop suggestive of acute stroke. Code stroke initiated, found to have LM1 occlusion, given TNK @ 1215 and was taken for thrombectomy. Unable to provide ROS 2/2 aphasia.      SUBJECTIVE: No events overnight.  No new neurologic complaints.  ROS reported negative unless otherwise noted.    acetaminophen     Tablet .. 975 milliGRAM(s) Oral every 6 hours PRN  albuterol/ipratropium for Nebulization 3 milliLiter(s) Nebulizer every 6 hours PRN  aspirin  chewable 81 milliGRAM(s) Oral daily  atorvastatin 80 milliGRAM(s) Oral at bedtime  chlorhexidine 2% Cloths 1 Application(s) Topical <User Schedule>  dextrose 5%. 1000 milliLiter(s) IV Continuous <Continuous>  dextrose 5%. 1000 milliLiter(s) IV Continuous <Continuous>  dextrose 50% Injectable 25 Gram(s) IV Push once  dextrose 50% Injectable 12.5 Gram(s) IV Push once  dextrose 50% Injectable 25 Gram(s) IV Push once  dextrose Oral Gel 15 Gram(s) Oral once PRN  enoxaparin Injectable 40 milliGRAM(s) SubCutaneous every 24 hours  glucagon  Injectable 1 milliGRAM(s) IntraMuscular once  hydrALAZINE Injectable 10 milliGRAM(s) IV Push every 2 hours PRN  insulin lispro (ADMELOG) corrective regimen sliding scale   SubCutaneous three times a day before meals  labetalol Injectable 10 milliGRAM(s) IV Push every 2 hours PRN  levETIRAcetam  IVPB 500 milliGRAM(s) IV Intermittent every 12 hours  polyethylene glycol 3350 17 Gram(s) Oral two times a day  senna 2 Tablet(s) Oral at bedtime  tamsulosin 0.8 milliGRAM(s) Oral at bedtime      PHYSICAL EXAM:   Vital Signs Last 24 Hrs  T(C): 36.7 (06 Sep 2023 10:40), Max: 37.6 (06 Sep 2023 04:32)  T(F): 98 (06 Sep 2023 10:40), Max: 99.6 (06 Sep 2023 04:32)  HR: 65 (06 Sep 2023 10:40) (65 - 110)  BP: 112/78 (06 Sep 2023 10:40) (104/73 - 139/85)  BP(mean): --  RR: 18 (06 Sep 2023 10:40) (18 - 18)  SpO2: 92% (06 Sep 2023 10:40) (92% - 97%)    Parameters below as of 06 Sep 2023 10:40  Patient On (Oxygen Delivery Method): room air      General: No acute distress    NEUROLOGICAL EXAM:  Mental status: eyes open, awake, alert, attends to examiner briskly on left, smiles, generates sounds, follows some simple commands, perseverates, mimics at times   Cranial Nerves: pupils 3mm and reactive, crosses midline, RHHA to threat, right facial palsy   Motor exam: Normal tone, right hemiplegia, RLE trace hip flexion;   5/5 LUE, 5/5 LLE, no drift   Sensation: Intact to noxious stimuli  throughout   Coordination/ Gait: gait not tested          LABS:                        11.0   14.19 )-----------( 379      ( 06 Sep 2023 06:09 )             33.6    09-06    146<H>  |  108  |  24.9<H>  ----------------------------<  128<H>  3.9   |  23.0  |  0.79    Ca    9.2      06 Sep 2023 06:09  Phos  3.4     09-06  Mg     2.3     09-06        IMAGING: Reviewed by me.   SAMARA Echo Doppler (09.01.23 @ 12:52)    1. Left ventricular ejection fraction, by visual estimation, is >75%.   2. Normal global left ventricular systolic function.   3. Normal right ventricular size and function.   4. Normal left atrial size.   5. No left atrial appendage thrombus and normal left atrial appendage   velocities.   6. Trace mitral valve regurgitation.   7. Color flow doppler and intravenous injection of agitated saline   demonstrates the presence of an intact intra atrial septum.   8. Mobile intra-atrial septum with lipomatous hypertrophy.   9. Moderate complex atheromas at the aortic arch and descending aorta.  10. There is no evidence of pericardial effusion.      CT Head No Cont (09.03.23 @ 11:20)   Impression:  No significant change from the previous exam performed yesterday.   Surgical site is unchanged as is the brain parenchyma changes from left   middle cerebral artery stroke. Mass effect is unchanged.    CT Head No Cont (09.02.23 @ 04:01)   IMPRESSION: Redemonstration of large left MCA territoryinfarct.   Left-to-right midline shift measures 3 mm, unchanged.    CT Head No Cont (08.31.23 @ 20:43)   IMPRESSION:  Interval removal of subcutaneous drainage catheter with new acute   extradural broad products identified, withincreased rightward midline   shift which now measures 6 mm (previously 3 mm on 8/30/2023).    Additionally, there is increased hypoattenuation of the left   frontoparietal cortex within the known MCA territory infarct, which may   represent new areas of acute/subacute ischemia versus evolving gliosis.   Hemorrhagic conversion is not suspected at this time. Close attention on   follow-up is advised.    Diffuse scalp edema extending to the left periorbital soft tissues has   significantly increased since the prior study, likely due to evolving   subcutaneous blood products.    Critical findings above were discussed directly by telephone by the ED   radiologist on call, Fei Hebert MD, with the neurosurgical ICU   physicians assistant, Casi RUGGIERO, at 3:40 AM on 9/1/2023.    CT Head No Cont (08.30.23 @ 15:44)   Postop changes compatible with a left temporal frontal craniectomy is   identified. There is some extension of the parenchyma through the   craniectomy defect identified. There is abnormal low-attenuation again   seen involving left temporal frontal parietal region involving the left   basal ganglia and corona radiata region. This is compatible with an   evolving acute left MCA infarct. Previously noted areas of high   attenuation is less conspicuous which could be compatible with evolving   areas of hemorrhage. Mass effect on the left lateral ventricle is seen.   Left-to-right shift (3.1 mm) is seen.    There is extra-axial hematoma fluid and soft tissue swelling seen seen in   the postop region with extra-axial drain comedies findings are compatible   with postoperative changes.    The visualized paranasal sinuses mastoid andmiddle regions appear clear.  IMPRESSION:  New changes as described above.    CT Head No Cont (08.29.23 @ 23:39)   IMPRESSION: Evolving acute left MCA infarct is identified.   Mass effect on the left lateral ventricle is   again seen. Slight increased left-to-right shift (1.2 cm) is seen.    CT Head No Cont (08.29.23 @ 00:31)   IMPRESSION:  Continued evolution of large acute left MCA territory infarct.   Left-to-right midline shift measuring 6 mm, increased. No hydrocephalus   or effacement of basal cisterns. Curvilinear foci of hyperdensity within   the area of infarct, for which petechial hemorrhage cannot be excluded.   No large parenchymal hemorrhage.    CT Abdomen and Pelvis w/ IV Cont (08.28.23 @ 02:53)   IMPRESSION:  No evidence for bladder rupture.    CT Brain Stroke Protocol (08.27.23 @ 11:53)   IMPRESSION:  Wedgelike low density in the left putamen and caudate (corpus striatum)   is new from the prior scan, and consistent with infarct that may be acute   given corresponding symptoms.  No acute intracranial hemorrhage.    The study was performed at 11:47 AM on 08/27/2023 and the above findings   were discussed with Dr. Betts at 12:02 PM.    CT Angio Brain Stroke Protocol  w/ IV Cont (08.27.23 @ 12:03)   IMPRESSION:  CTA head: Focal occlusion of left MCA distal M1 segment with patent but   small caliber filling of M2 segments.    CT perfusion indicates large mismatch between Tmax and CBF consistent   with ischemic penumbra.    CTA Neck: No steno-occlusive focus.    Case findings above discussed with Dr. Hu at 12:13 PM on 08/27/2023.    CT Head No Cont (08.28.23 @ 02:34)   IMPRESSION: Acute left MCA infarct. Possible acute infarct involving the   right temporal region. This finding can be better evaluated with MRI if   there are no contraindications.    TTE Echo Complete w/o Contrast w/ Doppler (08.29.23 @ 12:03)   Summary:   1. Left ventricular ejection fraction, by visual estimation, is 55 to   60%.   2. Normal global left ventricular systolic function.   3. Normal right ventricular size and function.   4. Trace mitral valve regurgitation.   5. Mild aortic regurgitation.   6. Sclerotic aortic valve with normal opening.   7. There is no evidence of pericardial effusion.                 [facility] Stroke Team Progress Note      #[id number]    HPI:  Patient is a 63 year old male with PMHx Hypertension, DM on oral medications who presented c/o stroke-like symptoms. Per son pt was walking outside around 10:15am on 8/27/23 when he suddenly fell. His son helped him up and noted that he was weak on the right side and not acting normally which prompted him to come to the ED. On arrival pt w/ obvious right sided arm weakness, confusion, dysarthria, facial droop suggestive of acute stroke. Code stroke initiated, found to have LM1 occlusion, given TNK @ 1215 and was taken for thrombectomy. Unable to provide ROS 2/2 aphasia.      SUBJECTIVE: No events overnight.  No new neurologic complaints.  ROS reported negative unless otherwise noted.    acetaminophen     Tablet .. 975 milliGRAM(s) Oral every 6 hours PRN  albuterol/ipratropium for Nebulization 3 milliLiter(s) Nebulizer every 6 hours PRN  aspirin  chewable 81 milliGRAM(s) Oral daily  atorvastatin 80 milliGRAM(s) Oral at bedtime  chlorhexidine 2% Cloths 1 Application(s) Topical <User Schedule>  dextrose 5%. 1000 milliLiter(s) IV Continuous <Continuous>  dextrose 5%. 1000 milliLiter(s) IV Continuous <Continuous>  dextrose 50% Injectable 25 Gram(s) IV Push once  dextrose 50% Injectable 12.5 Gram(s) IV Push once  dextrose 50% Injectable 25 Gram(s) IV Push once  dextrose Oral Gel 15 Gram(s) Oral once PRN  enoxaparin Injectable 40 milliGRAM(s) SubCutaneous every 24 hours  glucagon  Injectable 1 milliGRAM(s) IntraMuscular once  hydrALAZINE Injectable 10 milliGRAM(s) IV Push every 2 hours PRN  insulin lispro (ADMELOG) corrective regimen sliding scale   SubCutaneous three times a day before meals  labetalol Injectable 10 milliGRAM(s) IV Push every 2 hours PRN  levETIRAcetam  IVPB 500 milliGRAM(s) IV Intermittent every 12 hours  polyethylene glycol 3350 17 Gram(s) Oral two times a day  senna 2 Tablet(s) Oral at bedtime  tamsulosin 0.8 milliGRAM(s) Oral at bedtime      PHYSICAL EXAM:   Vital Signs Last 24 Hrs  T(C): 36.7 (06 Sep 2023 10:40), Max: 37.6 (06 Sep 2023 04:32)  T(F): 98 (06 Sep 2023 10:40), Max: 99.6 (06 Sep 2023 04:32)  HR: 65 (06 Sep 2023 10:40) (65 - 110)  BP: 112/78 (06 Sep 2023 10:40) (104/73 - 139/85)  BP(mean): --  RR: 18 (06 Sep 2023 10:40) (18 - 18)  SpO2: 92% (06 Sep 2023 10:40) (92% - 97%)    Parameters below as of 06 Sep 2023 10:40  Patient On (Oxygen Delivery Method): room air      General: No acute distress.    NEUROLOGICAL EXAM:  Mental status: eyes open, awake, alert, attends to examiner briskly on left, smiles, generates sounds, follows some simple commands, perseverates, mimics at times   Cranial Nerves: pupils 3mm and reactive, crosses midline, RHHA to threat, right facial palsy   Motor exam: Normal tone, right hemiplegia, RLE trace hip flexion;   5/5 LUE, 5/5 LLE, no drift   Sensation: Intact to noxious stimuli  throughout   Coordination/ Gait: gait not tested          LABS:                        11.0   14.19 )-----------( 379      ( 06 Sep 2023 06:09 )             33.6    09-06    146<H>  |  108  |  24.9<H>  ----------------------------<  128<H>  3.9   |  23.0  |  0.79    Ca    9.2      06 Sep 2023 06:09  Phos  3.4     09-06  Mg     2.3     09-06        IMAGING: Reviewed by me.   SAMARA Echo Doppler (09.01.23 @ 12:52)    1. Left ventricular ejection fraction, by visual estimation, is >75%.   2. Normal global left ventricular systolic function.   3. Normal right ventricular size and function.   4. Normal left atrial size.   5. No left atrial appendage thrombus and normal left atrial appendage   velocities.   6. Trace mitral valve regurgitation.   7. Color flow doppler and intravenous injection of agitated saline   demonstrates the presence of an intact intra atrial septum.   8. Mobile intra-atrial septum with lipomatous hypertrophy.   9. Moderate complex atheromas at the aortic arch and descending aorta.  10. There is no evidence of pericardial effusion.      CT Head No Cont (09.03.23 @ 11:20)   Impression:  No significant change from the previous exam performed yesterday.   Surgical site is unchanged as is the brain parenchyma changes from left   middle cerebral artery stroke. Mass effect is unchanged.    CT Head No Cont (09.02.23 @ 04:01)   IMPRESSION: Redemonstration of large left MCA territoryinfarct.   Left-to-right midline shift measures 3 mm, unchanged.    CT Head No Cont (08.31.23 @ 20:43)   IMPRESSION:  Interval removal of subcutaneous drainage catheter with new acute   extradural broad products identified, withincreased rightward midline   shift which now measures 6 mm (previously 3 mm on 8/30/2023).    Additionally, there is increased hypoattenuation of the left   frontoparietal cortex within the known MCA territory infarct, which may   represent new areas of acute/subacute ischemia versus evolving gliosis.   Hemorrhagic conversion is not suspected at this time. Close attention on   follow-up is advised.    Diffuse scalp edema extending to the left periorbital soft tissues has   significantly increased since the prior study, likely due to evolving   subcutaneous blood products.    Critical findings above were discussed directly by telephone by the ED   radiologist on call, Fei Hebert MD, with the neurosurgical ICU   physicians assistant, Casi RUGGIERO, at 3:40 AM on 9/1/2023.    CT Head No Cont (08.30.23 @ 15:44)   Postop changes compatible with a left temporal frontal craniectomy is   identified. There is some extension of the parenchyma through the   craniectomy defect identified. There is abnormal low-attenuation again   seen involving left temporal frontal parietal region involving the left   basal ganglia and corona radiata region. This is compatible with an   evolving acute left MCA infarct. Previously noted areas of high   attenuation is less conspicuous which could be compatible with evolving   areas of hemorrhage. Mass effect on the left lateral ventricle is seen.   Left-to-right shift (3.1 mm) is seen.    There is extra-axial hematoma fluid and soft tissue swelling seen seen in   the postop region with extra-axial drain comedies findings are compatible   with postoperative changes.    The visualized paranasal sinuses mastoid andmiddle regions appear clear.  IMPRESSION:  New changes as described above.    CT Head No Cont (08.29.23 @ 23:39)   IMPRESSION: Evolving acute left MCA infarct is identified.   Mass effect on the left lateral ventricle is   again seen. Slight increased left-to-right shift (1.2 cm) is seen.    CT Head No Cont (08.29.23 @ 00:31)   IMPRESSION:  Continued evolution of large acute left MCA territory infarct.   Left-to-right midline shift measuring 6 mm, increased. No hydrocephalus   or effacement of basal cisterns. Curvilinear foci of hyperdensity within   the area of infarct, for which petechial hemorrhage cannot be excluded.   No large parenchymal hemorrhage.    CT Abdomen and Pelvis w/ IV Cont (08.28.23 @ 02:53)   IMPRESSION:  No evidence for bladder rupture.    CT Brain Stroke Protocol (08.27.23 @ 11:53)   IMPRESSION:  Wedgelike low density in the left putamen and caudate (corpus striatum)   is new from the prior scan, and consistent with infarct that may be acute   given corresponding symptoms.  No acute intracranial hemorrhage.    The study was performed at 11:47 AM on 08/27/2023 and the above findings   were discussed with Dr. Betts at 12:02 PM.    CT Angio Brain Stroke Protocol  w/ IV Cont (08.27.23 @ 12:03)   IMPRESSION:  CTA head: Focal occlusion of left MCA distal M1 segment with patent but   small caliber filling of M2 segments.    CT perfusion indicates large mismatch between Tmax and CBF consistent   with ischemic penumbra.    CTA Neck: No steno-occlusive focus.    Case findings above discussed with Dr. Hu at 12:13 PM on 08/27/2023.    CT Head No Cont (08.28.23 @ 02:34)   IMPRESSION: Acute left MCA infarct. Possible acute infarct involving the   right temporal region. This finding can be better evaluated with MRI if   there are no contraindications.    TTE Echo Complete w/o Contrast w/ Doppler (08.29.23 @ 12:03)   Summary:   1. Left ventricular ejection fraction, by visual estimation, is 55 to   60%.   2. Normal global left ventricular systolic function.   3. Normal right ventricular size and function.   4. Trace mitral valve regurgitation.   5. Mild aortic regurgitation.   6. Sclerotic aortic valve with normal opening.   7. There is no evidence of pericardial effusion.

## 2023-09-06 NOTE — OCCUPATIONAL THERAPY INITIAL EVALUATION ADULT - ADDITIONAL COMMENTS
Pt unable to provide social history secondary to cognitive status- as per chart: Pt lives with nephew, independent PTA

## 2023-09-06 NOTE — PROGRESS NOTE ADULT - SUBJECTIVE AND OBJECTIVE BOX
CC: LT M1 Occlusion (06 Sep 2023 10:17)    HPI:  Patient is a 63 year old male with PMH Hypertension, DM on oral medications who presents c/o stroke-like symptoms.     INTERVAL HPI/OVERNIGHT EVENTS: Patient seen and examined sitting up in bed with  #[id number].  Patient denies pain.  ROS limited. Noted by RN with urinary retention >400mL, longo placed.    Vital Signs Last 24 Hrs  T(C): 36.7 (06 Sep 2023 10:40), Max: 37.6 (06 Sep 2023 04:32)  T(F): 98 (06 Sep 2023 10:40), Max: 99.6 (06 Sep 2023 04:32)  HR: 65 (06 Sep 2023 10:40) (65 - 110)  BP: 112/78 (06 Sep 2023 10:40) (104/73 - 139/85)  BP(mean): --  RR: 18 (06 Sep 2023 10:40) (18 - 18)  SpO2: 92% (06 Sep 2023 10:40) (92% - 97%)    Parameters below as of 06 Sep 2023 10:40  Patient On (Oxygen Delivery Method): room air      I&O's Detail    05 Sep 2023 07:01  -  06 Sep 2023 07:00  --------------------------------------------------------  IN:  Total IN: 0 mL    OUT:    Intermittent Catheterization - Urethral (mL): 600 mL  Total OUT: 600 mL    Total NET: -600 mL      PHYSICAL EXAM:  GENERAL: NAD  HEAD:  Left scalp with staples WILLIAM  NECK: Supple, No JVD, Normal thyroid  NERVOUS SYSTEM:  Alert, limited speech, Right hemiparesis  CHEST/LUNG: Clear to auscultation bilaterally  HEART: Regular rate and rhythm; No murmurs, rubs, or gallops  ABDOMEN: Soft, Nontender, Nondistended; Bowel sounds present  EXTREMITIES:  2+ Peripheral Pulses, No clubbing, cyanosis, or edema  SKIN: No rashes or lesions                          11.0   14.19 )-----------( 379      ( 06 Sep 2023 06:09 )             33.6     06 Sep 2023 06:09    146    |  108    |  24.9   ----------------------------<  128    3.9     |  23.0   |  0.79     Ca    9.2        06 Sep 2023 06:09  Phos  3.4       06 Sep 2023 06:09  Mg     2.3       06 Sep 2023 06:09        CAPILLARY BLOOD GLUCOSE  POCT Blood Glucose.: 212 mg/dL (06 Sep 2023 11:49)  POCT Blood Glucose.: 141 mg/dL (06 Sep 2023 07:52)  POCT Blood Glucose.: 139 mg/dL (05 Sep 2023 20:48)  POCT Blood Glucose.: 116 mg/dL (05 Sep 2023 17:44)  POCT Blood Glucose.: 118 mg/dL (05 Sep 2023 13:35)      Urinalysis Basic - ( 06 Sep 2023 06:09 )    Color: x / Appearance: x / SG: x / pH: x  Gluc: 128 mg/dL / Ketone: x  / Bili: x / Urobili: x   Blood: x / Protein: x / Nitrite: x   Leuk Esterase: x / RBC: x / WBC x   Sq Epi: x / Non Sq Epi: x / Bacteria: x        MEDICATIONS  (STANDING):  aspirin  chewable 81 milliGRAM(s) Oral daily  atorvastatin 80 milliGRAM(s) Oral at bedtime  chlorhexidine 2% Cloths 1 Application(s) Topical <User Schedule>  dextrose 5%. 1000 milliLiter(s) (50 mL/Hr) IV Continuous <Continuous>  dextrose 5%. 1000 milliLiter(s) (100 mL/Hr) IV Continuous <Continuous>  dextrose 50% Injectable 25 Gram(s) IV Push once  dextrose 50% Injectable 25 Gram(s) IV Push once  dextrose 50% Injectable 12.5 Gram(s) IV Push once  enoxaparin Injectable 40 milliGRAM(s) SubCutaneous every 24 hours  glucagon  Injectable 1 milliGRAM(s) IntraMuscular once  insulin lispro (ADMELOG) corrective regimen sliding scale   SubCutaneous three times a day before meals  levETIRAcetam  IVPB 500 milliGRAM(s) IV Intermittent every 12 hours  polyethylene glycol 3350 17 Gram(s) Oral two times a day  senna 2 Tablet(s) Oral at bedtime  tamsulosin 0.4 milliGRAM(s) Oral at bedtime    MEDICATIONS  (PRN):  acetaminophen     Tablet .. 975 milliGRAM(s) Oral every 6 hours PRN Mild Pain (1 - 3)  albuterol/ipratropium for Nebulization 3 milliLiter(s) Nebulizer every 6 hours PRN Shortness of Breath and/or Wheezing  dextrose Oral Gel 15 Gram(s) Oral once PRN Blood Glucose LESS THAN 70 milliGRAM(s)/deciliter  hydrALAZINE Injectable 10 milliGRAM(s) IV Push every 2 hours PRN SBP >140  labetalol Injectable 10 milliGRAM(s) IV Push every 2 hours PRN SBP >140

## 2023-09-07 ENCOUNTER — TRANSCRIPTION ENCOUNTER (OUTPATIENT)
Age: 63
End: 2023-09-07

## 2023-09-07 LAB
ANION GAP SERPL CALC-SCNC: 14 MMOL/L — SIGNIFICANT CHANGE UP (ref 5–17)
BASOPHILS # BLD AUTO: 0.07 K/UL — SIGNIFICANT CHANGE UP (ref 0–0.2)
BASOPHILS NFR BLD AUTO: 0.5 % — SIGNIFICANT CHANGE UP (ref 0–2)
BUN SERPL-MCNC: 25.3 MG/DL — HIGH (ref 8–20)
CALCIUM SERPL-MCNC: 9.3 MG/DL — SIGNIFICANT CHANGE UP (ref 8.4–10.5)
CHLORIDE SERPL-SCNC: 108 MMOL/L — SIGNIFICANT CHANGE UP (ref 96–108)
CO2 SERPL-SCNC: 24 MMOL/L — SIGNIFICANT CHANGE UP (ref 22–29)
CREAT SERPL-MCNC: 0.83 MG/DL — SIGNIFICANT CHANGE UP (ref 0.5–1.3)
CULTURE RESULTS: SIGNIFICANT CHANGE UP
CULTURE RESULTS: SIGNIFICANT CHANGE UP
EGFR: 99 ML/MIN/1.73M2 — SIGNIFICANT CHANGE UP
EOSINOPHIL # BLD AUTO: 0.34 K/UL — SIGNIFICANT CHANGE UP (ref 0–0.5)
EOSINOPHIL NFR BLD AUTO: 2.2 % — SIGNIFICANT CHANGE UP (ref 0–6)
GLUCOSE BLDC GLUCOMTR-MCNC: 129 MG/DL — HIGH (ref 70–99)
GLUCOSE BLDC GLUCOMTR-MCNC: 148 MG/DL — HIGH (ref 70–99)
GLUCOSE BLDC GLUCOMTR-MCNC: 155 MG/DL — HIGH (ref 70–99)
GLUCOSE SERPL-MCNC: 122 MG/DL — HIGH (ref 70–99)
HCT VFR BLD CALC: 34.7 % — LOW (ref 39–50)
HGB BLD-MCNC: 11.6 G/DL — LOW (ref 13–17)
IMM GRANULOCYTES NFR BLD AUTO: 1.5 % — HIGH (ref 0–0.9)
LYMPHOCYTES # BLD AUTO: 13.1 % — SIGNIFICANT CHANGE UP (ref 13–44)
LYMPHOCYTES # BLD AUTO: 2.03 K/UL — SIGNIFICANT CHANGE UP (ref 1–3.3)
MAGNESIUM SERPL-MCNC: 2.1 MG/DL — SIGNIFICANT CHANGE UP (ref 1.6–2.6)
MCHC RBC-ENTMCNC: 31.7 PG — SIGNIFICANT CHANGE UP (ref 27–34)
MCHC RBC-ENTMCNC: 33.4 GM/DL — SIGNIFICANT CHANGE UP (ref 32–36)
MCV RBC AUTO: 94.8 FL — SIGNIFICANT CHANGE UP (ref 80–100)
MONOCYTES # BLD AUTO: 1.42 K/UL — HIGH (ref 0–0.9)
MONOCYTES NFR BLD AUTO: 9.1 % — SIGNIFICANT CHANGE UP (ref 2–14)
NEUTROPHILS # BLD AUTO: 11.45 K/UL — HIGH (ref 1.8–7.4)
NEUTROPHILS NFR BLD AUTO: 73.6 % — SIGNIFICANT CHANGE UP (ref 43–77)
PHOSPHATE SERPL-MCNC: 3.8 MG/DL — SIGNIFICANT CHANGE UP (ref 2.4–4.7)
PLATELET # BLD AUTO: 407 K/UL — HIGH (ref 150–400)
POTASSIUM SERPL-MCNC: 4 MMOL/L — SIGNIFICANT CHANGE UP (ref 3.5–5.3)
POTASSIUM SERPL-SCNC: 4 MMOL/L — SIGNIFICANT CHANGE UP (ref 3.5–5.3)
RBC # BLD: 3.66 M/UL — LOW (ref 4.2–5.8)
RBC # FLD: 14 % — SIGNIFICANT CHANGE UP (ref 10.3–14.5)
SODIUM SERPL-SCNC: 146 MMOL/L — HIGH (ref 135–145)
SPECIMEN SOURCE: SIGNIFICANT CHANGE UP
SPECIMEN SOURCE: SIGNIFICANT CHANGE UP
WBC # BLD: 15.55 K/UL — HIGH (ref 3.8–10.5)
WBC # FLD AUTO: 15.55 K/UL — HIGH (ref 3.8–10.5)

## 2023-09-07 PROCEDURE — 99232 SBSQ HOSP IP/OBS MODERATE 35: CPT

## 2023-09-07 RX ORDER — APIXABAN 2.5 MG/1
5 TABLET, FILM COATED ORAL
Refills: 0 | Status: DISCONTINUED | OUTPATIENT
Start: 2023-09-07 | End: 2023-09-07

## 2023-09-07 RX ADMIN — Medication 2: at 11:59

## 2023-09-07 RX ADMIN — LEVETIRACETAM 400 MILLIGRAM(S): 250 TABLET, FILM COATED ORAL at 05:31

## 2023-09-07 RX ADMIN — SENNA PLUS 2 TABLET(S): 8.6 TABLET ORAL at 22:30

## 2023-09-07 RX ADMIN — ENOXAPARIN SODIUM 40 MILLIGRAM(S): 100 INJECTION SUBCUTANEOUS at 12:00

## 2023-09-07 RX ADMIN — POLYETHYLENE GLYCOL 3350 17 GRAM(S): 17 POWDER, FOR SOLUTION ORAL at 05:31

## 2023-09-07 RX ADMIN — ATORVASTATIN CALCIUM 80 MILLIGRAM(S): 80 TABLET, FILM COATED ORAL at 22:30

## 2023-09-07 RX ADMIN — CHLORHEXIDINE GLUCONATE 1 APPLICATION(S): 213 SOLUTION TOPICAL at 05:28

## 2023-09-07 RX ADMIN — LEVETIRACETAM 400 MILLIGRAM(S): 250 TABLET, FILM COATED ORAL at 18:37

## 2023-09-07 RX ADMIN — Medication 81 MILLIGRAM(S): at 11:59

## 2023-09-07 RX ADMIN — TAMSULOSIN HYDROCHLORIDE 0.8 MILLIGRAM(S): 0.4 CAPSULE ORAL at 22:30

## 2023-09-07 RX ADMIN — POLYETHYLENE GLYCOL 3350 17 GRAM(S): 17 POWDER, FOR SOLUTION ORAL at 18:37

## 2023-09-07 NOTE — DISCHARGE NOTE PROVIDER - NSDCCPCAREPLAN_GEN_ALL_CORE_FT
PRINCIPAL DISCHARGE DIAGNOSIS  Diagnosis: Stroke  Assessment and Plan of Treatment:       SECONDARY DISCHARGE DIAGNOSES  Diagnosis: Urinary retention  Assessment and Plan of Treatment:     Diagnosis: Dysphagia  Assessment and Plan of Treatment:      PRINCIPAL DISCHARGE DIAGNOSIS  Diagnosis: Stroke  Assessment and Plan of Treatment: - Continue Eliquis  - Sutures will resorb. Apply Bacitracin daily.  - Repeat Lupus Anticoagulant testing week of November 27, 2023      SECONDARY DISCHARGE DIAGNOSES  Diagnosis: Urinary retention  Assessment and Plan of Treatment: - Continue Bactrim through 10/25/2023.  - Continue Flomax    Diagnosis: Dysphagia  Assessment and Plan of Treatment: - Continue Easy to Chew diet    Diagnosis: Hypertension  Assessment and Plan of Treatment: - Maintain blood pressures at normal levels  - Continue Labetalol, Hydrochlorothiazide, and Midodrine as needed to keep blood pressures within normal limits

## 2023-09-07 NOTE — DISCHARGE NOTE PROVIDER - DISCHARGE DIET
Consistent Carbohydrate Diabetic Diets/Pureed Diet/Moderately Thick Liquids DASH Diet/Consistent Carbohydrate Diabetic Diets/Moderately Thick Liquids/Easy to Chew Diet

## 2023-09-07 NOTE — DISCHARGE NOTE PROVIDER - CARE PROVIDER_API CALL
Pramod Altamirano  Hematology/Oncology  [address] [place] [address]  Phone: [phone]  Fax: [phone]  Follow Up Time:     Kenn Pacheco  Neurology  [address] [facility], [place] [address]  Phone: [phone]  Fax: [phone]  Follow Up Time:    Pramod Altamirano  Hematology/Oncology  [address] [place] [address]  Phone: [phone]  Fax: [phone]  Follow Up Time:     Kenn Pacheco  Neurology  [address] [facility], [facility] [address]  [place] [address]  Phone: [phone]  Fax: [phone]  Follow Up Time:     Outpatient, PCP  Phone: (   )    -  Fax: (   )    -  Follow Up Time:

## 2023-09-07 NOTE — DISCHARGE NOTE PROVIDER - NSDCCONDITION_GEN_ALL_CORE
*******Please document Modified Viktoriya Score on the Barthel Index Scale flow sheet before discharge. ****** Stable

## 2023-09-07 NOTE — DISCHARGE NOTE PROVIDER - PROVIDER TOKENS
PROVIDER:[TOKEN:[[id number]]],PROVIDER:[TOKEN:[[id number]]] PROVIDER:[TOKEN:[[id number]]],PROVIDER:[TOKEN:[[id number]]],FREE:[LAST:[Outpatient],FIRST:[PCP],PHONE:[(   )    -],FAX:[(   )    -]]

## 2023-09-07 NOTE — DISCHARGE NOTE PROVIDER - HOSPITAL COURSE
63M PMH of DM2, HTN presents with fall, right sided weakness, dysarthria and right facial droop found to have LM1 occlusion s/p TNK in ER subsequent mechanical thrombectomy and decompressive craniectomy for midline shift. SAMARA negative for thrombus. Cardiology recommending ILR /MCOT as outpatient. Extubated on 9/1/23, monitored in ICU and transferred to medicine for further management. With regard to CVA: s/p cranioplasty. To c/w lipitor, ASA, SAMARA negative for thrombus, +atheroma of aortic arch and aorta. seen by heme/onc, concerning for possible hypercoag state, started on Eliquis BID. WIll need outpatient heme/onc f/u for repeat hypercoag workup within 2-3 months of discharge. Patient also to f/u neurosx outpatient with regard to cranioplasty timing.   Course c/b febrile episode 9/2/23, patient appears to have baseline leukocytosis ~13-14, WBC stably elevated , blood cultures negative, negative ua/ cxr, pt monitored off abx.   Course further c/b urinary retention, s/p longo placed 9/6/23, flomax increased to 0.8mg po daily and started on BM regimen, TOV ___     63 year old male with PMHx Hypertension, T2DM on oral medications who presented w/ R arm weakness, confusion,   dysarthria, and facial droop suggestive of acute stroke. Code stroke initiated, and patient was found to have LM1  occlusion. Tenecteplase was administered at 12:15 PM. Patient was taken for mechanical thrombectomy and   TICI 2c recanalization was achieved. CT head on 8/30 revealed large left MCA stroke with mass effect and MLS;  Neurosurgery was consulted, and patient underwent decompressive craniectomy that day. SHUN drain was removed on 8/31.  SAMARA on 9/1 revealed no vegetations, but atheroma of aortic arch and aorta. Patient was extubated and placed on   HFNC. Lovenox was started. Patient was then weaned off HFNC and placed on NC. Patient was cleared to begin aspirin 81 mg.  On 9/6, lupus anticoagulant was positive with normal ACL and B2G antibodies, but repeat testing was negative. Patient  was started on Eliquis BID due to no clear source for stroke. On 9/12, patient was found to have Klebsiella bacteremia,  with likely UTI in setting of urinary retention. Patient received Ceftriaxone, Zosyn, Vancomycin and Cefazolin.   On 9/29, left cranioplasty was done with complex closure with plastic surgery, and SHUN drains were placed.   On 10/2, one SHUN drain was removed, and was not cleared for anticoagulation per neurosurgery. The remaining SHUN drain  was removed on 10/6. CT head on 10/7 revealed slight increase in epidural fluid colection and repeat CT head on 10/9  revealed posterior margin not completely flush with posterior craniectomy margin. On 10/10, patient was cleared   to start heparin gtt with goal PTT 50-70; CT head was stable on 10/11. Patient was then cleared by neurosurgery to   transition to Eliquis. Course was complicated by Klebsiella pneumoniae UTI and was started on Ceftriaxone. Had urinary  retention likely secondary to UTI, so longo was placed. Cultures were sensitive to Bactrim.   Patient is medically stable for discharge to [facility].    63 year old male with PMHx Hypertension, T2DM on oral medications who presented w/ R arm weakness, confusion,   dysarthria, and facial droop suggestive of acute stroke. Code stroke initiated, and patient was found to have LM1  occlusion. Tenecteplase was administered at 12:15 PM. Patient was taken for mechanical thrombectomy and   TICI 2c recanalization was achieved. CT head on 8/30 revealed large left MCA stroke with mass effect and MLS;  Neurosurgery was consulted, and patient underwent decompressive craniectomy that day. SHUN drain was removed on 8/31.  SAMARA on 9/1 revealed no vegetations, but atheroma of aortic arch and aorta. Patient was extubated and placed on   HFNC. Lovenox was started. Patient was then weaned off HFNC and placed on NC. Patient was cleared to begin aspirin 81 mg.  On 9/6, lupus anticoagulant was positive with normal ACL and B2G antibodies, but repeat testing was negative. Patient  was started on Eliquis BID due to no clear source for stroke. On 9/12, patient was found to have Klebsiella bacteremia,  with likely UTI in setting of urinary retention. Patient received Ceftriaxone, Zosyn, Vancomycin and Cefazolin.   On 9/29, left cranioplasty was done with complex closure with plastic surgery, and SHUN drains were placed.   On 10/2, one SHUN drain was removed, and was not cleared for anticoagulation per neurosurgery. The remaining SHUN drain  was removed on 10/6. CT head on 10/7 revealed slight increase in epidural fluid collection and repeat CT head on 10/9  revealed posterior margin not completely flush with posterior craniectomy margin. On 10/10, patient was cleared   to start heparin gtt with goal PTT 50-70; CT head was stable on 10/11. Patient was then cleared by neurosurgery to   transition to Eliquis. Course was complicated by Klebsiella pneumoniae UTI and was started on Ceftriaxone. Had urinary  retention likely secondary to UTI, so longo was placed. Cultures were sensitive to Bactrim.   Patient is medically stable for discharge to [facility].

## 2023-09-07 NOTE — DISCHARGE NOTE PROVIDER - ATTENDING DISCHARGE PHYSICAL EXAMINATION:
Vitals:  T(C): 36.7 (18 Oct 2023 09:55), Max: 36.9 (17 Oct 2023 20:10)  T(F): 98 (18 Oct 2023 09:55), Max: 98.4 (17 Oct 2023 20:10)  HR: 98 (18 Oct 2023 10:00) (94 - 108)  BP: 123/79 (18 Oct 2023 09:55) (102/68 - 130/88)  RR: 19 (18 Oct 2023 09:55) (18 - 19)  SpO2: 98% (18 Oct 2023 09:55) (94% - 98%)    O2 Parameters below as of 18 Oct 2023 09:55  Patient On (Oxygen Delivery Method): room air    Physical Exam:    PHYSICAL EXAM:  GENERAL: lying comfortably in bed, pleasant  HEAD:  Atraumatic, Normocephalic  EYES: EOMI, PERRLA, conjunctiva and sclera clear  NECK: Supple, No JVD  NERVOUS SYSTEM: alert, awake, + expressive aphasia, follows commands, + mild right facial droop, 5/5 strength in LUE and LLE, + R hemiplegia, + total sensory loss in LUE and LLE, sensation intact in RUE and RLE.   CHEST/LUNG: Clear to auscultation bilaterally, no wheezes  HEART: Regular rate and rhythm; No murmurs  ABDOMEN: Soft, Nontender; Bowel sounds present  EXTREMITIES:  No cyanosis, or edema  SKIN: No rashes, + sutures on scalp clean, dry, intact

## 2023-09-07 NOTE — PROGRESS NOTE ADULT - SUBJECTIVE AND OBJECTIVE BOX
SUBJECTIVE:    Patient is a 63 year old male with PMH Hypertension, DM on oral medications who presents c/o stroke-like symptoms. Per son pt was walking outside around 10:15am this morning when he suddenly fell. His son helped him up and noted that he was weak on the right side and not acting normally which prompted him to come to the ED. On arrival pt w/ obvious right sided arm weakness, confusion, dysarthria, facial droop suggestive of acute stroke. Code stroke initiated, found to have LM1 occlusion, given TNK @ 1215 and was taken for thrombectomy. Unable to provide ROS 2/2 aphasia  (27 Aug 2023 16:45)    INTERVAL HX/OVERNIGHT EVENTS:  Pt is POD#8 s/p L hemicraniectomy. Patient seen and examined at bedside. Exam remains stable    Vital Signs Last 24 Hrs  T(C): 36.9 (09-07-23 @ 05:10), Max: 36.9 (09-07-23 @ 00:00)  T(F): 98.4 (09-07-23 @ 05:10), Max: 98.5 (09-07-23 @ 00:00)  HR: 104 (09-07-23 @ 05:10) (65 - 104)  BP: 110/75 (09-07-23 @ 05:10) (110/75 - 142/85)  BP(mean): --  RR: 18 (09-07-23 @ 05:10) (18 - 18)  SpO2: 93% (09-07-23 @ 05:10) (92% - 96%)    PHYSICAL EXAM:    GENERAL: NAD    INCISION: S/p L craniectomy, flap full and soft, left incision site w/ staples in place, no signs of infection     MENTAL STATUS: Opens eyes spontaneously, following simple commands, expressive aphasia    CRANIAL NERVES: PERRL  MOTOR: strength LUE 5/5, LLE 4/5, RUE weak extension, RLE TF   SKIN: Warm, dry    LABS:                          11.6   15.55 )-----------( 407      ( 07 Sep 2023 04:56 )             34.7    09-07    146<H>  |  108  |  25.3<H>  ----------------------------<  122<H>  4.0   |  24.0  |  0.83    Ca    9.3      07 Sep 2023 04:56  Phos  3.8     09-07  Mg     2.1     09-07 09-06 @ 07:01  -  09-07 @ 07:00  --------------------------------------------------------  IN: 0 mL / OUT: 400 mL / NET: -400 mL        IMAGING:     CT Head No Cont (09.03.23 @ 11:20)   No significant change from the previous exam performed yesterday.   Surgical site is unchanged as is the brain parenchyma changes from left   middle cerebral artery stroke. Mass effect is unchanged.

## 2023-09-07 NOTE — DISCHARGE NOTE PROVIDER - CARE PROVIDERS DIRECT ADDRESSES
,DirectAddress_Unknown,[email or web address] ,DirectAddress_Unknown,[email or web address],DirectAddress_Unknown

## 2023-09-07 NOTE — DISCHARGE NOTE PROVIDER - NSDCMRMEDTOKEN_GEN_ALL_CORE_FT
Flexeril 5 mg oral tablet: 1 tab(s) orally 2 times a day as needed for  muscle spasm  hydroCHLOROthiazide 25 mg oral tablet: 1 tab(s) orally once a day  metFORMIN 500 mg oral tablet: 1 tab(s) orally 2 times a day   apixaban 5 mg oral tablet: 1 tab(s) orally 2 times a day  ascorbic acid 500 mg oral tablet: 1 tab(s) orally once a day  atorvastatin 80 mg oral tablet: 1 tab(s) orally once a day (at bedtime)  Bactrim  mg-160 mg oral tablet: 1 tab(s) orally 2 times a day  hydroCHLOROthiazide 25 mg oral tablet: 1 tab(s) orally once a day  labetalol 5 mg/mL intravenous solution: 2 milliliter(s) intravenous every 2 hours As needed Systolic blood pressure &gt;160  metFORMIN 500 mg oral tablet: 1 tab(s) orally 2 times a day  midodrine 5 mg oral tablet: 1 tab(s) orally every 8 hours As needed for sbp &lt;100  Multiple Vitamins oral tablet: 1 tab(s) orally once a day  polyethylene glycol 3350 oral powder for reconstitution: 17 gram(s) orally once a day  senna leaf extract oral tablet: 1 tab(s) orally every 24 hours  tamsulosin 0.4 mg oral capsule: 2 cap(s) orally once a day (at bedtime)

## 2023-09-07 NOTE — PROGRESS NOTE ADULT - ASSESSMENT
ASSESSMENT:   63M s/p L MCA stroke s/p thrombectomy and subsequent L hemicraniectomy POD#8    PLAN:    -Continue q4h neuro checks  -Helmet when OOB  -Okay for ASA 81 for stroke, lovenox for DVT prophylaxis  -Flap too full for cranioplasty at this time, will continue to follow  -No contraindication to patient being discharged and coming back for cranioplasty when able   -Further medical management per primary team  -Discussed case with Dr. Bah   ASSESSMENT:   63M s/p L MCA stroke s/p thrombectomy and subsequent L hemicraniectomy POD#8    PLAN:    -Continue q4h neuro checks  -Helmet when OOB  -Okay for ASA 81 for stroke, lovenox for DVT prophylaxis  -Okay from neurosurgical standpoint to start Eliquis BID, per heme/onc reccs  -Flap too full for cranioplasty at this time, will continue to follow  -No contraindication to patient being discharged and coming back for cranioplasty when able   -Further medical management per primary team  -Discussed case with Dr. Bah

## 2023-09-07 NOTE — DISCHARGE NOTE PROVIDER - DETAILS OF MALNUTRITION DIAGNOSIS/DIAGNOSES
This patient has been assessed with a concern for Malnutrition and was treated during this hospitalization for the following Nutrition diagnosis/diagnoses:     -  10/03/2023: Moderate protein-calorie malnutrition

## 2023-09-07 NOTE — PROGRESS NOTE ADULT - SUBJECTIVE AND OBJECTIVE BOX
Kelly Joshi M.D.    Patient is a 63y old  Male who presents with a chief complaint of LT M1 Occlusion (07 Sep 2023 12:30)      SUBJECTIVE / OVERNIGHT EVENTS: NO event overnight.  Susie translated no concerns.     MEDICATIONS  (STANDING):  apixaban 5 milliGRAM(s) Oral two times a day  aspirin  chewable 81 milliGRAM(s) Oral daily  atorvastatin 80 milliGRAM(s) Oral at bedtime  chlorhexidine 2% Cloths 1 Application(s) Topical <User Schedule>  dextrose 5%. 1000 milliLiter(s) (100 mL/Hr) IV Continuous <Continuous>  dextrose 5%. 1000 milliLiter(s) (50 mL/Hr) IV Continuous <Continuous>  dextrose 50% Injectable 25 Gram(s) IV Push once  dextrose 50% Injectable 25 Gram(s) IV Push once  dextrose 50% Injectable 12.5 Gram(s) IV Push once  glucagon  Injectable 1 milliGRAM(s) IntraMuscular once  insulin lispro (ADMELOG) corrective regimen sliding scale   SubCutaneous three times a day before meals  levETIRAcetam  IVPB 500 milliGRAM(s) IV Intermittent every 12 hours  polyethylene glycol 3350 17 Gram(s) Oral two times a day  senna 2 Tablet(s) Oral at bedtime  tamsulosin 0.8 milliGRAM(s) Oral at bedtime    MEDICATIONS  (PRN):  acetaminophen     Tablet .. 975 milliGRAM(s) Oral every 6 hours PRN Mild Pain (1 - 3)  albuterol/ipratropium for Nebulization 3 milliLiter(s) Nebulizer every 6 hours PRN Shortness of Breath and/or Wheezing  dextrose Oral Gel 15 Gram(s) Oral once PRN Blood Glucose LESS THAN 70 milliGRAM(s)/deciliter  hydrALAZINE Injectable 10 milliGRAM(s) IV Push every 2 hours PRN SBP >140  labetalol Injectable 10 milliGRAM(s) IV Push every 2 hours PRN SBP >140      I&O's Summary    06 Sep 2023 07:01  -  07 Sep 2023 07:00  --------------------------------------------------------  IN: 0 mL / OUT: 400 mL / NET: -400 mL        PHYSICAL EXAM:  Vital Signs Last 24 Hrs  T(C): 37.4 (07 Sep 2023 10:40), Max: 37.4 (07 Sep 2023 10:40)  T(F): 99.3 (07 Sep 2023 10:40), Max: 99.3 (07 Sep 2023 10:40)  HR: 105 (07 Sep 2023 10:40) (75 - 105)  BP: 97/62 (07 Sep 2023 10:40) (97/62 - 142/85)  BP(mean): --  RR: 18 (07 Sep 2023 10:40) (18 - 18)  SpO2: 93% (07 Sep 2023 10:40) (93% - 96%)    Parameters below as of 07 Sep 2023 10:40  Patient On (Oxygen Delivery Method): room air    GENERAL: NAD  HEAD:  Left scalp with staples WILLIAM  NECK: Supple, No JVD, Normal thyroid  NERVOUS SYSTEM:  Alert, limited speech, Right hemiparesis  CHEST/LUNG: Clear to auscultation bilaterally  HEART: Regular rate and rhythm; No murmurs, rubs, or gallops  ABDOMEN: Soft, Nontender, Nondistended; Bowel sounds present  EXTREMITIES:  2+ Peripheral Pulses, No clubbing, cyanosis, or edema  SKIN: No rashes or lesions      LABS:                        11.6   15.55 )-----------( 407      ( 07 Sep 2023 04:56 )             34.7     09-07    146<H>  |  108  |  25.3<H>  ----------------------------<  122<H>  4.0   |  24.0  |  0.83    Ca    9.3      07 Sep 2023 04:56  Phos  3.8     09-07  Mg     2.1     09-07            Urinalysis Basic - ( 07 Sep 2023 04:56 )    Color: x / Appearance: x / SG: x / pH: x  Gluc: 122 mg/dL / Ketone: x  / Bili: x / Urobili: x   Blood: x / Protein: x / Nitrite: x   Leuk Esterase: x / RBC: x / WBC x   Sq Epi: x / Non Sq Epi: x / Bacteria: x        CAPILLARY BLOOD GLUCOSE      POCT Blood Glucose.: 155 mg/dL (07 Sep 2023 11:55)  POCT Blood Glucose.: 129 mg/dL (07 Sep 2023 08:54)  POCT Blood Glucose.: 102 mg/dL (06 Sep 2023 17:02)      RADIOLOGY & ADDITIONAL TESTS:  Results Reviewed:   Imaging Personally Reviewed:  Electrocardiogram Personally Reviewed:

## 2023-09-07 NOTE — CHART NOTE - NSCHARTNOTEFT_GEN_A_CORE
Source: Patient [ ]  Family [ ]   other [ x]EMR, rounds    Current Diet: pureed, consistent CHO with mod thick liquids    PO intake:  < 50% [ ]   50-75%  [ x]   %  [ ]  other : Total feed    Source for PO intake [ ] Patient [ ] family [x ] chart [ ] staff [ ] other    Enteral /Parenteral Nutrition:     Current Weight: 176.8# 9/1  179# admit,  possible few pound weight loss if accurate.   no edema as per flowsheets    % Weight Change     Pertinent Medications: MEDICATIONS  (STANDING):  apixaban 5 milliGRAM(s) Oral two times a day  aspirin  chewable 81 milliGRAM(s) Oral daily  atorvastatin 80 milliGRAM(s) Oral at bedtime  chlorhexidine 2% Cloths 1 Application(s) Topical <User Schedule>  dextrose 5%. 1000 milliLiter(s) (50 mL/Hr) IV Continuous <Continuous>  dextrose 5%. 1000 milliLiter(s) (100 mL/Hr) IV Continuous <Continuous>  dextrose 50% Injectable 25 Gram(s) IV Push once  dextrose 50% Injectable 25 Gram(s) IV Push once  dextrose 50% Injectable 12.5 Gram(s) IV Push once  glucagon  Injectable 1 milliGRAM(s) IntraMuscular once  insulin lispro (ADMELOG) corrective regimen sliding scale   SubCutaneous three times a day before meals  levETIRAcetam  IVPB 500 milliGRAM(s) IV Intermittent every 12 hours  polyethylene glycol 3350 17 Gram(s) Oral two times a day  senna 2 Tablet(s) Oral at bedtime  tamsulosin 0.8 milliGRAM(s) Oral at bedtime    MEDICATIONS  (PRN):  acetaminophen     Tablet .. 975 milliGRAM(s) Oral every 6 hours PRN Mild Pain (1 - 3)  albuterol/ipratropium for Nebulization 3 milliLiter(s) Nebulizer every 6 hours PRN Shortness of Breath and/or Wheezing  dextrose Oral Gel 15 Gram(s) Oral once PRN Blood Glucose LESS THAN 70 milliGRAM(s)/deciliter  hydrALAZINE Injectable 10 milliGRAM(s) IV Push every 2 hours PRN SBP >140  labetalol Injectable 10 milliGRAM(s) IV Push every 2 hours PRN SBP >140    Pertinent Labs: CBC Full  -  ( 07 Sep 2023 04:56 )  WBC Count : 15.55 K/uL  RBC Count : 3.66 M/uL  Hemoglobin : 11.6 g/dL  Hematocrit : 34.7 %  Platelet Count - Automated : 407 K/uL  Mean Cell Volume : 94.8 fl  Mean Cell Hemoglobin : 31.7 pg  Mean Cell Hemoglobin Concentration : 33.4 gm/dL  Auto Neutrophil # : 11.45 K/uL  Auto Lymphocyte # : 2.03 K/uL  Auto Monocyte # : 1.42 K/uL  Auto Eosinophil # : 0.34 K/uL  Auto Basophil # : 0.07 K/uL  Auto Neutrophil % : 73.6 %  Auto Lymphocyte % : 13.1 %  Auto Monocyte % : 9.1 %  Auto Eosinophil % : 2.2 %  Auto Basophil % : 0.5 %      09-07 Na146 mmol/L<H> Glu 122 mg/dL<H> K+ 4.0 mmol/L Cr  0.83 mg/dL BUN 25.3 mg/dL<H> Phos 3.8 mg/dL Alb n/a   PAB n/a           Skin: head staples    Nutrition focused physical exam conducted - found signs of malnutrition [ ]absent [ ]present    Subcutaneous fat loss: [ ] Orbital fat pads region, [ ]Buccal fat region, [ ]Triceps region,  [ ]Ribs region    Muscle wasting: [ ]Temples region, [ ]Clavicle region, [ ]Shoulder region, [ ]Scapula region, [ ]Interosseous region,  [ ]thigh region, [ ]Calf region    Estimated Needs:   [x ] no change since previous assessment  [ ] recalculated:     Current Nutrition Diagnosis: Pt with increased nutrient needs related to increased physiological demand to promote healing as evidenced by left acute MCA. Pt eats fair to good and is a total feed. Plan is [facility].     Recommendations:   Diet as per speech tx  Provide plain greek yogurt for increased protein    Monitoring and Evaluation:   [ x] PO intake [ x] Tolerance to diet prescription [X] Weights  [X] Follow up per protocol [X] Labs:

## 2023-09-07 NOTE — PROGRESS NOTE ADULT - ASSESSMENT
63M PMH of DM2, HTN presents with fall, right sided weakness, dysarthria and right facial droop found to have LM1 occlusion s/p TNK in ER subsequent mechanical thrombectomy and decompressive craniectomy for midline shift. SAMARA negative for thrombus. Cardiology recommending ILR /MCOT as outpatient. Extubated on 9/1/23, monitored in ICU and transferred to medicine for further management.     #CVA: s/p cranioplasty  c/w lipitor  ASA  SAMARA negative for thrombus, +atheroma of aortic arch and aorta  mcot/ILR on dc  pt/ot/speech/ PM&R  s/p keppra  Discussed +LA result with neurology. Result may be abnormal as patient was already receiving lovenox. Hematology consulted - start Eliquis given for hypercoag state (cleared by neurosx) - monitor   Cranioplasty with neurosx outpatient     #febrile episode 9/2/23  hold off on IV abx  baseline leukocytosis ~13-14, WBC stably elevated   blood cultures negative  negative ua/ cxr    #urinary retention  longo placed 9/6/23  flomax increased to 0.8mg po daily  bowel regimen  TOV in AM after BM    #Constipation  miralax/senna  add dulcolax suppos    #dysphagia  pureed diet with thick liquids  aspiration precautions    #hypernatremia  PO fluid intake    #dm2  sliding scale    ppx: lovenox  Dispo: [facility] in 1-2 days after TOV

## 2023-09-07 NOTE — PROGRESS NOTE ADULT - SUBJECTIVE AND OBJECTIVE BOX
Resting comfortably.      REVIEW OF SYSTEMS  Difficulty to obtain    FUNCTIONAL PROGRESS    Bed Mobility: Rolling/Turning:     · Level of [place]	maximum assist (25% patients effort)  · Physical Assist/Nonphysical Assist	1 person assist    Bed Mobility: Sit to Supine:     · Level of [place]	maximum assist (25% patients effort)  · Physical Assist/Nonphysical Assist	1 person assist    Bed Mobility: Supine to Sit:     · Level of [place]	maximum assist (25% patients effort)  · Physical Assist/Nonphysical Assist	1 person assist    Bed Mobility Analysis:     · Bed Mobility Limitations	decreased ability to use arms for pushing/pulling; impaired ability to control trunk for mobility; decreased ability to use legs for bridging/pushing  · Impairments Contributing to Impaired Bed Mobility	impaired balance; decreased strength    Transfer: Sit to Stand:     · Level of [place]	maximum assist (25% patients effort)  · Physical Assist/Nonphysical Assist	1 person assist  · Assistive Device	PT assist, donning helmet. Pt with right knee blocked    Transfer: Stand to Sit:     · Level of [place]	maximum assist (25% patients effort)  · Assistive Device	PT assist, donning helmet. Pt with right knee blocked    Sit/Stand Transfer Safety Analysis:     · Transfer Safety Concerns Noted	decreased weight-shifting ability  · Impairments Contributing to Impaired Transfers	impaired balance; decreased strength; cognition; impaired coordination; impaired motor control; impaired postural control    Gait Skills:     · Level of [place]	dependent (less than 25% patients effort)  · Assistive Device	PT assist, donning helmet. Pt with right knee blocked  · Gait Distance	to slides left foot backward toward bed during standing        VITALS  T(C): 37.4 (09-07-23 @ 10:40), Max: 37.4 (09-07-23 @ 10:40)  HR: 105 (09-07-23 @ 10:40) (75 - 105)  BP: 97/62 (09-07-23 @ 10:40) (97/62 - 142/85)  RR: 18 (09-07-23 @ 10:40) (18 - 18)  SpO2: 93% (09-07-23 @ 10:40) (93% - 96%)  Wt(kg): --    MEDICATIONS   acetaminophen     Tablet .. 975 milliGRAM(s) every 6 hours PRN  albuterol/ipratropium for Nebulization 3 milliLiter(s) every 6 hours PRN  aspirin  chewable 81 milliGRAM(s) daily  atorvastatin 80 milliGRAM(s) at bedtime  chlorhexidine 2% Cloths 1 Application(s) <User Schedule>  dextrose 5%. 1000 milliLiter(s) <Continuous>  dextrose 5%. 1000 milliLiter(s) <Continuous>  dextrose 50% Injectable 25 Gram(s) once  dextrose 50% Injectable 25 Gram(s) once  dextrose 50% Injectable 12.5 Gram(s) once  dextrose Oral Gel 15 Gram(s) once PRN  enoxaparin Injectable 40 milliGRAM(s) every 24 hours  glucagon  Injectable 1 milliGRAM(s) once  hydrALAZINE Injectable 10 milliGRAM(s) every 2 hours PRN  insulin lispro (ADMELOG) corrective regimen sliding scale   three times a day before meals  labetalol Injectable 10 milliGRAM(s) every 2 hours PRN  levETIRAcetam  IVPB 500 milliGRAM(s) every 12 hours  polyethylene glycol 3350 17 Gram(s) two times a day  senna 2 Tablet(s) at bedtime  tamsulosin 0.8 milliGRAM(s) at bedtime      RECENT LABS/IMAGING - Reviewed                        11.6   15.55 )-----------( 407      ( 07 Sep 2023 04:56 )             34.7     09-07    146<H>  |  108  |  25.3<H>  ----------------------------<  122<H>  4.0   |  24.0  |  0.83    Ca    9.3      07 Sep 2023 04:56  Phos  3.8     09-07  Mg     2.1     09-07        Urinalysis Basic - ( 07 Sep 2023 04:56 )    Color: x / Appearance: x / SG: x / pH: x  Gluc: 122 mg/dL / Ketone: x  / Bili: x / Urobili: x   Blood: x / Protein: x / Nitrite: x   Leuk Esterase: x / RBC: x / WBC x   Sq Epi: x / Non Sq Epi: x / Bacteria: x      ----------------------------------------------------------------------------------------  PHYSICAL EXAM  Constitutional - NAD, Comfortable  HEENT - NCAT, EOMI  Neck - Supple  Chest - Breathing comfortably, No wheezing  Cardiovascular - No cyanosis   Abdomen - Soft   Extremities - No C/C/E, No calf tenderness   Neurologic Exam -                    Cognitive - Awake, Alert to voice     Communication - aphasic      Motor - anti-gravity movements in LUE and LLE     Sensory - appears intact in LUE     Reflexes - No clonus   Psychiatric - Mood stable, Affect WNL  ----------------------------------------------------------------------------------------  ASSESSMENT/PLAN  63yMale with functional deficits after CVA  CVA - s/p thrombectomy and subsequent L hemicraniectomy, helmet when OOB  HTN - hydralazine  Dysphagia- SLP  Pain - Tylenol  DVT PPX - SCDs, lovenox  Rehab/Impaired mobility - Continue PT/OT.  Recommend [facility] for continued rehabilitation. Rehabilitation team will continue to follow as patients condition progresses.      Will continue to follow. Functional progress will determine ongoing rehab dispo recommendations, which may change.    Continue bedside therapy as well as OOB throughout the day with mobilization by staff to maintain cardiopulmonary function and prevention of secondary complications related to debility.

## 2023-09-08 LAB
ANION GAP SERPL CALC-SCNC: 13 MMOL/L — SIGNIFICANT CHANGE UP (ref 5–17)
APPEARANCE UR: ABNORMAL
BACTERIA # UR AUTO: ABNORMAL
BASOPHILS # BLD AUTO: 0.07 K/UL — SIGNIFICANT CHANGE UP (ref 0–0.2)
BASOPHILS NFR BLD AUTO: 0.3 % — SIGNIFICANT CHANGE UP (ref 0–2)
BILIRUB UR-MCNC: ABNORMAL
BUN SERPL-MCNC: 24.1 MG/DL — HIGH (ref 8–20)
CALCIUM SERPL-MCNC: 8.9 MG/DL — SIGNIFICANT CHANGE UP (ref 8.4–10.5)
CHLORIDE SERPL-SCNC: 105 MMOL/L — SIGNIFICANT CHANGE UP (ref 96–108)
CO2 SERPL-SCNC: 23 MMOL/L — SIGNIFICANT CHANGE UP (ref 22–29)
COLOR SPEC: YELLOW — SIGNIFICANT CHANGE UP
COMMENT - URINE: SIGNIFICANT CHANGE UP
CREAT SERPL-MCNC: 0.82 MG/DL — SIGNIFICANT CHANGE UP (ref 0.5–1.3)
DIFF PNL FLD: ABNORMAL
EGFR: 99 ML/MIN/1.73M2 — SIGNIFICANT CHANGE UP
EOSINOPHIL # BLD AUTO: 0.23 K/UL — SIGNIFICANT CHANGE UP (ref 0–0.5)
EOSINOPHIL NFR BLD AUTO: 1.1 % — SIGNIFICANT CHANGE UP (ref 0–6)
EPI CELLS # UR: SIGNIFICANT CHANGE UP
GLUCOSE BLDC GLUCOMTR-MCNC: 130 MG/DL — HIGH (ref 70–99)
GLUCOSE BLDC GLUCOMTR-MCNC: 134 MG/DL — HIGH (ref 70–99)
GLUCOSE BLDC GLUCOMTR-MCNC: 153 MG/DL — HIGH (ref 70–99)
GLUCOSE BLDC GLUCOMTR-MCNC: 171 MG/DL — HIGH (ref 70–99)
GLUCOSE SERPL-MCNC: 150 MG/DL — HIGH (ref 70–99)
GLUCOSE UR QL: NEGATIVE — SIGNIFICANT CHANGE UP
GRAN CASTS # UR COMP ASSIST: ABNORMAL /LPF
HCT VFR BLD CALC: 34 % — LOW (ref 39–50)
HGB BLD-MCNC: 10.8 G/DL — LOW (ref 13–17)
HYALINE CASTS # UR AUTO: ABNORMAL /LPF
IMM GRANULOCYTES NFR BLD AUTO: 1.7 % — HIGH (ref 0–0.9)
KETONES UR-MCNC: ABNORMAL
LACTATE BLDV-MCNC: 1.4 MMOL/L — SIGNIFICANT CHANGE UP (ref 0.5–2)
LEUKOCYTE ESTERASE UR-ACNC: ABNORMAL
LYMPHOCYTES # BLD AUTO: 2.06 K/UL — SIGNIFICANT CHANGE UP (ref 1–3.3)
LYMPHOCYTES # BLD AUTO: 9.9 % — LOW (ref 13–44)
MAGNESIUM SERPL-MCNC: 2 MG/DL — SIGNIFICANT CHANGE UP (ref 1.6–2.6)
MCHC RBC-ENTMCNC: 30.3 PG — SIGNIFICANT CHANGE UP (ref 27–34)
MCHC RBC-ENTMCNC: 31.8 GM/DL — LOW (ref 32–36)
MCV RBC AUTO: 95.2 FL — SIGNIFICANT CHANGE UP (ref 80–100)
MONOCYTES # BLD AUTO: 1.84 K/UL — HIGH (ref 0–0.9)
MONOCYTES NFR BLD AUTO: 8.9 % — SIGNIFICANT CHANGE UP (ref 2–14)
NEUTROPHILS # BLD AUTO: 16.21 K/UL — HIGH (ref 1.8–7.4)
NEUTROPHILS NFR BLD AUTO: 78.1 % — HIGH (ref 43–77)
NITRITE UR-MCNC: POSITIVE
PH UR: 5 — SIGNIFICANT CHANGE UP (ref 5–8)
PHOSPHATE SERPL-MCNC: 3 MG/DL — SIGNIFICANT CHANGE UP (ref 2.4–4.7)
PLATELET # BLD AUTO: 411 K/UL — HIGH (ref 150–400)
POTASSIUM SERPL-MCNC: 4 MMOL/L — SIGNIFICANT CHANGE UP (ref 3.5–5.3)
POTASSIUM SERPL-SCNC: 4 MMOL/L — SIGNIFICANT CHANGE UP (ref 3.5–5.3)
PROT UR-MCNC: 100
RBC # BLD: 3.57 M/UL — LOW (ref 4.2–5.8)
RBC # FLD: 14.1 % — SIGNIFICANT CHANGE UP (ref 10.3–14.5)
RBC CASTS # UR COMP ASSIST: ABNORMAL /HPF (ref 0–4)
SODIUM SERPL-SCNC: 141 MMOL/L — SIGNIFICANT CHANGE UP (ref 135–145)
SP GR SPEC: 1.02 — SIGNIFICANT CHANGE UP (ref 1.01–1.02)
TROPONIN T SERPL-MCNC: <0.01 NG/ML — SIGNIFICANT CHANGE UP (ref 0–0.06)
UROBILINOGEN FLD QL: 12
WBC # BLD: 20.77 K/UL — HIGH (ref 3.8–10.5)
WBC # FLD AUTO: 20.77 K/UL — HIGH (ref 3.8–10.5)
WBC UR QL: ABNORMAL /HPF (ref 0–5)

## 2023-09-08 PROCEDURE — 93010 ELECTROCARDIOGRAM REPORT: CPT

## 2023-09-08 PROCEDURE — 99232 SBSQ HOSP IP/OBS MODERATE 35: CPT | Mod: 25

## 2023-09-08 PROCEDURE — 99233 SBSQ HOSP IP/OBS HIGH 50: CPT

## 2023-09-08 RX ORDER — PIPERACILLIN AND TAZOBACTAM 4; .5 G/20ML; G/20ML
3.38 INJECTION, POWDER, LYOPHILIZED, FOR SOLUTION INTRAVENOUS EVERY 8 HOURS
Refills: 0 | Status: DISCONTINUED | OUTPATIENT
Start: 2023-09-08 | End: 2023-09-12

## 2023-09-08 RX ORDER — PIPERACILLIN AND TAZOBACTAM 4; .5 G/20ML; G/20ML
3.38 INJECTION, POWDER, LYOPHILIZED, FOR SOLUTION INTRAVENOUS ONCE
Refills: 0 | Status: COMPLETED | OUTPATIENT
Start: 2023-09-08 | End: 2023-09-08

## 2023-09-08 RX ORDER — SODIUM CHLORIDE 9 MG/ML
500 INJECTION, SOLUTION INTRAVENOUS ONCE
Refills: 0 | Status: COMPLETED | OUTPATIENT
Start: 2023-09-08 | End: 2023-09-08

## 2023-09-08 RX ADMIN — CHLORHEXIDINE GLUCONATE 1 APPLICATION(S): 213 SOLUTION TOPICAL at 05:24

## 2023-09-08 RX ADMIN — ATORVASTATIN CALCIUM 80 MILLIGRAM(S): 80 TABLET, FILM COATED ORAL at 22:33

## 2023-09-08 RX ADMIN — POLYETHYLENE GLYCOL 3350 17 GRAM(S): 17 POWDER, FOR SOLUTION ORAL at 05:29

## 2023-09-08 RX ADMIN — PIPERACILLIN AND TAZOBACTAM 25 GRAM(S): 4; .5 INJECTION, POWDER, LYOPHILIZED, FOR SOLUTION INTRAVENOUS at 22:32

## 2023-09-08 RX ADMIN — POLYETHYLENE GLYCOL 3350 17 GRAM(S): 17 POWDER, FOR SOLUTION ORAL at 18:43

## 2023-09-08 RX ADMIN — PIPERACILLIN AND TAZOBACTAM 200 GRAM(S): 4; .5 INJECTION, POWDER, LYOPHILIZED, FOR SOLUTION INTRAVENOUS at 10:13

## 2023-09-08 RX ADMIN — SODIUM CHLORIDE 1000 MILLILITER(S): 9 INJECTION, SOLUTION INTRAVENOUS at 07:02

## 2023-09-08 RX ADMIN — Medication 2: at 08:54

## 2023-09-08 RX ADMIN — Medication 81 MILLIGRAM(S): at 13:23

## 2023-09-08 RX ADMIN — TAMSULOSIN HYDROCHLORIDE 0.8 MILLIGRAM(S): 0.4 CAPSULE ORAL at 22:34

## 2023-09-08 RX ADMIN — LEVETIRACETAM 400 MILLIGRAM(S): 250 TABLET, FILM COATED ORAL at 18:43

## 2023-09-08 RX ADMIN — LEVETIRACETAM 400 MILLIGRAM(S): 250 TABLET, FILM COATED ORAL at 05:29

## 2023-09-08 RX ADMIN — PIPERACILLIN AND TAZOBACTAM 25 GRAM(S): 4; .5 INJECTION, POWDER, LYOPHILIZED, FOR SOLUTION INTRAVENOUS at 13:23

## 2023-09-08 RX ADMIN — Medication 975 MILLIGRAM(S): at 09:51

## 2023-09-08 RX ADMIN — SENNA PLUS 2 TABLET(S): 8.6 TABLET ORAL at 22:34

## 2023-09-08 RX ADMIN — Medication 975 MILLIGRAM(S): at 07:56

## 2023-09-08 NOTE — PROGRESS NOTE ADULT - SUBJECTIVE AND OBJECTIVE BOX
Kelly Joshi M.D.    Patient is a 63y old  Male who presents with a chief complaint of LT M1 Occlusion (07 Sep 2023 15:04)      SUBJECTIVE / OVERNIGHT EVENTS: Febrile up to 100.2 overnight. PA also noted different BP in the arm, CT angio ordered.    Patient denies chest pain, SOB, abd pain, N/V, fever, chills, dysuria or any other complaints. All remainder ROS negative.     MEDICATIONS  (STANDING):  aspirin  chewable 81 milliGRAM(s) Oral daily  atorvastatin 80 milliGRAM(s) Oral at bedtime  chlorhexidine 2% Cloths 1 Application(s) Topical <User Schedule>  dextrose 5%. 1000 milliLiter(s) (50 mL/Hr) IV Continuous <Continuous>  dextrose 5%. 1000 milliLiter(s) (100 mL/Hr) IV Continuous <Continuous>  dextrose 50% Injectable 25 Gram(s) IV Push once  dextrose 50% Injectable 12.5 Gram(s) IV Push once  dextrose 50% Injectable 25 Gram(s) IV Push once  glucagon  Injectable 1 milliGRAM(s) IntraMuscular once  insulin lispro (ADMELOG) corrective regimen sliding scale   SubCutaneous three times a day before meals  levETIRAcetam  IVPB 500 milliGRAM(s) IV Intermittent every 12 hours  piperacillin/tazobactam IVPB.- 3.375 Gram(s) IV Intermittent once  piperacillin/tazobactam IVPB.. 3.375 Gram(s) IV Intermittent every 8 hours  polyethylene glycol 3350 17 Gram(s) Oral two times a day  senna 2 Tablet(s) Oral at bedtime  tamsulosin 0.8 milliGRAM(s) Oral at bedtime    MEDICATIONS  (PRN):  acetaminophen     Tablet .. 975 milliGRAM(s) Oral every 6 hours PRN Mild Pain (1 - 3)  albuterol/ipratropium for Nebulization 3 milliLiter(s) Nebulizer every 6 hours PRN Shortness of Breath and/or Wheezing  dextrose Oral Gel 15 Gram(s) Oral once PRN Blood Glucose LESS THAN 70 milliGRAM(s)/deciliter  hydrALAZINE Injectable 10 milliGRAM(s) IV Push every 2 hours PRN SBP >140  labetalol Injectable 10 milliGRAM(s) IV Push every 2 hours PRN SBP >140      I&O's Summary      PHYSICAL EXAM:  Vital Signs Last 24 Hrs  T(C): 36.7 (08 Sep 2023 09:09), Max: 37.9 (08 Sep 2023 06:00)  T(F): 98.1 (08 Sep 2023 09:09), Max: 100.2 (08 Sep 2023 06:00)  HR: 106 (08 Sep 2023 09:09) (106 - 114)  BP: 95/65 (08 Sep 2023 09:10) (80/65 - 132/84)  BP(mean): --  RR: 18 (08 Sep 2023 09:09) (16 - 18)  SpO2: 95% (08 Sep 2023 09:09) (95% - 97%)    Parameters below as of 08 Sep 2023 09:09  Patient On (Oxygen Delivery Method): room air    GENERAL: NAD, not verbal, not awake and respond occasionally  HEAD:  Left scalp with staples WILLIAM  NECK: Supple, No JVD, Normal thyroid  NERVOUS SYSTEM:  Alert, limited speech, Right hemiparesis  CHEST/LUNG: Clear to auscultation bilaterally  HEART: Regular rate and rhythm; No murmurs, rubs, or gallops  ABDOMEN: Soft, Nontender, Nondistended; Bowel sounds present  SKIN: No rashes or lesions    LABS:                        10.8   20.77 )-----------( 411      ( 08 Sep 2023 06:13 )             34.0     09-08    141  |  105  |  24.1<H>  ----------------------------<  150<H>  4.0   |  23.0  |  0.82    Ca    8.9      08 Sep 2023 06:13  Phos  3.0     09-08  Mg     2.0     09-08        CARDIAC MARKERS ( 08 Sep 2023 06:13 )  x     / <0.01 ng/mL / x     / x     / x          Urinalysis Basic - ( 08 Sep 2023 10:27 )    Color: Yellow / Appearance: Slightly Turbid / S.025 / pH: x  Gluc: x / Ketone: Trace  / Bili: Small / Urobili: 12   Blood: x / Protein: 100 / Nitrite: Positive   Leuk Esterase: Moderate / RBC: 25-50 /HPF / WBC 11-25 /HPF   Sq Epi: x / Non Sq Epi: x / Bacteria: Moderate        CAPILLARY BLOOD GLUCOSE      POCT Blood Glucose.: 153 mg/dL (08 Sep 2023 08:52)  POCT Blood Glucose.: 148 mg/dL (07 Sep 2023 18:23)  POCT Blood Glucose.: 155 mg/dL (07 Sep 2023 11:55)      RADIOLOGY & ADDITIONAL TESTS:  Results Reviewed:   Imaging Personally Reviewed:  Electrocardiogram Personally Reviewed:

## 2023-09-08 NOTE — PROGRESS NOTE ADULT - ASSESSMENT
63M PMH of DM2, HTN presents with fall, right sided weakness, dysarthria and right facial droop found to have LM1 occlusion s/p TNK in ER subsequent mechanical thrombectomy and decompressive craniectomy for midline shift. SAMARA negative for thrombus.

## 2023-09-08 NOTE — PROGRESS NOTE ADULT - SUBJECTIVE AND OBJECTIVE BOX
[facility]                                                         CARDIOLOGY AT [facility]                                                                  [address] [facility], [place]-[id number] [place] [address]- [facility]                                                         Telephone: [phone]. Fax:[phone]                                                                             PROGRESS NOTE    Reason for follow up: Atheroma   Update: Cardiology called for comment on CVA, atheroma, ILR      Review of symptoms:   pt opens eyes, non verbal      Vitals:  T(C): 36.7 (09-08-23 @ 09:09), Max: 37.9 (09-08-23 @ 06:00)  HR: 106 (09-08-23 @ 09:09) (106 - 114)  BP: 95/65 (09-08-23 @ 09:10) (80/65 - 121/80)  RR: 18 (09-08-23 @ 09:09) (16 - 18)  SpO2: 95% (09-08-23 @ 09:09) (95% - 97%)        I&O's Summary    08 Sep 2023 07:01  -  08 Sep 2023 17:20  --------------------------------------------------------  IN: 0 mL / OUT: 500 mL / NET: -500 mL        PHYSICAL EXAM:  Appearance: Comfortable. No acute distress  HEENT:  Atraumatic. Normocephalic.  Normal oral mucosa  Neurologic: A & O x 0, R sided hemiparesis   Cardiovascular: RRR S1 S2, No murmur, no rubs/gallops. No JVD  Respiratory: Lungs clear to auscultation, unlabored   Gastrointestinal:  Soft, Non-tender, + BS  Lower Extremities:  No edema  Psychiatry: Patient is calm. No agitation.   Skin: warm and dry.      CURRENT CARDIAC MEDICATIONS:  hydrALAZINE Injectable 10 milliGRAM(s) IV Push every 2 hours PRN  labetalol Injectable 10 milliGRAM(s) IV Push every 2 hours PRN        CURRENT OTHER MEDICATIONS:  albuterol/ipratropium for Nebulization 3 milliLiter(s) Nebulizer every 6 hours PRN Shortness of Breath and/or Wheezing  piperacillin/tazobactam IVPB.. 3.375 Gram(s) IV Intermittent every 8 hours  acetaminophen     Tablet .. 975 milliGRAM(s) Oral every 6 hours PRN Mild Pain (1 - 3)  levETIRAcetam  IVPB 500 milliGRAM(s) IV Intermittent every 12 hours  polyethylene glycol 3350 17 Gram(s) Oral two times a day  senna 2 Tablet(s) Oral at bedtime  atorvastatin 80 milliGRAM(s) Oral at bedtime  dextrose 50% Injectable 25 Gram(s) IV Push once, Stop order after: 1 Doses  dextrose 50% Injectable 25 Gram(s) IV Push once, Stop order after: 1 Doses  dextrose 50% Injectable 12.5 Gram(s) IV Push once, Stop order after: 1 Doses  dextrose Oral Gel 15 Gram(s) Oral once, Stop order after: 1 Doses PRN Blood Glucose LESS THAN 70 milliGRAM(s)/deciliter  glucagon  Injectable 1 milliGRAM(s) IntraMuscular once, Stop order after: 1 Doses  insulin lispro (ADMELOG) corrective regimen sliding scale   SubCutaneous three times a day before meals  aspirin  chewable 81 milliGRAM(s) Oral daily  chlorhexidine 2% Cloths 1 Application(s) Topical <User Schedule>  dextrose 5%. 1000 milliLiter(s) (50 mL/Hr) IV Continuous <Continuous>  dextrose 5%. 1000 milliLiter(s) (100 mL/Hr) IV Continuous <Continuous>  tamsulosin 0.8 milliGRAM(s) Oral at bedtime        LABS:	 	  ( 08 Sep 2023 06:13 )  Troponin T  <0.01,  CPK  X    , CKMB  X    , BNP X        , ( 27 Aug 2023 11:40 )  Troponin T  <0.01,  CPK  795<H>, CKMB  X    , BNP X                                  10.8   20.77 )-----------( 411      ( 08 Sep 2023 06:13 )             34.0     09-08    141  |  105  |  24.1<H>  ----------------------------<  150<H>  4.0   |  23.0  |  0.82    Ca    8.9      08 Sep 2023 06:13  Phos  3.0     09-08  Mg     2.0     09-08      PT/INR/PTT ( 30 Aug 2023 06:30 )                       :                       :      13.0         :       28.5                  .        .                   .              .           .       1.18        .                                       Lipid Profile: Date: 08-29 @ 03:05  Total cholesterol 145; Direct LDL: --; HDL: 39; Triglycerides:128    HgA1c:   TSH:       TELEMETRY:   ECG:      DIAGNOSTIC TESTING:  [ ] Echocardiogram:   < from: SAMARA Echo Doppler (09.01.23 @ 12:52) >    Summary:   1. Left ventricular ejection fraction, by visual estimation, is >75%.   2. Normal global left ventricular systolic function.   3. Normal right ventricular size and function.   4. Normal left atrial size.   5. No left atrial appendage thrombus and normal left atrial appendage velocities.   6. Trace mitral valve regurgitation.   7. Color flow doppler and intravenous injection of agitated saline   demonstrates the presence of an intact intra atrial septum.   8. Mobile intra-atrial septum with lipomatous hypertrophy.   9. Moderate complex atheromas at the aortic arch and descending aorta.  10. There is no evidence of pericardial effusion.    Shailesh Helms DO Electronically signed on 9/1/2023 at 2:28:48 PM    < end of copied text >

## 2023-09-08 NOTE — CHART NOTE - NSCHARTNOTEFT_GEN_A_CORE
RN called patient had difference in BP in each arm, noted to be RT arm 110/70, LT 80/65 manually. Repeated BP on RT arm 100/72, and LT 80/60 manually taken.  63M PMH of DM2, HTN presents with fall, right sided weakness, dysarthria and right facial droop found to have LM1 occlusion s/p TNK in ER subsequent mechanical thrombectomy and decompressive craniectomy for midline shift. SAMARA negative for thrombus. Cardiology recommending ILR /MCOT as outpatient. Extubated on 9/1/23, monitored in ICU and transferred to medicine for further management. With regard to CVA: s/p cranioplasty. To c/w lipitor, ASA, SAMARA negative for thrombus, +atheroma of aortic arch and aorta. seen by heme/onc, concerning for possible hypercoag state, started on Eliquis BID. W  Course c/b febrile episode 9/2/23, patient appears to have baseline leukocytosis ~13-14, WBC stably elevated , blood cultures negative, negative ua/ cxr, pt monitored off abx. pt noted to have low grade temp of 100.2. Pt unable to give ROS 2/2 mental status.     T(C): 37.9 (09-08-23 @ 06:00), Max: 37.9 (09-08-23 @ 06:00)  HR: 110 (09-08-23 @ 05:38) (105 - 114)  BP: 80/65 (09-08-23 @ 05:55) (80/65 - 132/84)  RR: 18 (09-08-23 @ 05:38) (16 - 18)  SpO2: 97% (09-08-23 @ 05:38) (93% - 97%)    CONSTITUTIONAL: Well groomed, no apparent distress  EYES: PERRLA and symmetric, EOMI, No conjunctival or scleral injection, non-icteric  ENMT: Oral mucosa with moist membranes. Normal dentition; no pharyngeal injection or exudates,   RESP: No respiratory distress, no use of accessory muscles; CTA b/l, no WRR  CV: tachycardia,+S1S2, no MRG  GI: Soft, NT, ND, no rebound, no guarding, no bruits noted   LYMPH: No cervical LAD or tenderness; no axillary LAD or tenderness; no inguinal LAD or tenderness  MSK: moves LT arm, RT arm weakness   Vasc: pulses of the radial, ulnar as well as distal pulses intact +2 throughout   SKIN: No rashes or ulcers noted  NEURO: LT craniplasty, Right hemiparesis minimal speech  PSYCH: pleasant, alert    EKG:     Assessment/Plan:   Difference in BP readings b/l arms   -EKG sinus tachycardia @ 111 no ST elevations/depressions   -Check labs (CBC, BMP, coags, trops)  -Attempt calling emergency contact, unable to reach to see if patient has varying BP  -Note to have decreased BP in LT arm compared to RT arm on flowsheet   -CTA chest to evaluate further for difference in BP   -Monitor BP b/l arms   -Discussed w/ hospitalist, and RN     Low grade temp   -tylenol PRN   -check labs, BC x 2 pending   -Trend temp, WBC RN called patient had difference in BP in each arm, noted to be RT arm 110/70, LT 80/65 manually. Repeated BP on RT arm 100/72, and LT 80/60 manually taken.  63M PMH of DM2, HTN presents with fall, right sided weakness, dysarthria and right facial droop found to have LM1 occlusion s/p TNK in ER subsequent mechanical thrombectomy and decompressive craniectomy for midline shift. SAMARA negative for thrombus. Cardiology recommending ILR /MCOT as outpatient. Extubated on 9/1/23, monitored in ICU and transferred to medicine for further management. With regard to CVA: s/p cranioplasty. To c/w lipitor, ASA, SAMARA negative for thrombus, +atheroma of aortic arch and aorta. seen by heme/onc, concerning for possible hypercoag state, started on Eliquis BID. W  Course c/b febrile episode 9/2/23, patient appears to have baseline leukocytosis ~13-14, WBC stably elevated , blood cultures negative, negative ua/ cxr, pt monitored off abx. pt noted to have low grade temp of 100.2. Pt unable to give ROS 2/2 mental status.     T(C): 37.9 (09-08-23 @ 06:00), Max: 37.9 (09-08-23 @ 06:00)  HR: 110 (09-08-23 @ 05:38) (105 - 114)  BP: 80/65 (09-08-23 @ 05:55) (80/65 - 132/84)  RR: 18 (09-08-23 @ 05:38) (16 - 18)  SpO2: 97% (09-08-23 @ 05:38) (93% - 97%)    CONSTITUTIONAL: Well groomed, no apparent distress  EYES: PERRLA and symmetric, EOMI, No conjunctival or scleral injection, non-icteric  ENMT: Oral mucosa with moist membranes. Normal dentition; no pharyngeal injection or exudates,   RESP: No respiratory distress, no use of accessory muscles; CTA b/l, no WRR  CV: tachycardia,+S1S2, no MRG  GI: Soft, NT, ND, no rebound, no guarding, no bruits noted   LYMPH: No cervical LAD or tenderness; no axillary LAD or tenderness; no inguinal LAD or tenderness  MSK: moves LT arm, RT arm weakness   Vasc: pulses of the radial, ulnar as well as distal pulses intact +2 throughout   SKIN: No rashes or ulcers noted  NEURO: LT craniplasty, Right hemiparesis minimal speech  PSYCH: pleasant, alert    EKG: sinus tachycardia @ 111 no ST elevations/depressions     Assessment/Plan:   Difference in BP readings b/l arms   -EKG: sinus tachycardia @ 111 no ST elevations/depressions   -Check labs (CBC, BMP, coags, trops)  -Attempt calling emergency contact, unable to reach to see if patient has varying BP  -Note to have decreased BP in LT arm compared to RT arm on flowsheet   -CTA chest to evaluate further for difference in BP   -Monitor BP b/l arms   -Discussed w/ hospitalist, and RN     Low grade temp   -tylenol PRN   -check labs, BC x 2 pending   -Trend temp, WBC RN called patient had difference in BP in each arm, noted to be RT arm 110/70, LT 80/65 manually. Repeated BP on RT arm 100/72, and LT 80/60 manually taken.  63M PMH of DM2, HTN presents with fall, right sided weakness, dysarthria and right facial droop found to have LM1 occlusion s/p TNK in ER subsequent mechanical thrombectomy and decompressive craniectomy for midline shift. SAMARA negative for thrombus. Cardiology recommending ILR /MCOT as outpatient. Extubated on 9/1/23, monitored in ICU and transferred to medicine for further management. With regard to CVA: s/p cranioplasty. To c/w lipitor, ASA, SAMARA negative for thrombus, +atheroma of aortic arch and aorta. seen by heme/onc, concerning for possible hypercoag state, started on Eliquis BID.   Course c/b febrile episode 9/2/23, patient appears to have baseline leukocytosis ~13-14, WBC stably elevated , blood cultures negative, negative ua/ cxr, pt monitored off abx. pt noted to have low grade temp of 100.2. Pt unable to give ROS 2/2 mental status.     T(C): 37.9 (09-08-23 @ 06:00), Max: 37.9 (09-08-23 @ 06:00)  HR: 110 (09-08-23 @ 05:38) (105 - 114)  BP: 80/65 (09-08-23 @ 05:55) (80/65 - 132/84)  RR: 18 (09-08-23 @ 05:38) (16 - 18)  SpO2: 97% (09-08-23 @ 05:38) (93% - 97%)    CONSTITUTIONAL: Well groomed, no apparent distress  EYES: PERRLA and symmetric, EOMI, No conjunctival or scleral injection, non-icteric  ENMT: Oral mucosa with moist membranes. Normal dentition; no pharyngeal injection or exudates,   RESP: No respiratory distress, no use of accessory muscles; CTA b/l, no WRR  CV: tachycardia,+S1S2, no MRG  GI: Soft, NT, ND, no rebound, no guarding, no bruits noted   LYMPH: No cervical LAD or tenderness; no axillary LAD or tenderness; no inguinal LAD or tenderness  MSK: moves LT arm, RT arm weakness   Vasc: pulses of the radial, ulnar as well as distal pulses intact +2 throughout   SKIN: No rashes or ulcers noted  NEURO: LT craniplasty, Right hemiparesis minimal speech  PSYCH: pleasant, alert    EKG: sinus tachycardia @ 111 no ST elevations/depressions     Assessment/Plan:   Difference in BP readings b/l arms   -EKG: sinus tachycardia @ 111 no ST elevations/depressions   -Check labs (CBC, BMP, coags, trops)  -Attempt calling emergency contact, unable to reach to see if patient has varying BP  -Note to have decreased BP in LT arm compared to RT arm on flowsheet   -LR bolus 500 cc bolus x 1   -CTA chest to evaluate further for difference in BP   -Monitor BP b/l arms   -Discussed w/ hospitalist, and RN     Low grade temp   -tylenol PRN   -check labs, BC x 2 pending   -Trend temp, WBC

## 2023-09-08 NOTE — PROGRESS NOTE ADULT - NS ATTEND AMEND GEN_ALL_CORE FT
Patient seen and examined by me.    T(C): 37.3 (09-08-23 @ 17:36), Max: 37.9 (09-08-23 @ 06:00)  HR: 96 (09-08-23 @ 17:36) (96 - 114)  BP: 124/82 (09-08-23 @ 17:36) (80/65 - 124/82)  RR: 18 (09-08-23 @ 17:36) (16 - 18)  SpO2: 93% (09-08-23 @ 17:36) (93% - 97%)  Patient alert and nonverbal  Chest- Bilateral Clear BS  Cardiac- S1 and S2  Abdomen- Soft  Right hemiparesis    Assessment/Plan:  1. CVA    Discussed with Dr Joshi  I have discussed my recommendation with the PA which are outlined above.  Will sign off

## 2023-09-08 NOTE — PROGRESS NOTE ADULT - ASSESSMENT
63M PMH of DM2, HTN presents with fall, right sided weakness, dysarthria and right facial droop found to have LM1 occlusion s/p TNK in ER subsequent mechanical thrombectomy and decompressive craniectomy for midline shift. SAMARA negative for thrombus. Cardiology recommending ILR /MCOT as outpatient. Extubated on 9/1/23, monitored in ICU and transferred to medicine for further management.     #CVA: s/p cranioplasty  c/w lipitor  ASA  SAMARA negative for thrombus, +atheroma of aortic arch and aorta  mcot/ILR on dc  pt/ot/speech/ PM&R  s/p keppra  Discussed +LA result with neurology. Result may be abnormal as patient was already receiving lovenox. Hematology consulted - rec Eliquis given for hypercoag state (cleared by neurosx)   will start Eliquis once hematuria resolved and Hg stable  Cranioplasty with neurosx outpatient     #febrile episode 9/2/23 and again 9/8  with worsening leukocytosis up to 20  start empiric Zosyn  pt MRSA negative  f/u repeat BCx     #urinary retention  longo placed 9/6/23  flomax increased to 0.8mg po daily  bowel regimen  TOV near discharge vs. ALDEN     #Hematuria  pt noted with 1 episode of hematuria on 9/7, suspect 2/2 traumatic longo  Hg down to 10.8 on 9/8, will monitor CBC for now  if Hg remains stable, will start Eliquis     #Constipation  miralax/senna  add dulcolax suppos    #dysphagia  pureed diet with thick liquids  aspiration precautions    #hypernatremia  PO fluid intake    #dm2  sliding scale    ppx: Lovenox  Dispo: pending clinical course  63M PMH of DM2, HTN presents with fall, right sided weakness, dysarthria and right facial droop found to have LM1 occlusion s/p TNK in ER subsequent mechanical thrombectomy and decompressive craniectomy for midline shift. SAMARA negative for thrombus. Cardiology recommending ILR /MCOT as outpatient. Extubated on 9/1/23, monitored in ICU and transferred to medicine for further management.     #CVA: s/p cranioplasty  c/w lipitor  ASA  SAMARA negative for thrombus, +atheroma of aortic arch and aorta  mcot/ILR on dc  pt/ot/speech/ PM&R  s/p keppra  Discussed +LA result with neurology. Result may be abnormal as patient was already receiving lovenox. Hematology consulted - rec Eliquis given for hypercoag state (cleared by neurosx)   will start Eliquis once hematuria resolved and Hg stable  Cranioplasty with neurosx outpatient     #febrile episode 9/2/23 and again 9/8  with worsening leukocytosis up to 20  start empiric Zosyn  pt MRSA negative  f/u repeat BCx     #urinary retention  longo placed 9/6/23  flomax increased to 0.8mg po daily  bowel regimen  TOV near discharge vs. ALDEN     #Hematuria  pt noted with 1 episode of hematuria on 9/7, suspect 2/2 traumatic longo  Hg down to 10.8 on 9/8, will monitor CBC for now  if Hg remains stable, will start Eliquis     #Constipation  miralax/senna  add dulcolax suppos    #dysphagia  pureed diet with thick liquids  aspiration precautions    #Uneven BP  noted 20unit difference between left and right arm BP  CT angio pending     #hypernatremia  PO fluid intake    #dm2  sliding scale    ppx: Lovenox  Dispo: pending clinical course  63M PMH of DM2, HTN presents with fall, right sided weakness, dysarthria and right facial droop found to have LM1 occlusion s/p TNK in ER subsequent mechanical thrombectomy and decompressive craniectomy for midline shift. SAMARA negative for thrombus. Cardiology recommending ILR /MCOT as outpatient. Extubated on 9/1/23, monitored in ICU and transferred to medicine for further management.     #CVA: s/p cranioplasty  c/w lipitor  ASA  SAMARA negative for thrombus, +atheroma of aortic arch and aorta  mcot/ILR on dc  pt/ot/speech/ PM&R  s/p keppra  Discussed +LA result with neurology. Result may be abnormal as patient was already receiving lovenox. Hematology consulted - rec Eliquis given for hypercoag state (cleared by neurosx)   will start Eliquis once hematuria resolved and Hg stable  Cranioplasty with neurosx outpatient     #febrile episode 9/2/23 and again 9/8  with worsening leukocytosis up to 20  start empiric Zosyn  pt MRSA negative  f/u repeat BCx     #urinary retention  longo placed 9/6/23  flomax increased to 0.8mg po daily  bowel regimen  TOV near discharge vs. ALDEN     #Hematuria  pt noted with 1 episode of hematuria on 9/7, suspect 2/2 traumatic longo  Hg down to 10.8 on 9/8, will monitor CBC for now  if Hg remains stable, will start Eliquis     #Constipation  miralax/senna  add dulcolax suppository     #dysphagia  pureed diet with thick liquids  aspiration precautions    #Uneven BP  noted 20unit difference between left and right arm BP by RN overnight  Pt chest xray earlier without mediastinum widening and had recent CT angio of the neck and A/P did not show any acute pathologies  likely chronic atherosclerosis disease, hold off CT angio chest    #hypernatremia  PO fluid intake    #dm2  sliding scale    ppx: Lovenox  Dispo: pending clinical course  63M PMH of DM2, HTN presents with fall, right sided weakness, dysarthria and right facial droop found to have LM1 occlusion s/p TNK in ER subsequent mechanical thrombectomy and decompressive craniectomy for midline shift. SAMARA negative for thrombus. Cardiology recommending ILR /MCOT as outpatient. Extubated on 9/1/23, monitored in ICU and transferred to medicine for further management.     #CVA: s/p cranioplasty  c/w lipitor  ASA  SAMARA negative for thrombus, +atheroma of aortic arch and aorta - asked cards to comment on if this could be the source of his CVA  mcot/ILR on dc  pt/ot/speech/ PM&R  s/p keppra  Discussed +LA result with neurology. Result may be abnormal as patient was already receiving lovenox. Hematology consulted - rec Eliquis given for hypercoag state (cleared by neurosx)   will start Eliquis once hematuria resolved and Hg stable  Cranioplasty with neurosx outpatient     #febrile episode 9/2/23 and again 9/8  with worsening leukocytosis up to 20  start empiric Zosyn  pt MRSA negative  f/u repeat BCx     #urinary retention  longo placed 9/6/23  flomax increased to 0.8mg po daily  bowel regimen  TOV near discharge vs. ALDEN     #Hematuria  pt noted with 1 episode of hematuria on 9/7, suspect 2/2 traumatic longo  Hg down to 10.8 on 9/8, will monitor CBC for now  if Hg remains stable, will start Eliquis     #Constipation  miralax/senna  add dulcolax suppository     #dysphagia  pureed diet with thick liquids  aspiration precautions    #Uneven BP  noted 20unit difference between left and right arm BP by RN overnight  Pt chest xray earlier without mediastinum widening and had recent CT angio of the neck and A/P did not show any acute pathologies  likely chronic atherosclerosis disease, hold off CT angio chest    #hypernatremia  PO fluid intake    #dm2  sliding scale    ppx: Lovenox  Dispo: pending clinical course  63M PMH of DM2, HTN presents with fall, right sided weakness, dysarthria and right facial droop found to have LM1 occlusion s/p TNK in ER subsequent mechanical thrombectomy and decompressive craniectomy for midline shift. SAMARA negative for thrombus. Cardiology recommending ILR /MCOT as outpatient. Extubated on 9/1/23, monitored in ICU and transferred to medicine for further management.     #CVA: s/p cranioplasty  c/w lipitor  ASA  SAMARA negative for thrombus, +atheroma of aortic arch and aorta - asked cards to comment on if this could be the source of his CVA  mcot/ILR on dc  pt/ot/speech/ PM&R  s/p keppra  +LA, but patient already on Lovenox. D/w neuro, cards and heme/onc, will monitor off AC, just ASA for now, and repeat hypercoag workup Monday 9/11.   Neurology is not clearing pt for Eliquis  Cranioplasty with neurosx outpatient     #febrile episode 9/2/23 and again 9/8  with worsening leukocytosis up to 20  start empiric Zosyn  pt MRSA negative  f/u repeat BCx     #urinary retention  longo placed 9/6/23  flomax increased to 0.8mg po daily  bowel regimen  TOV near discharge vs. ALDEN     #Hematuria  pt noted with 1 episode of hematuria on 9/7, suspect 2/2 traumatic longo  Hg down to 10.8 on 9/8, will monitor CBC for now    #Constipation  miralax/senna  add dulcolax suppository     #dysphagia  pureed diet with thick liquids  aspiration precautions    #Uneven BP  noted 20unit difference between left and right arm BP by RN overnight  Pt chest xray earlier without mediastinum widening and had recent CT angio of the neck and A/P did not show any acute pathologies  likely chronic atherosclerosis disease, hold off CT angio chest    #hypernatremia  PO fluid intake    #dm2  sliding scale    ppx: SCD  Dispo: pending clinical course

## 2023-09-09 LAB
ANION GAP SERPL CALC-SCNC: 13 MMOL/L — SIGNIFICANT CHANGE UP (ref 5–17)
BASOPHILS # BLD AUTO: 0.09 K/UL — SIGNIFICANT CHANGE UP (ref 0–0.2)
BASOPHILS NFR BLD AUTO: 0.4 % — SIGNIFICANT CHANGE UP (ref 0–2)
BUN SERPL-MCNC: 19.9 MG/DL — SIGNIFICANT CHANGE UP (ref 8–20)
CALCIUM SERPL-MCNC: 8.8 MG/DL — SIGNIFICANT CHANGE UP (ref 8.4–10.5)
CHLORIDE SERPL-SCNC: 104 MMOL/L — SIGNIFICANT CHANGE UP (ref 96–108)
CO2 SERPL-SCNC: 22 MMOL/L — SIGNIFICANT CHANGE UP (ref 22–29)
CREAT SERPL-MCNC: 0.86 MG/DL — SIGNIFICANT CHANGE UP (ref 0.5–1.3)
EGFR: 98 ML/MIN/1.73M2 — SIGNIFICANT CHANGE UP
EOSINOPHIL # BLD AUTO: 0.3 K/UL — SIGNIFICANT CHANGE UP (ref 0–0.5)
EOSINOPHIL NFR BLD AUTO: 1.3 % — SIGNIFICANT CHANGE UP (ref 0–6)
GLUCOSE BLDC GLUCOMTR-MCNC: 148 MG/DL — HIGH (ref 70–99)
GLUCOSE BLDC GLUCOMTR-MCNC: 150 MG/DL — HIGH (ref 70–99)
GLUCOSE BLDC GLUCOMTR-MCNC: 171 MG/DL — HIGH (ref 70–99)
GLUCOSE BLDC GLUCOMTR-MCNC: 188 MG/DL — HIGH (ref 70–99)
GLUCOSE SERPL-MCNC: 135 MG/DL — HIGH (ref 70–99)
HCT VFR BLD CALC: 33.3 % — LOW (ref 39–50)
HGB BLD-MCNC: 10.9 G/DL — LOW (ref 13–17)
IMM GRANULOCYTES NFR BLD AUTO: 1.1 % — HIGH (ref 0–0.9)
LYMPHOCYTES # BLD AUTO: 1.72 K/UL — SIGNIFICANT CHANGE UP (ref 1–3.3)
LYMPHOCYTES # BLD AUTO: 7.2 % — LOW (ref 13–44)
MAGNESIUM SERPL-MCNC: 2 MG/DL — SIGNIFICANT CHANGE UP (ref 1.8–2.6)
MCHC RBC-ENTMCNC: 31.4 PG — SIGNIFICANT CHANGE UP (ref 27–34)
MCHC RBC-ENTMCNC: 32.7 GM/DL — SIGNIFICANT CHANGE UP (ref 32–36)
MCV RBC AUTO: 96 FL — SIGNIFICANT CHANGE UP (ref 80–100)
MONOCYTES # BLD AUTO: 2.12 K/UL — HIGH (ref 0–0.9)
MONOCYTES NFR BLD AUTO: 8.9 % — SIGNIFICANT CHANGE UP (ref 2–14)
NEUTROPHILS # BLD AUTO: 19.35 K/UL — HIGH (ref 1.8–7.4)
NEUTROPHILS NFR BLD AUTO: 81.1 % — HIGH (ref 43–77)
PHOSPHATE SERPL-MCNC: 3.1 MG/DL — SIGNIFICANT CHANGE UP (ref 2.4–4.7)
PLATELET # BLD AUTO: 377 K/UL — SIGNIFICANT CHANGE UP (ref 150–400)
POTASSIUM SERPL-MCNC: 4.4 MMOL/L — SIGNIFICANT CHANGE UP (ref 3.5–5.3)
POTASSIUM SERPL-SCNC: 4.4 MMOL/L — SIGNIFICANT CHANGE UP (ref 3.5–5.3)
RBC # BLD: 3.47 M/UL — LOW (ref 4.2–5.8)
RBC # FLD: 14.3 % — SIGNIFICANT CHANGE UP (ref 10.3–14.5)
SODIUM SERPL-SCNC: 139 MMOL/L — SIGNIFICANT CHANGE UP (ref 135–145)
WBC # BLD: 23.85 K/UL — HIGH (ref 3.8–10.5)
WBC # FLD AUTO: 23.85 K/UL — HIGH (ref 3.8–10.5)

## 2023-09-09 PROCEDURE — 99233 SBSQ HOSP IP/OBS HIGH 50: CPT

## 2023-09-09 PROCEDURE — 71045 X-RAY EXAM CHEST 1 VIEW: CPT | Mod: 26

## 2023-09-09 RX ORDER — VANCOMYCIN HCL 1 G
1000 VIAL (EA) INTRAVENOUS EVERY 12 HOURS
Refills: 0 | Status: DISCONTINUED | OUTPATIENT
Start: 2023-09-09 | End: 2023-09-11

## 2023-09-09 RX ADMIN — CHLORHEXIDINE GLUCONATE 1 APPLICATION(S): 213 SOLUTION TOPICAL at 05:44

## 2023-09-09 RX ADMIN — Medication 2: at 18:16

## 2023-09-09 RX ADMIN — POLYETHYLENE GLYCOL 3350 17 GRAM(S): 17 POWDER, FOR SOLUTION ORAL at 05:42

## 2023-09-09 RX ADMIN — Medication 81 MILLIGRAM(S): at 18:17

## 2023-09-09 RX ADMIN — PIPERACILLIN AND TAZOBACTAM 25 GRAM(S): 4; .5 INJECTION, POWDER, LYOPHILIZED, FOR SOLUTION INTRAVENOUS at 05:40

## 2023-09-09 RX ADMIN — PIPERACILLIN AND TAZOBACTAM 25 GRAM(S): 4; .5 INJECTION, POWDER, LYOPHILIZED, FOR SOLUTION INTRAVENOUS at 13:31

## 2023-09-09 RX ADMIN — Medication 2: at 13:31

## 2023-09-09 RX ADMIN — LEVETIRACETAM 400 MILLIGRAM(S): 250 TABLET, FILM COATED ORAL at 05:40

## 2023-09-09 RX ADMIN — LEVETIRACETAM 400 MILLIGRAM(S): 250 TABLET, FILM COATED ORAL at 18:16

## 2023-09-09 RX ADMIN — SENNA PLUS 2 TABLET(S): 8.6 TABLET ORAL at 22:21

## 2023-09-09 RX ADMIN — PIPERACILLIN AND TAZOBACTAM 25 GRAM(S): 4; .5 INJECTION, POWDER, LYOPHILIZED, FOR SOLUTION INTRAVENOUS at 22:21

## 2023-09-09 RX ADMIN — ATORVASTATIN CALCIUM 80 MILLIGRAM(S): 80 TABLET, FILM COATED ORAL at 22:21

## 2023-09-09 RX ADMIN — Medication 250 MILLIGRAM(S): at 18:16

## 2023-09-09 RX ADMIN — POLYETHYLENE GLYCOL 3350 17 GRAM(S): 17 POWDER, FOR SOLUTION ORAL at 18:17

## 2023-09-09 NOTE — PROGRESS NOTE ADULT - ASSESSMENT
63M PMH of DM2, HTN presents with fall, right sided weakness, dysarthria and right facial droop found to have LM1 occlusion s/p TNK in ER subsequent mechanical thrombectomy and decompressive craniectomy for midline shift. SAMARA negative for thrombus. Cardiology recommending ILR /MCOT as outpatient. Extubated on 9/1/23, monitored in ICU and transferred to medicine for further management.     #CVA  s/p cranioplasty  c/w ASA, lipitor  SAMARA negative for thrombus, +atheroma of aortic arch and aorta   mcot/ILR on DC  pt/ot/speech/ PM&R  s/p keppra  not cleared for AC by neuro yet. cont with ASA for now. repeat hypercoag workup Monday 9/11.   Cranioplasty with neurosx outpatient     #febrile episode 9/2/23 and again 9/8  with worsening leukocytosis. Afebrile overnight  UA positive for nitrite and leukocyte esterase   cont with empiric Zosyn  pt MRSA negative  f/u repeat BCx     #urinary retention  longo placed 9/6/23  flomax increased to 0.8mg po daily  bowel regimen  TOV near discharge vs. ALDEN     #Hematuria  pt noted with 1 episode of hematuria on 9/7, suspect 2/2 traumatic longo  H/H stable    #Constipation  miralax/senna  add dulcolax suppository     #Dysphagia  pureed diet with thick liquids  aspiration precautions    #Uneven BP  noted 20unit difference between left and right arm BP by RN overnight  Pt chest xray earlier without mediastinum widening and had recent CT angio of the neck and A/P did not show any acute pathologies  likely chronic atherosclerosis disease, hold off CT angio chest    #Hypernatremia -resolved  PO fluid intake    #Type 2DM  sliding scale    ppx: SCD  Dispo: pending clinical course  63M PMH of DM2, HTN presents with fall, right sided weakness, dysarthria and right facial droop found to have LM1 occlusion s/p TNK in ER subsequent mechanical thrombectomy and decompressive craniectomy for midline shift. SAMARA negative for thrombus. Cardiology recommending ILR /MCOT as outpatient. Extubated on 9/1/23, monitored in ICU and transferred to medicine for further management.     #CVA  s/p cranioplasty  c/w ASA, lipitor  SAMARA negative for thrombus, +atheroma of aortic arch and aorta   mcot/ILR on DC  pt/ot/speech/ PM&R  s/p keppra  not cleared for AC by neuro yet. cont with ASA for now. repeat hypercoag workup Monday 9/11.   Cranioplasty with neurosx outpatient     #febrile episode 9/2/23 and again 9/8  Afebrile overnight, however, with worsening leukocytosis to 23.   UA positive for nitrite and leukocyte esterase   cont with empiric Zosyn. Add vancomycin 1000mg BID  f/u blood cx from 9/8    #urinary retention  longo placed 9/6/23  flomax increased to 0.8mg po daily  bowel regimen  TOV near discharge vs. ALDEN     #Hematuria  pt noted with 1 episode of hematuria on 9/7, suspect 2/2 traumatic longo  H/H stable    #Constipation  miralax/senna  add dulcolax suppository     #Dysphagia  pureed diet with thick liquids  aspiration precautions    #Uneven BP  noted 20unit difference between left and right arm BP by RN overnight  Pt chest xray earlier without mediastinum widening and had recent CT angio of the neck and A/P did not show any acute pathologies  likely chronic atherosclerosis disease, hold off CT angio chest    #Hypernatremia -resolved  PO fluid intake    #Type 2DM  sliding scale    ppx: SCD  Dispo: pending clinical course

## 2023-09-09 NOTE — PROGRESS NOTE ADULT - SUBJECTIVE AND OBJECTIVE BOX
Patient is a 63y old  Male who presents with a chief complaint of LT M1 Occlusion (08 Sep 2023 17:20)      Subjective and overnight events:  Patient seen and examined at bedside. pt wakes up to verbal stimuli but constantly falling back to sleep. no fever ,chills, sob, cp.    ALLERGIES:  No Known Allergies    MEDICATIONS  (STANDING):  aspirin  chewable 81 milliGRAM(s) Oral daily  atorvastatin 80 milliGRAM(s) Oral at bedtime  chlorhexidine 2% Cloths 1 Application(s) Topical <User Schedule>  dextrose 5%. 1000 milliLiter(s) (50 mL/Hr) IV Continuous <Continuous>  dextrose 5%. 1000 milliLiter(s) (100 mL/Hr) IV Continuous <Continuous>  dextrose 50% Injectable 25 Gram(s) IV Push once  dextrose 50% Injectable 25 Gram(s) IV Push once  dextrose 50% Injectable 12.5 Gram(s) IV Push once  glucagon  Injectable 1 milliGRAM(s) IntraMuscular once  insulin lispro (ADMELOG) corrective regimen sliding scale   SubCutaneous three times a day before meals  levETIRAcetam  IVPB 500 milliGRAM(s) IV Intermittent every 12 hours  piperacillin/tazobactam IVPB.. 3.375 Gram(s) IV Intermittent every 8 hours  polyethylene glycol 3350 17 Gram(s) Oral two times a day  senna 2 Tablet(s) Oral at bedtime  tamsulosin 0.8 milliGRAM(s) Oral at bedtime    MEDICATIONS  (PRN):  acetaminophen     Tablet .. 975 milliGRAM(s) Oral every 6 hours PRN Mild Pain (1 - 3)  albuterol/ipratropium for Nebulization 3 milliLiter(s) Nebulizer every 6 hours PRN Shortness of Breath and/or Wheezing  dextrose Oral Gel 15 Gram(s) Oral once PRN Blood Glucose LESS THAN 70 milliGRAM(s)/deciliter  hydrALAZINE Injectable 10 milliGRAM(s) IV Push every 2 hours PRN SBP >140  labetalol Injectable 10 milliGRAM(s) IV Push every 2 hours PRN SBP >140    Vital Signs Last 24 Hrs  T(F): 98.8 (09 Sep 2023 09:24), Max: 99.8 (09 Sep 2023 04:08)  HR: 101 (09 Sep 2023 09:24) (96 - 111)  BP: 111/75 (09 Sep 2023 09:24) (110/76 - 128/88)  RR: 18 (09 Sep 2023 09:24) (18 - 18)  SpO2: 95% (09 Sep 2023 09:24) (93% - 95%)  I&O's Summary    08 Sep 2023 07:01  -  09 Sep 2023 07:00  --------------------------------------------------------  IN: 0 mL / OUT: 1200 mL / NET: -1200 mL    09 Sep 2023 07:01  -  09 Sep 2023 15:00  --------------------------------------------------------  IN: 0 mL / OUT: 600 mL / NET: -600 mL      PHYSICAL EXAM:  General: NAD, drowsy. s/p craniopasty  ENT: MMM  Neck: Supple, No JVD  Lungs: Clear to auscultation bilaterally  Cardio: RRR, S1/S2, No murmurs  Abdomen: Soft, Nontender, Nondistended; Bowel sounds present  Extremities: No calf tenderness, No pitting edema    LABS:                        10.9   23.85 )-----------( 377      ( 09 Sep 2023 05:01 )             33.3     09-09    139  |  104  |  19.9  ----------------------------<  135  4.4   |  22.0  |  0.86    Ca    8.8      09 Sep 2023 05:01  Phos  3.1     09-09  Mg     2.0     09-09                08-29 Chol 145 mg/dL LDL -- HDL 39 mg/dL Trig 128 mg/dL    06:28 - VBG - pH:       | pCO2:       | pO2:       | Lactate: 1.40             POCT Blood Glucose.: 188 mg/dL (09 Sep 2023 12:41)  POCT Blood Glucose.: 150 mg/dL (09 Sep 2023 08:53)  POCT Blood Glucose.: 171 mg/dL (08 Sep 2023 21:33)  POCT Blood Glucose.: 130 mg/dL (08 Sep 2023 17:35)      Urinalysis Basic - ( 09 Sep 2023 05:01 )    Color: x / Appearance: x / SG: x / pH: x  Gluc: 135 mg/dL / Ketone: x  / Bili: x / Urobili: x   Blood: x / Protein: x / Nitrite: x   Leuk Esterase: x / RBC: x / WBC x   Sq Epi: x / Non Sq Epi: x / Bacteria: x        Culture - Blood (collected 02 Sep 2023 16:00)  Source: .Blood Blood  Final Report (07 Sep 2023 22:01):    No growth at 5 days    Culture - Blood (collected 02 Sep 2023 16:00)  Source: .Blood Blood  Final Report (07 Sep 2023 22:01):    No growth at 5 days          RADIOLOGY & ADDITIONAL TESTS:    Care Discussed with Consultants/Other Providers:

## 2023-09-10 LAB
-  K. PNEUMONIAE GROUP: SIGNIFICANT CHANGE UP
ANION GAP SERPL CALC-SCNC: 11 MMOL/L — SIGNIFICANT CHANGE UP (ref 5–17)
ANISOCYTOSIS BLD QL: SLIGHT — SIGNIFICANT CHANGE UP
BASOPHILS # BLD AUTO: 0 K/UL — SIGNIFICANT CHANGE UP (ref 0–0.2)
BASOPHILS NFR BLD AUTO: 0 % — SIGNIFICANT CHANGE UP (ref 0–2)
BUN SERPL-MCNC: 19.3 MG/DL — SIGNIFICANT CHANGE UP (ref 8–20)
CALCIUM SERPL-MCNC: 9 MG/DL — SIGNIFICANT CHANGE UP (ref 8.4–10.5)
CHLORIDE SERPL-SCNC: 100 MMOL/L — SIGNIFICANT CHANGE UP (ref 96–108)
CO2 SERPL-SCNC: 24 MMOL/L — SIGNIFICANT CHANGE UP (ref 22–29)
CREAT SERPL-MCNC: 0.78 MG/DL — SIGNIFICANT CHANGE UP (ref 0.5–1.3)
CULTURE RESULTS: SIGNIFICANT CHANGE UP
DACRYOCYTES BLD QL SMEAR: SLIGHT — SIGNIFICANT CHANGE UP
EGFR: 101 ML/MIN/1.73M2 — SIGNIFICANT CHANGE UP
EOSINOPHIL # BLD AUTO: 0.24 K/UL — SIGNIFICANT CHANGE UP (ref 0–0.5)
EOSINOPHIL NFR BLD AUTO: 0.9 % — SIGNIFICANT CHANGE UP (ref 0–6)
GIANT PLATELETS BLD QL SMEAR: PRESENT — SIGNIFICANT CHANGE UP
GLUCOSE BLDC GLUCOMTR-MCNC: 129 MG/DL — HIGH (ref 70–99)
GLUCOSE BLDC GLUCOMTR-MCNC: 158 MG/DL — HIGH (ref 70–99)
GLUCOSE BLDC GLUCOMTR-MCNC: 168 MG/DL — HIGH (ref 70–99)
GLUCOSE SERPL-MCNC: 142 MG/DL — HIGH (ref 70–99)
GRAM STN FLD: SIGNIFICANT CHANGE UP
HCT VFR BLD CALC: 31.5 % — LOW (ref 39–50)
HGB BLD-MCNC: 10.1 G/DL — LOW (ref 13–17)
LYMPHOCYTES # BLD AUTO: 1.83 K/UL — SIGNIFICANT CHANGE UP (ref 1–3.3)
LYMPHOCYTES # BLD AUTO: 6.9 % — LOW (ref 13–44)
MACROCYTES BLD QL: SLIGHT — SIGNIFICANT CHANGE UP
MANUAL SMEAR VERIFICATION: SIGNIFICANT CHANGE UP
MCHC RBC-ENTMCNC: 30.8 PG — SIGNIFICANT CHANGE UP (ref 27–34)
MCHC RBC-ENTMCNC: 32.1 GM/DL — SIGNIFICANT CHANGE UP (ref 32–36)
MCV RBC AUTO: 96 FL — SIGNIFICANT CHANGE UP (ref 80–100)
METHOD TYPE: SIGNIFICANT CHANGE UP
MONOCYTES # BLD AUTO: 2.55 K/UL — HIGH (ref 0–0.9)
MONOCYTES NFR BLD AUTO: 9.6 % — SIGNIFICANT CHANGE UP (ref 2–14)
NEUTROPHILS # BLD AUTO: 21.91 K/UL — HIGH (ref 1.8–7.4)
NEUTROPHILS NFR BLD AUTO: 82.6 % — HIGH (ref 43–77)
OVALOCYTES BLD QL SMEAR: SLIGHT — SIGNIFICANT CHANGE UP
PLAT MORPH BLD: NORMAL — SIGNIFICANT CHANGE UP
PLATELET # BLD AUTO: 373 K/UL — SIGNIFICANT CHANGE UP (ref 150–400)
POIKILOCYTOSIS BLD QL AUTO: SLIGHT — SIGNIFICANT CHANGE UP
POLYCHROMASIA BLD QL SMEAR: SLIGHT — SIGNIFICANT CHANGE UP
POTASSIUM SERPL-MCNC: 4 MMOL/L — SIGNIFICANT CHANGE UP (ref 3.5–5.3)
POTASSIUM SERPL-SCNC: 4 MMOL/L — SIGNIFICANT CHANGE UP (ref 3.5–5.3)
RBC # BLD: 3.28 M/UL — LOW (ref 4.2–5.8)
RBC # FLD: 14.2 % — SIGNIFICANT CHANGE UP (ref 10.3–14.5)
RBC BLD AUTO: ABNORMAL
SODIUM SERPL-SCNC: 135 MMOL/L — SIGNIFICANT CHANGE UP (ref 135–145)
SPECIMEN SOURCE: SIGNIFICANT CHANGE UP
WBC # BLD: 26.52 K/UL — HIGH (ref 3.8–10.5)
WBC # FLD AUTO: 26.52 K/UL — HIGH (ref 3.8–10.5)

## 2023-09-10 PROCEDURE — 99233 SBSQ HOSP IP/OBS HIGH 50: CPT

## 2023-09-10 RX ORDER — ACETAMINOPHEN 500 MG
650 TABLET ORAL ONCE
Refills: 0 | Status: COMPLETED | OUTPATIENT
Start: 2023-09-10 | End: 2023-09-10

## 2023-09-10 RX ADMIN — POLYETHYLENE GLYCOL 3350 17 GRAM(S): 17 POWDER, FOR SOLUTION ORAL at 17:34

## 2023-09-10 RX ADMIN — POLYETHYLENE GLYCOL 3350 17 GRAM(S): 17 POWDER, FOR SOLUTION ORAL at 05:03

## 2023-09-10 RX ADMIN — TAMSULOSIN HYDROCHLORIDE 0.8 MILLIGRAM(S): 0.4 CAPSULE ORAL at 21:57

## 2023-09-10 RX ADMIN — CHLORHEXIDINE GLUCONATE 1 APPLICATION(S): 213 SOLUTION TOPICAL at 05:03

## 2023-09-10 RX ADMIN — PIPERACILLIN AND TAZOBACTAM 25 GRAM(S): 4; .5 INJECTION, POWDER, LYOPHILIZED, FOR SOLUTION INTRAVENOUS at 05:03

## 2023-09-10 RX ADMIN — Medication 2: at 08:34

## 2023-09-10 RX ADMIN — Medication 250 MILLIGRAM(S): at 05:03

## 2023-09-10 RX ADMIN — PIPERACILLIN AND TAZOBACTAM 25 GRAM(S): 4; .5 INJECTION, POWDER, LYOPHILIZED, FOR SOLUTION INTRAVENOUS at 14:51

## 2023-09-10 RX ADMIN — LEVETIRACETAM 400 MILLIGRAM(S): 250 TABLET, FILM COATED ORAL at 05:04

## 2023-09-10 RX ADMIN — PIPERACILLIN AND TAZOBACTAM 25 GRAM(S): 4; .5 INJECTION, POWDER, LYOPHILIZED, FOR SOLUTION INTRAVENOUS at 21:58

## 2023-09-10 RX ADMIN — ATORVASTATIN CALCIUM 80 MILLIGRAM(S): 80 TABLET, FILM COATED ORAL at 21:57

## 2023-09-10 RX ADMIN — Medication 250 MILLIGRAM(S): at 17:36

## 2023-09-10 RX ADMIN — LEVETIRACETAM 400 MILLIGRAM(S): 250 TABLET, FILM COATED ORAL at 17:34

## 2023-09-10 RX ADMIN — Medication 650 MILLIGRAM(S): at 22:56

## 2023-09-10 RX ADMIN — Medication 650 MILLIGRAM(S): at 21:56

## 2023-09-10 RX ADMIN — Medication 81 MILLIGRAM(S): at 18:38

## 2023-09-10 RX ADMIN — SENNA PLUS 2 TABLET(S): 8.6 TABLET ORAL at 21:57

## 2023-09-11 LAB
ANION GAP SERPL CALC-SCNC: 11 MMOL/L — SIGNIFICANT CHANGE UP (ref 5–17)
BUN SERPL-MCNC: 21.5 MG/DL — HIGH (ref 8–20)
CALCIUM SERPL-MCNC: 8.7 MG/DL — SIGNIFICANT CHANGE UP (ref 8.4–10.5)
CHLORIDE SERPL-SCNC: 99 MMOL/L — SIGNIFICANT CHANGE UP (ref 96–108)
CO2 SERPL-SCNC: 25 MMOL/L — SIGNIFICANT CHANGE UP (ref 22–29)
CREAT SERPL-MCNC: 0.7 MG/DL — SIGNIFICANT CHANGE UP (ref 0.5–1.3)
EGFR: 104 ML/MIN/1.73M2 — SIGNIFICANT CHANGE UP
GLUCOSE BLDC GLUCOMTR-MCNC: 146 MG/DL — HIGH (ref 70–99)
GLUCOSE BLDC GLUCOMTR-MCNC: 150 MG/DL — HIGH (ref 70–99)
GLUCOSE BLDC GLUCOMTR-MCNC: 161 MG/DL — HIGH (ref 70–99)
GLUCOSE SERPL-MCNC: 160 MG/DL — HIGH (ref 70–99)
HCT VFR BLD CALC: 27.7 % — LOW (ref 39–50)
HCYS SERPL-MCNC: 5.1 UMOL/L — SIGNIFICANT CHANGE UP
HGB BLD-MCNC: 9.2 G/DL — LOW (ref 13–17)
MCHC RBC-ENTMCNC: 31.3 PG — SIGNIFICANT CHANGE UP (ref 27–34)
MCHC RBC-ENTMCNC: 33.2 GM/DL — SIGNIFICANT CHANGE UP (ref 32–36)
MCV RBC AUTO: 94.2 FL — SIGNIFICANT CHANGE UP (ref 80–100)
PLATELET # BLD AUTO: 351 K/UL — SIGNIFICANT CHANGE UP (ref 150–400)
POTASSIUM SERPL-MCNC: 3.9 MMOL/L — SIGNIFICANT CHANGE UP (ref 3.5–5.3)
POTASSIUM SERPL-SCNC: 3.9 MMOL/L — SIGNIFICANT CHANGE UP (ref 3.5–5.3)
RBC # BLD: 2.94 M/UL — LOW (ref 4.2–5.8)
RBC # FLD: 14 % — SIGNIFICANT CHANGE UP (ref 10.3–14.5)
SODIUM SERPL-SCNC: 135 MMOL/L — SIGNIFICANT CHANGE UP (ref 135–145)
VANCOMYCIN TROUGH SERPL-MCNC: 35.9 UG/ML — CRITICAL HIGH (ref 10–20)
WBC # BLD: 18.95 K/UL — HIGH (ref 3.8–10.5)
WBC # FLD AUTO: 18.95 K/UL — HIGH (ref 3.8–10.5)

## 2023-09-11 PROCEDURE — 99233 SBSQ HOSP IP/OBS HIGH 50: CPT

## 2023-09-11 RX ADMIN — LEVETIRACETAM 400 MILLIGRAM(S): 250 TABLET, FILM COATED ORAL at 05:35

## 2023-09-11 RX ADMIN — POLYETHYLENE GLYCOL 3350 17 GRAM(S): 17 POWDER, FOR SOLUTION ORAL at 17:59

## 2023-09-11 RX ADMIN — POLYETHYLENE GLYCOL 3350 17 GRAM(S): 17 POWDER, FOR SOLUTION ORAL at 05:51

## 2023-09-11 RX ADMIN — PIPERACILLIN AND TAZOBACTAM 25 GRAM(S): 4; .5 INJECTION, POWDER, LYOPHILIZED, FOR SOLUTION INTRAVENOUS at 05:36

## 2023-09-11 RX ADMIN — Medication 81 MILLIGRAM(S): at 17:59

## 2023-09-11 RX ADMIN — ATORVASTATIN CALCIUM 80 MILLIGRAM(S): 80 TABLET, FILM COATED ORAL at 21:15

## 2023-09-11 RX ADMIN — PIPERACILLIN AND TAZOBACTAM 25 GRAM(S): 4; .5 INJECTION, POWDER, LYOPHILIZED, FOR SOLUTION INTRAVENOUS at 15:00

## 2023-09-11 RX ADMIN — SENNA PLUS 2 TABLET(S): 8.6 TABLET ORAL at 21:15

## 2023-09-11 RX ADMIN — PIPERACILLIN AND TAZOBACTAM 25 GRAM(S): 4; .5 INJECTION, POWDER, LYOPHILIZED, FOR SOLUTION INTRAVENOUS at 21:14

## 2023-09-11 RX ADMIN — Medication 2: at 15:31

## 2023-09-11 RX ADMIN — TAMSULOSIN HYDROCHLORIDE 0.8 MILLIGRAM(S): 0.4 CAPSULE ORAL at 21:15

## 2023-09-11 RX ADMIN — CHLORHEXIDINE GLUCONATE 1 APPLICATION(S): 213 SOLUTION TOPICAL at 05:15

## 2023-09-11 RX ADMIN — Medication 250 MILLIGRAM(S): at 05:35

## 2023-09-11 RX ADMIN — LEVETIRACETAM 400 MILLIGRAM(S): 250 TABLET, FILM COATED ORAL at 18:00

## 2023-09-11 NOTE — PHARMACOTHERAPY INTERVENTION NOTE - COMMENTS
Vancomycin level ordered 
Blood cultures growing Kleb pneumo, on Zosyn. No evidence of MRSA - discussed with attending discontinued vancomycin.

## 2023-09-11 NOTE — PROGRESS NOTE ADULT - ASSESSMENT
63M PMH of DM2, HTN presents with fall, right sided weakness, dysarthria and right facial droop found to have LM1 occlusion s/p TNK in ER subsequent mechanical thrombectomy and decompressive craniectomy for midline shift. SAMARA negative for thrombus. Cardiology recommending ILR /MCOT as outpatient. Extubated on 9/1/23, monitored in ICU and transferred to medicine for further management.     #CVA s/p cranioplasty  c/w ASA, lipitor-Rehab/Impaired mobility and function   SAMARA negative for thrombus, +atheroma of aortic arch and aorta   mcot/ILR on DC  pt/ot/speech/ PM&R  s/p keppra   cont with ASA for now. repeat hypercoag workup Monday 9/11.   Cranioplasty with neurosx outpatient     Klebsiella Bacteremia  leukocytosis to 23.   cont  Zosyn.f/u blood cx from 9/8    #urinary retention  longo   flomax increased to 0.8mg po daily  bowel regimen  TOV near discharge vs. ALDEN     #Hematuria  pt noted with 1 episode of hematuria on 9/7, suspect 2/2 traumatic longo  H/H stable    #Constipation  miralax/senna  add dulcolax suppository     #Dysphagia  pureed diet with thick liquids  aspiration precautions    #Uneven BP   CT angio of the neck and A/P did not show any acute pathologies  likely chronic atherosclerosis disease    #Hypernatremia -resolved  PO fluid intake    #Type 2DM  sliding scale

## 2023-09-11 NOTE — CHART NOTE - NSCHARTNOTEFT_GEN_A_CORE
Neurology  Discussed with cardiology regarding complex atheroma in the aortic arch.  No indication for anticoagulation for this, however based on hematology recommendation for Lupus anticoagulant he would need anticoagulation once cleared by neurology.  Recommend CT head on 9-13-23 and if stable can consider full anticoagulation at that time.

## 2023-09-11 NOTE — PROGRESS NOTE ADULT - SUBJECTIVE AND OBJECTIVE BOX
JOSUE JOHNSON Patient is a 63y old  Male who presents with a chief complaint of LT M1 Occlusion (11 Sep 2023 07:37)     HPI:  Patient is a 63 year old male with PMH Hypertension, DM on oral medications who presents c/o stroke-like symptoms. Per son pt was walking outside around 10:15am this morning when he suddenly fell. His son helped him up and noted that he was weak on the right side and not acting normally which prompted him to come to the ED. On arrival pt w/ obvious right sided arm weakness, confusion, dysarthria, facial droop suggestive of acute stroke. Code stroke initiated, found to have LM1 occlusion, given TNK @ 1215 and was taken for thrombectomy. Unable to provide ROS 2/2 aphasia  (27 Aug 2023 16:45)    The patient was seen and evaluated   The patient is in no acute distress.  Denied any fever chest pain, palpitations, shortness of breath, abdominal pain, fever, dysuria, cough, edema       I&O's Summary    10 Sep 2023 07:01  -  11 Sep 2023 07:00  --------------------------------------------------------  IN: 0 mL / OUT: 1750 mL / NET: -1750 mL      Allergies    No Known Allergies    Intolerances      HEALTH ISSUES - PROBLEM Dx:  Suspected deep vein thrombosis (DVT)    CVA (cerebrovascular accident)    Hypotension    Stroke          PAST MEDICAL & SURGICAL HISTORY:  Hypertension      No significant past surgical history              Vital Signs Last 24 Hrs  T(C): 37.6 (11 Sep 2023 20:37), Max: 37.6 (11 Sep 2023 20:37)  T(F): 99.6 (11 Sep 2023 20:37), Max: 99.6 (11 Sep 2023 20:37)  HR: 94 (11 Sep 2023 20:37) (89 - 95)  BP: 126/77 (11 Sep 2023 20:37) (93/69 - 126/77)  BP(mean): --  RR: 18 (11 Sep 2023 20:37) (18 - 18)  SpO2: 95% (11 Sep 2023 20:37) (95% - 98%)    Parameters below as of 11 Sep 2023 20:37  Patient On (Oxygen Delivery Method): room air    T(C): 37.6 (09-11-23 @ 20:37), Max: 37.6 (09-11-23 @ 20:37)  HR: 94 (09-11-23 @ 20:37) (89 - 95)  BP: 126/77 (09-11-23 @ 20:37) (93/69 - 126/77)  RR: 18 (09-11-23 @ 20:37) (18 - 18)  SpO2: 95% (09-11-23 @ 20:37) (95% - 98%)  Wt(kg): --    PHYSICAL EXAM:    GENERAL: NAD  HEAD:  Atraumatic, Normocephalic  EYES: conjunctiva and sclera clear  ENMT:  Moist mucous membranes,  No lesions  NECK: Supple,   NERVOUS SYSTEM:  Alert Receptive deficits, able to follow limited commands - improves with visual cues   Motor - Left UE plegia, Left LE paresis  CHEST/LUNG: Clear to auscultation bilaterally; No rales, rhonchi, wheezing,   HEART: Regular rate and rhythm; No murmurs,   ABDOMEN: Soft, Nontender, Nondistended; Bowel sounds present  EXTREMITIES:  Peripheral Pulses, No  cyanosis, or edema      acetaminophen     Tablet .. 975 milliGRAM(s) Oral every 6 hours PRN  albuterol/ipratropium for Nebulization 3 milliLiter(s) Nebulizer every 6 hours PRN  aspirin  chewable 81 milliGRAM(s) Oral daily  atorvastatin 80 milliGRAM(s) Oral at bedtime  chlorhexidine 2% Cloths 1 Application(s) Topical <User Schedule>  dextrose 5%. 1000 milliLiter(s) IV Continuous <Continuous>  dextrose 5%. 1000 milliLiter(s) IV Continuous <Continuous>  dextrose 50% Injectable 25 Gram(s) IV Push once  dextrose 50% Injectable 25 Gram(s) IV Push once  dextrose 50% Injectable 12.5 Gram(s) IV Push once  dextrose Oral Gel 15 Gram(s) Oral once PRN  glucagon  Injectable 1 milliGRAM(s) IntraMuscular once  hydrALAZINE Injectable 10 milliGRAM(s) IV Push every 2 hours PRN  insulin lispro (ADMELOG) corrective regimen sliding scale   SubCutaneous three times a day before meals  labetalol Injectable 10 milliGRAM(s) IV Push every 2 hours PRN  levETIRAcetam  IVPB 500 milliGRAM(s) IV Intermittent every 12 hours  piperacillin/tazobactam IVPB.. 3.375 Gram(s) IV Intermittent every 8 hours  polyethylene glycol 3350 17 Gram(s) Oral two times a day  senna 2 Tablet(s) Oral at bedtime  tamsulosin 0.8 milliGRAM(s) Oral at bedtime      LABS:                          9.2    18.95 )-----------( 351      ( 11 Sep 2023 08:44 )             27.7     09-11    135  |  99  |  21.5<H>  ----------------------------<  160<H>  3.9   |  25.0  |  0.70    Ca    8.7      11 Sep 2023 08:44              Urinalysis Basic - ( 11 Sep 2023 08:44 )    Color: x / Appearance: x / SG: x / pH: x  Gluc: 160 mg/dL / Ketone: x  / Bili: x / Urobili: x   Blood: x / Protein: x / Nitrite: x   Leuk Esterase: x / RBC: x / WBC x   Sq Epi: x / Non Sq Epi: x / Bacteria: x      CAPILLARY BLOOD GLUCOSE      POCT Blood Glucose.: 150 mg/dL (11 Sep 2023 16:29)  POCT Blood Glucose.: 161 mg/dL (11 Sep 2023 15:30)  POCT Blood Glucose.: 146 mg/dL (11 Sep 2023 08:27)      RADIOLOGY & ADDITIONAL TESTS:      Consultant notes reviewed    Case discussed with consultant/provider/ family /patient

## 2023-09-12 LAB
-  AMIKACIN: SIGNIFICANT CHANGE UP
-  AMPICILLIN/SULBACTAM: SIGNIFICANT CHANGE UP
-  AMPICILLIN: SIGNIFICANT CHANGE UP
-  AZTREONAM: SIGNIFICANT CHANGE UP
-  CEFAZOLIN: SIGNIFICANT CHANGE UP
-  CEFEPIME: SIGNIFICANT CHANGE UP
-  CEFOXITIN: SIGNIFICANT CHANGE UP
-  CEFTRIAXONE: SIGNIFICANT CHANGE UP
-  CIPROFLOXACIN: SIGNIFICANT CHANGE UP
-  ERTAPENEM: SIGNIFICANT CHANGE UP
-  GENTAMICIN: SIGNIFICANT CHANGE UP
-  IMIPENEM: SIGNIFICANT CHANGE UP
-  LEVOFLOXACIN: SIGNIFICANT CHANGE UP
-  MEROPENEM: SIGNIFICANT CHANGE UP
-  PIPERACILLIN/TAZOBACTAM: SIGNIFICANT CHANGE UP
-  TOBRAMYCIN: SIGNIFICANT CHANGE UP
-  TRIMETHOPRIM/SULFAMETHOXAZOLE: SIGNIFICANT CHANGE UP
ALBUMIN SERPL ELPH-MCNC: 2.5 G/DL — LOW (ref 3.3–5.2)
ALP SERPL-CCNC: 145 U/L — HIGH (ref 40–120)
ALT FLD-CCNC: 135 U/L — HIGH
ANION GAP SERPL CALC-SCNC: 10 MMOL/L — SIGNIFICANT CHANGE UP (ref 5–17)
APCR PPP: 3.12 RATIO — SIGNIFICANT CHANGE UP
AST SERPL-CCNC: 140 U/L — HIGH
AT III ACT/NOR PPP CHRO: 81 % — LOW (ref 85–135)
AT III AG PPP IA-MCNC: 24 MG/DL — SIGNIFICANT CHANGE UP (ref 19–31)
B2 GLYCOPROT1 AB SER QL: NEGATIVE — SIGNIFICANT CHANGE UP
BILIRUB SERPL-MCNC: 0.5 MG/DL — SIGNIFICANT CHANGE UP (ref 0.4–2)
BUN SERPL-MCNC: 18.2 MG/DL — SIGNIFICANT CHANGE UP (ref 8–20)
CALCIUM SERPL-MCNC: 8.6 MG/DL — SIGNIFICANT CHANGE UP (ref 8.4–10.5)
CARDIOLIPIN AB SER-ACNC: NEGATIVE — SIGNIFICANT CHANGE UP
CHLORIDE SERPL-SCNC: 98 MMOL/L — SIGNIFICANT CHANGE UP (ref 96–108)
CO2 SERPL-SCNC: 25 MMOL/L — SIGNIFICANT CHANGE UP (ref 22–29)
CREAT SERPL-MCNC: 0.71 MG/DL — SIGNIFICANT CHANGE UP (ref 0.5–1.3)
CULTURE RESULTS: SIGNIFICANT CHANGE UP
DRVVT RATIO: 1.07 RATIO — SIGNIFICANT CHANGE UP (ref 0–1.21)
DRVVT SCREEN TO CONFIRM RATIO: SIGNIFICANT CHANGE UP
EGFR: 104 ML/MIN/1.73M2 — SIGNIFICANT CHANGE UP
GLUCOSE BLDC GLUCOMTR-MCNC: 130 MG/DL — HIGH (ref 70–99)
GLUCOSE BLDC GLUCOMTR-MCNC: 139 MG/DL — HIGH (ref 70–99)
GLUCOSE BLDC GLUCOMTR-MCNC: 168 MG/DL — HIGH (ref 70–99)
GLUCOSE SERPL-MCNC: 150 MG/DL — HIGH (ref 70–99)
HCT VFR BLD CALC: 27.7 % — LOW (ref 39–50)
HGB BLD-MCNC: 9 G/DL — LOW (ref 13–17)
MCHC RBC-ENTMCNC: 30.4 PG — SIGNIFICANT CHANGE UP (ref 27–34)
MCHC RBC-ENTMCNC: 32.5 GM/DL — SIGNIFICANT CHANGE UP (ref 32–36)
MCV RBC AUTO: 93.6 FL — SIGNIFICANT CHANGE UP (ref 80–100)
METHOD TYPE: SIGNIFICANT CHANGE UP
ORGANISM # SPEC MICROSCOPIC CNT: SIGNIFICANT CHANGE UP
PLATELET # BLD AUTO: 375 K/UL — SIGNIFICANT CHANGE UP (ref 150–400)
POTASSIUM SERPL-MCNC: 3.9 MMOL/L — SIGNIFICANT CHANGE UP (ref 3.5–5.3)
POTASSIUM SERPL-SCNC: 3.9 MMOL/L — SIGNIFICANT CHANGE UP (ref 3.5–5.3)
PROT C ACT/NOR PPP: 86 % — SIGNIFICANT CHANGE UP (ref 74–150)
PROT SERPL-MCNC: 6.1 G/DL — LOW (ref 6.6–8.7)
RBC # BLD: 2.96 M/UL — LOW (ref 4.2–5.8)
RBC # FLD: 14.1 % — SIGNIFICANT CHANGE UP (ref 10.3–14.5)
SODIUM SERPL-SCNC: 133 MMOL/L — LOW (ref 135–145)
SPECIMEN SOURCE: SIGNIFICANT CHANGE UP
WBC # BLD: 14.18 K/UL — HIGH (ref 3.8–10.5)
WBC # FLD AUTO: 14.18 K/UL — HIGH (ref 3.8–10.5)

## 2023-09-12 PROCEDURE — 70450 CT HEAD/BRAIN W/O DYE: CPT | Mod: 26

## 2023-09-12 PROCEDURE — 99223 1ST HOSP IP/OBS HIGH 75: CPT

## 2023-09-12 PROCEDURE — 76700 US EXAM ABDOM COMPLETE: CPT | Mod: 26

## 2023-09-12 PROCEDURE — 71045 X-RAY EXAM CHEST 1 VIEW: CPT | Mod: 26

## 2023-09-12 PROCEDURE — 99233 SBSQ HOSP IP/OBS HIGH 50: CPT

## 2023-09-12 RX ORDER — CEFTRIAXONE 500 MG/1
2000 INJECTION, POWDER, FOR SOLUTION INTRAMUSCULAR; INTRAVENOUS EVERY 24 HOURS
Refills: 0 | Status: COMPLETED | OUTPATIENT
Start: 2023-09-12 | End: 2023-09-19

## 2023-09-12 RX ADMIN — TAMSULOSIN HYDROCHLORIDE 0.8 MILLIGRAM(S): 0.4 CAPSULE ORAL at 22:24

## 2023-09-12 RX ADMIN — LEVETIRACETAM 400 MILLIGRAM(S): 250 TABLET, FILM COATED ORAL at 05:03

## 2023-09-12 RX ADMIN — SENNA PLUS 2 TABLET(S): 8.6 TABLET ORAL at 22:24

## 2023-09-12 RX ADMIN — LEVETIRACETAM 400 MILLIGRAM(S): 250 TABLET, FILM COATED ORAL at 17:51

## 2023-09-12 RX ADMIN — POLYETHYLENE GLYCOL 3350 17 GRAM(S): 17 POWDER, FOR SOLUTION ORAL at 05:06

## 2023-09-12 RX ADMIN — CHLORHEXIDINE GLUCONATE 1 APPLICATION(S): 213 SOLUTION TOPICAL at 05:11

## 2023-09-12 RX ADMIN — ATORVASTATIN CALCIUM 80 MILLIGRAM(S): 80 TABLET, FILM COATED ORAL at 22:24

## 2023-09-12 RX ADMIN — CEFTRIAXONE 2000 MILLIGRAM(S): 500 INJECTION, POWDER, FOR SOLUTION INTRAMUSCULAR; INTRAVENOUS at 13:03

## 2023-09-12 RX ADMIN — Medication 81 MILLIGRAM(S): at 13:04

## 2023-09-12 RX ADMIN — PIPERACILLIN AND TAZOBACTAM 25 GRAM(S): 4; .5 INJECTION, POWDER, LYOPHILIZED, FOR SOLUTION INTRAVENOUS at 05:06

## 2023-09-12 RX ADMIN — POLYETHYLENE GLYCOL 3350 17 GRAM(S): 17 POWDER, FOR SOLUTION ORAL at 17:51

## 2023-09-12 NOTE — CONSULT NOTE ADULT - CONSULT REASON
lupus anticoagulant positive
CVA
Left MCA Stroke, hemicraniectomy evaluation
bacteremia
stroke eval
Stroke Etiology

## 2023-09-12 NOTE — PROGRESS NOTE ADULT - SUBJECTIVE AND OBJECTIVE BOX
Preliminary note, offical recommendations pending attending review/signature       [facility] Stroke Team Progress Note      #    HPI:  Patient is a 63 year old male with PMHx Hypertension, DM on oral medications who presented c/o stroke-like symptoms. Per son pt was walking outside around 10:15am on 8/27/23 when he suddenly fell. His son helped him up and noted that he was weak on the right side and not acting normally which prompted him to come to the ED. On arrival pt w/ obvious right sided arm weakness, confusion, dysarthria, facial droop suggestive of acute stroke. Code stroke initiated, found to have LM1 occlusion, given TNK @ 1215 and was taken for thrombectomy. Unable to provide ROS 2/2 aphasia.      SUBJECTIVE: No events overnight.  No new neurologic complaints.  ROS reported negative unless otherwise noted.    acetaminophen     Tablet .. 975 milliGRAM(s) Oral every 6 hours PRN  albuterol/ipratropium for Nebulization 3 milliLiter(s) Nebulizer every 6 hours PRN  aspirin  chewable 81 milliGRAM(s) Oral daily  atorvastatin 80 milliGRAM(s) Oral at bedtime  chlorhexidine 2% Cloths 1 Application(s) Topical <User Schedule>  dextrose 5%. 1000 milliLiter(s) IV Continuous <Continuous>  dextrose 5%. 1000 milliLiter(s) IV Continuous <Continuous>  dextrose 50% Injectable 25 Gram(s) IV Push once  dextrose 50% Injectable 25 Gram(s) IV Push once  dextrose 50% Injectable 12.5 Gram(s) IV Push once  dextrose Oral Gel 15 Gram(s) Oral once PRN  glucagon  Injectable 1 milliGRAM(s) IntraMuscular once  hydrALAZINE Injectable 10 milliGRAM(s) IV Push every 2 hours PRN  insulin lispro (ADMELOG) corrective regimen sliding scale   SubCutaneous three times a day before meals  labetalol Injectable 10 milliGRAM(s) IV Push every 2 hours PRN  levETIRAcetam  IVPB 500 milliGRAM(s) IV Intermittent every 12 hours  piperacillin/tazobactam IVPB.. 3.375 Gram(s) IV Intermittent every 8 hours  polyethylene glycol 3350 17 Gram(s) Oral two times a day  senna 2 Tablet(s) Oral at bedtime  tamsulosin 0.8 milliGRAM(s) Oral at bedtime      PHYSICAL EXAM:   Vital Signs Last 24 Hrs  T(C): 36.5 (12 Sep 2023 05:50), Max: 37.6 (11 Sep 2023 20:37)  T(F): 97.7 (12 Sep 2023 05:50), Max: 99.6 (11 Sep 2023 20:37)  HR: 85 (12 Sep 2023 08:26) (85 - 94)  BP: 124/67 (12 Sep 2023 08:26) (94/50 - 126/77)  BP(mean): --  RR: 18 (12 Sep 2023 08:26) (16 - 18)  SpO2: 94% (12 Sep 2023 08:26) (94% - 95%)    Parameters below as of 12 Sep 2023 08:26  Patient On (Oxygen Delivery Method): room air        General: No acute distress. INCOMPLETE    NEUROLOGICAL EXAM:  Mental status: eyes open, awake, alert, attends to examiner briskly on left, smiles, generates sounds, follows some simple commands, perseverates, mimics at times   Cranial Nerves: pupils 3mm and reactive, crosses midline, RHHA to threat, right facial palsy   Motor exam: Normal tone, right hemiplegia, RLE trace hip flexion;   5/5 LUE, 5/5 LLE, no drift   Sensation: Intact to noxious stimuli  throughout   Coordination/ Gait: gait not tested          LABS:                        9.0    14.18 )-----------( 375      ( 12 Sep 2023 07:02 )             27.7    09-12    133<L>  |  98  |  18.2  ----------------------------<  150<H>  3.9   |  25.0  |  0.71    Ca    8.6      12 Sep 2023 07:02    TPro  6.1<L>  /  Alb  2.5<L>  /  TBili  0.5  /  DBili  x   /  AST  140<H>  /  ALT  135<H>  /  AlkPhos  145<H>  09-12      IMAGING: Reviewed by me.   SAMARA Echo Doppler (09.01.23 @ 12:52)    1. Left ventricular ejection fraction, by visual estimation, is >75%.   2. Normal global left ventricular systolic function.   3. Normal right ventricular size and function.   4. Normal left atrial size.   5. No left atrial appendage thrombus and normal left atrial appendage   velocities.   6. Trace mitral valve regurgitation.   7. Color flow doppler and intravenous injection of agitated saline   demonstrates the presence of an intact intra atrial septum.   8. Mobile intra-atrial septum with lipomatous hypertrophy.   9. Moderate complex atheromas at the aortic arch and descending aorta.  10. There is no evidence of pericardial effusion.      CT Head No Cont (09.03.23 @ 11:20)   Impression:  No significant change from the previous exam performed yesterday.   Surgical site is unchanged as is the brain parenchyma changes from left   middle cerebral artery stroke. Mass effect is unchanged.    CT Head No Cont (09.02.23 @ 04:01)   IMPRESSION: Redemonstration of large left MCA territoryinfarct.   Left-to-right midline shift measures 3 mm, unchanged.    CT Head No Cont (08.31.23 @ 20:43)   IMPRESSION:  Interval removal of subcutaneous drainage catheter with new acute   extradural broad products identified, withincreased rightward midline   shift which now measures 6 mm (previously 3 mm on 8/30/2023).    Additionally, there is increased hypoattenuation of the left   frontoparietal cortex within the known MCA territory infarct, which may   represent new areas of acute/subacute ischemia versus evolving gliosis.   Hemorrhagic conversion is not suspected at this time. Close attention on   follow-up is advised.    Diffuse scalp edema extending to the left periorbital soft tissues has   significantly increased since the prior study, likely due to evolving   subcutaneous blood products.    Critical findings above were discussed directly by telephone by the ED   radiologist on call, Fei Hebert MD, with the neurosurgical ICU   physicians assistant, Casi RUGGIERO, at 3:40 AM on 9/1/2023.    CT Head No Cont (08.30.23 @ 15:44)   Postop changes compatible with a left temporal frontal craniectomy is   identified. There is some extension of the parenchyma through the   craniectomy defect identified. There is abnormal low-attenuation again   seen involving left temporal frontal parietal region involving the left   basal ganglia and corona radiata region. This is compatible with an   evolving acute left MCA infarct. Previously noted areas of high   attenuation is less conspicuous which could be compatible with evolving   areas of hemorrhage. Mass effect on the left lateral ventricle is seen.   Left-to-right shift (3.1 mm) is seen.    There is extra-axial hematoma fluid and soft tissue swelling seen seen in   the postop region with extra-axial drain comedies findings are compatible   with postoperative changes.    The visualized paranasal sinuses mastoid andmiddle regions appear clear.  IMPRESSION:  New changes as described above.    CT Head No Cont (08.29.23 @ 23:39)   IMPRESSION: Evolving acute left MCA infarct is identified.   Mass effect on the left lateral ventricle is   again seen. Slight increased left-to-right shift (1.2 cm) is seen.    CT Head No Cont (08.29.23 @ 00:31)   IMPRESSION:  Continued evolution of large acute left MCA territory infarct.   Left-to-right midline shift measuring 6 mm, increased. No hydrocephalus   or effacement of basal cisterns. Curvilinear foci of hyperdensity within   the area of infarct, for which petechial hemorrhage cannot be excluded.   No large parenchymal hemorrhage.    CT Abdomen and Pelvis w/ IV Cont (08.28.23 @ 02:53)   IMPRESSION:  No evidence for bladder rupture.    CT Brain Stroke Protocol (08.27.23 @ 11:53)   IMPRESSION:  Wedgelike low density in the left putamen and caudate (corpus striatum)   is new from the prior scan, and consistent with infarct that may be acute   given corresponding symptoms.  No acute intracranial hemorrhage.    The study was performed at 11:47 AM on 08/27/2023 and the above findings   were discussed with Dr. Betts at 12:02 PM.    CT Angio Brain Stroke Protocol  w/ IV Cont (08.27.23 @ 12:03)   IMPRESSION:  CTA head: Focal occlusion of left MCA distal M1 segment with patent but   small caliber filling of M2 segments.    CT perfusion indicates large mismatch between Tmax and CBF consistent   with ischemic penumbra.    CTA Neck: No steno-occlusive focus.    Case findings above discussed with Dr. Hu at 12:13 PM on 08/27/2023.    CT Head No Cont (08.28.23 @ 02:34)   IMPRESSION: Acute left MCA infarct. Possible acute infarct involving the   right temporal region. This finding can be better evaluated with MRI if   there are no contraindications.    TTE Echo Complete w/o Contrast w/ Doppler (08.29.23 @ 12:03)   Summary:   1. Left ventricular ejection fraction, by visual estimation, is 55 to   60%.   2. Normal global left ventricular systolic function.   3. Normal right ventricular size and function.   4. Trace mitral valve regurgitation.   5. Mild aortic regurgitation.   6. Sclerotic aortic valve with normal opening.   7. There is no evidence of pericardial effusion.           Preliminary note, offical recommendations pending attending review/signature       [facility] Stroke Team Progress Note       HPI:  Patient is a 63 year old male with PMHx Hypertension, DM on oral medications who presented c/o stroke-like symptoms. Per son pt was walking outside around 10:15am on 8/27/23 when he suddenly fell. His son helped him up and noted that he was weak on the right side and not acting normally which prompted him to come to the ED. On arrival pt w/ obvious right sided arm weakness, confusion, dysarthria, facial droop suggestive of acute stroke. Code stroke initiated, found to have LM1 occlusion, given TNK @ 1215 and was taken for thrombectomy. Unable to provide ROS 2/2 aphasia.      SUBJECTIVE: No events overnight.  No new neurologic complaints.  ROS reported negative unless otherwise noted.    acetaminophen     Tablet .. 975 milliGRAM(s) Oral every 6 hours PRN  albuterol/ipratropium for Nebulization 3 milliLiter(s) Nebulizer every 6 hours PRN  aspirin  chewable 81 milliGRAM(s) Oral daily  atorvastatin 80 milliGRAM(s) Oral at bedtime  chlorhexidine 2% Cloths 1 Application(s) Topical <User Schedule>  dextrose 5%. 1000 milliLiter(s) IV Continuous <Continuous>  dextrose 5%. 1000 milliLiter(s) IV Continuous <Continuous>  dextrose 50% Injectable 25 Gram(s) IV Push once  dextrose 50% Injectable 25 Gram(s) IV Push once  dextrose 50% Injectable 12.5 Gram(s) IV Push once  dextrose Oral Gel 15 Gram(s) Oral once PRN  glucagon  Injectable 1 milliGRAM(s) IntraMuscular once  hydrALAZINE Injectable 10 milliGRAM(s) IV Push every 2 hours PRN  insulin lispro (ADMELOG) corrective regimen sliding scale   SubCutaneous three times a day before meals  labetalol Injectable 10 milliGRAM(s) IV Push every 2 hours PRN  levETIRAcetam  IVPB 500 milliGRAM(s) IV Intermittent every 12 hours  piperacillin/tazobactam IVPB.. 3.375 Gram(s) IV Intermittent every 8 hours  polyethylene glycol 3350 17 Gram(s) Oral two times a day  senna 2 Tablet(s) Oral at bedtime  tamsulosin 0.8 milliGRAM(s) Oral at bedtime      PHYSICAL EXAM:   Vital Signs Last 24 Hrs  T(C): 36.5 (12 Sep 2023 05:50), Max: 37.6 (11 Sep 2023 20:37)  T(F): 97.7 (12 Sep 2023 05:50), Max: 99.6 (11 Sep 2023 20:37)  HR: 85 (12 Sep 2023 08:26) (85 - 94)  BP: 124/67 (12 Sep 2023 08:26) (94/50 - 126/77)  BP(mean): --  RR: 18 (12 Sep 2023 08:26) (16 - 18)  SpO2: 94% (12 Sep 2023 08:26) (94% - 95%)    Parameters below as of 12 Sep 2023 08:26  Patient On (Oxygen Delivery Method): room air        General: No acute distress.     NEUROLOGICAL EXAM:  Mental status: eyes open, awake, alert, attends to examiner briskly on left, smiles, generates sounds, follows some simple commands, perseverates, mimics at times   Cranial Nerves: pupils 3mm and reactive, crosses midline, right eye no blink to threat, right facial palsy   Motor exam: Normal tone, right hemiplegia, RLE trace hip flexion;   5/5 LUE, 5/5 LLE, no drift   Sensation: Intact to noxious stimuli  throughout   Coordination/ Gait: gait not tested          LABS:                        9.0    14.18 )-----------( 375      ( 12 Sep 2023 07:02 )             27.7    09-12    133<L>  |  98  |  18.2  ----------------------------<  150<H>  3.9   |  25.0  |  0.71    Ca    8.6      12 Sep 2023 07:02    TPro  6.1<L>  /  Alb  2.5<L>  /  TBili  0.5  /  DBili  x   /  AST  140<H>  /  ALT  135<H>  /  AlkPhos  145<H>  09-12      IMAGING: Reviewed by me.     CT Head No Cont (09.12.23 @ 10:08)     Impression: Evolving left MCA infarct.  There is some associated area of high attenuation   identified which could be compatible spared parenchyma versus petechial   hemorrhage. Localized mass effect is seen consisting of sulcal   effacement. No significant shift or herniation is seen.      SAMARA Echo Doppler (09.01.23 @ 12:52)    1. Left ventricular ejection fraction, by visual estimation, is >75%.   2. Normal global left ventricular systolic function.   3. Normal right ventricular size and function.   4. Normal left atrial size.   5. No left atrial appendage thrombus and normal left atrial appendage   velocities.   6. Trace mitral valve regurgitation.   7. Color flow doppler and intravenous injection of agitated saline   demonstrates the presence of an intact intra atrial septum.   8. Mobile intra-atrial septum with lipomatous hypertrophy.   9. Moderate complex atheromas at the aortic arch and descending aorta.  10. There is no evidence of pericardial effusion.      CT Head No Cont (09.03.23 @ 11:20)   Impression:  No significant change from the previous exam performed yesterday.   Surgical site is unchanged as is the brain parenchyma changes from left   middle cerebral artery stroke. Mass effect is unchanged.    CT Head No Cont (09.02.23 @ 04:01)   IMPRESSION: Redemonstration of large left MCA territoryinfarct.   Left-to-right midline shift measures 3 mm, unchanged.    CT Head No Cont (08.31.23 @ 20:43)   IMPRESSION:  Interval removal of subcutaneous drainage catheter with new acute   extradural broad products identified, withincreased rightward midline   shift which now measures 6 mm (previously 3 mm on 8/30/2023).    Additionally, there is increased hypoattenuation of the left   frontoparietal cortex within the known MCA territory infarct, which may   represent new areas of acute/subacute ischemia versus evolving gliosis.   Hemorrhagic conversion is not suspected at this time. Close attention on   follow-up is advised.    Diffuse scalp edema extending to the left periorbital soft tissues has   significantly increased since the prior study, likely due to evolving   subcutaneous blood products.    Critical findings above were discussed directly by telephone by the ED   radiologist on call, Fei Hebert MD, with the neurosurgical ICU   physicians assistant, Casi RUGGIEOR, at 3:40 AM on 9/1/2023.    CT Head No Cont (08.30.23 @ 15:44)   Postop changes compatible with a left temporal frontal craniectomy is   identified. There is some extension of the parenchyma through the   craniectomy defect identified. There is abnormal low-attenuation again   seen involving left temporal frontal parietal region involving the left   basal ganglia and corona radiata region. This is compatible with an   evolving acute left MCA infarct. Previously noted areas of high   attenuation is less conspicuous which could be compatible with evolving   areas of hemorrhage. Mass effect on the left lateral ventricle is seen.   Left-to-right shift (3.1 mm) is seen.    There is extra-axial hematoma fluid and soft tissue swelling seen seen in   the postop region with extra-axial drain comedies findings are compatible   with postoperative changes.    The visualized paranasal sinuses mastoid andmiddle regions appear clear.  IMPRESSION:  New changes as described above.    CT Head No Cont (08.29.23 @ 23:39)   IMPRESSION: Evolving acute left MCA infarct is identified.   Mass effect on the left lateral ventricle is   again seen. Slight increased left-to-right shift (1.2 cm) is seen.    CT Head No Cont (08.29.23 @ 00:31)   IMPRESSION:  Continued evolution of large acute left MCA territory infarct.   Left-to-right midline shift measuring 6 mm, increased. No hydrocephalus   or effacement of basal cisterns. Curvilinear foci of hyperdensity within   the area of infarct, for which petechial hemorrhage cannot be excluded.   No large parenchymal hemorrhage.    CT Abdomen and Pelvis w/ IV Cont (08.28.23 @ 02:53)   IMPRESSION:  No evidence for bladder rupture.    CT Brain Stroke Protocol (08.27.23 @ 11:53)   IMPRESSION:  Wedgelike low density in the left putamen and caudate (corpus striatum)   is new from the prior scan, and consistent with infarct that may be acute   given corresponding symptoms.  No acute intracranial hemorrhage.    The study was performed at 11:47 AM on 08/27/2023 and the above findings   were discussed with Dr. Betts at 12:02 PM.    CT Angio Brain Stroke Protocol  w/ IV Cont (08.27.23 @ 12:03)   IMPRESSION:  CTA head: Focal occlusion of left MCA distal M1 segment with patent but   small caliber filling of M2 segments.    CT perfusion indicates large mismatch between Tmax and CBF consistent   with ischemic penumbra.    CTA Neck: No steno-occlusive focus.    Case findings above discussed with Dr. Hu at 12:13 PM on 08/27/2023.    CT Head No Cont (08.28.23 @ 02:34)   IMPRESSION: Acute left MCA infarct. Possible acute infarct involving the   right temporal region. This finding can be better evaluated with MRI if   there are no contraindications.    TTE Echo Complete w/o Contrast w/ Doppler (08.29.23 @ 12:03)   Summary:   1. Left ventricular ejection fraction, by visual estimation, is 55 to   60%.   2. Normal global left ventricular systolic function.   3. Normal right ventricular size and function.   4. Trace mitral valve regurgitation.   5. Mild aortic regurgitation.   6. Sclerotic aortic valve with normal opening.   7. There is no evidence of pericardial effusion.           [facility] Stroke Team Progress Note       HPI:  Patient is a 63 year old male with PMHx Hypertension, DM on oral medications who presented c/o stroke-like symptoms. Per son pt was walking outside around 10:15am on 8/27/23 when he suddenly fell. His son helped him up and noted that he was weak on the right side and not acting normally which prompted him to come to the ED. On arrival pt w/ obvious right sided arm weakness, confusion, dysarthria, facial droop suggestive of acute stroke. Code stroke initiated, found to have LM1 occlusion, given TNK @ 1215 and was taken for thrombectomy. Unable to provide ROS 2/2 aphasia.      SUBJECTIVE: No events overnight.  No new neurologic complaints.  ROS reported negative unless otherwise noted.    acetaminophen     Tablet .. 975 milliGRAM(s) Oral every 6 hours PRN  albuterol/ipratropium for Nebulization 3 milliLiter(s) Nebulizer every 6 hours PRN  aspirin  chewable 81 milliGRAM(s) Oral daily  atorvastatin 80 milliGRAM(s) Oral at bedtime  chlorhexidine 2% Cloths 1 Application(s) Topical <User Schedule>  dextrose 5%. 1000 milliLiter(s) IV Continuous <Continuous>  dextrose 5%. 1000 milliLiter(s) IV Continuous <Continuous>  dextrose 50% Injectable 25 Gram(s) IV Push once  dextrose 50% Injectable 25 Gram(s) IV Push once  dextrose 50% Injectable 12.5 Gram(s) IV Push once  dextrose Oral Gel 15 Gram(s) Oral once PRN  glucagon  Injectable 1 milliGRAM(s) IntraMuscular once  hydrALAZINE Injectable 10 milliGRAM(s) IV Push every 2 hours PRN  insulin lispro (ADMELOG) corrective regimen sliding scale   SubCutaneous three times a day before meals  labetalol Injectable 10 milliGRAM(s) IV Push every 2 hours PRN  levETIRAcetam  IVPB 500 milliGRAM(s) IV Intermittent every 12 hours  piperacillin/tazobactam IVPB.. 3.375 Gram(s) IV Intermittent every 8 hours  polyethylene glycol 3350 17 Gram(s) Oral two times a day  senna 2 Tablet(s) Oral at bedtime  tamsulosin 0.8 milliGRAM(s) Oral at bedtime      PHYSICAL EXAM:   Vital Signs Last 24 Hrs  T(C): 36.5 (12 Sep 2023 05:50), Max: 37.6 (11 Sep 2023 20:37)  T(F): 97.7 (12 Sep 2023 05:50), Max: 99.6 (11 Sep 2023 20:37)  HR: 85 (12 Sep 2023 08:26) (85 - 94)  BP: 124/67 (12 Sep 2023 08:26) (94/50 - 126/77)  BP(mean): --  RR: 18 (12 Sep 2023 08:26) (16 - 18)  SpO2: 94% (12 Sep 2023 08:26) (94% - 95%)    Parameters below as of 12 Sep 2023 08:26  Patient On (Oxygen Delivery Method): room air        General: No acute distress.     NEUROLOGICAL EXAM:  Mental status: eyes open, awake, alert, attends to examiner briskly on left, smiles, generates sounds, follows some simple commands, perseverates, mimics at times   Cranial Nerves: pupils 3mm and reactive, crosses midline, right eye no blink to threat, right facial palsy   Motor exam: Normal tone, right hemiplegia, RLE trace hip flexion;   5/5 LUE, 5/5 LLE, no drift   Sensation: Intact to noxious stimuli  throughout   Coordination/ Gait: gait not tested          LABS:                        9.0    14.18 )-----------( 375      ( 12 Sep 2023 07:02 )             27.7    09-12    133<L>  |  98  |  18.2  ----------------------------<  150<H>  3.9   |  25.0  |  0.71    Ca    8.6      12 Sep 2023 07:02    TPro  6.1<L>  /  Alb  2.5<L>  /  TBili  0.5  /  DBili  x   /  AST  140<H>  /  ALT  135<H>  /  AlkPhos  145<H>  09-12      IMAGING: Reviewed by me.     CT Head No Cont (09.12.23 @ 10:08)     Impression: Evolving left MCA infarct.  There is some associated area of high attenuation   identified which could be compatible spared parenchyma versus petechial   hemorrhage. Localized mass effect is seen consisting of sulcal   effacement. No significant shift or herniation is seen.      SAMARA Echo Doppler (09.01.23 @ 12:52)    1. Left ventricular ejection fraction, by visual estimation, is >75%.   2. Normal global left ventricular systolic function.   3. Normal right ventricular size and function.   4. Normal left atrial size.   5. No left atrial appendage thrombus and normal left atrial appendage   velocities.   6. Trace mitral valve regurgitation.   7. Color flow doppler and intravenous injection of agitated saline   demonstrates the presence of an intact intra atrial septum.   8. Mobile intra-atrial septum with lipomatous hypertrophy.   9. Moderate complex atheromas at the aortic arch and descending aorta.  10. There is no evidence of pericardial effusion.      CT Head No Cont (09.03.23 @ 11:20)   Impression:  No significant change from the previous exam performed yesterday.   Surgical site is unchanged as is the brain parenchyma changes from left   middle cerebral artery stroke. Mass effect is unchanged.    CT Head No Cont (09.02.23 @ 04:01)   IMPRESSION: Redemonstration of large left MCA territoryinfarct.   Left-to-right midline shift measures 3 mm, unchanged.    CT Head No Cont (08.31.23 @ 20:43)   IMPRESSION:  Interval removal of subcutaneous drainage catheter with new acute   extradural broad products identified, withincreased rightward midline   shift which now measures 6 mm (previously 3 mm on 8/30/2023).    Additionally, there is increased hypoattenuation of the left   frontoparietal cortex within the known MCA territory infarct, which may   represent new areas of acute/subacute ischemia versus evolving gliosis.   Hemorrhagic conversion is not suspected at this time. Close attention on   follow-up is advised.    Diffuse scalp edema extending to the left periorbital soft tissues has   significantly increased since the prior study, likely due to evolving   subcutaneous blood products.    Critical findings above were discussed directly by telephone by the ED   radiologist on call, Fei Hebert MD, with the neurosurgical ICU   physicians assistant, Casi RUGGIERO, at 3:40 AM on 9/1/2023.    CT Head No Cont (08.30.23 @ 15:44)   Postop changes compatible with a left temporal frontal craniectomy is   identified. There is some extension of the parenchyma through the   craniectomy defect identified. There is abnormal low-attenuation again   seen involving left temporal frontal parietal region involving the left   basal ganglia and corona radiata region. This is compatible with an   evolving acute left MCA infarct. Previously noted areas of high   attenuation is less conspicuous which could be compatible with evolving   areas of hemorrhage. Mass effect on the left lateral ventricle is seen.   Left-to-right shift (3.1 mm) is seen.    There is extra-axial hematoma fluid and soft tissue swelling seen seen in   the postop region with extra-axial drain comedies findings are compatible   with postoperative changes.    The visualized paranasal sinuses mastoid andmiddle regions appear clear.  IMPRESSION:  New changes as described above.    CT Head No Cont (08.29.23 @ 23:39)   IMPRESSION: Evolving acute left MCA infarct is identified.   Mass effect on the left lateral ventricle is   again seen. Slight increased left-to-right shift (1.2 cm) is seen.    CT Head No Cont (08.29.23 @ 00:31)   IMPRESSION:  Continued evolution of large acute left MCA territory infarct.   Left-to-right midline shift measuring 6 mm, increased. No hydrocephalus   or effacement of basal cisterns. Curvilinear foci of hyperdensity within   the area of infarct, for which petechial hemorrhage cannot be excluded.   No large parenchymal hemorrhage.    CT Abdomen and Pelvis w/ IV Cont (08.28.23 @ 02:53)   IMPRESSION:  No evidence for bladder rupture.    CT Brain Stroke Protocol (08.27.23 @ 11:53)   IMPRESSION:  Wedgelike low density in the left putamen and caudate (corpus striatum)   is new from the prior scan, and consistent with infarct that may be acute   given corresponding symptoms.  No acute intracranial hemorrhage.    The study was performed at 11:47 AM on 08/27/2023 and the above findings   were discussed with Dr. Betts at 12:02 PM.    CT Angio Brain Stroke Protocol  w/ IV Cont (08.27.23 @ 12:03)   IMPRESSION:  CTA head: Focal occlusion of left MCA distal M1 segment with patent but   small caliber filling of M2 segments.    CT perfusion indicates large mismatch between Tmax and CBF consistent   with ischemic penumbra.    CTA Neck: No steno-occlusive focus.    Case findings above discussed with Dr. Hu at 12:13 PM on 08/27/2023.    CT Head No Cont (08.28.23 @ 02:34)   IMPRESSION: Acute left MCA infarct. Possible acute infarct involving the   right temporal region. This finding can be better evaluated with MRI if   there are no contraindications.    TTE Echo Complete w/o Contrast w/ Doppler (08.29.23 @ 12:03)   Summary:   1. Left ventricular ejection fraction, by visual estimation, is 55 to   60%.   2. Normal global left ventricular systolic function.   3. Normal right ventricular size and function.   4. Trace mitral valve regurgitation.   5. Mild aortic regurgitation.   6. Sclerotic aortic valve with normal opening.   7. There is no evidence of pericardial effusion.

## 2023-09-12 NOTE — PROGRESS NOTE ADULT - ASSESSMENT
63M PMH of DM2, HTN presents with fall, right sided weakness, dysarthria and right facial droop found to have LM1 occlusion s/p TNK in ER subsequent mechanical thrombectomy and decompressive craniectomy for midline shift. SAMARA negative for thrombus. Cardiology recommending ILR /MCOT as outpatient. Extubated on 9/1/23, monitored in ICU and transferred to medicine for further management.     #CVA s/p cranioplasty  c/w ASA, lipitor-Rehab/Impaired mobility and function   SAMARA negative for thrombus, +atheroma of aortic arch and aorta   mcot/ILR on DC  pt/ot/speech/ PM&R  s/p keppra  cont with ASA for now. repeat hypercoag workup sent.   Cranioplasty with neurosx outpatient   CT head with revolving left MCA infarct.  Neurology following.    Klebsiella Bacteremia  leukocytosis slowly improving.   cont Ceftriaxone  f/u blood cx from 9/11 - negative to date  ID consulted.    #urinary retention  longo   flomax increased to 0.8mg po daily  bowel regimen  TOV near discharge vs. ALDEN     #Hematuria  pt noted with 1 episode of hematuria on 9/7, suspect 2/2 traumatic longo  H/H stable    #Constipation  miralax/senna    #Dysphagia  pureed diet with thick liquids  aspiration precautions  Cough noted 9/12/23 - CXR ordered, S/S to follow up    #Uneven BP   CT angio of the neck and A/P did not show any acute pathologies  likely chronic atherosclerosis disease    #Hypernatremia -resolved  PO fluid intake    #Type 2DM  sliding scale    #Elevated LFTs  Abdominal US ordered.  Repeat CMP in am. 63M PMH of DM2, HTN presents with fall, right sided weakness, dysarthria and right facial droop found to have LM1 occlusion s/p TNK in ER subsequent mechanical thrombectomy and decompressive craniectomy for midline shift. SAMARA negative for thrombus. Cardiology recommending ILR /MCOT as outpatient. Extubated on 9/1/23, monitored in ICU and transferred to medicine for further management.     #CVA s/p cranioplasty  c/w ASA, lipitor-Rehab/ Impaired mobility and function   SAMARA negative for thrombus, +atheroma of aortic arch and aorta   mcot/ILR on DC  pt/ot/speech/ PM&R  s/p keppra  cont with ASA for now. repeat hyper coag workup sent.   Cranioplasty with neurosx outpatient   CT head with revolving left MCA infarct.  Neurology following.    Klebsiella Bacteremia  leukocytosis slowly improving.   cont Ceftriaxone  f/u blood cx from 9/11 - negative to date  ID consulted.    #urinary retention longo   Flomax  0.8mg po daily  bowel regimen  TOV near discharge vs. ALDEN     #Hematuria  pt noted with 1 episode of hematuria on 9/7, suspect 2/2 traumatic Longo  H/H stable    #Constipation  miralax/senna    #Dysphagia  pureed diet with thick liquids  aspiration precautions  Cough noted 9/12/23 - CXR ordered, S/S to follow up    #Uneven BP   CT angio of the neck and A/P did not show any acute pathologies  likely chronic atherosclerosis disease    #Hypernatremia -resolved  PO fluid intake    #Type 2DM  sliding scale    #Elevated LFTs  Abdominal US ordered.  Repeat CMP in am.

## 2023-09-12 NOTE — CONSULT NOTE ADULT - CONSULT REQUESTED DATE/TIME
12-Sep-2023 14:27
29-Aug-2023 14:03
05-Sep-2023 11:52
27-Aug-2023 12:50
06-Sep-2023
31-Aug-2023 17:06

## 2023-09-12 NOTE — PROGRESS NOTE ADULT - ASSESSMENT
INCOMPLETE      ASSESSMENT: Patient is a 63 year old male with PMHx Hypertension, DM on oral medications who presented c/o stroke-like symptoms. Per son pt was walking outside around 10:15am on 8/27/23 when he suddenly fell. His son helped him up and noted that he was weak on the right side and not acting normally which prompted him to come to the ED. On arrival patient came in with right sided arm weakness, global aphasia, and dysarthria, right sided facial droop. Initial head CT demonstrated wedge like low density in the left putamen and caudate (corpus striatum) consistent with infarct that may be acute and no acute intracranial hemorrhage. Tenecteplase was administered at 12:15 pm. CTA head demonstrated occlusion of left MCA distal M1 segment and CTP showed large mismatch. found to have LM1 occlusion. Patient taken for mechanical thrombectomy where he was found to have LM1 occlusion with TICI 2C recanalization. Due to worsening of neurological exam and increase in midline shift found on CT head on 8/29/23(increased in left to right shift of around 9.5mm (previously was 5.2mm)) patient was taken to the OR on 8/30/23 for decompressive hemicraniectomy.   Etiology: embolic stroke of undetermined source.    NEURO:   - neurologically more awake and appears stable  - Awaiting post decompressive hemicraniectomy helmet fitting  -Repeat CT head 9/12/23 for stability and clearance for AC  -Continue close monitoring for neurologic deterioration in setting of cerebral edema, mass effect, and brain compression  -Avoid hyponatremia and rapid fluctuations in serum Na  - Stroke neuro checks q 4 hour  - SBP currently 120-140mmHg, avoid rapid fluctuations and further hypotension as appears to be neurologically tolerating    -ANTITHROMBOTIC THERAPY: ASA 81mg daily (was cleared by neurosx)  -AED in place per nsx   -titrate statin to LDL goal less than 70.  LFT monitoring.  -Obtain MRI Brain w/o when medically optimized  -Dysphagia screen: passed with pureed diet, advance as per SLP  -Physical therapy/OT/Speech eval/treatment.     -CARDIOVASCULAR: TTE as noted , SAMARA revealed moderate complex atheromas at the aortic arch and descending aorta, would clarify with cardiology on possible contribution to embolic stroke   cardiac monitoring w/ telemetry for now, further evaluation pending findings of noted workup                             -HEMATOLOGY: H/H with anemia, Monitor for signs and symptoms of bleeding, transfusion per primary team to Hgb > 8. Platelets 375. Patient should have all age and risk appropriate malignancy screenings with PCP or sooner if clinically suspected, hypercoagulable panel with LA +, consideration in CT Chest, had abdomen/pelvic imaging- noted enlarged prostate- confirm there is  no evidence of malignancy. Hematology consult appreciated.      DVT ppx: Heparin s.c [] LMWH [x] - was cleared by neurosx    PULMONARY: saturating well on room air    RENAL: BUN/Cr without acute change, monitor urine output, maintain adequate hydration. Noted fullness on 8/28/23 physical exam around right thigh. CT abdomen pelvis w/o bladder rupture. No evidence of hematoma on US.      Na Goal: 135-145.  Avoid hyponatremia and rapid fluctuations.     ID: afebrile, leukocytosis 14,19, screen for si/sx of infection. Trend CBC.     OTHER:  A1C is 6.8%. Suggest endocrine follow up for overall long term glycemic control. Condition and plan of care d/w primary team and patient. questions and concerns addressed.     DISPOSITION: Rehab or home depending on PT eval once stable and workup is complete      CORE MEASURES:        Admission NIHSS: 23     Tenecteplase : [x] YES [] NO      LDL/HDL/A1C: 80/39/6.8     Depression Screen- if depression hx and/or present      Statin Therapy: as noted     Dysphagia Screen: [x] PASS [] FAIL     Smoking [] YES [x] NO      Afib [] YES [x] NO     Stroke Education [] YES [] NO- ordered and pending    Obtain screening lower extremity venous ultrasound in patients who meet 1 or more of the following criteria as patient is high risk for DVT/PE on admission:   [] History of DVT/PE  []Hypercoagulable states (Factor V Leiden, Cancer, OCP, etc. )  []Prolonged immobility (hemiplegia/hemiparesis/post operative or any other extended immobilization)  [] Transferred from outside facility (Rehab or Long term care)  [] Age </= to 50 ASSESSMENT: Patient is a 63 year old male with PMHx Hypertension, DM on oral medications who presented c/o stroke-like symptoms. Per son pt was walking outside around 10:15am on 8/27/23 when he suddenly fell. His son helped him up and noted that he was weak on the right side and not acting normally which prompted him to come to the ED. On arrival patient came in with right sided arm weakness, global aphasia, and dysarthria, right sided facial droop. Initial head CT demonstrated wedge like low density in the left putamen and caudate (corpus striatum) consistent with infarct that may be acute and no acute intracranial hemorrhage. Tenecteplase was administered at 12:15 pm. CTA head demonstrated occlusion of left MCA distal M1 segment and CTP showed large mismatch. found to have LM1 occlusion. Patient taken for mechanical thrombectomy where he was found to have LM1 occlusion with TICI 2C recanalization. Due to worsening of neurological exam and increase in midline shift found on CT head on 8/29/23(increased in left to right shift of around 9.5mm (previously was 5.2mm)) patient was taken to the OR on 8/30/23 for decompressive hemicraniectomy. CT head 9/12/23 with evolving left MCA infarct and some associated area of high attenuation identified which could be compatible spared parenchyma versus petechial hemorrhage. Etiology: embolic stroke of undetermined source.    NEURO:   - neurologically more awake and appears stable  - Awaiting post decompressive hemicraniectomy helmet fitting  -Repeat CT head 9/12/23 for stability, reviewed by stroke attending, w/o hemorrhage,  cleared  for AC  -Continue close monitoring for neurologic deterioration with stroke neuro checks q 4 hour  -Avoid hyponatremia and rapid fluctuations in serum Na  - SBP currently 120-140mmHg, avoid rapid fluctuations and further hypotension as appears to be neurologically tolerating    -ANTITHROMBOTIC THERAPY: ASA 81mg daily (was cleared by neurosx)  -AED in place per nsx   -titrate statin to LDL goal less than 70.  LFT monitoring.  -Obtain MRI Brain w/o when medically optimized  -Dysphagia screen: passed with pureed diet, advance as per SLP  -Physical therapy/OT/Speech eval/treatment.     -CARDIOVASCULAR: TTE as noted , SAMARA revealed moderate complex atheromas at the aortic arch and descending aorta, would appreciate cardiology comment on possible contribution to embolic stroke   cardiac monitoring w/ telemetry for now, further evaluation pending findings of noted workup                             -HEMATOLOGY: H/H with anemia, Monitor for signs and symptoms of bleeding, transfusion per primary team to Hgb > 8. Platelets 375. Patient should have all age and risk appropriate malignancy screenings with PCP or sooner if clinically suspected, hypercoagulable panel with LA +, consideration in CT Chest, had abdomen/pelvic imaging- noted enlarged prostate- confirm there is  no evidence of malignancy. Hematology consult appreciated.      DVT ppx: Heparin s.c [] LMWH [x] - was cleared by neurosx    PULMONARY: saturating well on room air    RENAL: BUN/Cr without acute change, monitor urine output, maintain adequate hydration. Noted fullness on 8/28/23 physical exam around right thigh. CT abdomen pelvis w/o bladder rupture. No evidence of hematoma on US.      Na Goal: 135-145.  Avoid hyponatremia and rapid fluctuations.     ID: afebrile, leukocytosis 14,18, screen for si/sx of infection. Trend CBC.     OTHER:  A1C is 6.8%. Suggest endocrine follow up for overall long term glycemic control. Condition and plan of care d/w primary team and patient. questions and concerns addressed.     DISPOSITION: Rehab or home depending on PT eval once stable and workup is complete      CORE MEASURES:        Admission NIHSS: 23     Tenecteplase : [x] YES [] NO      LDL/HDL/A1C: 80/39/6.8     Depression Screen- if depression hx and/or present      Statin Therapy: as noted     Dysphagia Screen: [x] PASS [] FAIL     Smoking [] YES [x] NO      Afib [] YES [x] NO     Stroke Education [] YES [] NO- ordered and pending    Obtain screening lower extremity venous ultrasound in patients who meet 1 or more of the following criteria as patient is high risk for DVT/PE on admission:   [] History of DVT/PE  []Hypercoagulable states (Factor V Leiden, Cancer, OCP, etc. )  []Prolonged immobility (hemiplegia/hemiparesis/post operative or any other extended immobilization)  [] Transferred from outside facility (Rehab or Long term care)  [] Age </= to 50 ASSESSMENT: Patient is a 63 year old male with PMHx Hypertension, DM on oral medications who presented c/o stroke-like symptoms. Per son pt was walking outside around 10:15am on 8/27/23 when he suddenly fell. His son helped him up and noted that he was weak on the right side and not acting normally which prompted him to come to the ED. On arrival patient came in with right sided arm weakness, global aphasia, and dysarthria, right sided facial droop. Initial head CT demonstrated wedge like low density in the left putamen and caudate (corpus striatum) consistent with infarct that may be acute and no acute intracranial hemorrhage. Tenecteplase was administered at 12:15 pm. CTA head demonstrated occlusion of left MCA distal M1 segment and CTP showed large mismatch. found to have LM1 occlusion. Patient taken for mechanical thrombectomy where he was found to have LM1 occlusion with TICI 2C recanalization. Due to worsening of neurological exam and increase in midline shift found on CT head on 8/29/23(increased in left to right shift of around 9.5mm (previously was 5.2mm)) patient was taken to the OR on 8/30/23 for decompressive hemicraniectomy. CT head 9/12/23 with evolving left MCA infarct and some associated area of high attenuation identified which could be compatible spared parenchyma versus petechial hemorrhage. Etiology: embolic stroke of undetermined source.    NEURO:   - neurologically more awake and appears stable  - wearing post decompressive hemicraniectomy helmet   -Repeat CT head 9/12/23 for stability, reviewed by stroke attending, w/o hemorrhage,  cleared  for anticoagulation  -Continue close monitoring for neurologic deterioration with stroke neuro checks q 4 hour  -Avoid hyponatremia and rapid fluctuations in serum Na  - SBP currently 120-140mmHg, avoid rapid fluctuations and further hypotension as appears to be neurologically tolerating    -ANTITHROMBOTIC THERAPY: ASA 81mg daily (was cleared by neurosx), can start anticoagualtion  -AED in place per nsx   -titrate statin to LDL goal less than 70.  LFT monitoring.  -Obtain MRI Brain w/o when medically optimized  -Dysphagia screen: passed with pureed diet, advance as per SLP  -Physical therapy/OT/Speech eval/treatment.     -CARDIOVASCULAR: TTE as noted , SAMARA revealed moderate complex atheromas at the aortic arch and descending aorta, would appreciate cardiology comment on possible contribution to embolic stroke   cardiac monitoring w/ telemetry for now, further evaluation pending findings of noted workup                             -HEMATOLOGY: H/H with anemia, Monitor for signs and symptoms of bleeding, transfusion per primary team to Hgb > 8. Platelets 375. Patient should have all age and risk appropriate malignancy screenings with PCP or sooner if clinically suspected, hypercoagulable panel with LA +, consideration in CT Chest, had abdomen/pelvic imaging- noted enlarged prostate- confirm there is  no evidence of malignancy. Hematology consult appreciated.      DVT ppx: Heparin s.c [] LMWH [x] - was cleared by neurosx    PULMONARY: saturating well on room air    RENAL: BUN/Cr without acute change, monitor urine output, maintain adequate hydration. Noted fullness on 8/28/23 physical exam around right thigh. CT abdomen pelvis w/o bladder rupture. No evidence of hematoma on US.      Na Goal: 135-145.  Avoid hyponatremia and rapid fluctuations.     ID: afebrile, leukocytosis 14,18, screen for si/sx of infection. Trend CBC.     OTHER:  A1C is 6.8%. Suggest endocrine follow up for overall long term glycemic control. Condition and plan of care d/w primary team and patient. questions and concerns addressed.     DISPOSITION: Rehab or home depending on PT eval once stable and workup is complete      CORE MEASURES:        Admission NIHSS: 23     Tenecteplase : [x] YES [] NO      LDL/HDL/A1C: 80/39/6.8     Depression Screen- if depression hx and/or present      Statin Therapy: as noted     Dysphagia Screen: [x] PASS [] FAIL     Smoking [] YES [x] NO      Afib [] YES [x] NO     Stroke Education [] YES [] NO- ordered and pending    Obtain screening lower extremity venous ultrasound in patients who meet 1 or more of the following criteria as patient is high risk for DVT/PE on admission:   [] History of DVT/PE  []Hypercoagulable states (Factor V Leiden, Cancer, OCP, etc. )  []Prolonged immobility (hemiplegia/hemiparesis/post operative or any other extended immobilization)  [] Transferred from outside facility (Rehab or Long term care)  [] Age </= to 50

## 2023-09-12 NOTE — CONSULT NOTE ADULT - SUBJECTIVE AND OBJECTIVE BOX
[facility]                                                INFECTIOUS DISEASES  =======================================================                     Jaron Campbell#   Enmanuel Tran MD#   Ashvin Brody MD*                           Bernarda Gross MD*   Aneta Lubin MD*            Diplomates American Board of Internal Medicine & Infectious Diseases                  # [place] Office - Appt - Tel  [phone] Fax [phone]                * [place] Office - Appt - Tel [phone] Fax [phone]                                  Hospital Consult line:  [phone]  =======================================================      [id number]  JOSUE JOHNSON   HPI:  Patient is a 63 year old male with PMH Hypertension, DM on oral medications who presents c/o stroke-like symptoms. Per son pt was walking outside around 10:15am this morning when he suddenly fell. His son helped him up and noted that he was weak on the right side and not acting normally which prompted him to come to the ED. On arrival pt w/ obvious right sided arm weakness, confusion, dysarthria, facial droop suggestive of acute stroke. Code stroke initiated, found to have LM1 occlusion, given TNK @ 1215 and was taken for thrombectomy. Unable to provide ROS 2/2 aphasia  (27 Aug 2023 16:45)       ID [id number]  unable to obtain history from patient   pt nonverbal, able to follow some commands  ID called for klebsiella bacteremia, blood cultures 9/8 drawn in the setting of low grade fever  PT had reported urinary retention and longo placed on 9/6  per chart + traumatic longo with some hematuria    I have personally reviewed the labs and data; pertinent labs and data are listed in this note; please see below.   =======================================================  Past Medical & Surgical Hx:  =====================  PAST MEDICAL & SURGICAL HISTORY:  Hypertension      No significant past surgical history        Problem List:  ==========  HEALTH ISSUES - PROBLEM Dx:  Suspected deep vein thrombosis (DVT)    CVA (cerebrovascular accident)    Hypotension    Stroke          Social Hx:  =======  no toxic habits currently    FAMILY HISTORY:  no significant family history of immunosuppressive disorders in mother or father   =======================================================    REVIEW OF SYSTEMS:  cannot obtain reliably    =======================================================  Allergies    No Known Allergies    Intolerances    Antibiotics:  cefTRIAXone Injectable. 2000 milliGRAM(s) IV Push every 24 hours    Other medications:  aspirin  chewable 81 milliGRAM(s) Oral daily  atorvastatin 80 milliGRAM(s) Oral at bedtime  chlorhexidine 2% Cloths 1 Application(s) Topical <User Schedule>  dextrose 5%. 1000 milliLiter(s) IV Continuous <Continuous>  dextrose 5%. 1000 milliLiter(s) IV Continuous <Continuous>  dextrose 50% Injectable 25 Gram(s) IV Push once  dextrose 50% Injectable 12.5 Gram(s) IV Push once  dextrose 50% Injectable 25 Gram(s) IV Push once  glucagon  Injectable 1 milliGRAM(s) IntraMuscular once  insulin lispro (ADMELOG) corrective regimen sliding scale   SubCutaneous three times a day before meals  levETIRAcetam  IVPB 500 milliGRAM(s) IV Intermittent every 12 hours  polyethylene glycol 3350 17 Gram(s) Oral two times a day  senna 2 Tablet(s) Oral at bedtime  tamsulosin 0.8 milliGRAM(s) Oral at bedtime     ceFAZolin  Injectable.   2000 milliGRAM(s) IV Push (08-30-23 @ 14:00)   2000 milliGRAM(s) IV Push (08-30-23 @ 21:52)   2000 milliGRAM(s) IV Push (08-31-23 @ 05:23)   2000 milliGRAM(s) IV Push (08-31-23 @ 13:04)   2000 milliGRAM(s) IV Push (08-31-23 @ 22:31)    cefTRIAXone Injectable.   2000 milliGRAM(s) IV Push (09-12-23 @ 13:03)    clindamycin IVPB   100 mL/Hr IV Intermittent (08-27-23 @ 21:16)   100 mL/Hr IV Intermittent (08-28-23 @ 06:00)    piperacillin/tazobactam IVPB.   200 mL/Hr IV Intermittent (08-28-23 @ 12:11)    piperacillin/tazobactam IVPB.   200 mL/Hr IV Intermittent (09-08-23 @ 10:13)    piperacillin/tazobactam IVPB.-   25 mL/Hr IV Intermittent (09-08-23 @ 13:23)    piperacillin/tazobactam IVPB.-   25 mL/Hr IV Intermittent (08-28-23 @ 20:51)    piperacillin/tazobactam IVPB..   25 mL/Hr IV Intermittent (09-08-23 @ 22:32)   25 mL/Hr IV Intermittent (09-09-23 @ 05:40)   25 mL/Hr IV Intermittent (09-09-23 @ 13:31)   25 mL/Hr IV Intermittent (09-09-23 @ 22:21)   25 mL/Hr IV Intermittent (09-10-23 @ 05:03)   25 mL/Hr IV Intermittent (09-10-23 @ 14:51)   25 mL/Hr IV Intermittent (09-10-23 @ 21:58)   25 mL/Hr IV Intermittent (09-11-23 @ 05:36)   25 mL/Hr IV Intermittent (09-11-23 @ 15:00)   25 mL/Hr IV Intermittent (09-11-23 @ 21:14)   25 mL/Hr IV Intermittent (09-12-23 @ 05:06)    piperacillin/tazobactam IVPB..   25 mL/Hr IV Intermittent (08-29-23 @ 05:37)    vancomycin  IVPB   250 mL/Hr IV Intermittent (09-09-23 @ 18:16)   250 mL/Hr IV Intermittent (09-10-23 @ 05:03)   250 mL/Hr IV Intermittent (09-10-23 @ 17:36)   250 mL/Hr IV Intermittent (09-11-23 @ 05:35)      ======================================================  Physical Exam:  ============  T(F): 98.1 (12 Sep 2023 12:54), Max: 99.6 (11 Sep 2023 20:37)  HR: 93 (12 Sep 2023 12:54)  BP: 96/64 (12 Sep 2023 12:54)  RR: 18 (12 Sep 2023 12:54)  SpO2: 95% (12 Sep 2023 12:54) (94% - 95%)  temp max in last 48H T(F): , Max: 100.2 (09-10-23 @ 20:05)    General:  No acute distress. pt alert and awake, left cranial staples intact  Eye: Normal conjunctiva.  Neck: Supple, No lymphadenopathy.  Respiratory: Lungs are clear to auscultation, Respirations are non-labored.  Cardiovascular: Normal rate, Regular rhythm, s1 + s2  Gastrointestinal: Soft, Non-tender, Non-distended, Normal bowel sounds.  Genitourinary: No costovertebral angle tenderness. longo in place  Neurologic: Alert, awake, nonverbal, able to move LUE and LLE       =======================================================  Labs:                        9.0    14.18 )-----------( 375      ( 12 Sep 2023 07:02 )             27.7     09-12    133<L>  |  98  |  18.2  ----------------------------<  150<H>  3.9   |  25.0  |  0.71    Ca    8.6      12 Sep 2023 07:02    TPro  6.1<L>  /  Alb  2.5<L>  /  TBili  0.5  /  DBili  x   /  AST  140<H>  /  ALT  135<H>  /  AlkPhos  145<H>  09-12      Culture - Blood (collected 09-11-23 @ 08:44)  Source: .Blood Blood-Peripheral    Culture - Blood (collected 09-11-23 @ 08:40)  Source: .Blood Blood-Peripheral    Culture - Urine (collected 09-09-23 @ 09:25)  Source: Clean Catch Clean Catch (Midstream)  Final Report (09-10-23 @ 10:35):    <10,000 CFU/mL Normal Urogenital Lorraine    Culture - Blood (collected 09-08-23 @ 06:31)  Source: .Blood Blood-Peripheral    Culture - Blood (collected 09-08-23 @ 06:28)  Source: .Blood Blood-Peripheral  Gram Stain (09-10-23 @ 11:59):    Growth in aerobic bottle: Gram Negative Rods  Final Report (09-12-23 @ 07:33):    Growth in aerobic bottle: Klebsiella pneumoniae    Direct identification is available within approximately 3-5    hours either by Blood Panel Multiplexed PCR or Direct    MALDI-TOF. Details: [email or web address]  Organism: Blood Culture PCR  Klebsiella pneumoniae (09-12-23 @ 07:33)  Organism: Klebsiella pneumoniae (09-12-23 @ 07:33)    Sensitivities:      Method Type: LB      -  Amikacin: S <=16      -  Ampicillin: R >16 These ampicillin results predict results for amoxicillin      -  Ampicillin/Sulbactam: R >16/8      -  Aztreonam: S <=4      -  Cefazolin: I 4      -  Cefepime: S <=2      -  Cefoxitin: S <=8      -  Ceftriaxone: S <=1      -  Ciprofloxacin: S <=0.25      -  Ertapenem: S <=0.5      -  Gentamicin: S <=2      -  Imipenem: S <=1      -  Levofloxacin: S <=0.5      -  Meropenem: S <=1      -  Piperacillin/Tazobactam: R 32      -  Tobramycin: S <=2      -  Trimethoprim/Sulfamethoxazole: S <=0.5/9.5  Organism: Blood Culture PCR (09-12-23 @ 07:33)    Sensitivities:      Method Type: PCR      -  K. pneumoniae group: Detec (K. pneumoniae, K. quasipneumoniae, K. variicola)    Culture - Blood (collected 09-02-23 @ 16:00)  Source: .Blood Blood  Final Report (09-07-23 @ 22:01):    No growth at 5 days    Culture - Blood (collected 09-02-23 @ 16:00)  Source: .Blood Blood  Final Report (09-07-23 @ 22:01):    No growth at 5 days    Culture - Blood (collected 08-31-23 @ 06:20)  Source: .Blood Blood-Peripheral  Final Report (09-05-23 @ 13:00):    No growth at 5 days    Culture - Blood (collected 08-31-23 @ 06:19)  Source: .Blood Blood-Peripheral  Final Report (09-05-23 @ 13:00):    No growth at 5 days       < from: CT Head No Cont (09.12.23 @ 10:08) >  INTERPRETATION:  Clinical indication: Follow-up CVA.    Multiple axial sections were performed from the base of vertex without   contrast enhancement. Coronal and sagittal reconstructions were performed   as well.    This exam is compared prior head CT performed on September 3, 2022.    Postoperative changes compatible left temporal frontal parietal   craniectomy is identified. There abnormal low attenuation again   identified involving the left temporal frontal parietal basal ganglia and   corona radiata region. This is compatible with a subacute left MCA   infarct. This infarct has demonstrated expected changes when compared   with the prior exam. There is some associated area of high attenuation   identified which could be compatible spared parenchyma versus petechial   hemorrhage. Localized mass effect is seen consisting of sulcal   effacement. No significant shift or herniation is seen.    Extra-axial soft tissue swelling in the postoperative region is   identified as well as skin staples.    The visualized paranasal sinuses mastoid and middle ear regions appear   clear.    Impression: Evolving left MCA infarct as described above.    --- End of Report ---    < end of copied text >

## 2023-09-12 NOTE — PROGRESS NOTE ADULT - SUBJECTIVE AND OBJECTIVE BOX
CC: LT M1 Occlusion (12 Sep 2023 09:35)    HPI:  63 year old male with PMH Hypertension, DM on oral medications who presents c/o stroke-like symptoms. On arrival pt w/ obvious right sided arm weakness, confusion, dysarthria, facial droop suggestive of acute stroke. Code stroke initiated, found to have LM1 occlusion, given TNK @ 1215 and was taken for thrombectomy.     INTERVAL HPI/OVERNIGHT EVENTS: Patient seen and examined lying in bed with nephew at bedside and  #[id number].  Patient pointing to right arm, when asked about pain.  Nephew reported cough x2days. Patient aphasic and ROS limited.      Vital Signs Last 24 Hrs  T(C): 36.7 (12 Sep 2023 12:54), Max: 37.6 (11 Sep 2023 20:37)  T(F): 98.1 (12 Sep 2023 12:54), Max: 99.6 (11 Sep 2023 20:37)  HR: 93 (12 Sep 2023 12:54) (85 - 94)  BP: 96/64 (12 Sep 2023 12:54) (94/50 - 126/77)  BP(mean): --  RR: 18 (12 Sep 2023 12:54) (16 - 18)  SpO2: 95% (12 Sep 2023 12:54) (94% - 95%)    Parameters below as of 12 Sep 2023 12:54  Patient On (Oxygen Delivery Method): room air      I&O's Detail    12 Sep 2023 07:01  -  12 Sep 2023 14:34  --------------------------------------------------------  IN:  Total IN: 0 mL    OUT:    Voided (mL): 200 mL  Total OUT: 200 mL    Total NET: -200 mL      PHYSICAL EXAM:  GENERAL: NAD  HEAD:  Atraumatic, Normocephalic  NECK: Supple, No JVD, Normal thyroid  NERVOUS SYSTEM:  Alert, Aphasic, right sided hemiparesis   CHEST/LUNG: Clear to auscultation bilaterally  HEART: Regular rate and rhythm; No murmurs, rubs, or gallops  ABDOMEN: Soft, tenderness on palpation in RUQ, Nondistended; Bowel sounds present  EXTREMITIES:  2+ Peripheral Pulses, No clubbing, cyanosis, or edema  SKIN: No rashes or lesions                          9.0    14.18 )-----------( 375      ( 12 Sep 2023 07:02 )             27.7     12 Sep 2023 07:02    133    |  98     |  18.2   ----------------------------<  150    3.9     |  25.0   |  0.71     Ca    8.6        12 Sep 2023 07:02    TPro  6.1    /  Alb  2.5    /  TBili  0.5    /  DBili  x      /  AST  140    /  ALT  135    /  AlkPhos  145    12 Sep 2023 07:02      CAPILLARY BLOOD GLUCOSE  POCT Blood Glucose.: 168 mg/dL (12 Sep 2023 12:57)  POCT Blood Glucose.: 139 mg/dL (12 Sep 2023 08:29)  POCT Blood Glucose.: 150 mg/dL (11 Sep 2023 16:29)  POCT Blood Glucose.: 161 mg/dL (11 Sep 2023 15:30)    LIVER FUNCTIONS - ( 12 Sep 2023 07:02 )  Alb: 2.5 g/dL / Pro: 6.1 g/dL / ALK PHOS: 145 U/L / ALT: 135 U/L / AST: 140 U/L / GGT: x           Urinalysis Basic - ( 12 Sep 2023 07:02 )    Color: x / Appearance: x / SG: x / pH: x  Gluc: 150 mg/dL / Ketone: x  / Bili: x / Urobili: x   Blood: x / Protein: x / Nitrite: x   Leuk Esterase: x / RBC: x / WBC x   Sq Epi: x / Non Sq Epi: x / Bacteria: x    Culture - Blood (09.11.23 @ 08:44)    Specimen Source: .Blood Blood-Peripheral   Culture Results:   No growth at 24 hours    Culture - Blood (09.11.23 @ 08:40)    Specimen Source: .Blood Blood-Peripheral   Culture Results:   No growth at 24 hours    MEDICATIONS  (STANDING):  aspirin  chewable 81 milliGRAM(s) Oral daily  atorvastatin 80 milliGRAM(s) Oral at bedtime  cefTRIAXone Injectable. 2000 milliGRAM(s) IV Push every 24 hours  chlorhexidine 2% Cloths 1 Application(s) Topical <User Schedule>  dextrose 5%. 1000 milliLiter(s) (50 mL/Hr) IV Continuous <Continuous>  dextrose 5%. 1000 milliLiter(s) (100 mL/Hr) IV Continuous <Continuous>  dextrose 50% Injectable 12.5 Gram(s) IV Push once  dextrose 50% Injectable 25 Gram(s) IV Push once  dextrose 50% Injectable 25 Gram(s) IV Push once  glucagon  Injectable 1 milliGRAM(s) IntraMuscular once  insulin lispro (ADMELOG) corrective regimen sliding scale   SubCutaneous three times a day before meals  levETIRAcetam  IVPB 500 milliGRAM(s) IV Intermittent every 12 hours  polyethylene glycol 3350 17 Gram(s) Oral two times a day  senna 2 Tablet(s) Oral at bedtime  tamsulosin 0.8 milliGRAM(s) Oral at bedtime    MEDICATIONS  (PRN):  acetaminophen     Tablet .. 975 milliGRAM(s) Oral every 6 hours PRN Mild Pain (1 - 3)  albuterol/ipratropium for Nebulization 3 milliLiter(s) Nebulizer every 6 hours PRN Shortness of Breath and/or Wheezing  dextrose Oral Gel 15 Gram(s) Oral once PRN Blood Glucose LESS THAN 70 milliGRAM(s)/deciliter  hydrALAZINE Injectable 10 milliGRAM(s) IV Push every 2 hours PRN SBP >140  labetalol Injectable 10 milliGRAM(s) IV Push every 2 hours PRN SBP >140      RADIOLOGY & ADDITIONAL TESTS:  < from: CT Head No Cont (09.12.23 @ 10:08) >  ACC: [id number] EXAM:  CT BRAIN   ORDERED BY: RALPH CARRILLO     PROCEDURE DATE:  09/12/2023          INTERPRETATION:  Clinical indication: Follow-up CVA.    Multiple axial sections were performed from the base of vertex without   contrast enhancement. Coronal and sagittal reconstructions were performed   as well.    This exam is compared prior head CT performed on September 3, 2022.    Postoperative changes compatible left temporal frontal parietal   craniectomy is identified. There abnormal low attenuation again   identified involving the left temporal frontal parietal basal ganglia and   corona radiata region. This is compatible with a subacute left MCA   infarct. This infarct has demonstrated expected changes when compared   with the prior exam. There is some associated area of high attenuation   identified which could be compatible spared parenchyma versus petechial   hemorrhage. Localized mass effect is seen consisting of sulcal   effacement. No significant shift or herniation is seen.    Extra-axial soft tissue swelling in the postoperative region is   identified as well as skin staples.    The visualized paranasal sinuses mastoid and middle ear regions appear   clear.    Impression: Evolving left MCA infarct as described above.    --- End of Report ---            LUNA BERRY MD; Attending Radiologist  This document has been electronically signed. Sep 12 2023 10:16AM    < end of copied text >   CC: LT M1 Occlusion (12 Sep 2023 09:35)    HPI:  63 year old male with PMH Hypertension, DM on oral medications who presents c/o stroke-like symptoms. On arrival pt w/ obvious right sided arm weakness, confusion, dysarthria, facial droop suggestive of acute stroke. Code stroke initiated, found to have LM1 occlusion, given TNK @ 1215 and was taken for thrombectomy.     INTERVAL HPI/OVERNIGHT EVENTS: Patient seen and examined lying in bed with nephew at bedside and  #[id number].  Patient pointing to right arm, when asked about pain.  Nephew reported cough x2days. Patient aphasic and ROS limited.      Vital Signs Last 24 Hrs  T(C): 36.7 (12 Sep 2023 12:54), Max: 37.6 (11 Sep 2023 20:37)  T(F): 98.1 (12 Sep 2023 12:54), Max: 99.6 (11 Sep 2023 20:37)  HR: 93 (12 Sep 2023 12:54) (85 - 94)  BP: 96/64 (12 Sep 2023 12:54) (94/50 - 126/77)  BP(mean): --  RR: 18 (12 Sep 2023 12:54) (16 - 18)  SpO2: 95% (12 Sep 2023 12:54) (94% - 95%)    Parameters below as of 12 Sep 2023 12:54  Patient On (Oxygen Delivery Method): room air      I&O's Detail    12 Sep 2023 07:01  -  12 Sep 2023 14:34  --------------------------------------------------------  IN:  Total IN: 0 mL    OUT:    Voided (mL): 200 mL  Total OUT: 200 mL    Total NET: -200 mL      PHYSICAL EXAM:  GENERAL: NAD  HEAD:  scalp incision with staples WILLIAM  NECK: Supple, No JVD, Normal thyroid  NERVOUS SYSTEM:  Alert, Aphasic, right sided hemiparesis   CHEST/LUNG: Clear to auscultation bilaterally  HEART: Regular rate and rhythm; No murmurs, rubs, or gallops  ABDOMEN: Soft, tenderness on palpation in RUQ, Nondistended; Bowel sounds present  EXTREMITIES:  2+ Peripheral Pulses, No clubbing, cyanosis, or edema  SKIN: No rashes or lesions                          9.0    14.18 )-----------( 375      ( 12 Sep 2023 07:02 )             27.7     12 Sep 2023 07:02    133    |  98     |  18.2   ----------------------------<  150    3.9     |  25.0   |  0.71     Ca    8.6        12 Sep 2023 07:02    TPro  6.1    /  Alb  2.5    /  TBili  0.5    /  DBili  x      /  AST  140    /  ALT  135    /  AlkPhos  145    12 Sep 2023 07:02      CAPILLARY BLOOD GLUCOSE  POCT Blood Glucose.: 168 mg/dL (12 Sep 2023 12:57)  POCT Blood Glucose.: 139 mg/dL (12 Sep 2023 08:29)  POCT Blood Glucose.: 150 mg/dL (11 Sep 2023 16:29)  POCT Blood Glucose.: 161 mg/dL (11 Sep 2023 15:30)    LIVER FUNCTIONS - ( 12 Sep 2023 07:02 )  Alb: 2.5 g/dL / Pro: 6.1 g/dL / ALK PHOS: 145 U/L / ALT: 135 U/L / AST: 140 U/L / GGT: x           Urinalysis Basic - ( 12 Sep 2023 07:02 )    Color: x / Appearance: x / SG: x / pH: x  Gluc: 150 mg/dL / Ketone: x  / Bili: x / Urobili: x   Blood: x / Protein: x / Nitrite: x   Leuk Esterase: x / RBC: x / WBC x   Sq Epi: x / Non Sq Epi: x / Bacteria: x    Culture - Blood (09.11.23 @ 08:44)    Specimen Source: .Blood Blood-Peripheral   Culture Results:   No growth at 24 hours    Culture - Blood (09.11.23 @ 08:40)    Specimen Source: .Blood Blood-Peripheral   Culture Results:   No growth at 24 hours    MEDICATIONS  (STANDING):  aspirin  chewable 81 milliGRAM(s) Oral daily  atorvastatin 80 milliGRAM(s) Oral at bedtime  cefTRIAXone Injectable. 2000 milliGRAM(s) IV Push every 24 hours  chlorhexidine 2% Cloths 1 Application(s) Topical <User Schedule>  dextrose 5%. 1000 milliLiter(s) (50 mL/Hr) IV Continuous <Continuous>  dextrose 5%. 1000 milliLiter(s) (100 mL/Hr) IV Continuous <Continuous>  dextrose 50% Injectable 12.5 Gram(s) IV Push once  dextrose 50% Injectable 25 Gram(s) IV Push once  dextrose 50% Injectable 25 Gram(s) IV Push once  glucagon  Injectable 1 milliGRAM(s) IntraMuscular once  insulin lispro (ADMELOG) corrective regimen sliding scale   SubCutaneous three times a day before meals  levETIRAcetam  IVPB 500 milliGRAM(s) IV Intermittent every 12 hours  polyethylene glycol 3350 17 Gram(s) Oral two times a day  senna 2 Tablet(s) Oral at bedtime  tamsulosin 0.8 milliGRAM(s) Oral at bedtime    MEDICATIONS  (PRN):  acetaminophen     Tablet .. 975 milliGRAM(s) Oral every 6 hours PRN Mild Pain (1 - 3)  albuterol/ipratropium for Nebulization 3 milliLiter(s) Nebulizer every 6 hours PRN Shortness of Breath and/or Wheezing  dextrose Oral Gel 15 Gram(s) Oral once PRN Blood Glucose LESS THAN 70 milliGRAM(s)/deciliter  hydrALAZINE Injectable 10 milliGRAM(s) IV Push every 2 hours PRN SBP >140  labetalol Injectable 10 milliGRAM(s) IV Push every 2 hours PRN SBP >140      RADIOLOGY & ADDITIONAL TESTS:  < from: CT Head No Cont (09.12.23 @ 10:08) >  ACC: [id number] EXAM:  CT BRAIN   ORDERED BY: RALPH CARRILLO     PROCEDURE DATE:  09/12/2023          INTERPRETATION:  Clinical indication: Follow-up CVA.    Multiple axial sections were performed from the base of vertex without   contrast enhancement. Coronal and sagittal reconstructions were performed   as well.    This exam is compared prior head CT performed on September 3, 2022.    Postoperative changes compatible left temporal frontal parietal   craniectomy is identified. There abnormal low attenuation again   identified involving the left temporal frontal parietal basal ganglia and   corona radiata region. This is compatible with a subacute left MCA   infarct. This infarct has demonstrated expected changes when compared   with the prior exam. There is some associated area of high attenuation   identified which could be compatible spared parenchyma versus petechial   hemorrhage. Localized mass effect is seen consisting of sulcal   effacement. No significant shift or herniation is seen.    Extra-axial soft tissue swelling in the postoperative region is   identified as well as skin staples.    The visualized paranasal sinuses mastoid and middle ear regions appear   clear.    Impression: Evolving left MCA infarct as described above.    --- End of Report ---            LUNA BERRY MD; Attending Radiologist  This document has been electronically signed. Sep 12 2023 10:16AM    < end of copied text >

## 2023-09-12 NOTE — CONSULT NOTE ADULT - ASSESSMENT
63M PMH of DM2, HTN presents with fall, right sided weakness, dysarthria and right facial droop found to have LM1 occlusion s/p TNK in ER subsequent mechanical thrombectomy and decompressive craniectomy for midline shift. SAMARA negative for thrombus. Cardiology recommending ILR /MCOT as outpatient. Extubated on 9/1/23, monitored in ICU and transferred to medicine for further management.   ID called for klebsiella bacteremia, blood cultures 9/8 drawn in the setting of low grade fever  PT had reported urinary retention and longo placed on 9/6, per chart + traumatic longo with some hematuria. UA +. zosyn started 9/8. UCX sent 9/9  after starting abx, ngtd    Klebsiella bacteremia likely UTI in the setting of urinary retention  Leukocytosis  Fever  Elevated LFT  CVA    - UA +  - UCX sent 9/9 ngtd  - zosyn started 9/8  - Blood cultures + klebsiella  - Repeat blood cultures 9/11 testing  - f/u USG abdomen  - zosyn--> ceftriaxone. plan for 2 weeks, will change to po cefpodoxime when ready for discharge  - trend LFT- ? from statin  - Trend Fever  - Trend Leukocytosis-improving      d/w NP, pharmacy  please call with questions

## 2023-09-12 NOTE — PROGRESS NOTE ADULT - NS ATTEND AMEND GEN_ALL_CORE FT
Seen and examined with the ACP team. Plan discussed with ACPs.    I agree with assessment and plan  as written with modifications made above.    will follow with you    Kenn Pacheco MD PhD   [id number]

## 2023-09-13 LAB
ALBUMIN SERPL ELPH-MCNC: 2.8 G/DL — LOW (ref 3.3–5.2)
ALP SERPL-CCNC: 161 U/L — HIGH (ref 40–120)
ALT FLD-CCNC: 158 U/L — HIGH
ANION GAP SERPL CALC-SCNC: 12 MMOL/L — SIGNIFICANT CHANGE UP (ref 5–17)
AST SERPL-CCNC: 141 U/L — HIGH
BILIRUB SERPL-MCNC: 0.5 MG/DL — SIGNIFICANT CHANGE UP (ref 0.4–2)
BUN SERPL-MCNC: 14.8 MG/DL — SIGNIFICANT CHANGE UP (ref 8–20)
CALCIUM SERPL-MCNC: 8.9 MG/DL — SIGNIFICANT CHANGE UP (ref 8.4–10.5)
CHLORIDE SERPL-SCNC: 101 MMOL/L — SIGNIFICANT CHANGE UP (ref 96–108)
CO2 SERPL-SCNC: 23 MMOL/L — SIGNIFICANT CHANGE UP (ref 22–29)
CREAT SERPL-MCNC: 0.55 MG/DL — SIGNIFICANT CHANGE UP (ref 0.5–1.3)
CULTURE RESULTS: SIGNIFICANT CHANGE UP
EGFR: 112 ML/MIN/1.73M2 — SIGNIFICANT CHANGE UP
GLUCOSE BLDC GLUCOMTR-MCNC: 125 MG/DL — HIGH (ref 70–99)
GLUCOSE BLDC GLUCOMTR-MCNC: 143 MG/DL — HIGH (ref 70–99)
GLUCOSE BLDC GLUCOMTR-MCNC: 149 MG/DL — HIGH (ref 70–99)
GLUCOSE BLDC GLUCOMTR-MCNC: 155 MG/DL — HIGH (ref 70–99)
GLUCOSE SERPL-MCNC: 138 MG/DL — HIGH (ref 70–99)
HCT VFR BLD CALC: 28.9 % — LOW (ref 39–50)
HCT VFR BLD CALC: 29.6 % — LOW (ref 39–50)
HGB BLD-MCNC: 9.5 G/DL — LOW (ref 13–17)
HGB BLD-MCNC: 9.5 G/DL — LOW (ref 13–17)
MCHC RBC-ENTMCNC: 30.4 PG — SIGNIFICANT CHANGE UP (ref 27–34)
MCHC RBC-ENTMCNC: 30.4 PG — SIGNIFICANT CHANGE UP (ref 27–34)
MCHC RBC-ENTMCNC: 32.1 GM/DL — SIGNIFICANT CHANGE UP (ref 32–36)
MCHC RBC-ENTMCNC: 32.9 GM/DL — SIGNIFICANT CHANGE UP (ref 32–36)
MCV RBC AUTO: 92.6 FL — SIGNIFICANT CHANGE UP (ref 80–100)
MCV RBC AUTO: 94.6 FL — SIGNIFICANT CHANGE UP (ref 80–100)
PLATELET # BLD AUTO: 432 K/UL — HIGH (ref 150–400)
PLATELET # BLD AUTO: 503 K/UL — HIGH (ref 150–400)
POTASSIUM SERPL-MCNC: 4.2 MMOL/L — SIGNIFICANT CHANGE UP (ref 3.5–5.3)
POTASSIUM SERPL-SCNC: 4.2 MMOL/L — SIGNIFICANT CHANGE UP (ref 3.5–5.3)
PROT S FREE PPP-ACNC: 74 % — SIGNIFICANT CHANGE UP (ref 63–140)
PROT SERPL-MCNC: 6.4 G/DL — LOW (ref 6.6–8.7)
RBC # BLD: 3.12 M/UL — LOW (ref 4.2–5.8)
RBC # BLD: 3.13 M/UL — LOW (ref 4.2–5.8)
RBC # FLD: 14.3 % — SIGNIFICANT CHANGE UP (ref 10.3–14.5)
RBC # FLD: 14.6 % — HIGH (ref 10.3–14.5)
SODIUM SERPL-SCNC: 136 MMOL/L — SIGNIFICANT CHANGE UP (ref 135–145)
SPECIMEN SOURCE: SIGNIFICANT CHANGE UP
WBC # BLD: 12.52 K/UL — HIGH (ref 3.8–10.5)
WBC # BLD: 12.89 K/UL — HIGH (ref 3.8–10.5)
WBC # FLD AUTO: 12.52 K/UL — HIGH (ref 3.8–10.5)
WBC # FLD AUTO: 12.89 K/UL — HIGH (ref 3.8–10.5)

## 2023-09-13 PROCEDURE — 99233 SBSQ HOSP IP/OBS HIGH 50: CPT

## 2023-09-13 RX ORDER — APIXABAN 2.5 MG/1
5 TABLET, FILM COATED ORAL
Refills: 0 | Status: DISCONTINUED | OUTPATIENT
Start: 2023-09-13 | End: 2023-09-14

## 2023-09-13 RX ORDER — APIXABAN 2.5 MG/1
5 TABLET, FILM COATED ORAL
Refills: 0 | Status: DISCONTINUED | OUTPATIENT
Start: 2023-09-13 | End: 2023-09-13

## 2023-09-13 RX ADMIN — LEVETIRACETAM 400 MILLIGRAM(S): 250 TABLET, FILM COATED ORAL at 06:13

## 2023-09-13 RX ADMIN — SENNA PLUS 2 TABLET(S): 8.6 TABLET ORAL at 22:06

## 2023-09-13 RX ADMIN — ATORVASTATIN CALCIUM 80 MILLIGRAM(S): 80 TABLET, FILM COATED ORAL at 22:06

## 2023-09-13 RX ADMIN — APIXABAN 5 MILLIGRAM(S): 2.5 TABLET, FILM COATED ORAL at 13:46

## 2023-09-13 RX ADMIN — Medication 2: at 12:29

## 2023-09-13 RX ADMIN — TAMSULOSIN HYDROCHLORIDE 0.8 MILLIGRAM(S): 0.4 CAPSULE ORAL at 22:05

## 2023-09-13 RX ADMIN — POLYETHYLENE GLYCOL 3350 17 GRAM(S): 17 POWDER, FOR SOLUTION ORAL at 17:12

## 2023-09-13 RX ADMIN — LEVETIRACETAM 400 MILLIGRAM(S): 250 TABLET, FILM COATED ORAL at 17:12

## 2023-09-13 RX ADMIN — POLYETHYLENE GLYCOL 3350 17 GRAM(S): 17 POWDER, FOR SOLUTION ORAL at 06:18

## 2023-09-13 RX ADMIN — CHLORHEXIDINE GLUCONATE 1 APPLICATION(S): 213 SOLUTION TOPICAL at 06:12

## 2023-09-13 RX ADMIN — APIXABAN 5 MILLIGRAM(S): 2.5 TABLET, FILM COATED ORAL at 17:12

## 2023-09-13 RX ADMIN — CEFTRIAXONE 2000 MILLIGRAM(S): 500 INJECTION, POWDER, FOR SOLUTION INTRAMUSCULAR; INTRAVENOUS at 13:46

## 2023-09-13 RX ADMIN — Medication 81 MILLIGRAM(S): at 13:46

## 2023-09-13 NOTE — PROGRESS NOTE ADULT - SUBJECTIVE AND OBJECTIVE BOX
JOSUE JOHNSON Patient is a 63y old  Male who presents with a chief complaint of LT M1 Occlusion (12 Sep 2023 14:34)     HPI:  Patient is a 63 year old male with PMH Hypertension, DM on oral medications who presents c/o stroke-like symptoms. Per son pt was walking outside around 10:15am this morning when he suddenly fell. His son helped him up and noted that he was weak on the right side and not acting normally which prompted him to come to the ED. On arrival pt w/ obvious right sided arm weakness, confusion, dysarthria, facial droop suggestive of acute stroke. Code stroke initiated, found to have LM1 occlusion, given TNK @ 1215 and was taken for thrombectomy. Unable to provide ROS 2/2 aphasia  (27 Aug 2023 16:45)    The patient was seen and evaluated eye patch awake - very hard to communicate- but does say "am OK" when asked how he is doing   The patient is in no acute distress.  Denied any fever chest pain, palpitations, shortness of breath, abdominal pain, fever, dysuria, cough, edema       I&O's Summary    12 Sep 2023 07:01  -  13 Sep 2023 07:00  --------------------------------------------------------  IN: 300 mL / OUT: 800 mL / NET: -500 mL      Allergies    No Known Allergies    Intolerances      HEALTH ISSUES - PROBLEM Dx:  Suspected deep vein thrombosis (DVT)    CVA (cerebrovascular accident)    Hypotension    Stroke          PAST MEDICAL & SURGICAL HISTORY:  Hypertension      No significant past surgical history              Vital Signs Last 24 Hrs  T(C): 37.2 (13 Sep 2023 12:00), Max: 37.6 (12 Sep 2023 19:50)  T(F): 98.9 (13 Sep 2023 12:00), Max: 99.7 (12 Sep 2023 19:50)  HR: 90 (13 Sep 2023 12:00) (86 - 94)  BP: 100/68 (13 Sep 2023 12:00) (99/66 - 106/68)  BP(mean): 18 (13 Sep 2023 05:13) (18 - 18)  RR: 18 (13 Sep 2023 12:00) (18 - 18)  SpO2: 92% (13 Sep 2023 12:00) (92% - 96%)    Parameters below as of 13 Sep 2023 12:00  Patient On (Oxygen Delivery Method): room air    T(C): 37.2 (09-13-23 @ 12:00), Max: 37.6 (09-12-23 @ 19:50)  HR: 90 (09-13-23 @ 12:00) (86 - 94)  BP: 100/68 (09-13-23 @ 12:00) (99/66 - 106/68)  RR: 18 (09-13-23 @ 12:00) (18 - 18)  SpO2: 92% (09-13-23 @ 12:00) (92% - 96%)  Wt(kg): --    PHYSICAL EXAM:    GENERAL: NAD, scar head   EYES: right eye patch   ENMT:  Moist mucous membranes,  No lesions  NECK: Supple,   NERVOUS SYSTEM:  Alert & barely Moves upper and lower extremities; CNS-II-XII  CHEST/LUNG:  auscultation bilaterally; No rales, rhonchi, wheezing,   HEART: Regular rate and rhythm; No murmurs,   ABDOMEN: Soft, Nontender, Nondistended; Bowel sounds +  EXTREMITIES:  Peripheral Pulses, No  cyanosis, or edema  psychiatry- cant assess mood or Insight and judgement intact     acetaminophen     Tablet .. 975 milliGRAM(s) Oral every 6 hours PRN  albuterol/ipratropium for Nebulization 3 milliLiter(s) Nebulizer every 6 hours PRN  apixaban 5 milliGRAM(s) Oral two times a day  aspirin  chewable 81 milliGRAM(s) Oral daily  atorvastatin 80 milliGRAM(s) Oral at bedtime  cefTRIAXone Injectable. 2000 milliGRAM(s) IV Push every 24 hours  chlorhexidine 2% Cloths 1 Application(s) Topical <User Schedule>  dextrose 5%. 1000 milliLiter(s) IV Continuous <Continuous>  dextrose 5%. 1000 milliLiter(s) IV Continuous <Continuous>  dextrose 50% Injectable 25 Gram(s) IV Push once  dextrose 50% Injectable 25 Gram(s) IV Push once  dextrose 50% Injectable 12.5 Gram(s) IV Push once  dextrose Oral Gel 15 Gram(s) Oral once PRN  glucagon  Injectable 1 milliGRAM(s) IntraMuscular once  hydrALAZINE Injectable 10 milliGRAM(s) IV Push every 2 hours PRN  insulin lispro (ADMELOG) corrective regimen sliding scale   SubCutaneous three times a day before meals  labetalol Injectable 10 milliGRAM(s) IV Push every 2 hours PRN  levETIRAcetam  IVPB 500 milliGRAM(s) IV Intermittent every 12 hours  polyethylene glycol 3350 17 Gram(s) Oral two times a day  senna 2 Tablet(s) Oral at bedtime  tamsulosin 0.8 milliGRAM(s) Oral at bedtime      LABS:                          9.5    12.89 )-----------( 432      ( 13 Sep 2023 08:30 )             28.9     09-13    136  |  101  |  14.8  ----------------------------<  138<H>  4.2   |  23.0  |  0.55    Ca    8.9      13 Sep 2023 08:30    TPro  6.4<L>  /  Alb  2.8<L>  /  TBili  0.5  /  DBili  x   /  AST  141<H>  /  ALT  158<H>  /  AlkPhos  161<H>  09-13    LIVER FUNCTIONS - ( 13 Sep 2023 08:30 )  Alb: 2.8 g/dL / Pro: 6.4 g/dL / ALK PHOS: 161 U/L / ALT: 158 U/L / AST: 141 U/L / GGT: x                 Urinalysis Basic - ( 13 Sep 2023 08:30 )    Color: x / Appearance: x / SG: x / pH: x  Gluc: 138 mg/dL / Ketone: x  / Bili: x / Urobili: x   Blood: x / Protein: x / Nitrite: x   Leuk Esterase: x / RBC: x / WBC x   Sq Epi: x / Non Sq Epi: x / Bacteria: x      CAPILLARY BLOOD GLUCOSE      POCT Blood Glucose.: 155 mg/dL (13 Sep 2023 11:57)  POCT Blood Glucose.: 125 mg/dL (13 Sep 2023 09:03)  POCT Blood Glucose.: 130 mg/dL (12 Sep 2023 17:29)      RADIOLOGY & ADDITIONAL TESTS:      Consultant notes reviewed    Case discussed with consultant/provider/ family /patient

## 2023-09-13 NOTE — PROGRESS NOTE ADULT - ASSESSMENT
63M PMH of DM2, HTN presents with fall, right sided weakness, dysarthria and right facial droop found to have LM1 occlusion s/p TNK in ER subsequent mechanical thrombectomy and decompressive craniectomy for midline shift. SAMARA negative for thrombus. Cardiology recommending ILR /MCOT as outpatient. Extubated on 9/1/23, monitored in ICU and transferred to medicine for further management.     #CVA + antiphospholipid syndrome (embolic stroke+ positive antiphospholipid antibody)- Started on Eliquis-   AC cleared by Neuro Sx and Hematology recommended Eliquis BID   s/p cranioplasty  c/w ASA, lipitor- Rehab/ Impaired mobility and function   SAMARA negative for thrombus, +atheroma of aortic arch and aorta   mcot/ILR on DC  pt/ot/speech/ PM&R- cont   s/p keppra  Cranioplasty with neurosx outpatient   CT head with revolving left MCA infarct.  Neurology following.    Klebsiella Bacteremia  leukocytosis slowly improving.   cont Ceftriaxone  f/u blood cx from 9/11 - negative to date  ID consulted.    #urinary retention longo   Flomax  0.8mg po daily  bowel regimen  TOV near discharge vs. ALDEN     #Hematuria- since resolved - monitor on Eliquis   pt noted with 1 episode of hematuria on 9/7, suspect 2/2 traumatic Longo  H/H stable    #Constipation  miralax/senna    #Dysphagia  pureed diet with thick liquids  aspiration precautions  Cough noted 9/12/23 - CXR ordered, S/S to follow up    #Uneven BP   CT angio of the neck and A/P did not show any acute pathologies  likely chronic atherosclerosis disease    #Hypernatremia -resolved  PO fluid intake    #Type 2DM  sliding scale    #Elevated LFTs  Abdominal US ordered.  Repeat CMP in am.

## 2023-09-13 NOTE — CHART NOTE - NSCHARTNOTEFT_GEN_A_CORE
Notified by RN pt with possible hematuria Notified by RN pt with "questionable hematuria."  Patient started on Eliquis today. Received PO 5mg Eliquis BID today.   RN reports pt without melena, BRBPR.  Patient lying in bed awake, denies complaints.  Elisa colored urine in collection bag but appears to have scant amount of blood tinged urine in longo catheter with sediments.    ABD: soft, NT, ND  CARDIAC: +S1/S2, RRR    Today H&H 9.5/28.9  Patient last had documented hematuria X1 event 9/7 suspected due to F/C insertion which resolved.  Will order STAT CBC, T&S, U/A. Will f/u results. Monitor urine.  RN instructed to continue to monitor pt and notify PA of any changes in pt status. Notified by RN pt with "questionable hematuria."  Patient started on Eliquis today. Received PO 5mg Eliquis BID today.   RN reports pt without melena, BRBPR.  Patient lying in bed awake, denies complaints.  Elisa colored urine in collection bag but appears to have scant amount of blood tinged urine in longo catheter with sediments.    ABD: soft, NT, ND  CARDIAC: +S1/S2, RRR    Today H&H 9.5/28.9  Patient last had documented hematuria X1 event 9/7 suspected due to F/C insertion which resolved.  Will order STAT CBC, T&S, U/A. Will f/u results. Monitor urine.  RN instructed to continue to monitor pt and notify PA of any changes in pt status.    09/14/2023 05:20AM Notified of U/A results. Hematuria persists. Eliquis AM dose held today. PMD will be notified in AM. Will leave Urology consult at discretion of PMD.

## 2023-09-14 LAB
APPEARANCE UR: CLEAR — SIGNIFICANT CHANGE UP
BACTERIA # UR AUTO: ABNORMAL
BILIRUB UR-MCNC: NEGATIVE — SIGNIFICANT CHANGE UP
BLD GP AB SCN SERPL QL: SIGNIFICANT CHANGE UP
COLOR SPEC: YELLOW — SIGNIFICANT CHANGE UP
DIFF PNL FLD: ABNORMAL
EPI CELLS # UR: SIGNIFICANT CHANGE UP
GLUCOSE BLDC GLUCOMTR-MCNC: 135 MG/DL — HIGH (ref 70–99)
GLUCOSE BLDC GLUCOMTR-MCNC: 137 MG/DL — HIGH (ref 70–99)
GLUCOSE BLDC GLUCOMTR-MCNC: 151 MG/DL — HIGH (ref 70–99)
GLUCOSE UR QL: NEGATIVE MG/DL — SIGNIFICANT CHANGE UP
HCT VFR BLD CALC: 32.1 % — LOW (ref 39–50)
HGB BLD-MCNC: 10.5 G/DL — LOW (ref 13–17)
KETONES UR-MCNC: NEGATIVE — SIGNIFICANT CHANGE UP
LEUKOCYTE ESTERASE UR-ACNC: ABNORMAL
MCHC RBC-ENTMCNC: 30.5 PG — SIGNIFICANT CHANGE UP (ref 27–34)
MCHC RBC-ENTMCNC: 32.7 GM/DL — SIGNIFICANT CHANGE UP (ref 32–36)
MCV RBC AUTO: 93.3 FL — SIGNIFICANT CHANGE UP (ref 80–100)
NITRITE UR-MCNC: NEGATIVE — SIGNIFICANT CHANGE UP
PH UR: 7 — SIGNIFICANT CHANGE UP (ref 5–8)
PLATELET # BLD AUTO: 513 K/UL — HIGH (ref 150–400)
PROT UR-MCNC: 30 MG/DL
RBC # BLD: 3.44 M/UL — LOW (ref 4.2–5.8)
RBC # FLD: 14.6 % — HIGH (ref 10.3–14.5)
RBC CASTS # UR COMP ASSIST: >50 /HPF (ref 0–4)
SP GR SPEC: 1.01 — SIGNIFICANT CHANGE UP (ref 1.01–1.02)
UROBILINOGEN FLD QL: 8 MG/DL
WBC # BLD: 13.51 K/UL — HIGH (ref 3.8–10.5)
WBC # FLD AUTO: 13.51 K/UL — HIGH (ref 3.8–10.5)
WBC UR QL: SIGNIFICANT CHANGE UP /HPF (ref 0–5)

## 2023-09-14 PROCEDURE — 99233 SBSQ HOSP IP/OBS HIGH 50: CPT

## 2023-09-14 RX ADMIN — Medication 2: at 14:03

## 2023-09-14 RX ADMIN — ATORVASTATIN CALCIUM 80 MILLIGRAM(S): 80 TABLET, FILM COATED ORAL at 21:41

## 2023-09-14 RX ADMIN — LEVETIRACETAM 400 MILLIGRAM(S): 250 TABLET, FILM COATED ORAL at 17:31

## 2023-09-14 RX ADMIN — TAMSULOSIN HYDROCHLORIDE 0.8 MILLIGRAM(S): 0.4 CAPSULE ORAL at 21:41

## 2023-09-14 RX ADMIN — POLYETHYLENE GLYCOL 3350 17 GRAM(S): 17 POWDER, FOR SOLUTION ORAL at 17:30

## 2023-09-14 RX ADMIN — APIXABAN 5 MILLIGRAM(S): 2.5 TABLET, FILM COATED ORAL at 17:29

## 2023-09-14 RX ADMIN — CHLORHEXIDINE GLUCONATE 1 APPLICATION(S): 213 SOLUTION TOPICAL at 06:17

## 2023-09-14 RX ADMIN — CEFTRIAXONE 2000 MILLIGRAM(S): 500 INJECTION, POWDER, FOR SOLUTION INTRAMUSCULAR; INTRAVENOUS at 14:01

## 2023-09-14 RX ADMIN — SENNA PLUS 2 TABLET(S): 8.6 TABLET ORAL at 21:40

## 2023-09-14 RX ADMIN — LEVETIRACETAM 400 MILLIGRAM(S): 250 TABLET, FILM COATED ORAL at 06:17

## 2023-09-14 RX ADMIN — Medication 81 MILLIGRAM(S): at 14:02

## 2023-09-14 NOTE — PROGRESS NOTE ADULT - ASSESSMENT
ASSESSMENT:   63M s/p L MCA stroke s/p thrombectomy and subsequent L hemicraniectomy POD#15    PLAN:    -Continue q4h neuro checks  -Helmet when OOB  -Staples removed today  -Okay for ASA 81 for stroke, lovenox for DVT prophylaxis  -On Eliquis BID, per heme/onc reccs  -Flap now sunken, could likely do cranioplasty this admission pending medical clearance, date TBD  -No contraindication to patient being discharged and coming back for cranioplasty when able   -Further medical management per primary team  -Discussed case with Dr. Bah   ASSESSMENT:   63M s/p L MCA stroke s/p thrombectomy and subsequent L hemicraniectomy POD#15    PLAN:    -Continue q4h neuro checks  -Helmet when OOB  -Staples removed today  -Okay for ASA 81 for stroke, lovenox for DVT prophylaxis  -On Eliquis BID, per heme/onc reccs  -Flap now sunken, could likely do cranioplasty this admission pending medical clearance, will likely do cranioplasty next week  -Further medical management per primary team  -Discussed case with Dr. Bah

## 2023-09-14 NOTE — PROGRESS NOTE ADULT - SUBJECTIVE AND OBJECTIVE BOX
JOSUE JOHNSON Patient is a 63y old  Male who presents with a chief complaint of LT M1 Occlusion (14 Sep 2023 10:47)     HPI:  Patient is a 63 year old male with PMH Hypertension, DM on oral medications who presents c/o stroke-like symptoms. Per son pt was walking outside around 10:15am this morning when he suddenly fell. His son helped him up and noted that he was weak on the right side and not acting normally which prompted him to come to the ED. On arrival pt w/ obvious right sided arm weakness, confusion, dysarthria, facial droop suggestive of acute stroke. Code stroke initiated, found to have LM1 occlusion, given TNK @ 1215 and was taken for thrombectomy. Unable to provide ROS 2/2 aphasia  (27 Aug 2023 16:45)    The patient was seen and evaluated smiles- no answers no talking no following commands   The patient is in no acute distress.        I&O's Summary    13 Sep 2023 07:  -  14 Sep 2023 07:00  --------------------------------------------------------  IN: 0 mL / OUT: 1300 mL / NET: -1300 mL    14 Sep 2023 07:01  -  14 Sep 2023 17:42  --------------------------------------------------------  IN: 0 mL / OUT: 800 mL / NET: -800 mL      Allergies    No Known Allergies    Intolerances      HEALTH ISSUES - PROBLEM Dx:  Suspected deep vein thrombosis (DVT)    CVA (cerebrovascular accident)    Hypotension    Stroke          PAST MEDICAL & SURGICAL HISTORY:  Hypertension      No significant past surgical history              Vital Signs Last 24 Hrs  T(C): 36.7 (14 Sep 2023 16:26), Max: 37.2 (13 Sep 2023 19:40)  T(F): 98 (14 Sep 2023 16:26), Max: 99 (13 Sep 2023 19:40)  HR: 91 (14 Sep 2023 16:) (90 - 94)  BP: 113/78 (14 Sep 2023 16:26) (105/76 - 113/78)  BP(mean): --  RR: 18 (14 Sep 2023 16:26) (18 - 18)  SpO2: 92% (14 Sep 2023 16:26) (92% - 95%)    Parameters below as of 14 Sep 2023 16:26  Patient On (Oxygen Delivery Method): room air    T(C): 36.7 (23 @ 16:26), Max: 37.2 (23 @ 19:40)  HR: 91 (23 @ 16:26) (90 - 94)  BP: 113/78 (23 @ 16:26) (105/76 - 113/78)  RR: 18 (23 @ 16:26) (18 - 18)  SpO2: 92% (23 @ 16:26) (92% - 95%)  Wt(kg): --    PHYSICAL EXAM:    GENERAL: NAD  HEAD:  craniectomy sunken   EYES: EOMI, PERRL, conjunctiva and sclera clear  ENMT:  Moist mucous membranes,  No lesions  NECK: Supple,   NERVOUS SYSTEM:  Alert & nrver really Moves upper and lower extremities; CNS-II-XII  CHEST/LUNG: Clear to auscultation bilaterally; No rales, rhonchi, wheezing,   HEART: Regular rate and rhythm; No murmurs,   ABDOMEN: Soft, Nontender, Nondistended; Bowel sounds present  EXTREMITIES:  Peripheral Pulses, No  cyanosis, or edema  SKIN: No rashes or lesions  psychiatry- cant assess Insight and judgement intact     acetaminophen     Tablet .. 975 milliGRAM(s) Oral every 6 hours PRN  albuterol/ipratropium for Nebulization 3 milliLiter(s) Nebulizer every 6 hours PRN  aspirin  chewable 81 milliGRAM(s) Oral daily  atorvastatin 80 milliGRAM(s) Oral at bedtime  cefTRIAXone Injectable. 2000 milliGRAM(s) IV Push every 24 hours  chlorhexidine 2% Cloths 1 Application(s) Topical <User Schedule>  dextrose 5%. 1000 milliLiter(s) IV Continuous <Continuous>  dextrose 5%. 1000 milliLiter(s) IV Continuous <Continuous>  dextrose 50% Injectable 25 Gram(s) IV Push once  dextrose 50% Injectable 12.5 Gram(s) IV Push once  dextrose 50% Injectable 25 Gram(s) IV Push once  dextrose Oral Gel 15 Gram(s) Oral once PRN  glucagon  Injectable 1 milliGRAM(s) IntraMuscular once  hydrALAZINE Injectable 10 milliGRAM(s) IV Push every 2 hours PRN  insulin lispro (ADMELOG) corrective regimen sliding scale   SubCutaneous three times a day before meals  labetalol Injectable 10 milliGRAM(s) IV Push every 2 hours PRN  levETIRAcetam  IVPB 500 milliGRAM(s) IV Intermittent every 12 hours  polyethylene glycol 3350 17 Gram(s) Oral two times a day  senna 2 Tablet(s) Oral at bedtime  tamsulosin 0.8 milliGRAM(s) Oral at bedtime      LABS:                          10.5   13.51 )-----------( 513      ( 14 Sep 2023 10:56 )             32.1         136  |  101  |  14.8  ----------------------------<  138<H>  4.2   |  23.0  |  0.55    Ca    8.9      13 Sep 2023 08:30    TPro  6.4<L>  /  Alb  2.8<L>  /  TBili  0.5  /  DBili  x   /  AST  141<H>  /  ALT  158<H>  /  AlkPhos  161<H>      LIVER FUNCTIONS - ( 13 Sep 2023 08:30 )  Alb: 2.8 g/dL / Pro: 6.4 g/dL / ALK PHOS: 161 U/L / ALT: 158 U/L / AST: 141 U/L / GGT: x                 Urinalysis Basic - ( 14 Sep 2023 03:30 )    Color: Yellow / Appearance: Clear / S.015 / pH: x  Gluc: x / Ketone: Negative  / Bili: Negative / Urobili: 8 mg/dL   Blood: x / Protein: 30 mg/dL / Nitrite: Negative   Leuk Esterase: Trace / RBC: >50 /HPF / WBC 0-2 /HPF   Sq Epi: x / Non Sq Epi: x / Bacteria: Occasional      CAPILLARY BLOOD GLUCOSE      POCT Blood Glucose.: 135 mg/dL (14 Sep 2023 17:20)  POCT Blood Glucose.: 151 mg/dL (14 Sep 2023 14:00)  POCT Blood Glucose.: 137 mg/dL (14 Sep 2023 07:57)  POCT Blood Glucose.: 149 mg/dL (13 Sep 2023 22:03)  POCT Blood Glucose.: 143 mg/dL (13 Sep 2023 18:01)      RADIOLOGY & ADDITIONAL TESTS:      Consultant notes reviewed    Case discussed with consultant/provider/ family /patient

## 2023-09-14 NOTE — PROGRESS NOTE ADULT - SUBJECTIVE AND OBJECTIVE BOX
SUBJECTIVE:    Patient is a 63 year old male with PMH Hypertension, DM on oral medications who presents c/o stroke-like symptoms. Per son pt was walking outside around 10:15am this morning when he suddenly fell. His son helped him up and noted that he was weak on the right side and not acting normally which prompted him to come to the ED. On arrival pt w/ obvious right sided arm weakness, confusion, dysarthria, facial droop suggestive of acute stroke. Code stroke initiated, found to have LM1 occlusion, given TNK @ 1215 and was taken for thrombectomy. Unable to provide ROS 2/2 aphasia  (27 Aug 2023 16:45)    INTERVAL HX/OVERNIGHT EVENTS:  Pt is POD#15 s/p L hemicraniectomy. Patient seen and examined at bedside. Pt was lethargic on exam this morning but back at baseline later in the morning. Staples removed today, pt tolerated well.     Vital Signs Last 24 Hrs  T(C): 37.2 (09-14-23 @ 08:00), Max: 37.2 (09-13-23 @ 12:00)  T(F): 98.9 (09-14-23 @ 08:00), Max: 99 (09-13-23 @ 19:40)  HR: 90 (09-14-23 @ 08:00) (90 - 94)  BP: 107/70 (09-14-23 @ 08:00) (100/68 - 109/79)  BP(mean): --  RR: 18 (09-14-23 @ 08:00) (18 - 18)  SpO2: 94% (09-14-23 @ 08:00) (92% - 97%)    PHYSICAL EXAM:    GENERAL: NAD    INCISION: S/p L craniectomy, flap sunken and soft, left incision site staples removed, no signs of infection     MENTAL STATUS: Opens eyes spontaneously, following simple commands on left side, expressive aphasia    CRANIAL NERVES: PERRL  MOTOR: strength LUE 5/5, LLE 4/5, RUE weak extension, RLE TF   SKIN: Warm, dry    LABS:                          9.5    12.52 )-----------( 503      ( 13 Sep 2023 22:00 )             29.6   09-13    136  |  101  |  14.8  ----------------------------<  138<H>  4.2   |  23.0  |  0.55    Ca    8.9      13 Sep 2023 08:30    TPro  6.4<L>  /  Alb  2.8<L>  /  TBili  0.5  /  DBili  x   /  AST  141<H>  /  ALT  158<H>  /  AlkPhos  161<H>  09-13        IMAGING:   CT Head No Cont (09.12.23 @ 10:08)     Impression: Evolving left MCA infarct as described above.      CT Head No Cont (09.03.23 @ 11:20)   No significant change from the previous exam performed yesterday.   Surgical site is unchanged as is the brain parenchyma changes from left   middle cerebral artery stroke. Mass effect is unchanged.

## 2023-09-15 LAB
ALBUMIN SERPL ELPH-MCNC: 2.9 G/DL — LOW (ref 3.3–5.2)
ALP SERPL-CCNC: 159 U/L — HIGH (ref 40–120)
ALT FLD-CCNC: 172 U/L — HIGH
ANION GAP SERPL CALC-SCNC: 14 MMOL/L — SIGNIFICANT CHANGE UP (ref 5–17)
AST SERPL-CCNC: 121 U/L — HIGH
BILIRUB SERPL-MCNC: 0.4 MG/DL — SIGNIFICANT CHANGE UP (ref 0.4–2)
BUN SERPL-MCNC: 18 MG/DL — SIGNIFICANT CHANGE UP (ref 8–20)
CALCIUM SERPL-MCNC: 9.1 MG/DL — SIGNIFICANT CHANGE UP (ref 8.4–10.5)
CHLORIDE SERPL-SCNC: 101 MMOL/L — SIGNIFICANT CHANGE UP (ref 96–108)
CO2 SERPL-SCNC: 23 MMOL/L — SIGNIFICANT CHANGE UP (ref 22–29)
CREAT SERPL-MCNC: 0.62 MG/DL — SIGNIFICANT CHANGE UP (ref 0.5–1.3)
EGFR: 108 ML/MIN/1.73M2 — SIGNIFICANT CHANGE UP
GLUCOSE BLDC GLUCOMTR-MCNC: 134 MG/DL — HIGH (ref 70–99)
GLUCOSE BLDC GLUCOMTR-MCNC: 150 MG/DL — HIGH (ref 70–99)
GLUCOSE BLDC GLUCOMTR-MCNC: 152 MG/DL — HIGH (ref 70–99)
GLUCOSE SERPL-MCNC: 131 MG/DL — HIGH (ref 70–99)
HCT VFR BLD CALC: 34.3 % — LOW (ref 39–50)
HGB BLD-MCNC: 11.1 G/DL — LOW (ref 13–17)
MCHC RBC-ENTMCNC: 31.1 PG — SIGNIFICANT CHANGE UP (ref 27–34)
MCHC RBC-ENTMCNC: 32.4 GM/DL — SIGNIFICANT CHANGE UP (ref 32–36)
MCV RBC AUTO: 96.1 FL — SIGNIFICANT CHANGE UP (ref 80–100)
PLATELET # BLD AUTO: 493 K/UL — HIGH (ref 150–400)
POTASSIUM SERPL-MCNC: 4.5 MMOL/L — SIGNIFICANT CHANGE UP (ref 3.5–5.3)
POTASSIUM SERPL-SCNC: 4.5 MMOL/L — SIGNIFICANT CHANGE UP (ref 3.5–5.3)
PROT SERPL-MCNC: 6.7 G/DL — SIGNIFICANT CHANGE UP (ref 6.6–8.7)
RBC # BLD: 3.57 M/UL — LOW (ref 4.2–5.8)
RBC # FLD: 14.8 % — HIGH (ref 10.3–14.5)
SODIUM SERPL-SCNC: 138 MMOL/L — SIGNIFICANT CHANGE UP (ref 135–145)
WBC # BLD: 12.77 K/UL — HIGH (ref 3.8–10.5)
WBC # FLD AUTO: 12.77 K/UL — HIGH (ref 3.8–10.5)

## 2023-09-15 PROCEDURE — 70450 CT HEAD/BRAIN W/O DYE: CPT | Mod: 26

## 2023-09-15 PROCEDURE — 99233 SBSQ HOSP IP/OBS HIGH 50: CPT

## 2023-09-15 RX ADMIN — Medication 81 MILLIGRAM(S): at 12:17

## 2023-09-15 RX ADMIN — CHLORHEXIDINE GLUCONATE 1 APPLICATION(S): 213 SOLUTION TOPICAL at 06:15

## 2023-09-15 RX ADMIN — LEVETIRACETAM 400 MILLIGRAM(S): 250 TABLET, FILM COATED ORAL at 06:15

## 2023-09-15 RX ADMIN — POLYETHYLENE GLYCOL 3350 17 GRAM(S): 17 POWDER, FOR SOLUTION ORAL at 06:16

## 2023-09-15 RX ADMIN — TAMSULOSIN HYDROCHLORIDE 0.8 MILLIGRAM(S): 0.4 CAPSULE ORAL at 22:01

## 2023-09-15 RX ADMIN — LEVETIRACETAM 400 MILLIGRAM(S): 250 TABLET, FILM COATED ORAL at 17:47

## 2023-09-15 RX ADMIN — CEFTRIAXONE 2000 MILLIGRAM(S): 500 INJECTION, POWDER, FOR SOLUTION INTRAMUSCULAR; INTRAVENOUS at 14:56

## 2023-09-15 RX ADMIN — SENNA PLUS 2 TABLET(S): 8.6 TABLET ORAL at 22:01

## 2023-09-15 NOTE — PROGRESS NOTE ADULT - SUBJECTIVE AND OBJECTIVE BOX
JOSUE JOHNSON Patient is a 63y old  Male who presents with a chief complaint of LT M1 Occlusion (14 Sep 2023 17:42)     HPI:  Patient is a 63 year old male with PMH Hypertension, DM on oral medications who presents c/o stroke-like symptoms. Per son pt was walking outside around 10:15am this morning when he suddenly fell. His son helped him up and noted that he was weak on the right side and not acting normally which prompted him to come to the ED. On arrival pt w/ obvious right sided arm weakness, confusion, dysarthria, facial droop suggestive of acute stroke. Code stroke initiated, found to have LM1 occlusion, given TNK @ 1215 and was taken for thrombectomy. Unable to provide ROS 2/2 aphasia  (27 Aug 2023 16:45)    The patient was seen and evaluated doesn't respond doesn't move - smiles and turns eyes to me   The patient is in no acute distress.      I&O's Summary    14 Sep 2023 07:01  -  15 Sep 2023 07:00  --------------------------------------------------------  IN: 0 mL / OUT: 1650 mL / NET: -1650 mL      Allergies    No Known Allergies    Intolerances      HEALTH ISSUES - PROBLEM Dx:  Suspected deep vein thrombosis (DVT)    CVA (cerebrovascular accident)    Hypotension    Stroke          PAST MEDICAL & SURGICAL HISTORY:  Hypertension      No significant past surgical history              Vital Signs Last 24 Hrs  T(C): 36.9 (15 Sep 2023 04:11), Max: 37.3 (14 Sep 2023 19:40)  T(F): 98.5 (15 Sep 2023 04:11), Max: 99.2 (14 Sep 2023 19:40)  HR: 82 (15 Sep 2023 04:11) (82 - 93)  BP: 100/70 (15 Sep 2023 04:11) (100/70 - 120/82)  BP(mean): --  RR: 16 (15 Sep 2023 04:11) (16 - 18)  SpO2: 95% (15 Sep 2023 04:11) (95% - 95%)    Parameters below as of 15 Sep 2023 04:11  Patient On (Oxygen Delivery Method): room air    T(C): 36.9 (09-15-23 @ 04:11), Max: 37.3 (09-14-23 @ 19:40)  HR: 82 (09-15-23 @ 04:11) (82 - 93)  BP: 100/70 (09-15-23 @ 04:11) (100/70 - 120/82)  RR: 16 (09-15-23 @ 04:11) (16 - 18)  SpO2: 95% (09-15-23 @ 04:11) (95% - 95%)  Wt(kg): --    PHYSICAL EXAM:    GENERAL: NAD doesn't talk doesn't move   HEAD:  Atraumatic, craniectomy  EYES: conjunctiva and sclera clear  ENMT:  Moist mucous membranes,    NECK: Supple,   NERVOUS SYSTEM:  Alert & never  Moves upper and lower extremities;   CHEST/LUNG: Clear to auscultation bilaterally; No rales, rhonchi, wheezing,   HEART: Regular rate and rhythm; No murmurs,   ABDOMEN: Soft, Nontender, Nondistended; Bowel sounds present  EXTREMITIES:  Peripheral Pulses, No  cyanosis, or edema  SKIN: No rashes or lesions  psychiatry- cant assess Insight and judgement intact     acetaminophen     Tablet .. 975 milliGRAM(s) Oral every 6 hours PRN  albuterol/ipratropium for Nebulization 3 milliLiter(s) Nebulizer every 6 hours PRN  aspirin  chewable 81 milliGRAM(s) Oral daily  atorvastatin 80 milliGRAM(s) Oral at bedtime  cefTRIAXone Injectable. 2000 milliGRAM(s) IV Push every 24 hours  chlorhexidine 2% Cloths 1 Application(s) Topical <User Schedule>  dextrose 5%. 1000 milliLiter(s) IV Continuous <Continuous>  dextrose 5%. 1000 milliLiter(s) IV Continuous <Continuous>  dextrose 50% Injectable 25 Gram(s) IV Push once  dextrose 50% Injectable 25 Gram(s) IV Push once  dextrose 50% Injectable 12.5 Gram(s) IV Push once  dextrose Oral Gel 15 Gram(s) Oral once PRN  glucagon  Injectable 1 milliGRAM(s) IntraMuscular once  hydrALAZINE Injectable 10 milliGRAM(s) IV Push every 2 hours PRN  insulin lispro (ADMELOG) corrective regimen sliding scale   SubCutaneous three times a day before meals  labetalol Injectable 10 milliGRAM(s) IV Push every 2 hours PRN  levETIRAcetam  IVPB 500 milliGRAM(s) IV Intermittent every 12 hours  polyethylene glycol 3350 17 Gram(s) Oral two times a day  senna 2 Tablet(s) Oral at bedtime  tamsulosin 0.8 milliGRAM(s) Oral at bedtime      LABS:                          11.1   12.77 )-----------( 493      ( 15 Sep 2023 07:05 )             34.3     09-15    138  |  101  |  18.0  ----------------------------<  131<H>  4.5   |  23.0  |  0.62    Ca    9.1      15 Sep 2023 07:05    TPro  6.7  /  Alb  2.9<L>  /  TBili  0.4  /  DBili  x   /  AST  121<H>  /  ALT  172<H>  /  AlkPhos  159<H>  09-15    LIVER FUNCTIONS - ( 15 Sep 2023 07:05 )  Alb: 2.9 g/dL / Pro: 6.7 g/dL / ALK PHOS: 159 U/L / ALT: 172 U/L / AST: 121 U/L / GGT: x                 Urinalysis Basic - ( 15 Sep 2023 07:05 )    Color: x / Appearance: x / SG: x / pH: x  Gluc: 131 mg/dL / Ketone: x  / Bili: x / Urobili: x   Blood: x / Protein: x / Nitrite: x   Leuk Esterase: x / RBC: x / WBC x   Sq Epi: x / Non Sq Epi: x / Bacteria: x      CAPILLARY BLOOD GLUCOSE      POCT Blood Glucose.: 150 mg/dL (15 Sep 2023 12:43)  POCT Blood Glucose.: 135 mg/dL (14 Sep 2023 17:20)      RADIOLOGY & ADDITIONAL TESTS:      Consultant notes reviewed    Case discussed with consultant/provider/ family /patient

## 2023-09-15 NOTE — PROGRESS NOTE ADULT - CONVERSATION DETAILS
per the nephew he is seeing that the patient has been needing assistance with all his ADLs and he will think about the DNR DNI and discuss with his family members regards and wants to see how much progress he will make on discharge at the rehab
discussed with friend who is at bedside - he enquired about the prognosis and is worried about his after care- will try to talk to patient's nephew and set up a time   states he is not aware of patient's wishes about resuscitation but sees that patient has not been able to move or been able to communicate his needs and hasn't improved since his hospitalization

## 2023-09-15 NOTE — PROGRESS NOTE ADULT - ASSESSMENT
63M PMH of DM2, HTN presents with fall, right sided weakness, dysarthria and right facial droop found to have LM1 occlusion s/p TNK in ER subsequent mechanical thrombectomy and decompressive craniectomy for midline shift. SAMARA negative for thrombus. Cardiology recommending ILR /MCOT as outpatient. Extubated on 9/1/23, monitored in ICU and transferred to medicine for further management.     #CVA + antiphospholipid syndrome (embolic stroke+ positive antiphospholipid antibody)- Started on Eliquis-   AC cleared by Neuro Sx and Hematology recommended Eliquis BID   s/p cranioplasty-c/w ASA, lipitor- Rehab/ Impaired mobility and function   SAMARA negative for thrombus, +atheroma of aortic arch and aorta   mcot/ILR on DC  pt/ot/speech/ PM&R- cont   s/p keppra  Cranioplasty with neurosx next week  CT head with revolving left MCA infarct.  Neurology following.    Klebsiella Bacteremia  leukocytosis slowly improving.   cont Ceftriaxone per ID recommendations  f/u blood cx from 9/11 - negative to date      #urinary retention longo   Flomax  0.8mg po daily  bowel regimen  TOV near discharge vs. ALDEN     #Hematuria- ELIQUIS started and then had an episode of hematuria again and changed to lovenox - will be easier to transition - given Neuro sx plans for cranioplasty next week    reached out to regina Sanchez - heme onc with original recommendations - regards switching to heparin   last night had an episode of hematuria   and prior   pt noted with 1 episode of hematuria on 9/7, suspect 2/2 traumatic Longo  H/H stable    #Constipation  miralax/senna    #Dysphagia  pureed diet with thick liquids  aspiration precautions    #Uneven BP   CT angio of the neck and A/P did not show any acute pathologies  likely chronic atherosclerosis disease    #Hypernatremia -resolved  PO fluid intake    #Type 2DM  sliding scale    #Elevated LFTs- hold statin and hold Tylenol for now   repeat LFTS

## 2023-09-16 LAB
ALBUMIN SERPL ELPH-MCNC: 3.2 G/DL — LOW (ref 3.3–5.2)
ALP SERPL-CCNC: 150 U/L — HIGH (ref 40–120)
ALT FLD-CCNC: 171 U/L — HIGH
ANION GAP SERPL CALC-SCNC: 15 MMOL/L — SIGNIFICANT CHANGE UP (ref 5–17)
AST SERPL-CCNC: 123 U/L — HIGH
BILIRUB SERPL-MCNC: 0.4 MG/DL — SIGNIFICANT CHANGE UP (ref 0.4–2)
BUN SERPL-MCNC: 19.3 MG/DL — SIGNIFICANT CHANGE UP (ref 8–20)
CALCIUM SERPL-MCNC: 9.3 MG/DL — SIGNIFICANT CHANGE UP (ref 8.4–10.5)
CHLORIDE SERPL-SCNC: 99 MMOL/L — SIGNIFICANT CHANGE UP (ref 96–108)
CO2 SERPL-SCNC: 22 MMOL/L — SIGNIFICANT CHANGE UP (ref 22–29)
CREAT SERPL-MCNC: 0.62 MG/DL — SIGNIFICANT CHANGE UP (ref 0.5–1.3)
CULTURE RESULTS: SIGNIFICANT CHANGE UP
CULTURE RESULTS: SIGNIFICANT CHANGE UP
EGFR: 108 ML/MIN/1.73M2 — SIGNIFICANT CHANGE UP
GLUCOSE BLDC GLUCOMTR-MCNC: 136 MG/DL — HIGH (ref 70–99)
GLUCOSE BLDC GLUCOMTR-MCNC: 142 MG/DL — HIGH (ref 70–99)
GLUCOSE BLDC GLUCOMTR-MCNC: 166 MG/DL — HIGH (ref 70–99)
GLUCOSE SERPL-MCNC: 140 MG/DL — HIGH (ref 70–99)
HCT VFR BLD CALC: 33.1 % — LOW (ref 39–50)
HGB BLD-MCNC: 10.6 G/DL — LOW (ref 13–17)
MCHC RBC-ENTMCNC: 30.5 PG — SIGNIFICANT CHANGE UP (ref 27–34)
MCHC RBC-ENTMCNC: 32 GM/DL — SIGNIFICANT CHANGE UP (ref 32–36)
MCV RBC AUTO: 95.1 FL — SIGNIFICANT CHANGE UP (ref 80–100)
PLATELET # BLD AUTO: 530 K/UL — HIGH (ref 150–400)
POTASSIUM SERPL-MCNC: 4.4 MMOL/L — SIGNIFICANT CHANGE UP (ref 3.5–5.3)
POTASSIUM SERPL-SCNC: 4.4 MMOL/L — SIGNIFICANT CHANGE UP (ref 3.5–5.3)
PROT SERPL-MCNC: 6.5 G/DL — LOW (ref 6.6–8.7)
RBC # BLD: 3.48 M/UL — LOW (ref 4.2–5.8)
RBC # FLD: 14.9 % — HIGH (ref 10.3–14.5)
SODIUM SERPL-SCNC: 136 MMOL/L — SIGNIFICANT CHANGE UP (ref 135–145)
SPECIMEN SOURCE: SIGNIFICANT CHANGE UP
SPECIMEN SOURCE: SIGNIFICANT CHANGE UP
WBC # BLD: 11.82 K/UL — HIGH (ref 3.8–10.5)
WBC # FLD AUTO: 11.82 K/UL — HIGH (ref 3.8–10.5)

## 2023-09-16 PROCEDURE — 99233 SBSQ HOSP IP/OBS HIGH 50: CPT

## 2023-09-16 RX ADMIN — LEVETIRACETAM 400 MILLIGRAM(S): 250 TABLET, FILM COATED ORAL at 17:43

## 2023-09-16 RX ADMIN — Medication 2: at 12:30

## 2023-09-16 RX ADMIN — CHLORHEXIDINE GLUCONATE 1 APPLICATION(S): 213 SOLUTION TOPICAL at 06:07

## 2023-09-16 RX ADMIN — LEVETIRACETAM 400 MILLIGRAM(S): 250 TABLET, FILM COATED ORAL at 06:07

## 2023-09-16 RX ADMIN — SENNA PLUS 2 TABLET(S): 8.6 TABLET ORAL at 21:38

## 2023-09-16 RX ADMIN — CEFTRIAXONE 2000 MILLIGRAM(S): 500 INJECTION, POWDER, FOR SOLUTION INTRAMUSCULAR; INTRAVENOUS at 15:08

## 2023-09-16 RX ADMIN — TAMSULOSIN HYDROCHLORIDE 0.8 MILLIGRAM(S): 0.4 CAPSULE ORAL at 21:38

## 2023-09-16 RX ADMIN — Medication 81 MILLIGRAM(S): at 15:08

## 2023-09-16 RX ADMIN — POLYETHYLENE GLYCOL 3350 17 GRAM(S): 17 POWDER, FOR SOLUTION ORAL at 06:08

## 2023-09-16 RX ADMIN — POLYETHYLENE GLYCOL 3350 17 GRAM(S): 17 POWDER, FOR SOLUTION ORAL at 17:44

## 2023-09-16 NOTE — PROGRESS NOTE ADULT - SUBJECTIVE AND OBJECTIVE BOX
SUBJECTIVE / OVERNIGHT EVENTS: no acute events. Seen and examined at bedside. smiles and moves his left upper extremity. non verbal.    MEDICATIONS  (STANDING):  aspirin  chewable 81 milliGRAM(s) Oral daily  cefTRIAXone Injectable. 2000 milliGRAM(s) IV Push every 24 hours  chlorhexidine 2% Cloths 1 Application(s) Topical <User Schedule>  dextrose 5%. 1000 milliLiter(s) (100 mL/Hr) IV Continuous <Continuous>  dextrose 5%. 1000 milliLiter(s) (50 mL/Hr) IV Continuous <Continuous>  dextrose 50% Injectable 12.5 Gram(s) IV Push once  dextrose 50% Injectable 25 Gram(s) IV Push once  dextrose 50% Injectable 25 Gram(s) IV Push once  glucagon  Injectable 1 milliGRAM(s) IntraMuscular once  insulin lispro (ADMELOG) corrective regimen sliding scale   SubCutaneous three times a day before meals  levETIRAcetam  IVPB 500 milliGRAM(s) IV Intermittent every 12 hours  polyethylene glycol 3350 17 Gram(s) Oral two times a day  senna 2 Tablet(s) Oral at bedtime  tamsulosin 0.8 milliGRAM(s) Oral at bedtime    MEDICATIONS  (PRN):  albuterol/ipratropium for Nebulization 3 milliLiter(s) Nebulizer every 6 hours PRN Shortness of Breath and/or Wheezing  dextrose Oral Gel 15 Gram(s) Oral once PRN Blood Glucose LESS THAN 70 milliGRAM(s)/deciliter  hydrALAZINE Injectable 10 milliGRAM(s) IV Push every 2 hours PRN SBP >140  labetalol Injectable 10 milliGRAM(s) IV Push every 2 hours PRN SBP >140        I&O's Summary    15 Sep 2023 07:01  -  16 Sep 2023 07:00  --------------------------------------------------------  IN: 0 mL / OUT: 650 mL / NET: -650 mL        PHYSICAL EXAM:  Vital Signs Last 24 Hrs  T(C): 36.9 (16 Sep 2023 13:24), Max: 37.2 (16 Sep 2023 09:44)  T(F): 98.4 (16 Sep 2023 13:24), Max: 99 (16 Sep 2023 09:44)  HR: 91 (16 Sep 2023 13:24) (86 - 94)  BP: 106/70 (16 Sep 2023 13:24) (95/66 - 110/76)  BP(mean): --  RR: 18 (16 Sep 2023 13:24) (18 - 18)  SpO2: 92% (16 Sep 2023 13:24) (92% - 100%)    Parameters below as of 16 Sep 2023 13:24  Patient On (Oxygen Delivery Method): room air        PHYSICAL EXAM:  GENERAL: NAD, Nonverbal  HEAD:  craniectomy sunken   EYES: EOMI, PERRL, conjunctiva and sclera clear  ENMT:  Moist mucous membranes,  No lesions  NECK: Supple,   NERVOUS SYSTEM:  Alert & awake, moves left upper  CHEST/LUNG: Clear to auscultation bilaterally; No rales, rhonchi, wheezing,   HEART: Regular rate and rhythm; No murmurs,   ABDOMEN: Soft, Nontender, Nondistended; Bowel sounds present  EXTREMITIES:  Peripheral Pulses, No  cyanosis, or edema  SKIN: No rashes or lesions  psychiatry- cant assess Insight and judgement intact    LABS:                        10.6   11.82 )-----------( 530      ( 16 Sep 2023 04:55 )             33.1     09-16    136  |  99  |  19.3  ----------------------------<  140<H>  4.4   |  22.0  |  0.62    Ca    9.3      16 Sep 2023 04:55    TPro  6.5<L>  /  Alb  3.2<L>  /  TBili  0.4  /  DBili  x   /  AST  123<H>  /  ALT  171<H>  /  AlkPhos  150<H>  09-16          Urinalysis Basic - ( 16 Sep 2023 04:55 )    Color: x / Appearance: x / SG: x / pH: x  Gluc: 140 mg/dL / Ketone: x  / Bili: x / Urobili: x   Blood: x / Protein: x / Nitrite: x   Leuk Esterase: x / RBC: x / WBC x   Sq Epi: x / Non Sq Epi: x / Bacteria: x        CAPILLARY BLOOD GLUCOSE      POCT Blood Glucose.: 166 mg/dL (16 Sep 2023 12:29)  POCT Blood Glucose.: 142 mg/dL (16 Sep 2023 08:07)  POCT Blood Glucose.: 152 mg/dL (15 Sep 2023 22:08)  POCT Blood Glucose.: 134 mg/dL (15 Sep 2023 17:53)        RADIOLOGY & ADDITIONAL TESTS:  Results Reviewed:   Imaging Personally Reviewed:  Electrocardiogram Personally Reviewed:

## 2023-09-16 NOTE — PROGRESS NOTE ADULT - ASSESSMENT
63M PMH of DM2, HTN presents with fall, right sided weakness, dysarthria and right facial droop found to have LM1 occlusion s/p TNK in ER subsequent mechanical thrombectomy and decompressive craniectomy for midline shift. SAMARA negative for thrombus. Cardiology recommending ILR /MCOT as outpatient. Extubated on 9/1/23, monitored in ICU and transferred to medicine for further management.     #CVA + antiphospholipid syndrome (embolic stroke+ positive antiphospholipid antibody)- Started on Eliquis-   AC cleared by Neuro Sx and Hematology recommended Eliquis BID   s/p cranioplasty-c/w ASA, lipitor- Rehab/ Impaired mobility and function   SAMARA negative for thrombus, +atheroma of aortic arch and aorta   CT angio of the neck and A/P did not show any acute pathologies  likely chronic atherosclerosis disease   mcot/ILR on DC  pt/ot/speech/ PM&R- cont   s/p keppra  Cranioplasty with neurosx next week  CT head with revolving left MCA infarct.  Neurology following.    Klebsiella Bacteremia  leukocytosis slowly improving.   cont Ceftriaxone until 9/20 per ID recommendations  f/u blood cx from 9/11 - negative to date    #urinary retention longo   Flomax  0.8mg po daily  bowel regimen  TOV near discharge vs. ALDEN     #Hematuria- ELIQUIS started and then had an episode of hematuria again and changed to lovenox - will be easier to transition - given Neuro sx plans for cranioplasty next week    reached out to regina Sanchez - heme onc with original recommendations - regards switching to heparin   last night had an episode of hematuria   and prior   pt noted with 1 episode of hematuria on 9/7, suspect 2/2 traumatic Longo  H/H stable    #Constipation  miralax/senna    #Dysphagia  pureed diet with thick liquids  aspiration precautions    #Hypernatremia -resolved  PO fluid intake    #Type 2DM  sliding scale    #Elevated LFTs- hold statin and hold Tylenol for now   repeat LFTS      63M PMH of DM2, HTN presents with fall, right sided weakness, dysarthria and right facial droop found to have LM1 occlusion s/p TNK in ER subsequent mechanical thrombectomy and decompressive craniectomy for midline shift. SAMARA negative for thrombus. Cardiology recommending ILR /MCOT as outpatient. Extubated on 9/1/23, monitored in ICU and transferred to medicine for further management.     #CVA + antiphospholipid syndrome (embolic stroke+ positive antiphospholipid antibody)- Started on Eliquis-   AC cleared by Neuro Sx and Hematology recommended Eliquis BID   s/p Hemicraniectomy-c/w ASA, lipitor- Rehab/ Impaired mobility and function   SAMARA negative for thrombus, +atheroma of aortic arch and aorta  CT angio of the neck and A/P did not show any acute pathologies, likely chronic atherosclerosis disease   mcot/ILR on DC  pt/ot/speech/ PM&R- cont   s/p keppra  Cranioplasty with neurosx next week  CT head with evolving left MCA infarct.  Neurology following.    Klebsiella Bacteremia  leukocytosis slowly improving.   cont Ceftriaxone until 9/20 per ID recommendations  f/u blood cx from 9/11 - negative to date    #urinary retention longo   Flomax  0.8mg po daily  bowel regimen  TOV near discharge vs. ALDEN     #Hematuria- ELIQUIS started and then had an episode of hematuria again and changed to lovenox - will be easier to transition - given Neuro sx plans for cranioplasty next week    reached out to regina Sanchez - heme onc with original recommendations - regards switching to heparin   last night had an episode of hematuria   and prior   pt noted with 1 episode of hematuria on 9/7, suspect 2/2 traumatic Longo  H/H stable    #Constipation  miralax/senna    #Dysphagia  pureed diet with thick liquids  aspiration precautions    #Hypernatremia -resolved  PO fluid intake    #Type 2DM  sliding scale    #Elevated LFTs- hold statin and hold Tylenol for now   repeat LFTS      63M PMH of DM2, HTN presents with fall, right sided weakness, dysarthria and right facial droop found to have LM1 occlusion s/p TNK in ER subsequent mechanical thrombectomy and decompressive craniectomy for midline shift. SAMARA negative for thrombus. Cardiology recommending ILR /MCOT as outpatient. Extubated on 9/1/23, monitored in ICU and transferred to medicine for further management.     #CVA + antiphospholipid syndrome (embolic stroke+ positive antiphospholipid antibody)- Started on Eliquis-   AC cleared by Neuro Sx and Hematology recommended Eliquis BID   s/p Hemicraniectomy-c/w ASA, lipitor- Rehab/ Impaired mobility and function   SAMARA negative for thrombus, +atheroma of aortic arch and aorta  CT angio of the neck and A/P did not show any acute pathologies, likely chronic atherosclerosis disease   mcot/ILR on DC  pt/ot/speech/ PM&R- cont   s/p keppra  Cranioplasty with neurosx next week  CT head with evolving left MCA infarct.  Neurology following.    Klebsiella Bacteremia  leukocytosis slowly improving.   cont Ceftriaxone until 9/20 per ID recommendations  f/u blood cx from 9/11 - negative to date    #urinary retention longo   Flomax  0.8mg po daily  bowel regimen  TOV near discharge vs. ALDEN     #Hematuria- ELIQUIS started and then had an episode of hematuria again and changed to lovenox - will be easier to transition - given Neuro sx plans for cranioplasty next week    pt noted with 1 episode of hematuria on 9/7, suspect 2/2 traumatic Longo  H/H stable    #Constipation  miralax/senna    #Dysphagia  pureed diet with thick liquids  aspiration precautions    #Hypernatremia -resolved  PO fluid intake    #Type 2DM  sliding scale    #Elevated LFTs- hold statin and hold Tylenol for now   repeat LFTS

## 2023-09-17 LAB
GLUCOSE BLDC GLUCOMTR-MCNC: 117 MG/DL — HIGH (ref 70–99)
GLUCOSE BLDC GLUCOMTR-MCNC: 125 MG/DL — HIGH (ref 70–99)
GLUCOSE BLDC GLUCOMTR-MCNC: 144 MG/DL — HIGH (ref 70–99)

## 2023-09-17 PROCEDURE — 99233 SBSQ HOSP IP/OBS HIGH 50: CPT

## 2023-09-17 RX ORDER — ENOXAPARIN SODIUM 100 MG/ML
80 INJECTION SUBCUTANEOUS EVERY 12 HOURS
Refills: 0 | Status: DISCONTINUED | OUTPATIENT
Start: 2023-09-17 | End: 2023-09-18

## 2023-09-17 RX ADMIN — SENNA PLUS 2 TABLET(S): 8.6 TABLET ORAL at 21:37

## 2023-09-17 RX ADMIN — Medication 81 MILLIGRAM(S): at 12:27

## 2023-09-17 RX ADMIN — POLYETHYLENE GLYCOL 3350 17 GRAM(S): 17 POWDER, FOR SOLUTION ORAL at 05:06

## 2023-09-17 RX ADMIN — ENOXAPARIN SODIUM 80 MILLIGRAM(S): 100 INJECTION SUBCUTANEOUS at 18:39

## 2023-09-17 RX ADMIN — LEVETIRACETAM 400 MILLIGRAM(S): 250 TABLET, FILM COATED ORAL at 05:06

## 2023-09-17 RX ADMIN — TAMSULOSIN HYDROCHLORIDE 0.8 MILLIGRAM(S): 0.4 CAPSULE ORAL at 21:37

## 2023-09-17 RX ADMIN — CEFTRIAXONE 2000 MILLIGRAM(S): 500 INJECTION, POWDER, FOR SOLUTION INTRAMUSCULAR; INTRAVENOUS at 13:29

## 2023-09-17 RX ADMIN — POLYETHYLENE GLYCOL 3350 17 GRAM(S): 17 POWDER, FOR SOLUTION ORAL at 17:50

## 2023-09-17 RX ADMIN — CHLORHEXIDINE GLUCONATE 1 APPLICATION(S): 213 SOLUTION TOPICAL at 05:07

## 2023-09-17 RX ADMIN — LEVETIRACETAM 400 MILLIGRAM(S): 250 TABLET, FILM COATED ORAL at 17:50

## 2023-09-17 NOTE — PROGRESS NOTE ADULT - ASSESSMENT
63M PMH of DM2, HTN presents with fall, right sided weakness, dysarthria and right facial droop found to have LM1 occlusion s/p TNK in ER subsequent mechanical thrombectomy and decompressive craniectomy for midline shift. SAMARA negative for thrombus. Cardiology recommending ILR /MCOT as outpatient. Extubated on 9/1/23, monitored in ICU and transferred to medicine for further management.     #CVA + antiphospholipid syndrome (embolic stroke+ positive antiphospholipid antibody)- Started on Eliquis-   AC cleared by Neuro Sx and Hematology recommended Eliquis BID- will use lovenox BID due to episode of hematuria  s/p Hemicraniectomy-c/w ASA, lipitor- Rehab/ Impaired mobility and function   SAMARA negative for thrombus, +atheroma of aortic arch and aorta  CT angio of the neck and A/P did not show any acute pathologies, likely chronic atherosclerosis disease   mcot/ILR on DC  pt/ot/speech/ PM&R- cont   s/p keppra  Cranioplasty with neurosx next week  CT head with evolving left MCA infarct.  C/w Lovenox BID  Neurology following.    Klebsiella Bacteremia  leukocytosis slowly improving.   cont Ceftriaxone until 9/20 per ID recommendations  f/u blood cx from 9/11 - negative to date    #urinary retention longo   Flomax  0.8mg po daily  bowel regimen  TOV near discharge vs. ALDEN     #Hematuria- Eliquis started and then had an episode of hematuria again and changed to lovenox - will be easier to transition - given Neuro sx plans for cranioplasty next week    pt noted with 1 episode of hematuria on 9/7, suspect 2/2 traumatic Longo  H/H stable    #Constipation  miralax/senna    #Dysphagia  pureed diet with thick liquids  aspiration precautions    #Hypernatremia -resolved  PO fluid intake    #Type 2DM  sliding scale    #Elevated LFTs- hold statin and hold Tylenol for now   repeat LFTS       DVT ppx: Lovenox    D/w family at bedside

## 2023-09-17 NOTE — PROGRESS NOTE ADULT - SUBJECTIVE AND OBJECTIVE BOX
SUBJECTIVE / OVERNIGHT EVENTS: Seen and examined at bedside. Spoke with family at bedside. Patient feels well and denies any current complaints. able to move his right hand on command.      MEDICATIONS  (STANDING):  aspirin  chewable 81 milliGRAM(s) Oral daily  cefTRIAXone Injectable. 2000 milliGRAM(s) IV Push every 24 hours  chlorhexidine 2% Cloths 1 Application(s) Topical <User Schedule>  dextrose 5%. 1000 milliLiter(s) (100 mL/Hr) IV Continuous <Continuous>  dextrose 5%. 1000 milliLiter(s) (50 mL/Hr) IV Continuous <Continuous>  dextrose 50% Injectable 25 Gram(s) IV Push once  dextrose 50% Injectable 25 Gram(s) IV Push once  dextrose 50% Injectable 12.5 Gram(s) IV Push once  glucagon  Injectable 1 milliGRAM(s) IntraMuscular once  insulin lispro (ADMELOG) corrective regimen sliding scale   SubCutaneous three times a day before meals  levETIRAcetam  IVPB 500 milliGRAM(s) IV Intermittent every 12 hours  polyethylene glycol 3350 17 Gram(s) Oral two times a day  senna 2 Tablet(s) Oral at bedtime  tamsulosin 0.8 milliGRAM(s) Oral at bedtime    MEDICATIONS  (PRN):  albuterol/ipratropium for Nebulization 3 milliLiter(s) Nebulizer every 6 hours PRN Shortness of Breath and/or Wheezing  dextrose Oral Gel 15 Gram(s) Oral once PRN Blood Glucose LESS THAN 70 milliGRAM(s)/deciliter  hydrALAZINE Injectable 10 milliGRAM(s) IV Push every 2 hours PRN SBP >140  labetalol Injectable 10 milliGRAM(s) IV Push every 2 hours PRN SBP >140        I&O's Summary    16 Sep 2023 07:01  -  17 Sep 2023 07:00  --------------------------------------------------------  IN: 0 mL / OUT: 800 mL / NET: -800 mL        PHYSICAL EXAM:  Vital Signs Last 24 Hrs  T(C): 36.8 (17 Sep 2023 05:00), Max: 36.9 (16 Sep 2023 16:36)  T(F): 98.2 (17 Sep 2023 05:00), Max: 98.4 (16 Sep 2023 16:36)  HR: 90 (17 Sep 2023 08:00) (85 - 90)  BP: 112/75 (17 Sep 2023 08:00) (106/71 - 131/84)  BP(mean): --  RR: 18 (17 Sep 2023 08:00) (18 - 18)  SpO2: 96% (17 Sep 2023 08:00) (93% - 99%)    Parameters below as of 17 Sep 2023 08:00  Patient On (Oxygen Delivery Method): room air        PHYSICAL EXAM:  GENERAL: NAD, Nonverbal  HEAD:  craniectomy sunken   EYES: EOMI, PERRL, conjunctiva and sclera clear  ENMT:  Moist mucous membranes,  No lesions  NECK: Supple,   NERVOUS SYSTEM:  Alert & awake, moves left upper arm and left, No movement of right side arm/leg  CHEST/LUNG: Clear to auscultation bilaterally; No rales, rhonchi, wheezing,   HEART: Regular rate and rhythm; No murmurs,   ABDOMEN: Soft, Nontender, Nondistended; Bowel sounds present  EXTREMITIES:  Peripheral Pulses, No  cyanosis, or edema  SKIN: No rashes or lesions  psychiatry- cant assess Insight and judgement intact    LABS:                        10.6   11.82 )-----------( 530      ( 16 Sep 2023 04:55 )             33.1     09-16    136  |  99  |  19.3  ----------------------------<  140<H>  4.4   |  22.0  |  0.62    Ca    9.3      16 Sep 2023 04:55    TPro  6.5<L>  /  Alb  3.2<L>  /  TBili  0.4  /  DBili  x   /  AST  123<H>  /  ALT  171<H>  /  AlkPhos  150<H>  09-16          Urinalysis Basic - ( 16 Sep 2023 04:55 )    Color: x / Appearance: x / SG: x / pH: x  Gluc: 140 mg/dL / Ketone: x  / Bili: x / Urobili: x   Blood: x / Protein: x / Nitrite: x   Leuk Esterase: x / RBC: x / WBC x   Sq Epi: x / Non Sq Epi: x / Bacteria: x        CAPILLARY BLOOD GLUCOSE      POCT Blood Glucose.: 125 mg/dL (17 Sep 2023 12:47)  POCT Blood Glucose.: 117 mg/dL (17 Sep 2023 08:15)  POCT Blood Glucose.: 136 mg/dL (16 Sep 2023 17:42)        RADIOLOGY & ADDITIONAL TESTS:  Results Reviewed:   Imaging Personally Reviewed:  Electrocardiogram Personally Reviewed:

## 2023-09-18 LAB
ALBUMIN SERPL ELPH-MCNC: 3.1 G/DL — LOW (ref 3.3–5.2)
ALP SERPL-CCNC: 147 U/L — HIGH (ref 40–120)
ALT FLD-CCNC: 113 U/L — HIGH
ANION GAP SERPL CALC-SCNC: 11 MMOL/L — SIGNIFICANT CHANGE UP (ref 5–17)
APTT BLD: 34.6 SEC — SIGNIFICANT CHANGE UP (ref 24.5–35.6)
AST SERPL-CCNC: 59 U/L — HIGH
BASOPHILS # BLD AUTO: 0.11 K/UL — SIGNIFICANT CHANGE UP (ref 0–0.2)
BASOPHILS NFR BLD AUTO: 0.9 % — SIGNIFICANT CHANGE UP (ref 0–2)
BILIRUB SERPL-MCNC: 0.4 MG/DL — SIGNIFICANT CHANGE UP (ref 0.4–2)
BUN SERPL-MCNC: 15.2 MG/DL — SIGNIFICANT CHANGE UP (ref 8–20)
CALCIUM SERPL-MCNC: 9.1 MG/DL — SIGNIFICANT CHANGE UP (ref 8.4–10.5)
CHLORIDE SERPL-SCNC: 98 MMOL/L — SIGNIFICANT CHANGE UP (ref 96–108)
CO2 SERPL-SCNC: 27 MMOL/L — SIGNIFICANT CHANGE UP (ref 22–29)
CREAT SERPL-MCNC: 0.68 MG/DL — SIGNIFICANT CHANGE UP (ref 0.5–1.3)
DACRYOCYTES BLD QL SMEAR: SLIGHT — SIGNIFICANT CHANGE UP
EGFR: 105 ML/MIN/1.73M2 — SIGNIFICANT CHANGE UP
EOSINOPHIL # BLD AUTO: 0.31 K/UL — SIGNIFICANT CHANGE UP (ref 0–0.5)
EOSINOPHIL NFR BLD AUTO: 2.6 % — SIGNIFICANT CHANGE UP (ref 0–6)
GIANT PLATELETS BLD QL SMEAR: PRESENT — SIGNIFICANT CHANGE UP
GLUCOSE BLDC GLUCOMTR-MCNC: 110 MG/DL — HIGH (ref 70–99)
GLUCOSE BLDC GLUCOMTR-MCNC: 112 MG/DL — HIGH (ref 70–99)
GLUCOSE BLDC GLUCOMTR-MCNC: 119 MG/DL — HIGH (ref 70–99)
GLUCOSE SERPL-MCNC: 122 MG/DL — HIGH (ref 70–99)
HCT VFR BLD CALC: 33.8 % — LOW (ref 39–50)
HGB BLD-MCNC: 10.9 G/DL — LOW (ref 13–17)
INR BLD: 1.34 RATIO — HIGH (ref 0.85–1.18)
LYMPHOCYTES # BLD AUTO: 18.2 % — SIGNIFICANT CHANGE UP (ref 13–44)
LYMPHOCYTES # BLD AUTO: 2.17 K/UL — SIGNIFICANT CHANGE UP (ref 1–3.3)
MAGNESIUM SERPL-MCNC: 2 MG/DL — SIGNIFICANT CHANGE UP (ref 1.8–2.6)
MANUAL SMEAR VERIFICATION: SIGNIFICANT CHANGE UP
MCHC RBC-ENTMCNC: 30.4 PG — SIGNIFICANT CHANGE UP (ref 27–34)
MCHC RBC-ENTMCNC: 32.2 GM/DL — SIGNIFICANT CHANGE UP (ref 32–36)
MCV RBC AUTO: 94.4 FL — SIGNIFICANT CHANGE UP (ref 80–100)
MONOCYTES # BLD AUTO: 0.62 K/UL — SIGNIFICANT CHANGE UP (ref 0–0.9)
MONOCYTES NFR BLD AUTO: 5.2 % — SIGNIFICANT CHANGE UP (ref 2–14)
NEUTROPHILS # BLD AUTO: 8.73 K/UL — HIGH (ref 1.8–7.4)
NEUTROPHILS NFR BLD AUTO: 72.2 % — SIGNIFICANT CHANGE UP (ref 43–77)
NEUTS BAND # BLD: 0.9 % — SIGNIFICANT CHANGE UP (ref 0–8)
OVALOCYTES BLD QL SMEAR: SLIGHT — SIGNIFICANT CHANGE UP
PHOSPHATE SERPL-MCNC: 3.3 MG/DL — SIGNIFICANT CHANGE UP (ref 2.4–4.7)
PLAT MORPH BLD: NORMAL — SIGNIFICANT CHANGE UP
PLATELET # BLD AUTO: 504 K/UL — HIGH (ref 150–400)
POIKILOCYTOSIS BLD QL AUTO: SLIGHT — SIGNIFICANT CHANGE UP
POLYCHROMASIA BLD QL SMEAR: SLIGHT — SIGNIFICANT CHANGE UP
POTASSIUM SERPL-MCNC: 4.1 MMOL/L — SIGNIFICANT CHANGE UP (ref 3.5–5.3)
POTASSIUM SERPL-SCNC: 4.1 MMOL/L — SIGNIFICANT CHANGE UP (ref 3.5–5.3)
PROT SERPL-MCNC: 6.6 G/DL — SIGNIFICANT CHANGE UP (ref 6.6–8.7)
PROTHROM AB SERPL-ACNC: 14.7 SEC — HIGH (ref 9.5–13)
RBC # BLD: 3.58 M/UL — LOW (ref 4.2–5.8)
RBC # FLD: 14.6 % — HIGH (ref 10.3–14.5)
RBC BLD AUTO: ABNORMAL
SODIUM SERPL-SCNC: 136 MMOL/L — SIGNIFICANT CHANGE UP (ref 135–145)
WBC # BLD: 11.94 K/UL — HIGH (ref 3.8–10.5)
WBC # FLD AUTO: 11.94 K/UL — HIGH (ref 3.8–10.5)

## 2023-09-18 PROCEDURE — 99233 SBSQ HOSP IP/OBS HIGH 50: CPT

## 2023-09-18 PROCEDURE — 71045 X-RAY EXAM CHEST 1 VIEW: CPT | Mod: 26

## 2023-09-18 RX ADMIN — CEFTRIAXONE 2000 MILLIGRAM(S): 500 INJECTION, POWDER, FOR SOLUTION INTRAMUSCULAR; INTRAVENOUS at 13:25

## 2023-09-18 RX ADMIN — ENOXAPARIN SODIUM 80 MILLIGRAM(S): 100 INJECTION SUBCUTANEOUS at 05:55

## 2023-09-18 RX ADMIN — SENNA PLUS 2 TABLET(S): 8.6 TABLET ORAL at 21:44

## 2023-09-18 RX ADMIN — TAMSULOSIN HYDROCHLORIDE 0.8 MILLIGRAM(S): 0.4 CAPSULE ORAL at 21:47

## 2023-09-18 RX ADMIN — CHLORHEXIDINE GLUCONATE 1 APPLICATION(S): 213 SOLUTION TOPICAL at 05:55

## 2023-09-18 RX ADMIN — LEVETIRACETAM 400 MILLIGRAM(S): 250 TABLET, FILM COATED ORAL at 05:55

## 2023-09-18 RX ADMIN — POLYETHYLENE GLYCOL 3350 17 GRAM(S): 17 POWDER, FOR SOLUTION ORAL at 06:02

## 2023-09-18 RX ADMIN — POLYETHYLENE GLYCOL 3350 17 GRAM(S): 17 POWDER, FOR SOLUTION ORAL at 17:30

## 2023-09-18 RX ADMIN — LEVETIRACETAM 400 MILLIGRAM(S): 250 TABLET, FILM COATED ORAL at 17:30

## 2023-09-18 NOTE — PROGRESS NOTE ADULT - NS ATTEND AMEND GEN_ALL_CORE FT
patient seen and evaluated. Case discussed with NP. Agree with current management and plan. Neurosurgery plans to do cranioplasty this Friday. Hold Aspirin and Lovenox. Has pink tinged urine in longo bag, Hgb stable. Flush catheter and assess for recurrence. Spoke with Neurosurgergy. patient seen and evaluated. Case discussed with NP. Agree with current management and plan. Neurosurgery plans to do cranioplasty this Friday. Hold Aspirin and Lovenox. Has pink tinged urine in longo bag, Hgb stable. Flush catheter and assess for recurrence. Spoke with Neurosurgery.

## 2023-09-18 NOTE — PROGRESS NOTE ADULT - ASSESSMENT
63M PMH of DM2, HTN presents with fall, right sided weakness, dysarthria and right facial droop found to have LM1 occlusion s/p TNK in ER subsequent mechanical thrombectomy and decompressive craniectomy for midline shift. SAMARA negative for thrombus. Cardiology recommending ILR /MCOT as outpatient. Extubated on 9/1/23, monitored in ICU and transferred to medicine for further management.     #CVA + antiphospholipid syndrome (embolic stroke+ positive antiphospholipid antibody)- Started on AC  AC cleared by Neuro Sx and Hematology recommended Eliquis BID- will use lovenox BID due to episode of hematuria  s/p Hemicraniectomy-c/w ASA, lipitor- Rehab/ Impaired mobility and function   SAMARA negative for thrombus, +atheroma of aortic arch and aorta  CT angio of the neck and A/P did not show any acute pathologies, likely chronic atherosclerosis disease   mcot/ILR on DC  pt/ot/speech/ PM&R- cont   s/p keppra  Cranioplasty with neurosx this week vs next week  CT head with evolving left MCA infarct.  C/w Lovenox BID  Neurology following.    Klebsiella Bacteremia  leukocytosis slowly improving.   cont Ceftriaxone until 9/20 per ID recommendations  f/u blood cx from 9/11 - negative to date    #urinary retention longo   Flomax  0.8mg po daily  bowel regimen  TOV near discharge vs. ALDEN     #Hematuria- Eliquis started and then had an episode of hematuria again and changed to lovenox - will be easier to transition - given Neuro sx plans for cranioplasty next week    pt noted with 1 episode of hematuria on 9/7, suspected 2/2 traumatic Longo, now with recurrent hematuria.  Will flush longo and monitor.  H/H stable    #Constipation  miralax/senna    #Dysphagia  Soft, bite sized with mildly thick diet  MBS in am  aspiration precautions    #Hypernatremia -resolved  PO fluid intake    #Type 2DM  sliding scale    #Elevated LFTs- hold statin and hold Tylenol for now   repeat LFTS improving      DVT ppx: Lovenox

## 2023-09-18 NOTE — PROGRESS NOTE ADULT - SUBJECTIVE AND OBJECTIVE BOX
CC: LT M1 Occlusion (17 Sep 2023 13:27)    HPI:  63 year old male with PMH Hypertension, DM on oral medications who presents c/o stroke-like symptoms. On arrival pt w/ obvious right sided arm weakness, confusion, dysarthria, facial droop suggestive of acute stroke. Code stroke initiated, found to have LM1 occlusion, given TNK @ 1215 and was taken for thrombectomy.     INTERVAL HPI/OVERNIGHT EVENTS: Patient seen and examined lying in bed with  at bedside.  Patient aphasic and unable to answer ROS.  Patient noted with coarse BS overnight and CXR ordered.    Vital Signs Last 24 Hrs  T(C): 36.4 (18 Sep 2023 10:00), Max: 37.1 (17 Sep 2023 21:44)  T(F): 97.6 (18 Sep 2023 10:00), Max: 98.8 (17 Sep 2023 21:44)  HR: 85 (18 Sep 2023 10:00) (85 - 95)  BP: 110/74 (18 Sep 2023 10:00) (101/71 - 138/83)  BP(mean): --  RR: 18 (18 Sep 2023 10:00) (18 - 18)  SpO2: 91% (18 Sep 2023 10:00) (91% - 95%)    Parameters below as of 18 Sep 2023 10:00  Patient On (Oxygen Delivery Method): room air      I&O's Detail    17 Sep 2023 07:01  -  18 Sep 2023 07:00  --------------------------------------------------------  IN:    Oral Fluid: 720 mL  Total IN: 720 mL    OUT:    Indwelling Catheter - Urethral (mL): 1350 mL  Total OUT: 1350 mL    Total NET: -630 mL      PHYSICAL EXAM:  GENERAL: NAD  HEAD:  scalp incision clean  NECK: Supple, No JVD, Normal thyroid  NERVOUS SYSTEM:  Alert, aphasic, right hemiparesis  CHEST/LUNG: Coarse BS bilaterally  HEART: Regular rate and rhythm; No murmurs, rubs, or gallops  ABDOMEN: Soft, Nontender, Nondistended; Bowel sounds present; (+) longo with fruit punch colored urine  EXTREMITIES:  2+ Peripheral Pulses, No clubbing, cyanosis, or edema  SKIN: No rashes or lesions                                10.9   11.94 )-----------( 504      ( 18 Sep 2023 05:30 )             33.8     18 Sep 2023 05:30    136    |  98     |  15.2   ----------------------------<  122    4.1     |  27.0   |  0.68     Ca    9.1        18 Sep 2023 05:30  Phos  3.3       18 Sep 2023 05:30  Mg     2.0       18 Sep 2023 05:30    TPro  6.6    /  Alb  3.1    /  TBili  0.4    /  DBili  x      /  AST  59     /  ALT  113    /  AlkPhos  147    18 Sep 2023 05:30    PT/INR - ( 18 Sep 2023 05:30 )   PT: 14.7 sec;   INR: 1.34 ratio         PTT - ( 18 Sep 2023 05:30 )  PTT:34.6 sec    CAPILLARY BLOOD GLUCOSE  POCT Blood Glucose.: 110 mg/dL (18 Sep 2023 08:06)  POCT Blood Glucose.: 144 mg/dL (17 Sep 2023 17:56)  POCT Blood Glucose.: 125 mg/dL (17 Sep 2023 12:47)    LIVER FUNCTIONS - ( 18 Sep 2023 05:30 )  Alb: 3.1 g/dL / Pro: 6.6 g/dL / ALK PHOS: 147 U/L / ALT: 113 U/L / AST: 59 U/L / GGT: x           Urinalysis Basic - ( 18 Sep 2023 05:30 )    Color: x / Appearance: x / SG: x / pH: x  Gluc: 122 mg/dL / Ketone: x  / Bili: x / Urobili: x   Blood: x / Protein: x / Nitrite: x   Leuk Esterase: x / RBC: x / WBC x   Sq Epi: x / Non Sq Epi: x / Bacteria: x        MEDICATIONS  (STANDING):  aspirin  chewable 81 milliGRAM(s) Oral daily  cefTRIAXone Injectable. 2000 milliGRAM(s) IV Push every 24 hours  chlorhexidine 2% Cloths 1 Application(s) Topical <User Schedule>  dextrose 5%. 1000 milliLiter(s) (100 mL/Hr) IV Continuous <Continuous>  dextrose 5%. 1000 milliLiter(s) (50 mL/Hr) IV Continuous <Continuous>  dextrose 50% Injectable 12.5 Gram(s) IV Push once  dextrose 50% Injectable 25 Gram(s) IV Push once  dextrose 50% Injectable 25 Gram(s) IV Push once  enoxaparin Injectable 80 milliGRAM(s) SubCutaneous every 12 hours  glucagon  Injectable 1 milliGRAM(s) IntraMuscular once  insulin lispro (ADMELOG) corrective regimen sliding scale   SubCutaneous three times a day before meals  levETIRAcetam  IVPB 500 milliGRAM(s) IV Intermittent every 12 hours  polyethylene glycol 3350 17 Gram(s) Oral two times a day  senna 2 Tablet(s) Oral at bedtime  tamsulosin 0.8 milliGRAM(s) Oral at bedtime    MEDICATIONS  (PRN):  albuterol/ipratropium for Nebulization 3 milliLiter(s) Nebulizer every 6 hours PRN Shortness of Breath and/or Wheezing  dextrose Oral Gel 15 Gram(s) Oral once PRN Blood Glucose LESS THAN 70 milliGRAM(s)/deciliter  hydrALAZINE Injectable 10 milliGRAM(s) IV Push every 2 hours PRN SBP >140  labetalol Injectable 10 milliGRAM(s) IV Push every 2 hours PRN SBP >140      RADIOLOGY & ADDITIONAL TESTS:  < from: CT Head No Cont (09.15.23 @ 17:54) >  ACC: [id number] EXAM:  CT BRAIN   ORDERED BY: CHANA COOK     PROCEDURE DATE:  09/15/2023          INTERPRETATION:  Noncontrast CT of the brain.    CLINICAL INDICATION:  Status post craniectomy, presurgical planning for   cranioplasty.    TECHNIQUE : This section axial CT scanning of the brain was obtained from   the skull base to the vertex without the administration of intravenous   contrast. Sagittal and coronal reformats were provided.    COMPARISON: CT brain 9/12/2023    FINDINGS:    Similar-appearing left hemicraniectomy. Brain parenchyma again protrudes   through the cranial defect.    Similar encephalomalacia and gliosis throughout the left frontal and   parietal lobes system with chronic left MCA territory infarct.    Similar minimal rightward midline shift. No effacement of basal cisterns.   No hydrocephalus.    No acute intracranial hemorrhage.    IMPRESSION:    No significant interval change.    Study done for presurgical planning.    --- End of Report ---            LISETTE DON MD; Attending Radiologist  This document has been electronically signed. Sep 16 2023  8:41AM    < end of copied text >   CC: LT M1 Occlusion (17 Sep 2023 13:27)    HPI:  63 year old male with PMH Hypertension, DM on oral medications who presents c/o stroke-like symptoms. On arrival pt w/ obvious right sided arm weakness, confusion, dysarthria, facial droop suggestive of acute stroke. Code stroke initiated, found to have LM1 occlusion, given TNK @ 1215 and was taken for thrombectomy.     INTERVAL HPI/OVERNIGHT EVENTS: Patient seen and examined lying in bed with  at bedside.  Patient aphasic and unable to answer ROS.  Patient noted with coarse BS overnight and CXR ordered.    Vital Signs Last 24 Hrs  T(C): 36.4 (18 Sep 2023 10:00), Max: 37.1 (17 Sep 2023 21:44)  T(F): 97.6 (18 Sep 2023 10:00), Max: 98.8 (17 Sep 2023 21:44)  HR: 85 (18 Sep 2023 10:00) (85 - 95)  BP: 110/74 (18 Sep 2023 10:00) (101/71 - 138/83)  BP(mean): --  RR: 18 (18 Sep 2023 10:00) (18 - 18)  SpO2: 91% (18 Sep 2023 10:00) (91% - 95%)    Parameters below as of 18 Sep 2023 10:00  Patient On (Oxygen Delivery Method): room air      I&O's Detail    17 Sep 2023 07:01  -  18 Sep 2023 07:00  --------------------------------------------------------  IN:    Oral Fluid: 720 mL  Total IN: 720 mL    OUT:    Indwelling Catheter - Urethral (mL): 1350 mL  Total OUT: 1350 mL    Total NET: -630 mL      PHYSICAL EXAM:  GENERAL: NAD  HEAD:  scalp incision clean  NECK: Supple, No JVD, Normal thyroid  NERVOUS SYSTEM:  Alert, aphasic, right hemiparesis, moves left upper extremity on command.  CHEST/LUNG: Coarse BS bilaterally  HEART: Regular rate and rhythm  ABDOMEN: Soft, Nontender, Nondistended; Bowel sounds present; (+) longo with fruit punch colored urine  EXTREMITIES:  2+ Peripheral Pulses  SKIN: No rashes or lesions                                10.9   11.94 )-----------( 504      ( 18 Sep 2023 05:30 )             33.8     18 Sep 2023 05:30    136    |  98     |  15.2   ----------------------------<  122    4.1     |  27.0   |  0.68     Ca    9.1        18 Sep 2023 05:30  Phos  3.3       18 Sep 2023 05:30  Mg     2.0       18 Sep 2023 05:30    TPro  6.6    /  Alb  3.1    /  TBili  0.4    /  DBili  x      /  AST  59     /  ALT  113    /  AlkPhos  147    18 Sep 2023 05:30    PT/INR - ( 18 Sep 2023 05:30 )   PT: 14.7 sec;   INR: 1.34 ratio         PTT - ( 18 Sep 2023 05:30 )  PTT:34.6 sec    CAPILLARY BLOOD GLUCOSE  POCT Blood Glucose.: 110 mg/dL (18 Sep 2023 08:06)  POCT Blood Glucose.: 144 mg/dL (17 Sep 2023 17:56)  POCT Blood Glucose.: 125 mg/dL (17 Sep 2023 12:47)    LIVER FUNCTIONS - ( 18 Sep 2023 05:30 )  Alb: 3.1 g/dL / Pro: 6.6 g/dL / ALK PHOS: 147 U/L / ALT: 113 U/L / AST: 59 U/L / GGT: x           Urinalysis Basic - ( 18 Sep 2023 05:30 )    Color: x / Appearance: x / SG: x / pH: x  Gluc: 122 mg/dL / Ketone: x  / Bili: x / Urobili: x   Blood: x / Protein: x / Nitrite: x   Leuk Esterase: x / RBC: x / WBC x   Sq Epi: x / Non Sq Epi: x / Bacteria: x        MEDICATIONS  (STANDING):  aspirin  chewable 81 milliGRAM(s) Oral daily  cefTRIAXone Injectable. 2000 milliGRAM(s) IV Push every 24 hours  chlorhexidine 2% Cloths 1 Application(s) Topical <User Schedule>  dextrose 5%. 1000 milliLiter(s) (100 mL/Hr) IV Continuous <Continuous>  dextrose 5%. 1000 milliLiter(s) (50 mL/Hr) IV Continuous <Continuous>  dextrose 50% Injectable 12.5 Gram(s) IV Push once  dextrose 50% Injectable 25 Gram(s) IV Push once  dextrose 50% Injectable 25 Gram(s) IV Push once  enoxaparin Injectable 80 milliGRAM(s) SubCutaneous every 12 hours  glucagon  Injectable 1 milliGRAM(s) IntraMuscular once  insulin lispro (ADMELOG) corrective regimen sliding scale   SubCutaneous three times a day before meals  levETIRAcetam  IVPB 500 milliGRAM(s) IV Intermittent every 12 hours  polyethylene glycol 3350 17 Gram(s) Oral two times a day  senna 2 Tablet(s) Oral at bedtime  tamsulosin 0.8 milliGRAM(s) Oral at bedtime    MEDICATIONS  (PRN):  albuterol/ipratropium for Nebulization 3 milliLiter(s) Nebulizer every 6 hours PRN Shortness of Breath and/or Wheezing  dextrose Oral Gel 15 Gram(s) Oral once PRN Blood Glucose LESS THAN 70 milliGRAM(s)/deciliter  hydrALAZINE Injectable 10 milliGRAM(s) IV Push every 2 hours PRN SBP >140  labetalol Injectable 10 milliGRAM(s) IV Push every 2 hours PRN SBP >140      RADIOLOGY & ADDITIONAL TESTS:  < from: CT Head No Cont (09.15.23 @ 17:54) >  ACC: [id number] EXAM:  CT BRAIN   ORDERED BY: CHANA COOK     PROCEDURE DATE:  09/15/2023          INTERPRETATION:  Noncontrast CT of the brain.    CLINICAL INDICATION:  Status post craniectomy, presurgical planning for   cranioplasty.    TECHNIQUE : This section axial CT scanning of the brain was obtained from   the skull base to the vertex without the administration of intravenous   contrast. Sagittal and coronal reformats were provided.    COMPARISON: CT brain 9/12/2023    FINDINGS:    Similar-appearing left hemicraniectomy. Brain parenchyma again protrudes   through the cranial defect.    Similar encephalomalacia and gliosis throughout the left frontal and   parietal lobes system with chronic left MCA territory infarct.    Similar minimal rightward midline shift. No effacement of basal cisterns.   No hydrocephalus.    No acute intracranial hemorrhage.    IMPRESSION:    No significant interval change.    Study done for presurgical planning.    --- End of Report ---            LISETTE DON MD; Attending Radiologist  This document has been electronically signed. Sep 16 2023  8:41AM    < end of copied text >

## 2023-09-18 NOTE — CHART NOTE - NSCHARTNOTEFT_GEN_A_CORE
Source: Patient [ ]  Family [ ]   other [x ]EMR    Current Diet: pureed, consistent CHO, mod thick liquids    Patient reports [ ] nausea  [ ] vomiting [ ] diarrhea [ ] constipation  [ ]chewing problems [ ] swallowing issues  [ ] other:     PO intake:  < 50% [ ]   50-75%  [x ]   %  [ ]  other :varies    Source for PO intake [ ] Patient [ ] family [x ] chart [ ] staff [ ] other    Enteral /Parenteral Nutrition:       CuRRENT Weight:  176.8# 9/1  179# admission  no edema    % Weight Change     Pertinent Medications: MEDICATIONS  (STANDING):  aspirin  chewable 81 milliGRAM(s) Oral daily  cefTRIAXone Injectable. 2000 milliGRAM(s) IV Push every 24 hours  chlorhexidine 2% Cloths 1 Application(s) Topical <User Schedule>  dextrose 5%. 1000 milliLiter(s) (50 mL/Hr) IV Continuous <Continuous>  dextrose 5%. 1000 milliLiter(s) (100 mL/Hr) IV Continuous <Continuous>  dextrose 50% Injectable 12.5 Gram(s) IV Push once  dextrose 50% Injectable 25 Gram(s) IV Push once  dextrose 50% Injectable 25 Gram(s) IV Push once  enoxaparin Injectable 80 milliGRAM(s) SubCutaneous every 12 hours  glucagon  Injectable 1 milliGRAM(s) IntraMuscular once  insulin lispro (ADMELOG) corrective regimen sliding scale   SubCutaneous three times a day before meals  levETIRAcetam  IVPB 500 milliGRAM(s) IV Intermittent every 12 hours  polyethylene glycol 3350 17 Gram(s) Oral two times a day  senna 2 Tablet(s) Oral at bedtime  tamsulosin 0.8 milliGRAM(s) Oral at bedtime    MEDICATIONS  (PRN):  albuterol/ipratropium for Nebulization 3 milliLiter(s) Nebulizer every 6 hours PRN Shortness of Breath and/or Wheezing  dextrose Oral Gel 15 Gram(s) Oral once PRN Blood Glucose LESS THAN 70 milliGRAM(s)/deciliter  hydrALAZINE Injectable 10 milliGRAM(s) IV Push every 2 hours PRN SBP >140  labetalol Injectable 10 milliGRAM(s) IV Push every 2 hours PRN SBP >140    Pertinent Labs: CBC Full  -  ( 18 Sep 2023 05:30 )  WBC Count : 11.94 K/uL  RBC Count : 3.58 M/uL  Hemoglobin : 10.9 g/dL  Hematocrit : 33.8 %  Platelet Count - Automated : 504 K/uL  Mean Cell Volume : 94.4 fl  Mean Cell Hemoglobin : 30.4 pg  Mean Cell Hemoglobin Concentration : 32.2 gm/dL  Auto Neutrophil # : x  Auto Lymphocyte # : x  Auto Monocyte # : x  Auto Eosinophil # : x  Auto Basophil # : x  Auto Neutrophil % : x  Auto Lymphocyte % : x  Auto Monocyte % : x  Auto Eosinophil % : x  Auto Basophil % : x        09-18 Na136 mmol/L Glu 122 mg/dL<H> K+ 4.1 mmol/L Cr  0.68 mg/dL BUN 15.2 mg/dL Phos 3.3 mg/dL Alb 3.1 g/dL<L> PAB n/a         Skin:     Nutrition focused physical exam not conducted - found signs of malnutrition [ ]absent [ ]present  pt snoring and under blankets  Subcutaneous fat loss: [ ] Orbital fat pads region, [ ]Buccal fat region, [ ]Triceps region,  [ ]Ribs region    Muscle wasting: [ ]Temples region, [ ]Clavicle region, [ ]Shoulder region, [ ]Scapula region, [ ]Interosseous region,  [ ]thigh region, [ ]Calf region    Estimated Needs:   [x ] no change since previous assessment  [ ] recalculated:     Current Nutrition Diagnosis: Pt with increased nutrient needs related to increased physiological demand to promote healing as evidenced by left acute MCA. Pt eats fair to good and is a total feed. Plan is crani later in week.    Recommendations:   Diet as per speech tx  Provide plain greek yogurt for increased protein        Monitoring and Evaluation:   [ x] PO intake [ x] Tolerance to diet prescription [X] Weights  [X] Follow up per protocol [X] Labs:

## 2023-09-18 NOTE — CHART NOTE - NSCHARTNOTEFT_GEN_A_CORE
Please hold aspirin 81mg in preparation for cranioplasty likely later this week/early next week per Dr. Bah. Discussed with Dr. Dunn

## 2023-09-19 LAB
ALBUMIN SERPL ELPH-MCNC: 3.1 G/DL — LOW (ref 3.3–5.2)
ALP SERPL-CCNC: 138 U/L — HIGH (ref 40–120)
ALT FLD-CCNC: 95 U/L — HIGH
ANION GAP SERPL CALC-SCNC: 13 MMOL/L — SIGNIFICANT CHANGE UP (ref 5–17)
AST SERPL-CCNC: 50 U/L — HIGH
BASOPHILS # BLD AUTO: 0.07 K/UL — SIGNIFICANT CHANGE UP (ref 0–0.2)
BASOPHILS NFR BLD AUTO: 0.6 % — SIGNIFICANT CHANGE UP (ref 0–2)
BILIRUB SERPL-MCNC: 0.4 MG/DL — SIGNIFICANT CHANGE UP (ref 0.4–2)
BUN SERPL-MCNC: 15.8 MG/DL — SIGNIFICANT CHANGE UP (ref 8–20)
CALCIUM SERPL-MCNC: 9.1 MG/DL — SIGNIFICANT CHANGE UP (ref 8.4–10.5)
CHLORIDE SERPL-SCNC: 101 MMOL/L — SIGNIFICANT CHANGE UP (ref 96–108)
CO2 SERPL-SCNC: 26 MMOL/L — SIGNIFICANT CHANGE UP (ref 22–29)
CREAT SERPL-MCNC: 0.68 MG/DL — SIGNIFICANT CHANGE UP (ref 0.5–1.3)
EGFR: 105 ML/MIN/1.73M2 — SIGNIFICANT CHANGE UP
EOSINOPHIL # BLD AUTO: 0.3 K/UL — SIGNIFICANT CHANGE UP (ref 0–0.5)
EOSINOPHIL NFR BLD AUTO: 2.5 % — SIGNIFICANT CHANGE UP (ref 0–6)
GLUCOSE BLDC GLUCOMTR-MCNC: 102 MG/DL — HIGH (ref 70–99)
GLUCOSE BLDC GLUCOMTR-MCNC: 109 MG/DL — HIGH (ref 70–99)
GLUCOSE BLDC GLUCOMTR-MCNC: 110 MG/DL — HIGH (ref 70–99)
GLUCOSE SERPL-MCNC: 114 MG/DL — HIGH (ref 70–99)
HCT VFR BLD CALC: 35 % — LOW (ref 39–50)
HGB BLD-MCNC: 11.2 G/DL — LOW (ref 13–17)
IMM GRANULOCYTES NFR BLD AUTO: 1.7 % — HIGH (ref 0–0.9)
LYMPHOCYTES # BLD AUTO: 17.8 % — SIGNIFICANT CHANGE UP (ref 13–44)
LYMPHOCYTES # BLD AUTO: 2.11 K/UL — SIGNIFICANT CHANGE UP (ref 1–3.3)
MCHC RBC-ENTMCNC: 30.4 PG — SIGNIFICANT CHANGE UP (ref 27–34)
MCHC RBC-ENTMCNC: 32 GM/DL — SIGNIFICANT CHANGE UP (ref 32–36)
MCV RBC AUTO: 94.9 FL — SIGNIFICANT CHANGE UP (ref 80–100)
MONOCYTES # BLD AUTO: 0.98 K/UL — HIGH (ref 0–0.9)
MONOCYTES NFR BLD AUTO: 8.3 % — SIGNIFICANT CHANGE UP (ref 2–14)
NEUTROPHILS # BLD AUTO: 8.17 K/UL — HIGH (ref 1.8–7.4)
NEUTROPHILS NFR BLD AUTO: 69.1 % — SIGNIFICANT CHANGE UP (ref 43–77)
PLATELET # BLD AUTO: 476 K/UL — HIGH (ref 150–400)
POTASSIUM SERPL-MCNC: 4.3 MMOL/L — SIGNIFICANT CHANGE UP (ref 3.5–5.3)
POTASSIUM SERPL-SCNC: 4.3 MMOL/L — SIGNIFICANT CHANGE UP (ref 3.5–5.3)
PROT SERPL-MCNC: 6.4 G/DL — LOW (ref 6.6–8.7)
RBC # BLD: 3.69 M/UL — LOW (ref 4.2–5.8)
RBC # FLD: 14.7 % — HIGH (ref 10.3–14.5)
SODIUM SERPL-SCNC: 140 MMOL/L — SIGNIFICANT CHANGE UP (ref 135–145)
WBC # BLD: 11.83 K/UL — HIGH (ref 3.8–10.5)
WBC # FLD AUTO: 11.83 K/UL — HIGH (ref 3.8–10.5)

## 2023-09-19 PROCEDURE — 99232 SBSQ HOSP IP/OBS MODERATE 35: CPT

## 2023-09-19 PROCEDURE — 74230 X-RAY XM SWLNG FUNCJ C+: CPT | Mod: 26

## 2023-09-19 RX ORDER — ENOXAPARIN SODIUM 100 MG/ML
80 INJECTION SUBCUTANEOUS EVERY 12 HOURS
Refills: 0 | Status: DISCONTINUED | OUTPATIENT
Start: 2023-09-19 | End: 2023-09-26

## 2023-09-19 RX ADMIN — CHLORHEXIDINE GLUCONATE 1 APPLICATION(S): 213 SOLUTION TOPICAL at 05:29

## 2023-09-19 RX ADMIN — CEFTRIAXONE 2000 MILLIGRAM(S): 500 INJECTION, POWDER, FOR SOLUTION INTRAMUSCULAR; INTRAVENOUS at 14:30

## 2023-09-19 RX ADMIN — LEVETIRACETAM 400 MILLIGRAM(S): 250 TABLET, FILM COATED ORAL at 18:10

## 2023-09-19 RX ADMIN — LEVETIRACETAM 400 MILLIGRAM(S): 250 TABLET, FILM COATED ORAL at 05:28

## 2023-09-19 RX ADMIN — SENNA PLUS 2 TABLET(S): 8.6 TABLET ORAL at 21:49

## 2023-09-19 RX ADMIN — ENOXAPARIN SODIUM 80 MILLIGRAM(S): 100 INJECTION SUBCUTANEOUS at 18:10

## 2023-09-19 RX ADMIN — TAMSULOSIN HYDROCHLORIDE 0.8 MILLIGRAM(S): 0.4 CAPSULE ORAL at 21:49

## 2023-09-19 RX ADMIN — POLYETHYLENE GLYCOL 3350 17 GRAM(S): 17 POWDER, FOR SOLUTION ORAL at 18:00

## 2023-09-19 RX ADMIN — POLYETHYLENE GLYCOL 3350 17 GRAM(S): 17 POWDER, FOR SOLUTION ORAL at 05:28

## 2023-09-19 NOTE — PROGRESS NOTE ADULT - SUBJECTIVE AND OBJECTIVE BOX
SUBJECTIVE / OVERNIGHT EVENTS: Seen and examined at bedside. Non verbal. smiles and moves left upper arm on command.    MEDICATIONS  (STANDING):  cefTRIAXone Injectable. 2000 milliGRAM(s) IV Push every 24 hours  chlorhexidine 2% Cloths 1 Application(s) Topical <User Schedule>  dextrose 5%. 1000 milliLiter(s) (100 mL/Hr) IV Continuous <Continuous>  dextrose 5%. 1000 milliLiter(s) (50 mL/Hr) IV Continuous <Continuous>  dextrose 50% Injectable 12.5 Gram(s) IV Push once  dextrose 50% Injectable 25 Gram(s) IV Push once  dextrose 50% Injectable 25 Gram(s) IV Push once  enoxaparin Injectable 80 milliGRAM(s) SubCutaneous every 12 hours  glucagon  Injectable 1 milliGRAM(s) IntraMuscular once  insulin lispro (ADMELOG) corrective regimen sliding scale   SubCutaneous three times a day before meals  levETIRAcetam  IVPB 500 milliGRAM(s) IV Intermittent every 12 hours  polyethylene glycol 3350 17 Gram(s) Oral two times a day  senna 2 Tablet(s) Oral at bedtime  tamsulosin 0.8 milliGRAM(s) Oral at bedtime    MEDICATIONS  (PRN):  albuterol/ipratropium for Nebulization 3 milliLiter(s) Nebulizer every 6 hours PRN Shortness of Breath and/or Wheezing  dextrose Oral Gel 15 Gram(s) Oral once PRN Blood Glucose LESS THAN 70 milliGRAM(s)/deciliter  hydrALAZINE Injectable 10 milliGRAM(s) IV Push every 2 hours PRN SBP >140  labetalol Injectable 10 milliGRAM(s) IV Push every 2 hours PRN SBP >140        I&O's Summary      PHYSICAL EXAM:  Vital Signs Last 24 Hrs  T(C): 36.8 (19 Sep 2023 09:30), Max: 37 (18 Sep 2023 17:29)  T(F): 98.3 (19 Sep 2023 09:30), Max: 98.6 (18 Sep 2023 17:29)  HR: 84 (19 Sep 2023 09:30) (84 - 102)  BP: 107/76 (19 Sep 2023 09:30) (100/70 - 108/74)  BP(mean): --  RR: 18 (19 Sep 2023 09:30) (18 - 18)  SpO2: 92% (19 Sep 2023 05:22) (92% - 93%)    Parameters below as of 19 Sep 2023 09:30  Patient On (Oxygen Delivery Method): room air          PHYSICAL EXAM:  GENERAL: NAD, Nonverbal  HEAD:  craniectomy sunken   EYES: EOMI, PERRL, conjunctiva and sclera clear  ENMT:  Moist mucous membranes,  No lesions  NECK: Supple,   NERVOUS SYSTEM:  Alert & awake, moves left upper arm  CHEST/LUNG: Clear to auscultation bilaterally; No rales, rhonchi, wheezing,   HEART: Regular rate and rhythm; No murmurs,   ABDOMEN: Soft, Nontender, Nondistended; Bowel sounds present  EXTREMITIES:  Peripheral Pulses, No  cyanosis, or edema  SKIN: No rashes or lesions  psychiatry- cant assess Insight and judgement intact    LABS:                        11.2   11.83 )-----------( 476      ( 19 Sep 2023 05:47 )             35.0     09-19    140  |  101  |  15.8  ----------------------------<  114<H>  4.3   |  26.0  |  0.68    Ca    9.1      19 Sep 2023 05:47  Phos  3.3     09-18  Mg     2.0     09-18    TPro  6.4<L>  /  Alb  3.1<L>  /  TBili  0.4  /  DBili  x   /  AST  50<H>  /  ALT  95<H>  /  AlkPhos  138<H>  09-19    PT/INR - ( 18 Sep 2023 05:30 )   PT: 14.7 sec;   INR: 1.34 ratio         PTT - ( 18 Sep 2023 05:30 )  PTT:34.6 sec      Urinalysis Basic - ( 19 Sep 2023 05:47 )    Color: x / Appearance: x / SG: x / pH: x  Gluc: 114 mg/dL / Ketone: x  / Bili: x / Urobili: x   Blood: x / Protein: x / Nitrite: x   Leuk Esterase: x / RBC: x / WBC x   Sq Epi: x / Non Sq Epi: x / Bacteria: x        CAPILLARY BLOOD GLUCOSE      POCT Blood Glucose.: 109 mg/dL (19 Sep 2023 07:41)  POCT Blood Glucose.: 112 mg/dL (18 Sep 2023 17:35)        RADIOLOGY & ADDITIONAL TESTS:  Results Reviewed:   Imaging Personally Reviewed:  Electrocardiogram Personally Reviewed:

## 2023-09-19 NOTE — PROGRESS NOTE ADULT - ASSESSMENT
63M PMH of DM2, HTN presents with fall, right sided weakness, dysarthria and right facial droop found to have LM1 occlusion s/p TNK in ER subsequent mechanical thrombectomy and decompressive craniectomy for midline shift. SAMARA negative for thrombus. Cardiology recommending ILR /MCOT as outpatient. Extubated on 9/1/23, monitored in ICU and transferred to medicine for further management.     #CVA + antiphospholipid syndrome (embolic stroke+ positive antiphospholipid antibody)- Started on AC  AC cleared by Neuro Sx and Hematology recommended Eliquis BID- will use lovenox BID due to episode of hematuria  s/p Hemicraniectomy-c/w ASA, lipitor- Rehab/ Impaired mobility and function   SAMARA negative for thrombus, +atheroma of aortic arch and aorta  CT angio of the neck and A/P did not show any acute pathologies, likely chronic atherosclerosis disease   mcot/ILR on DC  pt/ot/speech/ PM&R- cont   s/p keppra  Cranioplasty with neurosx this week vs next week  CT head with evolving left MCA infarct.  C/w Lovenox BID  Will stop lovenox 2 days prior to planned procedure as per neurosurgery  Neurology following.    Klebsiella Bacteremia  leukocytosis slowly improving.   cont Ceftriaxone until 9/20 per ID recommendations  f/u blood cx from 9/11 - negative to date    #urinary retention longo   Flomax  0.8mg po daily  bowel regimen  TOV near discharge vs. ALDEN     #Hematuria-   Eliquis started and then had an episode of hematuria again and changed to lovenox  pt noted with 1 episode of hematuria on 9/7, suspected 2/2 traumatic Longo,   Will flush longo and monitor  H/H stable    #Constipation  miralax/senna    #Dysphagia  soft/bite sized diet w/ MODERATELY thick liquids  MBS- note appreciated  aspiration precautions    #Hypernatremia -resolved  PO fluid intake    #Type 2DM  sliding scale    #Elevated LFTs- hold statin and hold Tylenol for now   repeat LFTS improving      DVT ppx: Lovenox

## 2023-09-19 NOTE — PROGRESS NOTE ADULT - SUBJECTIVE AND OBJECTIVE BOX
INTERVAL HPI/OVERNIGHT EVENTS:  63y Male PMH HTN, DM, presented to [facility] 8/27 with right hemiparesis and aphasia, found with LM1 occlusion, s/p IV Tenecteplase 12:15 PM 8/27/23 and mechanical thrombectomy 8/27/23 with TICI 2C reperfusion, evolving stroke causing mass effect subsequently required left hemicraniectomy 8/30/23 with Dr. Bah. Patient seen this AM, flap sunken, exam stable    Vital Signs Last 24 Hrs  T(C): 36.8 (19 Sep 2023 09:30), Max: 37 (18 Sep 2023 17:29)  T(F): 98.3 (19 Sep 2023 09:30), Max: 98.6 (18 Sep 2023 17:29)  HR: 84 (19 Sep 2023 09:30) (84 - 102)  BP: 107/76 (19 Sep 2023 09:30) (100/70 - 108/74)  BP(mean): --  RR: 18 (19 Sep 2023 09:30) (18 - 18)  SpO2: 92% (19 Sep 2023 05:22) (92% - 93%)    Parameters below as of 19 Sep 2023 09:30  Patient On (Oxygen Delivery Method): room air    PHYSICAL EXAM:  GENERAL: NAD, well-groomed  HEAD: s/p left hemicraniectomy. incision c/d/i. flap sunken  MARQUES COMA SCORE: E- 4 V- 1 M- 6=11  MENTAL STATUS: Awake, alert. Nonverbal. Has both expressive and receptive aphasia, expressive > receptive. Able to follow most commands on left-- able to open mouth/protrude tongue, show 2 fingers with encouragement  CRANIAL NERVES: PERRL. Tracks examiner. R facial droop. Hearing grossly intact. Speech unable to assess  MOTOR: LUE/LLE 5/5; RUE/RLE 0/5  SENSATION: grimaces to noxious on right; nods head yes that he feels sensation to light touch on left    LABS:                        11.2   11.83 )-----------( 476      ( 19 Sep 2023 05:47 )             35.0     09-19    140  |  101  |  15.8  ----------------------------<  114<H>  4.3   |  26.0  |  0.68    Ca    9.1      19 Sep 2023 05:47  Phos  3.3     09-18  Mg     2.0     09-18    TPro  6.4<L>  /  Alb  3.1<L>  /  TBili  0.4  /  DBili  x   /  AST  50<H>  /  ALT  95<H>  /  AlkPhos  138<H>  09-19    PT/INR - ( 18 Sep 2023 05:30 )   PT: 14.7 sec;   INR: 1.34 ratio      PTT - ( 18 Sep 2023 05:30 )  PTT:34.6 sec  Urinalysis Basic - ( 19 Sep 2023 05:47 )    Color: x / Appearance: x / SG: x / pH: x  Gluc: 114 mg/dL / Ketone: x  / Bili: x / Urobili: x   Blood: x / Protein: x / Nitrite: x   Leuk Esterase: x / RBC: x / WBC x   Sq Epi: x / Non Sq Epi: x / Bacteria: x    RADIOLOGY & ADDITIONAL TESTS:  CT Head No Cont (09.15.23 @ 17:54)  IMPRESSION:  No significant interval change.  Study done for presurgical planning.    CT Head No Cont (09.12.23 @ 10:08)  Impression: Evolving left MCA infarct as described above.

## 2023-09-19 NOTE — SWALLOW VFSS/MBS ASSESSMENT ADULT - DIAGNOSTIC IMPRESSIONS
Pt presents w/ moderate scottie-pharyngeal dysphagia for trials presented. Oral phase characterized by labial/lingual/buccal weakness and reduced coordination resulting in increased mastication time of easy to chew solids w/ delayed & incomplete A-P transit w/ moderate left sided buccal pocketing requiring assistance to clear residue. Posterior loss to hypo-pharynx noted across consistencies.   Pharyngeal phase characterized by reduced hyo-laryngeal elevation/excursion & poor upper/lower airway protection resulting in silent aspiration of thin liquids & penetration on the initial swallow of mildly thick liquids & aspiration after the swallow. No penetration/aspiration observed w/ puree, advanced solids or moderately thick liquids. Reduced base of tongue retraction and reduced pharyngeal contractility resulting in moderate valleculae and mild posterior pharyngeal wall and pyriform sinus residue which reduced w/ cued subsequent swallow.

## 2023-09-19 NOTE — SWALLOW VFSS/MBS ASSESSMENT ADULT - SLP GENERAL OBSERVATIONS
Pt recd a&a, reduced cognition, difficulty following commands, +expressive aphasia, tolerating RA, NAD, 0/10 nonverbal pain scale pre/post, [place] language line #[id number] utilized.

## 2023-09-19 NOTE — PROGRESS NOTE ADULT - ASSESSMENT
63y Male PMH HTN, DM, presented to [facility] 8/27 with right hemiparesis and aphasia, found with LM1 occlusion, s/p IV Tenecteplase 12:15 PM 8/27/23 and mechanical thrombectomy 8/27/23 with TICI 2C reperfusion, evolving stroke causing mass effect subsequently required left hemicraniectomy 8/30/23 with Dr. Bah    PLAN:  - D/w Dr. Bah  - Q4 neuro checks  - Awaiting implant to be printed by [employer] in preparation for cranioplasty  - Keppra 500 Q12  - On therapeutic lovenox-- will need to d/c 48 hours prior to cranioplasty  - On Ceftriaxone until 9/20  - Will need medical optimization for anticipation of cranioplasty likely this coming week  - Supportive care/further medical management per primary team

## 2023-09-19 NOTE — SWALLOW VFSS/MBS ASSESSMENT ADULT - ORAL PHASE
Uncontrolled bolus / spillover in hypopharynx left buccal residue manually removed by SLP 2' pt's limited awareness of residue & inability to clear./Residue in oral cavity/Uncontrolled bolus / spillover in hypopharynx

## 2023-09-20 LAB
BASOPHILS # BLD AUTO: 0.08 K/UL — SIGNIFICANT CHANGE UP (ref 0–0.2)
BASOPHILS NFR BLD AUTO: 0.7 % — SIGNIFICANT CHANGE UP (ref 0–2)
EOSINOPHIL # BLD AUTO: 0.31 K/UL — SIGNIFICANT CHANGE UP (ref 0–0.5)
EOSINOPHIL NFR BLD AUTO: 2.8 % — SIGNIFICANT CHANGE UP (ref 0–6)
GLUCOSE BLDC GLUCOMTR-MCNC: 113 MG/DL — HIGH (ref 70–99)
GLUCOSE BLDC GLUCOMTR-MCNC: 118 MG/DL — HIGH (ref 70–99)
GLUCOSE BLDC GLUCOMTR-MCNC: 135 MG/DL — HIGH (ref 70–99)
GLUCOSE BLDC GLUCOMTR-MCNC: 149 MG/DL — HIGH (ref 70–99)
HCT VFR BLD CALC: 33.9 % — LOW (ref 39–50)
HGB BLD-MCNC: 10.9 G/DL — LOW (ref 13–17)
IMM GRANULOCYTES NFR BLD AUTO: 1.2 % — HIGH (ref 0–0.9)
LYMPHOCYTES # BLD AUTO: 1.75 K/UL — SIGNIFICANT CHANGE UP (ref 1–3.3)
LYMPHOCYTES # BLD AUTO: 15.6 % — SIGNIFICANT CHANGE UP (ref 13–44)
MCHC RBC-ENTMCNC: 31.1 PG — SIGNIFICANT CHANGE UP (ref 27–34)
MCHC RBC-ENTMCNC: 32.2 GM/DL — SIGNIFICANT CHANGE UP (ref 32–36)
MCV RBC AUTO: 96.6 FL — SIGNIFICANT CHANGE UP (ref 80–100)
MONOCYTES # BLD AUTO: 0.9 K/UL — SIGNIFICANT CHANGE UP (ref 0–0.9)
MONOCYTES NFR BLD AUTO: 8 % — SIGNIFICANT CHANGE UP (ref 2–14)
NEUTROPHILS # BLD AUTO: 8.08 K/UL — HIGH (ref 1.8–7.4)
NEUTROPHILS NFR BLD AUTO: 71.7 % — SIGNIFICANT CHANGE UP (ref 43–77)
PLATELET # BLD AUTO: 446 K/UL — HIGH (ref 150–400)
RBC # BLD: 3.51 M/UL — LOW (ref 4.2–5.8)
RBC # FLD: 15.1 % — HIGH (ref 10.3–14.5)
WBC # BLD: 11.25 K/UL — HIGH (ref 3.8–10.5)
WBC # FLD AUTO: 11.25 K/UL — HIGH (ref 3.8–10.5)

## 2023-09-20 PROCEDURE — 99232 SBSQ HOSP IP/OBS MODERATE 35: CPT

## 2023-09-20 RX ORDER — CEFTRIAXONE 500 MG/1
2000 INJECTION, POWDER, FOR SOLUTION INTRAMUSCULAR; INTRAVENOUS EVERY 24 HOURS
Refills: 0 | Status: COMPLETED | OUTPATIENT
Start: 2023-09-20 | End: 2023-09-28

## 2023-09-20 RX ORDER — QUETIAPINE FUMARATE 200 MG/1
25 TABLET, FILM COATED ORAL AT BEDTIME
Refills: 0 | Status: DISCONTINUED | OUTPATIENT
Start: 2023-09-20 | End: 2023-09-29

## 2023-09-20 RX ADMIN — ENOXAPARIN SODIUM 80 MILLIGRAM(S): 100 INJECTION SUBCUTANEOUS at 05:29

## 2023-09-20 RX ADMIN — ENOXAPARIN SODIUM 80 MILLIGRAM(S): 100 INJECTION SUBCUTANEOUS at 18:33

## 2023-09-20 RX ADMIN — LEVETIRACETAM 400 MILLIGRAM(S): 250 TABLET, FILM COATED ORAL at 18:34

## 2023-09-20 RX ADMIN — CHLORHEXIDINE GLUCONATE 1 APPLICATION(S): 213 SOLUTION TOPICAL at 05:34

## 2023-09-20 RX ADMIN — SENNA PLUS 2 TABLET(S): 8.6 TABLET ORAL at 21:12

## 2023-09-20 RX ADMIN — LEVETIRACETAM 400 MILLIGRAM(S): 250 TABLET, FILM COATED ORAL at 05:29

## 2023-09-20 RX ADMIN — POLYETHYLENE GLYCOL 3350 17 GRAM(S): 17 POWDER, FOR SOLUTION ORAL at 18:31

## 2023-09-20 RX ADMIN — TAMSULOSIN HYDROCHLORIDE 0.8 MILLIGRAM(S): 0.4 CAPSULE ORAL at 21:12

## 2023-09-20 RX ADMIN — QUETIAPINE FUMARATE 25 MILLIGRAM(S): 200 TABLET, FILM COATED ORAL at 21:12

## 2023-09-20 RX ADMIN — POLYETHYLENE GLYCOL 3350 17 GRAM(S): 17 POWDER, FOR SOLUTION ORAL at 05:29

## 2023-09-20 NOTE — PROGRESS NOTE ADULT - ASSESSMENT
63M PMH of DM2, HTN presents with fall, right sided weakness, dysarthria and right facial droop found to have LM1 occlusion s/p TNK in ER subsequent mechanical thrombectomy and decompressive craniectomy for midline shift. SAMARA negative for thrombus. Cardiology recommending ILR /MCOT as outpatient. Extubated on 9/1/23, monitored in ICU and transferred to medicine for further management.     #CVA + antiphospholipid syndrome (embolic stroke+ positive antiphospholipid antibody)- Started on AC  AC cleared by Neuro Sx and Hematology recommended Eliquis BID- will use lovenox BID due to episode of hematuria  s/p Hemicraniectomy-c/w ASA, lipitor- Rehab/ Impaired mobility and function   SAMARA negative for thrombus, +atheroma of aortic arch and aorta  CT angio of the neck and A/P did not show any acute pathologies, likely chronic atherosclerosis disease   mcot/ILR on DC  pt/ot/speech/ PM&R- cont   s/p keppra  Cranioplasty with neurosx this week vs next week  CT head with evolving left MCA infarct.  C/w Lovenox BID  Will stop lovenox 2 days prior to planned procedure as per neurosurgery  Neurology following.    Klebsiella Bacteremia  leukocytosis slowly improving.   completing Ceftriaxone today per ID recommendations  f/u blood cx from 9/11 - negative to date    #urinary retention lonog   Flomax 0.8mg po daily  bowel regimen  TOV near discharge vs. ALDEN     #Hematuria- Improved  Eliquis started and then had an episode of hematuria again and changed to lovenox  pt noted with 1 episode of hematuria on 9/7, suspected 2/2 traumatic Longo,   Will flush longo prn and monitor  H/H stable    #Constipation  miralax/senna    #Dysphagia  soft/bite sized diet w/ MODERATELY thick liquids  MBS- note appreciated  aspiration precautions    #Hypernatremia -resolved  PO fluid intake    #Type 2DM  sliding scale    #Elevated LFTs- hold statin and hold Tylenol for now   repeat LFTS improving      DVT ppx: Lovenox     63M PMH of DM2, HTN presents with fall, right sided weakness, dysarthria and right facial droop found to have LM1 occlusion s/p TNK in ER subsequent mechanical thrombectomy and decompressive craniectomy for midline shift. SAMARA negative for thrombus. Cardiology recommending ILR /MCOT as outpatient. Extubated on 9/1/23, monitored in ICU and transferred to medicine for further management.     #CVA + antiphospholipid syndrome (embolic stroke+ positive antiphospholipid antibody)- Started on AC  AC cleared by Neuro Sx and Hematology recommended Eliquis BID- will use lovenox BID due to episode of hematuria  s/p Hemicraniectomy-c/w ASA, lipitor- Rehab/ Impaired mobility and function   SAMARA negative for thrombus, +atheroma of aortic arch and aorta  CT angio of the neck and A/P did not show any acute pathologies, likely chronic atherosclerosis disease   mcot/ILR on DC  pt/ot/speech/ PM&R- cont   s/p keppra  Cranioplasty with neurosx this week vs next week  CT head with evolving left MCA infarct.  C/w Lovenox BID  Will stop lovenox 2 days prior to planned procedure as per neurosurgery  Neurology following.  Will obtain cardiology follow up for risk stratification for impending procedure.    Klebsiella Bacteremia (Improving)  leukocytosis slowly improving.   completing Ceftriaxone per ID recommendations  f/u blood cx from 9/11 - negative to date    #urinary retention longo   Flomax 0.8mg po daily  bowel regimen  TOV near discharge vs. ALDEN     #Hematuria- Improved  Eliquis started and then had an episode of hematuria again and changed to lovenox  pt noted with 1 episode of hematuria on 9/7, suspected 2/2 traumatic Longo,   Will flush longo prn and monitor  H/H stable    #Constipation  miralax/senna    #Dysphagia  soft/bite sized diet w/ MODERATELY thick liquids  MBS- note appreciated  aspiration precautions    #Hypernatremia -resolved  PO fluid intake    #Type 2DM  sliding scale    #Elevated LFTs- hold statin and hold Tylenol for now   repeat LFTS improving    #?Depressed Mood  not able to be verbalize mood  start seroquel 25mg QHS      DVT ppx: Lovenox

## 2023-09-20 NOTE — PROGRESS NOTE ADULT - SUBJECTIVE AND OBJECTIVE BOX
CC: LT M1 Occlusion (19 Sep 2023 14:50)    HPI:  63 year old male with PMH Hypertension, DM on oral medications who presents c/o stroke-like symptoms. On arrival pt w/ obvious right sided arm weakness, confusion, dysarthria, facial droop suggestive of acute stroke. Code stroke initiated, found to have LM1 occlusion, given TNK @ 1215 and was taken for thrombectomy.     INTERVAL HPI/OVERNIGHT EVENTS: Patient seen and examined lying in bed with  #[id number].  Patient denies any pain.  ROS limited secondary to aphasia.    Vital Signs Last 24 Hrs  T(C): 37.1 (20 Sep 2023 10:13), Max: 37.3 (19 Sep 2023 20:45)  T(F): 98.8 (20 Sep 2023 10:13), Max: 99.1 (19 Sep 2023 20:45)  HR: 94 (20 Sep 2023 10:13) (79 - 94)  BP: 110/76 (20 Sep 2023 10:13) (101/70 - 110/76)  BP(mean): --  RR: 18 (20 Sep 2023 10:13) (17 - 18)  SpO2: 94% (20 Sep 2023 10:13) (93% - 99%)    Parameters below as of 20 Sep 2023 10:13  Patient On (Oxygen Delivery Method): room air      I&O's Detail    19 Sep 2023 07:01  -  20 Sep 2023 07:00  --------------------------------------------------------  IN:  Total IN: 0 mL    OUT:    Indwelling Catheter - Urethral (mL): 600 mL    Voided (mL): 200 mL  Total OUT: 800 mL    Total NET: -800 mL      PHYSICAL EXAM:  GENERAL: NAD  HEAD:  Scalp incision clean and dry  NECK: Supple, No JVD, Normal thyroid  NERVOUS SYSTEM:  Alert, aphasia, left hemiparesis  CHEST/LUNG: Clear to auscultation bilaterally  HEART: Regular rate and rhythm; No murmurs, rubs, or gallops  ABDOMEN: Soft, Nontender, Nondistended; Bowel sounds present; (+) longo  EXTREMITIES:  2+ Peripheral Pulses, No clubbing, cyanosis, or edema                            10.9   11.25 )-----------( 446      ( 20 Sep 2023 05:17 )             33.9     19 Sep 2023 05:47    140    |  101    |  15.8   ----------------------------<  114    4.3     |  26.0   |  0.68     Ca    9.1        19 Sep 2023 05:47    TPro  6.4    /  Alb  3.1    /  TBili  0.4    /  DBili  x      /  AST  50     /  ALT  95     /  AlkPhos  138    19 Sep 2023 05:47      CAPILLARY BLOOD GLUCOSE  POCT Blood Glucose.: 113 mg/dL (20 Sep 2023 08:10)  POCT Blood Glucose.: 102 mg/dL (19 Sep 2023 21:48)  POCT Blood Glucose.: 110 mg/dL (19 Sep 2023 17:28)    LIVER FUNCTIONS - ( 19 Sep 2023 05:47 )  Alb: 3.1 g/dL / Pro: 6.4 g/dL / ALK PHOS: 138 U/L / ALT: 95 U/L / AST: 50 U/L / GGT: x           Urinalysis Basic - ( 19 Sep 2023 05:47 )    Color: x / Appearance: x / SG: x / pH: x  Gluc: 114 mg/dL / Ketone: x  / Bili: x / Urobili: x   Blood: x / Protein: x / Nitrite: x   Leuk Esterase: x / RBC: x / WBC x   Sq Epi: x / Non Sq Epi: x / Bacteria: x        MEDICATIONS  (STANDING):  chlorhexidine 2% Cloths 1 Application(s) Topical <User Schedule>  dextrose 5%. 1000 milliLiter(s) (50 mL/Hr) IV Continuous <Continuous>  dextrose 5%. 1000 milliLiter(s) (100 mL/Hr) IV Continuous <Continuous>  dextrose 50% Injectable 25 Gram(s) IV Push once  dextrose 50% Injectable 12.5 Gram(s) IV Push once  dextrose 50% Injectable 25 Gram(s) IV Push once  enoxaparin Injectable 80 milliGRAM(s) SubCutaneous every 12 hours  glucagon  Injectable 1 milliGRAM(s) IntraMuscular once  insulin lispro (ADMELOG) corrective regimen sliding scale   SubCutaneous three times a day before meals  levETIRAcetam  IVPB 500 milliGRAM(s) IV Intermittent every 12 hours  polyethylene glycol 3350 17 Gram(s) Oral two times a day  senna 2 Tablet(s) Oral at bedtime  tamsulosin 0.8 milliGRAM(s) Oral at bedtime    MEDICATIONS  (PRN):  albuterol/ipratropium for Nebulization 3 milliLiter(s) Nebulizer every 6 hours PRN Shortness of Breath and/or Wheezing  dextrose Oral Gel 15 Gram(s) Oral once PRN Blood Glucose LESS THAN 70 milliGRAM(s)/deciliter  hydrALAZINE Injectable 10 milliGRAM(s) IV Push every 2 hours PRN SBP >140  labetalol Injectable 10 milliGRAM(s) IV Push every 2 hours PRN SBP >140      RADIOLOGY & ADDITIONAL TESTS:  < from: Xray Chest 1 View- PORTABLE-Urgent (Xray Chest 1 View- PORTABLE-Urgent .) (09.18.23 @ 07:09) >  ACC: [id number] EXAM:  XR CHEST PORTABLE URGENT 1V   ORDERED BY: GLORY CROUCH     PROCEDURE DATE:  09/18/2023          INTERPRETATION:  Clinical history: 63-year-old male, coarse breath sounds.    Portable/expiratory view of the chest is compared to 9/12/2023.    FINDINGS: Normal cardiac silhouette and normal pulmonary vasculature with   no consolidation, effusion or pneumothorax.    No acute osseous finding. Post lower cervical fusion, finding unchanged.    IMPRESSION:  No acute radiographic findings and no change    --- End of Report ---            CORINNA MONTGOMERY DO; Attending Radiologist  This document has been electronically signed. Sep 19 2023  8:53AM    < end of copied text >   CC: LT M1 Occlusion (19 Sep 2023 14:50)    HPI:  63 year old male with PMH Hypertension, DM on oral medications who presents c/o stroke-like symptoms. On arrival pt w/ obvious right sided arm weakness, confusion, dysarthria, facial droop suggestive of acute stroke. Code stroke initiated, found to have LM1 occlusion, given TNK @ 1215 and was taken for thrombectomy.     INTERVAL HPI/OVERNIGHT EVENTS: Patient seen and examined lying in bed with  #[id number].  Patient denies any pain.  ROS limited secondary to aphasia.     Vital Signs Last 24 Hrs  T(C): 37.1 (20 Sep 2023 10:13), Max: 37.3 (19 Sep 2023 20:45)  T(F): 98.8 (20 Sep 2023 10:13), Max: 99.1 (19 Sep 2023 20:45)  HR: 94 (20 Sep 2023 10:13) (79 - 94)  BP: 110/76 (20 Sep 2023 10:13) (101/70 - 110/76)  BP(mean): --  RR: 18 (20 Sep 2023 10:13) (17 - 18)  SpO2: 94% (20 Sep 2023 10:13) (93% - 99%)    Parameters below as of 20 Sep 2023 10:13  Patient On (Oxygen Delivery Method): room air      I&O's Detail    19 Sep 2023 07:01  -  20 Sep 2023 07:00  --------------------------------------------------------  IN:  Total IN: 0 mL    OUT:    Indwelling Catheter - Urethral (mL): 600 mL    Voided (mL): 200 mL  Total OUT: 800 mL    Total NET: -800 mL      PHYSICAL EXAM:  GENERAL: NAD  HEAD:  Scalp incision clean and dry  NECK: Supple, No JVD, Normal thyroid  NERVOUS SYSTEM:  Alert, aphasic, right hemiparesis  CHEST/LUNG: Clear to auscultation bilaterally  HEART: Regular rate and rhythm; No murmurs, rubs, or gallops  ABDOMEN: Soft, Nontender, Nondistended; Bowel sounds present; (+) longo  EXTREMITIES:  2+ Peripheral Pulses, No clubbing, cyanosis, or edema                            10.9   11.25 )-----------( 446      ( 20 Sep 2023 05:17 )             33.9     19 Sep 2023 05:47    140    |  101    |  15.8   ----------------------------<  114    4.3     |  26.0   |  0.68     Ca    9.1        19 Sep 2023 05:47    TPro  6.4    /  Alb  3.1    /  TBili  0.4    /  DBili  x      /  AST  50     /  ALT  95     /  AlkPhos  138    19 Sep 2023 05:47      CAPILLARY BLOOD GLUCOSE  POCT Blood Glucose.: 113 mg/dL (20 Sep 2023 08:10)  POCT Blood Glucose.: 102 mg/dL (19 Sep 2023 21:48)  POCT Blood Glucose.: 110 mg/dL (19 Sep 2023 17:28)    LIVER FUNCTIONS - ( 19 Sep 2023 05:47 )  Alb: 3.1 g/dL / Pro: 6.4 g/dL / ALK PHOS: 138 U/L / ALT: 95 U/L / AST: 50 U/L / GGT: x           Urinalysis Basic - ( 19 Sep 2023 05:47 )    Color: x / Appearance: x / SG: x / pH: x  Gluc: 114 mg/dL / Ketone: x  / Bili: x / Urobili: x   Blood: x / Protein: x / Nitrite: x   Leuk Esterase: x / RBC: x / WBC x   Sq Epi: x / Non Sq Epi: x / Bacteria: x        MEDICATIONS  (STANDING):  chlorhexidine 2% Cloths 1 Application(s) Topical <User Schedule>  dextrose 5%. 1000 milliLiter(s) (50 mL/Hr) IV Continuous <Continuous>  dextrose 5%. 1000 milliLiter(s) (100 mL/Hr) IV Continuous <Continuous>  dextrose 50% Injectable 25 Gram(s) IV Push once  dextrose 50% Injectable 12.5 Gram(s) IV Push once  dextrose 50% Injectable 25 Gram(s) IV Push once  enoxaparin Injectable 80 milliGRAM(s) SubCutaneous every 12 hours  glucagon  Injectable 1 milliGRAM(s) IntraMuscular once  insulin lispro (ADMELOG) corrective regimen sliding scale   SubCutaneous three times a day before meals  levETIRAcetam  IVPB 500 milliGRAM(s) IV Intermittent every 12 hours  polyethylene glycol 3350 17 Gram(s) Oral two times a day  senna 2 Tablet(s) Oral at bedtime  tamsulosin 0.8 milliGRAM(s) Oral at bedtime    MEDICATIONS  (PRN):  albuterol/ipratropium for Nebulization 3 milliLiter(s) Nebulizer every 6 hours PRN Shortness of Breath and/or Wheezing  dextrose Oral Gel 15 Gram(s) Oral once PRN Blood Glucose LESS THAN 70 milliGRAM(s)/deciliter  hydrALAZINE Injectable 10 milliGRAM(s) IV Push every 2 hours PRN SBP >140  labetalol Injectable 10 milliGRAM(s) IV Push every 2 hours PRN SBP >140      RADIOLOGY & ADDITIONAL TESTS:  < from: Xray Chest 1 View- PORTABLE-Urgent (Xray Chest 1 View- PORTABLE-Urgent .) (09.18.23 @ 07:09) >  ACC: [id number] EXAM:  XR CHEST PORTABLE URGENT 1V   ORDERED BY: GLORY CROUCH     PROCEDURE DATE:  09/18/2023          INTERPRETATION:  Clinical history: 63-year-old male, coarse breath sounds.    Portable/expiratory view of the chest is compared to 9/12/2023.    FINDINGS: Normal cardiac silhouette and normal pulmonary vasculature with   no consolidation, effusion or pneumothorax.    No acute osseous finding. Post lower cervical fusion, finding unchanged.    IMPRESSION:  No acute radiographic findings and no change    --- End of Report ---            CORINNA MONTGOMERY DO; Attending Radiologist  This document has been electronically signed. Sep 19 2023  8:53AM    < end of copied text >

## 2023-09-21 DIAGNOSIS — Z01.810 ENCOUNTER FOR PREPROCEDURAL CARDIOVASCULAR EXAMINATION: ICD-10-CM

## 2023-09-21 LAB
GLUCOSE BLDC GLUCOMTR-MCNC: 122 MG/DL — HIGH (ref 70–99)
GLUCOSE BLDC GLUCOMTR-MCNC: 126 MG/DL — HIGH (ref 70–99)
GLUCOSE BLDC GLUCOMTR-MCNC: 137 MG/DL — HIGH (ref 70–99)
GLUCOSE BLDC GLUCOMTR-MCNC: 180 MG/DL — HIGH (ref 70–99)

## 2023-09-21 PROCEDURE — 99232 SBSQ HOSP IP/OBS MODERATE 35: CPT

## 2023-09-21 RX ADMIN — QUETIAPINE FUMARATE 25 MILLIGRAM(S): 200 TABLET, FILM COATED ORAL at 21:36

## 2023-09-21 RX ADMIN — CHLORHEXIDINE GLUCONATE 1 APPLICATION(S): 213 SOLUTION TOPICAL at 06:04

## 2023-09-21 RX ADMIN — LEVETIRACETAM 400 MILLIGRAM(S): 250 TABLET, FILM COATED ORAL at 17:31

## 2023-09-21 RX ADMIN — CEFTRIAXONE 2000 MILLIGRAM(S): 500 INJECTION, POWDER, FOR SOLUTION INTRAMUSCULAR; INTRAVENOUS at 05:56

## 2023-09-21 RX ADMIN — POLYETHYLENE GLYCOL 3350 17 GRAM(S): 17 POWDER, FOR SOLUTION ORAL at 17:31

## 2023-09-21 RX ADMIN — ENOXAPARIN SODIUM 80 MILLIGRAM(S): 100 INJECTION SUBCUTANEOUS at 17:32

## 2023-09-21 RX ADMIN — Medication 2: at 11:14

## 2023-09-21 RX ADMIN — POLYETHYLENE GLYCOL 3350 17 GRAM(S): 17 POWDER, FOR SOLUTION ORAL at 05:56

## 2023-09-21 RX ADMIN — ENOXAPARIN SODIUM 80 MILLIGRAM(S): 100 INJECTION SUBCUTANEOUS at 05:56

## 2023-09-21 RX ADMIN — TAMSULOSIN HYDROCHLORIDE 0.8 MILLIGRAM(S): 0.4 CAPSULE ORAL at 21:36

## 2023-09-21 RX ADMIN — SENNA PLUS 2 TABLET(S): 8.6 TABLET ORAL at 21:36

## 2023-09-21 RX ADMIN — LEVETIRACETAM 400 MILLIGRAM(S): 250 TABLET, FILM COATED ORAL at 05:56

## 2023-09-21 NOTE — PROGRESS NOTE ADULT - PROBLEM SELECTOR PLAN 2
63 year old male with PMH Hypertension, DM on oral medications who presents c/o stroke-like symptoms.   found to have LM1 occlusion, given TNK in ED.  8/27/2023 mechanical thrombectomy with reperfusion, evolving stroke causing mass effect subsequently required left hemicraniectomy 8/30/23 with Dr. Bah. Awaiting implant for cranioplasty.  Low risk procedure - patient at elevated risk with comorbidities (thn, dm CVA)  2 points  Class III Risk  10.1 %  30-day risk of death, MI, or cardiac arrest 63 year old male with PMH Hypertension, DM on oral medications who presents c/o stroke-like symptoms.   found to have LM1 occlusion, given TNK in ED.  8/27/2023 mechanical thrombectomy with reperfusion, evolving stroke causing mass effect subsequently required left hemicraniectomy 8/30/23 with Dr. Bah. Awaiting implant for cranioplasty.  Ever nephew stating patient prior to admission was ambulatory - had no cardiac history, no chest pain, could up and down stairs without difficulty and otherwise in good health.    Moderate risk procedure - patient at elevated risk with comorbidities (thn, dm CVA)  2 points  Class III Risk  10.1 %  30-day risk of death, MI, or cardiac arrest  Wait for MD for finale clearance

## 2023-09-21 NOTE — PROGRESS NOTE ADULT - SUBJECTIVE AND OBJECTIVE BOX
[facility]                                                         CARDIOLOGY AT [place] [address]                                                         Telephone: [phone]. Fax:[phone]                                                                             PROGRESS NOTE    Reason for follow up:   Re consulted for cardiac risk index  Update:   Planned for cranioplasty on Monday or teusday  Review of symptoms:   Cardiac:  No chest pain. No dyspnea. No palpitations.  Respiratory: no cough. No dyspnea  Gastrointestinal: No diarrhea. No abdominal pain. No bleeding.   Neuro: No focal neuro complaints.    Vitals:  T(C): 36.9 (09-21-23 @ 05:10), Max: 36.9 (09-20-23 @ 20:45)  HR: 97 (09-21-23 @ 05:10) (82 - 97)  BP: 120/83 (09-21-23 @ 05:10) (105/73 - 120/83)  RR: 18 (09-21-23 @ 05:10) (17 - 18)  SpO2: 93% (09-21-23 @ 05:10) (92% - 93%)    I&O's Summary  20 Sep 2023 07:01  -  21 Sep 2023 07:00  --------------------------------------------------------  IN: 0 mL / OUT: 1300 mL / NET: -1300 mL  21 Sep 2023 07:01  -  21 Sep 2023 12:06  --------------------------------------------------------  IN: 0 mL / OUT: 400 mL / NET: -400 mL    PHYSICAL EXAM:  Appearance: Comfortable. No acute distress  HEENT:  Cranial scaring noted and deformity noted.   Normal oral mucosa  Neurologic: A & O x 0,  left hemiparesis noted, follows commands on right.    Cardiovascular: RRR S1 S2, No murmur, no rubs/gallops. No JVD  Respiratory: Lungs clear to auscultation, unlabored   Gastrointestinal:  Soft, Non-tender, + BS  Lower Extremities: + Peripheral Pulses, No clubbing, cyanosis, or edema  Psychiatry: Patient is calm. No agitation.   Skin: warm and dry.    CURRENT CARDIAC MEDICATIONS:  hydrALAZINE Injectable 10 milliGRAM(s) IV Push every 2 hours PRN  labetalol Injectable 10 milliGRAM(s) IV Push every 2 hours PRN    CURRENT OTHER MEDICATIONS:  albuterol/ipratropium for Nebulization 3 milliLiter(s) Nebulizer every 6 hours PRN Shortness of Breath and/or Wheezing  cefTRIAXone Injectable. 2000 milliGRAM(s) IV Push every 24 hours  levETIRAcetam  IVPB 500 milliGRAM(s) IV Intermittent every 12 hours  QUEtiapine 25 milliGRAM(s) Oral at bedtime  polyethylene glycol 3350 17 Gram(s) Oral two times a day  senna 2 Tablet(s) Oral at bedtime  dextrose Oral Gel 15 Gram(s) Oral once, Stop order after: 1 Doses PRN Blood Glucose LESS THAN 70 milliGRAM(s)/deciliter  glucagon  Injectable 1 milliGRAM(s) IntraMuscular once, Stop order after: 1 Doses  insulin lispro (ADMELOG) corrective regimen sliding scale   SubCutaneous three times a day before meals  chlorhexidine 2% Cloths 1 Application(s) Topical <User Schedule>  enoxaparin Injectable 80 milliGRAM(s) SubCutaneous every 12 hours  tamsulosin 0.8 milliGRAM(s) Oral at bedtime    LABS:	 	  ( 08 Sep 2023 06:13 )  Troponin T  <0.01,  CPK  X    , CKMB  X    , BNP X                           10.9   11.25 )-----------( 446      ( 20 Sep 2023 05:17 )             33.9     PT/INR/PTT ( 18 Sep 2023 05:30 )                       :                       :      14.7         :       34.6                  .        .                   .              .           .       1.34        .                                       Lipid Profile: Date: 08-29 @ 03:05  Total cholesterol 145; Direct LDL: --; HDL: 39; Triglycerides:128    TELEMETRY:   No

## 2023-09-21 NOTE — PROGRESS NOTE ADULT - PROBLEM SELECTOR PLAN 1
L MCA Stroke  Neurologic Encephalopathy  Cerebral Edema with brain herniation  Afebrile, no pressors, remains vented.    Neurology on the case.   On statin, ASA  SAMARA bedside pending  Continue tele monitoring  ILR or outpatient MCOT if SAMARA  negative  NPO    Respiratory Failure: Continued on ventilator  Undifferentiated Shock: Maintained on pressors.
.  - LM 1 occlusion sp TNK, mechanical thrombectomy, decompression crainectomy.   - SAMARA neg for thrombus, septal defect; + Moderate complex atheromas at the aortic arch and descending aorta.  - tele - SR., no arrythmia, no afib/flutter   - No AC as per neurology   - cont antiplatelet, asa 81 mg   - ILR on discharge   - please call prior to discharge.
+ Stroke  LM 1 occlusion sp TNK, mechanical thrombectomy, decompression craniectomy.   SAMARA neg for thrombus, septal defect; + Moderate complex atheromas at the aortic arch and descending aorta.  ILR on discharge   please call prior to discharge.  Neuro following  EKG no acute changes or concern for ACS  Trops negative x 2  TTE done  Plan for IRL before discharge

## 2023-09-21 NOTE — PROGRESS NOTE ADULT - ASSESSMENT
63M PMH of DM2, HTN presents with fall, right sided weakness, dysarthria and right facial droop found to have LM1 occlusion s/p TNK in ER subsequent mechanical thrombectomy and decompressive craniectomy for midline shift. SAMARA negative for thrombus. Cardiology recommending ILR /MCOT as outpatient. Extubated on 9/1/23, monitored in ICU and transferred to medicine for further management.     #CVA + antiphospholipid syndrome (embolic stroke+ positive antiphospholipid antibody)- Started on AC  AC cleared by Neuro Sx and Hematology recommended Eliquis BID- will use lovenox BID due to episode of hematuria  s/p Hemicraniectomy-c/w ASA, lipitor- Rehab/ Impaired mobility and function   SAMARA negative for thrombus, +atheroma of aortic arch and aorta  CT angio of the neck and A/P did not show any acute pathologies, likely chronic atherosclerosis disease   mcot/ILR prior to DC  pt/ot/speech/ PM&R- cont   s/p keppra  Cranioplasty with neurosx this week vs next week  CT head with evolving left MCA infarct.  C/w Lovenox BID  Will stop lovenox 2 days prior to planned procedure as per neurosurgery  Neurology following.  Cardiology note re: RCRI appreciated    Klebsiella Bacteremia (Improving)  leukocytosis slowly improving.   completing Ceftriaxone per ID recommendations  f/u blood cx from 9/11 - negative to date    #urinary retention longo   Flomax 0.8mg po daily  bowel regimen  TOV near discharge vs. ALDEN     #Hematuria- Improved  Eliquis started and then had an episode of hematuria again and changed to lovenox  pt noted with 1 episode of hematuria on 9/7, suspected 2/2 traumatic Longo,   Will flush longo prn and monitor  H/H stable    #Constipation  miralax/senna    #Dysphagia  soft/bite sized diet w/ MODERATELY thick liquids  MBS- note appreciated  aspiration precautions    #Hypernatremia -resolved  PO fluid intake    #Type 2DM  sliding scale    #Elevated LFTs- hold statin and hold Tylenol for now   repeat LFTS improving    #?Depressed Mood  not able to be verbalize mood  start seroquel 25mg QHS      DVT ppx: Lovenox    Dispo: Pending cranioplasty probably next week as neurosurgery notes.

## 2023-09-21 NOTE — PROGRESS NOTE ADULT - SUBJECTIVE AND OBJECTIVE BOX
SUBJECTIVE / OVERNIGHT EVENTS: Seen and examined at bedside with friend. being fed without issue. Smiles. Non verbal     MEDICATIONS  (STANDING):  cefTRIAXone Injectable. 2000 milliGRAM(s) IV Push every 24 hours  chlorhexidine 2% Cloths 1 Application(s) Topical <User Schedule>  dextrose 5%. 1000 milliLiter(s) (100 mL/Hr) IV Continuous <Continuous>  dextrose 5%. 1000 milliLiter(s) (50 mL/Hr) IV Continuous <Continuous>  dextrose 50% Injectable 25 Gram(s) IV Push once  dextrose 50% Injectable 25 Gram(s) IV Push once  dextrose 50% Injectable 12.5 Gram(s) IV Push once  enoxaparin Injectable 80 milliGRAM(s) SubCutaneous every 12 hours  glucagon  Injectable 1 milliGRAM(s) IntraMuscular once  insulin lispro (ADMELOG) corrective regimen sliding scale   SubCutaneous three times a day before meals  levETIRAcetam  IVPB 500 milliGRAM(s) IV Intermittent every 12 hours  polyethylene glycol 3350 17 Gram(s) Oral two times a day  QUEtiapine 25 milliGRAM(s) Oral at bedtime  senna 2 Tablet(s) Oral at bedtime  tamsulosin 0.8 milliGRAM(s) Oral at bedtime    MEDICATIONS  (PRN):  albuterol/ipratropium for Nebulization 3 milliLiter(s) Nebulizer every 6 hours PRN Shortness of Breath and/or Wheezing  dextrose Oral Gel 15 Gram(s) Oral once PRN Blood Glucose LESS THAN 70 milliGRAM(s)/deciliter  hydrALAZINE Injectable 10 milliGRAM(s) IV Push every 2 hours PRN SBP >140  labetalol Injectable 10 milliGRAM(s) IV Push every 2 hours PRN SBP >140        I&O's Summary    20 Sep 2023 07:01  -  21 Sep 2023 07:00  --------------------------------------------------------  IN: 0 mL / OUT: 1300 mL / NET: -1300 mL    21 Sep 2023 07:01  -  21 Sep 2023 17:46  --------------------------------------------------------  IN: 0 mL / OUT: 400 mL / NET: -400 mL        PHYSICAL EXAM:  Vital Signs Last 24 Hrs  T(C): 37 (21 Sep 2023 16:16), Max: 37 (21 Sep 2023 16:16)  T(F): 98.6 (21 Sep 2023 16:16), Max: 98.6 (21 Sep 2023 16:16)  HR: 100 (21 Sep 2023 16:16) (82 - 100)  BP: 115/77 (21 Sep 2023 16:16) (104/70 - 120/83)  BP(mean): 93 (21 Sep 2023 05:10) (93 - 93)  RR: 18 (21 Sep 2023 16:16) (17 - 18)  SpO2: 95% (21 Sep 2023 16:16) (93% - 95%)    Parameters below as of 21 Sep 2023 16:16  Patient On (Oxygen Delivery Method): room air          PHYSICAL EXAM:  GENERAL: NAD  HEAD:  Scalp incision clean and dry  NECK: Supple, No JVD, Normal thyroid  NERVOUS SYSTEM:  Alert, aphasic, right hemiparesis  CHEST/LUNG: Clear to auscultation bilaterally  HEART: Regular rate and rhythm; No murmurs, rubs, or gallops  ABDOMEN: Soft, Nontender, Nondistended; Bowel sounds present; (+) longo draining clear urine  EXTREMITIES:  2+ Peripheral Pulses, No clubbing, cyanosis, or edema    LABS:                        10.9   11.25 )-----------( 446      ( 20 Sep 2023 05:17 )             33.9                     CAPILLARY BLOOD GLUCOSE      POCT Blood Glucose.: 137 mg/dL (21 Sep 2023 17:30)  POCT Blood Glucose.: 180 mg/dL (21 Sep 2023 11:11)  POCT Blood Glucose.: 126 mg/dL (21 Sep 2023 08:15)  POCT Blood Glucose.: 149 mg/dL (20 Sep 2023 21:13)  POCT Blood Glucose.: 118 mg/dL (20 Sep 2023 17:52)        RADIOLOGY & ADDITIONAL TESTS:  Results Reviewed:   Imaging Personally Reviewed:  Electrocardiogram Personally Reviewed:

## 2023-09-21 NOTE — PROGRESS NOTE ADULT - SUBJECTIVE AND OBJECTIVE BOX
HPI:  Patient is a 63 year old male with PMH Hypertension, DM on oral medications who presents c/o stroke-like symptoms. Per son pt was walking outside around 10:15am this morning when he suddenly fell. His son helped him up and noted that he was weak on the right side and not acting normally which prompted him to come to the ED. On arrival pt w/ obvious right sided arm weakness, confusion, dysarthria, facial droop suggestive of acute stroke. Code stroke initiated, found to have LM1 occlusion, given TNK @ 1215 and was taken for thrombectomy. Unable to provide ROS 2/2 aphasia  (27 Aug 2023 16:45)      INTERVAL HPI/OVERNIGHT EVENTS:  63y Male s/p throbectomy 8/27 and left hemicraniectomy 8/30 seen lying comfortably in bed with family at bedside. Tolerating diet.Voiding. Denies headache, weakness, numbness, n/v/d, fevers, chills, chest pain, SOB.     Vital Signs Last 24 Hrs  T(C): 36.9 (21 Sep 2023 05:10), Max: 36.9 (20 Sep 2023 20:45)  T(F): 98.5 (21 Sep 2023 05:10), Max: 98.5 (21 Sep 2023 05:10)  HR: 97 (21 Sep 2023 05:10) (82 - 97)  BP: 120/83 (21 Sep 2023 05:10) (105/73 - 120/83)  BP(mean): 93 (21 Sep 2023 05:10) (93 - 93)  RR: 18 (21 Sep 2023 05:10) (17 - 18)  SpO2: 93% (21 Sep 2023 05:10) (92% - 93%)    Parameters below as of 21 Sep 2023 05:10  Patient On (Oxygen Delivery Method): room air          PHYSICAL EXAM:  GENERAL: NAD, well-groomed  HEAD: s/p left hemicraniectomy, no bone flap. incision c/d/i. flap sunken  MARQUES COMA SCORE: E- 4 V- 1 M- 6=11  MENTAL STATUS: Awake, alert. Nonverbal. Expressive and receptive aphasia, expressive > receptive. follows most commands on left side (stick out tongue, show 2 fingers, lift leg)  CRANIAL NERVES: PERRL. Tracks. R facial droop. Hearing grossly intact. Speech unable to assess  MOTOR: LUE/LLE 5/5; RUE/RLE 0/5  SENSATION: grimaces to noxious on right; +sensation to light touch on left    ABDOMEN: Soft, nontender, nondistended  EXTREMITIES:  2+ peripheral pulses, no clubbing, cyanosis, or edema  SKIN: Warm, dry; no rashes or lesions    LABS:                        10.9   11.25 )-----------( 446      ( 20 Sep 2023 05:17 )             33.9                 09-20 @ 07:01  -  09-21 @ 07:00  --------------------------------------------------------  IN: 0 mL / OUT: 1300 mL / NET: -1300 mL        RADIOLOGY & ADDITIONAL TESTS:  < from: CT Head No Cont (09.15.23 @ 17:54) >  IMPRESSION:    No significant interval change.    Study done for presurgical planning.    --- End of Report ---    < end of copied text >    < from: CT Head No Cont (09.12.23 @ 10:08) >  Impression: Evolving left MCA infarct as described above.    --- End of Report ---    < end of copied text >

## 2023-09-21 NOTE — PROGRESS NOTE ADULT - ASSESSMENT
ASSESSMENT: 63y Male PMH HTN, DM, presented to [facility] 8/27 with right hemiparesis and aphasia, found with LM1 occlusion, s/p IV Tenecteplase 12:15 PM 8/27/23 and mechanical thrombectomy 8/27/23 with TICI 2C reperfusion, evolving stroke causing mass effect subsequently required left hemicraniectomy 8/30/23 with Dr. Bah. Awaiting implant for cranioplasty.    PLAN:  - Q4 neuro checks  - cranioplasty planning: waiting implant to be printed by [employer] in preparation for cranioplasty- likely ready by Monday or Tuesday per rep (updated today 9/21)  - continue Keppra 500 Q12  - On therapeutic SQL for antiphospholipid syndrome- will need to d/c 48 hours prior to cranioplasty  - Completed course of Ceftriaxone on 9/20 for bacteremia   - Will need medical optimization for anticipation of cranioplasty likely next week  - global management per primary team  - D/w Dr. Bah

## 2023-09-21 NOTE — PROGRESS NOTE ADULT - ASSESSMENT
63 year old male with PMH Hypertension, DM on oral medications who presents c/o stroke-like symptoms.   found to have LM1 occlusion, given TNK in ED.  8/27/2023 mechanical thrombectomy with reperfusion, evolving stroke causing mass effect subsequently required left hemicraniectomy 8/30/23 with Dr. Bah. Awaiting implant for cranioplasty.

## 2023-09-22 LAB
GLUCOSE BLDC GLUCOMTR-MCNC: 123 MG/DL — HIGH (ref 70–99)
GLUCOSE BLDC GLUCOMTR-MCNC: 135 MG/DL — HIGH (ref 70–99)
GLUCOSE BLDC GLUCOMTR-MCNC: 144 MG/DL — HIGH (ref 70–99)
GLUCOSE BLDC GLUCOMTR-MCNC: 158 MG/DL — HIGH (ref 70–99)

## 2023-09-22 PROCEDURE — 99232 SBSQ HOSP IP/OBS MODERATE 35: CPT

## 2023-09-22 RX ADMIN — SENNA PLUS 2 TABLET(S): 8.6 TABLET ORAL at 22:52

## 2023-09-22 RX ADMIN — ENOXAPARIN SODIUM 80 MILLIGRAM(S): 100 INJECTION SUBCUTANEOUS at 05:51

## 2023-09-22 RX ADMIN — CEFTRIAXONE 2000 MILLIGRAM(S): 500 INJECTION, POWDER, FOR SOLUTION INTRAMUSCULAR; INTRAVENOUS at 05:30

## 2023-09-22 RX ADMIN — LEVETIRACETAM 400 MILLIGRAM(S): 250 TABLET, FILM COATED ORAL at 17:45

## 2023-09-22 RX ADMIN — TAMSULOSIN HYDROCHLORIDE 0.8 MILLIGRAM(S): 0.4 CAPSULE ORAL at 22:52

## 2023-09-22 RX ADMIN — LEVETIRACETAM 400 MILLIGRAM(S): 250 TABLET, FILM COATED ORAL at 05:30

## 2023-09-22 RX ADMIN — POLYETHYLENE GLYCOL 3350 17 GRAM(S): 17 POWDER, FOR SOLUTION ORAL at 05:31

## 2023-09-22 RX ADMIN — CHLORHEXIDINE GLUCONATE 1 APPLICATION(S): 213 SOLUTION TOPICAL at 05:51

## 2023-09-22 RX ADMIN — POLYETHYLENE GLYCOL 3350 17 GRAM(S): 17 POWDER, FOR SOLUTION ORAL at 17:45

## 2023-09-22 RX ADMIN — QUETIAPINE FUMARATE 25 MILLIGRAM(S): 200 TABLET, FILM COATED ORAL at 22:52

## 2023-09-22 RX ADMIN — Medication 2: at 12:21

## 2023-09-22 NOTE — PROGRESS NOTE ADULT - ASSESSMENT
63M PMH of DM2, HTN presents with fall, right sided weakness, dysarthria and right facial droop found to have LM1 occlusion s/p TNK in ER subsequent mechanical thrombectomy and decompressive craniectomy for midline shift. SAMARA negative for thrombus. Cardiology recommending ILR /MCOT as outpatient. Extubated on 9/1/23, monitored in ICU and transferred to medicine for further management.     #CVA + antiphospholipid syndrome (embolic stroke+ positive antiphospholipid antibody)- Started on AC  AC cleared by Neuro Sx and Hematology recommended Eliquis BID- will use lovenox BID due to episode of hematuria  s/p Hemicraniectomy-c/w ASA, lipitor- Rehab/ Impaired mobility and function   SAMARA negative for thrombus, +atheroma of aortic arch and aorta  CT angio of the neck and A/P did not show any acute pathologies, likely chronic atherosclerosis disease   mcot/ILR prior to DC  pt/ot/speech/ PM&R- cont   s/p keppra  Cranioplasty with neurosx next week  CT head with evolving left MCA infarct.  C/w Lovenox BID  Will stop lovenox 2 days prior to planned procedure as per neurosurgery  Cardiology note re: RCRI appreciated    Klebsiella Bacteremia (Improving)  leukocytosis slowly improving.   completing Ceftriaxone per ID recommendations  f/u blood cx from 9/11 - negative to date    #urinary retention longo   Flomax 0.8mg po daily  bowel regimen  TOV near discharge vs. ALDEN     #Hematuria- Improved  Eliquis started and then had an episode of hematuria again and changed to lovenox  pt noted with 1 episode of hematuria on 9/7, suspected 2/2 traumatic Longo,   Will flush longo prn and monitor  H/H stable    #Constipation  miralax/senna    #Dysphagia  soft/bite sized diet w/ MODERATELY thick liquids  MBS- note appreciated  aspiration precautions    #Hypernatremia -resolved  PO fluid intake    #Type 2DM  sliding scale    #Elevated LFTs- hold statin and hold Tylenol for now   repeat LFTS improving    #?Depressed Mood  C/w Seroquel 25mg QHS      DVT ppx: Lovenox    Dispo: Pending cranioplasty probably next week as neurosurgery note.

## 2023-09-22 NOTE — PROGRESS NOTE ADULT - SUBJECTIVE AND OBJECTIVE BOX
SUBJECTIVE / OVERNIGHT EVENTS: Was being fed by aid at bedside. smiles and moves his right arm. signals no pain. Patient denies chest pain, SOB, abd pain, N/V, fever, chills, dysuria or any other complaints. All remainder ROS negative.     MEDICATIONS  (STANDING):  cefTRIAXone Injectable. 2000 milliGRAM(s) IV Push every 24 hours  chlorhexidine 2% Cloths 1 Application(s) Topical <User Schedule>  dextrose 5%. 1000 milliLiter(s) (100 mL/Hr) IV Continuous <Continuous>  dextrose 5%. 1000 milliLiter(s) (50 mL/Hr) IV Continuous <Continuous>  dextrose 50% Injectable 25 Gram(s) IV Push once  dextrose 50% Injectable 25 Gram(s) IV Push once  dextrose 50% Injectable 12.5 Gram(s) IV Push once  enoxaparin Injectable 80 milliGRAM(s) SubCutaneous every 12 hours  glucagon  Injectable 1 milliGRAM(s) IntraMuscular once  insulin lispro (ADMELOG) corrective regimen sliding scale   SubCutaneous three times a day before meals  levETIRAcetam  IVPB 500 milliGRAM(s) IV Intermittent every 12 hours  polyethylene glycol 3350 17 Gram(s) Oral two times a day  QUEtiapine 25 milliGRAM(s) Oral at bedtime  senna 2 Tablet(s) Oral at bedtime  tamsulosin 0.8 milliGRAM(s) Oral at bedtime    MEDICATIONS  (PRN):  albuterol/ipratropium for Nebulization 3 milliLiter(s) Nebulizer every 6 hours PRN Shortness of Breath and/or Wheezing  dextrose Oral Gel 15 Gram(s) Oral once PRN Blood Glucose LESS THAN 70 milliGRAM(s)/deciliter  hydrALAZINE Injectable 10 milliGRAM(s) IV Push every 2 hours PRN SBP >140  labetalol Injectable 10 milliGRAM(s) IV Push every 2 hours PRN SBP >140        I&O's Summary    21 Sep 2023 07:01  -  22 Sep 2023 07:00  --------------------------------------------------------  IN: 0 mL / OUT: 1000 mL / NET: -1000 mL        PHYSICAL EXAM:  Vital Signs Last 24 Hrs  T(C): 36.9 (22 Sep 2023 08:00), Max: 37.2 (21 Sep 2023 20:45)  T(F): 98.5 (22 Sep 2023 08:00), Max: 99 (22 Sep 2023 01:08)  HR: 98 (22 Sep 2023 08:00) (83 - 100)  BP: 128/84 (22 Sep 2023 08:00) (101/67 - 128/84)  BP(mean): --  RR: 18 (22 Sep 2023 08:00) (17 - 18)  SpO2: 93% (22 Sep 2023 08:00) (93% - 95%)    Parameters below as of 22 Sep 2023 08:00  Patient On (Oxygen Delivery Method): room air            PHYSICAL EXAM:  GENERAL: NAD  HEAD:  Scalp incision clean and dry  NECK: Supple, No JVD, Normal thyroid  NERVOUS SYSTEM:  Alert, aphasic, right hemiparesis  CHEST/LUNG: Clear to auscultation bilaterally  HEART: Regular rate and rhythm; No murmurs, rubs, or gallops  ABDOMEN: Soft, Nontender, Nondistended; Bowel sounds present; (+) longo draining clear urine  EXTREMITIES:  2+ Peripheral Pulses, No clubbing, cyanosis, or edema    LABS:                    CAPILLARY BLOOD GLUCOSE      POCT Blood Glucose.: 158 mg/dL (22 Sep 2023 12:19)  POCT Blood Glucose.: 123 mg/dL (22 Sep 2023 08:47)  POCT Blood Glucose.: 122 mg/dL (21 Sep 2023 23:10)  POCT Blood Glucose.: 137 mg/dL (21 Sep 2023 17:30)        RADIOLOGY & ADDITIONAL TESTS:  Results Reviewed:   Imaging Personally Reviewed:  Electrocardiogram Personally Reviewed:

## 2023-09-22 NOTE — PROGRESS NOTE ADULT - SUBJECTIVE AND OBJECTIVE BOX
[facility]                                                         CARDIOLOGY AT [facility], [place]                                                                  [address] [facility], [place] [place] [address]                                                         Telephone: [phone]. Fax:[phone]                                                                             PROGRESS NOTE    Reason for follow up:   Update:       Review of symptoms:   Cardiac:  No chest pain. No dyspnea. No palpitations.  Respiratory: no cough. No dyspnea  Gastrointestinal: No diarrhea. No abdominal pain. No bleeding.   Neuro: No focal neuro complaints.    Vitals:  T(C): 36.9 (09-22-23 @ 08:00), Max: 37.2 (09-21-23 @ 20:45)  HR: 98 (09-22-23 @ 08:00) (83 - 100)  BP: 128/84 (09-22-23 @ 08:00) (101/67 - 128/84)  RR: 18 (09-22-23 @ 08:00) (17 - 18)  SpO2: 93% (09-22-23 @ 08:00) (93% - 95%)  Wt(kg): --  I&O's Summary    21 Sep 2023 07:01  -  22 Sep 2023 07:00  --------------------------------------------------------  IN: 0 mL / OUT: 1000 mL / NET: -1000 mL          PHYSICAL EXAM:  Appearance: Comfortable. No acute distress  HEENT:  Atraumatic. Normocephalic.  Normal oral mucosa  Neurologic: A & O x 3, no gross focal deficits.  Cardiovascular: RRR S1 S2, No murmur, no rubs/gallops. No JVD  Respiratory: Lungs clear to auscultation, unlabored   Gastrointestinal:  Soft, Non-tender, + BS  Lower Extremities: 2+ Peripheral Pulses, No clubbing, cyanosis, or edema  Psychiatry: Patient is calm. No agitation.   Skin: warm and dry.    CURRENT CARDIAC MEDICATIONS:  hydrALAZINE Injectable 10 milliGRAM(s) IV Push every 2 hours PRN  labetalol Injectable 10 milliGRAM(s) IV Push every 2 hours PRN      CURRENT OTHER MEDICATIONS:  albuterol/ipratropium for Nebulization 3 milliLiter(s) Nebulizer every 6 hours PRN Shortness of Breath and/or Wheezing  cefTRIAXone Injectable. 2000 milliGRAM(s) IV Push every 24 hours  levETIRAcetam  IVPB 500 milliGRAM(s) IV Intermittent every 12 hours  QUEtiapine 25 milliGRAM(s) Oral at bedtime  polyethylene glycol 3350 17 Gram(s) Oral two times a day  senna 2 Tablet(s) Oral at bedtime  dextrose 50% Injectable 12.5 Gram(s) IV Push once, Stop order after: 1 Doses  dextrose 50% Injectable 25 Gram(s) IV Push once, Stop order after: 1 Doses  dextrose 50% Injectable 25 Gram(s) IV Push once, Stop order after: 1 Doses  dextrose Oral Gel 15 Gram(s) Oral once, Stop order after: 1 Doses PRN Blood Glucose LESS THAN 70 milliGRAM(s)/deciliter  glucagon  Injectable 1 milliGRAM(s) IntraMuscular once, Stop order after: 1 Doses  insulin lispro (ADMELOG) corrective regimen sliding scale   SubCutaneous three times a day before meals  chlorhexidine 2% Cloths 1 Application(s) Topical <User Schedule>  dextrose 5%. 1000 milliLiter(s) (100 mL/Hr) IV Continuous <Continuous>  dextrose 5%. 1000 milliLiter(s) (50 mL/Hr) IV Continuous <Continuous>  enoxaparin Injectable 80 milliGRAM(s) SubCutaneous every 12 hours  tamsulosin 0.8 milliGRAM(s) Oral at bedtime      LABS:	 	  PT/INR/PTT ( 18 Sep 2023 05:30 )                       :                       :      14.7         :       34.6                  .        .                   .              .           .       1.34        .                                       Lipid Profile: Date: 08-29 @ 03:05  Total cholesterol 145; Direct LDL: --; HDL: 39; Triglycerides:128      TELEMETRY:

## 2023-09-23 LAB
GLUCOSE BLDC GLUCOMTR-MCNC: 111 MG/DL — HIGH (ref 70–99)
GLUCOSE BLDC GLUCOMTR-MCNC: 135 MG/DL — HIGH (ref 70–99)
GLUCOSE BLDC GLUCOMTR-MCNC: 155 MG/DL — HIGH (ref 70–99)
GLUCOSE BLDC GLUCOMTR-MCNC: 170 MG/DL — HIGH (ref 70–99)

## 2023-09-23 PROCEDURE — 99232 SBSQ HOSP IP/OBS MODERATE 35: CPT

## 2023-09-23 RX ADMIN — TAMSULOSIN HYDROCHLORIDE 0.8 MILLIGRAM(S): 0.4 CAPSULE ORAL at 22:06

## 2023-09-23 RX ADMIN — ENOXAPARIN SODIUM 80 MILLIGRAM(S): 100 INJECTION SUBCUTANEOUS at 17:10

## 2023-09-23 RX ADMIN — POLYETHYLENE GLYCOL 3350 17 GRAM(S): 17 POWDER, FOR SOLUTION ORAL at 05:34

## 2023-09-23 RX ADMIN — POLYETHYLENE GLYCOL 3350 17 GRAM(S): 17 POWDER, FOR SOLUTION ORAL at 17:11

## 2023-09-23 RX ADMIN — LEVETIRACETAM 400 MILLIGRAM(S): 250 TABLET, FILM COATED ORAL at 06:24

## 2023-09-23 RX ADMIN — QUETIAPINE FUMARATE 25 MILLIGRAM(S): 200 TABLET, FILM COATED ORAL at 22:07

## 2023-09-23 RX ADMIN — Medication 2: at 12:13

## 2023-09-23 RX ADMIN — CHLORHEXIDINE GLUCONATE 1 APPLICATION(S): 213 SOLUTION TOPICAL at 05:36

## 2023-09-23 RX ADMIN — CEFTRIAXONE 2000 MILLIGRAM(S): 500 INJECTION, POWDER, FOR SOLUTION INTRAMUSCULAR; INTRAVENOUS at 05:34

## 2023-09-23 RX ADMIN — LEVETIRACETAM 400 MILLIGRAM(S): 250 TABLET, FILM COATED ORAL at 17:11

## 2023-09-23 RX ADMIN — ENOXAPARIN SODIUM 80 MILLIGRAM(S): 100 INJECTION SUBCUTANEOUS at 05:34

## 2023-09-23 RX ADMIN — SENNA PLUS 2 TABLET(S): 8.6 TABLET ORAL at 22:07

## 2023-09-23 NOTE — PROGRESS NOTE ADULT - SUBJECTIVE AND OBJECTIVE BOX
[facility] Division of Hospital Medicine    Chief Complaint:      INTERVAL HISTORY:  Overnight, no acute events.     Patient seen and examined at bedside this morning. Nonverbal but pleasant, does not appear to be in any acute distress. Moves LUE and LLE, unable to move RUE or RLE.     MEDICATIONS  (STANDING):  cefTRIAXone Injectable. 2000 milliGRAM(s) IV Push every 24 hours  chlorhexidine 2% Cloths 1 Application(s) Topical <User Schedule>  dextrose 5%. 1000 milliLiter(s) (50 mL/Hr) IV Continuous <Continuous>  dextrose 5%. 1000 milliLiter(s) (100 mL/Hr) IV Continuous <Continuous>  dextrose 50% Injectable 25 Gram(s) IV Push once  dextrose 50% Injectable 25 Gram(s) IV Push once  dextrose 50% Injectable 12.5 Gram(s) IV Push once  enoxaparin Injectable 80 milliGRAM(s) SubCutaneous every 12 hours  glucagon  Injectable 1 milliGRAM(s) IntraMuscular once  insulin lispro (ADMELOG) corrective regimen sliding scale   SubCutaneous three times a day before meals  levETIRAcetam  IVPB 500 milliGRAM(s) IV Intermittent every 12 hours  polyethylene glycol 3350 17 Gram(s) Oral two times a day  QUEtiapine 25 milliGRAM(s) Oral at bedtime  senna 2 Tablet(s) Oral at bedtime  tamsulosin 0.8 milliGRAM(s) Oral at bedtime    MEDICATIONS  (PRN):  albuterol/ipratropium for Nebulization 3 milliLiter(s) Nebulizer every 6 hours PRN Shortness of Breath and/or Wheezing  dextrose Oral Gel 15 Gram(s) Oral once PRN Blood Glucose LESS THAN 70 milliGRAM(s)/deciliter  hydrALAZINE Injectable 10 milliGRAM(s) IV Push every 2 hours PRN SBP >140  labetalol Injectable 10 milliGRAM(s) IV Push every 2 hours PRN SBP >140        I&O's Summary    22 Sep 2023 07:01  -  23 Sep 2023 07:00  --------------------------------------------------------  IN: 0 mL / OUT: 800 mL / NET: -800 mL        PHYSICAL EXAM:  Vital Signs Last 24 Hrs  T(C): 36.7 (23 Sep 2023 09:07), Max: 37.1 (22 Sep 2023 16:00)  T(F): 98.1 (23 Sep 2023 09:07), Max: 98.8 (22 Sep 2023 16:00)  HR: 100 (23 Sep 2023 09:07) (100 - 114)  BP: 121/77 (23 Sep 2023 09:07) (95/69 - 121/77)  BP(mean): --  RR: 18 (23 Sep 2023 09:07) (18 - 20)  SpO2: 95% (23 Sep 2023 09:07) (91% - 96%)    Parameters below as of 23 Sep 2023 09:07  Patient On (Oxygen Delivery Method): room air          CONSTITUTIONAL: No apparent distress  HEENT: scalp incision clear and dry  RESPIRATORY:  lungs are clear to auscultation bilaterally, No crackles, rhonchi, wheezes  CARDIOVASCULAR: Regular rate and rhythm, normal S1 and S2, no murmur/rub/gallop; No lower extremity edema; Peripheral pulses are 2+ bilaterally  ABDOMEN: Soft, non-distended, nontender to palpation, +BS  MUSCLOSKELETAL: R hemiparesis   PSYCH: thoughts linear, affect appropriate  NEUROLOGY: Alert  SKIN: No rashes    LABS:                    CAPILLARY BLOOD GLUCOSE      POCT Blood Glucose.: 155 mg/dL (23 Sep 2023 11:43)  POCT Blood Glucose.: 135 mg/dL (23 Sep 2023 08:20)  POCT Blood Glucose.: 144 mg/dL (22 Sep 2023 22:51)  POCT Blood Glucose.: 135 mg/dL (22 Sep 2023 17:44)        RADIOLOGY & ADDITIONAL TESTS:  Results Reviewed:   Imaging Personally Reviewed:  Electrocardiogram Personally Reviewed:

## 2023-09-24 LAB
ANION GAP SERPL CALC-SCNC: 14 MMOL/L — SIGNIFICANT CHANGE UP (ref 5–17)
BASOPHILS # BLD AUTO: 0.05 K/UL — SIGNIFICANT CHANGE UP (ref 0–0.2)
BASOPHILS NFR BLD AUTO: 0.4 % — SIGNIFICANT CHANGE UP (ref 0–2)
BUN SERPL-MCNC: 14.5 MG/DL — SIGNIFICANT CHANGE UP (ref 8–20)
CALCIUM SERPL-MCNC: 9 MG/DL — SIGNIFICANT CHANGE UP (ref 8.4–10.5)
CHLORIDE SERPL-SCNC: 100 MMOL/L — SIGNIFICANT CHANGE UP (ref 96–108)
CO2 SERPL-SCNC: 24 MMOL/L — SIGNIFICANT CHANGE UP (ref 22–29)
CREAT SERPL-MCNC: 0.61 MG/DL — SIGNIFICANT CHANGE UP (ref 0.5–1.3)
EGFR: 109 ML/MIN/1.73M2 — SIGNIFICANT CHANGE UP
EOSINOPHIL # BLD AUTO: 0.25 K/UL — SIGNIFICANT CHANGE UP (ref 0–0.5)
EOSINOPHIL NFR BLD AUTO: 1.9 % — SIGNIFICANT CHANGE UP (ref 0–6)
GLUCOSE BLDC GLUCOMTR-MCNC: 116 MG/DL — HIGH (ref 70–99)
GLUCOSE BLDC GLUCOMTR-MCNC: 127 MG/DL — HIGH (ref 70–99)
GLUCOSE BLDC GLUCOMTR-MCNC: 158 MG/DL — HIGH (ref 70–99)
GLUCOSE BLDC GLUCOMTR-MCNC: 179 MG/DL — HIGH (ref 70–99)
GLUCOSE SERPL-MCNC: 135 MG/DL — HIGH (ref 70–99)
HCT VFR BLD CALC: 33.2 % — LOW (ref 39–50)
HGB BLD-MCNC: 10.6 G/DL — LOW (ref 13–17)
IMM GRANULOCYTES NFR BLD AUTO: 0.6 % — SIGNIFICANT CHANGE UP (ref 0–0.9)
LYMPHOCYTES # BLD AUTO: 1.63 K/UL — SIGNIFICANT CHANGE UP (ref 1–3.3)
LYMPHOCYTES # BLD AUTO: 12.5 % — LOW (ref 13–44)
MCHC RBC-ENTMCNC: 30.3 PG — SIGNIFICANT CHANGE UP (ref 27–34)
MCHC RBC-ENTMCNC: 31.9 GM/DL — LOW (ref 32–36)
MCV RBC AUTO: 94.9 FL — SIGNIFICANT CHANGE UP (ref 80–100)
MONOCYTES # BLD AUTO: 1.17 K/UL — HIGH (ref 0–0.9)
MONOCYTES NFR BLD AUTO: 9 % — SIGNIFICANT CHANGE UP (ref 2–14)
NEUTROPHILS # BLD AUTO: 9.89 K/UL — HIGH (ref 1.8–7.4)
NEUTROPHILS NFR BLD AUTO: 75.6 % — SIGNIFICANT CHANGE UP (ref 43–77)
PLATELET # BLD AUTO: 292 K/UL — SIGNIFICANT CHANGE UP (ref 150–400)
POTASSIUM SERPL-MCNC: 4.2 MMOL/L — SIGNIFICANT CHANGE UP (ref 3.5–5.3)
POTASSIUM SERPL-SCNC: 4.2 MMOL/L — SIGNIFICANT CHANGE UP (ref 3.5–5.3)
RBC # BLD: 3.5 M/UL — LOW (ref 4.2–5.8)
RBC # FLD: 15.2 % — HIGH (ref 10.3–14.5)
SODIUM SERPL-SCNC: 138 MMOL/L — SIGNIFICANT CHANGE UP (ref 135–145)
WBC # BLD: 13.07 K/UL — HIGH (ref 3.8–10.5)
WBC # FLD AUTO: 13.07 K/UL — HIGH (ref 3.8–10.5)

## 2023-09-24 PROCEDURE — 99232 SBSQ HOSP IP/OBS MODERATE 35: CPT

## 2023-09-24 RX ADMIN — LEVETIRACETAM 400 MILLIGRAM(S): 250 TABLET, FILM COATED ORAL at 05:30

## 2023-09-24 RX ADMIN — QUETIAPINE FUMARATE 25 MILLIGRAM(S): 200 TABLET, FILM COATED ORAL at 21:05

## 2023-09-24 RX ADMIN — CHLORHEXIDINE GLUCONATE 1 APPLICATION(S): 213 SOLUTION TOPICAL at 05:31

## 2023-09-24 RX ADMIN — Medication 2: at 12:03

## 2023-09-24 RX ADMIN — ENOXAPARIN SODIUM 80 MILLIGRAM(S): 100 INJECTION SUBCUTANEOUS at 18:16

## 2023-09-24 RX ADMIN — SENNA PLUS 2 TABLET(S): 8.6 TABLET ORAL at 21:05

## 2023-09-24 RX ADMIN — TAMSULOSIN HYDROCHLORIDE 0.8 MILLIGRAM(S): 0.4 CAPSULE ORAL at 21:05

## 2023-09-24 RX ADMIN — POLYETHYLENE GLYCOL 3350 17 GRAM(S): 17 POWDER, FOR SOLUTION ORAL at 18:16

## 2023-09-24 RX ADMIN — CEFTRIAXONE 2000 MILLIGRAM(S): 500 INJECTION, POWDER, FOR SOLUTION INTRAMUSCULAR; INTRAVENOUS at 05:31

## 2023-09-24 RX ADMIN — ENOXAPARIN SODIUM 80 MILLIGRAM(S): 100 INJECTION SUBCUTANEOUS at 05:31

## 2023-09-24 RX ADMIN — LEVETIRACETAM 400 MILLIGRAM(S): 250 TABLET, FILM COATED ORAL at 18:16

## 2023-09-24 NOTE — PROGRESS NOTE ADULT - ASSESSMENT
63M PMH of DM2, HTN presents with fall, right sided weakness, dysarthria and right facial droop found to have LM1 occlusion s/p TNK in ER subsequent mechanical thrombectomy and decompressive craniectomy for midline shift. SAMARA negative for thrombus. Cardiology recommending ILR /MCOT as outpatient. Extubated on 9/1/23, monitored in ICU and transferred to medicine for further management.     #CVA + antiphospholipid syndrome (embolic stroke+ positive antiphospholipid antibody)- Started on AC  AC cleared by Neuro Sx and Hematology recommended Eliquis BID- will use lovenox BID due to episode of hematuria  s/p Hemicraniectomy-c/w ASA, lipitor- Rehab/ Impaired mobility and function   SAMARA negative for thrombus, +atheroma of aortic arch and aorta  CT angio of the neck and A/P did not show any acute pathologies, likely chronic atherosclerosis disease   mcot/ILR prior to DC  pt/ot/speech/ PM&R- cont   s/p keppra  Cranioplasty with neurosx next week; spoke with NSGY PA, no OR date yet, need to coordinate with plastics, will let us know 2 days in advance so can d/c dvt ppx   CT head with evolving left MCA infarct.  C/w Lovenox BID  Will stop lovenox 2 days prior to planned procedure as per neurosurgery  Cardiology note re: RCRI appreciated    Klebsiella Bacteremia (Improving)  leukocytosis slowly improving.   completing Ceftriaxone per ID recommendations  f/u blood cx from 9/11 - negative to date    #urinary retention longo   Flomax 0.8mg po daily  bowel regimen  TOV near discharge vs. ALDEN     #Hematuria- Improved  Eliquis started and then had an episode of hematuria again and changed to lovenox  pt noted with 1 episode of hematuria on 9/7, suspected 2/2 traumatic Longo,   Will flush longo prn and monitor  H/H stable    #Constipation  miralax/senna    #Dysphagia  soft/bite sized diet w/ MODERATELY thick liquids  MBS- note appreciated  aspiration precautions    #Hypernatremia -resolved  PO fluid intake    #Type 2DM  sliding scale    #Elevated LFTs- hold statin and hold Tylenol for now   repeat LFTS improving    #?Depressed Mood  C/w Seroquel 25mg QHS      DVT ppx: Lovenox    Dispo: Pending cranioplasty probably next week as neurosurgery note.

## 2023-09-24 NOTE — PROGRESS NOTE ADULT - SUBJECTIVE AND OBJECTIVE BOX
[facility] Division of Hospital Medicine    Chief Complaint:      INTERVAL HISTORY:  Overnight, no acute events.     Patient seen and examined at bedside this morning with family member who states he got a call from someone saying surgery was scheduled for maybe Monday or Tuesday. Nonverbal but pleasant, does not appear to be in any acute distress. Moves LUE and LLE, unable to move RUE or RLE.     MEDICATIONS  (STANDING):  cefTRIAXone Injectable. 2000 milliGRAM(s) IV Push every 24 hours  chlorhexidine 2% Cloths 1 Application(s) Topical <User Schedule>  dextrose 5%. 1000 milliLiter(s) (50 mL/Hr) IV Continuous <Continuous>  dextrose 5%. 1000 milliLiter(s) (100 mL/Hr) IV Continuous <Continuous>  dextrose 50% Injectable 25 Gram(s) IV Push once  dextrose 50% Injectable 12.5 Gram(s) IV Push once  dextrose 50% Injectable 25 Gram(s) IV Push once  enoxaparin Injectable 80 milliGRAM(s) SubCutaneous every 12 hours  glucagon  Injectable 1 milliGRAM(s) IntraMuscular once  insulin lispro (ADMELOG) corrective regimen sliding scale   SubCutaneous three times a day before meals  levETIRAcetam  IVPB 500 milliGRAM(s) IV Intermittent every 12 hours  polyethylene glycol 3350 17 Gram(s) Oral two times a day  QUEtiapine 25 milliGRAM(s) Oral at bedtime  senna 2 Tablet(s) Oral at bedtime  tamsulosin 0.8 milliGRAM(s) Oral at bedtime    MEDICATIONS  (PRN):  albuterol/ipratropium for Nebulization 3 milliLiter(s) Nebulizer every 6 hours PRN Shortness of Breath and/or Wheezing  dextrose Oral Gel 15 Gram(s) Oral once PRN Blood Glucose LESS THAN 70 milliGRAM(s)/deciliter  hydrALAZINE Injectable 10 milliGRAM(s) IV Push every 2 hours PRN SBP >140  labetalol Injectable 10 milliGRAM(s) IV Push every 2 hours PRN SBP >140        I&O's Summary    23 Sep 2023 07:01  -  24 Sep 2023 07:00  --------------------------------------------------------  IN: 440 mL / OUT: 1100 mL / NET: -660 mL        PHYSICAL EXAM:  Vital Signs Last 24 Hrs  T(C): 36.8 (24 Sep 2023 08:09), Max: 37.7 (24 Sep 2023 04:00)  T(F): 98.3 (24 Sep 2023 08:09), Max: 99.8 (24 Sep 2023 04:00)  HR: 104 (24 Sep 2023 08:09) (99 - 110)  BP: 111/75 (24 Sep 2023 08:09) (105/72 - 126/82)  BP(mean): --  RR: 18 (24 Sep 2023 08:09) (18 - 18)  SpO2: 92% (24 Sep 2023 08:09) (92% - 97%)    Parameters below as of 24 Sep 2023 08:09  Patient On (Oxygen Delivery Method): room air        CONSTITUTIONAL: No apparent distress  HEENT: scalp incision clear and dry  RESPIRATORY:  lungs are clear to auscultation bilaterally, No crackles, rhonchi, wheezes  CARDIOVASCULAR: Regular rate and rhythm, normal S1 and S2, no murmur/rub/gallop; No lower extremity edema; Peripheral pulses are 2+ bilaterally  ABDOMEN: Soft, non-distended, nontender to palpation, +BS  MUSCLOSKELETAL: R hemiparesis   PSYCH: thoughts linear, affect appropriate  NEUROLOGY: Alert  SKIN: No rashes    LABS:                        10.6   13.07 )-----------( 292      ( 24 Sep 2023 06:34 )             33.2     09-24    138  |  100  |  14.5  ----------------------------<  135<H>  4.2   |  24.0  |  0.61    Ca    9.0      24 Sep 2023 06:34            Urinalysis Basic - ( 24 Sep 2023 06:34 )    Color: x / Appearance: x / SG: x / pH: x  Gluc: 135 mg/dL / Ketone: x  / Bili: x / Urobili: x   Blood: x / Protein: x / Nitrite: x   Leuk Esterase: x / RBC: x / WBC x   Sq Epi: x / Non Sq Epi: x / Bacteria: x        CAPILLARY BLOOD GLUCOSE      POCT Blood Glucose.: 158 mg/dL (24 Sep 2023 11:58)  POCT Blood Glucose.: 127 mg/dL (24 Sep 2023 08:59)  POCT Blood Glucose.: 170 mg/dL (23 Sep 2023 22:06)  POCT Blood Glucose.: 111 mg/dL (23 Sep 2023 16:43)        RADIOLOGY & ADDITIONAL TESTS:  Results Reviewed:   Imaging Personally Reviewed:  Electrocardiogram Personally Reviewed:

## 2023-09-25 LAB
GLUCOSE BLDC GLUCOMTR-MCNC: 129 MG/DL — HIGH (ref 70–99)
GLUCOSE BLDC GLUCOMTR-MCNC: 137 MG/DL — HIGH (ref 70–99)
GLUCOSE BLDC GLUCOMTR-MCNC: 167 MG/DL — HIGH (ref 70–99)
GLUCOSE BLDC GLUCOMTR-MCNC: 181 MG/DL — HIGH (ref 70–99)

## 2023-09-25 PROCEDURE — 93010 ELECTROCARDIOGRAM REPORT: CPT

## 2023-09-25 PROCEDURE — 99232 SBSQ HOSP IP/OBS MODERATE 35: CPT

## 2023-09-25 RX ORDER — SODIUM CHLORIDE 9 MG/ML
1000 INJECTION, SOLUTION INTRAVENOUS
Refills: 0 | Status: DISCONTINUED | OUTPATIENT
Start: 2023-09-25 | End: 2023-09-29

## 2023-09-25 RX ADMIN — CEFTRIAXONE 2000 MILLIGRAM(S): 500 INJECTION, POWDER, FOR SOLUTION INTRAMUSCULAR; INTRAVENOUS at 05:34

## 2023-09-25 RX ADMIN — Medication 2: at 11:48

## 2023-09-25 RX ADMIN — SENNA PLUS 2 TABLET(S): 8.6 TABLET ORAL at 21:35

## 2023-09-25 RX ADMIN — LEVETIRACETAM 400 MILLIGRAM(S): 250 TABLET, FILM COATED ORAL at 18:30

## 2023-09-25 RX ADMIN — POLYETHYLENE GLYCOL 3350 17 GRAM(S): 17 POWDER, FOR SOLUTION ORAL at 18:31

## 2023-09-25 RX ADMIN — SODIUM CHLORIDE 70 MILLILITER(S): 9 INJECTION, SOLUTION INTRAVENOUS at 11:43

## 2023-09-25 RX ADMIN — CHLORHEXIDINE GLUCONATE 1 APPLICATION(S): 213 SOLUTION TOPICAL at 05:34

## 2023-09-25 RX ADMIN — ENOXAPARIN SODIUM 80 MILLIGRAM(S): 100 INJECTION SUBCUTANEOUS at 18:31

## 2023-09-25 RX ADMIN — ENOXAPARIN SODIUM 80 MILLIGRAM(S): 100 INJECTION SUBCUTANEOUS at 05:35

## 2023-09-25 RX ADMIN — POLYETHYLENE GLYCOL 3350 17 GRAM(S): 17 POWDER, FOR SOLUTION ORAL at 05:34

## 2023-09-25 RX ADMIN — QUETIAPINE FUMARATE 25 MILLIGRAM(S): 200 TABLET, FILM COATED ORAL at 21:35

## 2023-09-25 RX ADMIN — TAMSULOSIN HYDROCHLORIDE 0.8 MILLIGRAM(S): 0.4 CAPSULE ORAL at 21:35

## 2023-09-25 RX ADMIN — LEVETIRACETAM 400 MILLIGRAM(S): 250 TABLET, FILM COATED ORAL at 05:34

## 2023-09-25 RX ADMIN — SODIUM CHLORIDE 70 MILLILITER(S): 9 INJECTION, SOLUTION INTRAVENOUS at 22:20

## 2023-09-25 NOTE — PROGRESS NOTE ADULT - SUBJECTIVE AND OBJECTIVE BOX
[facility] Division of Hospital Medicine    Chief Complaint:      INTERVAL HISTORY:  Overnight, no acute events.     Patient seen and examined at bedside this morning. Nonverbal but pleasant and smiles, does not appear to be in any acute distress. Moves LUE and LLE, unable to move RUE or RLE.     MEDICATIONS  (STANDING):  cefTRIAXone Injectable. 2000 milliGRAM(s) IV Push every 24 hours  chlorhexidine 2% Cloths 1 Application(s) Topical <User Schedule>  dextrose 5%. 1000 milliLiter(s) (100 mL/Hr) IV Continuous <Continuous>  dextrose 5%. 1000 milliLiter(s) (50 mL/Hr) IV Continuous <Continuous>  dextrose 50% Injectable 25 Gram(s) IV Push once  dextrose 50% Injectable 25 Gram(s) IV Push once  dextrose 50% Injectable 12.5 Gram(s) IV Push once  enoxaparin Injectable 80 milliGRAM(s) SubCutaneous every 12 hours  glucagon  Injectable 1 milliGRAM(s) IntraMuscular once  insulin lispro (ADMELOG) corrective regimen sliding scale   SubCutaneous three times a day before meals  lactated ringers. 1000 milliLiter(s) (70 mL/Hr) IV Continuous <Continuous>  levETIRAcetam  IVPB 500 milliGRAM(s) IV Intermittent every 12 hours  polyethylene glycol 3350 17 Gram(s) Oral two times a day  QUEtiapine 25 milliGRAM(s) Oral at bedtime  senna 2 Tablet(s) Oral at bedtime  tamsulosin 0.8 milliGRAM(s) Oral at bedtime    MEDICATIONS  (PRN):  albuterol/ipratropium for Nebulization 3 milliLiter(s) Nebulizer every 6 hours PRN Shortness of Breath and/or Wheezing  dextrose Oral Gel 15 Gram(s) Oral once PRN Blood Glucose LESS THAN 70 milliGRAM(s)/deciliter  hydrALAZINE Injectable 10 milliGRAM(s) IV Push every 2 hours PRN SBP >140  labetalol Injectable 10 milliGRAM(s) IV Push every 2 hours PRN SBP >140        I&O's Summary    24 Sep 2023 07:01  -  25 Sep 2023 07:00  --------------------------------------------------------  IN: 0 mL / OUT: 1450 mL / NET: -1450 mL        PHYSICAL EXAM:  Vital Signs Last 24 Hrs  T(C): 37.2 (25 Sep 2023 08:04), Max: 37.3 (24 Sep 2023 20:35)  T(F): 99 (25 Sep 2023 08:04), Max: 99.2 (24 Sep 2023 20:35)  HR: 102 (25 Sep 2023 08:04) (102 - 112)  BP: 108/74 (25 Sep 2023 08:04) (101/69 - 109/75)  BP(mean): --  RR: 18 (25 Sep 2023 08:04) (17 - 18)  SpO2: 90% (25 Sep 2023 08:04) (90% - 95%)    Parameters below as of 25 Sep 2023 08:04  Patient On (Oxygen Delivery Method): room air        CONSTITUTIONAL: No apparent distress  HEENT: scalp incision clear and dry  RESPIRATORY:  lungs are clear to auscultation bilaterally, No crackles, rhonchi, wheezes  CARDIOVASCULAR: Regular rate and rhythm, normal S1 and S2, no murmur/rub/gallop; No lower extremity edema; Peripheral pulses are 2+ bilaterally  ABDOMEN: Soft, non-distended, nontender to palpation, +BS  MUSCLOSKELETAL: R hemiparesis   PSYCH: thoughts linear, affect appropriate  NEUROLOGY: Alert  SKIN: No rashes      LABS:                        10.6   13.07 )-----------( 292      ( 24 Sep 2023 06:34 )             33.2     09-24    138  |  100  |  14.5  ----------------------------<  135<H>  4.2   |  24.0  |  0.61    Ca    9.0      24 Sep 2023 06:34            Urinalysis Basic - ( 24 Sep 2023 06:34 )    Color: x / Appearance: x / SG: x / pH: x  Gluc: 135 mg/dL / Ketone: x  / Bili: x / Urobili: x   Blood: x / Protein: x / Nitrite: x   Leuk Esterase: x / RBC: x / WBC x   Sq Epi: x / Non Sq Epi: x / Bacteria: x        CAPILLARY BLOOD GLUCOSE      POCT Blood Glucose.: 167 mg/dL (25 Sep 2023 11:42)  POCT Blood Glucose.: 137 mg/dL (25 Sep 2023 08:55)  POCT Blood Glucose.: 179 mg/dL (24 Sep 2023 21:04)  POCT Blood Glucose.: 116 mg/dL (24 Sep 2023 17:27)        RADIOLOGY & ADDITIONAL TESTS:  Results Reviewed:   Imaging Personally Reviewed:  Electrocardiogram Personally Reviewed:

## 2023-09-26 LAB
APPEARANCE UR: ABNORMAL
BACTERIA # UR AUTO: ABNORMAL
BILIRUB UR-MCNC: NEGATIVE — SIGNIFICANT CHANGE UP
COLOR SPEC: YELLOW — SIGNIFICANT CHANGE UP
COMMENT - URINE: SIGNIFICANT CHANGE UP
D DIMER BLD IA.RAPID-MCNC: 682 NG/ML DDU — HIGH
DIFF PNL FLD: ABNORMAL
EPI CELLS # UR: SIGNIFICANT CHANGE UP
GLUCOSE BLDC GLUCOMTR-MCNC: 124 MG/DL — HIGH (ref 70–99)
GLUCOSE BLDC GLUCOMTR-MCNC: 131 MG/DL — HIGH (ref 70–99)
GLUCOSE BLDC GLUCOMTR-MCNC: 135 MG/DL — HIGH (ref 70–99)
GLUCOSE BLDC GLUCOMTR-MCNC: 135 MG/DL — HIGH (ref 70–99)
GLUCOSE UR QL: NEGATIVE MG/DL — SIGNIFICANT CHANGE UP
KETONES UR-MCNC: NEGATIVE — SIGNIFICANT CHANGE UP
LEUKOCYTE ESTERASE UR-ACNC: ABNORMAL
NITRITE UR-MCNC: NEGATIVE — SIGNIFICANT CHANGE UP
PH UR: 7 — SIGNIFICANT CHANGE UP (ref 5–8)
PROT UR-MCNC: 15
RBC CASTS # UR COMP ASSIST: ABNORMAL /HPF (ref 0–4)
SP GR SPEC: 1.01 — SIGNIFICANT CHANGE UP (ref 1.01–1.02)
UROBILINOGEN FLD QL: 4 MG/DL
WBC UR QL: ABNORMAL /HPF (ref 0–5)

## 2023-09-26 PROCEDURE — 99232 SBSQ HOSP IP/OBS MODERATE 35: CPT

## 2023-09-26 PROCEDURE — 70450 CT HEAD/BRAIN W/O DYE: CPT | Mod: 26

## 2023-09-26 PROCEDURE — 71045 X-RAY EXAM CHEST 1 VIEW: CPT | Mod: 26

## 2023-09-26 PROCEDURE — 70450 CT HEAD/BRAIN W/O DYE: CPT | Mod: 26,77

## 2023-09-26 RX ADMIN — LEVETIRACETAM 400 MILLIGRAM(S): 250 TABLET, FILM COATED ORAL at 05:16

## 2023-09-26 RX ADMIN — TAMSULOSIN HYDROCHLORIDE 0.8 MILLIGRAM(S): 0.4 CAPSULE ORAL at 21:57

## 2023-09-26 RX ADMIN — CHLORHEXIDINE GLUCONATE 1 APPLICATION(S): 213 SOLUTION TOPICAL at 05:18

## 2023-09-26 RX ADMIN — CEFTRIAXONE 2000 MILLIGRAM(S): 500 INJECTION, POWDER, FOR SOLUTION INTRAMUSCULAR; INTRAVENOUS at 05:16

## 2023-09-26 RX ADMIN — SENNA PLUS 2 TABLET(S): 8.6 TABLET ORAL at 21:55

## 2023-09-26 RX ADMIN — POLYETHYLENE GLYCOL 3350 17 GRAM(S): 17 POWDER, FOR SOLUTION ORAL at 05:18

## 2023-09-26 RX ADMIN — POLYETHYLENE GLYCOL 3350 17 GRAM(S): 17 POWDER, FOR SOLUTION ORAL at 17:54

## 2023-09-26 RX ADMIN — QUETIAPINE FUMARATE 25 MILLIGRAM(S): 200 TABLET, FILM COATED ORAL at 21:57

## 2023-09-26 RX ADMIN — LEVETIRACETAM 400 MILLIGRAM(S): 250 TABLET, FILM COATED ORAL at 17:46

## 2023-09-26 RX ADMIN — ENOXAPARIN SODIUM 80 MILLIGRAM(S): 100 INJECTION SUBCUTANEOUS at 05:16

## 2023-09-26 NOTE — PROGRESS NOTE ADULT - SUBJECTIVE AND OBJECTIVE BOX
[facility] Division of Hospital Medicine    Chief Complaint:      INTERVAL HISTORY:  Overnight, no acute events.     Patient seen and examined at bedside this morning.  Nonverbal but pleasant and smiles, does not appear to be in any acute distress. Moves LUE and LLE, unable to move RUE or RLE.     MEDICATIONS  (STANDING):  cefTRIAXone Injectable. 2000 milliGRAM(s) IV Push every 24 hours  chlorhexidine 2% Cloths 1 Application(s) Topical <User Schedule>  dextrose 5%. 1000 milliLiter(s) (50 mL/Hr) IV Continuous <Continuous>  dextrose 5%. 1000 milliLiter(s) (100 mL/Hr) IV Continuous <Continuous>  dextrose 50% Injectable 25 Gram(s) IV Push once  dextrose 50% Injectable 12.5 Gram(s) IV Push once  dextrose 50% Injectable 25 Gram(s) IV Push once  glucagon  Injectable 1 milliGRAM(s) IntraMuscular once  insulin lispro (ADMELOG) corrective regimen sliding scale   SubCutaneous three times a day before meals  lactated ringers. 1000 milliLiter(s) (70 mL/Hr) IV Continuous <Continuous>  levETIRAcetam  IVPB 500 milliGRAM(s) IV Intermittent every 12 hours  polyethylene glycol 3350 17 Gram(s) Oral two times a day  QUEtiapine 25 milliGRAM(s) Oral at bedtime  senna 2 Tablet(s) Oral at bedtime  tamsulosin 0.8 milliGRAM(s) Oral at bedtime    MEDICATIONS  (PRN):  albuterol/ipratropium for Nebulization 3 milliLiter(s) Nebulizer every 6 hours PRN Shortness of Breath and/or Wheezing  dextrose Oral Gel 15 Gram(s) Oral once PRN Blood Glucose LESS THAN 70 milliGRAM(s)/deciliter  hydrALAZINE Injectable 10 milliGRAM(s) IV Push every 2 hours PRN SBP >140  labetalol Injectable 10 milliGRAM(s) IV Push every 2 hours PRN SBP >140        I&O's Summary    25 Sep 2023 07:01  -  26 Sep 2023 07:00  --------------------------------------------------------  IN: 70 mL / OUT: 1375 mL / NET: -1305 mL    26 Sep 2023 07:01  -  26 Sep 2023 14:52  --------------------------------------------------------  IN: 280 mL / OUT: 0 mL / NET: 280 mL        PHYSICAL EXAM:  Vital Signs Last 24 Hrs  T(C): 37.1 (26 Sep 2023 14:28), Max: 37.4 (25 Sep 2023 16:20)  T(F): 98.7 (26 Sep 2023 14:28), Max: 99.4 (26 Sep 2023 05:38)  HR: 102 (26 Sep 2023 14:28) (101 - 110)  BP: 107/75 (26 Sep 2023 14:28) (107/75 - 118/79)  BP(mean): --  RR: 16 (26 Sep 2023 14:28) (16 - 18)  SpO2: 94% (26 Sep 2023 14:28) (93% - 94%)    Parameters below as of 26 Sep 2023 14:28  Patient On (Oxygen Delivery Method): room air        CONSTITUTIONAL: No apparent distress  HEENT: scalp incision clear and dry  RESPIRATORY:  lungs are clear to auscultation bilaterally, No crackles, rhonchi, wheezes  CARDIOVASCULAR: Regular rate and rhythm, normal S1 and S2, no murmur/rub/gallop; No lower extremity edema; Peripheral pulses are 2+ bilaterally  ABDOMEN: Soft, non-distended, nontender to palpation, +BS  MUSCLOSKELETAL: R hemiparesis   PSYCH: thoughts linear, affect appropriate  NEUROLOGY: Alert  SKIN: No rashes    LABS:                Urinalysis Basic - ( 26 Sep 2023 09:30 )    Color: Yellow / Appearance: Slightly Turbid / S.015 / pH: x  Gluc: x / Ketone: Negative  / Bili: Negative / Urobili: 4 mg/dL   Blood: x / Protein: 15 / Nitrite: Negative   Leuk Esterase: Small / RBC: 11-25 /HPF / WBC 6-10 /HPF   Sq Epi: x / Non Sq Epi: x / Bacteria: Few        CAPILLARY BLOOD GLUCOSE      POCT Blood Glucose.: 135 mg/dL (26 Sep 2023 11:45)  POCT Blood Glucose.: 124 mg/dL (26 Sep 2023 07:30)  POCT Blood Glucose.: 181 mg/dL (25 Sep 2023 22:00)  POCT Blood Glucose.: 129 mg/dL (25 Sep 2023 18:30)        RADIOLOGY & ADDITIONAL TESTS:  Results Reviewed:   Imaging Personally Reviewed:  Electrocardiogram Personally Reviewed:

## 2023-09-26 NOTE — CHART NOTE - NSCHARTNOTEFT_GEN_A_CORE
CTH ordered this AM for worsening L sided skull swelling. No change in neuro exam, patient hemodynamically stable.   CTH noted small foci of parenchymal hemorrhages with extradural and scalp hemorrhage.  Findings discussed with radiologist and NSx team.   Lovenox held, repeat CTH in 6 hours. Pending OR likely later this week.   Will upgrade to SDU for Q2 neuro checks pending stability on repeat imaging.  No changes on neurologic exam at this time.   RN to continue to monitor, to alert provider w/ any change in patient status. PMD made aware of above.

## 2023-09-26 NOTE — CHART NOTE - NSCHARTNOTEFT_GEN_A_CORE
Source: Patient [ ]  Family [ ]   other [ x]EMR    Current Diet: soft and bite sized with mod thick liquids    Patient reports [ ] nausea  [ ] vomiting [ ] diarrhea [ ] constipation  [ ]chewing problems [ ] swallowing issues  [ ] other:     PO intake:  < 50% [ ]   50-75%  [ ]   %  [x ]  other :total feed    Source for PO intake [ ] Patient [ ] family [x ] chart [ ] staff [ ] other    Enteral /Parenteral Nutrition:     Current Weight: 176.8# 9/1, 179# 8/27  no edema    % Weight Change     Pertinent Medications: MEDICATIONS  (STANDING):  cefTRIAXone Injectable. 2000 milliGRAM(s) IV Push every 24 hours  chlorhexidine 2% Cloths 1 Application(s) Topical <User Schedule>  dextrose 5%. 1000 milliLiter(s) (50 mL/Hr) IV Continuous <Continuous>  dextrose 5%. 1000 milliLiter(s) (100 mL/Hr) IV Continuous <Continuous>  dextrose 50% Injectable 25 Gram(s) IV Push once  dextrose 50% Injectable 25 Gram(s) IV Push once  dextrose 50% Injectable 12.5 Gram(s) IV Push once  enoxaparin Injectable 80 milliGRAM(s) SubCutaneous every 12 hours  glucagon  Injectable 1 milliGRAM(s) IntraMuscular once  insulin lispro (ADMELOG) corrective regimen sliding scale   SubCutaneous three times a day before meals  lactated ringers. 1000 milliLiter(s) (70 mL/Hr) IV Continuous <Continuous>  levETIRAcetam  IVPB 500 milliGRAM(s) IV Intermittent every 12 hours  polyethylene glycol 3350 17 Gram(s) Oral two times a day  QUEtiapine 25 milliGRAM(s) Oral at bedtime  senna 2 Tablet(s) Oral at bedtime  tamsulosin 0.8 milliGRAM(s) Oral at bedtime    MEDICATIONS  (PRN):  albuterol/ipratropium for Nebulization 3 milliLiter(s) Nebulizer every 6 hours PRN Shortness of Breath and/or Wheezing  dextrose Oral Gel 15 Gram(s) Oral once PRN Blood Glucose LESS THAN 70 milliGRAM(s)/deciliter  hydrALAZINE Injectable 10 milliGRAM(s) IV Push every 2 hours PRN SBP >140  labetalol Injectable 10 milliGRAM(s) IV Push every 2 hours PRN SBP >140    Pertinent Labs:         Skin:     Nutrition focused physical exam not  conducted - found signs of malnutrition [ ]absent [ ]present    Subcutaneous fat loss: [ ] Orbital fat pads region, [ ]Buccal fat region, [ ]Triceps region,  [ ]Ribs region    Muscle wasting: [ ]Temples region, [ ]Clavicle region, [ ]Shoulder region, [ ]Scapula region, [ ]Interosseous region,  [ ]thigh region, [ ]Calf region    Estimated Needs:   [x ] no change since previous assessment  [ ] recalculated:     Current Nutrition Diagnosis: Diagnosis: Pt with increased nutrient needs related to increased physiological demand to promote healing as evidenced by left acute MCA. Pt with good po intake and is a total feed. Plan is crani next week as per notes.     Recommendations:   Diet as per speech tx  Provide plain greek yogurt for increased protein  Provide high protein geletein BID      Monitoring and Evaluation:   [x ] PO intake [ x] Tolerance to diet prescription [X] Weights  [X] Follow up per protocol [X] Labs:

## 2023-09-26 NOTE — PROGRESS NOTE ADULT - ASSESSMENT
63M PMH of DM2, HTN presents with fall, right sided weakness, dysarthria and right facial droop found to have LM1 occlusion s/p TNK in ER subsequent mechanical thrombectomy and decompressive craniectomy for midline shift. SAMARA negative for thrombus. Cardiology recommending ILR /MCOT as outpatient. Extubated on 9/1/23, monitored in ICU and transferred to medicine for further management.     #CVA + antiphospholipid syndrome (embolic stroke+ positive antiphospholipid antibody)- Started on AC  AC cleared by Neuro Sx and Hematology recommended Eliquis BID- will use lovenox BID due to episode of hematuria  s/p Hemicraniectomy-c/w ASA, lipitor- Rehab/ Impaired mobility and function   SAMARA negative for thrombus, +atheroma of aortic arch and aorta  CT angio of the neck and A/P did not show any acute pathologies, likely chronic atherosclerosis disease   mcot/ILR prior to DC  pt/ot/speech/ PM&R- cont   s/p keppra  Repeat CT head today showing New small foci of hemorrhages in the left frontal and left occipital lobe. Chronic large left MCA territory infarction. New extradural hemorrhage along the left temporal convexity measuring 1.6 x 1.4 cm. New posterior left temporal scalp hemorrhage with a fluid/fluid level measures 3.2 x 3.3 cm   NSGY requesting repeat CTH in 6 hours, d/c lovenox; tentative OR 9/29    Klebsiella Bacteremia (Improving)  leukocytosis slowly improving, continue to trend periodically  completing Ceftriaxone per ID recommendations  f/u blood cx from 9/11 - negative to date    #urinary retention longo   Flomax 0.8mg po daily  bowel regimen  TOV near discharge vs. ALDEN     #Hematuria- Improved  Eliquis started and then had an episode of hematuria again and changed to lovenox  pt noted with 1 episode of hematuria on 9/7, suspected 2/2 traumatic Longo,   Will flush longo prn and monitor  H/H stable, continue to monitor     #Constipation  miralax/senna    #Dysphagia  soft/bite sized diet w/ MODERATELY thick liquids  MBS- note appreciated  aspiration precautions    #Hypernatremia -resolved  PO fluid intake    #Type 2DM  sliding scale    #Elevated LFTs- hold statin and hold Tylenol for now   repeat LFTS improving, continue to monitor     #?Depressed Mood  C/w Seroquel 25mg QHS      DVT ppx: SCDs

## 2023-09-26 NOTE — PROGRESS NOTE ADULT - SUBJECTIVE AND OBJECTIVE BOX
INTERVAL HPI/OVERNIGHT EVENTS:  63y m admitted on  -thrombectomy and left hemicraniectomy POST op   Pmhx HTN, DM presented with stroke like sxs with right sided weakness and a fall and was brought in by son. On arrival pt w/ obvious right sided arm weakness, confusion, dysarthria, facial droop suggestive of acute stroke. Code stroke initiated, found to have LM1 occlusion, given TNK @ 1215 and was taken for thrombectomy. Unable to provide ROS 2/2 aphasia  (27 Aug 2023 16:45)  Pt seen today eyes open tracking not speaking but appears to mouth words.     MEDICATIONS  (STANDING):  cefTRIAXone Injectable. 2000 milliGRAM(s) IV Push every 24 hours  chlorhexidine 2% Cloths 1 Application(s) Topical <User Schedule>  dextrose 5%. 1000 milliLiter(s) (50 mL/Hr) IV Continuous <Continuous>  dextrose 5%. 1000 milliLiter(s) (100 mL/Hr) IV Continuous <Continuous>  dextrose 50% Injectable 25 Gram(s) IV Push once  dextrose 50% Injectable 25 Gram(s) IV Push once  dextrose 50% Injectable 12.5 Gram(s) IV Push once  enoxaparin Injectable 80 milliGRAM(s) SubCutaneous every 12 hours  glucagon  Injectable 1 milliGRAM(s) IntraMuscular once  insulin lispro (ADMELOG) corrective regimen sliding scale   SubCutaneous three times a day before meals  lactated ringers. 1000 milliLiter(s) (70 mL/Hr) IV Continuous <Continuous>  levETIRAcetam  IVPB 500 milliGRAM(s) IV Intermittent every 12 hours  polyethylene glycol 3350 17 Gram(s) Oral two times a day  QUEtiapine 25 milliGRAM(s) Oral at bedtime  senna 2 Tablet(s) Oral at bedtime  tamsulosin 0.8 milliGRAM(s) Oral at bedtime    MEDICATIONS  (PRN):  albuterol/ipratropium for Nebulization 3 milliLiter(s) Nebulizer every 6 hours PRN Shortness of Breath and/or Wheezing  dextrose Oral Gel 15 Gram(s) Oral once PRN Blood Glucose LESS THAN 70 milliGRAM(s)/deciliter  hydrALAZINE Injectable 10 milliGRAM(s) IV Push every 2 hours PRN SBP >140  labetalol Injectable 10 milliGRAM(s) IV Push every 2 hours PRN SBP >140    Allergies  No Known Allergies  Intolerances    Vital Signs Last 24 Hrs  T(C): 36.3 (26 Sep 2023 07:53), Max: 37.4 (25 Sep 2023 16:20)  T(F): 97.4 (26 Sep 2023 07:53), Max: 99.4 (26 Sep 2023 05:38)  HR: 108 (26 Sep 2023 07:53) (101 - 110)  BP: 117/80 (26 Sep 2023 07:53) (107/83 - 118/79)  BP(mean): --  RR: 18 (26 Sep 2023 07:53) (16 - 18)  SpO2: 93% (26 Sep 2023 07:53) (93% - 94%)    Parameters below as of 26 Sep 2023 08:00  Patient On (Oxygen Delivery Method): room air     PHYSICAL EXAM  GENERAL: NAD, w  HEAD: pos cranial defect with fullness at the temporal region with the cranial flap sunken.   EYES: EOMI, PERRLA, conjunctiva and sclera clear  ENMT: No tonsillar erythema, exudates, or enlargement; Moist mucous membranes,  NECK: Supple,  NERVOUS SYSTEM:  Alert & Oriented self tracks when called. Motor Strength 5/5 left sided. upper and lower extremities, right sided weakness.   CHEST/LUNG: Clear  bilat   HEART: Regular rate and rhythm; No murmurs, rubs, or gallops  ABDOMEN: Soft, Nontender, Nondistended; Bowel sounds present  EXTREMITIES:  2+ Peripheral Pulses, No clubbing, cyanosis, or edema  LYMPH: No lymphadenopathy noted  SKIN: No rashes or lesions      LABS:        Urinalysis Basic - ( 26 Sep 2023 09:30 )    Color: Yellow / Appearance: Slightly Turbid / S.015 / pH: x  Gluc: x / Ketone: Negative  / Bili: Negative / Urobili: 4 mg/dL   Blood: x / Protein: 15 / Nitrite: Negative   Leuk Esterase: Small / RBC: 11-25 /HPF / WBC 6-10 /HPF   Sq Epi: x / Non Sq Epi: x / Bacteria: Few      I&O's Detail    25 Sep 2023 07:01  -  26 Sep 2023 07:00  --------------------------------------------------------  IN:    Lactated Ringers: 70 mL  Total IN: 70 mL    OUT:    Intermittent Catheterization - Urethral (mL): 1375 mL  Total OUT: 1375 mL    Total NET: -1305 mL      26 Sep 2023 07:01  -  26 Sep 2023 13:55  --------------------------------------------------------  IN:    Lactated Ringers: 280 mL  Total IN: 280 mL    OUT:  Total OUT: 0 mL    Total NET: 280 mL    RADIOLOGY & ADDITIONAL TESTS:  < from: CT Head No Cont (09.15.23 @ 17:54) >  PROCEDURE DATE:  09/15/2023    IMPRESSION:  No significant interval change.  Study done for presurgical planning.  --- End of Report ---  < from: CT Head No Cont (23 @ 13:10) >    ACC: [id number] EXAM:  CT BRAIN  PROCEDURE DATE:  2023    IMPRESSION: New small foci of hemorrhages in the left frontal and left   occipital lobe. Chronic large left MCA territory infarction. New   extradural hemorrhage along the left temporal convexity measuring 1.6 x   1.4 cm. New posterior left temporal scalp hemorrhage with a fluid/fluid   level measures 3.2 x 3.3 cm . Findings discussed with physician assistant   RAZ Cortes.    --- End of Report ---

## 2023-09-26 NOTE — PROGRESS NOTE ADULT - ASSESSMENT
This is a 63ym presents as a code stroke, had thrombectomy and hemicrani    Plan  -Cranioplasty being planned on Friday 9/29, New scan noted for extra axial heme will need to repeat ct of the brain 6 hours  -hold lovenox  -pt is presently on Ceftriaxone day 6/8  - pt will need medical clearance prior to surgical intervention  -Dr Bah informed of the new finding on today's ct of the brain

## 2023-09-27 LAB
ALBUMIN SERPL ELPH-MCNC: 2.5 G/DL — LOW (ref 3.3–5.2)
ALP SERPL-CCNC: 108 U/L — SIGNIFICANT CHANGE UP (ref 40–120)
ALT FLD-CCNC: 78 U/L — HIGH
ANION GAP SERPL CALC-SCNC: 13 MMOL/L — SIGNIFICANT CHANGE UP (ref 5–17)
APTT BLD: 27.8 SEC — SIGNIFICANT CHANGE UP (ref 24.5–35.6)
AST SERPL-CCNC: 43 U/L — HIGH
BILIRUB DIRECT SERPL-MCNC: 0.1 MG/DL — SIGNIFICANT CHANGE UP (ref 0–0.3)
BILIRUB INDIRECT FLD-MCNC: 0.2 MG/DL — SIGNIFICANT CHANGE UP (ref 0.2–1)
BILIRUB SERPL-MCNC: 0.3 MG/DL — LOW (ref 0.4–2)
BLD GP AB SCN SERPL QL: SIGNIFICANT CHANGE UP
BUN SERPL-MCNC: 12.3 MG/DL — SIGNIFICANT CHANGE UP (ref 8–20)
CALCIUM SERPL-MCNC: 8.7 MG/DL — SIGNIFICANT CHANGE UP (ref 8.4–10.5)
CHLORIDE SERPL-SCNC: 99 MMOL/L — SIGNIFICANT CHANGE UP (ref 96–108)
CO2 SERPL-SCNC: 24 MMOL/L — SIGNIFICANT CHANGE UP (ref 22–29)
CREAT SERPL-MCNC: 0.51 MG/DL — SIGNIFICANT CHANGE UP (ref 0.5–1.3)
EGFR: 115 ML/MIN/1.73M2 — SIGNIFICANT CHANGE UP
GLUCOSE BLDC GLUCOMTR-MCNC: 104 MG/DL — HIGH (ref 70–99)
GLUCOSE BLDC GLUCOMTR-MCNC: 112 MG/DL — HIGH (ref 70–99)
GLUCOSE BLDC GLUCOMTR-MCNC: 135 MG/DL — HIGH (ref 70–99)
GLUCOSE BLDC GLUCOMTR-MCNC: 138 MG/DL — HIGH (ref 70–99)
GLUCOSE SERPL-MCNC: 100 MG/DL — HIGH (ref 70–99)
HCT VFR BLD CALC: 30.7 % — LOW (ref 39–50)
HGB BLD-MCNC: 9.8 G/DL — LOW (ref 13–17)
INR BLD: 1.32 RATIO — HIGH (ref 0.85–1.18)
MAGNESIUM SERPL-MCNC: 1.8 MG/DL — SIGNIFICANT CHANGE UP (ref 1.6–2.6)
MCHC RBC-ENTMCNC: 30.4 PG — SIGNIFICANT CHANGE UP (ref 27–34)
MCHC RBC-ENTMCNC: 31.9 GM/DL — LOW (ref 32–36)
MCV RBC AUTO: 95.3 FL — SIGNIFICANT CHANGE UP (ref 80–100)
PHOSPHATE SERPL-MCNC: 3.1 MG/DL — SIGNIFICANT CHANGE UP (ref 2.4–4.7)
PLATELET # BLD AUTO: 235 K/UL — SIGNIFICANT CHANGE UP (ref 150–400)
POTASSIUM SERPL-MCNC: 3.8 MMOL/L — SIGNIFICANT CHANGE UP (ref 3.5–5.3)
POTASSIUM SERPL-SCNC: 3.8 MMOL/L — SIGNIFICANT CHANGE UP (ref 3.5–5.3)
PROT SERPL-MCNC: 5.8 G/DL — LOW (ref 6.6–8.7)
PROTHROM AB SERPL-ACNC: 14.5 SEC — HIGH (ref 9.5–13)
RBC # BLD: 3.22 M/UL — LOW (ref 4.2–5.8)
RBC # FLD: 14.9 % — HIGH (ref 10.3–14.5)
SODIUM SERPL-SCNC: 136 MMOL/L — SIGNIFICANT CHANGE UP (ref 135–145)
WBC # BLD: 9.39 K/UL — SIGNIFICANT CHANGE UP (ref 3.8–10.5)
WBC # FLD AUTO: 9.39 K/UL — SIGNIFICANT CHANGE UP (ref 3.8–10.5)

## 2023-09-27 PROCEDURE — 99232 SBSQ HOSP IP/OBS MODERATE 35: CPT

## 2023-09-27 RX ORDER — MAGNESIUM SULFATE 500 MG/ML
2 VIAL (ML) INJECTION ONCE
Refills: 0 | Status: COMPLETED | OUTPATIENT
Start: 2023-09-27 | End: 2023-09-27

## 2023-09-27 RX ADMIN — SODIUM CHLORIDE 70 MILLILITER(S): 9 INJECTION, SOLUTION INTRAVENOUS at 21:13

## 2023-09-27 RX ADMIN — POLYETHYLENE GLYCOL 3350 17 GRAM(S): 17 POWDER, FOR SOLUTION ORAL at 05:03

## 2023-09-27 RX ADMIN — CHLORHEXIDINE GLUCONATE 1 APPLICATION(S): 213 SOLUTION TOPICAL at 05:02

## 2023-09-27 RX ADMIN — LEVETIRACETAM 400 MILLIGRAM(S): 250 TABLET, FILM COATED ORAL at 05:03

## 2023-09-27 RX ADMIN — LEVETIRACETAM 400 MILLIGRAM(S): 250 TABLET, FILM COATED ORAL at 17:21

## 2023-09-27 RX ADMIN — TAMSULOSIN HYDROCHLORIDE 0.8 MILLIGRAM(S): 0.4 CAPSULE ORAL at 21:14

## 2023-09-27 RX ADMIN — SODIUM CHLORIDE 70 MILLILITER(S): 9 INJECTION, SOLUTION INTRAVENOUS at 17:20

## 2023-09-27 RX ADMIN — CEFTRIAXONE 2000 MILLIGRAM(S): 500 INJECTION, POWDER, FOR SOLUTION INTRAMUSCULAR; INTRAVENOUS at 05:02

## 2023-09-27 RX ADMIN — POLYETHYLENE GLYCOL 3350 17 GRAM(S): 17 POWDER, FOR SOLUTION ORAL at 17:21

## 2023-09-27 RX ADMIN — QUETIAPINE FUMARATE 25 MILLIGRAM(S): 200 TABLET, FILM COATED ORAL at 21:13

## 2023-09-27 RX ADMIN — Medication 25 GRAM(S): at 17:33

## 2023-09-27 RX ADMIN — SENNA PLUS 2 TABLET(S): 8.6 TABLET ORAL at 21:13

## 2023-09-27 NOTE — PROGRESS NOTE ADULT - ASSESSMENT
63M PMH of DM2, HTN presents with fall, right sided weakness, dysarthria and right facial droop found to have LM1 occlusion s/p TNK in ER subsequent mechanical thrombectomy and decompressive craniectomy for midline shift. SAMARA negative for thrombus. Cardiology recommending ILR /MCOT as outpatient. Extubated on 9/1/23, monitored in ICU and transferred to medicine for further management.     #CVA + antiphospholipid syndrome (embolic stroke+ positive antiphospholipid antibody)- Started on AC  AC cleared by Neuro Sx and Hematology recommended Eliquis BID- will use lovenox BID due to episode of hematuria  s/p Hemicraniectomy-c/w ASA, lipitor- Rehab/ Impaired mobility and function   SAMARA negative for thrombus, +atheroma of aortic arch and aorta  CT angio of the neck and A/P did not show any acute pathologies, likely chronic atherosclerosis disease   mcot/ILR prior to DC  pt/ot/speech/ PM&R- cont   s/p keppra  Repeat CT head today showing New small foci of hemorrhages in the left frontal and left occipital lobe. Chronic large left MCA territory infarction. New extradural hemorrhage along the left temporal convexity measuring 1.6 x 1.4 cm. New posterior left temporal scalp hemorrhage with a fluid/fluid level measures 3.2 x 3.3 cm   NSGY requesting repeat CTH in 6 hours: intraparenchymal and extra-axial hemorrhagic collections, which overall are stable in appearance compared with the earlier examination dated 9/26/2023 at approximately 1:05 PM, d/c'd lovenox; OR Friday 9/29    #Klebsiella Bacteremia (Improving)  - leukocytosis normalized 9/27  - completing Ceftriaxone per ID recommendations 9/28  - f/u blood cx from 9/11 - negative to date    #urinary retention longo   - Flomax 0.8mg po daily  - bowel regimen  - TOV near discharge vs. ALDEN     #Hematuria- resolved   - Eliquis started and then had an episode of hematuria again and changed to lovenox  - pt noted with 1 episode of hematuria on 9/7, suspected 2/2 traumatic Longo,   - Will flush longo prn and monitor  - H/H stable, no hematuria noted, continue to monitor     #Constipation  - miralax/senna    #Dysphagia  - soft/bite sized diet w/ MODERATELY thick liquids w/ assistance   - MBS- note appreciated  - aspiration precautions    #Hypernatremia -resolved  - PO fluid intake    #Type 2DM  - sliding scale    #Elevated LFTs- hold statin and hold Tylenol for now   - repeat LFTS improving, continue to monitor     #?Depressed Mood  - C/w Seroquel 25mg QHS      F:-  E: 4, 3, 2  N: soft/bite sized diet w/ MODERATELY thick liquids  A:-    DVT: therapeutic lovenox help 9/26 per NSGY recs, on SCDs

## 2023-09-27 NOTE — PROGRESS NOTE ADULT - SUBJECTIVE AND OBJECTIVE BOX
[facility] [place] Division of Hospital Medicine    Chief Complaint:      INTERVAL HISTORY:  Yesterday, had repeat CTH which did not show any worsening     Overnight, no acute events.     Patient seen and examined at bedside this morning. Sleeping comfortably. Wakes up easily, shakes hand. Able to move L arm and L legs when prompted.     MEDICATIONS  (STANDING):  cefTRIAXone Injectable. 2000 milliGRAM(s) IV Push every 24 hours  chlorhexidine 2% Cloths 1 Application(s) Topical <User Schedule>  dextrose 5%. 1000 milliLiter(s) (100 mL/Hr) IV Continuous <Continuous>  dextrose 5%. 1000 milliLiter(s) (50 mL/Hr) IV Continuous <Continuous>  dextrose 50% Injectable 25 Gram(s) IV Push once  dextrose 50% Injectable 12.5 Gram(s) IV Push once  dextrose 50% Injectable 25 Gram(s) IV Push once  glucagon  Injectable 1 milliGRAM(s) IntraMuscular once  insulin lispro (ADMELOG) corrective regimen sliding scale   SubCutaneous three times a day before meals  lactated ringers. 1000 milliLiter(s) (70 mL/Hr) IV Continuous <Continuous>  levETIRAcetam  IVPB 500 milliGRAM(s) IV Intermittent every 12 hours  magnesium sulfate  IVPB 2 Gram(s) IV Intermittent once  polyethylene glycol 3350 17 Gram(s) Oral two times a day  QUEtiapine 25 milliGRAM(s) Oral at bedtime  senna 2 Tablet(s) Oral at bedtime  tamsulosin 0.8 milliGRAM(s) Oral at bedtime    MEDICATIONS  (PRN):  albuterol/ipratropium for Nebulization 3 milliLiter(s) Nebulizer every 6 hours PRN Shortness of Breath and/or Wheezing  dextrose Oral Gel 15 Gram(s) Oral once PRN Blood Glucose LESS THAN 70 milliGRAM(s)/deciliter  hydrALAZINE Injectable 10 milliGRAM(s) IV Push every 2 hours PRN SBP >140  labetalol Injectable 10 milliGRAM(s) IV Push every 2 hours PRN SBP >140        I&O's Summary    26 Sep 2023 07:01  -  27 Sep 2023 07:00  --------------------------------------------------------  IN: 2210 mL / OUT: 1600 mL / NET: 610 mL        PHYSICAL EXAM:  Vital Signs Last 24 Hrs  T(C): 36.8 (27 Sep 2023 07:30), Max: 37.9 (26 Sep 2023 16:28)  T(F): 98.3 (27 Sep 2023 07:30), Max: 100.2 (26 Sep 2023 16:28)  HR: 94 (27 Sep 2023 14:00) (88 - 101)  BP: 120/74 (27 Sep 2023 14:00) (103/73 - 129/63)  BP(mean): 85 (27 Sep 2023 14:00) (74 - 97)  RR: 16 (27 Sep 2023 14:00) (14 - 18)  SpO2: 99% (27 Sep 2023 14:00) (94% - 100%)    Parameters below as of 27 Sep 2023 14:00  Patient On (Oxygen Delivery Method): room air      CONSTITUTIONAL: No apparent distress  HEENT: scalp incision clear and dry  RESPIRATORY:  lungs are clear to auscultation bilaterally, No crackles, rhonchi, wheezes  CARDIOVASCULAR: Regular rate and rhythm, normal S1 and S2, no murmur/rub/gallop; No lower extremity edema; Peripheral pulses are 2+ bilaterally  ABDOMEN: Soft, non-distended, nontender to palpation, +BS  MUSCLOSKELETAL: R hemiparesis   PSYCH: thoughts linear, affect appropriate  NEUROLOGY: Alert  SKIN: No rashes    LABS:                        9.8    9.39  )-----------( 235      ( 27 Sep 2023 06:09 )             30.7     09-27    136  |  99  |  12.3  ----------------------------<  100<H>  3.8   |  24.0  |  0.51    Ca    8.7      27 Sep 2023 06:09  Phos  3.1     09-27  Mg     1.8     09-27    TPro  5.8<L>  /  Alb  2.5<L>  /  TBili  0.3<L>  /  DBili  0.1  /  AST  43<H>  /  ALT  78<H>  /  AlkPhos  108  09-27    PT/INR - ( 27 Sep 2023 07:01 )   PT: 14.5 sec;   INR: 1.32 ratio         PTT - ( 27 Sep 2023 07:01 )  PTT:27.8 sec      Urinalysis Basic - ( 27 Sep 2023 06:09 )    Color: x / Appearance: x / SG: x / pH: x  Gluc: 100 mg/dL / Ketone: x  / Bili: x / Urobili: x   Blood: x / Protein: x / Nitrite: x   Leuk Esterase: x / RBC: x / WBC x   Sq Epi: x / Non Sq Epi: x / Bacteria: x        CAPILLARY BLOOD GLUCOSE      POCT Blood Glucose.: 138 mg/dL (27 Sep 2023 12:36)  POCT Blood Glucose.: 104 mg/dL (27 Sep 2023 09:26)  POCT Blood Glucose.: 135 mg/dL (26 Sep 2023 22:02)  POCT Blood Glucose.: 131 mg/dL (26 Sep 2023 17:45)        RADIOLOGY & ADDITIONAL TESTS:  Results Reviewed:   Imaging Personally Reviewed:  Electrocardiogram Personally Reviewed:

## 2023-09-27 NOTE — PROGRESS NOTE ADULT - ASSESSMENT
-Cranioplasty being planned on Friday 9/29  -hold lovenox night before  -appreciate medical optimization and clearance prior to OR 63y Male PMH HTN, DM, presented [facility]/27 with right hemiparesis and aphasia, found with LM1 occlusion, s/p IV Tenecteplase 12:15 PM 8/27/23 and mechanical thrombectomy 8/27/23 with TICI 2C reperfusion, evolving stroke causing mass effect subsequently required left hemicraniectomy 8/30/23 with Dr. Bah      - D/w Dr. Bah  - Q4 neuro checks  -Cranioplasty being planned on Friday 9/29  -appreciate medical optimization and clearance prior to OR  - Keppra 500 Q12  - On therapeutic lovenox-- will need to d/c 48 hours prior to cranioplasty  - On Ceftriaxone until 9/20  - Supportive care/further medical management per primary team

## 2023-09-27 NOTE — PROGRESS NOTE ADULT - SUBJECTIVE AND OBJECTIVE BOX
Preliminary note, offical recommendations pending attending review/signature   [facility] Stroke Team  Progress Note     HPI:  Patient is a 63 year old male with PMHx Hypertension, DM on oral medications who presented c/o stroke-like symptoms. Per son pt was walking outside around 10:15am on 8/27/23 when he suddenly fell. His son helped him up and noted that he was weak on the right side and not acting normally which prompted him to come to the ED. On arrival pt w/ obvious right sided arm weakness, confusion, dysarthria, facial droop suggestive of acute stroke. Code stroke initiated, found to have left M1 occlusion, given Tenecteplase @ 1215 and was taken for thrombectomy. Admitted for stroke workup.     SUBJECTIVE: No events overnight.  No new neurologic complaints.  ROS reported negative unless otherwise noted.    albuterol/ipratropium for Nebulization 3 milliLiter(s) Nebulizer every 6 hours PRN  cefTRIAXone Injectable. 2000 milliGRAM(s) IV Push every 24 hours  chlorhexidine 2% Cloths 1 Application(s) Topical <User Schedule>  dextrose 5%. 1000 milliLiter(s) IV Continuous <Continuous>  dextrose 5%. 1000 milliLiter(s) IV Continuous <Continuous>  dextrose 50% Injectable 25 Gram(s) IV Push once  dextrose 50% Injectable 25 Gram(s) IV Push once  dextrose 50% Injectable 12.5 Gram(s) IV Push once  dextrose Oral Gel 15 Gram(s) Oral once PRN  glucagon  Injectable 1 milliGRAM(s) IntraMuscular once  hydrALAZINE Injectable 10 milliGRAM(s) IV Push every 2 hours PRN  insulin lispro (ADMELOG) corrective regimen sliding scale   SubCutaneous three times a day before meals  labetalol Injectable 10 milliGRAM(s) IV Push every 2 hours PRN  lactated ringers. 1000 milliLiter(s) IV Continuous <Continuous>  levETIRAcetam  IVPB 500 milliGRAM(s) IV Intermittent every 12 hours  polyethylene glycol 3350 17 Gram(s) Oral two times a day  QUEtiapine 25 milliGRAM(s) Oral at bedtime  senna 2 Tablet(s) Oral at bedtime  tamsulosin 0.8 milliGRAM(s) Oral at bedtime      PHYSICAL EXAM:   Vital Signs Last 24 Hrs  T(C): 36.8 (27 Sep 2023 07:30), Max: 37.9 (26 Sep 2023 16:28)  T(F): 98.3 (27 Sep 2023 07:30), Max: 100.2 (26 Sep 2023 16:28)  HR: 97 (27 Sep 2023 08:00) (89 - 102)  BP: 123/78 (27 Sep 2023 08:00) (106/65 - 129/63)  BP(mean): 90 (27 Sep 2023 08:00) (74 - 97)  RR: 18 (27 Sep 2023 08:00) (14 - 18)  SpO2: 100% (27 Sep 2023 08:00) (94% - 100%)    Parameters below as of 27 Sep 2023 08:00  Patient On (Oxygen Delivery Method): room air    EXAM PENDING   General: No acute distress.     NEUROLOGICAL EXAM:  Mental status: eyes open, awake, alert, attends to examiner briskly on left, smiles, generates sounds, follows some simple commands, perseverates, mimics at times   Cranial Nerves: pupils 3mm and reactive, crosses midline, right eye no blink to threat, right facial palsy   Motor exam: Normal tone, right hemiplegia, RLE trace hip flexion;   5/5 LUE, 5/5 LLE, no drift   Sensation: Intact to noxious stimuli  throughout   Coordination/ Gait: gait not tested    LABS:                        9.8    9.39  )-----------( 235      ( 27 Sep 2023 06:09 )             30.7    09-27    136  |  99  |  12.3  ----------------------------<  100<H>  3.8   |  24.0  |  0.51    Ca    8.7      27 Sep 2023 06:09  Phos  3.1     09-27  Mg     1.8     09-27    TPro  5.8<L>  /  Alb  2.5<L>  /  TBili  0.3<L>  /  DBili  0.1  /  AST  43<H>  /  ALT  78<H>  /  AlkPhos  108  09-27  PT/INR - ( 27 Sep 2023 07:01 )   PT: 14.5 sec;   INR: 1.32 ratio         PTT - ( 27 Sep 2023 07:01 )  PTT:27.8 sec      IMAGING: Reviewed by me.     CT Head No Cont (09.26.23 @ 20:38)   IMPRESSION: Status post craniectomy in this patient with a large left MCA   territory infarct. Findings include intraparenchymal and extra-axial   hemorrhagic collections, which overall are stable in appearance compared   with the earlier examination dated 9/26/2023 at approximately 1:05 PM.    CT Head No Cont (09.26.23 @ 13:10)   IMPRESSION: New small foci of hemorrhages in the left frontal and left   occipital lobe. Chronic large left MCA territory infarction. New   extradural hemorrhage along the left temporal convexity measuring 1.6 x   1.4 cm. New posterior left temporal scalp hemorrhage with a fluid/fluid   level measures 3.2 x 3.3 cm     CT Head No Cont (09.15.23 @ 17:54)   IMPRESSION:  No significant interval change.  Study done for presurgical planning.    CT Head No Cont (09.12.23 @ 10:08)   Impression: Evolving left MCA infarct.  There is some associated area of high attenuation   identified which could be compatible spared parenchyma versus petechial   hemorrhage. Localized mass effect is seen consisting of sulcal   effacement. No significant shift or herniation is seen.    [facility] Echo Doppler (09.01.23 @ 12:52)    1. Left ventricular ejection fraction, by visual estimation, is >75%.   2. Normal global left ventricular systolic function.   3. Normal right ventricular size and function.   4. Normal left atrial size.   5. No left atrial appendage thrombus and normal left atrial appendage   velocities.   6. Trace mitral valve regurgitation.   7. Color flow doppler and intravenous injection of agitated saline   demonstrates the presence of an intact intra atrial septum.   8. Mobile intra-atrial septum with lipomatous hypertrophy.   9. Moderate complex atheromas at the aortic arch and descending aorta.  10. There is no evidence of pericardial effusion.      CT Head No Cont (09.03.23 @ 11:20)   Impression:  No significant change from the previous exam performed yesterday.   Surgical site is unchanged as is the brain parenchyma changes from left   middle cerebral artery stroke. Mass effect is unchanged.    CT Head No Cont (09.02.23 @ 04:01)   IMPRESSION: Redemonstration of large left MCA territoryinfarct.   Left-to-right midline shift measures 3 mm, unchanged.    CT Head No Cont (08.31.23 @ 20:43)   IMPRESSION:  Interval removal of subcutaneous drainage catheter with new acute   extradural broad products identified, withincreased rightward midline   shift which now measures 6 mm (previously 3 mm on 8/30/2023).    Additionally, there is increased hypoattenuation of the left   frontoparietal cortex within the known MCA territory infarct, which may   represent new areas of acute/subacute ischemia versus evolving gliosis.   Hemorrhagic conversion is not suspected at this time. Close attention on   follow-up is advised.    Diffuse scalp edema extending to the left periorbital soft tissues has   significantly increased since the prior study, likely due to evolving   subcutaneous blood products.    Critical findings above were discussed directly by telephone by the ED   radiologist on call, Fei Hebert MD, with the neurosurgical ICU   physicians assistant, Casi RUGGIERO, at 3:40 AM on 9/1/2023.    CT Head No Cont (08.30.23 @ 15:44)   Postop changes compatible with a left temporal frontal craniectomy is   identified. There is some extension of the parenchyma through the   craniectomy defect identified. There is abnormal low-attenuation again   seen involving left temporal frontal parietal region involving the left   basal ganglia and corona radiata region. This is compatible with an   evolving acute left MCA infarct. Previously noted areas of high   attenuation is less conspicuous which could be compatible with evolving   areas of hemorrhage. Mass effect on the left lateral ventricle is seen.   Left-to-right shift (3.1 mm) is seen.    There is extra-axial hematoma fluid and soft tissue swelling seen seen in   the postop region with extra-axial drain comedies findings are compatible   with postoperative changes.    The visualized paranasal sinuses mastoid andmiddle regions appear clear.  IMPRESSION:  New changes as described above.    CT Head No Cont (08.29.23 @ 23:39)   IMPRESSION: Evolving acute left MCA infarct is identified.   Mass effect on the left lateral ventricle is   again seen. Slight increased left-to-right shift (1.2 cm) is seen.    CT Head No Cont (08.29.23 @ 00:31)   IMPRESSION:  Continued evolution of large acute left MCA territory infarct.   Left-to-right midline shift measuring 6 mm, increased. No hydrocephalus   or effacement of basal cisterns. Curvilinear foci of hyperdensity within   the area of infarct, for which petechial hemorrhage cannot be excluded.   No large parenchymal hemorrhage.    CT Abdomen and Pelvis w/ IV Cont (08.28.23 @ 02:53)   IMPRESSION:  No evidence for bladder rupture.    CT Brain Stroke Protocol (08.27.23 @ 11:53)   IMPRESSION:  Wedgelike low density in the left putamen and caudate (corpus striatum)   is new from the prior scan, and consistent with infarct that may be acute   given corresponding symptoms.  No acute intracranial hemorrhage.    The study was performed at 11:47 AM on 08/27/2023 and the above findings   were discussed with Dr. Betts at 12:02 PM.    CT Angio Brain Stroke Protocol  w/ IV Cont (08.27.23 @ 12:03)   IMPRESSION:  CTA head: Focal occlusion of left MCA distal M1 segment with patent but   small caliber filling of M2 segments.    CT perfusion indicates large mismatch between Tmax and CBF consistent   with ischemic penumbra.    CTA Neck: No steno-occlusive focus.    Case findings above discussed with Dr. Hu at 12:13 PM on 08/27/2023.    CT Head No Cont (08.28.23 @ 02:34)   IMPRESSION: Acute left MCA infarct. Possible acute infarct involving the   right temporal region. This finding can be better evaluated with MRI if   there are no contraindications.    TTE Echo Complete w/o Contrast w/ Doppler (08.29.23 @ 12:03)   Summary:   1. Left ventricular ejection fraction, by visual estimation, is 55 to   60%.   2. Normal global left ventricular systolic function.   3. Normal right ventricular size and function.   4. Trace mitral valve regurgitation.   5. Mild aortic regurgitation.   6. Sclerotic aortic valve with normal opening.   7. There is no evidence of pericardial effusion.       Preliminary note, offical recommendations pending attending review/signature   [facility] Stroke Team  Progress Note     HPI:  Patient is a 63 year old male with PMHx Hypertension, DM on oral medications who presented c/o stroke-like symptoms. Per son pt was walking outside around 10:15am on 8/27/23 when he suddenly fell. His son helped him up and noted that he was weak on the right side and not acting normally which prompted him to come to the ED. On arrival pt w/ obvious right sided arm weakness, confusion, dysarthria, facial droop suggestive of acute stroke. Code stroke initiated, found to have left M1 occlusion, given Tenecteplase @ 1215 and was taken for thrombectomy. Admitted for stroke workup.     SUBJECTIVE: No events overnight.  No new neurologic complaints.  ROS reported negative unless otherwise noted.    albuterol/ipratropium for Nebulization 3 milliLiter(s) Nebulizer every 6 hours PRN  cefTRIAXone Injectable. 2000 milliGRAM(s) IV Push every 24 hours  chlorhexidine 2% Cloths 1 Application(s) Topical <User Schedule>  dextrose 5%. 1000 milliLiter(s) IV Continuous <Continuous>  dextrose 5%. 1000 milliLiter(s) IV Continuous <Continuous>  dextrose 50% Injectable 25 Gram(s) IV Push once  dextrose 50% Injectable 25 Gram(s) IV Push once  dextrose 50% Injectable 12.5 Gram(s) IV Push once  dextrose Oral Gel 15 Gram(s) Oral once PRN  glucagon  Injectable 1 milliGRAM(s) IntraMuscular once  hydrALAZINE Injectable 10 milliGRAM(s) IV Push every 2 hours PRN  insulin lispro (ADMELOG) corrective regimen sliding scale   SubCutaneous three times a day before meals  labetalol Injectable 10 milliGRAM(s) IV Push every 2 hours PRN  lactated ringers. 1000 milliLiter(s) IV Continuous <Continuous>  levETIRAcetam  IVPB 500 milliGRAM(s) IV Intermittent every 12 hours  polyethylene glycol 3350 17 Gram(s) Oral two times a day  QUEtiapine 25 milliGRAM(s) Oral at bedtime  senna 2 Tablet(s) Oral at bedtime  tamsulosin 0.8 milliGRAM(s) Oral at bedtime      PHYSICAL EXAM:   Vital Signs Last 24 Hrs  T(C): 36.8 (27 Sep 2023 07:30), Max: 37.9 (26 Sep 2023 16:28)  T(F): 98.3 (27 Sep 2023 07:30), Max: 100.2 (26 Sep 2023 16:28)  HR: 97 (27 Sep 2023 08:00) (89 - 102)  BP: 123/78 (27 Sep 2023 08:00) (106/65 - 129/63)  BP(mean): 90 (27 Sep 2023 08:00) (74 - 97)  RR: 18 (27 Sep 2023 08:00) (14 - 18)  SpO2: 100% (27 Sep 2023 08:00) (94% - 100%)    Parameters below as of 27 Sep 2023 08:00  Patient On (Oxygen Delivery Method): room air       General: No acute distress. in bed eating lunch.    NEUROLOGICAL EXAM:  Mental status: eyes open, awake, alert, attends to examiner briskly , smiles uses hand gestures in attempt to communicate, generates sounds, follows some simple commands, perseverates, mimics at times , some motor impersistence   Cranial Nerves: pupils 2mm b/l, crosses midline, right eye no blink to threat, mild right facial palsy   Motor exam: Normal tone, right hemiplegia,   5/5 LUE, 5/5 LLE (requires prompting)  Sensation: Intact to noxious stimuli  throughout   Coordination/ Gait: gait not tested    LABS:                        9.8    9.39  )-----------( 235      ( 27 Sep 2023 06:09 )             30.7    09-27    136  |  99  |  12.3  ----------------------------<  100<H>  3.8   |  24.0  |  0.51    Ca    8.7      27 Sep 2023 06:09  Phos  3.1     09-27  Mg     1.8     09-27    TPro  5.8<L>  /  Alb  2.5<L>  /  TBili  0.3<L>  /  DBili  0.1  /  AST  43<H>  /  ALT  78<H>  /  AlkPhos  108  09-27  PT/INR - ( 27 Sep 2023 07:01 )   PT: 14.5 sec;   INR: 1.32 ratio         PTT - ( 27 Sep 2023 07:01 )  PTT:27.8 sec      IMAGING: Reviewed by me.     CT Head No Cont (09.26.23 @ 20:38)   IMPRESSION: Status post craniectomy in this patient with a large left MCA   territory infarct. Findings include intraparenchymal and extra-axial   hemorrhagic collections, which overall are stable in appearance compared   with the earlier examination dated 9/26/2023 at approximately 1:05 PM.    CT Head No Cont (09.26.23 @ 13:10)   IMPRESSION: New small foci of hemorrhages in the left frontal and left   occipital lobe. Chronic large left MCA territory infarction. New   extradural hemorrhage along the left temporal convexity measuring 1.6 x   1.4 cm. New posterior left temporal scalp hemorrhage with a fluid/fluid   level measures 3.2 x 3.3 cm     CT Head No Cont (09.15.23 @ 17:54)   IMPRESSION:  No significant interval change.  Study done for presurgical planning.    CT Head No Cont (09.12.23 @ 10:08)   Impression: Evolving left MCA infarct.  There is some associated area of high attenuation   identified which could be compatible spared parenchyma versus petechial   hemorrhage. Localized mass effect is seen consisting of sulcal   effacement. No significant shift or herniation is seen.    [facility] Echo Doppler (09.01.23 @ 12:52)    1. Left ventricular ejection fraction, by visual estimation, is >75%.   2. Normal global left ventricular systolic function.   3. Normal right ventricular size and function.   4. Normal left atrial size.   5. No left atrial appendage thrombus and normal left atrial appendage   velocities.   6. Trace mitral valve regurgitation.   7. Color flow doppler and intravenous injection of agitated saline   demonstrates the presence of an intact intra atrial septum.   8. Mobile intra-atrial septum with lipomatous hypertrophy.   9. Moderate complex atheromas at the aortic arch and descending aorta.  10. There is no evidence of pericardial effusion.      CT Head No Cont (09.03.23 @ 11:20)   Impression:  No significant change from the previous exam performed yesterday.   Surgical site is unchanged as is the brain parenchyma changes from left   middle cerebral artery stroke. Mass effect is unchanged.    CT Head No Cont (09.02.23 @ 04:01)   IMPRESSION: Redemonstration of large left MCA territoryinfarct.   Left-to-right midline shift measures 3 mm, unchanged.    CT Head No Cont (08.31.23 @ 20:43)   IMPRESSION:  Interval removal of subcutaneous drainage catheter with new acute   extradural broad products identified, withincreased rightward midline   shift which now measures 6 mm (previously 3 mm on 8/30/2023).    Additionally, there is increased hypoattenuation of the left   frontoparietal cortex within the known MCA territory infarct, which may   represent new areas of acute/subacute ischemia versus evolving gliosis.   Hemorrhagic conversion is not suspected at this time. Close attention on   follow-up is advised.    Diffuse scalp edema extending to the left periorbital soft tissues has   significantly increased since the prior study, likely due to evolving   subcutaneous blood products.    Critical findings above were discussed directly by telephone by the ED   radiologist on call, Fei Hebert MD, with the neurosurgical ICU   physicians assistant, Casi RUGGIERO, at 3:40 AM on 9/1/2023.    CT Head No Cont (08.30.23 @ 15:44)   Postop changes compatible with a left temporal frontal craniectomy is   identified. There is some extension of the parenchyma through the   craniectomy defect identified. There is abnormal low-attenuation again   seen involving left temporal frontal parietal region involving the left   basal ganglia and corona radiata region. This is compatible with an   evolving acute left MCA infarct. Previously noted areas of high   attenuation is less conspicuous which could be compatible with evolving   areas of hemorrhage. Mass effect on the left lateral ventricle is seen.   Left-to-right shift (3.1 mm) is seen.    There is extra-axial hematoma fluid and soft tissue swelling seen seen in   the postop region with extra-axial drain comedies findings are compatible   with postoperative changes.    The visualized paranasal sinuses mastoid andmiddle regions appear clear.  IMPRESSION:  New changes as described above.    CT Head No Cont (08.29.23 @ 23:39)   IMPRESSION: Evolving acute left MCA infarct is identified.   Mass effect on the left lateral ventricle is   again seen. Slight increased left-to-right shift (1.2 cm) is seen.    CT Head No Cont (08.29.23 @ 00:31)   IMPRESSION:  Continued evolution of large acute left MCA territory infarct.   Left-to-right midline shift measuring 6 mm, increased. No hydrocephalus   or effacement of basal cisterns. Curvilinear foci of hyperdensity within   the area of infarct, for which petechial hemorrhage cannot be excluded.   No large parenchymal hemorrhage.    CT Abdomen and Pelvis w/ IV Cont (08.28.23 @ 02:53)   IMPRESSION:  No evidence for bladder rupture.    CT Brain Stroke Protocol (08.27.23 @ 11:53)   IMPRESSION:  Wedgelike low density in the left putamen and caudate (corpus striatum)   is new from the prior scan, and consistent with infarct that may be acute   given corresponding symptoms.  No acute intracranial hemorrhage.    The study was performed at 11:47 AM on 08/27/2023 and the above findings   were discussed with Dr. Betts at 12:02 PM.    CT Angio Brain Stroke Protocol  w/ IV Cont (08.27.23 @ 12:03)   IMPRESSION:  CTA head: Focal occlusion of left MCA distal M1 segment with patent but   small caliber filling of M2 segments.    CT perfusion indicates large mismatch between Tmax and CBF consistent   with ischemic penumbra.    CTA Neck: No steno-occlusive focus.    Case findings above discussed with Dr. Hu at 12:13 PM on 08/27/2023.    CT Head No Cont (08.28.23 @ 02:34)   IMPRESSION: Acute left MCA infarct. Possible acute infarct involving the   right temporal region. This finding can be better evaluated with MRI if   there are no contraindications.    TTE Echo Complete w/o Contrast w/ Doppler (08.29.23 @ 12:03)   Summary:   1. Left ventricular ejection fraction, by visual estimation, is 55 to   60%.   2. Normal global left ventricular systolic function.   3. Normal right ventricular size and function.   4. Trace mitral valve regurgitation.   5. Mild aortic regurgitation.   6. Sclerotic aortic valve with normal opening.   7. There is no evidence of pericardial effusion.         [facility] Stroke Team  Progress Note     HPI:  Patient is a 63 year old male with PMHx Hypertension, DM on oral medications who presented c/o stroke-like symptoms. Per son pt was walking outside around 10:15am on 8/27/23 when he suddenly fell. His son helped him up and noted that he was weak on the right side and not acting normally which prompted him to come to the ED. On arrival pt w/ obvious right sided arm weakness, confusion, dysarthria, facial droop suggestive of acute stroke. Code stroke initiated, found to have left M1 occlusion, given Tenecteplase @ 1215 and was taken for thrombectomy. Admitted for stroke workup.     SUBJECTIVE: No events overnight.  No new neurologic complaints.  ROS reported negative unless otherwise noted.    albuterol/ipratropium for Nebulization 3 milliLiter(s) Nebulizer every 6 hours PRN  cefTRIAXone Injectable. 2000 milliGRAM(s) IV Push every 24 hours  chlorhexidine 2% Cloths 1 Application(s) Topical <User Schedule>  dextrose 5%. 1000 milliLiter(s) IV Continuous <Continuous>  dextrose 5%. 1000 milliLiter(s) IV Continuous <Continuous>  dextrose 50% Injectable 25 Gram(s) IV Push once  dextrose 50% Injectable 25 Gram(s) IV Push once  dextrose 50% Injectable 12.5 Gram(s) IV Push once  dextrose Oral Gel 15 Gram(s) Oral once PRN  glucagon  Injectable 1 milliGRAM(s) IntraMuscular once  hydrALAZINE Injectable 10 milliGRAM(s) IV Push every 2 hours PRN  insulin lispro (ADMELOG) corrective regimen sliding scale   SubCutaneous three times a day before meals  labetalol Injectable 10 milliGRAM(s) IV Push every 2 hours PRN  lactated ringers. 1000 milliLiter(s) IV Continuous <Continuous>  levETIRAcetam  IVPB 500 milliGRAM(s) IV Intermittent every 12 hours  polyethylene glycol 3350 17 Gram(s) Oral two times a day  QUEtiapine 25 milliGRAM(s) Oral at bedtime  senna 2 Tablet(s) Oral at bedtime  tamsulosin 0.8 milliGRAM(s) Oral at bedtime      PHYSICAL EXAM:   Vital Signs Last 24 Hrs  T(C): 36.8 (27 Sep 2023 07:30), Max: 37.9 (26 Sep 2023 16:28)  T(F): 98.3 (27 Sep 2023 07:30), Max: 100.2 (26 Sep 2023 16:28)  HR: 97 (27 Sep 2023 08:00) (89 - 102)  BP: 123/78 (27 Sep 2023 08:00) (106/65 - 129/63)  BP(mean): 90 (27 Sep 2023 08:00) (74 - 97)  RR: 18 (27 Sep 2023 08:00) (14 - 18)  SpO2: 100% (27 Sep 2023 08:00) (94% - 100%)    Parameters below as of 27 Sep 2023 08:00  Patient On (Oxygen Delivery Method): room air       General: No acute distress. in bed eating lunch.    NEUROLOGICAL EXAM:  Mental status: eyes open, awake, alert, attends to examiner briskly , smiles uses hand gestures in attempt to communicate, generates sounds, follows some simple commands, perseverates, mimics at times , some motor impersistence   Cranial Nerves: pupils 2mm b/l, crosses midline, right eye no blink to threat, mild right facial palsy   Motor exam: Normal tone, right hemiplegia,   5/5 LUE, 5/5 LLE (requires prompting)  Sensation: Intact to noxious stimuli  throughout   Coordination/ Gait: gait not tested    LABS:                        9.8    9.39  )-----------( 235      ( 27 Sep 2023 06:09 )             30.7    09-27    136  |  99  |  12.3  ----------------------------<  100<H>  3.8   |  24.0  |  0.51    Ca    8.7      27 Sep 2023 06:09  Phos  3.1     09-27  Mg     1.8     09-27    TPro  5.8<L>  /  Alb  2.5<L>  /  TBili  0.3<L>  /  DBili  0.1  /  AST  43<H>  /  ALT  78<H>  /  AlkPhos  108  09-27  PT/INR - ( 27 Sep 2023 07:01 )   PT: 14.5 sec;   INR: 1.32 ratio         PTT - ( 27 Sep 2023 07:01 )  PTT:27.8 sec      IMAGING: Reviewed by me.     CT Head No Cont (09.26.23 @ 20:38)   IMPRESSION: Status post craniectomy in this patient with a large left MCA   territory infarct. Findings include intraparenchymal and extra-axial   hemorrhagic collections, which overall are stable in appearance compared   with the earlier examination dated 9/26/2023 at approximately 1:05 PM.    CT Head No Cont (09.26.23 @ 13:10)   IMPRESSION: New small foci of hemorrhages in the left frontal and left   occipital lobe. Chronic large left MCA territory infarction. New   extradural hemorrhage along the left temporal convexity measuring 1.6 x   1.4 cm. New posterior left temporal scalp hemorrhage with a fluid/fluid   level measures 3.2 x 3.3 cm     CT Head No Cont (09.15.23 @ 17:54)   IMPRESSION:  No significant interval change.  Study done for presurgical planning.    CT Head No Cont (09.12.23 @ 10:08)   Impression: Evolving left MCA infarct.  There is some associated area of high attenuation   identified which could be compatible spared parenchyma versus petechial   hemorrhage. Localized mass effect is seen consisting of sulcal   effacement. No significant shift or herniation is seen.    SAMARA Echo Doppler (09.01.23 @ 12:52)    1. Left ventricular ejection fraction, by visual estimation, is >75%.   2. Normal global left ventricular systolic function.   3. Normal right ventricular size and function.   4. Normal left atrial size.   5. No left atrial appendage thrombus and normal left atrial appendage   velocities.   6. Trace mitral valve regurgitation.   7. Color flow doppler and intravenous injection of agitated saline   demonstrates the presence of an intact intra atrial septum.   8. Mobile intra-atrial septum with lipomatous hypertrophy.   9. Moderate complex atheromas at the aortic arch and descending aorta.  10. There is no evidence of pericardial effusion.      CT Head No Cont (09.03.23 @ 11:20)   Impression:  No significant change from the previous exam performed yesterday.   Surgical site is unchanged as is the brain parenchyma changes from left   middle cerebral artery stroke. Mass effect is unchanged.    CT Head No Cont (09.02.23 @ 04:01)   IMPRESSION: Redemonstration of large left MCA territoryinfarct.   Left-to-right midline shift measures 3 mm, unchanged.    CT Head No Cont (08.31.23 @ 20:43)   IMPRESSION:  Interval removal of subcutaneous drainage catheter with new acute   extradural broad products identified, withincreased rightward midline   shift which now measures 6 mm (previously 3 mm on 8/30/2023).    Additionally, there is increased hypoattenuation of the left   frontoparietal cortex within the known MCA territory infarct, which may   represent new areas of acute/subacute ischemia versus evolving gliosis.   Hemorrhagic conversion is not suspected at this time. Close attention on   follow-up is advised.    Diffuse scalp edema extending to the left periorbital soft tissues has   significantly increased since the prior study, likely due to evolving   subcutaneous blood products.    Critical findings above were discussed directly by telephone by the ED   radiologist on call, Fei Hebert MD, with the neurosurgical ICU   physicians assistant, Casi RUGGIERO, at 3:40 AM on 9/1/2023.    CT Head No Cont (08.30.23 @ 15:44)   Postop changes compatible with a left temporal frontal craniectomy is   identified. There is some extension of the parenchyma through the   craniectomy defect identified. There is abnormal low-attenuation again   seen involving left temporal frontal parietal region involving the left   basal ganglia and corona radiata region. This is compatible with an   evolving acute left MCA infarct. Previously noted areas of high   attenuation is less conspicuous which could be compatible with evolving   areas of hemorrhage. Mass effect on the left lateral ventricle is seen.   Left-to-right shift (3.1 mm) is seen.    There is extra-axial hematoma fluid and soft tissue swelling seen seen in   the postop region with extra-axial drain comedies findings are compatible   with postoperative changes.    The visualized paranasal sinuses mastoid andmiddle regions appear clear.  IMPRESSION:  New changes as described above.    CT Head No Cont (08.29.23 @ 23:39)   IMPRESSION: Evolving acute left MCA infarct is identified.   Mass effect on the left lateral ventricle is   again seen. Slight increased left-to-right shift (1.2 cm) is seen.    CT Head No Cont (08.29.23 @ 00:31)   IMPRESSION:  Continued evolution of large acute left MCA territory infarct.   Left-to-right midline shift measuring 6 mm, increased. No hydrocephalus   or effacement of basal cisterns. Curvilinear foci of hyperdensity within   the area of infarct, for which petechial hemorrhage cannot be excluded.   No large parenchymal hemorrhage.    CT Abdomen and Pelvis w/ IV Cont (08.28.23 @ 02:53)   IMPRESSION:  No evidence for bladder rupture.    CT Brain Stroke Protocol (08.27.23 @ 11:53)   IMPRESSION:  Wedgelike low density in the left putamen and caudate (corpus striatum)   is new from the prior scan, and consistent with infarct that may be acute   given corresponding symptoms.  No acute intracranial hemorrhage.    The study was performed at 11:47 AM on 08/27/2023 and the above findings   were discussed with Dr. Betts at 12:02 PM.    CT Angio Brain Stroke Protocol  w/ IV Cont (08.27.23 @ 12:03)   IMPRESSION:  CTA head: Focal occlusion of left MCA distal M1 segment with patent but   small caliber filling of M2 segments.    CT perfusion indicates large mismatch between Tmax and CBF consistent   with ischemic penumbra.    CTA Neck: No steno-occlusive focus.    Case findings above discussed with Dr. Hu at 12:13 PM on 08/27/2023.    CT Head No Cont (08.28.23 @ 02:34)   IMPRESSION: Acute left MCA infarct. Possible acute infarct involving the   right temporal region. This finding can be better evaluated with MRI if   there are no contraindications.    TTE Echo Complete w/o Contrast w/ Doppler (08.29.23 @ 12:03)   Summary:   1. Left ventricular ejection fraction, by visual estimation, is 55 to   60%.   2. Normal global left ventricular systolic function.   3. Normal right ventricular size and function.   4. Trace mitral valve regurgitation.   5. Mild aortic regurgitation.   6. Sclerotic aortic valve with normal opening.   7. There is no evidence of pericardial effusion.

## 2023-09-27 NOTE — PROGRESS NOTE ADULT - ASSESSMENT
ASSESSMENT: Patient is a 63 year old male with PMHx Hypertension, DM on oral medications who presented c/o stroke-like symptoms. Per son pt was walking outside around 10:15am on 8/27/23 when he suddenly fell. His son helped him up and noted that he was weak on the right side and not acting normally which prompted him to come to the ED. On arrival patient came in with right sided arm weakness, global aphasia, and dysarthria, right sided facial droop. Initial head CT demonstrated wedge like low density in the left putamen and caudate (corpus striatum) consistent with infarct that may be acute and no acute intracranial hemorrhage. Tenecteplase was administered at 12:15 pm. CTA head demonstrated occlusion of left MCA distal M1 segment and CTP showed large mismatch. found to have LM1 occlusion. Patient taken for mechanical thrombectomy where he was found to have LM1 occlusion with TICI 2C recanalization. Due to worsening of neurological exam and increase in midline shift found on CT head on 8/29/23(increased in left to right shift of around 9.5mm (previously was 5.2mm)) patient was taken to the OR on 8/30/23 for decompressive hemicraniectomy. CT head 9/12/23 with evolving left MCA infarct and some associated area of high attenuation identified which could be compatible spared parenchyma versus petechial hemorrhage. Etiology: Concerning for Lupus anticoagulant as noted by heme/onc. Can not entirely exclude alternate hypercoagulable states and cardioembolic event.    NEURO:   - neurologically without acute change  - NSx following along for anticipated cranioplasty 9/29/23   -Continue close monitoring for neurologic deterioration with stroke neuro checks q 4 hour  -Avoid hyponatremia and rapid fluctuations in serum Na  - SBP goal 110-130mmHg, appears to be neurologically tolerating.  avoid rapid fluctuations and further hypotension    -ANTITHROMBOTIC THERAPY: Full dose LMWH on hold per nsx.  -AED in place per nsx , further course and taper pending clinical course.   -titrate statin to LDL goal less than 70.  LFT monitoring.  -Obtain MRI Brain w/o when medically optimized  -Dysphagia screen: passed advance as per SLP  -Physical therapy/OT/Speech eval/treatment.     -CARDIOVASCULAR: TTE as noted , SAMARA revealed moderate complex atheromas at the aortic arch and descending aorta,   - suggest cardiology follow up to ensure no increased risk/ contribution to embolic stroke   cardiac monitoring w/ telemetry for now,  Long term cardiac event monitoring.                  -HEMATOLOGY: H/H with anemia, Monitor for signs and symptoms of bleeding, transfusion per primary team to Hgb > 8. Platelets 235. Patient should have all age and risk appropriate malignancy screenings with PCP or sooner if clinically suspected, hypercoagulable panel with LA +- per heme / onc:  Lupus anticoagulant is positive with  normal ACL and B2G antibodies. Given no other clear source, would be reasonable to do Eliquis BID at this time if okay be neuro/neurosurgery.  Plan for repeat LA testing in 12 weeks.  , consideration in CT Chest, had abdomen/pelvic imaging- noted enlarged prostate- confirm there is  no evidence of malignancy. Hematology consult appreciated.      DVT ppx: Heparin s.c [] LMWH [x] - was cleared by neurosx    PULMONARY: saturating well on room air    RENAL: BUN/Cr without acute change, monitor urine output, maintain adequate hydration.   Na Goal: 135-145.  Avoid hyponatremia and rapid fluctuations.     ID: afebrile, leukocytosis resolved, monitor for si/sx of infection.      Translation per ROMEO manning at bedside.     DISPOSITION: Rehab or home depending on PT eval once stable and workup is complete      CORE MEASURES:        Admission NIHSS: 23     Tenecteplase : [x] YES [] NO      LDL/HDL/A1C: 80/39/6.8     Depression Screen- if depression hx and/or present      Statin Therapy: as noted     Dysphagia Screen: [x] PASS [] FAIL     Smoking [] YES [x] NO      Afib [] YES [x] NO     Stroke Education [x] YES [] NO-     Obtain screening lower extremity venous ultrasound in patients who meet 1 or more of the following criteria as patient is high risk for DVT/PE on admission:   [] History of DVT/PE  []Hypercoagulable states (Factor V Leiden, Cancer, OCP, etc. )  []Prolonged immobility (hemiplegia/hemiparesis/post operative or any other extended immobilization)  [] Transferred from outside facility (Rehab or Long term care)  [] Age </= to 50

## 2023-09-27 NOTE — PROGRESS NOTE ADULT - SUBJECTIVE AND OBJECTIVE BOX
HPI:  HPI:  Patient is a 63 year old male with PMH Hypertension, DM on oral medications who presents c/o stroke-like symptoms. Per son pt was walking outside around 10:15am this morning when he suddenly fell. His son helped him up and noted that he was weak on the right side and not acting normally which prompted him to come to the ED. On arrival pt w/ obvious right sided arm weakness, confusion, dysarthria, facial droop suggestive of acute stroke. Code stroke initiated, found to have LM1 occlusion, given TNK @ 1215 and was taken for thrombectomy. Unable to provide ROS 2/2 aphasia  (27 Aug 2023 16:45)        INTERVAL HPI/OVERNIGHT EVENTS:  63y Male s/p __ seen lying comfortably in bed. Tolerating diet. Passing gas/BM. Voiding. Umaña in with __ clear urine. Denies headache, weakness, numbness, n/v/d, fevers, chills, chest pain, SOB.       Vital Signs Last 24 Hrs  T(C): 36.8 (27 Sep 2023 07:30), Max: 37.9 (26 Sep 2023 16:28)  T(F): 98.3 (27 Sep 2023 07:30), Max: 100.2 (26 Sep 2023 16:28)  HR: 88 (27 Sep 2023 12:00) (88 - 102)  BP: 103/73 (27 Sep 2023 12:00) (103/73 - 129/63)  BP(mean): 80 (27 Sep 2023 12:00) (74 - 97)  RR: 16 (27 Sep 2023 12:00) (14 - 18)  SpO2: 96% (27 Sep 2023 12:00) (94% - 100%)    Parameters below as of 27 Sep 2023 12:00  Patient On (Oxygen Delivery Method): room air        PHYSICAL EXAM:  GENERAL: NAD, well-groomed  HEAD: flap sunken   WOUND: clean dry intact  MENTAL STATUS: Awake and alert; Opens eyes spontaneously; Nonverbal; following simple commands  CRANIAL NERVES: DORINA; EOMI  MOTOR: strength 5/5 L upper and lower extremities, 0/5 RUE, WD RLE  EXTREMITIES: no clubbing, cyanosis  SKIN: Warm, dry      LABS:                        9.8    9.39  )-----------( 235      ( 27 Sep 2023 06:09 )             30.7     09-27    136  |  99  |  12.3  ----------------------------<  100<H>  3.8   |  24.0  |  0.51    Ca    8.7      27 Sep 2023 06:09  Phos  3.1     09-27  Mg     1.8     09-27    TPro  5.8<L>  /  Alb  2.5<L>  /  TBili  0.3<L>  /  DBili  0.1  /  AST  43<H>  /  ALT  78<H>  /  AlkPhos  108  09-27    PT/INR - ( 27 Sep 2023 07:01 )   PT: 14.5 sec;   INR: 1.32 ratio         PTT - ( 27 Sep 2023 07:01 )  PTT:27.8 sec  Urinalysis Basic - ( 27 Sep 2023 06:09 )    Color: x / Appearance: x / SG: x / pH: x  Gluc: 100 mg/dL / Ketone: x  / Bili: x / Urobili: x   Blood: x / Protein: x / Nitrite: x   Leuk Esterase: x / RBC: x / WBC x   Sq Epi: x / Non Sq Epi: x / Bacteria: x        09-26 @ 07:01  -  09-27 @ 07:00  --------------------------------------------------------  IN: 2210 mL / OUT: 1600 mL / NET: 610 mL        RADIOLOGY & ADDITIONAL TESTS:   INTERVAL HPI/OVERNIGHT EVENTS:  63y Male PMH HTN, DM, presented to [facility] 8/27 with right hemiparesis and aphasia, found with LM1 occlusion, s/p IV Tenecteplase 12:15 PM 8/27/23 and mechanical thrombectomy 8/27/23 with TICI 2C reperfusion, evolving stroke causing mass effect subsequently required left hemicraniectomy 8/30/23 with Dr. Bah. Patient seen this AM, flap sunken, exam stable      Vital Signs Last 24 Hrs  T(C): 36.8 (27 Sep 2023 07:30), Max: 37.9 (26 Sep 2023 16:28)  T(F): 98.3 (27 Sep 2023 07:30), Max: 100.2 (26 Sep 2023 16:28)  HR: 88 (27 Sep 2023 12:00) (88 - 102)  BP: 103/73 (27 Sep 2023 12:00) (103/73 - 129/63)  BP(mean): 80 (27 Sep 2023 12:00) (74 - 97)  RR: 16 (27 Sep 2023 12:00) (14 - 18)  SpO2: 96% (27 Sep 2023 12:00) (94% - 100%)    Parameters below as of 27 Sep 2023 12:00  Patient On (Oxygen Delivery Method): room air        PHYSICAL EXAM:  GENERAL: NAD, well-groomed  HEAD: flap sunken   WOUND: clean dry intact  MENTAL STATUS: Awake and alert; Opens eyes spontaneously; Nonverbal; following simple commands  CRANIAL NERVES: DORINA; EOMI  MOTOR: strength 5/5 L upper and lower extremities, 0/5 RUE, WD RLE  EXTREMITIES: no clubbing, cyanosis  SKIN: Warm, dry      LABS:                        9.8    9.39  )-----------( 235      ( 27 Sep 2023 06:09 )             30.7     09-27    136  |  99  |  12.3  ----------------------------<  100<H>  3.8   |  24.0  |  0.51    Ca    8.7      27 Sep 2023 06:09  Phos  3.1     09-27  Mg     1.8     09-27    TPro  5.8<L>  /  Alb  2.5<L>  /  TBili  0.3<L>  /  DBili  0.1  /  AST  43<H>  /  ALT  78<H>  /  AlkPhos  108  09-27    PT/INR - ( 27 Sep 2023 07:01 )   PT: 14.5 sec;   INR: 1.32 ratio         PTT - ( 27 Sep 2023 07:01 )  PTT:27.8 sec  Urinalysis Basic - ( 27 Sep 2023 06:09 )    Color: x / Appearance: x / SG: x / pH: x  Gluc: 100 mg/dL / Ketone: x  / Bili: x / Urobili: x   Blood: x / Protein: x / Nitrite: x   Leuk Esterase: x / RBC: x / WBC x   Sq Epi: x / Non Sq Epi: x / Bacteria: x        09-26 @ 07:01  -  09-27 @ 07:00  --------------------------------------------------------  IN: 2210 mL / OUT: 1600 mL / NET: 610 mL

## 2023-09-28 ENCOUNTER — TRANSCRIPTION ENCOUNTER (OUTPATIENT)
Age: 63
End: 2023-09-28

## 2023-09-28 LAB
GLUCOSE BLDC GLUCOMTR-MCNC: 102 MG/DL — HIGH (ref 70–99)
GLUCOSE BLDC GLUCOMTR-MCNC: 113 MG/DL — HIGH (ref 70–99)
GLUCOSE BLDC GLUCOMTR-MCNC: 170 MG/DL — HIGH (ref 70–99)
GLUCOSE BLDC GLUCOMTR-MCNC: 92 MG/DL — SIGNIFICANT CHANGE UP (ref 70–99)

## 2023-09-28 PROCEDURE — 93970 EXTREMITY STUDY: CPT | Mod: 26

## 2023-09-28 PROCEDURE — 99232 SBSQ HOSP IP/OBS MODERATE 35: CPT

## 2023-09-28 RX ORDER — LEVETIRACETAM 250 MG/1
500 TABLET, FILM COATED ORAL ONCE
Refills: 0 | Status: COMPLETED | OUTPATIENT
Start: 2023-09-29 | End: 2023-09-29

## 2023-09-28 RX ORDER — CEFAZOLIN SODIUM 1 G
2000 VIAL (EA) INJECTION ONCE
Refills: 0 | Status: COMPLETED | OUTPATIENT
Start: 2023-09-29 | End: 2023-09-29

## 2023-09-28 RX ORDER — CHLORHEXIDINE GLUCONATE 213 G/1000ML
1 SOLUTION TOPICAL
Refills: 0 | Status: DISCONTINUED | OUTPATIENT
Start: 2023-09-29 | End: 2023-09-29

## 2023-09-28 RX ADMIN — LEVETIRACETAM 400 MILLIGRAM(S): 250 TABLET, FILM COATED ORAL at 18:09

## 2023-09-28 RX ADMIN — SENNA PLUS 2 TABLET(S): 8.6 TABLET ORAL at 21:17

## 2023-09-28 RX ADMIN — POLYETHYLENE GLYCOL 3350 17 GRAM(S): 17 POWDER, FOR SOLUTION ORAL at 18:09

## 2023-09-28 RX ADMIN — LEVETIRACETAM 400 MILLIGRAM(S): 250 TABLET, FILM COATED ORAL at 05:18

## 2023-09-28 RX ADMIN — CEFTRIAXONE 2000 MILLIGRAM(S): 500 INJECTION, POWDER, FOR SOLUTION INTRAMUSCULAR; INTRAVENOUS at 05:18

## 2023-09-28 RX ADMIN — SODIUM CHLORIDE 70 MILLILITER(S): 9 INJECTION, SOLUTION INTRAVENOUS at 10:32

## 2023-09-28 RX ADMIN — TAMSULOSIN HYDROCHLORIDE 0.8 MILLIGRAM(S): 0.4 CAPSULE ORAL at 21:18

## 2023-09-28 RX ADMIN — CHLORHEXIDINE GLUCONATE 1 APPLICATION(S): 213 SOLUTION TOPICAL at 05:18

## 2023-09-28 RX ADMIN — Medication 2: at 18:09

## 2023-09-28 RX ADMIN — POLYETHYLENE GLYCOL 3350 17 GRAM(S): 17 POWDER, FOR SOLUTION ORAL at 05:18

## 2023-09-28 RX ADMIN — QUETIAPINE FUMARATE 25 MILLIGRAM(S): 200 TABLET, FILM COATED ORAL at 21:18

## 2023-09-28 NOTE — PROGRESS NOTE ADULT - SUBJECTIVE AND OBJECTIVE BOX
[facility] Division of Hospital Medicine    Chief Complaint:      INTERVAL HISTORY:  Overnight, no acute events.     Patient seen and examined at bedside this morning. Patient points at food and opening mouth. Pleasant, moves L arm and legs when prompted, shakes hand, smiles. Unable to engage in rest of ROS.     MEDICATIONS  (STANDING):  chlorhexidine 2% Cloths 1 Application(s) Topical <User Schedule>  dextrose 5%. 1000 milliLiter(s) (100 mL/Hr) IV Continuous <Continuous>  dextrose 5%. 1000 milliLiter(s) (50 mL/Hr) IV Continuous <Continuous>  dextrose 50% Injectable 25 Gram(s) IV Push once  dextrose 50% Injectable 25 Gram(s) IV Push once  dextrose 50% Injectable 12.5 Gram(s) IV Push once  glucagon  Injectable 1 milliGRAM(s) IntraMuscular once  insulin lispro (ADMELOG) corrective regimen sliding scale   SubCutaneous three times a day before meals  lactated ringers. 1000 milliLiter(s) (70 mL/Hr) IV Continuous <Continuous>  levETIRAcetam  IVPB 500 milliGRAM(s) IV Intermittent every 12 hours  polyethylene glycol 3350 17 Gram(s) Oral two times a day  QUEtiapine 25 milliGRAM(s) Oral at bedtime  senna 2 Tablet(s) Oral at bedtime  tamsulosin 0.8 milliGRAM(s) Oral at bedtime    MEDICATIONS  (PRN):  albuterol/ipratropium for Nebulization 3 milliLiter(s) Nebulizer every 6 hours PRN Shortness of Breath and/or Wheezing  dextrose Oral Gel 15 Gram(s) Oral once PRN Blood Glucose LESS THAN 70 milliGRAM(s)/deciliter  hydrALAZINE Injectable 10 milliGRAM(s) IV Push every 2 hours PRN SBP >140  labetalol Injectable 10 milliGRAM(s) IV Push every 2 hours PRN SBP >140        I&O's Summary    27 Sep 2023 07:01  -  28 Sep 2023 07:00  --------------------------------------------------------  IN: 560 mL / OUT: 900 mL / NET: -340 mL        PHYSICAL EXAM:  Vital Signs Last 24 Hrs  T(C): 37 (28 Sep 2023 07:49), Max: 37.4 (28 Sep 2023 00:00)  T(F): 98.6 (28 Sep 2023 07:49), Max: 99.3 (28 Sep 2023 00:00)  HR: 100 (28 Sep 2023 12:00) (76 - 100)  BP: 115/89 (28 Sep 2023 12:00) (98/69 - 115/89)  BP(mean): 95 (28 Sep 2023 12:00) (76 - 95)  RR: 17 (28 Sep 2023 12:00) (17 - 18)  SpO2: 96% (28 Sep 2023 12:00) (95% - 100%)    Parameters below as of 28 Sep 2023 12:00  Patient On (Oxygen Delivery Method): room air      CONSTITUTIONAL: No apparent distress  HEENT: scalp incision clear and dry  RESPIRATORY:  lungs are clear to auscultation bilaterally, No crackles, rhonchi, wheezes  CARDIOVASCULAR: Regular rate and rhythm, normal S1 and S2, no murmur/rub/gallop; No lower extremity edema; Peripheral pulses are 2+ bilaterally  ABDOMEN: Soft, non-distended, nontender to palpation, +BS  MUSCLOSKELETAL: R hemiparesis   NEUROLOGY: Alert  SKIN: No rashes    LABS:                        9.8    9.39  )-----------( 235      ( 27 Sep 2023 06:09 )             30.7     09-27    136  |  99  |  12.3  ----------------------------<  100<H>  3.8   |  24.0  |  0.51    Ca    8.7      27 Sep 2023 06:09  Phos  3.1     09-27  Mg     1.8     09-27    TPro  5.8<L>  /  Alb  2.5<L>  /  TBili  0.3<L>  /  DBili  0.1  /  AST  43<H>  /  ALT  78<H>  /  AlkPhos  108  09-27    PT/INR - ( 27 Sep 2023 07:01 )   PT: 14.5 sec;   INR: 1.32 ratio         PTT - ( 27 Sep 2023 07:01 )  PTT:27.8 sec      Urinalysis Basic - ( 27 Sep 2023 06:09 )    Color: x / Appearance: x / SG: x / pH: x  Gluc: 100 mg/dL / Ketone: x  / Bili: x / Urobili: x   Blood: x / Protein: x / Nitrite: x   Leuk Esterase: x / RBC: x / WBC x   Sq Epi: x / Non Sq Epi: x / Bacteria: x        CAPILLARY BLOOD GLUCOSE      POCT Blood Glucose.: 113 mg/dL (28 Sep 2023 13:52)  POCT Blood Glucose.: 102 mg/dL (28 Sep 2023 09:06)  POCT Blood Glucose.: 135 mg/dL (27 Sep 2023 21:12)  POCT Blood Glucose.: 112 mg/dL (27 Sep 2023 17:37)        RADIOLOGY & ADDITIONAL TESTS:  Results Reviewed:   Imaging Personally Reviewed:  Electrocardiogram Personally Reviewed:

## 2023-09-28 NOTE — PROGRESS NOTE ADULT - ASSESSMENT
63M PMH of DM2, HTN presents with fall, right sided weakness, dysarthria and right facial droop found to have LM1 occlusion s/p TNK in ER subsequent mechanical thrombectomy and decompressive craniectomy for midline shift. SAMARA negative for thrombus. Cardiology recommending ILR /MCOT as outpatient. Extubated on 9/1/23, monitored in ICU and transferred to medicine for further management.     #CVA + antiphospholipid syndrome (embolic stroke+ positive antiphospholipid antibody)- Started on AC  - AC cleared by Neuro Sx and Hematology recommended Eliquis BID- will use lovenox BID due to episode of hematuria  - s/p Hemicraniectomy-c/w ASA, lipitor- Rehab/ Impaired mobility and function   - SAMARA negative for thrombus, +atheroma of aortic arch and aorta  - CT angio of the neck and A/P did not show any acute pathologies, likely chronic atherosclerosis disease   - mcot/ILR prior to DC  - pt/ot/speech/ PM&R- cont   - keppra  - Repeat CT head today showing New small foci of hemorrhages in the left frontal and left occipital lobe. Chronic large left MCA territory infarction. New extradural hemorrhage along the left temporal convexity measuring 1.6 x 1.4 cm. New posterior left temporal scalp hemorrhage with a fluid/fluid level measures 3.2 x 3.3 cm; NSGY requested repeat CTH in 6 hours: intraparenchymal and extra-axial hemorrhagic collections, which overall are stable in appearance compared with the earlier examination dated 9/26/2023 at approximately 1:05 PM; d/c'd lovenox  - per NSGY, OR Friday 9/29 per NSGY; patient medically optimized for planned surgery:  finished abx today, has been afebrile, hemodynamically stable, tachycardia resolved, wbc wnl on labs yesterday, sodium wnl on labs yesterday, LFTs markedly improved     #Klebsiella Bacteremia, treated   - leukocytosis normalized 9/27  - completing Ceftriaxone per ID recommendations today 9/28  - f/u blood cx from 9/11 - negative to date    #urinary retention longo   - Flomax 0.8mg po daily  - bowel regimen  - TOV near discharge vs. ALDEN     #Hematuria- resolved   - Eliquis started and then had an episode of hematuria again and changed to lovenox  - pt noted with 1 episode of hematuria on 9/7, suspected 2/2 traumatic Longo,   - H/H stable, no hematuria noted, continue to monitor     #Constipation  - miralax/senna    #Dysphagia  - soft/bite sized diet w/ MODERATELY thick liquids w/ assistance   - MBS- note appreciated  - aspiration precautions  - eats with aids     #Hypernatremia -resolved  - PO fluid intake  - Na 136 9/28 labs     #Type 2DM -well controlled  - sliding scale, sugars well controlled     #Elevated LFTs- hold statin and hold Tylenol for now   - repeat LFTS improving, continue to monitor     #?Depressed Mood  - C/w Seroquel 25mg QHS      F:-  E: 4, 3, 2  N: soft/bite sized diet w/ MODERATELY thick liquids; npo after midnight   A:-    DVT: therapeutic lovenox help 9/26 per NSGY recs, on SCDs

## 2023-09-28 NOTE — PROGRESS NOTE ADULT - ASSESSMENT
63y Male PMH HTN, DM, presented to [facility] 8/27 with right hemiparesis and aphasia, found with LM1 occlusion, s/p IV Tenecteplase 12:15 PM 8/27/23 and mechanical thrombectomy 8/27/23 with TICI 2C reperfusion, evolving stroke causing mass effect subsequently required left hemicraniectomy 8/30/23 with Dr. Bah.  - Hospital course with findings significant for +antiphospholipid antibody originally started on therapeutic anticoagulation (on hold for OR tomorrow), Klebsiella bacteremia now improved with normalized leukocytosis (to complete course of Ceftriaxone today), hematuria (improved), hypernatremia (resolved), transaminitis (improving)  - Exam stable    PLAN:  - D/w Dr. Bah  - Q4 neuro checks  - Plan for cranioplasty tomorrow afternoon 9/29/23  - Ancef and Keppra doses on hold in preparation for OR  - Preop labs completed yesterday-- will repeat CBC/BMP/Mg/Phos/coags in AM  - Continue Keppra 500 Q12  - NPO after MN  - Last BM 9/27  - IVF already ordered  - Hold ACT/APT for now  - SCDs for DVT ppx. Baseline LED pending  - Medical optimization pending- d/w hospitalist yesterday, most likely ok to proceed if remains stable today after chart review/patient eval  - Cardiac risk stratification appreciated noted on 9/21/23-- moderate risk procedure and patient at elevated risk with comorbidities  - Supportive care/further medical management per primary team   - Will go to NSICU postoperatively

## 2023-09-28 NOTE — PROGRESS NOTE ADULT - SUBJECTIVE AND OBJECTIVE BOX
INTERVAL HPI/OVERNIGHT EVENTS:  63y Male PMH HTN, DM, presented to [facility] 8/27 with right hemiparesis and aphasia, found with LM1 occlusion, s/p IV Tenecteplase 12:15 PM 8/27/23 and mechanical thrombectomy 8/27/23 with TICI 2C reperfusion, evolving stroke causing mass effect subsequently required left hemicraniectomy 8/30/23 with Dr. Bah. Hospital course with findings significant for +antiphospholipid antibody originally started on therapeutic anticoagulation (on hold for OR tomorrow), Klebsiella bacteremia now improved with normalized leukocytosis (to complete course of Ceftriaxone today), hematuria (improved), hypernatremia (resolved), transaminitis (improving). Patient is scheduled for cranioplasty tomorrow 9/29/23.     Vital Signs Last 24 Hrs  T(C): 37 (28 Sep 2023 07:49), Max: 37.4 (28 Sep 2023 00:00)  T(F): 98.6 (28 Sep 2023 07:49), Max: 99.3 (28 Sep 2023 00:00)  HR: 76 (28 Sep 2023 08:00) (76 - 99)  BP: 108/76 (28 Sep 2023 08:00) (98/69 - 120/74)  BP(mean): 84 (28 Sep 2023 08:00) (76 - 86)  RR: 18 (28 Sep 2023 06:00) (16 - 18)  SpO2: 100% (28 Sep 2023 08:00) (95% - 100%)    Parameters below as of 28 Sep 2023 08:00  Patient On (Oxygen Delivery Method): room air    PHYSICAL EXAM:  GENERAL: NAD  HEAD: s/p L hemicraniectomy. incision healing well. Flap sunken, soft  MENTAL STATUS: Awake, alert, minimal verbal output-- able to say "no" only. Nods head yes and no appropriately. Able to follow most commands, at times requiring encouragement/repetition of command  CRANIAL NERVES: PERRL. EOMI without nystagmus. Facial sensation diminished on right. + R facial droop. Hearing grossly intact. Speech difficult to assess as mostly nonverbal  MOTOR: RUE/RLE 0/5; LUE/LLE 5/5  SENSATION: Diminished on right to light noxious but intact to pain  CHEST/LUNG: Nonlabored breathing    LABS:                        9.8    9.39  )-----------( 235      ( 27 Sep 2023 06:09 )             30.7     09-27    136  |  99  |  12.3  ----------------------------<  100<H>  3.8   |  24.0  |  0.51    Ca    8.7      27 Sep 2023 06:09  Phos  3.1     09-27  Mg     1.8     09-27    TPro  5.8<L>  /  Alb  2.5<L>  /  TBili  0.3<L>  /  DBili  0.1  /  AST  43<H>  /  ALT  78<H>  /  AlkPhos  108  09-27    PT/INR - ( 27 Sep 2023 07:01 )   PT: 14.5 sec;   INR: 1.32 ratio      PTT - ( 27 Sep 2023 07:01 )  PTT:27.8 sec  Urinalysis Basic - ( 27 Sep 2023 06:09 )    Color: x / Appearance: x / SG: x / pH: x  Gluc: 100 mg/dL / Ketone: x  / Bili: x / Urobili: x   Blood: x / Protein: x / Nitrite: x   Leuk Esterase: x / RBC: x / WBC x   Sq Epi: x / Non Sq Epi: x / Bacteria: x    09-27 @ 07:01  -  09-28 @ 07:00  --------------------------------------------------------  IN: 560 mL / OUT: 900 mL / NET: -340 mL    RADIOLOGY & ADDITIONAL TESTS:  CT Head No Cont (09.26.23 @ 20:38)  IMPRESSION: Status post craniectomy in this patient with a large left MCA   territory infarct. Findings include intraparenchymal and extra-axial   hemorrhagic collections, which overall are stable in appearance compared   with the earlier examination dated 9/26/2023 at approximately 1:05 PM.

## 2023-09-29 ENCOUNTER — TRANSCRIPTION ENCOUNTER (OUTPATIENT)
Age: 63
End: 2023-09-29

## 2023-09-29 ENCOUNTER — APPOINTMENT (OUTPATIENT)
Dept: NEUROSURGERY | Facility: HOSPITAL | Age: 63
End: 2023-09-29

## 2023-09-29 LAB
ALBUMIN SERPL ELPH-MCNC: 2.6 G/DL — LOW (ref 3.3–5.2)
ALP SERPL-CCNC: 118 U/L — SIGNIFICANT CHANGE UP (ref 40–120)
ALT FLD-CCNC: 157 U/L — HIGH
ANION GAP SERPL CALC-SCNC: 12 MMOL/L — SIGNIFICANT CHANGE UP (ref 5–17)
APTT BLD: 28.3 SEC — SIGNIFICANT CHANGE UP (ref 24.5–35.6)
AST SERPL-CCNC: 74 U/L — HIGH
BILIRUB DIRECT SERPL-MCNC: 0.1 MG/DL — SIGNIFICANT CHANGE UP (ref 0–0.3)
BILIRUB INDIRECT FLD-MCNC: 0.3 MG/DL — SIGNIFICANT CHANGE UP (ref 0.2–1)
BILIRUB SERPL-MCNC: 0.4 MG/DL — SIGNIFICANT CHANGE UP (ref 0.4–2)
BUN SERPL-MCNC: 8.7 MG/DL — SIGNIFICANT CHANGE UP (ref 8–20)
CALCIUM SERPL-MCNC: 8.7 MG/DL — SIGNIFICANT CHANGE UP (ref 8.4–10.5)
CHLORIDE SERPL-SCNC: 103 MMOL/L — SIGNIFICANT CHANGE UP (ref 96–108)
CO2 SERPL-SCNC: 25 MMOL/L — SIGNIFICANT CHANGE UP (ref 22–29)
CREAT SERPL-MCNC: 0.53 MG/DL — SIGNIFICANT CHANGE UP (ref 0.5–1.3)
EGFR: 113 ML/MIN/1.73M2 — SIGNIFICANT CHANGE UP
GLUCOSE BLDC GLUCOMTR-MCNC: 113 MG/DL — HIGH (ref 70–99)
GLUCOSE BLDC GLUCOMTR-MCNC: 114 MG/DL — HIGH (ref 70–99)
GLUCOSE BLDC GLUCOMTR-MCNC: 130 MG/DL — HIGH (ref 70–99)
GLUCOSE BLDC GLUCOMTR-MCNC: 155 MG/DL — HIGH (ref 70–99)
GLUCOSE BLDC GLUCOMTR-MCNC: 98 MG/DL — SIGNIFICANT CHANGE UP (ref 70–99)
GLUCOSE SERPL-MCNC: 103 MG/DL — HIGH (ref 70–99)
HCT VFR BLD CALC: 29.8 % — LOW (ref 39–50)
HGB BLD-MCNC: 9.5 G/DL — LOW (ref 13–17)
INR BLD: 1.32 RATIO — HIGH (ref 0.85–1.18)
MAGNESIUM SERPL-MCNC: 1.7 MG/DL — SIGNIFICANT CHANGE UP (ref 1.6–2.6)
MCHC RBC-ENTMCNC: 30.1 PG — SIGNIFICANT CHANGE UP (ref 27–34)
MCHC RBC-ENTMCNC: 31.9 GM/DL — LOW (ref 32–36)
MCV RBC AUTO: 94.3 FL — SIGNIFICANT CHANGE UP (ref 80–100)
PHOSPHATE SERPL-MCNC: 3.2 MG/DL — SIGNIFICANT CHANGE UP (ref 2.4–4.7)
PLATELET # BLD AUTO: 267 K/UL — SIGNIFICANT CHANGE UP (ref 150–400)
POTASSIUM SERPL-MCNC: 3.4 MMOL/L — LOW (ref 3.5–5.3)
POTASSIUM SERPL-SCNC: 3.4 MMOL/L — LOW (ref 3.5–5.3)
PROT SERPL-MCNC: 5.7 G/DL — LOW (ref 6.6–8.7)
PROTHROM AB SERPL-ACNC: 14.5 SEC — HIGH (ref 9.5–13)
RBC # BLD: 3.16 M/UL — LOW (ref 4.2–5.8)
RBC # FLD: 14.5 % — SIGNIFICANT CHANGE UP (ref 10.3–14.5)
SODIUM SERPL-SCNC: 140 MMOL/L — SIGNIFICANT CHANGE UP (ref 135–145)
WBC # BLD: 7.31 K/UL — SIGNIFICANT CHANGE UP (ref 3.8–10.5)
WBC # FLD AUTO: 7.31 K/UL — SIGNIFICANT CHANGE UP (ref 3.8–10.5)

## 2023-09-29 PROCEDURE — 99232 SBSQ HOSP IP/OBS MODERATE 35: CPT

## 2023-09-29 PROCEDURE — 62141 CRNOP SKULL DEFECT>5 CM DIAM: CPT | Mod: 58

## 2023-09-29 DEVICE — SURGICEL 2 X 14": Type: IMPLANTABLE DEVICE | Site: LEFT | Status: FUNCTIONAL

## 2023-09-29 DEVICE — GRAFT DURAL MATRX 4X5IN DURGN: Type: IMPLANTABLE DEVICE | Site: LEFT | Status: FUNCTIONAL

## 2023-09-29 DEVICE — SCREW UN3 AXS SELF DRILL 1.5X4MM: Type: IMPLANTABLE DEVICE | Site: LEFT | Status: FUNCTIONAL

## 2023-09-29 DEVICE — SURGICEL FIBRILLAR 4 X 4": Type: IMPLANTABLE DEVICE | Site: LEFT | Status: FUNCTIONAL

## 2023-09-29 DEVICE — PLATE UN3 RECTANGLE: Type: IMPLANTABLE DEVICE | Site: LEFT | Status: FUNCTIONAL

## 2023-09-29 DEVICE — PLATE UN3 BOX LRG: Type: IMPLANTABLE DEVICE | Site: LEFT | Status: FUNCTIONAL

## 2023-09-29 DEVICE — STRYKER PEEK PRIORITY LARGE: Type: IMPLANTABLE DEVICE | Site: LEFT | Status: FUNCTIONAL

## 2023-09-29 DEVICE — FLOSEAL WITH RECOTHROM THROMBIN 10ML: Type: IMPLANTABLE DEVICE | Site: LEFT | Status: FUNCTIONAL

## 2023-09-29 DEVICE — SURGIFOAM PAD 8CM X 12.5CM X 10MM (100): Type: IMPLANTABLE DEVICE | Site: LEFT | Status: FUNCTIONAL

## 2023-09-29 RX ORDER — LABETALOL HCL 100 MG
10 TABLET ORAL
Refills: 0 | Status: DISCONTINUED | OUTPATIENT
Start: 2023-09-29 | End: 2023-10-18

## 2023-09-29 RX ORDER — HYDRALAZINE HCL 50 MG
10 TABLET ORAL
Refills: 0 | Status: DISCONTINUED | OUTPATIENT
Start: 2023-09-29 | End: 2023-10-18

## 2023-09-29 RX ORDER — ACETAMINOPHEN 500 MG
1000 TABLET ORAL EVERY 6 HOURS
Refills: 0 | Status: DISCONTINUED | OUTPATIENT
Start: 2023-09-29 | End: 2023-10-18

## 2023-09-29 RX ORDER — SODIUM CHLORIDE 9 MG/ML
1000 INJECTION INTRAMUSCULAR; INTRAVENOUS; SUBCUTANEOUS
Refills: 0 | Status: DISCONTINUED | OUTPATIENT
Start: 2023-09-29 | End: 2023-10-01

## 2023-09-29 RX ORDER — OXYCODONE HYDROCHLORIDE 5 MG/1
5 TABLET ORAL EVERY 4 HOURS
Refills: 0 | Status: DISCONTINUED | OUTPATIENT
Start: 2023-09-29 | End: 2023-09-29

## 2023-09-29 RX ADMIN — LEVETIRACETAM 400 MILLIGRAM(S): 250 TABLET, FILM COATED ORAL at 06:55

## 2023-09-29 RX ADMIN — CHLORHEXIDINE GLUCONATE 1 APPLICATION(S): 213 SOLUTION TOPICAL at 06:51

## 2023-09-29 RX ADMIN — SODIUM CHLORIDE 75 MILLILITER(S): 9 INJECTION INTRAMUSCULAR; INTRAVENOUS; SUBCUTANEOUS at 23:40

## 2023-09-29 RX ADMIN — SODIUM CHLORIDE 70 MILLILITER(S): 9 INJECTION, SOLUTION INTRAVENOUS at 06:43

## 2023-09-29 RX ADMIN — LEVETIRACETAM 400 MILLIGRAM(S): 250 TABLET, FILM COATED ORAL at 18:21

## 2023-09-29 NOTE — BRIEF OPERATIVE NOTE - NSICDXBRIEFPREOP_GEN_ALL_CORE_FT
PRE-OP DIAGNOSIS:  Status post craniectomy 14-Oct-2023 20:14:09  Kiera Bah  
PRE-OP DIAGNOSIS:  Arterial ischemic stroke, MCA (middle cerebral artery), left, acute 30-Aug-2023 10:33:11  Jose Warner

## 2023-09-29 NOTE — BRIEF OPERATIVE NOTE - NSICDXBRIEFPOSTOP_GEN_ALL_CORE_FT
POST-OP DIAGNOSIS:  Status post craniectomy 14-Oct-2023 20:14:22  Kiera Bah  
POST-OP DIAGNOSIS:  Arterial ischemic stroke, MCA (middle cerebral artery), left, acute 30-Aug-2023 10:33:23  Jose Warner

## 2023-09-29 NOTE — CHART NOTE - NSCHARTNOTEFT_GEN_A_CORE
H&P Update    62 y/o M w/ PMHx significant for HTN, DM, presented to [facility] 8/27 with right hemiparesis and aphasia, found with LM1 occlusion, s/p IV Tenecteplase 12:15 PM 8/27/23 and mechanical thrombectomy 8/27/23 with TICI 2C reperfusion, evolving stroke causing mass effect subsequently s/p left hemicraniectomy 8/30/23 now preopped for L cranioplasty and complex PRS closure.    Exam:  awake, alert  nods appropriately to name, hospital, and year  LUE follows commands at least antigravity  LLE follows commands at least antigravity  no movement in RUE/RLE  flap sunken  incisions c/d/i    Discussed risks and benefits of surgery with patient's nephew Jose who is patient's only family. Benefits include replacement of bone flap with PEEK implant. Risks include but are not limited to bleeding, pain, infection, motor/sensory/cognitive deficits, stroke, coma, paralysis, death and need for repeat or further surgery. Family expressed understanding of the risks and benefits and wish to proceed.    I reviewed the patient’s imaging before the case. I confirmed with the patient the planned surgical site and side of the procedure. I confirmed that the appropriate imaging demonstrating the surgical side and site was available and present for the planned case. The present imaging confirmed the surgical indication for the planned surgery. The secondary provider reviewing the imaging was Dr. Marquita Deutsch.    Plan:  -proceed with OR as planned    -Kiera Bah MD

## 2023-09-29 NOTE — CHART NOTE - NSCHARTNOTEFT_GEN_A_CORE
Pt retaining >500cc and longo not draining despite attempts to flush  new longo placed by RN overnight.

## 2023-09-29 NOTE — PROGRESS NOTE ADULT - ASSESSMENT
63M PMH of DM2, HTN presents with fall, right sided weakness, dysarthria and right facial droop found to have LM1 occlusion s/p TNK in ER subsequent mechanical thrombectomy and decompressive craniectomy for midline shift. SAMARA negative for thrombus. Cardiology recommending ILR /MCOT as outpatient. Extubated on 9/1/23, monitored in ICU and transferred to medicine for further management.     #CVA + antiphospholipid syndrome (embolic stroke+ positive antiphospholipid antibody)- Started on AC  - AC cleared by Neuro Sx and Hematology recommended Eliquis BID- will use lovenox BID due to episode of hematuria  - s/p Hemicraniectomy-c/w ASA, lipitor- Rehab/ Impaired mobility and function   - SAMARA negative for thrombus, +atheroma of aortic arch and aorta  - CT angio of the neck and A/P did not show any acute pathologies, likely chronic atherosclerosis disease   - mcot/ILR prior to DC  - pt/ot/speech/ PM&R- cont   - keppra  - Repeat CT head today showing New small foci of hemorrhages in the left frontal and left occipital lobe. Chronic large left MCA territory infarction. New extradural hemorrhage along the left temporal convexity measuring 1.6 x 1.4 cm. New posterior left temporal scalp hemorrhage with a fluid/fluid level measures 3.2 x 3.3 cm; NSGY requested repeat CTH in 6 hours: intraparenchymal and extra-axial hemorrhagic collections, which overall are stable in appearance compared with the earlier examination dated 9/26/2023 at approximately 1:05 PM; d/c'd lovenox  - per NSGY, OR Friday 9/29 per NSGY; patient medically optimized for planned surgery:  finished abx yesterday, has been afebrile, hemodynamically stable, wbc wnl on labs, sodium 140 on labs yesterday, LFTs markedly improved   - NPO since midnight, q6h fingersticks while NPO    #Klebsiella Bacteremia, treated   - leukocytosis normalized 9/27  - completing Ceftriaxone per ID recommendations today 9/28  - f/u blood cx from 9/11 - negative to date    #urinary retention longo   - Flomax 0.8mg po daily  - bowel regimen  - TOV near discharge vs. ALDEN     #Hematuria- resolved   - Eliquis started and then had an episode of hematuria again and changed to lovenox  - pt noted with 1 episode of hematuria on 9/7, suspected 2/2 traumatic Longo,   - H/H stable, no hematuria noted, continue to monitor     #Constipation  - miralax/senna    #Dysphagia  - soft/bite sized diet w/ MODERATELY thick liquids w/ assistance   - MBS- note appreciated  - aspiration precautions  - eats with aids   - NPO for surgery today     #Hypernatremia -resolved  - PO fluid intake  - Na 136 9/28 labs     #Type 2DM -well controlled  - sliding scale, sugars well controlled     #Elevated LFTs- hold statin and hold Tylenol for now   - repeat LFTS fluctuation but stable, not drastically worsening or improving, continue to monitor periodically     #?Depressed Mood  - C/w Seroquel 25mg QHS    DVT: lovenox held since 9/26 per NSGY recs, on SCDs

## 2023-09-29 NOTE — PROGRESS NOTE ADULT - SUBJECTIVE AND OBJECTIVE BOX
[facility] Division of Hospital Medicine    Chief Complaint:      INTERVAL HISTORY:  Overnight, pt retaining urine >500cc on bladder scan, longo not flushing, replaced longo.    Patient seen and examined at bedside this morning. Pt sleeping but easily arousable, smiles, shakes hand with L hand. Moves L legs when prompted. Unable to engage in rest of ROS.     Per nurse, scant blood at urethral meatus, likely 2/2 traumatic longo insertion. Longo bag draining clear yellow urine, no hematuria. CBC with stable Hgb this am. Vitals stable, afebrile, HDS.     MEDICATIONS  (STANDING):  ceFAZolin  Injectable. 2000 milliGRAM(s) IV Push once  chlorhexidine 2% Cloths 1 Application(s) Topical <User Schedule>  chlorhexidine 4% Liquid 1 Application(s) Topical <User Schedule>  dextrose 5%. 1000 milliLiter(s) (50 mL/Hr) IV Continuous <Continuous>  dextrose 5%. 1000 milliLiter(s) (100 mL/Hr) IV Continuous <Continuous>  dextrose 50% Injectable 25 Gram(s) IV Push once  dextrose 50% Injectable 25 Gram(s) IV Push once  dextrose 50% Injectable 12.5 Gram(s) IV Push once  glucagon  Injectable 1 milliGRAM(s) IntraMuscular once  insulin lispro (ADMELOG) corrective regimen sliding scale   SubCutaneous three times a day before meals  lactated ringers. 1000 milliLiter(s) (70 mL/Hr) IV Continuous <Continuous>  levETIRAcetam  IVPB 500 milliGRAM(s) IV Intermittent once  levETIRAcetam  IVPB 500 milliGRAM(s) IV Intermittent every 12 hours  polyethylene glycol 3350 17 Gram(s) Oral two times a day  QUEtiapine 25 milliGRAM(s) Oral at bedtime  senna 2 Tablet(s) Oral at bedtime  tamsulosin 0.8 milliGRAM(s) Oral at bedtime    MEDICATIONS  (PRN):  albuterol/ipratropium for Nebulization 3 milliLiter(s) Nebulizer every 6 hours PRN Shortness of Breath and/or Wheezing  dextrose Oral Gel 15 Gram(s) Oral once PRN Blood Glucose LESS THAN 70 milliGRAM(s)/deciliter  hydrALAZINE Injectable 10 milliGRAM(s) IV Push every 2 hours PRN SBP >140  labetalol Injectable 10 milliGRAM(s) IV Push every 2 hours PRN SBP >140        I&O's Summary    28 Sep 2023 07:01  -  29 Sep 2023 07:00  --------------------------------------------------------  IN: 870 mL / OUT: 1250 mL / NET: -380 mL        PHYSICAL EXAM:  Vital Signs Last 24 Hrs  T(C): 37.2 (29 Sep 2023 11:32), Max: 37.2 (29 Sep 2023 11:32)  T(F): 99 (29 Sep 2023 11:32), Max: 99 (29 Sep 2023 11:32)  HR: 93 (29 Sep 2023 11:32) (92 - 100)  BP: 109/71 (29 Sep 2023 11:32) (107/68 - 130/91)  BP(mean): 95 (28 Sep 2023 12:00) (95 - 95)  RR: 18 (29 Sep 2023 11:32) (17 - 18)  SpO2: 94% (29 Sep 2023 11:32) (92% - 98%)    Parameters below as of 29 Sep 2023 11:32  Patient On (Oxygen Delivery Method): room air        CONSTITUTIONAL: No apparent distress  HEENT: scalp incision clear and dry  RESPIRATORY:  lungs are clear to auscultation bilaterally, No crackles, rhonchi, wheezes  CARDIOVASCULAR: Regular rate and rhythm, normal S1 and S2, no murmur/rub/gallop; No lower extremity edema; Peripheral pulses are 2+ bilaterally  ABDOMEN: Soft, non-distended, nontender to palpation, +BS  MUSCLOSKELETAL: R hemiparesis   NEUROLOGY: Alert  SKIN: No obvious rashes    LABS:                        9.5    7.31  )-----------( 267      ( 29 Sep 2023 05:43 )             29.8     09-29    140  |  103  |  8.7  ----------------------------<  103<H>  3.4<L>   |  25.0  |  0.53    Ca    8.7      29 Sep 2023 05:43  Phos  3.2     09-29  Mg     1.7     09-29    TPro  5.7<L>  /  Alb  2.6<L>  /  TBili  0.4  /  DBili  0.1  /  AST  74<H>  /  ALT  157<H>  /  AlkPhos  118  09-29    PT/INR - ( 29 Sep 2023 05:43 )   PT: 14.5 sec;   INR: 1.32 ratio         PTT - ( 29 Sep 2023 05:43 )  PTT:28.3 sec      Urinalysis Basic - ( 29 Sep 2023 05:43 )    Color: x / Appearance: x / SG: x / pH: x  Gluc: 103 mg/dL / Ketone: x  / Bili: x / Urobili: x   Blood: x / Protein: x / Nitrite: x   Leuk Esterase: x / RBC: x / WBC x   Sq Epi: x / Non Sq Epi: x / Bacteria: x        CAPILLARY BLOOD GLUCOSE      POCT Blood Glucose.: 130 mg/dL (29 Sep 2023 08:45)  POCT Blood Glucose.: 92 mg/dL (28 Sep 2023 21:25)  POCT Blood Glucose.: 170 mg/dL (28 Sep 2023 18:07)  POCT Blood Glucose.: 113 mg/dL (28 Sep 2023 13:52)        RADIOLOGY & ADDITIONAL TESTS:  Results Reviewed:   Imaging Personally Reviewed:  Electrocardiogram Personally Reviewed:

## 2023-09-29 NOTE — BRIEF OPERATIVE NOTE - OPERATION/FINDINGS
large amount of swelling intraop, s/p mannitol, on 3%, dural opening, epidural lei placed
relaxed underlying brain

## 2023-09-30 LAB
ANION GAP SERPL CALC-SCNC: 11 MMOL/L — SIGNIFICANT CHANGE UP (ref 5–17)
BUN SERPL-MCNC: 11.8 MG/DL — SIGNIFICANT CHANGE UP (ref 8–20)
CALCIUM SERPL-MCNC: 8.4 MG/DL — SIGNIFICANT CHANGE UP (ref 8.4–10.5)
CHLORIDE SERPL-SCNC: 102 MMOL/L — SIGNIFICANT CHANGE UP (ref 96–108)
CO2 SERPL-SCNC: 24 MMOL/L — SIGNIFICANT CHANGE UP (ref 22–29)
CREAT SERPL-MCNC: 0.56 MG/DL — SIGNIFICANT CHANGE UP (ref 0.5–1.3)
EGFR: 111 ML/MIN/1.73M2 — SIGNIFICANT CHANGE UP
GLUCOSE SERPL-MCNC: 144 MG/DL — HIGH (ref 70–99)
HCT VFR BLD CALC: 28.4 % — LOW (ref 39–50)
HGB BLD-MCNC: 9.1 G/DL — LOW (ref 13–17)
MAGNESIUM SERPL-MCNC: 1.8 MG/DL — SIGNIFICANT CHANGE UP (ref 1.6–2.6)
MCHC RBC-ENTMCNC: 29.7 PG — SIGNIFICANT CHANGE UP (ref 27–34)
MCHC RBC-ENTMCNC: 32 GM/DL — SIGNIFICANT CHANGE UP (ref 32–36)
MCV RBC AUTO: 92.8 FL — SIGNIFICANT CHANGE UP (ref 80–100)
PHOSPHATE SERPL-MCNC: 3.8 MG/DL — SIGNIFICANT CHANGE UP (ref 2.4–4.7)
PLATELET # BLD AUTO: 292 K/UL — SIGNIFICANT CHANGE UP (ref 150–400)
POTASSIUM SERPL-MCNC: 4.2 MMOL/L — SIGNIFICANT CHANGE UP (ref 3.5–5.3)
POTASSIUM SERPL-SCNC: 4.2 MMOL/L — SIGNIFICANT CHANGE UP (ref 3.5–5.3)
RBC # BLD: 3.06 M/UL — LOW (ref 4.2–5.8)
RBC # FLD: 14.7 % — HIGH (ref 10.3–14.5)
SODIUM SERPL-SCNC: 137 MMOL/L — SIGNIFICANT CHANGE UP (ref 135–145)
WBC # BLD: 11.44 K/UL — HIGH (ref 3.8–10.5)
WBC # FLD AUTO: 11.44 K/UL — HIGH (ref 3.8–10.5)

## 2023-09-30 PROCEDURE — 70450 CT HEAD/BRAIN W/O DYE: CPT | Mod: 26

## 2023-09-30 PROCEDURE — 99291 CRITICAL CARE FIRST HOUR: CPT | Mod: 24

## 2023-09-30 RX ORDER — MAGNESIUM SULFATE 500 MG/ML
2 VIAL (ML) INJECTION ONCE
Refills: 0 | Status: COMPLETED | OUTPATIENT
Start: 2023-09-30 | End: 2023-09-30

## 2023-09-30 RX ORDER — PHENYLEPHRINE HYDROCHLORIDE 10 MG/ML
0.2 INJECTION INTRAVENOUS
Qty: 40 | Refills: 0 | Status: DISCONTINUED | OUTPATIENT
Start: 2023-09-30 | End: 2023-10-01

## 2023-09-30 RX ORDER — MIDODRINE HYDROCHLORIDE 2.5 MG/1
5 TABLET ORAL EVERY 8 HOURS
Refills: 0 | Status: DISCONTINUED | OUTPATIENT
Start: 2023-09-30 | End: 2023-10-01

## 2023-09-30 RX ORDER — LEVETIRACETAM 250 MG/1
500 TABLET, FILM COATED ORAL EVERY 12 HOURS
Refills: 0 | Status: COMPLETED | OUTPATIENT
Start: 2023-09-30 | End: 2023-10-06

## 2023-09-30 RX ORDER — SODIUM CHLORIDE 9 MG/ML
500 INJECTION INTRAMUSCULAR; INTRAVENOUS; SUBCUTANEOUS ONCE
Refills: 0 | Status: COMPLETED | OUTPATIENT
Start: 2023-09-30 | End: 2023-09-30

## 2023-09-30 RX ADMIN — LEVETIRACETAM 400 MILLIGRAM(S): 250 TABLET, FILM COATED ORAL at 05:05

## 2023-09-30 RX ADMIN — Medication 400 MILLIGRAM(S): at 06:00

## 2023-09-30 RX ADMIN — Medication 400 MILLIGRAM(S): at 17:45

## 2023-09-30 RX ADMIN — SODIUM CHLORIDE 500 MILLILITER(S): 9 INJECTION INTRAMUSCULAR; INTRAVENOUS; SUBCUTANEOUS at 07:00

## 2023-09-30 RX ADMIN — Medication 1000 MILLIGRAM(S): at 18:45

## 2023-09-30 RX ADMIN — Medication 25 GRAM(S): at 05:07

## 2023-09-30 RX ADMIN — LEVETIRACETAM 400 MILLIGRAM(S): 250 TABLET, FILM COATED ORAL at 18:13

## 2023-09-30 RX ADMIN — Medication 1000 MILLIGRAM(S): at 06:15

## 2023-09-30 RX ADMIN — SODIUM CHLORIDE 75 MILLILITER(S): 9 INJECTION INTRAMUSCULAR; INTRAVENOUS; SUBCUTANEOUS at 18:13

## 2023-09-30 NOTE — PROGRESS NOTE ADULT - SUBJECTIVE AND OBJECTIVE BOX
63M previous LM1 occlusion requiring DHC now s/p Cranioplasty          24hr EVENTS:  - Weaned off Liam    ROS: [ ]  Unable to assess due to mental status   All other systems negative    -----------------------------------------------------------------------------------------------------------------------------------------------------------------------------------  ICU Vital Signs Last 24 Hrs  T(C): 37.2 (30 Sep 2023 20:26), Max: 37.2 (30 Sep 2023 20:26)  T(F): 98.9 (30 Sep 2023 20:26), Max: 98.9 (30 Sep 2023 20:26)  HR: 76 (30 Sep 2023 23:30) (61 - 93)  BP: 115/70 (30 Sep 2023 23:30) (84/67 - 130/78)  BP(mean): 83 (30 Sep 2023 23:30) (66 - 98)  ABP: 93/56 (30 Sep 2023 07:08) (57/45 - 118/61)  ABP(mean): 72 (30 Sep 2023 07:08) (31 - 87)  RR: 10 (30 Sep 2023 23:30) (7 - 21)  SpO2: 99% (30 Sep 2023 23:30) (93% - 100%)    O2 Parameters below as of 30 Sep 2023 23:00  Patient On (Oxygen Delivery Method): room air            I&O's Summary    29 Sep 2023 07:01  -  30 Sep 2023 07:00  --------------------------------------------------------  IN: 1425 mL / OUT: 725 mL / NET: 700 mL    30 Sep 2023 07:01  -  30 Sep 2023 23:38  --------------------------------------------------------  IN: 1197.2 mL / OUT: 790 mL / NET: 407.2 mL        MEDICATIONS  (STANDING):  levETIRAcetam  IVPB 500 milliGRAM(s) IV Intermittent every 12 hours  phenylephrine    Infusion 0.2 MICROgram(s)/kG/Min (6.09 mL/Hr) IV Continuous <Continuous>  sodium chloride 0.9%. 1000 milliLiter(s) (75 mL/Hr) IV Continuous <Continuous>      RESPIRATORY:        IMAGING:   Recent imaging studies were reviewed.    LAB RESULTS:                          9.1    11.44 )-----------( 292      ( 30 Sep 2023 03:00 )             28.4       PT/INR - ( 29 Sep 2023 05:43 )   PT: 14.5 sec;   INR: 1.32 ratio         PTT - ( 29 Sep 2023 05:43 )  PTT:28.3 sec    09-30    137  |  102  |  11.8  ----------------------------<  144<H>  4.2   |  24.0  |  0.56    Ca    8.4      30 Sep 2023 03:00  Phos  3.8     09-30  Mg     1.8     09-30    TPro  5.7<L>  /  Alb  2.6<L>  /  TBili  0.4  /  DBili  0.1  /  AST  74<H>  /  ALT  157<H>  /  AlkPhos  118  09-29            -----------------------------------------------------------------------------------------------------------------------------------------------------------------------------------    PHYSICAL EXAM:  General: Calm  HEENT: MMM  Neuro:  -Mental status- No acute distress  -CN- PERRL 3mm, EOMI, tongue midline, face symmetric    CV: RRR  Pulm: clear to auscultation  Abd: Soft, nontender, nondistended  Ext: no noted edema in lower ext  Skin: warm, dry

## 2023-09-30 NOTE — PROGRESS NOTE ADULT - SUBJECTIVE AND OBJECTIVE BOX
Post Op Check   HPI:  Patient is a 63 year old male with PMH Hypertension, DM on oral medications who presents c/o stroke-like symptoms. Per son pt was walking outside around 10:15am this morning when he suddenly fell. His son helped him up and noted that he was weak on the right side and not acting normally which prompted him to come to the ED. On arrival pt w/ obvious right sided arm weakness, confusion, dysarthria, facial droop suggestive of acute stroke. Code stroke initiated, found to have LM1 occlusion, given TNK @ 1215 and was taken for thrombectomy. Unable to provide ROS 2/2 aphasia  (27 Aug 2023 16:45)    INTERVAL HPI  9/29 S/p Left Cranioplasty with complex closure with plastic surgery     64 yo male found laying in bed NAD.     Vital Signs Last 24 Hrs  T(C): 36.7 (29 Sep 2023 23:00), Max: 37.2 (29 Sep 2023 11:32)  T(F): 98.1 (29 Sep 2023 23:00), Max: 99 (29 Sep 2023 11:32)  HR: 89 (29 Sep 2023 23:45) (87 - 96)  BP: 125/97 (29 Sep 2023 23:15) (101/65 - 132/103)  BP(mean): 105 (29 Sep 2023 23:15) (105 - 112)  RR: 9 (29 Sep 2023 23:45) (7 - 18)  SpO2: 95% (29 Sep 2023 23:45) (93% - 100%)    Parameters below as of 29 Sep 2023 23:00  Patient On (Oxygen Delivery Method): mask, nonrebreather    PHYSICAL EXAM:  GENERAL: NAD, well-groomed, well-developed  HEAD: Left Cranioplasty with drain present to full suction   DRAINS: In place and draining well   WOUND: Dressing clean dry intact  MENTAL STATUS: AAO x3; Awake/Comatose; Opens eyes spontaneously/to voice/to light touch/to noxious stimuli; Appropriately conversant without aphasia/Nonverbal; following simple commands/mimicking/not following commands  CRANIAL NERVES: PERRL; EOMI; corneals intact b/l; Dolls sign positive; blinks to threat b/l; no facial asymmetry; facial sensation grossly intact to light touch b/l;  tongue midline; palate rises symmetrically; gag reflex intact  MOTOR: strength 5/5 B/L upper and lower extremities; sensation grossly intact all extremities; DTRs 2+ intact and symmetric; negative Hernandez's b/l; negative clonus b/l  CHEST/LUNG: Clear to auscultation bilaterally; no rales, rhonchi, wheezing, or rubs  HEART: +S1/+S2; Regular rate and rhythm; no murmurs, rubs, or gallops  ABDOMEN: Soft, nontender, nondistended; bowel sounds present all four quadrants  EXTREMITIES:  2+ peripheral pulses, no clubbing, cyanosis, or edema  SKIN: Warm, dry; no rashes or lesions    LABS:                        9.5    7.31  )-----------( 267      ( 29 Sep 2023 05:43 )             29.8     09-29    140  |  103  |  8.7  ----------------------------<  103<H>  3.4<L>   |  25.0  |  0.53    Ca    8.7      29 Sep 2023 05:43  Phos  3.2     09-29  Mg     1.7     09-29    TPro  5.7<L>  /  Alb  2.6<L>  /  TBili  0.4  /  DBili  0.1  /  AST  74<H>  /  ALT  157<H>  /  AlkPhos  118  09-29    PT/INR - ( 29 Sep 2023 05:43 )   PT: 14.5 sec;   INR: 1.32 ratio       PTT - ( 29 Sep 2023 05:43 )  PTT:28.3 sec  Urinalysis Basic - ( 29 Sep 2023 05:43 )    Color: x / Appearance: x / SG: x / pH: x  Gluc: 103 mg/dL / Ketone: x  / Bili: x / Urobili: x   Blood: x / Protein: x / Nitrite: x   Leuk Esterase: x / RBC: x / WBC x   Sq Epi: x / Non Sq Epi: x / Bacteria: x    09-28 @ 07:01  -  09-29 @ 07:00  --------------------------------------------------------  IN: 870 mL / OUT: 1250 mL / NET: -380 mL    09-29 @ 07:01  -  09-30 @ 00:15  --------------------------------------------------------  IN: 150 mL / OUT: 125 mL / NET: 25 mL    RADIOLOGY & ADDITIONAL TESTS:  < from: CT Head No Cont (09.26.23 @ 20:38) >  IMPRESSION: Status post craniectomy in this patient with a large left MCA   territory infarct. Findings include intraparenchymal and extra-axial   hemorrhagic collections, which overall are stable in appearance compared   with the earlier examination dated 9/26/2023 at approximately 1:05 PM.    --- End of Report ---    DAWN M BEHR-VENTURA MD; Attending Radiologist  This document has been electronically signed. Sep 26 2023  8:56PM    < end of copied text >   Post Op Check   HPI:  Patient is a 63 year old male with PMH Hypertension, DM on oral medications who presents c/o stroke-like symptoms. Per son pt was walking outside around 10:15am this morning when he suddenly fell. His son helped him up and noted that he was weak on the right side and not acting normally which prompted him to come to the ED. On arrival pt w/ obvious right sided arm weakness, confusion, dysarthria, facial droop suggestive of acute stroke. Code stroke initiated, found to have LM1 occlusion, given TNK @ 1215 and was taken for thrombectomy. Unable to provide ROS 2/2 aphasia  (27 Aug 2023 16:45)    INTERVAL HPI  9/29 S/p Left Cranioplasty with complex closure with plastic surgery     62 yo male found laying in bed NAD.     Vital Signs Last 24 Hrs  T(C): 36.7 (29 Sep 2023 23:00), Max: 37.2 (29 Sep 2023 11:32)  T(F): 98.1 (29 Sep 2023 23:00), Max: 99 (29 Sep 2023 11:32)  HR: 89 (29 Sep 2023 23:45) (87 - 96)  BP: 125/97 (29 Sep 2023 23:15) (101/65 - 132/103)  BP(mean): 105 (29 Sep 2023 23:15) (105 - 112)  RR: 9 (29 Sep 2023 23:45) (7 - 18)  SpO2: 95% (29 Sep 2023 23:45) (93% - 100%)    Parameters below as of 29 Sep 2023 23:00  Patient On (Oxygen Delivery Method): mask, nonrebreather    PHYSICAL EXAM:  GENERAL: NAD  HEAD: Left Cranioplasty with drains present to full suction   DRAINS: In place and draining well   WOUND: Dressing clean dry intact  MENTAL STATUS: Awake, alert and following commands, Opens eyes spontaneously, following simple commands  CRANIAL NERVES: PERRL  MOTOR: Left Upper and lower antigravity, right side hemiplegia  EXTREMITIES:  2+ peripheral pulses, no clubbing, cyanosis, or edema  SKIN: Warm, dry; no rashes or lesions    LABS:                        9.5    7.31  )-----------( 267      ( 29 Sep 2023 05:43 )             29.8     09-29    140  |  103  |  8.7  ----------------------------<  103<H>  3.4<L>   |  25.0  |  0.53    Ca    8.7      29 Sep 2023 05:43  Phos  3.2     09-29  Mg     1.7     09-29    TPro  5.7<L>  /  Alb  2.6<L>  /  TBili  0.4  /  DBili  0.1  /  AST  74<H>  /  ALT  157<H>  /  AlkPhos  118  09-29    PT/INR - ( 29 Sep 2023 05:43 )   PT: 14.5 sec;   INR: 1.32 ratio       PTT - ( 29 Sep 2023 05:43 )  PTT:28.3 sec  Urinalysis Basic - ( 29 Sep 2023 05:43 )    Color: x / Appearance: x / SG: x / pH: x  Gluc: 103 mg/dL / Ketone: x  / Bili: x / Urobili: x   Blood: x / Protein: x / Nitrite: x   Leuk Esterase: x / RBC: x / WBC x   Sq Epi: x / Non Sq Epi: x / Bacteria: x    09-28 @ 07:01 - 09-29 @ 07:00  --------------------------------------------------------  IN: 870 mL / OUT: 1250 mL / NET: -380 mL    09-29 @ 07:01 - 09-30 @ 00:15  --------------------------------------------------------  IN: 150 mL / OUT: 125 mL / NET: 25 mL    RADIOLOGY & ADDITIONAL TESTS:  < from: CT Head No Cont (09.26.23 @ 20:38) >  IMPRESSION: Status post craniectomy in this patient with a large left MCA   territory infarct. Findings include intraparenchymal and extra-axial   hemorrhagic collections, which overall are stable in appearance compared   with the earlier examination dated 9/26/2023 at approximately 1:05 PM.    --- End of Report ---    DAWN M BEHR-VENTURA MD; Attending Radiologist  This document has been electronically signed. Sep 26 2023  8:56PM    < end of copied text >

## 2023-09-30 NOTE — PROGRESS NOTE ADULT - ASSESSMENT
Chief complaint:   Patient is a 63y old  Male who presents with a chief complaint of LT M1 Occlusion (30 Sep 2023 00:14)      This Patient is critically Ill due to the following diagnoses:      NEURO  #Cranioplasty  - Neurochecks  - Keppra Ppx    #Stroke  2/2 APLS  - restart AC when NSGY stable    CV  MAP >60, SBP 90 - 150. Use Midodrine if necessary to wean off pressors    PULM  SpO2 > 92%  IS as tolerated    GI  TF via NGT  Bowel Regemin    ID  Trend Fever/WBC    RENAL  Trend I/O  Replete Electrolytes PRN    ENDO  Glucose <180 with LSS    HEME  VTE Ppx: SCD, start SQL POD2. AC as above    DISPO: ICU    My full attention was spent providing medically necessary critical care to the patient with details documented in my note above.   Critical care time spent examining patient, reviewing vitals, labs, medications, imaging and discussing with the team goals of care   The combined critical care time provided to the patient was between 40 - 110 minutes  This time does not include bedside procedures that are documented separately.

## 2023-09-30 NOTE — PROGRESS NOTE ADULT - ASSESSMENT
62 yo male s/p a *** cranioplasty with Dr. Bah and complex closure with plastics    Plan:  -Medical management per nsicU  - Keppra 500mg BID x 7 days  -Ancef while drains in place  -Continue to monitor and record drain output q4  -Pain Control PRN  -Goal SBP < 160mm/Hg  -CTH post op pending  -Case and plan discussed with Dr. Bah  64 yo male s/p a Left cranioplasty with Dr. Bah and complex closure with plastics    Plan:  -Medical management per nsicU  - Keppra 500mg BID x 7 days  -Ancef while drains in place  -Continue to monitor and record drain output q4  -Pain Control PRN  -Goal SBP < 160mm/Hg  -CTH post op pending  -Case and plan discussed with Dr. Bah

## 2023-10-01 LAB
ANION GAP SERPL CALC-SCNC: 12 MMOL/L — SIGNIFICANT CHANGE UP (ref 5–17)
BUN SERPL-MCNC: 9.4 MG/DL — SIGNIFICANT CHANGE UP (ref 8–20)
CALCIUM SERPL-MCNC: 8 MG/DL — LOW (ref 8.4–10.5)
CHLORIDE SERPL-SCNC: 103 MMOL/L — SIGNIFICANT CHANGE UP (ref 96–108)
CO2 SERPL-SCNC: 23 MMOL/L — SIGNIFICANT CHANGE UP (ref 22–29)
CREAT SERPL-MCNC: 0.5 MG/DL — SIGNIFICANT CHANGE UP (ref 0.5–1.3)
EGFR: 115 ML/MIN/1.73M2 — SIGNIFICANT CHANGE UP
GLUCOSE SERPL-MCNC: 93 MG/DL — SIGNIFICANT CHANGE UP (ref 70–99)
HCT VFR BLD CALC: 26.2 % — LOW (ref 39–50)
HGB BLD-MCNC: 8.3 G/DL — LOW (ref 13–17)
MAGNESIUM SERPL-MCNC: 1.8 MG/DL — SIGNIFICANT CHANGE UP (ref 1.6–2.6)
MCHC RBC-ENTMCNC: 29.9 PG — SIGNIFICANT CHANGE UP (ref 27–34)
MCHC RBC-ENTMCNC: 31.7 GM/DL — LOW (ref 32–36)
MCV RBC AUTO: 94.2 FL — SIGNIFICANT CHANGE UP (ref 80–100)
PHOSPHATE SERPL-MCNC: 2.2 MG/DL — LOW (ref 2.4–4.7)
PLATELET # BLD AUTO: 297 K/UL — SIGNIFICANT CHANGE UP (ref 150–400)
POTASSIUM SERPL-MCNC: 3.4 MMOL/L — LOW (ref 3.5–5.3)
POTASSIUM SERPL-SCNC: 3.4 MMOL/L — LOW (ref 3.5–5.3)
RBC # BLD: 2.78 M/UL — LOW (ref 4.2–5.8)
RBC # FLD: 14.9 % — HIGH (ref 10.3–14.5)
SODIUM SERPL-SCNC: 138 MMOL/L — SIGNIFICANT CHANGE UP (ref 135–145)
WBC # BLD: 10.7 K/UL — HIGH (ref 3.8–10.5)
WBC # FLD AUTO: 10.7 K/UL — HIGH (ref 3.8–10.5)

## 2023-10-01 PROCEDURE — 99233 SBSQ HOSP IP/OBS HIGH 50: CPT

## 2023-10-01 PROCEDURE — 71045 X-RAY EXAM CHEST 1 VIEW: CPT | Mod: 26

## 2023-10-01 RX ORDER — POTASSIUM PHOSPHATE, MONOBASIC POTASSIUM PHOSPHATE, DIBASIC 236; 224 MG/ML; MG/ML
30 INJECTION, SOLUTION INTRAVENOUS ONCE
Refills: 0 | Status: COMPLETED | OUTPATIENT
Start: 2023-10-01 | End: 2023-10-01

## 2023-10-01 RX ORDER — SENNA PLUS 8.6 MG/1
1 TABLET ORAL EVERY 24 HOURS
Refills: 0 | Status: DISCONTINUED | OUTPATIENT
Start: 2023-10-01 | End: 2023-10-18

## 2023-10-01 RX ORDER — POTASSIUM CHLORIDE 20 MEQ
10 PACKET (EA) ORAL
Refills: 0 | Status: COMPLETED | OUTPATIENT
Start: 2023-10-01 | End: 2023-10-01

## 2023-10-01 RX ORDER — MIDODRINE HYDROCHLORIDE 2.5 MG/1
5 TABLET ORAL EVERY 8 HOURS
Refills: 0 | Status: DISCONTINUED | OUTPATIENT
Start: 2023-10-01 | End: 2023-10-18

## 2023-10-01 RX ORDER — POTASSIUM CHLORIDE 20 MEQ
30 PACKET (EA) ORAL
Refills: 0 | Status: DISCONTINUED | OUTPATIENT
Start: 2023-10-01 | End: 2023-10-01

## 2023-10-01 RX ORDER — POLYETHYLENE GLYCOL 3350 17 G/17G
17 POWDER, FOR SOLUTION ORAL DAILY
Refills: 0 | Status: DISCONTINUED | OUTPATIENT
Start: 2023-10-01 | End: 2023-10-18

## 2023-10-01 RX ORDER — ENOXAPARIN SODIUM 100 MG/ML
40 INJECTION SUBCUTANEOUS EVERY 24 HOURS
Refills: 0 | Status: DISCONTINUED | OUTPATIENT
Start: 2023-10-01 | End: 2023-10-10

## 2023-10-01 RX ORDER — SODIUM CHLORIDE 9 MG/ML
1000 INJECTION INTRAMUSCULAR; INTRAVENOUS; SUBCUTANEOUS
Refills: 0 | Status: DISCONTINUED | OUTPATIENT
Start: 2023-10-01 | End: 2023-10-16

## 2023-10-01 RX ADMIN — Medication 100 MILLIEQUIVALENT(S): at 08:42

## 2023-10-01 RX ADMIN — Medication 100 MILLIEQUIVALENT(S): at 11:33

## 2023-10-01 RX ADMIN — POTASSIUM PHOSPHATE, MONOBASIC POTASSIUM PHOSPHATE, DIBASIC 83.33 MILLIMOLE(S): 236; 224 INJECTION, SOLUTION INTRAVENOUS at 05:39

## 2023-10-01 RX ADMIN — LEVETIRACETAM 400 MILLIGRAM(S): 250 TABLET, FILM COATED ORAL at 05:07

## 2023-10-01 RX ADMIN — Medication 100 MILLIEQUIVALENT(S): at 10:03

## 2023-10-01 RX ADMIN — LEVETIRACETAM 400 MILLIGRAM(S): 250 TABLET, FILM COATED ORAL at 17:25

## 2023-10-01 RX ADMIN — MIDODRINE HYDROCHLORIDE 5 MILLIGRAM(S): 2.5 TABLET ORAL at 14:36

## 2023-10-01 RX ADMIN — POLYETHYLENE GLYCOL 3350 17 GRAM(S): 17 POWDER, FOR SOLUTION ORAL at 11:34

## 2023-10-01 RX ADMIN — SENNA PLUS 1 TABLET(S): 8.6 TABLET ORAL at 11:34

## 2023-10-01 RX ADMIN — ENOXAPARIN SODIUM 40 MILLIGRAM(S): 100 INJECTION SUBCUTANEOUS at 17:25

## 2023-10-01 RX ADMIN — MIDODRINE HYDROCHLORIDE 5 MILLIGRAM(S): 2.5 TABLET ORAL at 05:06

## 2023-10-01 NOTE — PROGRESS NOTE ADULT - NS ATTEND OPT1A GEN_ALL_CORE
History/Exam/Medical decision making
Exam/Medical decision making
History/Exam/Medical decision making
History/Medical decision making

## 2023-10-01 NOTE — CHART NOTE - NSCHARTNOTEFT_GEN_A_CORE
HPI:  HPI:  Patient is a 63 year old male with PMH Hypertension, DM on oral medications who presents c/o stroke-like symptoms. Per son pt was walking outside around 10:15am this morning when he suddenly fell. His son helped him up and noted that he was weak on the right side and not acting normally which prompted him to come to the ED. On arrival pt w/ obvious right sided arm weakness, confusion, dysarthria, facial droop suggestive of acute stroke. Code stroke initiated, found to have LM1 occlusion, given TNK @ 1215 and was taken for thrombectomy. Unable to provide ROS 2/2 aphasia  (27 Aug 2023 16:45)    Events:   patient admitted, found to have a L m1 occlusion, given tenecteplase, thrombectomY TICI 2c. S/p hemicrani 8/30, L cranioplaty 09/29    Vital Signs Last 24 Hrs  T(C): 37.1 (01 Oct 2023 15:44), Max: 37.3 (01 Oct 2023 03:25)  T(F): 98.8 (01 Oct 2023 15:44), Max: 99.1 (01 Oct 2023 03:25)  HR: 88 (01 Oct 2023 16:00) (66 - 99)  BP: 132/76 (01 Oct 2023 16:00) (89/72 - 135/85)  BP(mean): 91 (01 Oct 2023 16:00) (76 - 101)  RR: 22 (01 Oct 2023 16:00) (9 - 22)  SpO2: 100% (01 Oct 2023 16:00) (90% - 100%)    Parameters below as of 01 Oct 2023 16:00  Patient On (Oxygen Delivery Method): room air        PHYSICAL EXAM:  General: Calm,  HEENT: MMM  Neuro:  -Mental status- No acute distress, EO spont, FC  tracking  expressive aphasia  -CN- PERRL 3mm, EOMI, tongue midline, R facial droop  full strength in all ext    CV: RRR  Pulm: clear to auscultation  Abd: Soft, nontender, nondistended  Ext: no noted edema in lower ext  Skin: warm, dry  LABS:                        8.3    10.70 )-----------( 297      ( 01 Oct 2023 02:53 )             26.2     10-01    138  |  103  |  9.4  ----------------------------<  93  3.4<L>   |  23.0  |  0.50    Ca    8.0<L>      01 Oct 2023 02:53  Phos  2.2     10-01  Mg     1.8     10-01        Urinalysis Basic - ( 01 Oct 2023 02:53 )    Color: x / Appearance: x / SG: x / pH: x  Gluc: 93 mg/dL / Ketone: x  / Bili: x / Urobili: x   Blood: x / Protein: x / Nitrite: x   Leuk Esterase: x / RBC: x / WBC x   Sq Epi: x / Non Sq Epi: x / Bacteria: x        09-30 @ 07:01  -  10-01 @ 07:00  --------------------------------------------------------  IN: 2243.8 mL / OUT: 1930 mL / NET: 313.8 mL    10-01 @ 07:01  -  10-01 @ 16:28  --------------------------------------------------------  IN: 1308.2 mL / OUT: 660 mL / NET: 648.2 mL        RADIOLOGY & ADDITIONAL TESTS:  < from: CT Head No Cont (09.30.23 @ 01:35) >      IMPRESSION: New left frontal prosthetic cranioplasty since 9/26/2023 with   a small amount of fluid, air and hemorrhage. No significant mass effect.   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   Decreased left scalp hematoma.    --- End of Report ---    < end of copied text >    < from: CT Head No Cont (09.26.23 @ 20:38) >    IMPRESSION: Status post craniectomy in this patient with a large left MCA   territory infarct. Findings include intraparenchymal and extra-axial   hemorrhagic collections, which overall are stable in appearance compared   with the earlier examination dated 9/26/2023 at approximately 1:05 PM.    --- End of Report ---    < end of copied text > HPI:  HPI:  Patient is a 63 year old male with PMH Hypertension, DM on oral medications who presents c/o stroke-like symptoms. Per son pt was walking outside around 10:15am this morning when he suddenly fell. His son helped him up and noted that he was weak on the right side and not acting normally which prompted him to come to the ED. On arrival pt w/ obvious right sided arm weakness, confusion, dysarthria, facial droop suggestive of acute stroke. Code stroke initiated, found to have LM1 occlusion, given TNK @ 1215 and was taken for thrombectomy. Unable to provide ROS 2/2 aphasia  (27 Aug 2023 16:45)    Events:   patient admitted, found to have a L m1 occlusion, given tenecteplase, thrombectomY TICI 2c. S/p hemicrani 8/30, L cranioplaty 09/29. patient initially on beata in Neuroic[facility] POD0, liberalized to MAP>60.DG to Medicine under Dr. Baldwin, with NSG following .    Vital Signs Last 24 Hrs  T(C): 37.1 (01 Oct 2023 15:44), Max: 37.3 (01 Oct 2023 03:25)  T(F): 98.8 (01 Oct 2023 15:44), Max: 99.1 (01 Oct 2023 03:25)  HR: 88 (01 Oct 2023 16:00) (66 - 99)  BP: 132/76 (01 Oct 2023 16:00) (89/72 - 135/85)  BP(mean): 91 (01 Oct 2023 16:00) (76 - 101)  RR: 22 (01 Oct 2023 16:00) (9 - 22)  SpO2: 100% (01 Oct 2023 16:00) (90% - 100%)    Parameters below as of 01 Oct 2023 16:00  Patient On (Oxygen Delivery Method): room air        PHYSICAL EXAM:  General: Calm,  HEENT: MMM  Neuro:  -Mental status- No acute distress, EO spont, FC  tracking  expressive aphasia  -CN- PERRL 3mm, EOMI, tongue midline, R facial droop  full strength in all ext    CV: RRR  Pulm: clear to auscultation  Abd: Soft, nontender, nondistended  Ext: no noted edema in lower ext  Skin: warm, dry  LABS:                        8.3    10.70 )-----------( 297      ( 01 Oct 2023 02:53 )             26.2     10-01    138  |  103  |  9.4  ----------------------------<  93  3.4<L>   |  23.0  |  0.50    Ca    8.0<L>      01 Oct 2023 02:53  Phos  2.2     10-01  Mg     1.8     10-01        Urinalysis Basic - ( 01 Oct 2023 02:53 )    Color: x / Appearance: x / SG: x / pH: x  Gluc: 93 mg/dL / Ketone: x  / Bili: x / Urobili: x   Blood: x / Protein: x / Nitrite: x   Leuk Esterase: x / RBC: x / WBC x   Sq Epi: x / Non Sq Epi: x / Bacteria: x        09-30 @ 07:01  -  10-01 @ 07:00  --------------------------------------------------------  IN: 2243.8 mL / OUT: 1930 mL / NET: 313.8 mL    10-01 @ 07:01  -  10-01 @ 16:28  --------------------------------------------------------  IN: 1308.2 mL / OUT: 660 mL / NET: 648.2 mL        RADIOLOGY & ADDITIONAL TESTS:  < from: CT Head No Cont (09.30.23 @ 01:35) >      IMPRESSION: New left frontal prosthetic cranioplasty since 9/26/2023 with   a small amount of fluid, air and hemorrhage. No significant mass effect.   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   Decreased left scalp hematoma.    --- End of Report ---    < end of copied text >    < from: CT Head No Cont (09.26.23 @ 20:38) >    IMPRESSION: Status post craniectomy in this patient with a large left MCA   territory infarct. Findings include intraparenchymal and extra-axial   hemorrhagic collections, which overall are stable in appearance compared   with the earlier examination dated 9/26/2023 at approximately 1:05 PM.    --- End of Report ---    < end of copied text >    Plan:   Patient is a 63y old  Male who presents with a chief complaint of LT M1 Occlusion. now POD 2 cranioplasty  Cranioplasty POD 2    NEURO- Cranioplasty POD 2  - Neurochecks q2h, stepdown. DG to medicine under Dr. Baldwin  neurousrgery following, reccs appreciated   - Keppra Ppx  - SHUN drains per plastics- 20cc overnight    #Stroke  2/2 APLS  - eventually restart AC when NSGY stable    CV  MAP >60, SBP 90 - 150.   midodrine until stable BP, wean as tolerated    PULM  SpO2 > 92%  IS as tolerated    GI  puree/mod thick  start Bowel Reg  LBM 9/29    ID  Trend Fever/WBC  afebrile    RENAL  Trend I/O  Replete Electrolytes PRN  voiding    ENDO  Glucose <180 with LSS    HEME  VTE Ppx: SCD, start SQL POD2. AC as above HPI:  HPI:  Patient is a 63 year old male with PMH Hypertension, DM on oral medications who presents c/o stroke-like symptoms. Per son pt was walking outside around 10:15am this morning when he suddenly fell. His son helped him up and noted that he was weak on the right side and not acting normally which prompted him to come to the ED. On arrival pt w/ obvious right sided arm weakness, confusion, dysarthria, facial droop suggestive of acute stroke. Code stroke initiated, found to have LM1 occlusion, given TNK @ 1215 and was taken for thrombectomy. Unable to provide ROS 2/2 aphasia  (27 Aug 2023 16:45)    Events:   patient admitted, found to have a L m1 occlusion, given tenecteplase, thrombectomY TICI 2c. S/p hemicrani 8/30, L cranioplaty 09/29. patient initially on beata in Neuroic[facility] POD0, liberalized to MAP>60.DG to Medicine under Dr. Baldwin, with NSG following .    Vital Signs Last 24 Hrs  T(C): 37.1 (01 Oct 2023 15:44), Max: 37.3 (01 Oct 2023 03:25)  T(F): 98.8 (01 Oct 2023 15:44), Max: 99.1 (01 Oct 2023 03:25)  HR: 88 (01 Oct 2023 16:00) (66 - 99)  BP: 132/76 (01 Oct 2023 16:00) (89/72 - 135/85)  BP(mean): 91 (01 Oct 2023 16:00) (76 - 101)  RR: 22 (01 Oct 2023 16:00) (9 - 22)  SpO2: 100% (01 Oct 2023 16:00) (90% - 100%)    Parameters below as of 01 Oct 2023 16:00  Patient On (Oxygen Delivery Method): room air        PHYSICAL EXAM:  General: Calm,  HEENT: MMM  Neuro:  -Mental status- No acute distress, EO spont, FC  tracking  expressive aphasia  -CN- PERRL 3mm, EOMI, tongue midline, R facial droop  R side hemiplegia, LUE/LLE AG   CV: RRR  Pulm: clear to auscultation  Abd: Soft, nontender, nondistended  Ext: no noted edema in lower ext  Skin: warm, dry  LABS:                        8.3    10.70 )-----------( 297      ( 01 Oct 2023 02:53 )             26.2     10-01    138  |  103  |  9.4  ----------------------------<  93  3.4<L>   |  23.0  |  0.50    Ca    8.0<L>      01 Oct 2023 02:53  Phos  2.2     10-01  Mg     1.8     10-01        Urinalysis Basic - ( 01 Oct 2023 02:53 )    Color: x / Appearance: x / SG: x / pH: x  Gluc: 93 mg/dL / Ketone: x  / Bili: x / Urobili: x   Blood: x / Protein: x / Nitrite: x   Leuk Esterase: x / RBC: x / WBC x   Sq Epi: x / Non Sq Epi: x / Bacteria: x        09-30 @ 07:01  -  10-01 @ 07:00  --------------------------------------------------------  IN: 2243.8 mL / OUT: 1930 mL / NET: 313.8 mL    10-01 @ 07:01  -  10-01 @ 16:28  --------------------------------------------------------  IN: 1308.2 mL / OUT: 660 mL / NET: 648.2 mL        RADIOLOGY & ADDITIONAL TESTS:  < from: CT Head No Cont (09.30.23 @ 01:35) >      IMPRESSION: New left frontal prosthetic cranioplasty since 9/26/2023 with   a small amount of fluid, air and hemorrhage. No significant mass effect.   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   Decreased left scalp hematoma.    --- End of Report ---    < end of copied text >    < from: CT Head No Cont (09.26.23 @ 20:38) >    IMPRESSION: Status post craniectomy in this patient with a large left MCA   territory infarct. Findings include intraparenchymal and extra-axial   hemorrhagic collections, which overall are stable in appearance compared   with the earlier examination dated 9/26/2023 at approximately 1:05 PM.    --- End of Report ---    < end of copied text >    Plan:   Patient is a 63y old  Male who presents with a chief complaint of LT M1 Occlusion. now POD 2 cranioplasty  Cranioplasty POD 2    NEURO- Cranioplasty POD 2  - Neurochecks q2h, stepdown. DG to medicine under Dr. Baldwin  neurousrgery following, reccs appreciated   - Keppra Ppx  - SHUN drains per plastics- 20cc overnight    #Stroke  2/2 APLS  - eventually restart AC when NSGY stable    CV  MAP >60, SBP 90 - 150.   midodrine until stable BP, wean as tolerated    PULM  SpO2 > 92%  IS as tolerated    GI  puree/mod thick  start Bowel Reg  LBM 9/29    ID  Trend Fever/WBC  afebrile    RENAL  Trend I/O  Replete Electrolytes PRN  voiding    ENDO  Glucose <180 with LSS    HEME  VTE Ppx: SCD, start SQL POD2. AC as above HPI:  HPI:  Patient is a 63 year old male with PMH Hypertension, DM on oral medications who presents c/o stroke-like symptoms. Per son pt was walking outside around 10:15am this morning when he suddenly fell. His son helped him up and noted that he was weak on the right side and not acting normally which prompted him to come to the ED. On arrival pt w/ obvious right sided arm weakness, confusion, dysarthria, facial droop suggestive of acute stroke. Code stroke initiated, found to have LM1 occlusion, given TNK @ 1215 and was taken for thrombectomy. Unable to provide ROS 2/2 aphasia  (27 Aug 2023 16:45)    Events:   patient admitted, found to have a L m1 occlusion, given tenecteplase, thrombectomY TICI 2c. S/p hemicrani 8/30, L cranioplaty 09/29. patient initially on beata in Neuroicu POD0, liberalized to MAP>60.DG to Medicine under Dr. Wang with NSG following .    Vital Signs Last 24 Hrs  T(C): 37.1 (01 Oct 2023 15:44), Max: 37.3 (01 Oct 2023 03:25)  T(F): 98.8 (01 Oct 2023 15:44), Max: 99.1 (01 Oct 2023 03:25)  HR: 88 (01 Oct 2023 16:00) (66 - 99)  BP: 132/76 (01 Oct 2023 16:00) (89/72 - 135/85)  BP(mean): 91 (01 Oct 2023 16:00) (76 - 101)  RR: 22 (01 Oct 2023 16:00) (9 - 22)  SpO2: 100% (01 Oct 2023 16:00) (90% - 100%)    Parameters below as of 01 Oct 2023 16:00  Patient On (Oxygen Delivery Method): room air        PHYSICAL EXAM:  General: Calm,  HEENT: MMM  Neuro:  -Mental status- No acute distress, EO spont, FC  tracking  expressive aphasia  -CN- PERRL 3mm, EOMI, tongue midline, R facial droop  R side hemiplegia, LUE/LLE AG   CV: RRR  Pulm: clear to auscultation  Abd: Soft, nontender, nondistended  Ext: no noted edema in lower ext  Skin: warm, dry  LABS:                        8.3    10.70 )-----------( 297      ( 01 Oct 2023 02:53 )             26.2     10-01    138  |  103  |  9.4  ----------------------------<  93  3.4<L>   |  23.0  |  0.50    Ca    8.0<L>      01 Oct 2023 02:53  Phos  2.2     10-01  Mg     1.8     10-01        Urinalysis Basic - ( 01 Oct 2023 02:53 )    Color: x / Appearance: x / SG: x / pH: x  Gluc: 93 mg/dL / Ketone: x  / Bili: x / Urobili: x   Blood: x / Protein: x / Nitrite: x   Leuk Esterase: x / RBC: x / WBC x   Sq Epi: x / Non Sq Epi: x / Bacteria: x        09-30 @ 07:01  -  10-01 @ 07:00  --------------------------------------------------------  IN: 2243.8 mL / OUT: 1930 mL / NET: 313.8 mL    10-01 @ 07:01  -  10-01 @ 16:28  --------------------------------------------------------  IN: 1308.2 mL / OUT: 660 mL / NET: 648.2 mL        RADIOLOGY & ADDITIONAL TESTS:  < from: CT Head No Cont (09.30.23 @ 01:35) >      IMPRESSION: New left frontal prosthetic cranioplasty since 9/26/2023 with   a small amount of fluid, air and hemorrhage. No significant mass effect.   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   Decreased left scalp hematoma.    --- End of Report ---    < end of copied text >    < from: CT Head No Cont (09.26.23 @ 20:38) >    IMPRESSION: Status post craniectomy in this patient with a large left MCA   territory infarct. Findings include intraparenchymal and extra-axial   hemorrhagic collections, which overall are stable in appearance compared   with the earlier examination dated 9/26/2023 at approximately 1:05 PM.    --- End of Report ---    < end of copied text >    Plan:   Patient is a 63y old  Male who presents with a chief complaint of LT M1 Occlusion. now POD 2 cranioplasty  Cranioplasty POD 2    NEURO- Cranioplasty POD 2  - Neurochecks q2h, stepdown. DG to medicine under Dr. Wang  neurousrgery following, reccs appreciated   - Keppra Ppx  - SHUN drains per plastics- 20cc overnight    #Stroke  2/2 APLS  - eventually restart AC when NSGY stable    CV  MAP >60, SBP 90 - 150.   midodrine until stable BP, wean as tolerated    PULM  SpO2 > 92%  IS as tolerated    GI  puree/mod thick  start Bowel Reg  LBM 9/29    ID  Trend Fever/WBC  afebrile    RENAL  Trend I/O  Replete Electrolytes PRN  voiding    ENDO  Glucose <180 with LSS    HEME  VTE Ppx: SCD, start SQL POD2. AC as above

## 2023-10-01 NOTE — PROGRESS NOTE ADULT - SUBJECTIVE AND OBJECTIVE BOX
HEALTH ISSUES - PROBLEM Dx:  8/27- 63 year old male with PMH Hypertension, DM on oral medications who presents c/o stroke-like symptoms.  as per family pt was walking and suddenly around 1.5 hour ago was unable to walk, has a right sided facial droop and is not acting his normal self.  On arrival pt w/ obvious right sided arm weakness, confusion, dysarthria, facial droop suggestive of acute stroke. Code stroke initiated, found to have LM1 occlusion, given TNK @ 1215 and was taken for thrombectomy.   8/30- CTH showed a large left MCA stroke with mass effect and MLS and thus neurosurgery was consulted.   Decompressive craniectomy same day. large amount of swelling intraop, s/p mannitol, on 3%, dural opening, epidural lei placed  8/31- LEI drain removed  9/1 - SAMARA without vegetations. +atheroma of aortic arch and aorta. Extubated to HiFlo. SQ lovenox started  9/2 - HiFlo -> N#FEver. Spiked fever, sent work-up and d/g to floors  9/3- Okay for ASA 82 for stroke. Helmet  9/6- HEME- Lupus anticoagulant is positive with  normal ACL and B2G antibodies. Given no other clear source, would be reasonable to do Eliquis BID. Rpt LA in 12 weeks  9/12- Klebsiella bacteremia likely UTI in the setting of urinary retention. ID consulted  9/29 S/p Left Cranioplasty with complex closure with plastic surgery. LEI drains in place  10/1- pureed diet, sq lovenox, and d/g to sdu for drain management      INTERVAL HPI/ OVERNIGHT EVENTS:    comfortable  follows commands  with LEI drains  no distress  speech difficulties    REVIEW OF SYSTEMS:    as above    Vital Signs Last 24 Hrs  T(C): 37.1 (01 Oct 2023 15:44), Max: 37.3 (01 Oct 2023 03:25)  T(F): 98.8 (01 Oct 2023 15:44), Max: 99.1 (01 Oct 2023 03:25)  HR: 88 (01 Oct 2023 16:00) (66 - 99)  BP: 132/76 (01 Oct 2023 16:00) (89/72 - 135/85)  BP(mean): 91 (01 Oct 2023 16:00) (76 - 101)  RR: 22 (01 Oct 2023 16:00) (9 - 22)  SpO2: 100% (01 Oct 2023 16:00) (90% - 100%)    Parameters below as of 01 Oct 2023 16:00  Patient On (Oxygen Delivery Method): room air      PHYSICAL EXAM-  GENERAL: comfortable, follows commands  HEAD:  Left Cranioplasty with drains present to full suction   DRAINS: In place and draining well   WOUND: Dressing clean dry intact  EYES: PERRLA, conjunctiva and sclera clear  ENMT:Moist mucous membranes, No lesions  NECK:  No JVD, Normal thyroid  NERVOUS SYSTEM:  -Mental status- No acute distress, Opens eyes spontaneously, following simple commands  tracking  expressive aphasia  -CN- PERRL 3mm, EOMI, tongue midline, R facial droop  full strength in all ext but right hemiplegia  CHEST/LUNG: CTA bilaterally; No rales, rhonchi, wheezing, or rubs  HEART: Regular rate and rhythm; No murmurs, rubs, or gallops  ABDOMEN: Soft, Nontender, Nondistended; Bowel sounds present; Dorsey +  EXTREMITIES:  2+ Peripheral Pulses, No clubbing, cyanosis, or edema  SKIN: No rashes or lesions    MEDICATIONS  (STANDING):  enoxaparin Injectable 40 milliGRAM(s) SubCutaneous every 24 hours  levETIRAcetam  IVPB 500 milliGRAM(s) IV Intermittent every 12 hours  midodrine 5 milliGRAM(s) Oral every 8 hours  polyethylene glycol 3350 17 Gram(s) Oral daily  senna 1 Tablet(s) Oral every 24 hours  sodium chloride 0.9%. 1000 milliLiter(s) (75 mL/Hr) IV Continuous <Continuous>    MEDICATIONS  (PRN):  acetaminophen   IVPB .. 1000 milliGRAM(s) IV Intermittent every 6 hours PRN Severe Pain (7 - 10)  hydrALAZINE Injectable 10 milliGRAM(s) IV Push every 2 hours   labetalol Injectable 10 milliGRAM(s) IV Push every 2 hours PRN Systolic blood pressure >160  oxyCODONE    IR 5 milliGRAM(s) Oral every 4 hours PRN Moderate Pain (4 - 6)      LABS:                        8.3    10.70 )-----------( 297      ( 01 Oct 2023 02:53 )             26.2     10-01    138  |  103  |  9.4  ----------------------------<  93  3.4<L>   |  23.0  |  0.50    Ca    8.0<L>      01 Oct 2023 02:53  Phos  2.2     10-01  Mg     1.8     10-01        Urinalysis Basic - ( 01 Oct 2023 02:53 )    Color: x / Appearance: x / SG: x / pH: x  Gluc: 93 mg/dL / Ketone: x  / Bili: x / Urobili: x   Blood: x / Protein: x / Nitrite: x   Leuk Esterase: x / RBC: x / WBC x   Sq Epi: x / Non Sq Epi: x / Bacteria: x

## 2023-10-01 NOTE — CHART NOTE - NSCHARTNOTEFT_GEN_A_CORE
NeuroSx. updates noted. Patient can be changed to general Neuro checks Q 4 hours. Vitals remain stable, no other recent acute events per chart review. Will change transfer to Tele with Q 4 hour neuro/VS. Cont. remainder of care as ordered and update Hospitalist in AM.

## 2023-10-01 NOTE — PROGRESS NOTE ADULT - ASSESSMENT
8/27- 63 year old male with PMH Hypertension, DM on oral medications who presents c/o stroke-like symptoms.  as per family pt was walking and suddenly around 1.5 hour ago was unable to walk, has a right sided facial droop and is not acting his normal self.  On arrival pt w/ obvious right sided arm weakness, confusion, dysarthria, facial droop suggestive of acute stroke. Code stroke initiated, found to have LM1 occlusion, given TNK @ 1215 and was taken for thrombectomy.   8/30- CTH showed a large left MCA stroke with mass effect and MLS and thus neurosurgery was consulted.   Decompressive craniectomy same day. large amount of swelling intraop, s/p mannitol, on 3%, dural opening, epidural lei placed  8/31- LEI drain removed  9/1 - SAMARA without vegetations. + atheroma of aortic arch and aorta. Extubated to HiFlo. SQ lovenox started  9/2 - HiFlo -> N#FEver. Spiked fever, sent work-up and d/g to floors  9/3- Okay for ASA 82 for stroke. Helmet  9/6- HEME- Lupus anticoagulant is positive with  normal ACL and B2G antibodies. Given no other clear source, would be reasonable to do Eliquis BID. Rpt LA in 12 weeks  9/12- Klebsiella bacteremia likely UTI in the setting of urinary retention. ID consulted  9/29 S/p Left Cranioplasty with complex closure with plastic surgery. LEI drains in place  10/1- pureed diet, sq lovenox, and d/g to sdu for drain management      # s/p Decompressive Craniectomy for MLS on 8/30  # s/p Cranioplasty on 9/29  with LEI drains - managed by NS  Dysphagia diet. Low dose IVF. reassess IVF tomorrow  SQ Lovenox    # Mild Leucocytosis  Stable  likely reactive to surgery  Monitor  # s/p Klebsiella bacteremia and UTI. treated 2 weeks total    # Anemia- Post op  anticipated and acceptable if hgb > 8    # HTN  # Was on Midodrine to wean of pressors  d/stephon today as MAP > 60    # Large left MCA stroke  # APLS  ASA when Ok with NS  Eliquis bid when Ok with NS  statins  s/p TNK on arrival  o/p rpt APL in 12 weeks- per Heme  Neuro- Exp aphasia, rt facial droop, right plegia    # DM2  controlled  on DM dysphagia diet  add insulin when required    # Hypokalemia, Hypophosphatemia  repleted today    # Urinary retention  With [place]maña     Lovenox    seen by PMR and not ideal for Acute Rehab  ILR at discharge or post in o/p setting  rpt APLS in 12 weeks

## 2023-10-01 NOTE — PROGRESS NOTE ADULT - SUBJECTIVE AND OBJECTIVE BOX
HPI:  Patient is a 63 year old male with PMH Hypertension, DM on oral medications who presents c/o stroke-like symptoms. Per son pt was walking outside around 10:15am this morning when he suddenly fell. His son helped him up and noted that he was weak on the right side and not acting normally which prompted him to come to the ED. On arrival pt w/ obvious right sided arm weakness, confusion, dysarthria, facial droop suggestive of acute stroke. Code stroke initiated, found to have LM1 occlusion, given TNK @ 1215 and was taken for thrombectomy. Unable to provide ROS 2/2 aphasia  (27 Aug 2023 16:45)    INTERVAL HPI  9/29 S/p Left Cranioplasty with complex closure with plastic surgery  9/30 remains on pressors, added midodrine 5 q 8, ngt placed due to failed dysphagia     62 yo male found laying in bed NAD.     Vital Signs Last 24 Hrs  ICU Vital Signs Last 24 Hrs  T(C): 37.3 (01 Oct 2023 03:25), Max: 37.3 (01 Oct 2023 03:25)  T(F): 99.1 (01 Oct 2023 03:25), Max: 99.1 (01 Oct 2023 03:25)  HR: 88 (01 Oct 2023 03:00) (61 - 93)  BP: 119/77 (01 Oct 2023 03:00) (84/67 - 133/83)  BP(mean): 90 (01 Oct 2023 03:00) (66 - 98)  ABP: 93/56 (30 Sep 2023 07:08) (57/45 - 94/61)  ABP(mean): 72 (30 Sep 2023 07:08) (31 - 72)  RR: 10 (01 Oct 2023 03:00) (8 - 21)  SpO2: 96% (01 Oct 2023 03:00) (90% - 100%)    O2 Parameters below as of 01 Oct 2023 03:00  Patient On (Oxygen Delivery Method): room air    PHYSICAL EXAM:  GENERAL: NAD  HEAD: Left Cranioplasty with drains present to full suction   DRAINS: In place and draining well   WOUND: Dressing clean dry intact  MENTAL STATUS: Awake, alert and following commands, Opens eyes spontaneously, following simple commands  CRANIAL NERVES: PERRL  MOTOR: Left Upper and lower antigravity, right side hemiplegia  EXTREMITIES:  2+ peripheral pulses, no clubbing, cyanosis, or edema  SKIN: Warm, dry; no rashes or lesions    LABS:                           8.3    10.70 )-----------( 297      ( 01 Oct 2023 02:53 )             26.2   10-01    138  |  103  |  9.4  ----------------------------<  93  3.4<L>   |  23.0  |  0.50    Ca    8.0<L>      01 Oct 2023 02:53  Phos  2.2     10-01  Mg     1.8     10-01    TPro  5.7<L>  /  Alb  2.6<L>  /  TBili  0.4  /  DBili  0.1  /  AST  74<H>  /  ALT  157<H>  /  AlkPhos  118  09-29  PT/INR - ( 29 Sep 2023 05:43 )   PT: 14.5 sec;   INR: 1.32 ratio         PTT - ( 29 Sep 2023 05:43 )  PTT:28.3 sec              PTT - ( 29 Sep 2023 05:43 )  PTT:28.3 sec  Urinalysis Basic - ( 29 Sep 2023 05:43 )    Color: x / Appearance: x / SG: x / pH: x  Gluc: 103 mg/dL / Ketone: x  / Bili: x / Urobili: x   Blood: x / Protein: x / Nitrite: x   Leuk Esterase: x / RBC: x / WBC x   Sq Epi: x / Non Sq Epi: x / Bacteria: x    I&O's Summary    29 Sep 2023 07:01  -  30 Sep 2023 07:00  --------------------------------------------------------  IN: 1425 mL / OUT: 725 mL / NET: 700 mL    30 Sep 2023 07:01  -  01 Oct 2023 04:42  --------------------------------------------------------  IN: 1677.2 mL / OUT: 1215 mL / NET: 462.2 mL        RADIOLOGY & ADDITIONAL TESTS:  < from: CT Head No Cont (09.26.23 @ 20:38) >  IMPRESSION: Status post craniectomy in this patient with a large left MCA   territory infarct. Findings include intraparenchymal and extra-axial   hemorrhagic collections, which overall are stable in appearance compared   with the earlier examination dated 9/26/2023 at approximately 1:05 PM.    --- End of Report ---    DAWN M BEHR-VENTURA MD; Attending Radiologist  This document has been electronically signed. Sep 26 2023  8:56PM    < end of copied text >

## 2023-10-01 NOTE — PROGRESS NOTE ADULT - ASSESSMENT
Chief complaint:   Patient is a 63y old  Male who presents with a chief complaint of LT M1 Occlusion (30 Sep 2023 00:14)    NEURO  #Cranioplasty  - Neurochecks  - Keppra Ppx    #Stroke  2/2 APLS  - restart AC when NSGY stable    CV  MAP >60, SBP 90 - 150. Midodrine 5q8 to wean off pressors    PULM  SpO2 > 92%  IS as tolerated    GI  TF via NGT  Bowel Regemin    ID  Trend Fever/WBC    RENAL  Trend I/O  Replete Electrolytes PRN    ENDO  Glucose <180 with LSS    HEME  VTE Ppx: SCD, start SQL POD2. AC as above

## 2023-10-01 NOTE — PROGRESS NOTE ADULT - SUBJECTIVE AND OBJECTIVE BOX
Chief complaint:   Patient is a 63y old  Male who presents with a chief complaint of LT M1 Occlusion (01 Oct 2023 04:40)    HPI:  Patient is a 63 year old male with PMH Hypertension, DM on oral medications who presents c/o stroke-like symptoms. Per son pt was walking outside around 10:15am this morning when he suddenly fell. His son helped him up and noted that he was weak on the right side and not acting normally which prompted him to come to the ED. On arrival pt w/ obvious right sided arm weakness, confusion, dysarthria, facial droop suggestive of acute stroke. Code stroke initiated, found to have LM1 occlusion, given TNK @ 1215 and was taken for thrombectomy. Unable to provide ROS 2/2 aphasia  (27 Aug 2023 16:45)        24hr EVENTS:      ROS: [ ]  Unable to assess due to mental status   All other systems negative    -----------------------------------------------------------------------------------------------------------------------------------------------------------------------------------  ICU Vital Signs Last 24 Hrs  T(C): 36.9 (01 Oct 2023 07:20), Max: 37.3 (01 Oct 2023 03:25)  T(F): 98.4 (01 Oct 2023 07:20), Max: 99.1 (01 Oct 2023 03:25)  HR: 95 (01 Oct 2023 08:00) (61 - 95)  BP: 107/76 (01 Oct 2023 08:00) (89/72 - 133/83)  BP(mean): 87 (01 Oct 2023 08:00) (75 - 98)  ABP: --  ABP(mean): --  RR: 14 (01 Oct 2023 08:00) (9 - 21)  SpO2: 98% (01 Oct 2023 08:00) (90% - 100%)    O2 Parameters below as of 01 Oct 2023 08:00  Patient On (Oxygen Delivery Method): room air            I&O's Summary    30 Sep 2023 07:01  -  01 Oct 2023 07:00  --------------------------------------------------------  IN: 2243.8 mL / OUT: 1930 mL / NET: 313.8 mL    01 Oct 2023 07:01  -  01 Oct 2023 09:30  --------------------------------------------------------  IN: 316.6 mL / OUT: 100 mL / NET: 216.6 mL        MEDICATIONS  (STANDING):  levETIRAcetam  IVPB 500 milliGRAM(s) IV Intermittent every 12 hours  midodrine 5 milliGRAM(s) Oral every 8 hours  potassium chloride  10 mEq/100 mL IVPB 10 milliEquivalent(s) IV Intermittent every 1 hour  sodium chloride 0.9%. 1000 milliLiter(s) (75 mL/Hr) IV Continuous <Continuous>      RESPIRATORY:        IMAGING:   Recent imaging studies were reviewed.    LAB RESULTS:                          8.3    10.70 )-----------( 297      ( 01 Oct 2023 02:53 )             26.2           10-01    138  |  103  |  9.4  ----------------------------<  93  3.4<L>   |  23.0  |  0.50    Ca    8.0<L>      01 Oct 2023 02:53  Phos  2.2     10-01  Mg     1.8     10-01        -----------------------------------------------------------------------------------------------------------------------------------------------------------------------------------    PHYSICAL EXAM:  General: Calm,  HEENT: MMM  Neuro:  -Mental status- No acute distress  -CN- PERRL 3mm, EOMI, tongue midline, face symmetric    CV: RRR  Pulm: clear to auscultation  Abd: Soft, nontender, nondistended  Ext: no noted edema in lower ext  Skin: warm, dry       Chief complaint:   Patient is a 63y old  Male who presents with a chief complaint of LT M1 Occlusion (01 Oct 2023 04:40)    HPI:  Patient is a 63 year old male with PMH Hypertension, DM on oral medications who presents c/o stroke-like symptoms. Per son pt was walking outside around 10:15am this morning when he suddenly fell. His son helped him up and noted that he was weak on the right side and not acting normally which prompted him to come to the ED. On arrival pt w/ obvious right sided arm weakness, confusion, dysarthria, facial droop suggestive of acute stroke. Code stroke initiated, found to have LM1 occlusion, given TNK @ 1215 and was taken for thrombectomy. Unable to provide ROS 2/2 aphasia  (27 Aug 2023 16:45)    24hr EVENTS:  weaned off beata gtt    ROS: [x ]  Unable to assess due to mental status   All other systems negative    -----------------------------------------------------------------------------------------------------------------------------------------------------------------------------------  ICU Vital Signs Last 24 Hrs  T(C): 36.9 (01 Oct 2023 07:20), Max: 37.3 (01 Oct 2023 03:25)  T(F): 98.4 (01 Oct 2023 07:20), Max: 99.1 (01 Oct 2023 03:25)  HR: 95 (01 Oct 2023 08:00) (61 - 95)  BP: 107/76 (01 Oct 2023 08:00) (89/72 - 133/83)  BP(mean): 87 (01 Oct 2023 08:00) (75 - 98)  ABP: --  ABP(mean): --  RR: 14 (01 Oct 2023 08:00) (9 - 21)  SpO2: 98% (01 Oct 2023 08:00) (90% - 100%)    O2 Parameters below as of 01 Oct 2023 08:00  Patient On (Oxygen Delivery Method): room air            I&O's Summary    30 Sep 2023 07:01  -  01 Oct 2023 07:00  --------------------------------------------------------  IN: 2243.8 mL / OUT: 1930 mL / NET: 313.8 mL    01 Oct 2023 07:01  -  01 Oct 2023 09:30  --------------------------------------------------------  IN: 316.6 mL / OUT: 100 mL / NET: 216.6 mL        MEDICATIONS  (STANDING):  levETIRAcetam  IVPB 500 milliGRAM(s) IV Intermittent every 12 hours  midodrine 5 milliGRAM(s) Oral every 8 hours  potassium chloride  10 mEq/100 mL IVPB 10 milliEquivalent(s) IV Intermittent every 1 hour  sodium chloride 0.9%. 1000 milliLiter(s) (75 mL/Hr) IV Continuous <Continuous>      RESPIRATORY:        IMAGING:   Recent imaging studies were reviewed.    LAB RESULTS:                          8.3    10.70 )-----------( 297      ( 01 Oct 2023 02:53 )             26.2           10-01    138  |  103  |  9.4  ----------------------------<  93  3.4<L>   |  23.0  |  0.50    Ca    8.0<L>      01 Oct 2023 02:53  Phos  2.2     10-01  Mg     1.8     10-01        -----------------------------------------------------------------------------------------------------------------------------------------------------------------------------------    PHYSICAL EXAM:  General: Calm,  HEENT: MMM  Neuro:  -Mental status- No acute distress, mimicing, no FC  tracking  global aphasia  -CN- PERRL 3mm, EOMI, tongue midline, R facial droop  full strength in all ext    CV: RRR  Pulm: clear to auscultation  Abd: Soft, nontender, nondistended  Ext: no noted edema in lower ext  Skin: warm, dry       Chief complaint:   Patient is a 63y old  Male who presents with a chief complaint of LT M1 Occlusion (01 Oct 2023 04:40)    HPI:  Patient is a 63 year old male with PMH Hypertension, DM on oral medications who presents c/o stroke-like symptoms. Per son pt was walking outside around 10:15am this morning when he suddenly fell. His son helped him up and noted that he was weak on the right side and not acting normally which prompted him to come to the ED. On arrival pt w/ obvious right sided arm weakness, confusion, dysarthria, facial droop suggestive of acute stroke. Code stroke initiated, found to have LM1 occlusion, given TNK @ 1215 and was taken for thrombectomy. Unable to provide ROS 2/2 aphasia  (27 Aug 2023 16:45)    24hr EVENTS:  weaned off beata gtt    ROS: [x ]  Unable to assess due to mental status   All other systems negative    -----------------------------------------------------------------------------------------------------------------------------------------------------------------------------------  ICU Vital Signs Last 24 Hrs  T(C): 36.9 (01 Oct 2023 07:20), Max: 37.3 (01 Oct 2023 03:25)  T(F): 98.4 (01 Oct 2023 07:20), Max: 99.1 (01 Oct 2023 03:25)  HR: 95 (01 Oct 2023 08:00) (61 - 95)  BP: 107/76 (01 Oct 2023 08:00) (89/72 - 133/83)  BP(mean): 87 (01 Oct 2023 08:00) (75 - 98)  ABP: --  ABP(mean): --  RR: 14 (01 Oct 2023 08:00) (9 - 21)  SpO2: 98% (01 Oct 2023 08:00) (90% - 100%)    O2 Parameters below as of 01 Oct 2023 08:00  Patient On (Oxygen Delivery Method): room air            I&O's Summary    30 Sep 2023 07:01  -  01 Oct 2023 07:00  --------------------------------------------------------  IN: 2243.8 mL / OUT: 1930 mL / NET: 313.8 mL    01 Oct 2023 07:01  -  01 Oct 2023 09:30  --------------------------------------------------------  IN: 316.6 mL / OUT: 100 mL / NET: 216.6 mL        MEDICATIONS  (STANDING):  levETIRAcetam  IVPB 500 milliGRAM(s) IV Intermittent every 12 hours  midodrine 5 milliGRAM(s) Oral every 8 hours  potassium chloride  10 mEq/100 mL IVPB 10 milliEquivalent(s) IV Intermittent every 1 hour  sodium chloride 0.9%. 1000 milliLiter(s) (75 mL/Hr) IV Continuous <Continuous>      RESPIRATORY:        IMAGING:   Recent imaging studies were reviewed.    LAB RESULTS:                          8.3    10.70 )-----------( 297      ( 01 Oct 2023 02:53 )             26.2           10-01    138  |  103  |  9.4  ----------------------------<  93  3.4<L>   |  23.0  |  0.50    Ca    8.0<L>      01 Oct 2023 02:53  Phos  2.2     10-01  Mg     1.8     10-01        -----------------------------------------------------------------------------------------------------------------------------------------------------------------------------------    PHYSICAL EXAM:  General: Calm,  HEENT: MMM  Neuro:  -Mental status- No acute distress, EO spont, FC  tracking  expressive aphasia  -CN- PERRL 3mm, EOMI, tongue midline, R facial droop  full strength in all ext    CV: RRR  Pulm: clear to auscultation  Abd: Soft, nontender, nondistended  Ext: no noted edema in lower ext  Skin: warm, dry

## 2023-10-01 NOTE — PROGRESS NOTE ADULT - ASSESSMENT
Chief complaint:   Patient is a 63y old  Male who presents with a chief complaint of LT M1 Occlusion (30 Sep 2023 00:14)    NEURO  #Cranioplasty  - Neurochecks  - Keppra Ppx    #Stroke  2/2 APLS  - restart AC when NSGY stable    CV  MAP >60, SBP 90 - 150. Use Midodrine if necessary to wean off pressors    PULM  SpO2 > 92%  IS as tolerated    GI  TF via NGT  Bowel Regemin    ID  Trend Fever/WBC    RENAL  Trend I/O  Replete Electrolytes PRN    ENDO  Glucose <180 with LSS    HEME  VTE Ppx: SCD, start SQL POD2. AC as above   Chief complaint:   Patient is a 63y old  Male who presents with a chief complaint of LT M1 Occlusion. now POD 2 cranioplasty    NEURO- Cranioplasty POD 2  - Neurochecks q2h  - Keppra Ppx  - SHUN drains per neurosurg- 20cc overnight    #Stroke  2/2 APLS  - eventually restart AC when NSGY stable    CV  MAP >60, SBP 90 - 150.   midodrine until stable BP    PULM  SpO2 > 92%  IS as tolerated    GI  puree/mod thick  start Bowel Reg  LBM 9/29    ID  Trend Fever/WBC  afebrile    RENAL  Trend I/O  Replete Electrolytes PRN  voiding    ENDO  Glucose <180 with LSS    HEME  VTE Ppx: SCD, start SQL POD2. AC as above   Chief complaint:   Patient is a 63y old  Male who presents with a chief complaint of LT M1 Occlusion. now POD 2 cranioplasty    NEURO- Cranioplasty POD 2  - Neurochecks q2h  - Keppra Ppx  - SHUN drains per plastics- 20cc overnight    #Stroke  2/2 APLS  - eventually restart AC when NSGY stable    CV  MAP >60, SBP 90 - 150.   midodrine until stable BP, wean as tolerated    PULM  SpO2 > 92%  IS as tolerated    GI  puree/mod thick  start Bowel Reg  LBM 9/29    ID  Trend Fever/WBC  afebrile    RENAL  Trend I/O  Replete Electrolytes PRN  voiding    ENDO  Glucose <180 with LSS    HEME  VTE Ppx: SCD, start SQL POD2. AC as above

## 2023-10-02 LAB
ANION GAP SERPL CALC-SCNC: 12 MMOL/L — SIGNIFICANT CHANGE UP (ref 5–17)
BASOPHILS # BLD AUTO: 0.04 K/UL — SIGNIFICANT CHANGE UP (ref 0–0.2)
BASOPHILS NFR BLD AUTO: 0.4 % — SIGNIFICANT CHANGE UP (ref 0–2)
BUN SERPL-MCNC: 6.9 MG/DL — LOW (ref 8–20)
CALCIUM SERPL-MCNC: 8.2 MG/DL — LOW (ref 8.4–10.5)
CHLORIDE SERPL-SCNC: 99 MMOL/L — SIGNIFICANT CHANGE UP (ref 96–108)
CO2 SERPL-SCNC: 24 MMOL/L — SIGNIFICANT CHANGE UP (ref 22–29)
CREAT SERPL-MCNC: 0.44 MG/DL — LOW (ref 0.5–1.3)
EGFR: 120 ML/MIN/1.73M2 — SIGNIFICANT CHANGE UP
EOSINOPHIL # BLD AUTO: 0.2 K/UL — SIGNIFICANT CHANGE UP (ref 0–0.5)
EOSINOPHIL NFR BLD AUTO: 2.1 % — SIGNIFICANT CHANGE UP (ref 0–6)
GLUCOSE BLDC GLUCOMTR-MCNC: 101 MG/DL — HIGH (ref 70–99)
GLUCOSE BLDC GLUCOMTR-MCNC: 102 MG/DL — HIGH (ref 70–99)
GLUCOSE BLDC GLUCOMTR-MCNC: 104 MG/DL — HIGH (ref 70–99)
GLUCOSE SERPL-MCNC: 95 MG/DL — SIGNIFICANT CHANGE UP (ref 70–99)
HCT VFR BLD CALC: 24.6 % — LOW (ref 39–50)
HGB BLD-MCNC: 7.7 G/DL — LOW (ref 13–17)
IMM GRANULOCYTES NFR BLD AUTO: 0.8 % — SIGNIFICANT CHANGE UP (ref 0–0.9)
LYMPHOCYTES # BLD AUTO: 1.4 K/UL — SIGNIFICANT CHANGE UP (ref 1–3.3)
LYMPHOCYTES # BLD AUTO: 14.7 % — SIGNIFICANT CHANGE UP (ref 13–44)
MAGNESIUM SERPL-MCNC: 1.7 MG/DL — SIGNIFICANT CHANGE UP (ref 1.6–2.6)
MCHC RBC-ENTMCNC: 29.6 PG — SIGNIFICANT CHANGE UP (ref 27–34)
MCHC RBC-ENTMCNC: 31.3 GM/DL — LOW (ref 32–36)
MCV RBC AUTO: 94.6 FL — SIGNIFICANT CHANGE UP (ref 80–100)
MONOCYTES # BLD AUTO: 0.93 K/UL — HIGH (ref 0–0.9)
MONOCYTES NFR BLD AUTO: 9.8 % — SIGNIFICANT CHANGE UP (ref 2–14)
NEUTROPHILS # BLD AUTO: 6.88 K/UL — SIGNIFICANT CHANGE UP (ref 1.8–7.4)
NEUTROPHILS NFR BLD AUTO: 72.2 % — SIGNIFICANT CHANGE UP (ref 43–77)
PHOSPHATE SERPL-MCNC: 2.2 MG/DL — LOW (ref 2.4–4.7)
PLATELET # BLD AUTO: 289 K/UL — SIGNIFICANT CHANGE UP (ref 150–400)
POTASSIUM SERPL-MCNC: 3.4 MMOL/L — LOW (ref 3.5–5.3)
POTASSIUM SERPL-SCNC: 3.4 MMOL/L — LOW (ref 3.5–5.3)
RBC # BLD: 2.6 M/UL — LOW (ref 4.2–5.8)
RBC # FLD: 15 % — HIGH (ref 10.3–14.5)
SODIUM SERPL-SCNC: 135 MMOL/L — SIGNIFICANT CHANGE UP (ref 135–145)
WBC # BLD: 9.53 K/UL — SIGNIFICANT CHANGE UP (ref 3.8–10.5)
WBC # FLD AUTO: 9.53 K/UL — SIGNIFICANT CHANGE UP (ref 3.8–10.5)

## 2023-10-02 PROCEDURE — 99232 SBSQ HOSP IP/OBS MODERATE 35: CPT | Mod: GC

## 2023-10-02 RX ORDER — ATORVASTATIN CALCIUM 80 MG/1
80 TABLET, FILM COATED ORAL AT BEDTIME
Refills: 0 | Status: DISCONTINUED | OUTPATIENT
Start: 2023-10-02 | End: 2023-10-18

## 2023-10-02 RX ORDER — POTASSIUM CHLORIDE 20 MEQ
10 PACKET (EA) ORAL ONCE
Refills: 0 | Status: COMPLETED | OUTPATIENT
Start: 2023-10-02 | End: 2023-10-02

## 2023-10-02 RX ADMIN — SODIUM CHLORIDE 40 MILLILITER(S): 9 INJECTION INTRAMUSCULAR; INTRAVENOUS; SUBCUTANEOUS at 13:51

## 2023-10-02 RX ADMIN — ATORVASTATIN CALCIUM 80 MILLIGRAM(S): 80 TABLET, FILM COATED ORAL at 22:16

## 2023-10-02 RX ADMIN — SODIUM CHLORIDE 40 MILLILITER(S): 9 INJECTION INTRAMUSCULAR; INTRAVENOUS; SUBCUTANEOUS at 01:08

## 2023-10-02 RX ADMIN — LEVETIRACETAM 400 MILLIGRAM(S): 250 TABLET, FILM COATED ORAL at 05:08

## 2023-10-02 RX ADMIN — POLYETHYLENE GLYCOL 3350 17 GRAM(S): 17 POWDER, FOR SOLUTION ORAL at 13:51

## 2023-10-02 RX ADMIN — SENNA PLUS 1 TABLET(S): 8.6 TABLET ORAL at 13:51

## 2023-10-02 RX ADMIN — Medication 100 MILLIEQUIVALENT(S): at 13:51

## 2023-10-02 NOTE — PROGRESS NOTE ADULT - ASSESSMENT
ASSESSMENT:  63 year old male with PMHx of T2DM, HTN who presented after a fall with right sided weakness, dysarthria and right facial droop. Patient was found to have LM1 occlusion s/p tenecteplase in ED and had mechanical thrombectomy done with TICI 2c. Decompressive craniectomy for midline shift was done on 8/30/2023, SHUN drain was removed on 8/31, and left cranioplasty on 9/29/2023. SAMARA was negative for thrombus.     PLAN:    LM1 Occlusion, Midline Shift  - Decompressive Craniectomy on 8/30  - Mechanical thrombectomy with TICI 2c recanalization  - Left cranioplasty on 9/29 with SHUN drains  - SHUN drain x 1 removed today, managed by neurosurgery  - Repeat speech and swallow eval -------> Pending  - Lovenox 40 mg qd s.c.  - Continuing to hold anticoagulation per neurosurgery  - Atorvastatin 80 mg qd restarted  - CT head stat if any changes in status ASSESSMENT:  63 year old male with PMHx of T2DM, HTN who presented after a fall with right sided weakness, dysarthria and right facial droop. Patient was found to have LM1 occlusion s/p tenecteplase in ED and had mechanical thrombectomy done with TICI 2c. Decompressive craniectomy for midline shift was done on 8/30/2023, SHUN drain was removed on 8/31, and left cranioplasty on 9/29/2023. SAMARA was negative for thrombus.     PLAN:    LM1 Occlusion, Midline Shift  - SAMARA negative for PFO, atrial thrombus  - No significant occlusive disease on CT neck  - Decompressive Craniectomy on 8/30  - Mechanical thrombectomy with TICI 2c recanalization  - Left cranioplasty on 9/29 with SHUN drains  - SHUN drain x 1 removed today, managed by neurosurgery  - Dysphagia diet  - Repeat speech and swallow eval -------> Pending  - Lovenox 40 mg qd s.c.  - Continuing to hold anticoagulation per neurosurgery  - Atorvastatin 80 mg qd restarted  - Keppra 500 mg q12h until 10/6 for seizure prophylaxis  - CT head stat if any changes in status  - Lupus anticoagulant positive, normal ACL and B2G antibodies  - Repeat Lupus anticoagulant testing in 12 weeks per hematology  - ILR placement outpatient per cardiology    Hypokalemia, Hypophosphatemia  - Potassium chloride 10 mEq IV  - Continue to monitor    Leukocytosis  -Resolved  -Continue to monitor    Klebsiella Bacteremia, UTI  - Resolved  - s/p Ceftriaxone, Zosyn, Vancomycin, Cefazolin    Anemia  - Likely due to post-op status  - Continue to monitor    HTN  - Maintain normotensive per neurosurgery  - Hydralazine 10 mg IV PRN  - Labetalol 10 mg IV PRN  - Midodrine PRN     Urinary Retention  - Umaña removed  - No signs of urinary retention  - Monitor     T2DM  - ISS, ADA diet    VTE ppx:  - Lovenox 40 mg s.c.

## 2023-10-02 NOTE — PROGRESS NOTE ADULT - SUBJECTIVE AND OBJECTIVE BOX
HPI: Patient is a 63 year old male with PMH Hypertension, DM on oral medications who presents c/o stroke-like symptoms. Per son pt was walking outside around 10:15am this morning when he suddenly fell. His son helped him up and noted that he was weak on the right side and not acting normally which prompted him to come to the ED. On arrival pt w/ obvious right sided arm weakness, confusion, dysarthria, facial droop suggestive of acute stroke. Code stroke initiated, found to have LM1 occlusion, given TNK @ 1215 and was taken for thrombectomy. Unable to provide ROS 2/2 aphasia  (27 Aug 2023 16:45)      INTERVAL HPI/OVERNIGHT EVENTS:  63y Male seen lying comfortably in bed. DG from NSICU to medicine yesterday, 10/1. SHUN 1 removed today, patient tolerated procedure well.    Vital Signs Last 24 Hrs  T(C): 36.7 (02 Oct 2023 08:00), Max: 37.3 (01 Oct 2023 19:33)  T(F): 98 (02 Oct 2023 08:00), Max: 99.2 (01 Oct 2023 19:33)  HR: 89 (02 Oct 2023 08:00) (87 - 99)  BP: 134/69 (02 Oct 2023 08:00) (110/71 - 150/79)  BP(mean): 101 (02 Oct 2023 00:00) (82 - 102)  RR: 18 (02 Oct 2023 08:00) (10 - 24)  SpO2: 96% (02 Oct 2023 08:00) (94% - 100%)    Parameters below as of 02 Oct 2023 08:00  Patient On (Oxygen Delivery Method): room air        PHYSICAL EXAM:  GENERAL: NAD, well-groomed  HEAD: s/p L cranioplasty  DRAINS: SG SHUN present  WOUND: Dressing clean dry intact  MENTAL STATUS: AAO x3; Awake. Opens eyes spontaneously. Appropriately conversant without aphasia, following simple commands.  CRANIAL NERVES: PERRL. EOMI without nystagmus. Facial sensation intact V1-3 distribution b/l. Face symmetric w/ normal eye closure and smile, tongue midline. Hearing grossly intact. Speech clear.   MOTOR: strength 5/5 b/l upper and lower extremities  SENSATION: grossly intact to light touch all extremities      LABS:                        7.7    9.53  )-----------( 289      ( 02 Oct 2023 06:25 )             24.6     10-02    135  |  99  |  6.9<L>  ----------------------------<  95  3.4<L>   |  24.0  |  0.44<L>    Ca    8.2<L>      02 Oct 2023 06:25  Phos  2.2     10-02  Mg     1.7     10-02        Urinalysis Basic - ( 02 Oct 2023 06:25 )    Color: x / Appearance: x / SG: x / pH: x  Gluc: 95 mg/dL / Ketone: x  / Bili: x / Urobili: x   Blood: x / Protein: x / Nitrite: x   Leuk Esterase: x / RBC: x / WBC x   Sq Epi: x / Non Sq Epi: x / Bacteria: x        10-01 @ 07:01  -  10-02 @ 07:00  --------------------------------------------------------  IN: 1888.2 mL / OUT: 1200 mL / NET: 688.2 mL        RADIOLOGY & ADDITIONAL TESTS:  < from: CT Head No Cont (09.30.23 @ 01:35) >  IMPRESSION: New left frontal prosthetic cranioplasty since 9/26/2023 with   a small amount of fluid, air and hemorrhage. No significant mass effect.   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   Decreased left scalp hematoma.    < end of copied text >          CAPRINI SCORE [CLOT]:  Patient has an estimated Caprini score of greater than 5.  However, the patient's unique clinical situation will be addressed in an individual manner to determine appropriate anticoagulation treatment, if any. HPI: Patient is a 63 year old male with PMH Hypertension, DM on oral medications who presents c/o stroke-like symptoms. Per son pt was walking outside around 10:15am this morning when he suddenly fell. His son helped him up and noted that he was weak on the right side and not acting normally which prompted him to come to the ED. On arrival pt w/ obvious right sided arm weakness, confusion, dysarthria, facial droop suggestive of acute stroke. Code stroke initiated, found to have LM1 occlusion, given TNK @ 1215 and was taken for thrombectomy. Unable to provide ROS 2/2 aphasia  (27 Aug 2023 16:45)      INTERVAL HPI/OVERNIGHT EVENTS:  63y Male seen lying comfortably in bed. DG from NSICU to medicine yesterday, 10/1. Attempted to remove SHUN 1, resistance, aborted.    Vital Signs Last 24 Hrs  T(C): 36.7 (02 Oct 2023 08:00), Max: 37.3 (01 Oct 2023 19:33)  T(F): 98 (02 Oct 2023 08:00), Max: 99.2 (01 Oct 2023 19:33)  HR: 89 (02 Oct 2023 08:00) (87 - 99)  BP: 134/69 (02 Oct 2023 08:00) (110/71 - 150/79)  BP(mean): 101 (02 Oct 2023 00:00) (82 - 102)  RR: 18 (02 Oct 2023 08:00) (10 - 24)  SpO2: 96% (02 Oct 2023 08:00) (94% - 100%)    Parameters below as of 02 Oct 2023 08:00  Patient On (Oxygen Delivery Method): room air        PHYSICAL EXAM:  GENERAL: NAD, well-groomed  HEAD: s/p L cranioplasty  DRAINS: SG SHUN present  WOUND: Dressing clean dry intact  MENTAL STATUS: Awake. Opens eyes spontaneously, tracks examiner, following simple commands.  CRANIAL NERVES: PERRL. EOMI without nystagmus. Facial sensation intact V1-3 distribution b/l. Face symmetric w/ normal eye closure and smile, tongue midline. Hearing grossly intact.    MOTOR: LUE/LLE 5/5, RUE WD, RLE TF  SENSATION: grossly intact to light touch all extremities      LABS:                        7.7    9.53  )-----------( 289      ( 02 Oct 2023 06:25 )             24.6     10-02    135  |  99  |  6.9<L>  ----------------------------<  95  3.4<L>   |  24.0  |  0.44<L>    Ca    8.2<L>      02 Oct 2023 06:25  Phos  2.2     10-02  Mg     1.7     10-02        Urinalysis Basic - ( 02 Oct 2023 06:25 )    Color: x / Appearance: x / SG: x / pH: x  Gluc: 95 mg/dL / Ketone: x  / Bili: x / Urobili: x   Blood: x / Protein: x / Nitrite: x   Leuk Esterase: x / RBC: x / WBC x   Sq Epi: x / Non Sq Epi: x / Bacteria: x        10-01 @ 07:01  -  10-02 @ 07:00  --------------------------------------------------------  IN: 1888.2 mL / OUT: 1200 mL / NET: 688.2 mL        RADIOLOGY & ADDITIONAL TESTS:  < from: CT Head No Cont (09.30.23 @ 01:35) >  IMPRESSION: New left frontal prosthetic cranioplasty since 9/26/2023 with   a small amount of fluid, air and hemorrhage. No significant mass effect.   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   Decreased left scalp hematoma.    < end of copied text >          CAPRINI SCORE [CLOT]:  Patient has an estimated Caprini score of greater than 5.  However, the patient's unique clinical situation will be addressed in an individual manner to determine appropriate anticoagulation treatment, if any.

## 2023-10-02 NOTE — PROGRESS NOTE ADULT - SUBJECTIVE AND OBJECTIVE BOX
Patient is a 63y old  Male who presents with a chief complaint of LT M1 Occlusion (02 Oct 2023 11:08)      INTERVAL HPI/OVERNIGHT EVENTS: No acute events overnight.     MEDICATIONS  (STANDING):  enoxaparin Injectable 40 milliGRAM(s) SubCutaneous every 24 hours  levETIRAcetam  IVPB 500 milliGRAM(s) IV Intermittent every 12 hours  polyethylene glycol 3350 17 Gram(s) Oral daily  potassium chloride  10 mEq/100 mL IVPB 10 milliEquivalent(s) IV Intermittent once  senna 1 Tablet(s) Oral every 24 hours  sodium chloride 0.9%. 1000 milliLiter(s) (40 mL/Hr) IV Continuous <Continuous>    MEDICATIONS  (PRN):  acetaminophen   IVPB .. 1000 milliGRAM(s) IV Intermittent every 6 hours PRN Severe Pain (7 - 10)  hydrALAZINE Injectable 10 milliGRAM(s) IV Push every 2 hours   labetalol Injectable 10 milliGRAM(s) IV Push every 2 hours PRN Systolic blood pressure >160  midodrine 5 milliGRAM(s) Oral every 8 hours PRN for sbp <100  oxyCODONE    IR 5 milliGRAM(s) Oral every 4 hours PRN Moderate Pain (4 - 6)      Allergies: No Known Allergies      REVIEW OF SYSTEMS: unable to be obtained due to clinical condition      Vital Signs Last 24 Hrs  T(C): 36.7 (02 Oct 2023 08:00), Max: 37.3 (01 Oct 2023 19:33)  T(F): 98 (02 Oct 2023 08:00), Max: 99.2 (01 Oct 2023 19:33)  HR: 89 (02 Oct 2023 08:00) (87 - 99)  BP: 134/69 (02 Oct 2023 08:00) (110/71 - 150/79)  BP(mean): 101 (02 Oct 2023 00:00) (82 - 102)  RR: 18 (02 Oct 2023 08:00) (10 - 24)  SpO2: 96% (02 Oct 2023 08:00) (94% - 100%)    Parameters below as of 02 Oct 2023 08:00  Patient On (Oxygen Delivery Method): room air        PHYSICAL EXAM:  GENERAL: lying comfortably in bed  HEAD:  Atraumatic, Normocephalic, SHUN drain and bandages on scalp  EYES: EOMI, PERRLA, conjunctiva and sclera clear  NECK: Supple, No JVD  NERVOUS SYSTEM: awakens to voice, nonverbal, follows commands, right facial droop, 5/5 strength in LUE and LLE, 0/5 strength in RUE and RLE  CHEST/LUNG: + rhonchi throughout all lung fields  HEART: Regular rate and rhythm; No murmurs  ABDOMEN: Soft, Nontender; Bowel sounds present  EXTREMITIES:  No cyanosis, or edema  SKIN: No rashes    LABS:                        7.7    9.53  )-----------( 289      ( 02 Oct 2023 06:25 )             24.6     10-02    135  |  99  |  6.9<L>  ----------------------------<  95  3.4<L>   |  24.0  |  0.44<L>    Ca    8.2<L>      02 Oct 2023 06:25  Phos  2.2     10-02  Mg     1.7     10-02        Urinalysis Basic - ( 02 Oct 2023 06:25 )    Color: x / Appearance: x / SG: x / pH: x  Gluc: 95 mg/dL / Ketone: x  / Bili: x / Urobili: x   Blood: x / Protein: x / Nitrite: x   Leuk Esterase: x / RBC: x / WBC x   Sq Epi: x / Non Sq Epi: x / Bacteria: x      CAPILLARY BLOOD GLUCOSE      POCT Blood Glucose.: 102 mg/dL (02 Oct 2023 09:27)      RADIOLOGY & ADDITIONAL TESTS:    CT Head No Cont (09.30.23 @ 01:35) >  New left frontal prosthetic cranioplasty since 9/26/2023 with   a small amount of fluid, air and hemorrhage. No significant mass effect.   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   Decreased left scalp hematoma.    Xray Chest 1 View- PORTABLE-Urgent (Xray Chest 1 View- PORTABLE-Urgent .) (10.01.23 @ 02:05) >  NG tip in the stomach. Heart size unremarkable. Lungs are   clear.          Imaging Personally Reviewed:  [ ] YES  [ ] NO    Consultant(s) Notes Reviewed:  [ ] YES  [ ] NO    Care Discussed with Consultants/Other Providers [ ] YES  [ ] NO    Plan of Care discussed with Housestaff [ ]YES [ ] NO Patient is a 63y old  Male who presents with a chief complaint of LT M1 Occlusion (02 Oct 2023 11:08)      INTERVAL HPI/OVERNIGHT EVENTS: No acute events overnight.     MEDICATIONS  (STANDING):  enoxaparin Injectable 40 milliGRAM(s) SubCutaneous every 24 hours  levETIRAcetam  IVPB 500 milliGRAM(s) IV Intermittent every 12 hours  polyethylene glycol 3350 17 Gram(s) Oral daily  potassium chloride  10 mEq/100 mL IVPB 10 milliEquivalent(s) IV Intermittent once  senna 1 Tablet(s) Oral every 24 hours  sodium chloride 0.9%. 1000 milliLiter(s) (40 mL/Hr) IV Continuous <Continuous>    MEDICATIONS  (PRN):  acetaminophen   IVPB .. 1000 milliGRAM(s) IV Intermittent every 6 hours PRN Severe Pain (7 - 10)  hydrALAZINE Injectable 10 milliGRAM(s) IV Push every 2 hours   labetalol Injectable 10 milliGRAM(s) IV Push every 2 hours PRN Systolic blood pressure >160  midodrine 5 milliGRAM(s) Oral every 8 hours PRN for sbp <100  oxyCODONE    IR 5 milliGRAM(s) Oral every 4 hours PRN Moderate Pain (4 - 6)      Allergies: No Known Allergies      REVIEW OF SYSTEMS: unable to be obtained due to clinical condition      Vital Signs Last 24 Hrs  T(C): 36.7 (02 Oct 2023 08:00), Max: 37.3 (01 Oct 2023 19:33)  T(F): 98 (02 Oct 2023 08:00), Max: 99.2 (01 Oct 2023 19:33)  HR: 89 (02 Oct 2023 08:00) (87 - 99)  BP: 134/69 (02 Oct 2023 08:00) (110/71 - 150/79)  BP(mean): 101 (02 Oct 2023 00:00) (82 - 102)  RR: 18 (02 Oct 2023 08:00) (10 - 24)  SpO2: 96% (02 Oct 2023 08:00) (94% - 100%)    Parameters below as of 02 Oct 2023 08:00  Patient On (Oxygen Delivery Method): room air        PHYSICAL EXAM:  GENERAL: lying comfortably in bed  HEAD:  Atraumatic, Normocephalic, SHUN drain and bandages on scalp  EYES: EOMI, PERRLA, conjunctiva and sclera clear  NECK: Supple, No JVD  NERVOUS SYSTEM: awakens to voice, nonverbal, follows commands, right facial droop, 5/5 strength in LUE and LLE, 0/5 strength in RUE and RLE  CHEST/LUNG: + rhonchi throughout all lung fields  HEART: Regular rate and rhythm; No murmurs  ABDOMEN: Soft, Nontender; Bowel sounds present  EXTREMITIES:  No cyanosis, or edema  SKIN: No rashes    LABS:                        7.7    9.53  )-----------( 289      ( 02 Oct 2023 06:25 )             24.6     10-02    135  |  99  |  6.9<L>  ----------------------------<  95  3.4<L>   |  24.0  |  0.44<L>    Ca    8.2<L>      02 Oct 2023 06:25  Phos  2.2     10-02  Mg     1.7     10-02        Urinalysis Basic - ( 02 Oct 2023 06:25 )    Color: x / Appearance: x / SG: x / pH: x  Gluc: 95 mg/dL / Ketone: x  / Bili: x / Urobili: x   Blood: x / Protein: x / Nitrite: x   Leuk Esterase: x / RBC: x / WBC x   Sq Epi: x / Non Sq Epi: x / Bacteria: x      CAPILLARY BLOOD GLUCOSE      POCT Blood Glucose.: 102 mg/dL (02 Oct 2023 09:27)      RADIOLOGY & ADDITIONAL TESTS:    CT Head No Cont (09.30.23 @ 01:35) >  New left frontal prosthetic cranioplasty since 9/26/2023 with   a small amount of fluid, air and hemorrhage. No significant mass effect.   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   Decreased left scalp hematoma.    Xray Chest 1 View- PORTABLE-Urgent (Xray Chest 1 View- PORTABLE-Urgent .) (10.01.23 @ 02:05) >  NG tip in the stomach. Heart size unremarkable. Lungs are   clear.

## 2023-10-02 NOTE — PROGRESS NOTE ADULT - ATTENDING COMMENTS
Patient transferred out of NSICU to medicine after long hospital course for acute CVA (LM1 occlusion), complicated by need for decompressive craniectomy (s/p cranioplasty 9/29).  -post op care as per NSx  -concern for possible APLAS given positive Lupus anticoagulant, but repeat testing was negative, transient positivity does not fit with diagnosis of APLAS, hold on full AC.  -continue statin, Keppra

## 2023-10-02 NOTE — PROGRESS NOTE ADULT - ASSESSMENT
ASSESSMENT  63F PMH HTN, DM, presented initially with stroke now s/p thrombectomy TICI 2c s/p L hemicraniectomy now s/p L cranioplasty on 8/30.      PLAN  - case d/w team  - no acute neurosurgical intervention indicated at this time  - cont neuro checks q4  - pain control as needed, avoid over sedation  - STAT CTH if any acute change in mental status  - con't AED for seizure ppx  - 1 SHUN drain removed today, second SHUN drain remains in place, con't monitor output  - normotension  - SCDs, lovenox for dvt ppx, con't hold AC  - PT/OT, oob as tolerated  - further medical care per primary team  - will continue to follow ASSESSMENT  63F PMH HTN, DM, presented initially with stroke now s/p thrombectomy TICI 2c s/p L hemicraniectomy now s/p L cranioplasty on 8/30.      PLAN  - case d/w team  - no acute neurosurgical intervention indicated at this time  - cont neuro checks q4  - pain control as needed, avoid over sedation  - STAT CTH if any acute change in mental status  - con't AED for seizure ppx  - both SHUN drain remains in place, con't monitor output  - normotension  - SCDs, lovenox for dvt ppx, con't hold AC  - PT/OT, oob as tolerated  - further medical care per primary team  - will continue to follow

## 2023-10-03 LAB
ALBUMIN SERPL ELPH-MCNC: 2.7 G/DL — LOW (ref 3.3–5.2)
ALP SERPL-CCNC: 108 U/L — SIGNIFICANT CHANGE UP (ref 40–120)
ALT FLD-CCNC: 82 U/L — HIGH
ANION GAP SERPL CALC-SCNC: 12 MMOL/L — SIGNIFICANT CHANGE UP (ref 5–17)
AST SERPL-CCNC: 54 U/L — HIGH
BASOPHILS # BLD AUTO: 0.04 K/UL — SIGNIFICANT CHANGE UP (ref 0–0.2)
BASOPHILS NFR BLD AUTO: 0.5 % — SIGNIFICANT CHANGE UP (ref 0–2)
BILIRUB SERPL-MCNC: 0.4 MG/DL — SIGNIFICANT CHANGE UP (ref 0.4–2)
BUN SERPL-MCNC: 6.5 MG/DL — LOW (ref 8–20)
CALCIUM SERPL-MCNC: 8.9 MG/DL — SIGNIFICANT CHANGE UP (ref 8.4–10.5)
CHLORIDE SERPL-SCNC: 101 MMOL/L — SIGNIFICANT CHANGE UP (ref 96–108)
CO2 SERPL-SCNC: 25 MMOL/L — SIGNIFICANT CHANGE UP (ref 22–29)
CREAT SERPL-MCNC: 0.44 MG/DL — LOW (ref 0.5–1.3)
EGFR: 120 ML/MIN/1.73M2 — SIGNIFICANT CHANGE UP
EOSINOPHIL # BLD AUTO: 0.19 K/UL — SIGNIFICANT CHANGE UP (ref 0–0.5)
EOSINOPHIL NFR BLD AUTO: 2.4 % — SIGNIFICANT CHANGE UP (ref 0–6)
GLUCOSE SERPL-MCNC: 107 MG/DL — HIGH (ref 70–99)
HCT VFR BLD CALC: 26.8 % — LOW (ref 39–50)
HGB BLD-MCNC: 8.5 G/DL — LOW (ref 13–17)
IMM GRANULOCYTES NFR BLD AUTO: 1.1 % — HIGH (ref 0–0.9)
LYMPHOCYTES # BLD AUTO: 1.35 K/UL — SIGNIFICANT CHANGE UP (ref 1–3.3)
LYMPHOCYTES # BLD AUTO: 16.9 % — SIGNIFICANT CHANGE UP (ref 13–44)
MAGNESIUM SERPL-MCNC: 1.8 MG/DL — SIGNIFICANT CHANGE UP (ref 1.6–2.6)
MCHC RBC-ENTMCNC: 29.9 PG — SIGNIFICANT CHANGE UP (ref 27–34)
MCHC RBC-ENTMCNC: 31.7 GM/DL — LOW (ref 32–36)
MCV RBC AUTO: 94.4 FL — SIGNIFICANT CHANGE UP (ref 80–100)
MONOCYTES # BLD AUTO: 0.71 K/UL — SIGNIFICANT CHANGE UP (ref 0–0.9)
MONOCYTES NFR BLD AUTO: 8.9 % — SIGNIFICANT CHANGE UP (ref 2–14)
NEUTROPHILS # BLD AUTO: 5.63 K/UL — SIGNIFICANT CHANGE UP (ref 1.8–7.4)
NEUTROPHILS NFR BLD AUTO: 70.2 % — SIGNIFICANT CHANGE UP (ref 43–77)
PHOSPHATE SERPL-MCNC: 2.5 MG/DL — SIGNIFICANT CHANGE UP (ref 2.4–4.7)
PLATELET # BLD AUTO: 338 K/UL — SIGNIFICANT CHANGE UP (ref 150–400)
POTASSIUM SERPL-MCNC: 3.6 MMOL/L — SIGNIFICANT CHANGE UP (ref 3.5–5.3)
POTASSIUM SERPL-SCNC: 3.6 MMOL/L — SIGNIFICANT CHANGE UP (ref 3.5–5.3)
PROT SERPL-MCNC: 5.8 G/DL — LOW (ref 6.6–8.7)
RBC # BLD: 2.84 M/UL — LOW (ref 4.2–5.8)
RBC # FLD: 14.9 % — HIGH (ref 10.3–14.5)
SODIUM SERPL-SCNC: 138 MMOL/L — SIGNIFICANT CHANGE UP (ref 135–145)
WBC # BLD: 8.01 K/UL — SIGNIFICANT CHANGE UP (ref 3.8–10.5)
WBC # FLD AUTO: 8.01 K/UL — SIGNIFICANT CHANGE UP (ref 3.8–10.5)

## 2023-10-03 PROCEDURE — 99232 SBSQ HOSP IP/OBS MODERATE 35: CPT

## 2023-10-03 RX ADMIN — POLYETHYLENE GLYCOL 3350 17 GRAM(S): 17 POWDER, FOR SOLUTION ORAL at 12:52

## 2023-10-03 RX ADMIN — SENNA PLUS 1 TABLET(S): 8.6 TABLET ORAL at 12:52

## 2023-10-03 RX ADMIN — SODIUM CHLORIDE 40 MILLILITER(S): 9 INJECTION INTRAMUSCULAR; INTRAVENOUS; SUBCUTANEOUS at 05:08

## 2023-10-03 RX ADMIN — SODIUM CHLORIDE 40 MILLILITER(S): 9 INJECTION INTRAMUSCULAR; INTRAVENOUS; SUBCUTANEOUS at 21:47

## 2023-10-03 RX ADMIN — ENOXAPARIN SODIUM 40 MILLIGRAM(S): 100 INJECTION SUBCUTANEOUS at 18:36

## 2023-10-03 RX ADMIN — LEVETIRACETAM 400 MILLIGRAM(S): 250 TABLET, FILM COATED ORAL at 18:36

## 2023-10-03 RX ADMIN — LEVETIRACETAM 400 MILLIGRAM(S): 250 TABLET, FILM COATED ORAL at 05:06

## 2023-10-03 RX ADMIN — ATORVASTATIN CALCIUM 80 MILLIGRAM(S): 80 TABLET, FILM COATED ORAL at 21:44

## 2023-10-03 NOTE — PROGRESS NOTE ADULT - ATTENDING COMMENTS
62 y/o M with PMH of DM, HTN presented after a fall with right sided weakness, dysarthria and right facial droop. Patient found to have LM1 occlusion and midline shift, transferred out of NSICU. SHUN drain removed by Neurosurgery. Remaining SHUN drain to be removed tomorrow. Physical therapy consult. DC 2-3 days pending Neurosurgery clearance.

## 2023-10-03 NOTE — PROGRESS NOTE ADULT - SUBJECTIVE AND OBJECTIVE BOX
HPI:  Patient is a 63 year old male with PMH Hypertension, DM on oral medications who presents c/o stroke-like symptoms. Per son pt was walking outside around 10:15am this morning when he suddenly fell. His son helped him up and noted that he was weak on the right side and not acting normally which prompted him to come to the ED. On arrival pt w/ obvious right sided arm weakness, confusion, dysarthria, facial droop suggestive of acute stroke. Code stroke initiated, found to have LM1 occlusion, given TNK @ 1215 and was taken for thrombectomy. Unable to provide ROS 2/2 aphasia        INTERVAL HPI/OVERNIGHT EVENTS:  63y Male s/p left cranioplasty with complex closure with plastic surgery on 9/29. Patient seen and examined by neurosurgery at bedside. One SHUN drain removed.     Vital Signs Last 24 Hrs  T(C): 36.4 (03 Oct 2023 08:15), Max: 36.8 (02 Oct 2023 17:20)  T(F): 97.5 (03 Oct 2023 08:15), Max: 98.3 (02 Oct 2023 17:20)  HR: 85 (03 Oct 2023 08:15) (68 - 92)  BP: 113/77 (03 Oct 2023 08:15) (106/71 - 136/74)  BP(mean): --  RR: 18 (03 Oct 2023 08:15) (18 - 18)  SpO2: 95% (03 Oct 2023 08:15) (94% - 96%)    Parameters below as of 03 Oct 2023 08:15  Patient On (Oxygen Delivery Method): room air    PHYSICAL EXAM:  GENERAL: NAD  HEAD:  L cranioplasty with one SHUN drain to full suction  DRAINS: SHUN drain present   WOUND: Incision clean dry intact  MENTAL STATUS:  Awake; Opens eyes spontaneously; following simple commands  CRANIAL NERVES: PERRL.   MOTOR: LUE/LLE antigravity, right side hemiplegia   EXTREMITIES:  2+ peripheral pulses, no clubbing, cyanosis, or edema  SKIN: Warm, dry; no rashes or lesions    LABS:                        8.5    8.01  )-----------( 338      ( 03 Oct 2023 05:52 )             26.8     10-03    138  |  101  |  6.5<L>  ----------------------------<  107<H>  3.6   |  25.0  |  0.44<L>    Ca    8.9      03 Oct 2023 05:52  Phos  2.5     10-03  Mg     1.8     10-03    TPro  5.8<L>  /  Alb  2.7<L>  /  TBili  0.4  /  DBili  x   /  AST  54<H>  /  ALT  82<H>  /  AlkPhos  108  10-03      Urinalysis Basic - ( 03 Oct 2023 05:52 )    Color: x / Appearance: x / SG: x / pH: x  Gluc: 107 mg/dL / Ketone: x  / Bili: x / Urobili: x   Blood: x / Protein: x / Nitrite: x   Leuk Esterase: x / RBC: x / WBC x   Sq Epi: x / Non Sq Epi: x / Bacteria: x        10-02 @ 07:01  -  10-03 @ 07:00  --------------------------------------------------------  IN: 725 mL / OUT: 757 mL / NET: -32 mL        RADIOLOGY & ADDITIONAL TESTS:

## 2023-10-03 NOTE — PROGRESS NOTE ADULT - SUBJECTIVE AND OBJECTIVE BOX
Patient is a 63y old  Male who presents with a chief complaint of LT M1 Occlusion (03 Oct 2023 10:50)      INTERVAL HPI/OVERNIGHT EVENTS: No acute events overnight. Patient may have missed 6 PM Keppra dose so nurse manager currently investigating further.     MEDICATIONS  (STANDING):  atorvastatin 80 milliGRAM(s) Oral at bedtime  enoxaparin Injectable 40 milliGRAM(s) SubCutaneous every 24 hours  levETIRAcetam  IVPB 500 milliGRAM(s) IV Intermittent every 12 hours  polyethylene glycol 3350 17 Gram(s) Oral daily  senna 1 Tablet(s) Oral every 24 hours  sodium chloride 0.9%. 1000 milliLiter(s) (40 mL/Hr) IV Continuous <Continuous>    MEDICATIONS  (PRN):  acetaminophen   IVPB .. 1000 milliGRAM(s) IV Intermittent every 6 hours PRN Severe Pain (7 - 10)  hydrALAZINE Injectable 10 milliGRAM(s) IV Push every 2 hours   labetalol Injectable 10 milliGRAM(s) IV Push every 2 hours PRN Systolic blood pressure >160  midodrine 5 milliGRAM(s) Oral every 8 hours PRN for sbp <100  oxyCODONE    IR 5 milliGRAM(s) Oral every 4 hours PRN Moderate Pain (4 - 6)      Allergies: No Known Allergies        REVIEW OF SYSTEMS: unable to be assessed due to clinical condition    Vital Signs Last 24 Hrs  T(C): 36.4 (03 Oct 2023 08:15), Max: 36.8 (02 Oct 2023 17:20)  T(F): 97.5 (03 Oct 2023 08:15), Max: 98.3 (02 Oct 2023 17:20)  HR: 85 (03 Oct 2023 08:15) (68 - 92)  BP: 113/77 (03 Oct 2023 08:15) (106/71 - 136/74)  RR: 18 (03 Oct 2023 08:15) (18 - 18)  SpO2: 95% (03 Oct 2023 08:15) (94% - 96%)    Parameters below as of 03 Oct 2023 08:15  Patient On (Oxygen Delivery Method): room air    PHYSICAL EXAM:  GENERAL: lying comfortably in bed  HEAD:  Atraumatic, Normocephalic, SHUN drain and bandages on scalp  EYES: EOMI, PERRLA, conjunctiva and sclera clear  NECK: Supple, No JVD  NERVOUS SYSTEM: alert, nonverbal, follows commands, right facial droop, 5/5 strength in LUE and LLE, 0/5 strength in RUE and RLE  CHEST/LUNG: + wheezes throughout all lung fields  HEART: Regular rate and rhythm; No murmurs  ABDOMEN: Soft, Nontender; Bowel sounds present  EXTREMITIES:  No cyanosis, or edema, unna boots in place  SKIN: No rashes    LABS:                        8.5    8.01  )-----------( 338      ( 03 Oct 2023 05:52 )             26.8     10-03    138  |  101  |  6.5<L>  ----------------------------<  107<H>  3.6   |  25.0  |  0.44<L>    Ca    8.9      03 Oct 2023 05:52  Phos  2.5     10-03  Mg     1.8     10-03    TPro  5.8<L>  /  Alb  2.7<L>  /  TBili  0.4  /  DBili  x   /  AST  54<H>  /  ALT  82<H>  /  AlkPhos  108  10-03      Urinalysis Basic - ( 03 Oct 2023 05:52 )    Color: x / Appearance: x / SG: x / pH: x  Gluc: 107 mg/dL / Ketone: x  / Bili: x / Urobili: x   Blood: x / Protein: x / Nitrite: x   Leuk Esterase: x / RBC: x / WBC x   Sq Epi: x / Non Sq Epi: x / Bacteria: x      CAPILLARY BLOOD GLUCOSE      POCT Blood Glucose.: 104 mg/dL (02 Oct 2023 17:50)  POCT Blood Glucose.: 101 mg/dL (02 Oct 2023 12:47)      RADIOLOGY & ADDITIONAL TESTS:    CT Head No Cont (09.30.23 @ 01:35) >  New left frontal prosthetic cranioplasty since 9/26/2023 with   a small amount of fluid, air and hemorrhage. No significant mass effect.   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   Decreased left scalp hematoma.    Xray Chest 1 View- PORTABLE-Urgent (Xray Chest 1 View- PORTABLE-Urgent .) (10.01.23 @ 02:05) >  NG tip in the stomach. Heart size unremarkable. Lungs are   clear.

## 2023-10-03 NOTE — CHART NOTE - NSCHARTNOTEFT_GEN_A_CORE
Source: Patient [ ]  Family [ ]   other [x ]EMR    Current Diet: Puree, consistent CHO, Pureed with mod thick liquids    Patient reports [ ] nausea  [ ] vomiting [ ] diarrhea [ ] constipation  [ x]chewing problems [x ] swallowing issues  [ ] other:     PO intake:  < 50% [ ]   50-75%  [ x]   %  [ ]  other :    Source for PO intake [ ] Patient [ ] family [x ] chart [ ] staff [ ] other    Enteral /Parenteral Nutrition:     Current Weight: 8/27 179#, 9/1 176.8#  no edema    % Weight Change     Pertinent Medications: MEDICATIONS  (STANDING):  atorvastatin 80 milliGRAM(s) Oral at bedtime  enoxaparin Injectable 40 milliGRAM(s) SubCutaneous every 24 hours  levETIRAcetam  IVPB 500 milliGRAM(s) IV Intermittent every 12 hours  polyethylene glycol 3350 17 Gram(s) Oral daily  senna 1 Tablet(s) Oral every 24 hours  sodium chloride 0.9%. 1000 milliLiter(s) (40 mL/Hr) IV Continuous <Continuous>    MEDICATIONS  (PRN):  acetaminophen   IVPB .. 1000 milliGRAM(s) IV Intermittent every 6 hours PRN Severe Pain (7 - 10)  hydrALAZINE Injectable 10 milliGRAM(s) IV Push every 2 hours   labetalol Injectable 10 milliGRAM(s) IV Push every 2 hours PRN Systolic blood pressure >160  midodrine 5 milliGRAM(s) Oral every 8 hours PRN for sbp <100  oxyCODONE    IR 5 milliGRAM(s) Oral every 4 hours PRN Moderate Pain (4 - 6)    Pertinent Labs: CBC Full  -  ( 03 Oct 2023 05:52 )  WBC Count : 8.01 K/uL  RBC Count : 2.84 M/uL  Hemoglobin : 8.5 g/dL  Hematocrit : 26.8 %  Platelet Count - Automated : 338 K/uL  Mean Cell Volume : 94.4 fl  Mean Cell Hemoglobin : 29.9 pg  Mean Cell Hemoglobin Concentration : 31.7 gm/dL  Auto Neutrophil # : 5.63 K/uL  Auto Lymphocyte # : 1.35 K/uL  Auto Monocyte # : 0.71 K/uL  Auto Eosinophil # : 0.19 K/uL  Auto Basophil # : 0.04 K/uL  Auto Neutrophil % : 70.2 %  Auto Lymphocyte % : 16.9 %  Auto Monocyte % : 8.9 %  Auto Eosinophil % : 2.4 %  Auto Basophil % : 0.5 %      10-03 Na138 mmol/L Glu 107 mg/dL<H> K+ 3.6 mmol/L Cr  0.44 mg/dL<L> BUN 6.5 mg/dL<L> Phos 2.5 mg/dL Alb 2.7 g/dL<L> PAB n/a           Skin:     Nutrition focused physical exam conducted - found signs of malnutrition [ ]absent [ ]present    Subcutaneous fat loss: [ ] Orbital fat pads region, [ ]Buccal fat region, [ ]Triceps region,  [ ]Ribs region    Muscle wasting: [x ]Temples region, [ ]Clavicle region, [ ]Shoulder region, [ ]Scapula region, [ ]Interosseous region,  [ ]thigh region, [ ]Calf region    Estimated Needs:   [ x] no change since previous assessment  [ ] recalculated:     Current Nutrition Diagnosis: Pt meets criteria for moderate acute malnutrition related to difficulty swallowing in setting of left acute MCA as evidenced by downgrade to puree with mod thick liquids from soft and bite sized and meeting <75%EER>7days, mild muscle depletion.  Pt is a total feed.  Pt is s/p left cranioplasty with complex closure with plastic surgery on 9/29. Second SHUN drain to be removed 10/4.     Recommendations:   Need accurate weight.  Diet as per speech tx  Provide plain greek yogurt for increased protein  Provide magic cup TID      Monitoring and Evaluation:   [x ] PO intake [x ] Tolerance to diet prescription [X] Weights  [X] Follow up per protocol [X] Labs:

## 2023-10-03 NOTE — DIETITIAN NUTRITION RISK NOTIFICATION - UPON NUTRITIONAL ASSESSMENT BY THE REGISTERED DIETITIAN YOUR PATIENT WAS DETERMINED TO MEET CRITERIA/HAS EVIDENCE OF THE FOLLOWING DIAGNOSIS:
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 \par [X] No \par [ ] Yes - Details: [ ]  \par \par  \par \par Legal Involvement history  \par Does the individual have a history of or current involvement with the legal system?  \par [X] No \par [ ] Yes - Details: [ ]  \par \par  \par \par  Services  \par Have you ever served in the ?  \par [ X ] No \par [ ] Yes - Details: [ ]  \par \par  \par Spiritual Assessment Tool - [employer] \par F. What is your gigi or belief? Patient believes in energy.  “Something exists, but we don’t know” \par \par Do you consider yourself spiritual or Anabaptism? Spiritual  \par Is there something you believe in that gives meaning to your life? "Belief in success” \par \par I: Is it important in your life? Yes, very \par What influence does it have on how you take care of yourself? Drives her to engage in activities to be successful   \par \par How have your beliefs influenced your behavior during this illness? What role do your beliefs play in regaining your health? She does not believe it has  \par \par C. Are you part of a spiritual or Anabaptism community? No  \par Is this of support to you and how?  N/A \par \par Is there a person or group of people you really love or who are really important to you? Boyfriend and family  \par \par H. We have been discussing your belief and supports. What else gives you internal support?  “When someone loves me unconditionally” \par \par What are your sources of hope, strength, comfort and peace?  Love from others \par \par What do you hold on to during difficult times? Thoughts that things can be better  \par \par What sustains you and keeps you going? Hope  \par \par A. How would you like me, your healthcare provider, to address these issues in your healthcare? Does not want it addressed  \par \par   [Yes] : yes [None Known] : none known [No Known Use] : no known use [TextBox_7] : Patient reports she drinks 1-2 days a week where she consumes 1-2 glasses of wine with friends.  Denied problematic  [TextBox_9] : Denied [TextBox_11] : Denied

## 2023-10-03 NOTE — PROGRESS NOTE ADULT - ASSESSMENT
ASSESSMENT:  62yo M s/p L cranioplasty with complex closure with plastic surgery, now POD #4.     PLAN:  - q4 neuro checks  - c/w Keppra 500 BID  - monitor remaining SHUN output, plan to remove tomorrow  - Lovenox/SCDs for VTE ppx  - pain control prn  - staples to be removed on day 14, may be done outpatient ASSESSMENT:  64yo M s/p L cranioplasty with complex closure with plastic surgery, now POD #4.     PLAN:  - q4 neuro checks  - c/w Keppra 500 BID  - monitor remaining SHUN output  - Lovenox/SCDs for VTE ppx  - pain control prn  - staples to be removed on day 14, may be done outpatient

## 2023-10-03 NOTE — PROGRESS NOTE ADULT - ASSESSMENT
ASSESSMENT:  63 year old male with PMHx of T2DM, HTN who presented after a fall with right sided weakness, dysarthria and right facial droop. Patient was found to have LM1 occlusion s/p tenecteplase in ED and had mechanical thrombectomy done with TICI 2c. Decompressive craniectomy for midline shift was done on 8/30/2023, SHUN drain was removed on 8/31, and left cranioplasty on 9/29/2023. SAMARA was negative for thrombus. SHUN drain x1 removed on 10/2/2023 and plan for other SHUN drain to be removed on 10/4/2023.     PLAN:    LM1 Occlusion, Midline Shift  - SAMARA negative for PFO, atrial thrombus  - No significant occlusive disease on CT neck  - Decompressive Craniectomy on 8/30  - Mechanical thrombectomy with TICI 2c recanalization  - Left cranioplasty on 9/29 with SHUN drains  - SHUN drain x 1 removed 10/2/2023, managed by neurosurgery  - Monitor remaining SHUN drain's output  - Plan for other SHUN drain removal on 10/4/2023 per neurosx -----> Pending  - Dysphagia diet  - Repeat speech and swallow eval -------> Pending  - Lovenox 40 mg qd s.c.  - Continuing to hold anticoagulation per neurosurgery  - Atorvastatin 80 mg qd restarted  - Keppra 500 mg q12h until 10/6 for seizure prophylaxis  - CT head stat if any changes in status  - Lupus anticoagulant positive, normal ACL and B2G antibodies with repeat testing negative  - Antiphospholipid syndrome unlikely given negative repeat testing  - Repeat Lupus anticoagulant testing in 12 weeks per hematology  - ILR placement outpatient per cardiology  - Remove staples on 10/13, can be done outpatient    Hypokalemia, Hypophosphatemia  - Resolved  -  s/p potassium chloride 10 mEq IV  - Continue to monitor    Leukocytosis  -Resolved  -Continue to monitor    Klebsiella Bacteremia, UTI  - Resolved  - s/p Ceftriaxone, Zosyn, Vancomycin, Cefazolin    Anemia  - Likely due to post-op status  - Continue to monitor    HTN  - Maintain normotensive per neurosurgery  - Hydralazine 10 mg IV PRN  - Labetalol 10 mg IV PRN  - Midodrine PRN     Urinary Retention  - s/p Umaña removal  - No signs of urinary retention  - Monitor     T2DM  - ISS, ADA diet    VTE ppx:  - Lovenox 40 mg s.c.      Dispo: pending SHUN drain removal, neurosurgery clearance

## 2023-10-03 NOTE — DIETITIAN NUTRITION RISK NOTIFICATION - TREATMENT: THE FOLLOWING DIET HAS BEEN RECOMMENDED
Diet, Pureed:   Consistent Carbohydrate {Evening Snack} (CSTCHOSN)  Moderately Thick Liquids (MODTHICKLIQS) (10-01-23 @ 10:52) [Active]  Diet, Regular (09-29-23 @ 23:34) [Available for Activation]  Diet, Clear Liquid (09-29-23 @ 23:34) [Available for Activation]

## 2023-10-04 LAB
ALBUMIN SERPL ELPH-MCNC: 2.6 G/DL — LOW (ref 3.3–5.2)
ALP SERPL-CCNC: 105 U/L — SIGNIFICANT CHANGE UP (ref 40–120)
ALT FLD-CCNC: 85 U/L — HIGH
ANION GAP SERPL CALC-SCNC: 12 MMOL/L — SIGNIFICANT CHANGE UP (ref 5–17)
AST SERPL-CCNC: 49 U/L — HIGH
BASOPHILS # BLD AUTO: 0.04 K/UL — SIGNIFICANT CHANGE UP (ref 0–0.2)
BASOPHILS NFR BLD AUTO: 0.5 % — SIGNIFICANT CHANGE UP (ref 0–2)
BILIRUB SERPL-MCNC: 0.5 MG/DL — SIGNIFICANT CHANGE UP (ref 0.4–2)
BUN SERPL-MCNC: 6 MG/DL — LOW (ref 8–20)
CALCIUM SERPL-MCNC: 8.6 MG/DL — SIGNIFICANT CHANGE UP (ref 8.4–10.5)
CHLORIDE SERPL-SCNC: 101 MMOL/L — SIGNIFICANT CHANGE UP (ref 96–108)
CO2 SERPL-SCNC: 25 MMOL/L — SIGNIFICANT CHANGE UP (ref 22–29)
CREAT SERPL-MCNC: 0.4 MG/DL — LOW (ref 0.5–1.3)
EGFR: 123 ML/MIN/1.73M2 — SIGNIFICANT CHANGE UP
EOSINOPHIL # BLD AUTO: 0.22 K/UL — SIGNIFICANT CHANGE UP (ref 0–0.5)
EOSINOPHIL NFR BLD AUTO: 2.9 % — SIGNIFICANT CHANGE UP (ref 0–6)
GLUCOSE BLDC GLUCOMTR-MCNC: 114 MG/DL — HIGH (ref 70–99)
GLUCOSE SERPL-MCNC: 102 MG/DL — HIGH (ref 70–99)
HCT VFR BLD CALC: 25.7 % — LOW (ref 39–50)
HGB BLD-MCNC: 8.2 G/DL — LOW (ref 13–17)
IMM GRANULOCYTES NFR BLD AUTO: 0.9 % — SIGNIFICANT CHANGE UP (ref 0–0.9)
LYMPHOCYTES # BLD AUTO: 1.43 K/UL — SIGNIFICANT CHANGE UP (ref 1–3.3)
LYMPHOCYTES # BLD AUTO: 19 % — SIGNIFICANT CHANGE UP (ref 13–44)
MCHC RBC-ENTMCNC: 30 PG — SIGNIFICANT CHANGE UP (ref 27–34)
MCHC RBC-ENTMCNC: 31.9 GM/DL — LOW (ref 32–36)
MCV RBC AUTO: 94.1 FL — SIGNIFICANT CHANGE UP (ref 80–100)
MONOCYTES # BLD AUTO: 0.79 K/UL — SIGNIFICANT CHANGE UP (ref 0–0.9)
MONOCYTES NFR BLD AUTO: 10.5 % — SIGNIFICANT CHANGE UP (ref 2–14)
NEUTROPHILS # BLD AUTO: 4.97 K/UL — SIGNIFICANT CHANGE UP (ref 1.8–7.4)
NEUTROPHILS NFR BLD AUTO: 66.2 % — SIGNIFICANT CHANGE UP (ref 43–77)
PLATELET # BLD AUTO: 391 K/UL — SIGNIFICANT CHANGE UP (ref 150–400)
POTASSIUM SERPL-MCNC: 3.4 MMOL/L — LOW (ref 3.5–5.3)
POTASSIUM SERPL-SCNC: 3.4 MMOL/L — LOW (ref 3.5–5.3)
PROT SERPL-MCNC: 5.6 G/DL — LOW (ref 6.6–8.7)
RBC # BLD: 2.73 M/UL — LOW (ref 4.2–5.8)
RBC # FLD: 15.1 % — HIGH (ref 10.3–14.5)
SODIUM SERPL-SCNC: 137 MMOL/L — SIGNIFICANT CHANGE UP (ref 135–145)
WBC # BLD: 7.52 K/UL — SIGNIFICANT CHANGE UP (ref 3.8–10.5)
WBC # FLD AUTO: 7.52 K/UL — SIGNIFICANT CHANGE UP (ref 3.8–10.5)

## 2023-10-04 PROCEDURE — 99232 SBSQ HOSP IP/OBS MODERATE 35: CPT

## 2023-10-04 RX ORDER — POTASSIUM CHLORIDE 20 MEQ
20 PACKET (EA) ORAL ONCE
Refills: 0 | Status: COMPLETED | OUTPATIENT
Start: 2023-10-04 | End: 2023-10-04

## 2023-10-04 RX ORDER — MAGNESIUM SULFATE 500 MG/ML
1 VIAL (ML) INJECTION ONCE
Refills: 0 | Status: COMPLETED | OUTPATIENT
Start: 2023-10-04 | End: 2023-10-04

## 2023-10-04 RX ADMIN — ENOXAPARIN SODIUM 40 MILLIGRAM(S): 100 INJECTION SUBCUTANEOUS at 17:21

## 2023-10-04 RX ADMIN — LEVETIRACETAM 400 MILLIGRAM(S): 250 TABLET, FILM COATED ORAL at 17:22

## 2023-10-04 RX ADMIN — POLYETHYLENE GLYCOL 3350 17 GRAM(S): 17 POWDER, FOR SOLUTION ORAL at 17:22

## 2023-10-04 RX ADMIN — Medication 100 GRAM(S): at 09:20

## 2023-10-04 RX ADMIN — SODIUM CHLORIDE 40 MILLILITER(S): 9 INJECTION INTRAMUSCULAR; INTRAVENOUS; SUBCUTANEOUS at 05:34

## 2023-10-04 RX ADMIN — Medication 20 MILLIEQUIVALENT(S): at 17:21

## 2023-10-04 RX ADMIN — SENNA PLUS 1 TABLET(S): 8.6 TABLET ORAL at 23:34

## 2023-10-04 RX ADMIN — LEVETIRACETAM 400 MILLIGRAM(S): 250 TABLET, FILM COATED ORAL at 05:33

## 2023-10-04 NOTE — PHYSICAL THERAPY INITIAL EVALUATION ADULT - BED MOBILITY LIMITATIONS, REHAB EVAL
decreased ability to use arms for pushing/pulling/decreased ability to use legs for bridging/pushing/impaired ability to control trunk for mobility
decreased ability to use arms for pushing/pulling

## 2023-10-04 NOTE — PHYSICAL THERAPY INITIAL EVALUATION ADULT - IMPAIRMENTS FOUND, PT EVAL
cognitive impairment/gait, locomotion, and balance/muscle strength/tone
cognitive impairment/fine motor/gait, locomotion, and balance/gross motor/muscle strength/posture

## 2023-10-04 NOTE — PHYSICAL THERAPY INITIAL EVALUATION ADULT - IMPAIRMENTS CONTRIBUTING IMPAIRED BED MOBILITY, REHAB EVAL
impaired balance/decreased strength
impaired balance/cognition/impaired coordination/impaired motor control/abnormal muscle tone/impaired postural control/decreased strength

## 2023-10-04 NOTE — PHYSICAL THERAPY INITIAL EVALUATION ADULT - MANUAL MUSCLE TESTING RESULTS, REHAB EVAL
0/5 Right UE/LE - unable to produce movement upon request, left UE/LE 5/5
left shoulder flex, elbow flex, hip flex, knee ext, ankle df WFL: right UE/LE-no active movement noted.

## 2023-10-04 NOTE — PROGRESS NOTE ADULT - ATTENDING COMMENTS
62 y/o M with PMH of DM, HTN presented after a fall with right sided weakness, dysarthria and right facial droop. Patient found to have LM1 occlusion and midline shift, transferred out of NSICU. 1 SHUN drain removed by Neurosurgery, second drain to be removed tomorrow. Repeat CT head after removal. DC likely early next week per Neurosurgery.

## 2023-10-04 NOTE — PHYSICAL THERAPY INITIAL EVALUATION ADULT - GENERAL OBSERVATIONS, REHAB EVAL
Pt rec'd upright with the HOB elevated. Pt breathing RA in NAD, helmet at bedside
72
awake in bed on room air, +iv, +SHUN, +bilateral mitts, +telemonitor with

## 2023-10-04 NOTE — PHYSICAL THERAPY INITIAL EVALUATION ADULT - ADDITIONAL COMMENTS
Pt aphasic, unable to provide details of previous functional level or social hx - obtained from chart. pt independent without an AD, lives with nephew.
Pt aphasic, unable to provide details of previous functional level or social hx - obtained from chart. pt independent without an AD, lives with nephew.

## 2023-10-04 NOTE — PHYSICAL THERAPY INITIAL EVALUATION ADULT - MD/RN NOTIFIED
[FreeTextEntry1] : Umbilical granuloma cauterized today in office. Procedure well tolerated.\par Recommend exclusive breastfeeding, 8-12 feedings per day. Mother should continue prenatal vitamins and avoid alcohol. If formula is needed, recommend iron-fortified formulations, 2-4 oz every 2-3 hrs. When in car, patient should be in rear-facing car seat in back seat. Put baby to sleep on back, in own crib with no loose or soft bedding. Help baby to develop sleep and feeding routines. Limit baby's exposure to others, especially those with fever or unknown vaccine status. Parents counseled to call if rectal temperature >100.4 degrees F.\par Recheck 1 month WCC\par \par 
yes
yes

## 2023-10-04 NOTE — PHYSICAL THERAPY INITIAL EVALUATION ADULT - BALANCE DISTURBANCE, IDENTIFIED IMPAIRMENT CONTRIBUTE, REHAB EVAL
impaired coordination/impaired motor control/impaired postural control/decreased strength
impaired coordination/impaired motor control/abnormal muscle tone/decreased strength

## 2023-10-04 NOTE — PROGRESS NOTE ADULT - SUBJECTIVE AND OBJECTIVE BOX
HPI:  Patient is a 63 year old male with PMH Hypertension, DM on oral medications who presents c/o stroke-like symptoms. Per son pt was walking outside around 10:15am this morning when he suddenly fell. His son helped him up and noted that he was weak on the right side and not acting normally which prompted him to come to the ED. On arrival pt w/ obvious right sided arm weakness, confusion, dysarthria, facial droop suggestive of acute stroke. Code stroke initiated, found to have LM1 occlusion, given TNK @ 1215 and was taken for thrombectomy. Unable to provide ROS 2/2 aphasia        INTERVAL HPI/OVERNIGHT EVENTS:  63y Male seen and examined by neurosurgery team. Lying comfortably in bed.  S/p L cranioplasty with complex closure with plastic surgery on 9/29.     Vital Signs Last 24 Hrs  T(C): 36.9 (04 Oct 2023 08:00), Max: 37.1 (03 Oct 2023 21:15)  T(F): 98.5 (04 Oct 2023 08:00), Max: 98.7 (03 Oct 2023 21:15)  HR: 78 (04 Oct 2023 08:00) (63 - 87)  BP: 118/76 (04 Oct 2023 08:00) (107/71 - 131/80)  BP(mean): 97 (03 Oct 2023 21:15) (97 - 97)  RR: 19 (04 Oct 2023 05:09) (18 - 19)  SpO2: 96% (04 Oct 2023 08:00) (90% - 96%)    Parameters below as of 04 Oct 2023 08:00  Patient On (Oxygen Delivery Method): room air      PHYSICAL EXAM:  GENERAL: NAD  HEAD:  L cranioplasty with one SHUN drain to full suction  DRAINS: 1 SHUN drain present   WOUND: Incision clean dry intact  MENTAL STATUS: AAO x 2; Awake; Opens eyes spontaneously; following simple commands  CRANIAL NERVES:  PERRL. Face symmetric w/ normal eye closure and smile, tongue midline.  MOTOR: LUE/LLE antigravity, right side hemiplegia   EXTREMITIES:  2+ peripheral pulses, no clubbing, cyanosis, or edema  SKIN: Warm, dry; no rashes or lesions    LABS:                        8.2    7.52  )-----------( 391      ( 04 Oct 2023 05:45 )             25.7     10-04    137  |  101  |  6.0<L>  ----------------------------<  102<H>  3.4<L>   |  25.0  |  0.40<L>    Ca    8.6      04 Oct 2023 05:45  Phos  2.5     10-03  Mg     1.8     10-03    TPro  5.6<L>  /  Alb  2.6<L>  /  TBili  0.5  /  DBili  x   /  AST  49<H>  /  ALT  85<H>  /  AlkPhos  105  10-04      Urinalysis Basic - ( 04 Oct 2023 05:45 )    Color: x / Appearance: x / SG: x / pH: x  Gluc: 102 mg/dL / Ketone: x  / Bili: x / Urobili: x   Blood: x / Protein: x / Nitrite: x   Leuk Esterase: x / RBC: x / WBC x   Sq Epi: x / Non Sq Epi: x / Bacteria: x        10-03 @ 07:01  -  10-04 @ 07:00  --------------------------------------------------------  IN: 600 mL / OUT: 10 mL / NET: 590 mL        RADIOLOGY & ADDITIONAL TESTS:

## 2023-10-04 NOTE — PHYSICAL THERAPY INITIAL EVALUATION ADULT - RANGE OF MOTION EXAMINATION, REHAB EVAL
passive ROM bilateral UEs/LEs WFL: active ROM left UE/LE WFL; no active ROM noted right UE/LE.
PROM WFL

## 2023-10-04 NOTE — PROGRESS NOTE ADULT - SUBJECTIVE AND OBJECTIVE BOX
Patient is a 63y old  Male who presents with a chief complaint of LT M1 Occlusion (04 Oct 2023 09:56)      INTERVAL HPI/OVERNIGHT EVENTS: No acute events overnight.     MEDICATIONS  (STANDING):  atorvastatin 80 milliGRAM(s) Oral at bedtime  enoxaparin Injectable 40 milliGRAM(s) SubCutaneous every 24 hours  levETIRAcetam  IVPB 500 milliGRAM(s) IV Intermittent every 12 hours  polyethylene glycol 3350 17 Gram(s) Oral daily  senna 1 Tablet(s) Oral every 24 hours  sodium chloride 0.9%. 1000 milliLiter(s) (40 mL/Hr) IV Continuous <Continuous>    MEDICATIONS  (PRN):  acetaminophen   IVPB .. 1000 milliGRAM(s) IV Intermittent every 6 hours PRN Severe Pain (7 - 10)  hydrALAZINE Injectable 10 milliGRAM(s) IV Push every 2 hours   labetalol Injectable 10 milliGRAM(s) IV Push every 2 hours PRN Systolic blood pressure >160  midodrine 5 milliGRAM(s) Oral every 8 hours PRN for sbp <100  oxyCODONE    IR 5 milliGRAM(s) Oral every 4 hours PRN Moderate Pain (4 - 6)      Allergies: No Known Allergies        REVIEW OF SYSTEMS: unable to be obtained due to clinical condition      Vital Signs Last 24 Hrs  T(C): 36.9 (04 Oct 2023 08:00), Max: 37.1 (03 Oct 2023 21:15)  T(F): 98.5 (04 Oct 2023 08:00), Max: 98.7 (03 Oct 2023 21:15)  HR: 78 (04 Oct 2023 08:00) (63 - 87)  BP: 118/76 (04 Oct 2023 08:00) (107/71 - 131/80)  BP(mean): 97 (03 Oct 2023 21:15) (97 - 97)  RR: 19 (04 Oct 2023 05:09) (18 - 19)  SpO2: 96% (04 Oct 2023 08:00) (90% - 96%)    Parameters below as of 04 Oct 2023 08:00  Patient On (Oxygen Delivery Method): room air    PHYSICAL EXAM:  GENERAL: lying comfortably in bed  HEAD:  Atraumatic, Normocephalic, SHUN drain and bandages on scalp  EYES: EOMI, PERRLA, conjunctiva and sclera clear  NECK: Supple, No JVD  NERVOUS SYSTEM: alert, nonverbal, follows commands, right facial droop, 5/5 strength in LUE and LLE, 0/5 strength in RUE and RLE  CHEST/LUNG: + wheezes throughout all lung fields  HEART: Regular rate and rhythm; No murmurs  ABDOMEN: Soft, Nontender; Bowel sounds present  EXTREMITIES:  No cyanosis, or edema, unna boots in place  SKIN: No rashes    LABS:                        8.2    7.52  )-----------( 391      ( 04 Oct 2023 05:45 )             25.7     10-04    137  |  101  |  6.0<L>  ----------------------------<  102<H>  3.4<L>   |  25.0  |  0.40<L>    Ca    8.6      04 Oct 2023 05:45  Phos  2.5     10-03  Mg     1.8     10-03    TPro  5.6<L>  /  Alb  2.6<L>  /  TBili  0.5  /  DBili  x   /  AST  49<H>  /  ALT  85<H>  /  AlkPhos  105  10-04      Urinalysis Basic - ( 04 Oct 2023 05:45 )    Color: x / Appearance: x / SG: x / pH: x  Gluc: 102 mg/dL / Ketone: x  / Bili: x / Urobili: x   Blood: x / Protein: x / Nitrite: x   Leuk Esterase: x / RBC: x / WBC x   Sq Epi: x / Non Sq Epi: x / Bacteria: x      CAPILLARY BLOOD GLUCOSE      POCT Blood Glucose.: 114 mg/dL (04 Oct 2023 09:14)      RADIOLOGY & ADDITIONAL TESTS:    CT Head No Cont (09.30.23 @ 01:35) >  New left frontal prosthetic cranioplasty since 9/26/2023 with   a small amount of fluid, air and hemorrhage. No significant mass effect.   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   Decreased left scalp hematoma.    Xray Chest 1 View- PORTABLE-Urgent (Xray Chest 1 View- PORTABLE-Urgent .) (10.01.23 @ 02:05) >  NG tip in the stomach. Heart size unremarkable. Lungs are   clear.

## 2023-10-04 NOTE — PROGRESS NOTE ADULT - ASSESSMENT
ASSESSMENT:  64yo M s/p L cranioplasty with complex closure with plastic surgery    PLAN:  - q4 neuro checks  - c/w Keppra 500 BID  - monitor remaining SHUN output, plan to remove tomorrow   - Lovenox/SCDs for VTE ppx  - pain control prn  - staples to be removed on day 14, may be done outpatient

## 2023-10-04 NOTE — PROGRESS NOTE ADULT - ASSESSMENT
ASSESSMENT:  63 year old male with PMHx of T2DM, HTN who presented after a fall with right sided weakness, dysarthria and right facial droop. Patient was found to have LM1 occlusion s/p tenecteplase in ED and had mechanical thrombectomy done with TICI 2c. Decompressive craniectomy for midline shift was done on 8/30/2023, SHUN drain was removed on 8/31, and left cranioplasty on 9/29/2023. SAMARA was negative for thrombus. SHUN drain x1 removed on 10/2/2023 and plan for other SHUN drain to be removed on 10/5/2023.     PLAN:    LM1 Occlusion, Midline Shift  - SAMARA negative for PFO, atrial thrombus  - No significant occlusive disease on CT neck  - Decompressive Craniectomy on 8/30  - Mechanical thrombectomy with TICI 2c recanalization  - Left cranioplasty on 9/29 with SHUN drains  - SHUN drain x 1 removed 10/2/2023, managed by neurosurgery  - Monitor remaining SHUN drain's output  - Plan for other SHUN drain removal on 10/5/2023 per neurosx -----> Pending  - CT head on 10/6 after SHUN drain removed ----> Pending  - Dysphagia diet  - Repeat speech and swallow eval -------> Pending  - Lovenox 40 mg qd s.c.  - Continuing to hold anticoagulation per neurosurgery  - Plan to restart anticoagulation after SHUN drain removal  - Atorvastatin 80 mg qd restarted  - Keppra 500 mg q12h until 10/6 for seizure prophylaxis  - CT head stat if any changes in status  - Lupus anticoagulant positive, normal ACL and B2G antibodies with repeat testing negative  - Antiphospholipid syndrome unlikely given negative repeat testing  - Repeat Lupus anticoagulant testing in 12 weeks per hematology  - ILR placement outpatient per cardiology  - Remove staples on 10/13, can be done outpatient  - PT eval ----> Pending    Electrolyte Derangements  - Hypokalemia: 20 mEq potassium chloride powder x1  - Hypomagnesemia: 1 g IV magnesium sulfate  - Hypophosphatemia resolved  - Continue to monitor    Leukocytosis  -Resolved  -Continue to monitor    Klebsiella Bacteremia, UTI  - Resolved  - s/p Ceftriaxone, Zosyn, Vancomycin, Cefazolin    Anemia  - H/H stable, likely post-op  - Continue to monitor    HTN  - Maintain normotensive per neurosurgery  - Hydralazine 10 mg IV PRN  - Labetalol 10 mg IV PRN  - Midodrine PRN     Urinary Retention  - s/p Umaña removal  - No signs of urinary retention  - Monitor     T2DM  - ISS, ADA diet    VTE ppx:  - Lovenox 40 mg s.c.      Dispo: plan for 10/9-10/10, pending PT eval, SHUN drain removal, anticoagulation clearance, neurosurgery clearance

## 2023-10-04 NOTE — PHYSICAL THERAPY INITIAL EVALUATION ADULT - PERTINENT HX OF CURRENT PROBLEM, REHAB EVAL
Pt presents to [facility] with right sided weakness, no s/p craniotomy
63 year old male with PMHx of T2DM, HTN who presented after a fall with right sided weakness, dysarthria and right facial droop. Patient was found to have LM1 occlusion s/p tenecteplase in ED and had mechanical thrombectomy done with TICI 2c. Decompressive craniectomy for midline shift was done on 8/30/2023, SHUN drain was removed on 8/31, and left cranioplasty on 9/29/2023. SAMARA was negative for thrombus. SHUN drain x1 removed on 10/2/2023 and plan for other SHUN drain to be removed on 10/4/2023.

## 2023-10-05 LAB
ANION GAP SERPL CALC-SCNC: 11 MMOL/L — SIGNIFICANT CHANGE UP (ref 5–17)
BUN SERPL-MCNC: 6 MG/DL — LOW (ref 8–20)
CALCIUM SERPL-MCNC: 8.9 MG/DL — SIGNIFICANT CHANGE UP (ref 8.4–10.5)
CHLORIDE SERPL-SCNC: 100 MMOL/L — SIGNIFICANT CHANGE UP (ref 96–108)
CO2 SERPL-SCNC: 26 MMOL/L — SIGNIFICANT CHANGE UP (ref 22–29)
CREAT SERPL-MCNC: 0.48 MG/DL — LOW (ref 0.5–1.3)
EGFR: 117 ML/MIN/1.73M2 — SIGNIFICANT CHANGE UP
GLUCOSE BLDC GLUCOMTR-MCNC: 110 MG/DL — HIGH (ref 70–99)
GLUCOSE BLDC GLUCOMTR-MCNC: 121 MG/DL — HIGH (ref 70–99)
GLUCOSE BLDC GLUCOMTR-MCNC: 128 MG/DL — HIGH (ref 70–99)
GLUCOSE SERPL-MCNC: 108 MG/DL — HIGH (ref 70–99)
POTASSIUM SERPL-MCNC: 3.6 MMOL/L — SIGNIFICANT CHANGE UP (ref 3.5–5.3)
POTASSIUM SERPL-SCNC: 3.6 MMOL/L — SIGNIFICANT CHANGE UP (ref 3.5–5.3)
SODIUM SERPL-SCNC: 137 MMOL/L — SIGNIFICANT CHANGE UP (ref 135–145)

## 2023-10-05 PROCEDURE — 99232 SBSQ HOSP IP/OBS MODERATE 35: CPT

## 2023-10-05 RX ADMIN — LEVETIRACETAM 400 MILLIGRAM(S): 250 TABLET, FILM COATED ORAL at 06:23

## 2023-10-05 RX ADMIN — ATORVASTATIN CALCIUM 80 MILLIGRAM(S): 80 TABLET, FILM COATED ORAL at 22:05

## 2023-10-05 RX ADMIN — SENNA PLUS 1 TABLET(S): 8.6 TABLET ORAL at 13:57

## 2023-10-05 RX ADMIN — LEVETIRACETAM 400 MILLIGRAM(S): 250 TABLET, FILM COATED ORAL at 16:37

## 2023-10-05 RX ADMIN — ENOXAPARIN SODIUM 40 MILLIGRAM(S): 100 INJECTION SUBCUTANEOUS at 16:37

## 2023-10-05 RX ADMIN — POLYETHYLENE GLYCOL 3350 17 GRAM(S): 17 POWDER, FOR SOLUTION ORAL at 14:05

## 2023-10-05 NOTE — PROGRESS NOTE ADULT - ASSESSMENT
63 year old male with PMHx of T2DM, HTN who presented after a fall with right sided weakness, dysarthria and right facial droop. Patient was found to have LM1 occlusion s/p tenecteplase in ED and had mechanical thrombectomy done with TICI 2c. Decompressive craniectomy for midline shift was done on 8/30/2023, SHUN drain was removed on 8/31, and left cranioplasty on 9/29/2023. SAMARA was negative for thrombus. SHUN drain x1 removed on 10/2/2023 and plan for other SHUN drain to be removed on 10/5/2023.     LM1 Occlusion, Midline Shift  - SAMARA negative for PFO, atrial thrombus  - No significant occlusive disease on CT neck  - Decompressive Craniectomy on 8/30  - Mechanical thrombectomy with TICI 2c recanalization  - Left cranioplasty on 9/29 with SHUN drains  - SHUN drain x 1 removed 10/2/2023, managed by neurosurgery  - Monitor remaining SHUN drain's output  - Plan for second SHUN drain removal on 10/5/2023 per neurosx -----> Pending  - CT head on 10/6 after SHUN drain removed ----> Pending  - Dysphagia diet  - Repeat speech and swallow eval -------> Pending  - Continuing to hold anticoagulation per neurosurgery  - Plan to restart anticoagulation after SHUN drain removal  - Atorvastatin 80 mg nightly  - Keppra 500 mg q12h until 10/6 for seizure prophylaxis  - CT head stat if any changes in status  - Lupus anticoagulant positive, normal ACL and B2G antibodies with repeat testing negative  - Antiphospholipid syndrome unlikely given negative repeat testing  - Repeat Lupus anticoagulant testing in 12 weeks per hematology  - ILR placement outpatient per cardiology  - Remove staples on 10/13, can be done outpatient  - PT eval noted recommending ALDEN    Hypokalemia  - Monitor BMP    Leukocytosis  -Resolved  -Continue to monitor    Klebsiella Bacteremia, UTI  - Resolved  - s/p Ceftriaxone, Zosyn, Vancomycin, Cefazolin    Anemia  - H/H stable, likely post-op  - Continue to monitor    HTN  - Maintain normotensive per neurosurgery  - Hydralazine 10 mg IV PRN  - Labetalol 10 mg IV PRN  - Midodrine PRN     Urinary Retention  - s/p Umaña removal  - No signs of urinary retention  - Monitor     T2DM  - A1c 6.8    VTE ppx:  - Lovenox 40mg SQ    Dispo: Acute, likely DC early next week

## 2023-10-05 NOTE — PROGRESS NOTE ADULT - SUBJECTIVE AND OBJECTIVE BOX
Chief complaint: SHUN drain    Patient seen and examined at bedside. No acute overnight events reported. Unable to provide ROS.     Vital Signs Last 24 Hrs  T(F): 97.7 (05 Oct 2023 04:00), Max: 98.2 (04 Oct 2023 12:00)  HR: 93 (05 Oct 2023 04:00) (82 - 93)  BP: 121/82 (05 Oct 2023 04:00) (112/70 - 128/86)  RR: 18 (05 Oct 2023 04:00) (18 - 19)  SpO2: 96% (05 Oct 2023 04:00) (96% - 98%)    Physical Exam:  Constitutional: alert, in no acute distress   Neck: Soft and supple  Respiratory: Good air entry b/l  Cardiovascular: Regular rate and rhythm  Gastrointestinal: Soft  Vascular: 2+ peripheral pulses  Musculoskeletal: no lower extremity edema bilaterally    Labs:                        8.2    7.52  )-----------( 391      ( 04 Oct 2023 05:45 )             25.7   10-04    137  |  101  |  6.0<L>  ----------------------------<  102<H>  3.4<L>   |  25.0  |  0.40<L>    Ca    8.6      04 Oct 2023 05:45    TPro  5.6<L>  /  Alb  2.6<L>  /  TBili  0.5  /  DBili  x   /  AST  49<H>  /  ALT  85<H>  /  AlkPhos  105  10-04

## 2023-10-05 NOTE — PROGRESS NOTE ADULT - ASSESSMENT
ASSESSMENT:  62yo M s/p L cranioplasty with complex closure with plastic surgery.     PLAN:  - q4 neuro checks  - c/w Keppra 500 BID  - One SHUN drain in place, flap still looks slightly full - will monitor output closely then remove  - Lovenox/SCDs for VTE ppx  - pain control prn  - staples to be removed on day 14, may be done outpatient

## 2023-10-05 NOTE — PROGRESS NOTE ADULT - SUBJECTIVE AND OBJECTIVE BOX
HPI:  Patient is a 63 year old male with PMH Hypertension, DM on oral medications who presents c/o stroke-like symptoms. Per son pt was walking outside around 10:15am this morning when he suddenly fell. His son helped him up and noted that he was weak on the right side and not acting normally which prompted him to come to the ED. On arrival pt w/ obvious right sided arm weakness, confusion, dysarthria, facial droop suggestive of acute stroke. Code stroke initiated, found to have LM1 occlusion, given TNK @ 1215 and was taken for thrombectomy. Unable to provide ROS 2/2 aphasia  (27 Aug 2023 16:45)      INTERVAL HPI/OVERNIGHT EVENTS:  63y Male seen and examined by neurosurgery team. Lying comfortably in bed.  S/p L cranioplasty with complex closure with plastic surgery on 9/29.     Vital Signs Last 24 Hrs  T(C): 37 (05 Oct 2023 09:49), Max: 37 (05 Oct 2023 09:49)  T(F): 98.6 (05 Oct 2023 09:49), Max: 98.6 (05 Oct 2023 09:49)  HR: 93 (05 Oct 2023 04:00) (82 - 93)  BP: 142/83 (05 Oct 2023 09:49) (112/70 - 142/83)  BP(mean): --  RR: 18 (05 Oct 2023 09:49) (18 - 19)  SpO2: 93% (05 Oct 2023 09:49) (93% - 98%)    Parameters below as of 05 Oct 2023 09:49  Patient On (Oxygen Delivery Method): room air        PHYSICAL EXAM:  GENERAL: NAD  HEAD:  L cranioplasty with one SHUN drain to full suction  DRAINS: 1 SHUN drain present   WOUND: Incision clean dry intact  MENTAL STATUS: AAO x 2; Awake; Opens eyes spontaneously; following simple commands  CRANIAL NERVES:  PERRL. Face symmetric w/ normal eye closure and smile, tongue midline.  MOTOR: LUE/LLE antigravity, right side hemiplegia   EXTREMITIES:  no clubbing, cyanosis, or edema  SKIN: Warm, dry; no rashes or lesions    LABS:                        8.2    7.52  )-----------( 391      ( 04 Oct 2023 05:45 )             25.7     10-05    137  |  100  |  6.0<L>  ----------------------------<  108<H>  3.6   |  26.0  |  0.48<L>    Ca    8.9      05 Oct 2023 09:20    TPro  5.6<L>  /  Alb  2.6<L>  /  TBili  0.5  /  DBili  x   /  AST  49<H>  /  ALT  85<H>  /  AlkPhos  105  10-04      Urinalysis Basic - ( 05 Oct 2023 09:20 )    Color: x / Appearance: x / SG: x / pH: x  Gluc: 108 mg/dL / Ketone: x  / Bili: x / Urobili: x   Blood: x / Protein: x / Nitrite: x   Leuk Esterase: x / RBC: x / WBC x   Sq Epi: x / Non Sq Epi: x / Bacteria: x          RADIOLOGY & ADDITIONAL TESTS:

## 2023-10-06 LAB
ANION GAP SERPL CALC-SCNC: 13 MMOL/L — SIGNIFICANT CHANGE UP (ref 5–17)
BASOPHILS # BLD AUTO: 0.06 K/UL — SIGNIFICANT CHANGE UP (ref 0–0.2)
BASOPHILS NFR BLD AUTO: 0.8 % — SIGNIFICANT CHANGE UP (ref 0–2)
BUN SERPL-MCNC: 8 MG/DL — SIGNIFICANT CHANGE UP (ref 8–20)
CALCIUM SERPL-MCNC: 8.7 MG/DL — SIGNIFICANT CHANGE UP (ref 8.4–10.5)
CHLORIDE SERPL-SCNC: 101 MMOL/L — SIGNIFICANT CHANGE UP (ref 96–108)
CO2 SERPL-SCNC: 25 MMOL/L — SIGNIFICANT CHANGE UP (ref 22–29)
CREAT SERPL-MCNC: 0.56 MG/DL — SIGNIFICANT CHANGE UP (ref 0.5–1.3)
EGFR: 111 ML/MIN/1.73M2 — SIGNIFICANT CHANGE UP
EOSINOPHIL # BLD AUTO: 0.29 K/UL — SIGNIFICANT CHANGE UP (ref 0–0.5)
EOSINOPHIL NFR BLD AUTO: 3.9 % — SIGNIFICANT CHANGE UP (ref 0–6)
GLUCOSE SERPL-MCNC: 93 MG/DL — SIGNIFICANT CHANGE UP (ref 70–99)
HCT VFR BLD CALC: 28.3 % — LOW (ref 39–50)
HGB BLD-MCNC: 8.8 G/DL — LOW (ref 13–17)
IMM GRANULOCYTES NFR BLD AUTO: 0.8 % — SIGNIFICANT CHANGE UP (ref 0–0.9)
LYMPHOCYTES # BLD AUTO: 1.63 K/UL — SIGNIFICANT CHANGE UP (ref 1–3.3)
LYMPHOCYTES # BLD AUTO: 22 % — SIGNIFICANT CHANGE UP (ref 13–44)
MCHC RBC-ENTMCNC: 29.4 PG — SIGNIFICANT CHANGE UP (ref 27–34)
MCHC RBC-ENTMCNC: 31.1 GM/DL — LOW (ref 32–36)
MCV RBC AUTO: 94.6 FL — SIGNIFICANT CHANGE UP (ref 80–100)
MONOCYTES # BLD AUTO: 0.67 K/UL — SIGNIFICANT CHANGE UP (ref 0–0.9)
MONOCYTES NFR BLD AUTO: 9.1 % — SIGNIFICANT CHANGE UP (ref 2–14)
NEUTROPHILS # BLD AUTO: 4.69 K/UL — SIGNIFICANT CHANGE UP (ref 1.8–7.4)
NEUTROPHILS NFR BLD AUTO: 63.4 % — SIGNIFICANT CHANGE UP (ref 43–77)
PLATELET # BLD AUTO: 485 K/UL — HIGH (ref 150–400)
POTASSIUM SERPL-MCNC: 3.5 MMOL/L — SIGNIFICANT CHANGE UP (ref 3.5–5.3)
POTASSIUM SERPL-SCNC: 3.5 MMOL/L — SIGNIFICANT CHANGE UP (ref 3.5–5.3)
RBC # BLD: 2.99 M/UL — LOW (ref 4.2–5.8)
RBC # FLD: 15.4 % — HIGH (ref 10.3–14.5)
SODIUM SERPL-SCNC: 139 MMOL/L — SIGNIFICANT CHANGE UP (ref 135–145)
WBC # BLD: 7.4 K/UL — SIGNIFICANT CHANGE UP (ref 3.8–10.5)
WBC # FLD AUTO: 7.4 K/UL — SIGNIFICANT CHANGE UP (ref 3.8–10.5)

## 2023-10-06 PROCEDURE — 99232 SBSQ HOSP IP/OBS MODERATE 35: CPT

## 2023-10-06 RX ADMIN — LEVETIRACETAM 400 MILLIGRAM(S): 250 TABLET, FILM COATED ORAL at 16:50

## 2023-10-06 RX ADMIN — SENNA PLUS 1 TABLET(S): 8.6 TABLET ORAL at 14:40

## 2023-10-06 RX ADMIN — ATORVASTATIN CALCIUM 80 MILLIGRAM(S): 80 TABLET, FILM COATED ORAL at 22:18

## 2023-10-06 RX ADMIN — LEVETIRACETAM 400 MILLIGRAM(S): 250 TABLET, FILM COATED ORAL at 05:06

## 2023-10-06 RX ADMIN — ENOXAPARIN SODIUM 40 MILLIGRAM(S): 100 INJECTION SUBCUTANEOUS at 16:50

## 2023-10-06 NOTE — PROGRESS NOTE ADULT - SUBJECTIVE AND OBJECTIVE BOX
HPI:  Patient is a 63 year old male with PMH Hypertension, DM on oral medications who presents c/o stroke-like symptoms. Per son pt was walking outside around 10:15am this morning when he suddenly fell. His son helped him up and noted that he was weak on the right side and not acting normally which prompted him to come to the ED. On arrival pt w/ obvious right sided arm weakness, confusion, dysarthria, facial droop suggestive of acute stroke. Code stroke initiated, found to have LM1 occlusion, given TNK @ 1215 and was taken for thrombectomy. Unable to provide ROS 2/2 aphasia  (27 Aug 2023 16:45)    Interval History: drain removed, pt stable.     Vital Signs Last 24 Hrs  T(C): 36.7 (06 Oct 2023 09:19), Max: 37.3 (05 Oct 2023 20:45)  T(F): 98 (06 Oct 2023 09:19), Max: 99.1 (05 Oct 2023 20:45)  HR: 83 (06 Oct 2023 09:19) (80 - 88)  BP: 127/79 (06 Oct 2023 09:19) (95/56 - 127/79)  BP(mean): --  RR: 18 (06 Oct 2023 09:19) (18 - 18)  SpO2: 94% (06 Oct 2023 09:19) (94% - 98%)    Parameters below as of 06 Oct 2023 09:19  Patient On (Oxygen Delivery Method): room air    PHYSICAL EXAM:  GENERAL: NAD  HEAD: sutures in place and intact, dressing over drain exit site   MENTAL STATUS:; Awake; Opens eyes spontaneously, expressive aphasia/word salad   CRANIAL NERVES:  PERRL. + right facial   MOTOR: LUE/LLE antigravity, right side hemiplegia   Sensory: intact to noxious throughout

## 2023-10-06 NOTE — PROGRESS NOTE ADULT - ATTENDING COMMENTS
64 y/o M with PMH of DM, HTN presented after a fall with right sided weakness, dysarthria and right facial droop. Patient found to have LM1 occlusion and midline shift, transferred out of NSICU. SHUN drain removed. Repeat CT head tomorrow per NSG for consideration of AC.

## 2023-10-06 NOTE — PROGRESS NOTE ADULT - SUBJECTIVE AND OBJECTIVE BOX
Patient is a 63y old  Male who presents with a chief complaint of LT M1 Occlusion (06 Oct 2023 10:07)      INTERVAL HPI/OVERNIGHT EVENTS: No acute events overnight.     MEDICATIONS  (STANDING):  atorvastatin 80 milliGRAM(s) Oral at bedtime  enoxaparin Injectable 40 milliGRAM(s) SubCutaneous every 24 hours  levETIRAcetam  IVPB 500 milliGRAM(s) IV Intermittent every 12 hours  polyethylene glycol 3350 17 Gram(s) Oral daily  senna 1 Tablet(s) Oral every 24 hours  sodium chloride 0.9%. 1000 milliLiter(s) (40 mL/Hr) IV Continuous <Continuous>    MEDICATIONS  (PRN):  acetaminophen   IVPB .. 1000 milliGRAM(s) IV Intermittent every 6 hours PRN Severe Pain (7 - 10)  hydrALAZINE Injectable 10 milliGRAM(s) IV Push every 2 hours   labetalol Injectable 10 milliGRAM(s) IV Push every 2 hours PRN Systolic blood pressure >160  midodrine 5 milliGRAM(s) Oral every 8 hours PRN for sbp <100  oxyCODONE    IR 5 milliGRAM(s) Oral every 4 hours PRN Moderate Pain (4 - 6)      Allergies: No Known Allergies        REVIEW OF SYSTEMS: unable to be assessed due to clinical conditions      Vital Signs Last 24 Hrs  T(C): 36.7 (06 Oct 2023 09:19), Max: 37.3 (05 Oct 2023 20:45)  T(F): 98 (06 Oct 2023 09:19), Max: 99.1 (05 Oct 2023 20:45)  HR: 83 (06 Oct 2023 09:19) (80 - 88)  BP: 127/79 (06 Oct 2023 09:19) (95/56 - 127/79)  RR: 18 (06 Oct 2023 09:19) (18 - 18)  SpO2: 94% (06 Oct 2023 09:19) (94% - 98%)    Parameters below as of 06 Oct 2023 09:19  Patient On (Oxygen Delivery Method): room air    PHYSICAL EXAM:  GENERAL: lying comfortably in bed  HEAD:  Atraumatic, Normocephalic, SHUN drain and bandages on scalp  EYES: EOMI, PERRLA, conjunctiva and sclera clear  NECK: Supple, No JVD  NERVOUS SYSTEM: alert, nonverbal, follows commands, right facial droop, 5/5 strength in LUE and LLE, 0/5 strength in RUE and RLE  CHEST/LUNG: + wheezes throughout all lung fields  HEART: Regular rate and rhythm; No murmurs  ABDOMEN: Soft, Nontender; Bowel sounds present  EXTREMITIES:  No cyanosis, or edema, unna boots in place  SKIN: No rashes      LABS:                        8.8    7.40  )-----------( 485      ( 06 Oct 2023 06:13 )             28.3     10-06    139  |  101  |  8.0  ----------------------------<  93  3.5   |  25.0  |  0.56    Ca    8.7      06 Oct 2023 06:13        Urinalysis Basic - ( 06 Oct 2023 06:13 )    Color: x / Appearance: x / SG: x / pH: x  Gluc: 93 mg/dL / Ketone: x  / Bili: x / Urobili: x   Blood: x / Protein: x / Nitrite: x   Leuk Esterase: x / RBC: x / WBC x   Sq Epi: x / Non Sq Epi: x / Bacteria: x      CAPILLARY BLOOD GLUCOSE      POCT Blood Glucose.: 121 mg/dL (05 Oct 2023 20:59)  POCT Blood Glucose.: 128 mg/dL (05 Oct 2023 12:19)      RADIOLOGY & ADDITIONAL TESTS:    CT Head No Cont (09.30.23 @ 01:35) >  New left frontal prosthetic cranioplasty since 9/26/2023 with   a small amount of fluid, air and hemorrhage. No significant mass effect.   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   Decreased left scalp hematoma. Patient is a 63y old  Male who presents with a chief complaint of LT M1 Occlusion (06 Oct 2023 10:07)      INTERVAL HPI/OVERNIGHT EVENTS: No acute events overnight.     MEDICATIONS  (STANDING):  atorvastatin 80 milliGRAM(s) Oral at bedtime  enoxaparin Injectable 40 milliGRAM(s) SubCutaneous every 24 hours  levETIRAcetam  IVPB 500 milliGRAM(s) IV Intermittent every 12 hours  polyethylene glycol 3350 17 Gram(s) Oral daily  senna 1 Tablet(s) Oral every 24 hours  sodium chloride 0.9%. 1000 milliLiter(s) (40 mL/Hr) IV Continuous <Continuous>    MEDICATIONS  (PRN):  acetaminophen   IVPB .. 1000 milliGRAM(s) IV Intermittent every 6 hours PRN Severe Pain (7 - 10)  hydrALAZINE Injectable 10 milliGRAM(s) IV Push every 2 hours   labetalol Injectable 10 milliGRAM(s) IV Push every 2 hours PRN Systolic blood pressure >160  midodrine 5 milliGRAM(s) Oral every 8 hours PRN for sbp <100  oxyCODONE    IR 5 milliGRAM(s) Oral every 4 hours PRN Moderate Pain (4 - 6)      Allergies: No Known Allergies        REVIEW OF SYSTEMS: unable to be assessed due to clinical conditions      Vital Signs Last 24 Hrs  T(C): 36.7 (06 Oct 2023 09:19), Max: 37.3 (05 Oct 2023 20:45)  T(F): 98 (06 Oct 2023 09:19), Max: 99.1 (05 Oct 2023 20:45)  HR: 83 (06 Oct 2023 09:19) (80 - 88)  BP: 127/79 (06 Oct 2023 09:19) (95/56 - 127/79)  RR: 18 (06 Oct 2023 09:19) (18 - 18)  SpO2: 94% (06 Oct 2023 09:19) (94% - 98%)    Parameters below as of 06 Oct 2023 09:19  Patient On (Oxygen Delivery Method): room air    PHYSICAL EXAM:  GENERAL: lying comfortably in bed  HEAD:  Atraumatic, Normocephalic  EYES: EOMI, PERRLA, conjunctiva and sclera clear  NECK: Supple, No JVD  NERVOUS SYSTEM: alert, nonverbal, follows commands, right facial droop, 5/5 strength in LUE and LLE, 0/5 strength in RUE and RLE  CHEST/LUNG: + wheezes throughout all lung fields  HEART: Regular rate and rhythm; No murmurs  ABDOMEN: Soft, Nontender; Bowel sounds present  EXTREMITIES:  No cyanosis, or edema, unna boots in place  SKIN: No rashes      LABS:                        8.8    7.40  )-----------( 485      ( 06 Oct 2023 06:13 )             28.3     10-06    139  |  101  |  8.0  ----------------------------<  93  3.5   |  25.0  |  0.56    Ca    8.7      06 Oct 2023 06:13        Urinalysis Basic - ( 06 Oct 2023 06:13 )    Color: x / Appearance: x / SG: x / pH: x  Gluc: 93 mg/dL / Ketone: x  / Bili: x / Urobili: x   Blood: x / Protein: x / Nitrite: x   Leuk Esterase: x / RBC: x / WBC x   Sq Epi: x / Non Sq Epi: x / Bacteria: x      CAPILLARY BLOOD GLUCOSE      POCT Blood Glucose.: 121 mg/dL (05 Oct 2023 20:59)  POCT Blood Glucose.: 128 mg/dL (05 Oct 2023 12:19)      RADIOLOGY & ADDITIONAL TESTS:    CT Head No Cont (09.30.23 @ 01:35) >  New left frontal prosthetic cranioplasty since 9/26/2023 with   a small amount of fluid, air and hemorrhage. No significant mass effect.   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   Decreased left scalp hematoma.

## 2023-10-06 NOTE — PROGRESS NOTE ADULT - ASSESSMENT
63F s/p stroke, TNK, thrombectomy, decompressive craniectomy, now s/p cranioplasty     Plan   - q4 neuro checks   - Keppra 500 BID   - Repeat CT head tomorrow AM for consideration of AC   - Okay for diet   - Okay for Lovenox for dvt ppx   - Suture removal eval 10/13   - Please wash head daily with shampoo cap or shower if patient able

## 2023-10-06 NOTE — PROGRESS NOTE ADULT - ASSESSMENT
63M male with PMHx of T2DM, HTN who presented after a fall with right sided weakness, dysarthria and right facial droop. Patient was found to have LM1 occlusion s/p tenecteplase in ED and had mechanical thrombectomy done with TICI 2c. Decompressive craniectomy for midline shift was done on 8/30/2023, SHUN drain was removed on 8/31, and left cranioplasty on 9/29/2023. SAMARA was negative for thrombus. SHUN drain x1 removed on 10/2/2023 and plan for other SHUN drain to be removed.    LM1 Occlusion, Midline Shift  - SAMARA negative for PFO, atrial thrombus  - No significant occlusive disease on CT neck  - Decompressive Craniectomy on 8/30  - Mechanical thrombectomy with TICI 2c recanalization  - Left cranioplasty on 9/29 with SHUN drains  - SHUN drain x 1 removed 10/2/2023, managed by neurosurgery  - Monitor remaining SHUN drain's output  - Plan for second SHUN drain removal per neurosx -----> Pending  - Continuing to hold anticoagulation per neurosurgery  - Repeat CT head on 10/7 for consideration of anticoagulation ----> Pending  - Dysphagia diet  - Repeat speech and swallow eval -------> Pending  - Plan to restart anticoagulation after SHUN drain removal  - Atorvastatin 80 mg nightly  - Keppra 500 mg q12h until 10/6 for seizure prophylaxis  - CT head stat if any changes in status  - Lupus anticoagulant positive, normal ACL and B2G antibodies with repeat testing negative  - Antiphospholipid syndrome unlikely given negative repeat testing  - Repeat Lupus anticoagulant testing in 12 weeks per hematology  - ILR placement outpatient per cardiology  - Suture removal eval on 10/13 ------> Pending  - PT eval noted recommending ALDEN    Hypokalemia  - Resolved  - Monitor BMP    Leukocytosis  -Resolved  -Continue to monitor    Klebsiella Bacteremia, UTI  - Resolved  - s/p Ceftriaxone, Zosyn, Vancomycin, Cefazolin    Anemia  - H/H stable, likely post-op  - Continue to monitor    HTN  - Maintain normotensive per neurosurgery  - Hydralazine 10 mg IV PRN  - Labetalol 10 mg IV PRN  - Midodrine PRN     Urinary Retention  - s/p Umaña removal  - No signs of urinary retention  - Monitor     T2DM  - A1c 6.8    VTE ppx:  - Lovenox 40mg SQ    Dispo: Acute, likely DC next week 63M male with PMHx of T2DM, HTN who presented after a fall with right sided weakness, dysarthria and right facial droop. Patient was found to have LM1 occlusion s/p tenecteplase in ED and had mechanical thrombectomy done with TICI 2c. Decompressive craniectomy for midline shift was done on 8/30/2023, SHUN drain was removed on 8/31, and left cranioplasty on 9/29/2023. SAMARA was negative for thrombus. SHUN drain x2 removed.     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 Occlusion, Midline Shift  - SAMARA negative for PFO, atrial thrombus  - No significant occlusive disease on CT neck  - Decompressive Craniectomy on 8/30  - Mechanical thrombectomy with TICI 2c recanalization  - Left cranioplasty on 9/29 with SHUN drains  - SHUN drains removed by neurosx  - Continuing to hold anticoagulation per neurosurgery  - Repeat CT head on 10/7 for consideration of anticoagulation ----> Pending  - Dysphagia diet  - Repeat speech and swallow eval -------> Pending  - Atorvastatin 80 mg nightly  - Keppra 500 mg q12h until 10/6 for seizure prophylaxis  - CT head stat if any changes in status  - Lupus anticoagulant positive, normal ACL and B2G antibodies with repeat testing negative  - Antiphospholipid syndrome unlikely given negative repeat testing  - Repeat Lupus anticoagulant testing in 12 weeks per hematology  - ILR placement outpatient per cardiology  - Suture removal eval on 10/13 ------> Pending  - PT eval noted recommending ALDEN    Hypokalemia  - Resolved  - Monitor BMP    Leukocytosis  -Resolved  -Continue to monitor    Klebsiella Bacteremia, UTI  - Resolved  - s/p Ceftriaxone, Zosyn, Vancomycin, Cefazolin    Anemia  - H/H stable, likely post-op  - Continue to monitor    HTN  - Maintain normotensive per neurosurgery  - Hydralazine 10 mg IV PRN  - Labetalol 10 mg IV PRN  - Midodrine PRN     Urinary Retention  - s/p Umaña removal  - No signs of urinary retention  - Monitor     T2DM  - A1c 6.8    VTE ppx:  - Lovenox 40mg SQ    Dispo: Acute, likely DC next week

## 2023-10-07 PROCEDURE — 99232 SBSQ HOSP IP/OBS MODERATE 35: CPT | Mod: GC

## 2023-10-07 RX ADMIN — POLYETHYLENE GLYCOL 3350 17 GRAM(S): 17 POWDER, FOR SOLUTION ORAL at 18:48

## 2023-10-07 RX ADMIN — ATORVASTATIN CALCIUM 80 MILLIGRAM(S): 80 TABLET, FILM COATED ORAL at 21:00

## 2023-10-07 RX ADMIN — SENNA PLUS 1 TABLET(S): 8.6 TABLET ORAL at 18:48

## 2023-10-07 RX ADMIN — ENOXAPARIN SODIUM 40 MILLIGRAM(S): 100 INJECTION SUBCUTANEOUS at 18:47

## 2023-10-07 RX ADMIN — SODIUM CHLORIDE 40 MILLILITER(S): 9 INJECTION INTRAMUSCULAR; INTRAVENOUS; SUBCUTANEOUS at 21:00

## 2023-10-07 NOTE — PROGRESS NOTE ADULT - SUBJECTIVE AND OBJECTIVE BOX
Patient is a 63y old  Male who presents with a chief complaint of LT M1 Occlusion (06 Oct 2023 11:58)      INTERVAL HPI/OVERNIGHT EVENTS: No acute events overnight.    MEDICATIONS  (STANDING):  atorvastatin 80 milliGRAM(s) Oral at bedtime  enoxaparin Injectable 40 milliGRAM(s) SubCutaneous every 24 hours  polyethylene glycol 3350 17 Gram(s) Oral daily  senna 1 Tablet(s) Oral every 24 hours  sodium chloride 0.9%. 1000 milliLiter(s) (40 mL/Hr) IV Continuous <Continuous>    MEDICATIONS  (PRN):  acetaminophen   IVPB .. 1000 milliGRAM(s) IV Intermittent every 6 hours PRN Severe Pain (7 - 10)  hydrALAZINE Injectable 10 milliGRAM(s) IV Push every 2 hours   labetalol Injectable 10 milliGRAM(s) IV Push every 2 hours PRN Systolic blood pressure >160  midodrine 5 milliGRAM(s) Oral every 8 hours PRN for sbp <100      Allergies: No Known Allergies        REVIEW OF SYSTEMS:  CONSTITUTIONAL: No fever, weight loss, or fatigue  RESPIRATORY: No cough, wheezing, chills or hemoptysis; No shortness of breath  CARDIOVASCULAR: No chest pain, palpitations, dizziness, or leg swelling  GASTROINTESTINAL: No abdominal or epigastric pain. No nausea, vomiting, or hematemesis; No diarrhea or constipation. No melena or hematochezia.  NEUROLOGICAL: No headaches, memory loss, loss of strength, numbness, or tremors  MUSCULOSKELETAL: No joint pain or swelling; No muscle, back, or extremity pain      Vital Signs Last 24 Hrs  T(C): 36.6 (06 Oct 2023 20:00), Max: 37.3 (06 Oct 2023 18:20)  T(F): 97.8 (06 Oct 2023 20:00), Max: 99.1 (06 Oct 2023 18:20)  HR: 83 (06 Oct 2023 20:00) (83 - 86)  BP: 112/76 (06 Oct 2023 20:00) (112/76 - 165/92)  RR: 18 (06 Oct 2023 20:00) (18 - 18)  SpO2: 98% (06 Oct 2023 20:00) (97% - 98%)    Parameters below as of 06 Oct 2023 20:00  Patient On (Oxygen Delivery Method): room air      PHYSICAL EXAM:  GENERAL: lying comfortably in bed  HEAD:  Atraumatic, Normocephalic  EYES: EOMI, PERRLA, conjunctiva and sclera clear  NECK: Supple, No JVD  NERVOUS SYSTEM: alert, awakens to voice, nonverbal, follows commands, mild right facial droop, 5/5 strength in LUE and LLE, 0/5 strength in RUE and RLE  CHEST/LUNG: Clear to auscultation bilaterally  HEART: Regular rate and rhythm; No murmurs  ABDOMEN: Soft, Nontender; Bowel sounds present  EXTREMITIES:  No cyanosis, or edema  SKIN: No rashes, + sutures on scalp clean, dry, intact        LABS:                        8.8    7.40  )-----------( 485      ( 06 Oct 2023 06:13 )             28.3     10-06    139  |  101  |  8.0  ----------------------------<  93  3.5   |  25.0  |  0.56    Ca    8.7      06 Oct 2023 06:13        Urinalysis Basic - ( 06 Oct 2023 06:13 )    Color: x / Appearance: x / SG: x / pH: x  Gluc: 93 mg/dL / Ketone: x  / Bili: x / Urobili: x   Blood: x / Protein: x / Nitrite: x   Leuk Esterase: x / RBC: x / WBC x   Sq Epi: x / Non Sq Epi: x / Bacteria: x        RADIOLOGY & ADDITIONAL TESTS:    CT Head No Cont (09.30.23 @ 01:35) >  New left frontal prosthetic cranioplasty since 9/26/2023 with   a small amount of fluid, air and hemorrhage. No significant mass effect.   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   Decreased left scalp hematoma.

## 2023-10-07 NOTE — PROGRESS NOTE ADULT - ASSESSMENT
ASSESSMENT: 63M male with PMHx of T2DM, HTN who presented after a fall with right sided weakness, dysarthria and right facial droop. Patient was found to have LM1 occlusion s/p tenecteplase in ED and had mechanical thrombectomy done with TICI 2c. Decompressive craniectomy for midline shift was done on 8/30/2023, SHUN drain was removed on 8/31, and left cranioplasty on 9/29/2023. SAMARA was negative for thrombus. SHUN drain x2 removed.     PLAN:    LM1 Occlusion, Midline Shift  - SAMARA negative for PFO, atrial thrombus  - No significant occlusive disease on CT neck  - Decompressive Craniectomy on 8/30  - Mechanical thrombectomy with TICI 2c recanalization  - Left cranioplasty on 9/29 with SHUN drains  - SHUN drains removed by neurosx  - Continuing to hold anticoagulation per neurosurgery  - Repeat CT head on 10/7 for consideration of anticoagulation ----> Pending  - Dysphagia diet  - Repeat speech and swallow eval -------> Pending  - Atorvastatin 80 mg nightly  - Keppra 500 mg q12h until 10/6 for seizure prophylaxis  - CT head stat if any changes in status  - Lupus anticoagulant positive, normal ACL and B2G antibodies with repeat testing negative  - Antiphospholipid syndrome unlikely given negative repeat testing  - Repeat Lupus anticoagulant testing in 12 weeks per hematology  - ILR placement outpatient per cardiology  - Suture removal eval on 10/13 ------> Pending  - PT eval noted recommending ALDEN    Hypokalemia  - Resolved  - Monitor BMP    Leukocytosis  -Resolved  -Continue to monitor    Klebsiella Bacteremia, UTI  - Resolved  - s/p Ceftriaxone, Zosyn, Vancomycin, Cefazolin    Anemia  - H/H stable, likely post-op  - Continue to monitor    HTN  - Maintain normotensive per neurosurgery  - Hydralazine 10 mg IV PRN  - Labetalol 10 mg IV PRN  - Midodrine PRN     Urinary Retention  - s/p Umaña removal  - No signs of urinary retention  - Monitor     T2DM  - A1c 6.8    VTE ppx  - Lovenox 40mg SQ    Dispo: Acute, likely DC next week

## 2023-10-07 NOTE — PROGRESS NOTE ADULT - ASSESSMENT
63y Male PMH HTN, DM, presented to [facility] 8/27 with right hemiparesis and aphasia, found with LM1 occlusion, s/p IV Tenecteplase 12:15 PM 8/27/23 and mechanical thrombectomy 8/27/23 with TICI 2C reperfusion, evolving stroke causing mass effect subsequently required left hemicraniectomy 8/30/23 with Dr. Bah, now s/p cranioplasty.       -discussed w/ Dr. Deutsch  - q4 neuro checks   - Keppra 500 BID   - Repeat CT head pending   - Okay for Lovenox for dvt ppx   - Suture removal eval 10/13   -continue to follow  63y Male PMH HTN, DM, presented to [facility] 8/27 with right hemiparesis and aphasia, found with LM1 occlusion, s/p IV Tenecteplase 12:15 PM 8/27/23 and mechanical thrombectomy 8/27/23 with TICI 2C reperfusion, evolving stroke causing mass effect subsequently required left hemicraniectomy 8/30/23 with Dr. Bah, now s/p cranioplasty.       - discussed w/ Dr. Deutsch  - q4 neuro checks   - Keppra 500 BID   - Repeat CT head pending   - Okay for Lovenox for dvt ppx   - Suture removal eval 10/13   - continue to follow

## 2023-10-07 NOTE — PROGRESS NOTE ADULT - ATTENDING COMMENTS
64 y/o M with PMH of DM, HTN who presented after a fall with right sided weakness, dysarthria and right facial droop. Patient found to have LM1 occlusion and midline shift, transferred out of NSICU. SHUN drain removed. For repeat CTH today for consideration of AC once cleared by NSG.

## 2023-10-07 NOTE — PROGRESS NOTE ADULT - SUBJECTIVE AND OBJECTIVE BOX
HPI:  63y Male PMH HTN, DM, presented to [facility] 8/27 with right hemiparesis and aphasia, found with LM1 occlusion, s/p IV Tenecteplase 12:15 PM 8/27/23 and mechanical thrombectomy 8/27/23 with TICI 2C reperfusion, evolving stroke causing mass effect subsequently required left hemicraniectomy 8/30/23 with Dr. Bah. Patient seen this AM, no new reports of N/V dizziness.       Vital Signs Last 24 Hrs  T(C): 36.9 (07 Oct 2023 17:35), Max: 36.9 (07 Oct 2023 17:35)  T(F): 98.5 (07 Oct 2023 17:35), Max: 98.5 (07 Oct 2023 17:35)  HR: 89 (07 Oct 2023 17:35) (83 - 89)  BP: 107/74 (07 Oct 2023 17:35) (107/74 - 112/76)  BP(mean): --  RR: 18 (07 Oct 2023 17:35) (18 - 18)  SpO2: 94% (07 Oct 2023 17:35) (94% - 98%)    Parameters below as of 07 Oct 2023 17:35  Patient On (Oxygen Delivery Method): room air      PHYSICAL EXAM:  GENERAL: NAD  HEAD: sutures in place and intact, small drainage from drain site, cleaned, dressed   MENTAL STATUS:; Awake; Opens eyes spontaneously, expressive aphasia/word salad   CRANIAL NERVES:  PERRL. + right facial   MOTOR: LUE/LLE antigravity, right side hemiplegia   Sensory: intact to noxious throughout       LABS:                        8.8    7.40  )-----------( 485      ( 06 Oct 2023 06:13 )             28.3     10-06    139  |  101  |  8.0  ----------------------------<  93  3.5   |  25.0  |  0.56    Ca    8.7      06 Oct 2023 06:13        Urinalysis Basic - ( 06 Oct 2023 06:13 )    Color: x / Appearance: x / SG: x / pH: x  Gluc: 93 mg/dL / Ketone: x  / Bili: x / Urobili: x   Blood: x / Protein: x / Nitrite: x   Leuk Esterase: x / RBC: x / WBC x   Sq Epi: x / Non Sq Epi: x / Bacteria: x           2017 08:34

## 2023-10-08 LAB
ALBUMIN SERPL ELPH-MCNC: 2.8 G/DL — LOW (ref 3.3–5.2)
ALP SERPL-CCNC: 130 U/L — HIGH (ref 40–120)
ALT FLD-CCNC: 54 U/L — HIGH
ANION GAP SERPL CALC-SCNC: 13 MMOL/L — SIGNIFICANT CHANGE UP (ref 5–17)
AST SERPL-CCNC: 34 U/L — SIGNIFICANT CHANGE UP
BASOPHILS # BLD AUTO: 0.04 K/UL — SIGNIFICANT CHANGE UP (ref 0–0.2)
BASOPHILS NFR BLD AUTO: 0.5 % — SIGNIFICANT CHANGE UP (ref 0–2)
BILIRUB SERPL-MCNC: 0.5 MG/DL — SIGNIFICANT CHANGE UP (ref 0.4–2)
BUN SERPL-MCNC: 6.4 MG/DL — LOW (ref 8–20)
CALCIUM SERPL-MCNC: 8.5 MG/DL — SIGNIFICANT CHANGE UP (ref 8.4–10.5)
CHLORIDE SERPL-SCNC: 101 MMOL/L — SIGNIFICANT CHANGE UP (ref 96–108)
CO2 SERPL-SCNC: 25 MMOL/L — SIGNIFICANT CHANGE UP (ref 22–29)
CREAT SERPL-MCNC: 0.49 MG/DL — LOW (ref 0.5–1.3)
EGFR: 116 ML/MIN/1.73M2 — SIGNIFICANT CHANGE UP
EOSINOPHIL # BLD AUTO: 0.31 K/UL — SIGNIFICANT CHANGE UP (ref 0–0.5)
EOSINOPHIL NFR BLD AUTO: 3.7 % — SIGNIFICANT CHANGE UP (ref 0–6)
GLUCOSE SERPL-MCNC: 101 MG/DL — HIGH (ref 70–99)
HCT VFR BLD CALC: 29.8 % — LOW (ref 39–50)
HGB BLD-MCNC: 9.6 G/DL — LOW (ref 13–17)
IMM GRANULOCYTES NFR BLD AUTO: 0.7 % — SIGNIFICANT CHANGE UP (ref 0–0.9)
LYMPHOCYTES # BLD AUTO: 1.7 K/UL — SIGNIFICANT CHANGE UP (ref 1–3.3)
LYMPHOCYTES # BLD AUTO: 20.1 % — SIGNIFICANT CHANGE UP (ref 13–44)
MCHC RBC-ENTMCNC: 30 PG — SIGNIFICANT CHANGE UP (ref 27–34)
MCHC RBC-ENTMCNC: 32.2 GM/DL — SIGNIFICANT CHANGE UP (ref 32–36)
MCV RBC AUTO: 93.1 FL — SIGNIFICANT CHANGE UP (ref 80–100)
MONOCYTES # BLD AUTO: 0.67 K/UL — SIGNIFICANT CHANGE UP (ref 0–0.9)
MONOCYTES NFR BLD AUTO: 7.9 % — SIGNIFICANT CHANGE UP (ref 2–14)
NEUTROPHILS # BLD AUTO: 5.68 K/UL — SIGNIFICANT CHANGE UP (ref 1.8–7.4)
NEUTROPHILS NFR BLD AUTO: 67.1 % — SIGNIFICANT CHANGE UP (ref 43–77)
PLATELET # BLD AUTO: 493 K/UL — HIGH (ref 150–400)
POTASSIUM SERPL-MCNC: 3.5 MMOL/L — SIGNIFICANT CHANGE UP (ref 3.5–5.3)
POTASSIUM SERPL-SCNC: 3.5 MMOL/L — SIGNIFICANT CHANGE UP (ref 3.5–5.3)
PREALB SERPL-MCNC: 15 MG/DL — LOW (ref 18–38)
PROT SERPL-MCNC: 5.8 G/DL — LOW (ref 6.6–8.7)
RBC # BLD: 3.2 M/UL — LOW (ref 4.2–5.8)
RBC # FLD: 15.1 % — HIGH (ref 10.3–14.5)
SODIUM SERPL-SCNC: 138 MMOL/L — SIGNIFICANT CHANGE UP (ref 135–145)
WBC # BLD: 8.46 K/UL — SIGNIFICANT CHANGE UP (ref 3.8–10.5)
WBC # FLD AUTO: 8.46 K/UL — SIGNIFICANT CHANGE UP (ref 3.8–10.5)

## 2023-10-08 PROCEDURE — 70450 CT HEAD/BRAIN W/O DYE: CPT | Mod: 26

## 2023-10-08 PROCEDURE — 99232 SBSQ HOSP IP/OBS MODERATE 35: CPT

## 2023-10-08 RX ORDER — ASCORBIC ACID 60 MG
500 TABLET,CHEWABLE ORAL DAILY
Refills: 0 | Status: DISCONTINUED | OUTPATIENT
Start: 2023-10-08 | End: 2023-10-18

## 2023-10-08 RX ADMIN — SENNA PLUS 1 TABLET(S): 8.6 TABLET ORAL at 18:12

## 2023-10-08 RX ADMIN — ATORVASTATIN CALCIUM 80 MILLIGRAM(S): 80 TABLET, FILM COATED ORAL at 21:00

## 2023-10-08 RX ADMIN — ENOXAPARIN SODIUM 40 MILLIGRAM(S): 100 INJECTION SUBCUTANEOUS at 18:12

## 2023-10-08 RX ADMIN — Medication 1 TABLET(S): at 18:12

## 2023-10-08 RX ADMIN — Medication 500 MILLIGRAM(S): at 18:12

## 2023-10-08 RX ADMIN — POLYETHYLENE GLYCOL 3350 17 GRAM(S): 17 POWDER, FOR SOLUTION ORAL at 18:12

## 2023-10-08 NOTE — PROGRESS NOTE ADULT - NS ATTEND AMEND GEN_ALL_CORE FT
I agree with the above. I personally examined and saw the patient. Ms. Foy is pleasant, following commands on the left side. Incision c/d/i. Head CT yesterday demonstrates slight increase in epidural fluid collection, repeat head CT tomorrow. Continue keppra, chemical DVT ppx. (3) adequate

## 2023-10-08 NOTE — PROGRESS NOTE ADULT - NS ATTEND OPT1 GEN_ALL_CORE
I attest my time as attending is greater than 50% of the total combined time spent on qualifying patient care activities by the PA/NP and attending.
I independently performed the documented:
I attest my time as attending is greater than 50% of the total combined time spent on qualifying patient care activities by the PA/NP and attending.
I independently performed the documented:
I independently performed the documented:
I attest my time as attending is greater than 50% of the total combined time spent on qualifying patient care activities by the PA/NP and attending.
I independently performed the documented:
I independently performed the documented:
I attest my time as attending is greater than 50% of the total combined time spent on qualifying patient care activities by the PA/NP and attending.
I independently performed the documented:

## 2023-10-08 NOTE — PROGRESS NOTE ADULT - SUBJECTIVE AND OBJECTIVE BOX
[facility] Division of Hospital Medicine  Gamaliel Pinto MD    Chief Complaint:  Patient is a 63y old  Male who presents with a chief complaint of LT M1 Occlusion (07 Oct 2023 19:13)      SUBJECTIVE / OVERNIGHT EVENTS:  Patient seen and examined at bedside.     MEDICATIONS  (STANDING):  ascorbic acid 500 milliGRAM(s) Oral daily  atorvastatin 80 milliGRAM(s) Oral at bedtime  enoxaparin Injectable 40 milliGRAM(s) SubCutaneous every 24 hours  multivitamin 1 Tablet(s) Oral daily  polyethylene glycol 3350 17 Gram(s) Oral daily  senna 1 Tablet(s) Oral every 24 hours  sodium chloride 0.9%. 1000 milliLiter(s) (40 mL/Hr) IV Continuous <Continuous>    MEDICATIONS  (PRN):  acetaminophen   IVPB .. 1000 milliGRAM(s) IV Intermittent every 6 hours PRN Severe Pain (7 - 10)  hydrALAZINE Injectable 10 milliGRAM(s) IV Push every 2 hours   labetalol Injectable 10 milliGRAM(s) IV Push every 2 hours PRN Systolic blood pressure >160  midodrine 5 milliGRAM(s) Oral every 8 hours PRN for sbp <100        I&O's Summary    07 Oct 2023 07:01  -  08 Oct 2023 07:00  --------------------------------------------------------  IN: 480 mL / OUT: 1550 mL / NET: -1070 mL        PHYSICAL EXAM:  Vital Signs Last 24 Hrs  T(C): 36.9 (08 Oct 2023 10:00), Max: 37.1 (08 Oct 2023 04:45)  T(F): 98.5 (08 Oct 2023 10:00), Max: 98.7 (08 Oct 2023 04:45)  HR: 89 (08 Oct 2023 10:00) (89 - 90)  BP: 118/81 (08 Oct 2023 10:00) (107/74 - 118/81)  BP(mean): --  RR: 18 (08 Oct 2023 10:00) (18 - 18)  SpO2: 94% (08 Oct 2023 10:00) (93% - 96%)    Parameters below as of 08 Oct 2023 10:00  Patient On (Oxygen Delivery Method): room air        GENERAL: NAD  HEAD:  +Craniectomy stables  EYES: EOMI, PERRLA, conjunctiva and sclera clear  NECK: Supple, No JVD  NERVOUS SYSTEM: alert, awakens to voice, nonverbal, follows commands, mild right facial droop, 5/5 strength in LUE and LLE, 0/5 strength in RUE and RLE  CHEST/LUNG: Clear to auscultation bilaterally  HEART: Regular rate and rhythm; No murmurs  ABDOMEN: Soft, Nontender; Bowel sounds present  EXTREMITIES:  No cyanosis, or edema  SKIN: No rashes    LABS:                        9.6    8.46  )-----------( 493      ( 08 Oct 2023 07:10 )             29.8     10-08    138  |  101  |  6.4<L>  ----------------------------<  101<H>  3.5   |  25.0  |  0.49<L>    Ca    8.5      08 Oct 2023 07:10    TPro  5.8<L>  /  Alb  2.8<L>  /  TBili  0.5  /  DBili  x   /  AST  34  /  ALT  54<H>  /  AlkPhos  130<H>  10-08          Urinalysis Basic - ( 08 Oct 2023 07:10 )    Color: x / Appearance: x / SG: x / pH: x  Gluc: 101 mg/dL / Ketone: x  / Bili: x / Urobili: x   Blood: x / Protein: x / Nitrite: x   Leuk Esterase: x / RBC: x / WBC x   Sq Epi: x / Non Sq Epi: x / Bacteria: x        CAPILLARY BLOOD GLUCOSE            RADIOLOGY & ADDITIONAL TESTS:  Results Reviewed:   Imaging Personally Reviewed:  Electrocardiogram Personally Reviewed:

## 2023-10-08 NOTE — PROGRESS NOTE ADULT - SUBJECTIVE AND OBJECTIVE BOX
HPI:  63y Male PMH HTN, DM, presented to [facility] 8/27 with right hemiparesis and aphasia, found with LM1 occlusion, s/p IV Tenecteplase 12:15 PM 8/27/23 and mechanical thrombectomy 8/27/23 with TICI 2C reperfusion, evolving stroke causing mass effect subsequently required left hemicraniectomy 8/30/23 with Dr. Bah. Patient seen this AM, no new reports of N/V dizziness.       Vital Signs Last 24 Hrs  T(C): 36.9 (08 Oct 2023 10:00), Max: 37.1 (08 Oct 2023 04:45)  T(F): 98.5 (08 Oct 2023 10:00), Max: 98.7 (08 Oct 2023 04:45)  HR: 89 (08 Oct 2023 10:00) (89 - 90)  BP: 118/81 (08 Oct 2023 10:00) (107/74 - 118/81)  BP(mean): --  RR: 18 (08 Oct 2023 10:00) (18 - 18)  SpO2: 94% (08 Oct 2023 10:00) (93% - 96%)    Parameters below as of 08 Oct 2023 10:00  Patient On (Oxygen Delivery Method): room air        PHYSICAL EXAM:  GENERAL: NAD, well-groomed  HEAD: sutures in place and intact, wound/dressing C/D/I   MENTAL STATUS:; Awake; Opens eyes spontaneously, expressive aphasia/word salad   CRANIAL NERVES:  PERRL. + right facial   MOTOR: LUE/LLE antigravity, right side hemiplegia   Sensory: intact to noxious throughout       LABS:                        9.6    8.46  )-----------( 493      ( 08 Oct 2023 07:10 )             29.8     10-08    138  |  101  |  6.4<L>  ----------------------------<  101<H>  3.5   |  25.0  |  0.49<L>    Ca    8.5      08 Oct 2023 07:10    TPro  5.8<L>  /  Alb  2.8<L>  /  TBili  0.5  /  DBili  x   /  AST  34  /  ALT  54<H>  /  AlkPhos  130<H>  10-08      Urinalysis Basic - ( 08 Oct 2023 07:10 )    Color: x / Appearance: x / SG: x / pH: x  Gluc: 101 mg/dL / Ketone: x  / Bili: x / Urobili: x   Blood: x / Protein: x / Nitrite: x   Leuk Esterase: x / RBC: x / WBC x   Sq Epi: x / Non Sq Epi: x / Bacteria: x        10-07 @ 07:01  -  10-08 @ 07:00  --------------------------------------------------------  IN: 480 mL / OUT: 1550 mL / NET: -1070 mL        RADIOLOGY & ADDITIONAL TESTS:      ACC: [id number] EXAM:  CT BRAIN     PROCEDURE DATE:  10/08/2023    IMPRESSION: Increased subdural hemorrhage adjacent to the cranioplasty   measuring 1.5 cm in thickness. Tiny foci of hemorrhages in left frontal   lobe are smaller in size. Subacute large left MCA territory infarction.   Preliminary report provided by MITALI SAMUELS; Attending Radiologist.     HPI:  63y Male PMH HTN, DM, presented to [facility] 8/27 with right hemiparesis and aphasia, found with LM1 occlusion, s/p IV Tenecteplase 12:15 PM 8/27/23 and mechanical thrombectomy 8/27/23 with TICI 2C reperfusion, evolving stroke causing mass effect subsequently required left hemicraniectomy 8/30/23 with Dr. Bah.       Vital Signs Last 24 Hrs  T(C): 36.9 (08 Oct 2023 10:00), Max: 37.1 (08 Oct 2023 04:45)  T(F): 98.5 (08 Oct 2023 10:00), Max: 98.7 (08 Oct 2023 04:45)  HR: 89 (08 Oct 2023 10:00) (89 - 90)  BP: 118/81 (08 Oct 2023 10:00) (107/74 - 118/81)  BP(mean): --  RR: 18 (08 Oct 2023 10:00) (18 - 18)  SpO2: 94% (08 Oct 2023 10:00) (93% - 96%)    Parameters below as of 08 Oct 2023 10:00  Patient On (Oxygen Delivery Method): room air        PHYSICAL EXAM:  GENERAL: NAD, well-groomed  HEAD: sutures in place and intact, wound/dressing C/D/I   MENTAL STATUS:; Awake; Opens eyes spontaneously, expressive aphasia  CRANIAL NERVES:  PERRL. + right facial   MOTOR: follows commands on left side antigravity, right side hemiplegia       LABS:                        9.6    8.46  )-----------( 493      ( 08 Oct 2023 07:10 )             29.8     10-08    138  |  101  |  6.4<L>  ----------------------------<  101<H>  3.5   |  25.0  |  0.49<L>    Ca    8.5      08 Oct 2023 07:10    TPro  5.8<L>  /  Alb  2.8<L>  /  TBili  0.5  /  DBili  x   /  AST  34  /  ALT  54<H>  /  AlkPhos  130<H>  10-08      Urinalysis Basic - ( 08 Oct 2023 07:10 )    Color: x / Appearance: x / SG: x / pH: x  Gluc: 101 mg/dL / Ketone: x  / Bili: x / Urobili: x   Blood: x / Protein: x / Nitrite: x   Leuk Esterase: x / RBC: x / WBC x   Sq Epi: x / Non Sq Epi: x / Bacteria: x        10-07 @ 07:01  -  10-08 @ 07:00  --------------------------------------------------------  IN: 480 mL / OUT: 1550 mL / NET: -1070 mL        RADIOLOGY & ADDITIONAL TESTS:      ACC: [id number] EXAM:  CT BRAIN     PROCEDURE DATE:  10/08/2023    IMPRESSION: Increased subdural hemorrhage adjacent to the cranioplasty   measuring 1.5 cm in thickness. Tiny foci of hemorrhages in left frontal   lobe are smaller in size. Subacute large left MCA territory infarction.   Preliminary report provided by MITALI SAMUELS; Attending Radiologist.

## 2023-10-08 NOTE — PROGRESS NOTE ADULT - ASSESSMENT
63y Male PMH HTN, DM, presented to [facility] 8/27 with right hemiparesis and aphasia, found with LM1 occlusion, s/p IV Tenecteplase 12:15 PM 8/27/23 and mechanical thrombectomy 8/27/23 with TICI 2C reperfusion, evolving stroke causing mass effect subsequently required left hemicraniectomy 8/30/23 with Dr. Bah, now s/p cranioplasty.       - discussed w/ Dr. Deutsch  -images reviewd  - q4 neuro checks   - Keppra 500 BID   - Repeat CT head in AM   - continue SCDs and Lovenox for dvt ppx   - Suture removal eval 10/13   - continue to follow  63y Male PMH HTN, DM, presented to [facility] 8/27 with right hemiparesis and aphasia, found with LM1 occlusion, s/p IV Tenecteplase 12:15 PM 8/27/23 and mechanical thrombectomy 8/27/23 with TICI 2C reperfusion, evolving stroke causing mass effect subsequently required left hemicraniectomy 8/30/23 with Dr. Bah, now s/p cranioplasty.       - discussed w/ Dr. Deutsch  - images reviewd  - q4 neuro checks   - Keppra 500 BID   - Repeat CT head in AM   - continue SCDs and Lovenox for dvt ppx   - Suture removal eval 10/13   - continue to follow

## 2023-10-08 NOTE — PROGRESS NOTE ADULT - ASSESSMENT
63M male with PMHx of T2DM, HTN who presented after a fall with right sided weakness, dysarthria and right facial droop. Patient was found to have LM1 occlusion s/p tenecteplase in ED and had mechanical thrombectomy done with TICI 2c. Decompressive craniectomy for midline shift was done on 8/30/2023, SHUN drain was removed on 8/31, and left cranioplasty on 9/29/2023. SAMARA was negative for thrombus. SHUN drain x2 removed.     LM1 Occlusion, Midline Shift  - SAMARA negative for PFO, atrial thrombus  - No significant occlusive disease on CT neck  - Decompressive Craniectomy on 8/30  - Mechanical thrombectomy with TICI 2c recanalization  - Left cranioplasty on 9/29 with SHUN drains  - SHUN drains removed by neurosx  - Continuing to hold anticoagulation per neurosurgery  - Repeat CT head on 10/7 for consideration of anticoagulation ----> Pending  - Dysphagia diet  - Repeat speech and swallow eval -------> Pending  - Atorvastatin 80 mg nightly  - Keppra 500 mg q12h until 10/6 for seizure prophylaxis  - CT head stat if any changes in status  - Lupus anticoagulant positive, normal ACL and B2G antibodies with repeat testing negative  - Antiphospholipid syndrome unlikely given negative repeat testing  - Repeat Lupus anticoagulant testing in 12 weeks per hematology  - ILR placement outpatient per cardiology  - Suture removal eval on 10/13 ------> Pending  - PT eval noted recommending ALDEN  - Repeat CTH (10/7) - Increased subdural hemorrhage adjacent to the cranioplasty measuring 1.5 cm in thickness. Tiny foci of hemorrhages in left frontal lobe are smaller in size. Subacute large left MCA territory infarction.   - NSG f/u    Hypokalemia  - Resolved  - Monitor BMP    Leukocytosis  -Resolved  -Continue to monitor    Klebsiella Bacteremia, UTI  - Resolved  - s/p Ceftriaxone, Zosyn, Vancomycin, Cefazolin    Anemia  - H/H stable, likely post-op  - Continue to monitor    HTN  - Maintain normotensive per neurosurgery  - Hydralazine 10 mg IV PRN  - Labetalol 10 mg IV PRN  - Midodrine PRN     Urinary Retention  - s/p Umaña removal  - No signs of urinary retention  - Monitor     T2DM  - A1c 6.8    VTE ppx  - Lovenox 40mg SQ    Dispo: Acute    Discussed with family at bedside.

## 2023-10-09 PROCEDURE — 70450 CT HEAD/BRAIN W/O DYE: CPT | Mod: 26

## 2023-10-09 PROCEDURE — 99232 SBSQ HOSP IP/OBS MODERATE 35: CPT

## 2023-10-09 RX ADMIN — Medication 500 MILLIGRAM(S): at 13:47

## 2023-10-09 RX ADMIN — SENNA PLUS 1 TABLET(S): 8.6 TABLET ORAL at 13:47

## 2023-10-09 RX ADMIN — SODIUM CHLORIDE 40 MILLILITER(S): 9 INJECTION INTRAMUSCULAR; INTRAVENOUS; SUBCUTANEOUS at 06:04

## 2023-10-09 RX ADMIN — ATORVASTATIN CALCIUM 80 MILLIGRAM(S): 80 TABLET, FILM COATED ORAL at 21:39

## 2023-10-09 RX ADMIN — ENOXAPARIN SODIUM 40 MILLIGRAM(S): 100 INJECTION SUBCUTANEOUS at 18:21

## 2023-10-09 RX ADMIN — Medication 1 TABLET(S): at 13:47

## 2023-10-09 RX ADMIN — POLYETHYLENE GLYCOL 3350 17 GRAM(S): 17 POWDER, FOR SOLUTION ORAL at 13:48

## 2023-10-09 RX ADMIN — SODIUM CHLORIDE 40 MILLILITER(S): 9 INJECTION INTRAMUSCULAR; INTRAVENOUS; SUBCUTANEOUS at 21:39

## 2023-10-09 NOTE — PROGRESS NOTE ADULT - ASSESSMENT
This is a 64ym presented with TIA s/p thrombectomy s/pleft hemicraniectomy    Plan   1 continue monitoring incision  2. repeat ct of the brain due to the read on 10/8 and as slight inc hematoma

## 2023-10-09 NOTE — PROGRESS NOTE ADULT - ASSESSMENT
ASSESSMENT: 63M male with PMHx of T2DM, HTN who presented after a fall with right sided weakness, dysarthria and right facial droop. Patient was found to have LM1 occlusion s/p tenecteplase in ED and had mechanical thrombectomy done with TICI 2c. Decompressive craniectomy for midline shift was done on 8/30/2023, SHUN drain was removed on 8/31, and left cranioplasty on 9/29/2023. SAMARA was negative for thrombus. SHUN drain x2 removed. CT head on 10/8/2023 revealed increased subdural hemorrhage adjacent to cranioplasty measuring 1.5 cm in thickness along with tiny foci of hemorrhages in L frontal lobe are smaller in size.     LM1 Occlusion, Midline Shift  - SAMARA negative for PFO, atrial thrombus  - No significant occlusive disease on CT neck  - Decompressive Craniectomy on 8/30  - Mechanical thrombectomy with TICI 2c recanalization  - Left cranioplasty on 9/29 with SHUN drains  - SHUN drains removed by neurosx  - Continuing to hold anticoagulation per neurosurgery  - Dysphagia diet  - Repeat speech and swallow eval -------> Pending  - Atorvastatin 80 mg nightly  - Keppra 500 mg q12h until 10/6 for seizure prophylaxis  - CT head stat if any changes in status  - Lupus anticoagulant positive, normal ACL and B2G antibodies with repeat testing negative  - Antiphospholipid syndrome unlikely given negative repeat testing  - Repeat Lupus anticoagulant testing in 12 weeks per hematology  - ILR placement outpatient per cardiology  - PT eval noted recommending ALDEN  - Repeat CTH (10/7) - Increased subdural hemorrhage adjacent to the cranioplasty measuring 1.5 cm in thickness. Tiny foci of hemorrhages in left frontal lobe are smaller in size. Subacute large left MCA territory infarction.   - Repeat CT head (10/9) -------> Pending  - NSG followup  - Suture removal eval on 10/13 ------> Pending    Hypokalemia  - Resolved  - Monitor BMP    Leukocytosis  -Resolved  -Continue to monitor    Klebsiella Bacteremia, UTI  - Resolved  - s/p Ceftriaxone, Zosyn, Vancomycin, Cefazolin    Anemia  - H/H stable, likely post-op  - Continue to monitor    HTN  - Maintain normotensive per neurosurgery  - Hydralazine 10 mg IV PRN  - Labetalol 10 mg IV PRN  - Midodrine PRN     Urinary Retention  - s/p Umaña removal  - No signs of urinary retention  - Monitor     T2DM  - A1c 6.8    VTE ppx  - Lovenox 40mg SQ    Dispo: Acute

## 2023-10-09 NOTE — PROGRESS NOTE ADULT - SUBJECTIVE AND OBJECTIVE BOX
INTERVAL HPI/OVERNIGHT EVENTS:  L hemicraniectomy 8/30/23  63y Male PMH HTN, DM, presented to [facility] 8/27 with right hemiparesis and aphasia, found with LM1 occlusion, s/p IV Tenecteplase 12:15 PM 8/27/23 and mechanical thrombectomy 8/27/23 with TICI 2C reperfusion, evolving stroke causing mass effect subsequently required left hemicraniectomy 8/30/23 with Dr. Bah.     Pt seen today awake, alert, conversant with encouragement oriented to self, place hosp if given choice. pos confusion of the year.   Follows simple commands.     MEDICATIONS  (STANDING):  ascorbic acid 500 milliGRAM(s) Oral daily  atorvastatin 80 milliGRAM(s) Oral at bedtime  enoxaparin Injectable 40 milliGRAM(s) SubCutaneous every 24 hours  multivitamin 1 Tablet(s) Oral daily  polyethylene glycol 3350 17 Gram(s) Oral daily  senna 1 Tablet(s) Oral every 24 hours  sodium chloride 0.9%. 1000 milliLiter(s) (40 mL/Hr) IV Continuous <Continuous>    MEDICATIONS  (PRN):  acetaminophen   IVPB .. 1000 milliGRAM(s) IV Intermittent every 6 hours PRN Severe Pain (7 - 10)  hydrALAZINE Injectable 10 milliGRAM(s) IV Push every 2 hours   labetalol Injectable 10 milliGRAM(s) IV Push every 2 hours PRN Systolic blood pressure >160  midodrine 5 milliGRAM(s) Oral every 8 hours PRN for sbp <100    Allergies  No Known Allergies  Intolerances    Vital Signs Last 24 Hrs  T(C): 36.8 (09 Oct 2023 08:14), Max: 37.2 (08 Oct 2023 20:59)  T(F): 98.3 (09 Oct 2023 08:14), Max: 99 (08 Oct 2023 20:59)  HR: 85 (09 Oct 2023 08:14) (82 - 89)  BP: 147/95 (09 Oct 2023 08:14) (128/84 - 147/95)  BP(mean): --  RR: 18 (09 Oct 2023 05:20) (18 - 20)  SpO2: 94% (09 Oct 2023 08:14) (92% - 100%)    Parameters below as of 09 Oct 2023 08:14  Patient On (Oxygen Delivery Method): room air    PHYSICAL EXAM  GENERAL: NAD,   HEAD: Normocephalic, left sided cranial defect, incision clean and dry   EYES: EOMI, PERRLA, conjunctiva and sclera clear  ENMT: No tonsillar erythema, exudates, or enlargement; Moist mucous membranes, Good dentition, No lesions  NECK: Supple,  NERVOUS SYSTEM:  Alert & Oriented X3,  Motor Strength right sided weakness.   CHEST/LUNG: Clear bilat  HEART: Regular rate and rhythm; No murmurs, rubs, or gallops  ABDOMEN: Soft, Nontender, Nondistended; Bowel sounds present  EXTREMITIES:  No  edema  Incision clean and dry non bulging.     LABS:                          9.6    8.46  )-----------( 493      ( 08 Oct 2023 07:10 )             29.8     10-08    138  |  101  |  6.4<L>  ----------------------------<  101<H>  3.5   |  25.0  |  0.49<L>    Ca    8.5      08 Oct 2023 07:10    TPro  5.8<L>  /  Alb  2.8<L>  /  TBili  0.5  /  DBili  x   /  AST  34  /  ALT  54<H>  /  AlkPhos  130<H>  10-08      Urinalysis Basic - ( 08 Oct 2023 07:10 )    Color: x / Appearance: x / SG: x / pH: x  Gluc: 101 mg/dL / Ketone: x  / Bili: x / Urobili: x   Blood: x / Protein: x / Nitrite: x   Leuk Esterase: x / RBC: x / WBC x   Sq Epi: x / Non Sq Epi: x / Bacteria: x      I&O's Detail    08 Oct 2023 07:01  -  09 Oct 2023 07:00  --------------------------------------------------------  IN:    Oral Fluid: 240 mL    sodium chloride 0.9%: 480 mL  Total IN: 720 mL    OUT:    Incontinent per Condom Catheter (mL): 900 mL    Voided (mL): 300 mL  Total OUT: 1200 mL    Total NET: -480 mL      09 Oct 2023 07:01  -  09 Oct 2023 10:37  --------------------------------------------------------  IN:  Total IN: 0 mL    OUT:    Incontinent per Condom Catheter (mL): 700 mL    Voided (mL): 700 mL  Total OUT: 1400 mL    Total NET: -1400 mL      RADIOLOGY & ADDITIONAL TESTS:  < from: CT Head No Cont (10.08.23 @ 01:20) >  ACC: [id number] EXAM:  CT BRAIN   ORDERED BY: PRIYANKA DÍAZ   PROCEDURE DATE:  10/08/2023    IMPRESSION: Increased subdural hemorrhage adjacent to the cranioplasty   measuring 1.5 cm in thickness. Tiny foci of hemorrhages in left frontal   lobe are smaller in size. Subacute large left MCA territory infarction.   Preliminary report provided by MITALI SAMUELS; Attending Radiologist.  --- End of Report ---

## 2023-10-09 NOTE — PROGRESS NOTE ADULT - SUBJECTIVE AND OBJECTIVE BOX
Patient is a 63y old  Male who presents with a chief complaint of LT M1 Occlusion (08 Oct 2023 11:40)      INTERVAL HPI/OVERNIGHT EVENTS: No acute events overnight.    MEDICATIONS  (STANDING):  ascorbic acid 500 milliGRAM(s) Oral daily  atorvastatin 80 milliGRAM(s) Oral at bedtime  enoxaparin Injectable 40 milliGRAM(s) SubCutaneous every 24 hours  multivitamin 1 Tablet(s) Oral daily  polyethylene glycol 3350 17 Gram(s) Oral daily  senna 1 Tablet(s) Oral every 24 hours  sodium chloride 0.9%. 1000 milliLiter(s) (40 mL/Hr) IV Continuous <Continuous>    MEDICATIONS  (PRN):  acetaminophen   IVPB .. 1000 milliGRAM(s) IV Intermittent every 6 hours PRN Severe Pain (7 - 10)  hydrALAZINE Injectable 10 milliGRAM(s) IV Push every 2 hours   labetalol Injectable 10 milliGRAM(s) IV Push every 2 hours PRN Systolic blood pressure >160  midodrine 5 milliGRAM(s) Oral every 8 hours PRN for sbp <100      Allergies: No Known Allergies        REVIEW OF SYSTEMS: unable to be assessed due to clinical condition      Vital Signs Last 24 Hrs  T(C): 36.8 (09 Oct 2023 08:14), Max: 37.2 (08 Oct 2023 20:59)  T(F): 98.3 (09 Oct 2023 08:14), Max: 99 (08 Oct 2023 20:59)  HR: 85 (09 Oct 2023 08:14) (82 - 89)  BP: 147/95 (09 Oct 2023 08:14) (118/81 - 147/95)  RR: 18 (09 Oct 2023 05:20) (18 - 20)  SpO2: 94% (09 Oct 2023 08:14) (92% - 100%)    Parameters below as of 09 Oct 2023 08:14  Patient On (Oxygen Delivery Method): room air      PHYSICAL EXAM:  GENERAL: lying comfortably in bed  HEAD:  Atraumatic, Normocephalic  EYES: EOMI, PERRLA, conjunctiva and sclera clear  NECK: Supple, No JVD  NERVOUS SYSTEM: alert, awakens to touch, + expressive aphasia, follows commands, mild right facial droop, 5/5 strength in LUE and LLE, 0/5 strength in RUE and RLE  CHEST/LUNG: Clear to auscultation bilaterally  HEART: Regular rate and rhythm; No murmurs  ABDOMEN: Soft, Nontender; Bowel sounds present  GENITOURINARY: + [place] catheter in place, clean, dry, intact  EXTREMITIES:  No cyanosis, or edema  SKIN: No rashes, + sutures on scalp clean, dry, intact    LABS:                        9.6    8.46  )-----------( 493      ( 08 Oct 2023 07:10 )             29.8     10-08    138  |  101  |  6.4<L>  ----------------------------<  101<H>  3.5   |  25.0  |  0.49<L>    Ca    8.5      08 Oct 2023 07:10    TPro  5.8<L>  /  Alb  2.8<L>  /  TBili  0.5  /  DBili  x   /  AST  34  /  ALT  54<H>  /  AlkPhos  130<H>  10-08      Urinalysis Basic - ( 08 Oct 2023 07:10 )    Color: x / Appearance: x / SG: x / pH: x  Gluc: 101 mg/dL / Ketone: x  / Bili: x / Urobili: x   Blood: x / Protein: x / Nitrite: x   Leuk Esterase: x / RBC: x / WBC x   Sq Epi: x / Non Sq Epi: x / Bacteria: x      CAPILLARY BLOOD GLUCOSE        RADIOLOGY & ADDITIONAL TESTS:      CT Head No Cont (10.08.23 @ 01:20) >   Increased subdural hemorrhage adjacent to the cranioplasty   measuring 1.5 cm in thickness. Tiny foci of hemorrhages in left frontal   lobe are smaller in size. Subacute large left MCA territory infarction.      Patient is a 63y old  Male who presents with a chief complaint of LT M1 Occlusion (08 Oct 2023 11:40)      INTERVAL HPI/OVERNIGHT EVENTS: No acute events overnight.    MEDICATIONS  (STANDING):  ascorbic acid 500 milliGRAM(s) Oral daily  atorvastatin 80 milliGRAM(s) Oral at bedtime  enoxaparin Injectable 40 milliGRAM(s) SubCutaneous every 24 hours  multivitamin 1 Tablet(s) Oral daily  polyethylene glycol 3350 17 Gram(s) Oral daily  senna 1 Tablet(s) Oral every 24 hours  sodium chloride 0.9%. 1000 milliLiter(s) (40 mL/Hr) IV Continuous <Continuous>    MEDICATIONS  (PRN):  acetaminophen   IVPB .. 1000 milliGRAM(s) IV Intermittent every 6 hours PRN Severe Pain (7 - 10)  hydrALAZINE Injectable 10 milliGRAM(s) IV Push every 2 hours   labetalol Injectable 10 milliGRAM(s) IV Push every 2 hours PRN Systolic blood pressure >160  midodrine 5 milliGRAM(s) Oral every 8 hours PRN for sbp <100      Allergies: No Known Allergies        REVIEW OF SYSTEMS: unable to be assessed due to clinical condition      Vital Signs Last 24 Hrs  T(C): 36.8 (09 Oct 2023 08:14), Max: 37.2 (08 Oct 2023 20:59)  T(F): 98.3 (09 Oct 2023 08:14), Max: 99 (08 Oct 2023 20:59)  HR: 85 (09 Oct 2023 08:14) (82 - 89)  BP: 147/95 (09 Oct 2023 08:14) (118/81 - 147/95)  RR: 18 (09 Oct 2023 05:20) (18 - 20)  SpO2: 94% (09 Oct 2023 08:14) (92% - 100%)    Parameters below as of 09 Oct 2023 08:14  Patient On (Oxygen Delivery Method): room air      PHYSICAL EXAM:  GENERAL: lying comfortably in bed  HEAD:  Atraumatic, Normocephalic  EYES: EOMI, PERRLA, conjunctiva and sclera clear  NECK: Supple, No JVD  NERVOUS SYSTEM: alert, awakens to touch, + expressive aphasia, follows commands, mild right facial droop, 5/5 strength in LUE and LLE, 0/5 strength in RUE and RLE  CHEST/LUNG: Clear to auscultation bilaterally  HEART: Regular rate and rhythm; No murmurs  ABDOMEN: Soft, Nontender; Bowel sounds present  GENITOURINARY: + [place] catheter in place, clean, dry, intact  EXTREMITIES:  No cyanosis, or edema  SKIN: No rashes, + sutures on scalp clean, dry, intact    LABS:                        9.6    8.46  )-----------( 493      ( 08 Oct 2023 07:10 )             29.8     10-08    138  |  101  |  6.4<L>  ----------------------------<  101<H>  3.5   |  25.0  |  0.49<L>    Ca    8.5      08 Oct 2023 07:10    TPro  5.8<L>  /  Alb  2.8<L>  /  TBili  0.5  /  DBili  x   /  AST  34  /  ALT  54<H>  /  AlkPhos  130<H>  10-08      Urinalysis Basic - ( 08 Oct 2023 07:10 )    Color: x / Appearance: x / SG: x / pH: x  Gluc: 101 mg/dL / Ketone: x  / Bili: x / Urobili: x   Blood: x / Protein: x / Nitrite: x   Leuk Esterase: x / RBC: x / WBC x   Sq Epi: x / Non Sq Epi: x / Bacteria: x      CAPILLARY BLOOD GLUCOSE        RADIOLOGY & ADDITIONAL TESTS:          CT Head No Cont (10.08.23 @ 01:20) >   Increased subdural hemorrhage adjacent to the cranioplasty   measuring 1.5 cm in thickness. Tiny foci of hemorrhages in left frontal   lobe are smaller in size. Subacute large left MCA territory infarction.

## 2023-10-10 LAB
ALBUMIN SERPL ELPH-MCNC: 3.5 G/DL — SIGNIFICANT CHANGE UP (ref 3.3–5.2)
ALP SERPL-CCNC: 150 U/L — HIGH (ref 40–120)
ALT FLD-CCNC: 68 U/L — HIGH
ANION GAP SERPL CALC-SCNC: 12 MMOL/L — SIGNIFICANT CHANGE UP (ref 5–17)
APTT BLD: 156.4 SEC — CRITICAL HIGH (ref 24.5–35.6)
APTT BLD: 156.4 SEC — CRITICAL HIGH (ref 24.5–35.6)
APTT BLD: 34.2 SEC — SIGNIFICANT CHANGE UP (ref 24.5–35.6)
AST SERPL-CCNC: 49 U/L — HIGH
BASOPHILS # BLD AUTO: 0.07 K/UL — SIGNIFICANT CHANGE UP (ref 0–0.2)
BASOPHILS NFR BLD AUTO: 0.7 % — SIGNIFICANT CHANGE UP (ref 0–2)
BILIRUB SERPL-MCNC: 0.5 MG/DL — SIGNIFICANT CHANGE UP (ref 0.4–2)
BUN SERPL-MCNC: 6.6 MG/DL — LOW (ref 8–20)
CALCIUM SERPL-MCNC: 9.1 MG/DL — SIGNIFICANT CHANGE UP (ref 8.4–10.5)
CHLORIDE SERPL-SCNC: 103 MMOL/L — SIGNIFICANT CHANGE UP (ref 96–108)
CO2 SERPL-SCNC: 25 MMOL/L — SIGNIFICANT CHANGE UP (ref 22–29)
CREAT SERPL-MCNC: 0.47 MG/DL — LOW (ref 0.5–1.3)
EGFR: 118 ML/MIN/1.73M2 — SIGNIFICANT CHANGE UP
EOSINOPHIL # BLD AUTO: 0.31 K/UL — SIGNIFICANT CHANGE UP (ref 0–0.5)
EOSINOPHIL NFR BLD AUTO: 3.3 % — SIGNIFICANT CHANGE UP (ref 0–6)
GLUCOSE SERPL-MCNC: 108 MG/DL — HIGH (ref 70–99)
HCT VFR BLD CALC: 32.9 % — LOW (ref 39–50)
HCT VFR BLD CALC: 32.9 % — LOW (ref 39–50)
HCT VFR BLD CALC: 35.2 % — LOW (ref 39–50)
HGB BLD-MCNC: 10.5 G/DL — LOW (ref 13–17)
HGB BLD-MCNC: 10.5 G/DL — LOW (ref 13–17)
HGB BLD-MCNC: 11.2 G/DL — LOW (ref 13–17)
IMM GRANULOCYTES NFR BLD AUTO: 1 % — HIGH (ref 0–0.9)
LYMPHOCYTES # BLD AUTO: 2.09 K/UL — SIGNIFICANT CHANGE UP (ref 1–3.3)
LYMPHOCYTES # BLD AUTO: 22.1 % — SIGNIFICANT CHANGE UP (ref 13–44)
MCHC RBC-ENTMCNC: 29.9 PG — SIGNIFICANT CHANGE UP (ref 27–34)
MCHC RBC-ENTMCNC: 29.9 PG — SIGNIFICANT CHANGE UP (ref 27–34)
MCHC RBC-ENTMCNC: 30.2 PG — SIGNIFICANT CHANGE UP (ref 27–34)
MCHC RBC-ENTMCNC: 31.8 GM/DL — LOW (ref 32–36)
MCHC RBC-ENTMCNC: 31.9 GM/DL — LOW (ref 32–36)
MCHC RBC-ENTMCNC: 31.9 GM/DL — LOW (ref 32–36)
MCV RBC AUTO: 93.7 FL — SIGNIFICANT CHANGE UP (ref 80–100)
MCV RBC AUTO: 93.7 FL — SIGNIFICANT CHANGE UP (ref 80–100)
MCV RBC AUTO: 94.9 FL — SIGNIFICANT CHANGE UP (ref 80–100)
MONOCYTES # BLD AUTO: 0.91 K/UL — HIGH (ref 0–0.9)
MONOCYTES NFR BLD AUTO: 9.6 % — SIGNIFICANT CHANGE UP (ref 2–14)
NEUTROPHILS # BLD AUTO: 5.98 K/UL — SIGNIFICANT CHANGE UP (ref 1.8–7.4)
NEUTROPHILS NFR BLD AUTO: 63.3 % — SIGNIFICANT CHANGE UP (ref 43–77)
PLATELET # BLD AUTO: 490 K/UL — HIGH (ref 150–400)
PLATELET # BLD AUTO: 490 K/UL — HIGH (ref 150–400)
PLATELET # BLD AUTO: 530 K/UL — HIGH (ref 150–400)
POTASSIUM SERPL-MCNC: 3.6 MMOL/L — SIGNIFICANT CHANGE UP (ref 3.5–5.3)
POTASSIUM SERPL-SCNC: 3.6 MMOL/L — SIGNIFICANT CHANGE UP (ref 3.5–5.3)
PROT SERPL-MCNC: 6.7 G/DL — SIGNIFICANT CHANGE UP (ref 6.6–8.7)
RBC # BLD: 3.51 M/UL — LOW (ref 4.2–5.8)
RBC # BLD: 3.51 M/UL — LOW (ref 4.2–5.8)
RBC # BLD: 3.71 M/UL — LOW (ref 4.2–5.8)
RBC # FLD: 15.5 % — HIGH (ref 10.3–14.5)
RBC # FLD: 15.6 % — HIGH (ref 10.3–14.5)
RBC # FLD: 15.6 % — HIGH (ref 10.3–14.5)
SODIUM SERPL-SCNC: 140 MMOL/L — SIGNIFICANT CHANGE UP (ref 135–145)
WBC # BLD: 10.72 K/UL — HIGH (ref 3.8–10.5)
WBC # BLD: 10.72 K/UL — HIGH (ref 3.8–10.5)
WBC # BLD: 9.45 K/UL — SIGNIFICANT CHANGE UP (ref 3.8–10.5)
WBC # FLD AUTO: 10.72 K/UL — HIGH (ref 3.8–10.5)
WBC # FLD AUTO: 10.72 K/UL — HIGH (ref 3.8–10.5)
WBC # FLD AUTO: 9.45 K/UL — SIGNIFICANT CHANGE UP (ref 3.8–10.5)

## 2023-10-10 PROCEDURE — 99232 SBSQ HOSP IP/OBS MODERATE 35: CPT

## 2023-10-10 RX ORDER — HEPARIN SODIUM 5000 [USP'U]/ML
6500 INJECTION INTRAVENOUS; SUBCUTANEOUS EVERY 6 HOURS
Refills: 0 | Status: DISCONTINUED | OUTPATIENT
Start: 2023-10-10 | End: 2023-10-10

## 2023-10-10 RX ORDER — HEPARIN SODIUM 5000 [USP'U]/ML
INJECTION INTRAVENOUS; SUBCUTANEOUS
Qty: 25000 | Refills: 0 | Status: DISCONTINUED | OUTPATIENT
Start: 2023-10-10 | End: 2023-10-10

## 2023-10-10 RX ORDER — HEPARIN SODIUM 5000 [USP'U]/ML
INJECTION INTRAVENOUS; SUBCUTANEOUS
Qty: 25000 | Refills: 0 | Status: DISCONTINUED | OUTPATIENT
Start: 2023-10-10 | End: 2023-10-12

## 2023-10-10 RX ORDER — HEPARIN SODIUM 5000 [USP'U]/ML
3000 INJECTION INTRAVENOUS; SUBCUTANEOUS EVERY 6 HOURS
Refills: 0 | Status: DISCONTINUED | OUTPATIENT
Start: 2023-10-10 | End: 2023-10-10

## 2023-10-10 RX ADMIN — POLYETHYLENE GLYCOL 3350 17 GRAM(S): 17 POWDER, FOR SOLUTION ORAL at 12:58

## 2023-10-10 RX ADMIN — SODIUM CHLORIDE 40 MILLILITER(S): 9 INJECTION INTRAMUSCULAR; INTRAVENOUS; SUBCUTANEOUS at 12:57

## 2023-10-10 RX ADMIN — Medication 1 TABLET(S): at 12:58

## 2023-10-10 RX ADMIN — HEPARIN SODIUM 1500 UNIT(S)/HR: 5000 INJECTION INTRAVENOUS; SUBCUTANEOUS at 12:58

## 2023-10-10 RX ADMIN — SENNA PLUS 1 TABLET(S): 8.6 TABLET ORAL at 17:46

## 2023-10-10 RX ADMIN — HEPARIN SODIUM 0 UNIT(S)/HR: 5000 INJECTION INTRAVENOUS; SUBCUTANEOUS at 21:35

## 2023-10-10 RX ADMIN — Medication 500 MILLIGRAM(S): at 12:58

## 2023-10-10 RX ADMIN — HEPARIN SODIUM 1500 UNIT(S)/HR: 5000 INJECTION INTRAVENOUS; SUBCUTANEOUS at 19:17

## 2023-10-10 RX ADMIN — HEPARIN SODIUM 1200 UNIT(S)/HR: 5000 INJECTION INTRAVENOUS; SUBCUTANEOUS at 22:40

## 2023-10-10 NOTE — PROGRESS NOTE ADULT - ATTENDING COMMENTS
64 y/o M with PMH of DM, HTN who presented after a fall with right sided weakness, dysarthria and right facial droop. Patient found to have LM1 occlusion and midline shift. CTH stable. Per NSG, will start Heparin gtt and repeat CTH in 6 hours after achieving therapeutic level (goal of 50-70) .

## 2023-10-10 NOTE — PROGRESS NOTE ADULT - SUBJECTIVE AND OBJECTIVE BOX
Patient is a 63y old  Male who presents with a chief complaint of LT M1 Occlusion (09 Oct 2023 10:36)      INTERVAL HPI/OVERNIGHT EVENTS: CT head on 10/9 revealed posterior margin of cranioplasty is slightly displaced and not completely flush with the posterior craniectomy margin. SDH relatively stable and small midline shift to right of 0.4 cm. No acute events overnight.    MEDICATIONS  (STANDING):  ascorbic acid 500 milliGRAM(s) Oral daily  atorvastatin 80 milliGRAM(s) Oral at bedtime  enoxaparin Injectable 40 milliGRAM(s) SubCutaneous every 24 hours  multivitamin 1 Tablet(s) Oral daily  polyethylene glycol 3350 17 Gram(s) Oral daily  senna 1 Tablet(s) Oral every 24 hours  sodium chloride 0.9%. 1000 milliLiter(s) (40 mL/Hr) IV Continuous <Continuous>    MEDICATIONS  (PRN):  acetaminophen   IVPB .. 1000 milliGRAM(s) IV Intermittent every 6 hours PRN Severe Pain (7 - 10)  hydrALAZINE Injectable 10 milliGRAM(s) IV Push every 2 hours   labetalol Injectable 10 milliGRAM(s) IV Push every 2 hours PRN Systolic blood pressure >160  midodrine 5 milliGRAM(s) Oral every 8 hours PRN for sbp <100      Allergies: No Known Allergies        REVIEW OF SYSTEMS: unable to be assessed due to clinical condition      Vital Signs Last 24 Hrs  T(C): 37 (09 Oct 2023 20:00), Max: 37 (09 Oct 2023 20:00)  T(F): 98.6 (09 Oct 2023 20:00), Max: 98.6 (09 Oct 2023 20:00)  HR: 100 (09 Oct 2023 20:00) (85 - 100)  BP: 134/87 (09 Oct 2023 20:00) (130/87 - 147/95)  RR: 18 (09 Oct 2023 20:00) (18 - 18)  SpO2: 98% (09 Oct 2023 20:00) (94% - 98%)    Parameters below as of 09 Oct 2023 20:38  Patient On (Oxygen Delivery Method): room air        PHYSICAL EXAM:  GENERAL: NAD,   HEAD:  Atraumatic, Normocephalic  EYES: EOMI, PERRLA, conjunctiva and sclera clear  NECK: Supple, No JVD, Normal thyroid  NERVOUS SYSTEM:  Alert & Oriented X3, No gross focal deficits  CHEST/LUNG: Clear to auscultation bilaterally; No rales, rhonchi, wheezing, or rubs  HEART: Regular rate and rhythm; No murmurs, rubs, or gallops  ABDOMEN: Soft, Nontender, Nondistended; Bowel sounds present  EXTREMITIES:  No clubbing, cyanosis, or edema  SKIN: No rashes or lesions    LABS:                        11.2   9.45  )-----------( 530      ( 10 Oct 2023 05:57 )             35.2     10-10    140  |  103  |  6.6<L>  ----------------------------<  108<H>  3.6   |  25.0  |  0.47<L>    Ca    9.1      10 Oct 2023 05:57    TPro  6.7  /  Alb  3.5  /  TBili  0.5  /  DBili  x   /  AST  49<H>  /  ALT  68<H>  /  AlkPhos  150<H>  10-10      Urinalysis Basic - ( 10 Oct 2023 05:57 )    Color: x / Appearance: x / SG: x / pH: x  Gluc: 108 mg/dL / Ketone: x  / Bili: x / Urobili: x   Blood: x / Protein: x / Nitrite: x   Leuk Esterase: x / RBC: x / WBC x   Sq Epi: x / Non Sq Epi: x / Bacteria: x      CAPILLARY BLOOD GLUCOSE          RADIOLOGY & ADDITIONAL TESTS:    Imaging Personally Reviewed:  [ ] YES  [ ] NO    Consultant(s) Notes Reviewed:  [ ] YES  [ ] NO    Care Discussed with Consultants/Other Providers [ ] YES  [ ] NO    Plan of Care discussed with Housestaff [ ]YES [ ] NO Patient is a 63y old  Male who presents with a chief complaint of LT M1 Occlusion (09 Oct 2023 10:36)      INTERVAL HPI/OVERNIGHT EVENTS: CT head on 10/9 revealed posterior margin of cranioplasty is slightly displaced and not completely flush with the posterior craniectomy margin. SDH relatively stable and small midline shift to right of 0.4 cm. No acute events overnight.    MEDICATIONS  (STANDING):  ascorbic acid 500 milliGRAM(s) Oral daily  atorvastatin 80 milliGRAM(s) Oral at bedtime  enoxaparin Injectable 40 milliGRAM(s) SubCutaneous every 24 hours  multivitamin 1 Tablet(s) Oral daily  polyethylene glycol 3350 17 Gram(s) Oral daily  senna 1 Tablet(s) Oral every 24 hours  sodium chloride 0.9%. 1000 milliLiter(s) (40 mL/Hr) IV Continuous <Continuous>    MEDICATIONS  (PRN):  acetaminophen   IVPB .. 1000 milliGRAM(s) IV Intermittent every 6 hours PRN Severe Pain (7 - 10)  hydrALAZINE Injectable 10 milliGRAM(s) IV Push every 2 hours   labetalol Injectable 10 milliGRAM(s) IV Push every 2 hours PRN Systolic blood pressure >160  midodrine 5 milliGRAM(s) Oral every 8 hours PRN for sbp <100      Allergies: No Known Allergies        REVIEW OF SYSTEMS: unable to be assessed due to clinical condition      Vital Signs Last 24 Hrs  T(C): 37 (09 Oct 2023 20:00), Max: 37 (09 Oct 2023 20:00)  T(F): 98.6 (09 Oct 2023 20:00), Max: 98.6 (09 Oct 2023 20:00)  HR: 100 (09 Oct 2023 20:00) (85 - 100)  BP: 134/87 (09 Oct 2023 20:00) (130/87 - 147/95)  RR: 18 (09 Oct 2023 20:00) (18 - 18)  SpO2: 98% (09 Oct 2023 20:00) (94% - 98%)    Parameters below as of 09 Oct 2023 20:38  Patient On (Oxygen Delivery Method): room air        PHYSICAL EXAM:  GENERAL: lying comfortably in bed  HEAD:  Atraumatic, Normocephalic  EYES: EOMI, PERRLA, conjunctiva and sclera clear  NECK: Supple, No JVD  NERVOUS SYSTEM: alert, awakens to touch, + expressive aphasia, follows commands, mild right facial droop, 5/5 strength in LUE and LLE, 0/5 strength in RUE and RLE  CHEST/LUNG: Clear to auscultation bilaterally  HEART: Regular rate and rhythm; No murmurs  ABDOMEN: Soft, Nontender; Bowel sounds present  GENITOURINARY: + [place] catheter in place, clean, dry, intact  EXTREMITIES:  No cyanosis, or edema  SKIN: No rashes, + sutures on scalp clean, dry, intact      LABS:                        11.2   9.45  )-----------( 530      ( 10 Oct 2023 05:57 )             35.2     10-10    140  |  103  |  6.6<L>  ----------------------------<  108<H>  3.6   |  25.0  |  0.47<L>    Ca    9.1      10 Oct 2023 05:57    TPro  6.7  /  Alb  3.5  /  TBili  0.5  /  DBili  x   /  AST  49<H>  /  ALT  68<H>  /  AlkPhos  150<H>  10-10      Urinalysis Basic - ( 10 Oct 2023 05:57 )    Color: x / Appearance: x / SG: x / pH: x  Gluc: 108 mg/dL / Ketone: x  / Bili: x / Urobili: x   Blood: x / Protein: x / Nitrite: x   Leuk Esterase: x / RBC: x / WBC x   Sq Epi: x / Non Sq Epi: x / Bacteria: x      CAPILLARY BLOOD GLUCOSE          RADIOLOGY & ADDITIONAL TESTS:    Imaging Personally Reviewed:  [ ] YES  [ ] NO    Consultant(s) Notes Reviewed:  [ ] YES  [ ] NO    Care Discussed with Consultants/Other Providers [ ] YES  [ ] NO    Plan of Care discussed with Housestaff [ ]YES [ ] NO Patient is a 63y old  Male who presents with a chief complaint of LT M1 Occlusion (09 Oct 2023 10:36)      INTERVAL HPI/OVERNIGHT EVENTS: CT head on 10/9 revealed posterior margin of cranioplasty is slightly displaced and not completely flush with the posterior craniectomy margin. SDH relatively stable and small midline shift to right of 0.4 cm. No acute events overnight.    MEDICATIONS  (STANDING):  ascorbic acid 500 milliGRAM(s) Oral daily  atorvastatin 80 milliGRAM(s) Oral at bedtime  enoxaparin Injectable 40 milliGRAM(s) SubCutaneous every 24 hours  multivitamin 1 Tablet(s) Oral daily  polyethylene glycol 3350 17 Gram(s) Oral daily  senna 1 Tablet(s) Oral every 24 hours  sodium chloride 0.9%. 1000 milliLiter(s) (40 mL/Hr) IV Continuous <Continuous>    MEDICATIONS  (PRN):  acetaminophen   IVPB .. 1000 milliGRAM(s) IV Intermittent every 6 hours PRN Severe Pain (7 - 10)  hydrALAZINE Injectable 10 milliGRAM(s) IV Push every 2 hours   labetalol Injectable 10 milliGRAM(s) IV Push every 2 hours PRN Systolic blood pressure >160  midodrine 5 milliGRAM(s) Oral every 8 hours PRN for sbp <100      Allergies: No Known Allergies        REVIEW OF SYSTEMS: unable to be assessed due to clinical condition      Vital Signs Last 24 Hrs  T(C): 37 (09 Oct 2023 20:00), Max: 37 (09 Oct 2023 20:00)  T(F): 98.6 (09 Oct 2023 20:00), Max: 98.6 (09 Oct 2023 20:00)  HR: 100 (09 Oct 2023 20:00) (85 - 100)  BP: 134/87 (09 Oct 2023 20:00) (130/87 - 147/95)  RR: 18 (09 Oct 2023 20:00) (18 - 18)  SpO2: 98% (09 Oct 2023 20:00) (94% - 98%)    Parameters below as of 09 Oct 2023 20:38  Patient On (Oxygen Delivery Method): room air        PHYSICAL EXAM:  GENERAL: lying comfortably in bed  HEAD:  Atraumatic, Normocephalic  EYES: EOMI, PERRLA, conjunctiva and sclera clear  NECK: Supple, No JVD  NERVOUS SYSTEM: alert, awakens to touch, + expressive aphasia, follows commands, mild right facial droop, 5/5 strength in LUE and LLE, 0/5 strength in RUE and RLE  CHEST/LUNG: Clear to auscultation bilaterally  HEART: Regular rate and rhythm; No murmurs  ABDOMEN: Soft, Nontender; Bowel sounds present  GENITOURINARY: + [place] catheter in place, clean, dry, intact  EXTREMITIES:  No cyanosis, or edema  SKIN: No rashes, + sutures on scalp clean, dry, intact      LABS:                        11.2   9.45  )-----------( 530      ( 10 Oct 2023 05:57 )             35.2     10-10    140  |  103  |  6.6<L>  ----------------------------<  108<H>  3.6   |  25.0  |  0.47<L>    Ca    9.1      10 Oct 2023 05:57    TPro  6.7  /  Alb  3.5  /  TBili  0.5  /  DBili  x   /  AST  49<H>  /  ALT  68<H>  /  AlkPhos  150<H>  10-10      Urinalysis Basic - ( 10 Oct 2023 05:57 )    Color: x / Appearance: x / SG: x / pH: x  Gluc: 108 mg/dL / Ketone: x  / Bili: x / Urobili: x   Blood: x / Protein: x / Nitrite: x   Leuk Esterase: x / RBC: x / WBC x   Sq Epi: x / Non Sq Epi: x / Bacteria: x      CAPILLARY BLOOD GLUCOSE          RADIOLOGY & ADDITIONAL TESTS:    CT Head No Cont (10.09.23 @ 11:53) >  1.  Postoperative changes related to a prior left sided craniotomy with   cranioplasty. The posterior margin of the cranioplasty is slightly   displaced and not completely flush with the posterior craniectomy margin.  2.  Redemonstrated extensity subdural hematoma, relatively stable.  3.  Small midline shift to the right of approximately 0.4 cm.  4.  Redemonstrated left MCA territory infarction. Foci of increased   attenuation along the cortex and underlying white matter, likely related   to laminar necrosis/petechial hemorrhages, relatively stable.          Imaging Personally Reviewed:  [ ] YES  [ ] NO    Consultant(s) Notes Reviewed:  [ ] YES  [ ] NO    Care Discussed with Consultants/Other Providers [ ] YES  [ ] NO    Plan of Care discussed with Housestaff [ ]YES [ ] NO

## 2023-10-10 NOTE — PROGRESS NOTE ADULT - SUBJECTIVE AND OBJECTIVE BOX
HPI: 63y Male PMH HTN, DM, presented to [facility] 8/27 with right hemiparesis and aphasia, found with LM1 occlusion, s/p IV Tenecteplase 12:15 PM 8/27/23 and mechanical thrombectomy 8/27/23 with TICI 2C reperfusion, evolving stroke causing mass effect subsequently required left hemicraniectomy 8/30/23 with Dr. Bah.     Interval History: patient seen and examined. CT head reviewed hemorrhage is stable.     Vital Signs Last 24 Hrs  T(C): 36.8 (10 Oct 2023 08:59), Max: 37 (09 Oct 2023 20:00)  T(F): 98.2 (10 Oct 2023 08:59), Max: 98.6 (09 Oct 2023 20:00)  HR: 91 (10 Oct 2023 08:59) (87 - 100)  BP: 110/79 (10 Oct 2023 08:59) (110/79 - 140/83)  BP(mean): --  RR: 19 (10 Oct 2023 08:59) (18 - 19)  SpO2: 97% (10 Oct 2023 08:59) (95% - 98%)    Parameters below as of 10 Oct 2023 08:59  Patient On (Oxygen Delivery Method): room air    PHYSICAL EXAM:  GENERAL: NAD  HEAD: sutures in place and intact  MENTAL STATUS:; Awake; Opens eyes spontaneously, expressive aphasia/word salad   CRANIAL NERVES:  PERRL. + right facial   MOTOR: LUE/LLE antigravity, right side hemiplegia   Sensory: intact to noxious throughout     < from: CT Head No Cont (10.09.23 @ 11:53) >  IMPRESSION:    1.  Postoperative changes related to a prior left sided craniotomy with   cranioplasty. The posterior margin of the cranioplasty is slightly   displaced and not completely flush with the posterior craniectomy margin.  2.  Redemonstrated extensity subdural hematoma, relatively stable.  3.  Small midline shift to the right of approximately 0.4 cm.  4.  Redemonstrated left MCA territory infarction. Foci of increased   attenuation along the cortex and underlying white matter, likely related   to laminar necrosis/petechial hemorrhages, relatively stable.    < end of copied text >

## 2023-10-10 NOTE — CHART NOTE - NSCHARTNOTEFT_GEN_A_CORE
Source: Patient [ ]  Family [ ]   other [x ]EMR    Current Diet: Pureed, consistent CHO with mod thick liquids and Ensure TID        PO intake:  < 50% [ ]   50-75%  [ ]   %  [x ]  other :    Source for PO intake [x ] Patient [ ] family [ x] chart [ ] staff [ ] other    Enteral /Parenteral Nutrition:     Current Weight:  8/27 179#, 9/1 176.8#  bedscale weight 141.3# today, probably inaccurate    % Weight Change     Pertinent Medications: MEDICATIONS  (STANDING):  ascorbic acid 500 milliGRAM(s) Oral daily  atorvastatin 80 milliGRAM(s) Oral at bedtime  enoxaparin Injectable 40 milliGRAM(s) SubCutaneous every 24 hours  multivitamin 1 Tablet(s) Oral daily  polyethylene glycol 3350 17 Gram(s) Oral daily  senna 1 Tablet(s) Oral every 24 hours  sodium chloride 0.9%. 1000 milliLiter(s) (40 mL/Hr) IV Continuous <Continuous>    MEDICATIONS  (PRN):  acetaminophen   IVPB .. 1000 milliGRAM(s) IV Intermittent every 6 hours PRN Severe Pain (7 - 10)  hydrALAZINE Injectable 10 milliGRAM(s) IV Push every 2 hours   labetalol Injectable 10 milliGRAM(s) IV Push every 2 hours PRN Systolic blood pressure >160  midodrine 5 milliGRAM(s) Oral every 8 hours PRN for sbp <100    Pertinent Labs: CBC Full  -  ( 10 Oct 2023 05:57 )  WBC Count : 9.45 K/uL  RBC Count : 3.71 M/uL  Hemoglobin : 11.2 g/dL  Hematocrit : 35.2 %  Platelet Count - Automated : 530 K/uL  Mean Cell Volume : 94.9 fl  Mean Cell Hemoglobin : 30.2 pg  Mean Cell Hemoglobin Concentration : 31.8 gm/dL  Auto Neutrophil # : 5.98 K/uL  Auto Lymphocyte # : 2.09 K/uL  Auto Monocyte # : 0.91 K/uL  Auto Eosinophil # : 0.31 K/uL  Auto Basophil # : 0.07 K/uL  Auto Neutrophil % : 63.3 %  Auto Lymphocyte % : 22.1 %  Auto Monocyte % : 9.6 %  Auto Eosinophil % : 3.3 %  Auto Basophil % : 0.7 %      10-10 Na140 mmol/L Glu 108 mg/dL<H> K+ 3.6 mmol/L Cr  0.47 mg/dL<L> BUN 6.6 mg/dL<L> Phos n/a   Alb 3.5 g/dL PAB n/a           Skin:     Nutrition focused physical exam conducted - found signs of malnutrition [ ]absent [ ]present    Subcutaneous fat loss: [ ] Orbital fat pads region, [ ]Buccal fat region, [ ]Triceps region,  [ ]Ribs region    Muscle wasting: [x ]Temples region, [ ]Clavicle region, [ ]Shoulder region, [ ]Scapula region, [ ]Interosseous region,  [ ]thigh region, [ ]Calf region    Estimated Needs:   [x ] no change since previous assessment  [ ] recalculated:     Current Nutrition Diagnosis:   Current Nutrition Diagnosis: Pt meets criteria for moderate acute malnutrition related to difficulty swallowing in setting of left acute MCA as evidenced by downgrade to puree with mod thick liquids from soft and bite sized and meeting <75%EER>7days, mild muscle depletion.  Pt is a total feed.  Pt is s/p left cranioplasty with complex closure with plastic surgery on 9/29. SHUN drains removed. Today pt ate well and took Ensure 100%. ST recommending easy to chew on 10/4.    Recommendations:   Continue Ensure TID to optimize po intake and provide an additional 350 kcal, 20g protein per serving   Need accurate weight.  REc Easy to chew with mod thick liquids as of ST note 10/4  Provide plain greek yogurt for increased protein  Provide magic cup TID        Monitoring and Evaluation:   [ x] PO intake [x ] Tolerance to diet prescription [X] Weights  [X] Follow up per protocol [X] Labs: Source: Patient [ ]  Family [ ]   other [x ]EMR    Current Diet: Pureed, consistent CHO with mod thick liquids and Ensure TID        PO intake:  < 50% [ ]   50-75%  [ ]   %  [x ]  other :    Source for PO intake [x ] Patient [ ] family [ x] chart [ ] staff [ ] other    Enteral /Parenteral Nutrition:     Current Weight:  8/27 179#, 9/1 176.8#  bedscale weight 141.3# today, probably inaccurate    % Weight Change     Pertinent Medications: MEDICATIONS  (STANDING):  ascorbic acid 500 milliGRAM(s) Oral daily  atorvastatin 80 milliGRAM(s) Oral at bedtime  enoxaparin Injectable 40 milliGRAM(s) SubCutaneous every 24 hours  multivitamin 1 Tablet(s) Oral daily  polyethylene glycol 3350 17 Gram(s) Oral daily  senna 1 Tablet(s) Oral every 24 hours  sodium chloride 0.9%. 1000 milliLiter(s) (40 mL/Hr) IV Continuous <Continuous>    MEDICATIONS  (PRN):  acetaminophen   IVPB .. 1000 milliGRAM(s) IV Intermittent every 6 hours PRN Severe Pain (7 - 10)  hydrALAZINE Injectable 10 milliGRAM(s) IV Push every 2 hours   labetalol Injectable 10 milliGRAM(s) IV Push every 2 hours PRN Systolic blood pressure >160  midodrine 5 milliGRAM(s) Oral every 8 hours PRN for sbp <100    Pertinent Labs: CBC Full  -  ( 10 Oct 2023 05:57 )  WBC Count : 9.45 K/uL  RBC Count : 3.71 M/uL  Hemoglobin : 11.2 g/dL  Hematocrit : 35.2 %  Platelet Count - Automated : 530 K/uL  Mean Cell Volume : 94.9 fl  Mean Cell Hemoglobin : 30.2 pg  Mean Cell Hemoglobin Concentration : 31.8 gm/dL  Auto Neutrophil # : 5.98 K/uL  Auto Lymphocyte # : 2.09 K/uL  Auto Monocyte # : 0.91 K/uL  Auto Eosinophil # : 0.31 K/uL  Auto Basophil # : 0.07 K/uL  Auto Neutrophil % : 63.3 %  Auto Lymphocyte % : 22.1 %  Auto Monocyte % : 9.6 %  Auto Eosinophil % : 3.3 %  Auto Basophil % : 0.7 %      10-10 Na140 mmol/L Glu 108 mg/dL<H> K+ 3.6 mmol/L Cr  0.47 mg/dL<L> BUN 6.6 mg/dL<L> Phos n/a   Alb 3.5 g/dL PAB n/a           Skin:     Nutrition focused physical exam conducted - found signs of malnutrition [ ]absent [ x]present    Subcutaneous fat loss: [ ] Orbital fat pads region, [ ]Buccal fat region, [ ]Triceps region,  [ ]Ribs region    Muscle wasting: [x ]Temples region, [ ]Clavicle region, [ ]Shoulder region, [ ]Scapula region, [ ]Interosseous region,  [ ]thigh region, [ ]Calf region    Estimated Needs:   [x ] no change since previous assessment  [ ] recalculated:     Current Nutrition Diagnosis:   Current Nutrition Diagnosis: Pt meets criteria for moderate acute malnutrition related to difficulty swallowing in setting of left acute MCA as evidenced by downgrade to puree with mod thick liquids from soft and bite sized and meeting <75%EER>7days, mild muscle depletion.  Pt is a total feed.  Pt is s/p left cranioplasty with complex closure with plastic surgery on 9/29. SHUN drains removed. Today pt ate well and took Ensure 100%. ST recommending easy to chew on 10/4.    Recommendations:   Rec change supplement to Glucerna shake TID due to A1C 6.8 noted.  Need accurate weight.  REc Easy to chew with mod thick liquids, Consistent CHO as of ST note 10/4  Provide plain greek yogurt for increased protein        Monitoring and Evaluation:   [ x] PO intake [x ] Tolerance to diet prescription [X] Weights  [X] Follow up per protocol [X] Labs:

## 2023-10-10 NOTE — PROGRESS NOTE ADULT - ASSESSMENT
63F s/p stroke, TNK, thrombectomy, decompressive craniectomy, now s/p cranioplasty     Plan   - q4 neuro checks   - SCDs and Lovenox for dvt ppx   - Suture removal evaluation 10/14   - Okay to start Heparin drip for full dose AC, NO BOLUS. PTT goal 50-70. CT head 6 hours after therapeutic PTT, if stable can transition to long term oral AC

## 2023-10-10 NOTE — PROVIDER CONTACT NOTE (CRITICAL VALUE NOTIFICATION) - ACTION/TREATMENT ORDERED:
Spoke to PA, and they recommended to just continue to monitor based on history , no intervention needed at this time.
Follow Acs protocol. Hold Heparin x1 hour then decrease by 3.
repeat cbc- obtained and sent to lab

## 2023-10-10 NOTE — PROGRESS NOTE ADULT - ATTENDING COMMENTS
64 y/o M with PMH of DM, HTN who presented after a fall with right sided weakness, dysarthria and right facial droop. Patient found to have LM1 occlusion and midline shift. CTH stable. Per NSG, will start Heparin gtt and repeat CTH in 6 hours after achieving therapuetic level (goal of 50-70)

## 2023-10-10 NOTE — PROGRESS NOTE ADULT - ASSESSMENT
ASSESSMENT: 63M male with PMHx of T2DM, HTN who presented after a fall with right sided weakness, dysarthria and right facial droop. Patient was found to have LM1 occlusion s/p tenecteplase in ED and had mechanical thrombectomy done with TICI 2c. Decompressive craniectomy for midline shift was done on 8/30/2023, SHUN drain was removed on 8/31, and left cranioplasty on 9/29/2023. SAMARA was negative for thrombus. SHUN drain x2 removed. CT head on 10/8/2023 revealed increased subdural hemorrhage adjacent to cranioplasty measuring 1.5 cm in thickness along with tiny foci of hemorrhages in L frontal lobe are smaller in size.     LM1 Occlusion, Midline Shift  - SAMARA negative for PFO, atrial thrombus  - No significant occlusive disease on CT neck  - Decompressive Craniectomy on 8/30  - Mechanical thrombectomy with TICI 2c recanalization  - Left cranioplasty on 9/29, SHUN drains removed   - Dysphagia diet  - Repeat speech and swallow eval -------> Pending  - Atorvastatin 80 mg nightly  - Lupus anticoagulant positive, normal ACL and B2G antibodies with repeat testing negative  - Antiphospholipid syndrome unlikely given negative repeat testing  - Repeat Lupus anticoagulant testing in 12 weeks per hematology  - ILR placement outpatient per cardiology  - Repeat CTH (10/7) - Increased subdural hemorrhage adjacent to the cranioplasty measuring 1.5 cm in thickness. Tiny foci of hemorrhages in left frontal lobe are smaller in size. Subacute large left MCA territory infarction.   - Repeat CT head (10/9): posterior margin of cranioplasty is slightly displaced and not completely flush with the posterior craniectomy margin. Redemonstrated SDH, relatively stable. Small midline shift to right, 0.4 cm.   - Heparin gtt started  - PTT goal 50-70  - CT head 6 hours after achieving therapeutic PTT goal -------> Pending  - If CT head stable, can begin long term oral anticoagulation ----------> Pending  - NSG following, recs noted  - Suture removal eval on 10/14 ------> Pending    Hypokalemia  - Resolved  - Monitor BMP    Leukocytosis  -Resolved  -Continue to monitor    Klebsiella Bacteremia, UTI  - Resolved  - s/p Ceftriaxone, Zosyn, Vancomycin, Cefazolin    Anemia  - H/H stable, likely post-op  - Continue to monitor    HTN  - Maintain normotensive per neurosurgery  - Hydralazine 10 mg IV PRN  - Labetalol 10 mg IV PRN  - Midodrine PRN     Urinary Retention  - Resolved, no signs of urinary retention  - Monitor     T2DM  - A1c 6.8    VTE ppx  - Lovenox 40mg SQ    Dispo: Acute, to [facility] when ready for discharge   ASSESSMENT: 63M male with PMHx of T2DM, HTN who presented after a fall with right sided weakness, dysarthria and right facial droop. Patient was found to have LM1 occlusion s/p tenecteplase in ED and had mechanical thrombectomy done with TICI 2c. Decompressive craniectomy for midline shift was done on 8/30/2023, SHUN drain was removed on 8/31, and left cranioplasty on 9/29/2023. SAMARA was negative for thrombus. SHUN drain x2 removed. CT head on 10/8/2023 revealed increased subdural hemorrhage adjacent to cranioplasty measuring 1.5 cm in thickness along with tiny foci of hemorrhages in L frontal lobe are smaller in size.     LM1 Occlusion, Midline Shift  - SAMARA negative for PFO, atrial thrombus  - No significant occlusive disease on CT neck  - Decompressive Craniectomy on 8/30  - Mechanical thrombectomy with TICI 2c recanalization  - Left cranioplasty on 9/29, SHUN drains removed   - Atorvastatin 80 mg nightly  - Lupus anticoagulant positive, normal ACL and B2G antibodies with repeat testing negative  - Antiphospholipid syndrome unlikely given negative repeat testing  - Repeat Lupus anticoagulant testing in 12 weeks per hematology  - ILR placement outpatient per cardiology  - Repeat CTH (10/7) - Increased subdural hemorrhage adjacent to the cranioplasty measuring 1.5 cm in thickness. Tiny foci of hemorrhages in left frontal lobe are smaller in size. Subacute large left MCA territory infarction.   - Repeat CT head (10/9): posterior margin of cranioplasty is slightly displaced and not completely flush with the posterior craniectomy margin. Redemonstrated SDH, relatively stable. Small midline shift to right, 0.4 cm.   - Heparin gtt started  - PTT goal 50-70  - CT head 6 hours after achieving therapeutic PTT goal -------> Pending  - If CT head stable, can begin long term oral anticoagulation ----------> Pending  - NSG following, recs noted  - Suture removal eval on 10/14 ------> Pending    Hypokalemia  - Resolved  - Monitor BMP    Leukocytosis  -Resolved  -Continue to monitor    Klebsiella Bacteremia, UTI  - Resolved  - s/p Ceftriaxone, Zosyn, Vancomycin, Cefazolin    Anemia  - H/H stable, likely post-op  - Continue to monitor    HTN  - Maintain normotensive per neurosurgery  - Hydralazine 10 mg IV PRN  - Labetalol 10 mg IV PRN  - Midodrine PRN     Urinary Retention  - Resolved, no signs of urinary retention  - Monitor     T2DM  - A1c 6.8    VTE ppx  - Lovenox 40mg SQ    Dispo: Acute, to [facility] when ready for discharge

## 2023-10-11 LAB
ALBUMIN SERPL ELPH-MCNC: 3.3 G/DL — SIGNIFICANT CHANGE UP (ref 3.3–5.2)
ALP SERPL-CCNC: 132 U/L — HIGH (ref 40–120)
ALT FLD-CCNC: 59 U/L — HIGH
ANION GAP SERPL CALC-SCNC: 10 MMOL/L — SIGNIFICANT CHANGE UP (ref 5–17)
APTT BLD: 115.1 SEC — HIGH (ref 24.5–35.6)
APTT BLD: 34.2 SEC — SIGNIFICANT CHANGE UP (ref 24.5–35.6)
APTT BLD: 92.4 SEC — HIGH (ref 24.5–35.6)
AST SERPL-CCNC: 39 U/L — SIGNIFICANT CHANGE UP
BASOPHILS # BLD AUTO: 0.07 K/UL — SIGNIFICANT CHANGE UP (ref 0–0.2)
BASOPHILS NFR BLD AUTO: 0.6 % — SIGNIFICANT CHANGE UP (ref 0–2)
BILIRUB SERPL-MCNC: 0.4 MG/DL — SIGNIFICANT CHANGE UP (ref 0.4–2)
BUN SERPL-MCNC: 9 MG/DL — SIGNIFICANT CHANGE UP (ref 8–20)
CALCIUM SERPL-MCNC: 9 MG/DL — SIGNIFICANT CHANGE UP (ref 8.4–10.5)
CHLORIDE SERPL-SCNC: 101 MMOL/L — SIGNIFICANT CHANGE UP (ref 96–108)
CO2 SERPL-SCNC: 27 MMOL/L — SIGNIFICANT CHANGE UP (ref 22–29)
CREAT SERPL-MCNC: 0.54 MG/DL — SIGNIFICANT CHANGE UP (ref 0.5–1.3)
EGFR: 113 ML/MIN/1.73M2 — SIGNIFICANT CHANGE UP
EOSINOPHIL # BLD AUTO: 0.38 K/UL — SIGNIFICANT CHANGE UP (ref 0–0.5)
EOSINOPHIL NFR BLD AUTO: 3.5 % — SIGNIFICANT CHANGE UP (ref 0–6)
GLUCOSE SERPL-MCNC: 110 MG/DL — HIGH (ref 70–99)
HCT VFR BLD CALC: 32.1 % — LOW (ref 39–50)
HGB BLD-MCNC: 10.4 G/DL — LOW (ref 13–17)
IMM GRANULOCYTES NFR BLD AUTO: 1 % — HIGH (ref 0–0.9)
LYMPHOCYTES # BLD AUTO: 2.4 K/UL — SIGNIFICANT CHANGE UP (ref 1–3.3)
LYMPHOCYTES # BLD AUTO: 22 % — SIGNIFICANT CHANGE UP (ref 13–44)
MCHC RBC-ENTMCNC: 30.1 PG — SIGNIFICANT CHANGE UP (ref 27–34)
MCHC RBC-ENTMCNC: 32.4 GM/DL — SIGNIFICANT CHANGE UP (ref 32–36)
MCV RBC AUTO: 92.8 FL — SIGNIFICANT CHANGE UP (ref 80–100)
MONOCYTES # BLD AUTO: 0.99 K/UL — HIGH (ref 0–0.9)
MONOCYTES NFR BLD AUTO: 9.1 % — SIGNIFICANT CHANGE UP (ref 2–14)
NEUTROPHILS # BLD AUTO: 6.97 K/UL — SIGNIFICANT CHANGE UP (ref 1.8–7.4)
NEUTROPHILS NFR BLD AUTO: 63.8 % — SIGNIFICANT CHANGE UP (ref 43–77)
PLATELET # BLD AUTO: 500 K/UL — HIGH (ref 150–400)
POTASSIUM SERPL-MCNC: 3.8 MMOL/L — SIGNIFICANT CHANGE UP (ref 3.5–5.3)
POTASSIUM SERPL-SCNC: 3.8 MMOL/L — SIGNIFICANT CHANGE UP (ref 3.5–5.3)
PROT SERPL-MCNC: 6.2 G/DL — LOW (ref 6.6–8.7)
RBC # BLD: 3.46 M/UL — LOW (ref 4.2–5.8)
RBC # FLD: 15.8 % — HIGH (ref 10.3–14.5)
SODIUM SERPL-SCNC: 138 MMOL/L — SIGNIFICANT CHANGE UP (ref 135–145)
WBC # BLD: 10.92 K/UL — HIGH (ref 3.8–10.5)
WBC # FLD AUTO: 10.92 K/UL — HIGH (ref 3.8–10.5)

## 2023-10-11 PROCEDURE — 99232 SBSQ HOSP IP/OBS MODERATE 35: CPT | Mod: GC

## 2023-10-11 PROCEDURE — 70450 CT HEAD/BRAIN W/O DYE: CPT | Mod: 26

## 2023-10-11 RX ORDER — APIXABAN 2.5 MG/1
5 TABLET, FILM COATED ORAL
Refills: 0 | Status: DISCONTINUED | OUTPATIENT
Start: 2023-10-11 | End: 2023-10-18

## 2023-10-11 RX ADMIN — APIXABAN 5 MILLIGRAM(S): 2.5 TABLET, FILM COATED ORAL at 17:45

## 2023-10-11 RX ADMIN — Medication 1 TABLET(S): at 12:44

## 2023-10-11 RX ADMIN — POLYETHYLENE GLYCOL 3350 17 GRAM(S): 17 POWDER, FOR SOLUTION ORAL at 12:44

## 2023-10-11 RX ADMIN — HEPARIN SODIUM 1000 UNIT(S)/HR: 5000 INJECTION INTRAVENOUS; SUBCUTANEOUS at 06:05

## 2023-10-11 RX ADMIN — Medication 400 MILLIGRAM(S): at 22:51

## 2023-10-11 RX ADMIN — Medication 1000 MILLIGRAM(S): at 23:21

## 2023-10-11 RX ADMIN — SENNA PLUS 1 TABLET(S): 8.6 TABLET ORAL at 12:44

## 2023-10-11 RX ADMIN — HEPARIN SODIUM 1000 UNIT(S)/HR: 5000 INJECTION INTRAVENOUS; SUBCUTANEOUS at 06:04

## 2023-10-11 RX ADMIN — ATORVASTATIN CALCIUM 80 MILLIGRAM(S): 80 TABLET, FILM COATED ORAL at 22:51

## 2023-10-11 RX ADMIN — SODIUM CHLORIDE 40 MILLILITER(S): 9 INJECTION INTRAMUSCULAR; INTRAVENOUS; SUBCUTANEOUS at 22:50

## 2023-10-11 RX ADMIN — HEPARIN SODIUM 1000 UNIT(S)/HR: 5000 INJECTION INTRAVENOUS; SUBCUTANEOUS at 07:31

## 2023-10-11 RX ADMIN — HEPARIN SODIUM 1000 UNIT(S)/HR: 5000 INJECTION INTRAVENOUS; SUBCUTANEOUS at 15:00

## 2023-10-11 RX ADMIN — Medication 500 MILLIGRAM(S): at 12:44

## 2023-10-11 NOTE — PROGRESS NOTE ADULT - ASSESSMENT
ASSESSMENT: 63M male with PMHx of T2DM, HTN who presented after a fall with right sided weakness, dysarthria and right facial droop. Patient was found to have LM1 occlusion s/p tenecteplase in ED and had mechanical thrombectomy done with TICI 2c. Decompressive craniectomy for midline shift was done on 8/30/2023, SHUN drain was removed on 8/31, and left cranioplasty on 9/29/2023. SAMARA was negative for thrombus. SHUN drain x2 removed. Heparin gtt started, CT head stable compared to prior imaging. Planning to begin long-term anticoagulation.    LM1 Occlusion, Midline Shift  - SAMARA negative for PFO, atrial thrombus  - No significant occlusive disease on CT neck  - Decompressive Craniectomy on 8/30  - Mechanical thrombectomy with TICI 2c recanalization  - Left cranioplasty on 9/29, SHUN drains removed   - Atorvastatin 80 mg nightly  - Lupus anticoagulant positive, normal ACL and B2G antibodies with repeat testing negative  - Antiphospholipid syndrome unlikely given negative repeat testing  - Repeat Lupus anticoagulant testing in 12 weeks per hematology  - ILR placement outpatient per cardiology  - Heparin gtt started  - .4 so repeat CT head performed  - CT head stable  - PTT goal 50-70  - CT head 6 hours after achieving therapeutic PTT goal -------> Pending  - If CT head stable, can begin long term oral anticoagulation ----------> Pending  - NSG following, recs noted  - Maintain sutures, suture removal eval ----------> Pending    Hypokalemia  - Resolved  - Monitor BMP    Leukocytosis  -Resolved  -Continue to monitor    Klebsiella Bacteremia, UTI  - Resolved  - s/p Ceftriaxone, Zosyn, Vancomycin, Cefazolin    Anemia  - H/H stable, likely post-op  - Continue to monitor    HTN  - Maintain normotensive per neurosurgery  - Hydralazine 10 mg IV PRN  - Labetalol 10 mg IV PRN  - Midodrine PRN     Urinary Retention  - Resolved, no signs of urinary retention  - Monitor     T2DM  - A1c 6.8    VTE ppx  - Lovenox 40mg SQ    Dispo: Acute, to [facility] when ready for discharge

## 2023-10-11 NOTE — PROGRESS NOTE ADULT - ATTENDING COMMENTS
64 y/o M with PMH of DM, HTN who presented after a fall with right sided weakness, dysarthria and right facial droop. Patient found to have LM1 occlusion and midline shift. CTH has been stable. He was started on Heparin gtt yesterday. INR was supratherapuetic. CTH today also stable. Okay for oral anticoagulation per NSG. Will start Eliquis per Heme/Onc recs. Due for staple removal on 10/14.

## 2023-10-11 NOTE — PROGRESS NOTE ADULT - ASSESSMENT
64 y/o M s/p stroke, TNK, thrombectomy, decompressive craniectomy, now s/p cranioplasty. Afebrile. VSS. H&H stable. APTT 115.1.     Plan:  -q4 neuro checks   -Repeat CT head today  -AC per primary team  -Maintain sutures- absorbable chromic gut in place   -Continue present care  64 y/o M s/p stroke, TNK, thrombectomy, decompressive craniectomy, now s/p cranioplasty. Afebrile. VSS. H&H stable. APTT 115.1.     Plan:  -q4 neuro checks   -Repeat CT head today  -AC per primary team  -Maintain sutures- absorbable chromic gut in place   -Continue present care   - CT head reviewed, no new hemorrhage, okay to transition to oral AC   - NSG signing off please reconsult as needed

## 2023-10-11 NOTE — PROGRESS NOTE ADULT - SUBJECTIVE AND OBJECTIVE BOX
Patient is a 63y old  Male who presents with a chief complaint of LT M1 Occlusion (11 Oct 2023 10:21)      INTERVAL HPI/OVERNIGHT EVENTS: Patient was started on heparin drip, PTT of 156.4 so heparin was held for 1 hour and reduced rate. STAT CT head was obtained.     MEDICATIONS  (STANDING):  ascorbic acid 500 milliGRAM(s) Oral daily  atorvastatin 80 milliGRAM(s) Oral at bedtime  heparin  Infusion.  Unit(s)/Hr (15 mL/Hr) IV Continuous <Continuous>  multivitamin 1 Tablet(s) Oral daily  polyethylene glycol 3350 17 Gram(s) Oral daily  senna 1 Tablet(s) Oral every 24 hours  sodium chloride 0.9%. 1000 milliLiter(s) (40 mL/Hr) IV Continuous <Continuous>    MEDICATIONS  (PRN):  acetaminophen   IVPB .. 1000 milliGRAM(s) IV Intermittent every 6 hours PRN Severe Pain (7 - 10)  hydrALAZINE Injectable 10 milliGRAM(s) IV Push every 2 hours   labetalol Injectable 10 milliGRAM(s) IV Push every 2 hours PRN Systolic blood pressure >160  midodrine 5 milliGRAM(s) Oral every 8 hours PRN for sbp <100      Allergies: No Known Allergies        REVIEW OF SYSTEMS: unable to be assessed due to clinical condition      Vital Signs Last 24 Hrs  T(C): 37.1 (11 Oct 2023 08:27), Max: 37.2 (10 Oct 2023 22:43)  T(F): 98.7 (11 Oct 2023 08:27), Max: 99 (10 Oct 2023 22:43)  HR: 93 (11 Oct 2023 08:27) (84 - 102)  BP: 119/88 (11 Oct 2023 08:27) (112/83 - 136/91)  RR: 16 (11 Oct 2023 05:10) (16 - 18)  SpO2: 98% (11 Oct 2023 08:27) (96% - 98%)    Parameters below as of 11 Oct 2023 08:27  Patient On (Oxygen Delivery Method): room air        PHYSICAL EXAM:  GENERAL: lying comfortably in bed  HEAD:  Atraumatic, Normocephalic  EYES: EOMI, PERRLA, conjunctiva and sclera clear  NECK: Supple, No JVD  NERVOUS SYSTEM: alert, awakens to touch, + expressive aphasia, follows commands, mild right facial droop, 5/5 strength in LUE and LLE, 0/5 strength in RUE and RLE  CHEST/LUNG: Clear to auscultation bilaterally  HEART: Regular rate and rhythm; No murmurs  ABDOMEN: Soft, Nontender; Bowel sounds present  GENITOURINARY: + [place] catheter in place, clean, dry, intact  EXTREMITIES:  No cyanosis, or edema  SKIN: No rashes, + sutures on scalp clean, dry, intact      LABS:                        10.4   10.92 )-----------( 500      ( 11 Oct 2023 04:39 )             32.1     10-11    138  |  101  |  9.0  ----------------------------<  110<H>  3.8   |  27.0  |  0.54    Ca    9.0      11 Oct 2023 04:39    TPro  6.2<L>  /  Alb  3.3  /  TBili  0.4  /  DBili  x   /  AST  39  /  ALT  59<H>  /  AlkPhos  132<H>  10-11    PTT - ( 11 Oct 2023 04:39 )  PTT:115.1 sec  Urinalysis Basic - ( 11 Oct 2023 04:39 )    Color: x / Appearance: x / SG: x / pH: x  Gluc: 110 mg/dL / Ketone: x  / Bili: x / Urobili: x   Blood: x / Protein: x / Nitrite: x   Leuk Esterase: x / RBC: x / WBC x   Sq Epi: x / Non Sq Epi: x / Bacteria: x      CAPILLARY BLOOD GLUCOSE          RADIOLOGY & ADDITIONAL TESTS:    CT Head No Cont (10.11.23 @ 09:28) >  1.  Postoperative changes related to a prior left-sided craniotomy with   cranioplasty, grossly stable.  2.  Mixed density subdural hematoma along the left cerebral convexity,   relatively stable.  3.  Improvement of midline shift to the right now measuring approximately   2 mm.  4.  Left MCA territory infarction, grossly stable when compared to prior   imaging.        Imaging Personally Reviewed:  [ ] YES  [ ] NO    Consultant(s) Notes Reviewed:  [ ] YES  [ ] NO    Care Discussed with Consultants/Other Providers [ ] YES  [ ] NO    Plan of Care discussed with Housestaff [ ]YES [ ] NO

## 2023-10-11 NOTE — PROGRESS NOTE ADULT - THIS PATIENT HAS THE FOLLOWING CONDITION(S)/DIAGNOSES ON THIS ADMISSION:
None
Brain Compression / Herniation
None
Shock/Encephalopathy/Cerebral Edema/Brain Compression / Herniation
Encephalopathy
Encephalopathy
None
Cerebral Edema
None
Brain Compression / Herniation
None
Shock
stroke
Shock/Encephalopathy/Cerebral Edema/Brain Compression / Herniation
Shock/Encephalopathy/Cerebral Edema/Brain Compression / Herniation

## 2023-10-11 NOTE — PROGRESS NOTE ADULT - SUBJECTIVE AND OBJECTIVE BOX
HPI:  Patient is a 63 year old male with PMH HTN, DM presented to [facility] 8/27 with right hemiparesis and aphasia, found with LM1 occlusion s/p IV Tenecteplase 12:15PM 8/27/23 and mechanical thrombectomy 8/27/23 with TICI 2C reperfusion, evolving stroke causing mass effect subsequently required left hemicraniectomy 8/30/23 with Dr. Bah.     INTERVAL HPI/OVERNIGHT EVENTS: Patient seen and examined at bedside. Chromic gut sutures in place. Pt therapeutic on heparin gtt, repeat CT head today.     Vital Signs Last 24 Hrs  T(C): 37.1 (11 Oct 2023 08:27), Max: 37.2 (10 Oct 2023 22:43)  T(F): 98.7 (11 Oct 2023 08:27), Max: 99 (10 Oct 2023 22:43)  HR: 93 (11 Oct 2023 08:27) (84 - 102)  BP: 119/88 (11 Oct 2023 08:27) (112/83 - 136/91)  BP(mean): --  RR: 16 (11 Oct 2023 05:10) (16 - 18)  SpO2: 98% (11 Oct 2023 08:27) (96% - 98%)    Parameters below as of 11 Oct 2023 08:27  Patient On (Oxygen Delivery Method): room air    PHYSICAL EXAM:  GENERAL: NAD  HEAD:  sutures in place and intact, wound C/D/I  WOUND: Dressing clean dry intact  MENTAL STATUS: Awake, Opens eyes spontaneously, expressive aphasia/ word salad  MOTOR: LUE/ LLE antigravity, right side hemiplegia  SENSATION: intact to noxious throughout     LABS:                        10.4   10.92 )-----------( 500      ( 11 Oct 2023 04:39 )             32.1     10-11    138  |  101  |  9.0  ----------------------------<  110<H>  3.8   |  27.0  |  0.54    Ca    9.0      11 Oct 2023 04:39    TPro  6.2<L>  /  Alb  3.3  /  TBili  0.4  /  DBili  x   /  AST  39  /  ALT  59<H>  /  AlkPhos  132<H>  10-11    PTT - ( 11 Oct 2023 04:39 )  PTT:115.1 sec

## 2023-10-12 LAB
ALBUMIN SERPL ELPH-MCNC: 3.5 G/DL — SIGNIFICANT CHANGE UP (ref 3.3–5.2)
ALP SERPL-CCNC: 136 U/L — HIGH (ref 40–120)
ALT FLD-CCNC: 58 U/L — HIGH
ANION GAP SERPL CALC-SCNC: 12 MMOL/L — SIGNIFICANT CHANGE UP (ref 5–17)
AST SERPL-CCNC: 32 U/L — SIGNIFICANT CHANGE UP
BASOPHILS # BLD AUTO: 0.06 K/UL — SIGNIFICANT CHANGE UP (ref 0–0.2)
BASOPHILS NFR BLD AUTO: 0.6 % — SIGNIFICANT CHANGE UP (ref 0–2)
BILIRUB SERPL-MCNC: 0.3 MG/DL — LOW (ref 0.4–2)
BUN SERPL-MCNC: 16.3 MG/DL — SIGNIFICANT CHANGE UP (ref 8–20)
CALCIUM SERPL-MCNC: 9.3 MG/DL — SIGNIFICANT CHANGE UP (ref 8.4–10.5)
CHLORIDE SERPL-SCNC: 101 MMOL/L — SIGNIFICANT CHANGE UP (ref 96–108)
CO2 SERPL-SCNC: 26 MMOL/L — SIGNIFICANT CHANGE UP (ref 22–29)
CREAT SERPL-MCNC: 0.64 MG/DL — SIGNIFICANT CHANGE UP (ref 0.5–1.3)
EGFR: 107 ML/MIN/1.73M2 — SIGNIFICANT CHANGE UP
EOSINOPHIL # BLD AUTO: 0.27 K/UL — SIGNIFICANT CHANGE UP (ref 0–0.5)
EOSINOPHIL NFR BLD AUTO: 2.9 % — SIGNIFICANT CHANGE UP (ref 0–6)
GLUCOSE SERPL-MCNC: 108 MG/DL — HIGH (ref 70–99)
HCT VFR BLD CALC: 31.4 % — LOW (ref 39–50)
HGB BLD-MCNC: 10 G/DL — LOW (ref 13–17)
IMM GRANULOCYTES NFR BLD AUTO: 0.7 % — SIGNIFICANT CHANGE UP (ref 0–0.9)
LYMPHOCYTES # BLD AUTO: 1.63 K/UL — SIGNIFICANT CHANGE UP (ref 1–3.3)
LYMPHOCYTES # BLD AUTO: 17.3 % — SIGNIFICANT CHANGE UP (ref 13–44)
MCHC RBC-ENTMCNC: 30.1 PG — SIGNIFICANT CHANGE UP (ref 27–34)
MCHC RBC-ENTMCNC: 31.8 GM/DL — LOW (ref 32–36)
MCV RBC AUTO: 94.6 FL — SIGNIFICANT CHANGE UP (ref 80–100)
MONOCYTES # BLD AUTO: 0.92 K/UL — HIGH (ref 0–0.9)
MONOCYTES NFR BLD AUTO: 9.8 % — SIGNIFICANT CHANGE UP (ref 2–14)
NEUTROPHILS # BLD AUTO: 6.45 K/UL — SIGNIFICANT CHANGE UP (ref 1.8–7.4)
NEUTROPHILS NFR BLD AUTO: 68.7 % — SIGNIFICANT CHANGE UP (ref 43–77)
PLATELET # BLD AUTO: 470 K/UL — HIGH (ref 150–400)
POTASSIUM SERPL-MCNC: 3.9 MMOL/L — SIGNIFICANT CHANGE UP (ref 3.5–5.3)
POTASSIUM SERPL-SCNC: 3.9 MMOL/L — SIGNIFICANT CHANGE UP (ref 3.5–5.3)
PROT SERPL-MCNC: 6.2 G/DL — LOW (ref 6.6–8.7)
RBC # BLD: 3.32 M/UL — LOW (ref 4.2–5.8)
RBC # FLD: 15.9 % — HIGH (ref 10.3–14.5)
SODIUM SERPL-SCNC: 139 MMOL/L — SIGNIFICANT CHANGE UP (ref 135–145)
WBC # BLD: 9.4 K/UL — SIGNIFICANT CHANGE UP (ref 3.8–10.5)
WBC # FLD AUTO: 9.4 K/UL — SIGNIFICANT CHANGE UP (ref 3.8–10.5)

## 2023-10-12 PROCEDURE — 99232 SBSQ HOSP IP/OBS MODERATE 35: CPT

## 2023-10-12 RX ADMIN — SODIUM CHLORIDE 40 MILLILITER(S): 9 INJECTION INTRAMUSCULAR; INTRAVENOUS; SUBCUTANEOUS at 22:51

## 2023-10-12 RX ADMIN — APIXABAN 5 MILLIGRAM(S): 2.5 TABLET, FILM COATED ORAL at 05:33

## 2023-10-12 RX ADMIN — ATORVASTATIN CALCIUM 80 MILLIGRAM(S): 80 TABLET, FILM COATED ORAL at 22:51

## 2023-10-12 RX ADMIN — APIXABAN 5 MILLIGRAM(S): 2.5 TABLET, FILM COATED ORAL at 17:22

## 2023-10-12 NOTE — PROGRESS NOTE ADULT - ASSESSMENT
63M male with PMHx of T2DM, HTN who presented after a fall with right sided weakness, dysarthria and right facial droop. Patient was found to have LM1 occlusion s/p tenecteplase in ED and had mechanical thrombectomy done with TICI 2c. Decompressive craniectomy for midline shift was done on 8/30/2023, SHUN drain was removed on 8/31, and left cranioplasty on 9/29/2023. SAMARA was negative for thrombus. SHUN drain x2 removed. Heparin gtt started, CT head stable compared to prior imaging. Planning to begin long-term anticoagulation.    LM1 Occlusion, Midline Shift  - SAMARA negative for PFO, atrial thrombus  - No significant occlusive disease on CT neck  - Decompressive Craniectomy on 8/30  - Mechanical thrombectomy with TICI 2c recanalization  - Left cranioplasty on 9/29, SHUN drains removed   - Atorvastatin 80 mg nightly  - Lupus anticoagulant positive, normal ACL and B2G antibodies with repeat testing negative  - Antiphospholipid syndrome unlikely given negative repeat testing  - Repeat Lupus anticoagulant testing in 12 weeks per hematology  - ILR placement outpatient per cardiology  - s/p Heparin drip  - CT head stable  - NSG recs appreciated  - Continue Eliquis   - Maintain sutures, NSG f/u regarding removal    Hypokalemia  - Resolved  - Monitor BMP    Leukocytosis  -Resolved  -Continue to monitor    Klebsiella Bacteremia, UTI  - Resolved  - s/p Ceftriaxone, Zosyn, Vancomycin, Cefazolin    Anemia  - H/H stable, likely post-op  - Continue to monitor    HTN  - Maintain normotensive per neurosurgery  - Hydralazine 10 mg IV PRN  - Labetalol 10 mg IV PRN  - Midodrine PRN     Urinary Retention  - Resolved, no signs of urinary retention  - Monitor     T2DM  - A1c 6.8    DVT ppx  - Lovenox 40mg SQ    Dispo: DC to [facility] when auth obtained 63M male with PMHx of T2DM, HTN who presented after a fall with right sided weakness, dysarthria and right facial droop. Patient was found to have LM1 occlusion s/p tenecteplase in ED and had mechanical thrombectomy done with TICI 2c. Decompressive craniectomy for midline shift was done on 8/30/2023, SHUN drain was removed on 8/31, and left cranioplasty on 9/29/2023. SAMARA was negative for thrombus. SHUN drain x2 removed. Heparin gtt started, CT head stable compared to prior imaging. Planning to begin long-term anticoagulation.    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 Occlusion, Midline Shift  - SAMARA negative for PFO, atrial thrombus  - No significant occlusive disease on CT neck  - Decompressive Craniectomy on 8/30  - Mechanical thrombectomy with TICI 2c recanalization  - Left cranioplasty on 9/29, SHUN drains removed   - Atorvastatin 80 mg nightly  - Lupus anticoagulant positive, normal ACL and B2G antibodies with repeat testing negative  - Antiphospholipid syndrome unlikely given negative repeat testing  - Repeat Lupus anticoagulant testing in 12 weeks per hematology  - ILR placement outpatient per cardiology  - s/p Heparin drip  - CT head stable  - NSG recs appreciated  - Continue Eliquis   - Maintain sutures, Bacitracin daily    Hypokalemia  - Resolved  - Monitor BMP    Leukocytosis  -Resolved  -Continue to monitor    Klebsiella Bacteremia, UTI  - Resolved  - s/p Ceftriaxone, Zosyn, Vancomycin, Cefazolin    Anemia  - H/H stable, likely post-op  - Continue to monitor    HTN  - Maintain normotensive per neurosurgery  - Hydralazine 10 mg IV PRN  - Labetalol 10 mg IV PRN  - Midodrine PRN     Urinary Retention  - Resolved, no signs of urinary retention  - Monitor     T2DM  - A1c 6.8    DVT ppx  - Lovenox 40mg SQ    Dispo: DC to [facility] when auth obtained

## 2023-10-12 NOTE — PROGRESS NOTE ADULT - SUBJECTIVE AND OBJECTIVE BOX
Chief complaint: M1 occlusion    Patient seen and examined at bedside. No acute overnight events reported. Unable to provide significant ROS.     Vital Signs Last 24 Hrs  T(F): 98 (12 Oct 2023 08:00), Max: 98.9 (11 Oct 2023 21:48)  HR: 97 (12 Oct 2023 08:00) (92 - 110)  BP: 117/79 (12 Oct 2023 08:00) (101/71 - 121/81)  RR: 18 (12 Oct 2023 08:00) (18 - 18)  SpO2: 95% (11 Oct 2023 17:33) (95% - 95%)    Physical Exam:  Constitutional: alert and oriented, in no acute distress   Neck: Soft and supple  Respiratory: Good air entry b/l  Cardiovascular: Regular rate and rhythm  Gastrointestinal: Soft  Vascular: 2+ peripheral pulses  Neurological: Right side hemiplegia    Labs:                        10.0   9.40  )-----------( 470      ( 12 Oct 2023 05:39 )             31.4   10-12    139  |  101  |  16.3  ----------------------------<  108<H>  3.9   |  26.0  |  0.64    Ca    9.3      12 Oct 2023 05:39    TPro  6.2<L>  /  Alb  3.5  /  TBili  0.3<L>  /  DBili  x   /  AST  32  /  ALT  58<H>  /  AlkPhos  136<H>  10-12

## 2023-10-13 LAB
ANION GAP SERPL CALC-SCNC: 14 MMOL/L — SIGNIFICANT CHANGE UP (ref 5–17)
BASOPHILS # BLD AUTO: 0.06 K/UL — SIGNIFICANT CHANGE UP (ref 0–0.2)
BASOPHILS NFR BLD AUTO: 0.6 % — SIGNIFICANT CHANGE UP (ref 0–2)
BUN SERPL-MCNC: 9.7 MG/DL — SIGNIFICANT CHANGE UP (ref 8–20)
CALCIUM SERPL-MCNC: 9.4 MG/DL — SIGNIFICANT CHANGE UP (ref 8.4–10.5)
CHLORIDE SERPL-SCNC: 100 MMOL/L — SIGNIFICANT CHANGE UP (ref 96–108)
CO2 SERPL-SCNC: 24 MMOL/L — SIGNIFICANT CHANGE UP (ref 22–29)
CREAT SERPL-MCNC: 0.6 MG/DL — SIGNIFICANT CHANGE UP (ref 0.5–1.3)
EGFR: 109 ML/MIN/1.73M2 — SIGNIFICANT CHANGE UP
EOSINOPHIL # BLD AUTO: 0.31 K/UL — SIGNIFICANT CHANGE UP (ref 0–0.5)
EOSINOPHIL NFR BLD AUTO: 3 % — SIGNIFICANT CHANGE UP (ref 0–6)
GLUCOSE SERPL-MCNC: 123 MG/DL — HIGH (ref 70–99)
HCT VFR BLD CALC: 33.4 % — LOW (ref 39–50)
HGB BLD-MCNC: 10.5 G/DL — LOW (ref 13–17)
IMM GRANULOCYTES NFR BLD AUTO: 0.7 % — SIGNIFICANT CHANGE UP (ref 0–0.9)
LYMPHOCYTES # BLD AUTO: 2.24 K/UL — SIGNIFICANT CHANGE UP (ref 1–3.3)
LYMPHOCYTES # BLD AUTO: 21.9 % — SIGNIFICANT CHANGE UP (ref 13–44)
MCHC RBC-ENTMCNC: 29.8 PG — SIGNIFICANT CHANGE UP (ref 27–34)
MCHC RBC-ENTMCNC: 31.4 GM/DL — LOW (ref 32–36)
MCV RBC AUTO: 94.9 FL — SIGNIFICANT CHANGE UP (ref 80–100)
MONOCYTES # BLD AUTO: 0.78 K/UL — SIGNIFICANT CHANGE UP (ref 0–0.9)
MONOCYTES NFR BLD AUTO: 7.6 % — SIGNIFICANT CHANGE UP (ref 2–14)
NEUTROPHILS # BLD AUTO: 6.75 K/UL — SIGNIFICANT CHANGE UP (ref 1.8–7.4)
NEUTROPHILS NFR BLD AUTO: 66.2 % — SIGNIFICANT CHANGE UP (ref 43–77)
PLATELET # BLD AUTO: 367 K/UL — SIGNIFICANT CHANGE UP (ref 150–400)
POTASSIUM SERPL-MCNC: 3.7 MMOL/L — SIGNIFICANT CHANGE UP (ref 3.5–5.3)
POTASSIUM SERPL-SCNC: 3.7 MMOL/L — SIGNIFICANT CHANGE UP (ref 3.5–5.3)
RBC # BLD: 3.52 M/UL — LOW (ref 4.2–5.8)
RBC # FLD: 16 % — HIGH (ref 10.3–14.5)
SODIUM SERPL-SCNC: 138 MMOL/L — SIGNIFICANT CHANGE UP (ref 135–145)
WBC # BLD: 10.21 K/UL — SIGNIFICANT CHANGE UP (ref 3.8–10.5)
WBC # FLD AUTO: 10.21 K/UL — SIGNIFICANT CHANGE UP (ref 3.8–10.5)

## 2023-10-13 PROCEDURE — 99232 SBSQ HOSP IP/OBS MODERATE 35: CPT

## 2023-10-13 RX ADMIN — Medication 500 MILLIGRAM(S): at 12:24

## 2023-10-13 RX ADMIN — Medication 1 TABLET(S): at 12:24

## 2023-10-13 RX ADMIN — POLYETHYLENE GLYCOL 3350 17 GRAM(S): 17 POWDER, FOR SOLUTION ORAL at 12:25

## 2023-10-13 RX ADMIN — APIXABAN 5 MILLIGRAM(S): 2.5 TABLET, FILM COATED ORAL at 05:00

## 2023-10-13 RX ADMIN — SENNA PLUS 1 TABLET(S): 8.6 TABLET ORAL at 12:24

## 2023-10-13 RX ADMIN — ATORVASTATIN CALCIUM 80 MILLIGRAM(S): 80 TABLET, FILM COATED ORAL at 21:08

## 2023-10-13 RX ADMIN — APIXABAN 5 MILLIGRAM(S): 2.5 TABLET, FILM COATED ORAL at 17:49

## 2023-10-13 NOTE — PROGRESS NOTE ADULT - SUBJECTIVE AND OBJECTIVE BOX
Patient is a 63y old  Male who presents with a chief complaint of LT M1 Occlusion (12 Oct 2023 09:36)      INTERVAL HPI/OVERNIGHT EVENTS: No acute events overnight.     MEDICATIONS  (STANDING):  apixaban 5 milliGRAM(s) Oral two times a day  ascorbic acid 500 milliGRAM(s) Oral daily  atorvastatin 80 milliGRAM(s) Oral at bedtime  multivitamin 1 Tablet(s) Oral daily  polyethylene glycol 3350 17 Gram(s) Oral daily  senna 1 Tablet(s) Oral every 24 hours  sodium chloride 0.9%. 1000 milliLiter(s) (40 mL/Hr) IV Continuous <Continuous>    MEDICATIONS  (PRN):  acetaminophen   IVPB .. 1000 milliGRAM(s) IV Intermittent every 6 hours PRN Severe Pain (7 - 10)  hydrALAZINE Injectable 10 milliGRAM(s) IV Push every 2 hours   labetalol Injectable 10 milliGRAM(s) IV Push every 2 hours PRN Systolic blood pressure >160  midodrine 5 milliGRAM(s) Oral every 8 hours PRN for sbp <100      Allergies: No Known Allergies        REVIEW OF SYSTEMS: unable to be obtained due to clinical condition      Vital Signs Last 24 Hrs  T(C): 36.9 (13 Oct 2023 08:27), Max: 37.2 (12 Oct 2023 17:05)  T(F): 98.5 (13 Oct 2023 08:27), Max: 99 (12 Oct 2023 17:05)  HR: 96 (13 Oct 2023 08:27) (93 - 102)  BP: 117/75 (13 Oct 2023 08:27) (106/70 - 120/85)  BP(mean): 82 (13 Oct 2023 04:54) (82 - 82)  RR: 18 (13 Oct 2023 08:27) (18 - 18)  SpO2: 96% (13 Oct 2023 08:27) (94% - 97%)    Parameters below as of 13 Oct 2023 08:27  Patient On (Oxygen Delivery Method): room air      PHYSICAL EXAM:  GENERAL: lying comfortably in bed  HEAD:  Atraumatic, Normocephalic  EYES: EOMI, PERRLA, conjunctiva and sclera clear  NECK: Supple, No JVD  NERVOUS SYSTEM: alert, awake, + expressive aphasia, follows commands, mild right facial droop, 5/5 strength in LUE and LLE, + R hemiplegia  CHEST/LUNG: Clear to auscultation bilaterally, no wheezes  HEART: Regular rate and rhythm; No murmurs  ABDOMEN: Soft, Nontender; Bowel sounds present  GENITOURINARY: + [place] catheter in place, clean, dry, intact  EXTREMITIES:  No cyanosis, or edema  SKIN: No rashes, + sutures on scalp clean, dry, intact    LABS:                        10.5   10.21 )-----------( 367      ( 13 Oct 2023 05:48 )             33.4     10-13    138  |  100  |  9.7  ----------------------------<  123<H>  3.7   |  24.0  |  0.60    Ca    9.4      13 Oct 2023 05:48    TPro  6.2<L>  /  Alb  3.5  /  TBili  0.3<L>  /  DBili  x   /  AST  32  /  ALT  58<H>  /  AlkPhos  136<H>  10-12    PTT - ( 11 Oct 2023 22:20 )  PTT:34.2 sec  Urinalysis Basic - ( 13 Oct 2023 05:48 )    Color: x / Appearance: x / SG: x / pH: x  Gluc: 123 mg/dL / Ketone: x  / Bili: x / Urobili: x   Blood: x / Protein: x / Nitrite: x   Leuk Esterase: x / RBC: x / WBC x   Sq Epi: x / Non Sq Epi: x / Bacteria: x          RADIOLOGY & ADDITIONAL TESTS:    CT Head No Cont (10.11.23 @ 09:28) >  1.  Postoperative changes related to a prior left-sided craniotomy with   cranioplasty, grossly stable.  2.  Mixed density subdural hematoma along the left cerebral convexity,   relatively stable.  3.  Improvement of midline shift to the right now measuring approximately   2 mm.  4.  Left MCA territory infarction, grossly stable when compared to prior   imaging.

## 2023-10-13 NOTE — PROGRESS NOTE ADULT - ATTENDING COMMENTS
62 y/o M with PMH of DM, HTN who presented after a fall with right sided weakness, dysarthria and right facial droop. Patient found to have LM1 occlusion and midline shift. CTH has been stable. Currently on Eliquis. Per NSG, staples will resorb on their own. Pending insurance auth for [facility] placement.

## 2023-10-13 NOTE — PROGRESS NOTE ADULT - ASSESSMENT
63M male with PMHx of T2DM, HTN who presented after a fall with right sided weakness, dysarthria and right facial droop. Patient was found to have LM1 occlusion s/p tenecteplase in ED and had mechanical thrombectomy done with TICI 2c. Decompressive craniectomy for midline shift was done on 8/30/2023, SHUN drain was removed on 8/31, and left cranioplasty on 9/29/2023. SAMARA was negative for thrombus. SHUN drain x2 removed. Heparin gtt started, CT head stable compared to prior imaging. On Eliquis for long term anticoagulation.     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 Occlusion, Midline Shift  - SAMARA negative for PFO, atrial thrombus  - No significant occlusive disease on CT neck  - Decompressive Craniectomy on 8/30  - Mechanical thrombectomy with TICI 2c recanalization  - Left cranioplasty on 9/29, SHUN drains removed   - Atorvastatin 80 mg nightly  - Lupus anticoagulant positive, normal ACL and B2G antibodies with repeat testing negative  - Antiphospholipid syndrome unlikely given negative repeat testing  - Repeat Lupus anticoagulant testing in 12 weeks per hematology  - ILR placement outpatient per cardiology  - s/p Heparin drip  - CT head stable  - NSG recs appreciated  - Continue Eliquis   - Sutures will resorb, Bacitracin daily    Hypokalemia  - Resolved  - Monitor BMP    Leukocytosis  -Resolved  -Continue to monitor    Klebsiella Bacteremia, UTI  - Resolved  - s/p Ceftriaxone, Zosyn, Vancomycin, Cefazolin    Anemia  - H/H stable  - Continue to monitor    HTN  - Maintain normotensive per neurosurgery  - Hydralazine 10 mg IV PRN  - Labetalol 10 mg IV PRN  - Midodrine PRN     Urinary Retention  - Resolved, no signs of urinary retention  - Monitor     T2DM  - A1c 6.8    DVT ppx  - Eliquis 5 mg BID    Dispo: DC to [facility] when auth obtained, in process

## 2023-10-14 LAB
HCT VFR BLD CALC: 32.6 % — LOW (ref 39–50)
HGB BLD-MCNC: 10.5 G/DL — LOW (ref 13–17)
MCHC RBC-ENTMCNC: 30.3 PG — SIGNIFICANT CHANGE UP (ref 27–34)
MCHC RBC-ENTMCNC: 32.2 GM/DL — SIGNIFICANT CHANGE UP (ref 32–36)
MCV RBC AUTO: 94.2 FL — SIGNIFICANT CHANGE UP (ref 80–100)
PLATELET # BLD AUTO: 375 K/UL — SIGNIFICANT CHANGE UP (ref 150–400)
RBC # BLD: 3.46 M/UL — LOW (ref 4.2–5.8)
RBC # FLD: 16.3 % — HIGH (ref 10.3–14.5)
WBC # BLD: 11.66 K/UL — HIGH (ref 3.8–10.5)
WBC # FLD AUTO: 11.66 K/UL — HIGH (ref 3.8–10.5)

## 2023-10-14 PROCEDURE — 99232 SBSQ HOSP IP/OBS MODERATE 35: CPT

## 2023-10-14 RX ADMIN — ATORVASTATIN CALCIUM 80 MILLIGRAM(S): 80 TABLET, FILM COATED ORAL at 21:38

## 2023-10-14 RX ADMIN — APIXABAN 5 MILLIGRAM(S): 2.5 TABLET, FILM COATED ORAL at 17:50

## 2023-10-14 RX ADMIN — Medication 1 TABLET(S): at 11:57

## 2023-10-14 RX ADMIN — Medication 500 MILLIGRAM(S): at 11:58

## 2023-10-14 RX ADMIN — POLYETHYLENE GLYCOL 3350 17 GRAM(S): 17 POWDER, FOR SOLUTION ORAL at 11:58

## 2023-10-14 RX ADMIN — APIXABAN 5 MILLIGRAM(S): 2.5 TABLET, FILM COATED ORAL at 05:26

## 2023-10-14 RX ADMIN — SODIUM CHLORIDE 40 MILLILITER(S): 9 INJECTION INTRAMUSCULAR; INTRAVENOUS; SUBCUTANEOUS at 05:26

## 2023-10-14 RX ADMIN — SENNA PLUS 1 TABLET(S): 8.6 TABLET ORAL at 11:57

## 2023-10-14 NOTE — PROGRESS NOTE ADULT - SUBJECTIVE AND OBJECTIVE BOX
[facility] Division of Hospital Medicine    SUBJECTIVE / OVERNIGHT EVENTS:  No events    Patient denies chest pain, SOB, abd pain, N/V, fever, chills, dysuria or any other complaints. All remainder ROS negative.     MEDICATIONS  (STANDING):  apixaban 5 milliGRAM(s) Oral two times a day  ascorbic acid 500 milliGRAM(s) Oral daily  atorvastatin 80 milliGRAM(s) Oral at bedtime  multivitamin 1 Tablet(s) Oral daily  polyethylene glycol 3350 17 Gram(s) Oral daily  senna 1 Tablet(s) Oral every 24 hours  sodium chloride 0.9%. 1000 milliLiter(s) (40 mL/Hr) IV Continuous <Continuous>    MEDICATIONS  (PRN):  acetaminophen   IVPB .. 1000 milliGRAM(s) IV Intermittent every 6 hours PRN Severe Pain (7 - 10)  hydrALAZINE Injectable 10 milliGRAM(s) IV Push every 2 hours   labetalol Injectable 10 milliGRAM(s) IV Push every 2 hours PRN Systolic blood pressure >160  midodrine 5 milliGRAM(s) Oral every 8 hours PRN for sbp <100        I&O's Summary    13 Oct 2023 07:01  -  14 Oct 2023 07:00  --------------------------------------------------------  IN: 440 mL / OUT: 0 mL / NET: 440 mL        PHYSICAL EXAM:  Vital Signs Last 24 Hrs  T(C): 36.9 (14 Oct 2023 08:00), Max: 37 (13 Oct 2023 20:00)  T(F): 98.5 (14 Oct 2023 08:00), Max: 98.6 (13 Oct 2023 20:00)  HR: 94 (14 Oct 2023 08:00) (69 - 105)  BP: 110/77 (14 Oct 2023 08:00) (109/75 - 119/84)  BP(mean): --  RR: 18 (14 Oct 2023 08:00) (18 - 18)  SpO2: 95% (14 Oct 2023 08:00) (94% - 98%)    Parameters below as of 14 Oct 2023 08:00  Patient On (Oxygen Delivery Method): room air            CONSTITUTIONAL: NAD, appears stated age  ENMT: Moist oral mucosa, no pharyngeal injection or exudates; normal dentition  RESPIRATORY: Normal respiratory effort; clear to auscultation bilaterally  CARDIOVASCULAR: Regular rate and rhythm, normal S1 and S2, no murmur/rub/gallop; Peripheral pulses are 2+ bilaterally  ABDOMEN: Nontender to palpation, normoactive bowel sounds, no rebound/guarding;   MUSCLOSKELETAL:  No clubbing or cyanosis of digits; no joint swelling or tenderness to palpation  PSYCH: A+O to person, place, and time; affect appropriate  NEUROLOGY: CN 2-12 are intact and symmetric; no gross sensory deficits;   SKIN: No rashes; no palpable lesions    LABS:                        10.5   11.66 )-----------( 375      ( 14 Oct 2023 06:48 )             32.6     10-13    138  |  100  |  9.7  ----------------------------<  123<H>  3.7   |  24.0  |  0.60    Ca    9.4      13 Oct 2023 05:48            Urinalysis Basic - ( 13 Oct 2023 05:48 )    Color: x / Appearance: x / SG: x / pH: x  Gluc: 123 mg/dL / Ketone: x  / Bili: x / Urobili: x   Blood: x / Protein: x / Nitrite: x   Leuk Esterase: x / RBC: x / WBC x   Sq Epi: x / Non Sq Epi: x / Bacteria: x        CAPILLARY BLOOD GLUCOSE            RADIOLOGY & ADDITIONAL TESTS:  Results Reviewed:   Imaging Personally Reviewed:  Electrocardiogram Personally Reviewed:                                           [facility] Division of Hospital Medicine    SUBJECTIVE / OVERNIGHT EVENTS:  No events    Patient denies chest pain, SOB, abd pain, N/V, fever, chills, dysuria or any other complaints. All remainder ROS negative.     MEDICATIONS  (STANDING):  apixaban 5 milliGRAM(s) Oral two times a day  ascorbic acid 500 milliGRAM(s) Oral daily  atorvastatin 80 milliGRAM(s) Oral at bedtime  multivitamin 1 Tablet(s) Oral daily  polyethylene glycol 3350 17 Gram(s) Oral daily  senna 1 Tablet(s) Oral every 24 hours  sodium chloride 0.9%. 1000 milliLiter(s) (40 mL/Hr) IV Continuous <Continuous>    MEDICATIONS  (PRN):  acetaminophen   IVPB .. 1000 milliGRAM(s) IV Intermittent every 6 hours PRN Severe Pain (7 - 10)  hydrALAZINE Injectable 10 milliGRAM(s) IV Push every 2 hours   labetalol Injectable 10 milliGRAM(s) IV Push every 2 hours PRN Systolic blood pressure >160  midodrine 5 milliGRAM(s) Oral every 8 hours PRN for sbp <100        I&O's Summary    13 Oct 2023 07:01  -  14 Oct 2023 07:00  --------------------------------------------------------  IN: 440 mL / OUT: 0 mL / NET: 440 mL        PHYSICAL EXAM:  Vital Signs Last 24 Hrs  T(C): 36.9 (14 Oct 2023 08:00), Max: 37 (13 Oct 2023 20:00)  T(F): 98.5 (14 Oct 2023 08:00), Max: 98.6 (13 Oct 2023 20:00)  HR: 94 (14 Oct 2023 08:00) (69 - 105)  BP: 110/77 (14 Oct 2023 08:00) (109/75 - 119/84)  BP(mean): --  RR: 18 (14 Oct 2023 08:00) (18 - 18)  SpO2: 95% (14 Oct 2023 08:00) (94% - 98%)    Parameters below as of 14 Oct 2023 08:00  Patient On (Oxygen Delivery Method): room air            CONSTITUTIONAL: NAD, appears stated age  ENMT: Moist oral mucosa, no pharyngeal injection or exudates; normal dentition  RESPIRATORY: Normal respiratory effort; clear to auscultation bilaterally  CARDIOVASCULAR: Regular rate and rhythm, normal S1 and S2, no murmur/rub/gallop; Peripheral pulses are 2+ bilaterally  ABDOMEN: Nontender to palpation, normoactive bowel sounds, no rebound/guarding;   MUSCLOSKELETAL:  No clubbing or cyanosis of digits; no joint swelling or tenderness to palpation  PSYCH: A+O to person, place, and time; affect appropriate  NEUROLOGY: rue/rle plegia   SKIN: No rashes; no palpable lesions    LABS:                        10.5   11.66 )-----------( 375      ( 14 Oct 2023 06:48 )             32.6     10-13    138  |  100  |  9.7  ----------------------------<  123<H>  3.7   |  24.0  |  0.60    Ca    9.4      13 Oct 2023 05:48            Urinalysis Basic - ( 13 Oct 2023 05:48 )    Color: x / Appearance: x / SG: x / pH: x  Gluc: 123 mg/dL / Ketone: x  / Bili: x / Urobili: x   Blood: x / Protein: x / Nitrite: x   Leuk Esterase: x / RBC: x / WBC x   Sq Epi: x / Non Sq Epi: x / Bacteria: x        CAPILLARY BLOOD GLUCOSE            RADIOLOGY & ADDITIONAL TESTS:  Results Reviewed:   Imaging Personally Reviewed:  Electrocardiogram Personally Reviewed:

## 2023-10-15 LAB
APPEARANCE UR: ABNORMAL
BACTERIA # UR AUTO: ABNORMAL
BILIRUB UR-MCNC: NEGATIVE — SIGNIFICANT CHANGE UP
COLOR SPEC: YELLOW — SIGNIFICANT CHANGE UP
COMMENT - URINE: SIGNIFICANT CHANGE UP
DIFF PNL FLD: ABNORMAL
EPI CELLS # UR: SIGNIFICANT CHANGE UP
GLUCOSE UR QL: NEGATIVE MG/DL — SIGNIFICANT CHANGE UP
GRAN CASTS # UR COMP ASSIST: ABNORMAL /LPF
HCT VFR BLD CALC: 33.6 % — LOW (ref 39–50)
HGB BLD-MCNC: 10.6 G/DL — LOW (ref 13–17)
HYALINE CASTS # UR AUTO: ABNORMAL /LPF
KETONES UR-MCNC: NEGATIVE — SIGNIFICANT CHANGE UP
LEUKOCYTE ESTERASE UR-ACNC: ABNORMAL
MCHC RBC-ENTMCNC: 30.3 PG — SIGNIFICANT CHANGE UP (ref 27–34)
MCHC RBC-ENTMCNC: 31.5 GM/DL — LOW (ref 32–36)
MCV RBC AUTO: 96 FL — SIGNIFICANT CHANGE UP (ref 80–100)
NITRITE UR-MCNC: NEGATIVE — SIGNIFICANT CHANGE UP
PH UR: 7 — SIGNIFICANT CHANGE UP (ref 5–8)
PLATELET # BLD AUTO: 354 K/UL — SIGNIFICANT CHANGE UP (ref 150–400)
PROT UR-MCNC: 100
RBC # BLD: 3.5 M/UL — LOW (ref 4.2–5.8)
RBC # FLD: 16.7 % — HIGH (ref 10.3–14.5)
RBC CASTS # UR COMP ASSIST: ABNORMAL /HPF (ref 0–4)
SP GR SPEC: 1.01 — SIGNIFICANT CHANGE UP (ref 1.01–1.02)
UROBILINOGEN FLD QL: NEGATIVE MG/DL — SIGNIFICANT CHANGE UP
WBC # BLD: 14.15 K/UL — HIGH (ref 3.8–10.5)
WBC # FLD AUTO: 14.15 K/UL — HIGH (ref 3.8–10.5)
WBC UR QL: >50 /HPF (ref 0–5)

## 2023-10-15 PROCEDURE — 99233 SBSQ HOSP IP/OBS HIGH 50: CPT

## 2023-10-15 RX ORDER — CEFTRIAXONE 500 MG/1
1000 INJECTION, POWDER, FOR SOLUTION INTRAMUSCULAR; INTRAVENOUS EVERY 24 HOURS
Refills: 0 | Status: DISCONTINUED | OUTPATIENT
Start: 2023-10-15 | End: 2023-10-16

## 2023-10-15 RX ORDER — TAMSULOSIN HYDROCHLORIDE 0.4 MG/1
0.8 CAPSULE ORAL AT BEDTIME
Refills: 0 | Status: DISCONTINUED | OUTPATIENT
Start: 2023-10-15 | End: 2023-10-18

## 2023-10-15 RX ORDER — TAMSULOSIN HYDROCHLORIDE 0.4 MG/1
0.8 CAPSULE ORAL ONCE
Refills: 0 | Status: COMPLETED | OUTPATIENT
Start: 2023-10-15 | End: 2023-10-15

## 2023-10-15 RX ADMIN — ATORVASTATIN CALCIUM 80 MILLIGRAM(S): 80 TABLET, FILM COATED ORAL at 21:08

## 2023-10-15 RX ADMIN — Medication 500 MILLIGRAM(S): at 10:47

## 2023-10-15 RX ADMIN — TAMSULOSIN HYDROCHLORIDE 0.8 MILLIGRAM(S): 0.4 CAPSULE ORAL at 10:47

## 2023-10-15 RX ADMIN — CEFTRIAXONE 1000 MILLIGRAM(S): 500 INJECTION, POWDER, FOR SOLUTION INTRAMUSCULAR; INTRAVENOUS at 13:41

## 2023-10-15 RX ADMIN — Medication 1 TABLET(S): at 10:47

## 2023-10-15 RX ADMIN — POLYETHYLENE GLYCOL 3350 17 GRAM(S): 17 POWDER, FOR SOLUTION ORAL at 10:47

## 2023-10-15 RX ADMIN — SENNA PLUS 1 TABLET(S): 8.6 TABLET ORAL at 10:47

## 2023-10-15 RX ADMIN — APIXABAN 5 MILLIGRAM(S): 2.5 TABLET, FILM COATED ORAL at 05:28

## 2023-10-15 RX ADMIN — SODIUM CHLORIDE 40 MILLILITER(S): 9 INJECTION INTRAMUSCULAR; INTRAVENOUS; SUBCUTANEOUS at 18:49

## 2023-10-15 RX ADMIN — APIXABAN 5 MILLIGRAM(S): 2.5 TABLET, FILM COATED ORAL at 17:18

## 2023-10-15 RX ADMIN — TAMSULOSIN HYDROCHLORIDE 0.8 MILLIGRAM(S): 0.4 CAPSULE ORAL at 21:08

## 2023-10-15 NOTE — PROGRESS NOTE ADULT - SUBJECTIVE AND OBJECTIVE BOX
Patient is a 63y old  Male who presents with a chief complaint of LT M1 Occlusion (14 Oct 2023 12:24)      INTERVAL HPI/OVERNIGHT EVENTS: Overnight, patient had straight catheterization done and was restarted on Flomax.     MEDICATIONS  (STANDING):  apixaban 5 milliGRAM(s) Oral two times a day  ascorbic acid 500 milliGRAM(s) Oral daily  atorvastatin 80 milliGRAM(s) Oral at bedtime  multivitamin 1 Tablet(s) Oral daily  polyethylene glycol 3350 17 Gram(s) Oral daily  senna 1 Tablet(s) Oral every 24 hours  sodium chloride 0.9%. 1000 milliLiter(s) (40 mL/Hr) IV Continuous <Continuous>  tamsulosin 0.8 milliGRAM(s) Oral at bedtime    MEDICATIONS  (PRN):  acetaminophen   IVPB .. 1000 milliGRAM(s) IV Intermittent every 6 hours PRN Severe Pain (7 - 10)  hydrALAZINE Injectable 10 milliGRAM(s) IV Push every 2 hours   labetalol Injectable 10 milliGRAM(s) IV Push every 2 hours PRN Systolic blood pressure >160  midodrine 5 milliGRAM(s) Oral every 8 hours PRN for sbp <100      Allergies: No Known Allergies        REVIEW OF SYSTEMS: unable to be assessed due to clinical condition      Vital Signs Last 24 Hrs  T(C): 37 (15 Oct 2023 10:31), Max: 37 (15 Oct 2023 10:31)  T(F): 98.6 (15 Oct 2023 10:31), Max: 98.6 (15 Oct 2023 10:31)  HR: 100 (15 Oct 2023 10:31) (100 - 105)  BP: 108/72 (15 Oct 2023 10:31) (100/69 - 123/72)  RR: 18 (15 Oct 2023 10:31) (18 - 18)  SpO2: 98% (15 Oct 2023 10:31) (91% - 98%)    Parameters below as of 15 Oct 2023 10:31  Patient On (Oxygen Delivery Method): room air    PHYSICAL EXAM:  GENERAL: lying comfortably in bed  HEAD:  Atraumatic, Normocephalic  EYES: EOMI, PERRLA, conjunctiva and sclera clear  NECK: Supple, No JVD  NERVOUS SYSTEM: alert, awake, + expressive aphasia, follows commands, mild right facial droop, 5/5 strength in LUE and LLE, + R hemiplegia  CHEST/LUNG: Clear to auscultation bilaterally, no wheezes  HEART: Regular rate and rhythm; No murmurs  ABDOMEN: Soft, Nontender; Bowel sounds present  EXTREMITIES:  No cyanosis, or edema  SKIN: No rashes, + sutures on scalp clean, dry, intact        LABS:                        10.6   14.15 )-----------( 354      ( 15 Oct 2023 09:08 )             33.6         Urinalysis Basic - ( 15 Oct 2023 11:30 )    Color: Yellow / Appearance: very cloudy / S.015 / pH: x  Gluc: x / Ketone: Negative  / Bili: Negative / Urobili: Negative mg/dL   Blood: x / Protein: 100 / Nitrite: Negative   Leuk Esterase: Moderate / RBC: x / WBC x   Sq Epi: x / Non Sq Epi: x / Bacteria: x          RADIOLOGY & ADDITIONAL TESTS:    CT Head No Cont (10.11.23 @ 09:28) >  1.  Postoperative changes related to a prior left-sided craniotomy with   cranioplasty, grossly stable.  2.  Mixed density subdural hematoma along the left cerebral convexity,   relatively stable.  3.  Improvement of midline shift to the right now measuring approximately   2 mm.  4.  Left MCA territory infarction, grossly stable when compared to prior   imaging.

## 2023-10-15 NOTE — PROGRESS NOTE ADULT - ASSESSMENT
ASSESSMENT: 63M male with PMHx of T2DM, HTN who presented after a fall with right sided weakness, dysarthria and right facial droop. Patient was found to have LM1 occlusion s/p tenecteplase in ED and had mechanical thrombectomy done with TICI 2c. Decompressive craniectomy for midline shift was done on 8/30/2023, SHUN drain was removed on 8/31, and left cranioplasty on 9/29/2023. SAMARA was negative for thrombus. SHUN drain x2 removed. CT head stable compared to prior imaging. On Eliquis for long term anticoagulation.     UTI, complicated  - Afebrile, + leukocytosis  - Urinalysis with WBC >50, mod. leukocyte esterase, no nitrites, many bacteria  - Urine culture ------------> Pending  - Ceftriaxone 1g IV until 10/20/2023    Urinary Retention  - Likely due to UTI  - Straight cath done twice  - Flomax 0.8 mg x1 STAT, then resume at bedtime  - Monitor     CVA  LM1 Occlusion, Midline Shift  - SAMARA negative for PFO, atrial thrombus  - No significant occlusive disease on CT neck  - Decompressive Craniectomy on 8/30  - Mechanical thrombectomy with TICI 2c recanalization  - Left cranioplasty on 9/29, SHUN drains removed   - Atorvastatin 80 mg nightly  - Lupus anticoagulant positive, normal ACL and B2G antibodies with repeat testing negative.  - Antiphospholipid syndrome unlikely given negative repeat testing  - Repeat Lupus anticoagulant testing in 12 weeks per hematology  - ILR placement outpatient per cardiology  - s/p Heparin drip  - CT head stable  - NSG recs appreciated  - Continue Eliquis   - Sutures will resorb, Bacitracin daily    Hypokalemia  - Resolved  - Monitor BMP    Klebsiella Bacteremia, UTI  - Resolved  - s/p Ceftriaxone, Zosyn, Vancomycin, Cefazolin    Anemia  - H/H stable  - Continue to monitor    HTN  - Maintain normotensive per neurosurgery  - Hydralazine 10 mg IV PRN  - Labetalol 10 mg IV PRN  - Midodrine PRN     T2DM  - A1c 6.8    DVT ppx  - Eliquis 5 mg BID    Dispo: DC to [facility] pending auth   ASSESSMENT: 63M male with PMHx of T2DM, HTN who presented after a fall with right sided weakness, dysarthria and right facial droop. Patient was found to have LM1 occlusion s/p tenecteplase in ED and had mechanical thrombectomy done with TICI 2c. Decompressive craniectomy for midline shift was done on 8/30/2023, SHUN drain was removed on 8/31, and left cranioplasty on 9/29/2023. SAMARA was negative for thrombus. SHUN drain x2 removed. CT head stable compared to prior imaging. On Eliquis for long term anticoagulation.     UTI, complicated  - Afebrile, + leukocytosis  - Urinalysis with WBC >50, mod. leukocyte esterase, no nitrites, many bacteria  - Urine culture ------------> Pending  - Ceftriaxone 1g IV until 10/20/2023    Urinary Retention  - Likely due to UTI  - Straight cath done twice  - Flomax 0.8 mg x1 STAT, then resume at bedtime  - Monitor   Urine culture  CTX    CVA  LM1 Occlusion, Midline Shift  - SAMARA negative for PFO, atrial thrombus  - No significant occlusive disease on CT neck  - Decompressive Craniectomy on 8/30  - Mechanical thrombectomy with TICI 2c recanalization  - Left cranioplasty on 9/29, SHUN drains removed   - Atorvastatin 80 mg nightly  - Lupus anticoagulant positive, normal ACL and B2G antibodies with repeat testing negative.  - Antiphospholipid syndrome unlikely given negative repeat testing  - Repeat Lupus anticoagulant testing in 12 weeks per hematology  - ILR placement outpatient per cardiology  - s/p Heparin drip  - CT head stable  - NSG recs appreciated  - Continue Eliquis   - Sutures will resorb, Bacitracin daily    Hypokalemia  - Resolved  - Monitor BMP    Klebsiella Bacteremia, UTI  - Resolved  - s/p Ceftriaxone, Zosyn, Vancomycin, Cefazolin    Anemia  - H/H stable  - Continue to monitor    HTN  - Maintain normotensive per neurosurgery  - Hydralazine 10 mg IV PRN  - Labetalol 10 mg IV PRN  - Midodrine PRN     T2DM  - A1c 6.8    DVT ppx  - Eliquis 5 mg BID    Dispo: DC to [facility] pending auth   ASSESSMENT: 63M male with PMHx of T2DM, HTN who presented after a fall with right sided weakness, dysarthria and right facial droop. Patient was found to have LM1 occlusion s/p tenecteplase in ED and had mechanical thrombectomy done with TICI 2c. Decompressive craniectomy for midline shift was done on 8/30/2023, SHUN drain was removed on 8/31, and left cranioplasty on 9/29/2023. SAMARA was negative for thrombus. SHUN drain x2 removed. CT head stable compared to prior imaging. On Eliquis for long term anticoagulation.     UTI, complicated  - Afebrile, + leukocytosis  - Urinalysis with WBC >50, mod. leukocyte esterase, no nitrites, many bacteria  - Urine culture ------------> Pending  - Ceftriaxone 1g IV until 10/20/2023    Urinary Retention  - Likely due to UTI  - Straight cath done twice  - Flomax 0.8 mg x1 STAT, then resume at bedtime  - Monitor   Urine culture  CTX    CVA  LM1 Occlusion, Midline Shift        CVA  LM1 Occlusion, Midline Shift  s/p mechanical thrombectomy with TICI 2c recanalization  s/p decompressive Craniectomy on 8/30  s/p - Left cranioplasty on 9/29, SHUN drains removed   SAMARA negative for PFO, atrial thrombus  - Atorvastatin 80 mg nightly  - Lupus anticoagulant positive, normal ACL and B2G antibodies with repeat testing negative.  - Antiphospholipid syndrome unlikely given negative repeat testing  - Repeat Lupus anticoagulant testing in 12 weeks per hematology  - ILR placement outpatient per cardiology  - s/p Heparin drip  - CT head stable  - NSG recs appreciated  - Continue Eliquis   - Sutures will resorb, Bacitracin daily      Hypokalemia  - Resolved  - Monitor BMP    Klebsiella Bacteremia, UTI  - Resolved  - s/p Ceftriaxone, Zosyn, Vancomycin, Cefazolin    Anemia  - H/H stable  - Continue to monitor    HTN  - Maintain normotensive per neurosurgery  - Hydralazine 10 mg IV PRN  - Labetalol 10 mg IV PRN  - Midodrine PRN     T2DM  - A1c 6.8    DVT ppx  - Eliquis 5 mg BID    Dispo: DC to [facility] pending auth

## 2023-10-15 NOTE — PROGRESS NOTE ADULT - ATTENDING COMMENTS
63M male with PMHx of T2DM, HTN who presented after a fall with right sided weakness, dysarthria and right facial droop. Patient was found to have LM1 occlusion s/p tenecteplase in ED and had mechanical thrombectomy done with TICI 2c. Decompressive craniectomy for midline shift was done on 8/30/2023, SHUN drain was removed on 8/31, and left cranioplasty on 9/29/2023. SAMARA was negative for thrombus. SHUN drain x2 removed. CT head stable compared to prior imaging. On Eliquis for long term anticoagulation.     CVA  LM1 Occlusion, Midline Shift  s/p mechanical thrombectomy with TICI 2c recanalization  s/p decompressive Craniectomy on 8/30  s/p - Left cranioplasty on 9/29, SHUN drains removed   SAMARA negative for PFO, atrial thrombus  - Atorvastatin 80 mg nightly  - Lupus anticoagulant positive, normal ACL and B2G antibodies with repeat testing negative.  - Antiphospholipid syndrome unlikely given negative repeat testing  - Repeat Lupus anticoagulant testing in 12 weeks per hematology  - ILR placement outpatient per cardiology  - s/p Heparin drip  - CT head stable  - NSG recs appreciated  - Continue Eliquis   - Sutures will resorb, Bacitracin daily    Hypokalemia  - Resolved  - Monitor BMP    Leukocytosis  -Resolved  -Continue to monitor    Klebsiella Bacteremia, UTI  - Resolved  - s/p Ceftriaxone, Zosyn, Vancomycin, Cefazolin    Anemia  - H/H stable  - Continue to monitor    HTN  - Maintain normotensive per neurosurgery  - Hydralazine 10 mg IV PRN  - Labetalol 10 mg IV PRN    T2DM  - A1c 6.8    DVT ppx  - Eliquis 5 mg BID    Dispo: Acute pending results of urine culture. [facility] Admitted with Acute CVA. Was planned for dispo to [facility]. CCB Urinary Retention / UTI.  Urine Culture  CTX

## 2023-10-16 LAB
ALBUMIN SERPL ELPH-MCNC: 3.6 G/DL — SIGNIFICANT CHANGE UP (ref 3.3–5.2)
ALP SERPL-CCNC: 111 U/L — SIGNIFICANT CHANGE UP (ref 40–120)
ALT FLD-CCNC: 39 U/L — SIGNIFICANT CHANGE UP
ANION GAP SERPL CALC-SCNC: 11 MMOL/L — SIGNIFICANT CHANGE UP (ref 5–17)
AST SERPL-CCNC: 24 U/L — SIGNIFICANT CHANGE UP
BASOPHILS # BLD AUTO: 0.05 K/UL — SIGNIFICANT CHANGE UP (ref 0–0.2)
BASOPHILS NFR BLD AUTO: 0.5 % — SIGNIFICANT CHANGE UP (ref 0–2)
BILIRUB SERPL-MCNC: 0.3 MG/DL — LOW (ref 0.4–2)
BUN SERPL-MCNC: 19.9 MG/DL — SIGNIFICANT CHANGE UP (ref 8–20)
CALCIUM SERPL-MCNC: 9.3 MG/DL — SIGNIFICANT CHANGE UP (ref 8.4–10.5)
CHLORIDE SERPL-SCNC: 105 MMOL/L — SIGNIFICANT CHANGE UP (ref 96–108)
CO2 SERPL-SCNC: 27 MMOL/L — SIGNIFICANT CHANGE UP (ref 22–29)
CREAT SERPL-MCNC: 0.67 MG/DL — SIGNIFICANT CHANGE UP (ref 0.5–1.3)
EGFR: 106 ML/MIN/1.73M2 — SIGNIFICANT CHANGE UP
EOSINOPHIL # BLD AUTO: 0.32 K/UL — SIGNIFICANT CHANGE UP (ref 0–0.5)
EOSINOPHIL NFR BLD AUTO: 3.1 % — SIGNIFICANT CHANGE UP (ref 0–6)
GLUCOSE SERPL-MCNC: 144 MG/DL — HIGH (ref 70–99)
HCT VFR BLD CALC: 33.7 % — LOW (ref 39–50)
HGB BLD-MCNC: 10.5 G/DL — LOW (ref 13–17)
IMM GRANULOCYTES NFR BLD AUTO: 0.5 % — SIGNIFICANT CHANGE UP (ref 0–0.9)
LYMPHOCYTES # BLD AUTO: 2.18 K/UL — SIGNIFICANT CHANGE UP (ref 1–3.3)
LYMPHOCYTES # BLD AUTO: 20.9 % — SIGNIFICANT CHANGE UP (ref 13–44)
MCHC RBC-ENTMCNC: 30.3 PG — SIGNIFICANT CHANGE UP (ref 27–34)
MCHC RBC-ENTMCNC: 31.2 GM/DL — LOW (ref 32–36)
MCV RBC AUTO: 97.4 FL — SIGNIFICANT CHANGE UP (ref 80–100)
MONOCYTES # BLD AUTO: 0.82 K/UL — SIGNIFICANT CHANGE UP (ref 0–0.9)
MONOCYTES NFR BLD AUTO: 7.9 % — SIGNIFICANT CHANGE UP (ref 2–14)
NEUTROPHILS # BLD AUTO: 6.99 K/UL — SIGNIFICANT CHANGE UP (ref 1.8–7.4)
NEUTROPHILS NFR BLD AUTO: 67.1 % — SIGNIFICANT CHANGE UP (ref 43–77)
PLATELET # BLD AUTO: 309 K/UL — SIGNIFICANT CHANGE UP (ref 150–400)
POTASSIUM SERPL-MCNC: 4 MMOL/L — SIGNIFICANT CHANGE UP (ref 3.5–5.3)
POTASSIUM SERPL-SCNC: 4 MMOL/L — SIGNIFICANT CHANGE UP (ref 3.5–5.3)
PROT SERPL-MCNC: 6.5 G/DL — LOW (ref 6.6–8.7)
RBC # BLD: 3.46 M/UL — LOW (ref 4.2–5.8)
RBC # FLD: 16.6 % — HIGH (ref 10.3–14.5)
SODIUM SERPL-SCNC: 143 MMOL/L — SIGNIFICANT CHANGE UP (ref 135–145)
WBC # BLD: 10.41 K/UL — SIGNIFICANT CHANGE UP (ref 3.8–10.5)
WBC # FLD AUTO: 10.41 K/UL — SIGNIFICANT CHANGE UP (ref 3.8–10.5)

## 2023-10-16 PROCEDURE — 99232 SBSQ HOSP IP/OBS MODERATE 35: CPT

## 2023-10-16 RX ORDER — CEFTRIAXONE 500 MG/1
1000 INJECTION, POWDER, FOR SOLUTION INTRAMUSCULAR; INTRAVENOUS EVERY 24 HOURS
Refills: 0 | Status: DISCONTINUED | OUTPATIENT
Start: 2023-10-16 | End: 2023-10-18

## 2023-10-16 RX ADMIN — ATORVASTATIN CALCIUM 80 MILLIGRAM(S): 80 TABLET, FILM COATED ORAL at 21:31

## 2023-10-16 RX ADMIN — POLYETHYLENE GLYCOL 3350 17 GRAM(S): 17 POWDER, FOR SOLUTION ORAL at 12:26

## 2023-10-16 RX ADMIN — APIXABAN 5 MILLIGRAM(S): 2.5 TABLET, FILM COATED ORAL at 18:48

## 2023-10-16 RX ADMIN — SENNA PLUS 1 TABLET(S): 8.6 TABLET ORAL at 12:25

## 2023-10-16 RX ADMIN — SODIUM CHLORIDE 40 MILLILITER(S): 9 INJECTION INTRAMUSCULAR; INTRAVENOUS; SUBCUTANEOUS at 05:28

## 2023-10-16 RX ADMIN — Medication 500 MILLIGRAM(S): at 12:38

## 2023-10-16 RX ADMIN — Medication 1 TABLET(S): at 12:38

## 2023-10-16 RX ADMIN — APIXABAN 5 MILLIGRAM(S): 2.5 TABLET, FILM COATED ORAL at 05:28

## 2023-10-16 RX ADMIN — TAMSULOSIN HYDROCHLORIDE 0.8 MILLIGRAM(S): 0.4 CAPSULE ORAL at 21:31

## 2023-10-16 RX ADMIN — CEFTRIAXONE 1000 MILLIGRAM(S): 500 INJECTION, POWDER, FOR SOLUTION INTRAMUSCULAR; INTRAVENOUS at 15:34

## 2023-10-16 NOTE — PROGRESS NOTE ADULT - ASSESSMENT
ASSESSMENT: 63M male with PMHx of T2DM, HTN who presented after a fall with right sided weakness, dysarthria and right facial droop. Patient was found to have LM1 occlusion s/p tenecteplase in ED and had mechanical thrombectomy done with TICI 2c. Decompressive craniectomy for midline shift was done on 8/30/2023, SHUN drain was removed on 8/31, and left cranioplasty on 9/29/2023. SAMARA was negative for thrombus. SHUN drain x2 removed. CT head stable compared to prior imaging. On Eliquis for long term anticoagulation. Course complicated by UTI, on Ceftriaxone pending urine culture results.     UTI, complicated/Urinary Retention  - Afebrile, no leukocytosis  - Urinalysis with WBC >50, mod. leukocyte esterase, no nitrites, many bacteria  - Ceftriaxone 1g IV until 10/25/2023  - Urine culture ------------> Pending  - Urinary retention likely due to UTI  - Straight cath done 3x  - Flomax 0.8 mg    CVA  LM1 Occlusion, Midline Shift  - s/p mechanical thrombectomy with TICI 2c recanalization  - s/p decompressive Craniectomy on 8/30  - s/p - Left cranioplasty on 9/29, SHUN drains removed   - SAMARA negative for PFO, atrial thrombus  - Atorvastatin 80 mg nightly  - Lupus anticoagulant positive, normal ACL and B2G antibodies with repeat testing negative.  - Antiphospholipid syndrome unlikely given negative repeat testing  - Repeat Lupus anticoagulant testing in 12 weeks per hematology  - ILR placement outpatient per cardiology  - s/p Heparin drip  - CT head stable  - NSG recs appreciated  - Continue Eliquis   - Sutures will resorb, Bacitracin daily      Hypokalemia  - Resolved  - Monitor BMP    Klebsiella Bacteremia, UTI  - Resolved  - s/p Ceftriaxone, Zosyn, Vancomycin, Cefazolin    Anemia  - H/H stable  - Continue to monitor    HTN  - Maintain normotensive per neurosurgery  - Hydralazine 10 mg IV PRN  - Labetalol 10 mg IV PRN  - Midodrine PRN     T2DM  - A1c 6.8    DVT ppx  - Eliquis 5 mg BID    Dispo: DC to [facility] pending auth   ASSESSMENT: 63M male with PMHx of T2DM, HTN who presented after a fall with right sided weakness, dysarthria and right facial droop. Patient was found to have LM1 occlusion s/p tenecteplase in ED and had mechanical thrombectomy done with TICI 2c. Decompressive craniectomy for midline shift was done on 8/30/2023, SHUN drain was removed on 8/31, and left cranioplasty on 9/29/2023. SAMARA was negative for thrombus. SHUN drain x2 removed. CT head stable compared to prior imaging. On Eliquis for long term anticoagulation. Course complicated by UTI, on Ceftriaxone pending urine culture results.     UTI, complicated/Urinary Retention  - Afebrile, no leukocytosis  - Urinalysis with WBC >50, mod. leukocyte esterase, no nitrites, many bacteria  - Ceftriaxone 1g IV until 10/25/2023  - Urine culture ------------> Pending  - Urinary retention likely due to UTI  - Straight cath done 3x  - Possible longo, pending bladder scan results  - Flomax 0.8 mg    CVA  LM1 Occlusion, Midline Shift  - s/p mechanical thrombectomy with TICI 2c recanalization  - s/p decompressive Craniectomy on 8/30  - s/p - Left cranioplasty on 9/29, SHUN drains removed   - SAMARA negative for PFO, atrial thrombus  - Atorvastatin 80 mg nightly  - Lupus anticoagulant positive, normal ACL and B2G antibodies with repeat testing negative.  - Antiphospholipid syndrome unlikely given negative repeat testing  - Repeat Lupus anticoagulant testing in 12 weeks per hematology  - ILR placement outpatient per cardiology  - s/p Heparin drip  - CT head stable  - NSG recs appreciated  - Continue Eliquis   - Sutures will resorb, Bacitracin daily      Hypokalemia  - Resolved  - Monitor BMP    Klebsiella Bacteremia, UTI  - Resolved  - s/p Ceftriaxone, Zosyn, Vancomycin, Cefazolin    Anemia  - H/H stable  - Continue to monitor    HTN  - Maintain normotensive per neurosurgery  - Hydralazine 10 mg IV PRN  - Labetalol 10 mg IV PRN  - Midodrine PRN     T2DM  - A1c 6.8    DVT ppx  - Eliquis 5 mg BID    Dispo: DC to [facility], pending urine culture results

## 2023-10-16 NOTE — PROGRESS NOTE ADULT - ATTENDING COMMENTS
Admitted with Acute CVA. Was planned for dispo to [facility], [place]. CCB Urinary Retention / UTI.  Urine Culture pending   CTX  Tamsulosin started if continues to retain urine will place longo

## 2023-10-16 NOTE — PROGRESS NOTE ADULT - SUBJECTIVE AND OBJECTIVE BOX
Patient is a 63y old  Male who presents with a chief complaint of LT M1 Occlusion (15 Oct 2023 12:06)      INTERVAL HPI/OVERNIGHT EVENTS: Straight catheterization done overnight due to urinary retention. This was 3rd straight cath.     MEDICATIONS  (STANDING):  apixaban 5 milliGRAM(s) Oral two times a day  ascorbic acid 500 milliGRAM(s) Oral daily  atorvastatin 80 milliGRAM(s) Oral at bedtime  cefTRIAXone Injectable. 1000 milliGRAM(s) IV Push every 24 hours  multivitamin 1 Tablet(s) Oral daily  polyethylene glycol 3350 17 Gram(s) Oral daily  senna 1 Tablet(s) Oral every 24 hours  tamsulosin 0.8 milliGRAM(s) Oral at bedtime    MEDICATIONS  (PRN):  acetaminophen   IVPB .. 1000 milliGRAM(s) IV Intermittent every 6 hours PRN Severe Pain (7 - 10)  hydrALAZINE Injectable 10 milliGRAM(s) IV Push every 2 hours   labetalol Injectable 10 milliGRAM(s) IV Push every 2 hours PRN Systolic blood pressure >160  midodrine 5 milliGRAM(s) Oral every 8 hours PRN for sbp <100      Allergies: No Known Allergies        REVIEW OF SYSTEMS: unable to be assessed due to clinical condition      Vital Signs Last 24 Hrs  T(C): 37.2 (16 Oct 2023 04:23), Max: 37.2 (15 Oct 2023 16:37)  T(F): 99 (16 Oct 2023 04:23), Max: 99 (15 Oct 2023 16:37)  HR: 98 (16 Oct 2023 04:23) (71 - 100)  BP: 100/60 (16 Oct 2023 04:23) (100/60 - 111/74)  RR: 18 (16 Oct 2023 04:23) (18 - 18)  SpO2: 95% (16 Oct 2023 04:23) (95% - 95%)    Parameters below as of 16 Oct 2023 04:23  Patient On (Oxygen Delivery Method): room air      PHYSICAL EXAM:  GENERAL: lying comfortably in bed, pleasant  HEAD:  Atraumatic, Normocephalic  EYES: EOMI, PERRLA, conjunctiva and sclera clear  NECK: Supple, No JVD  NERVOUS SYSTEM: alert, awake, + expressive aphasia, follows commands, mild right facial droop, 5/5 strength in LUE and LLE, + R hemiplegia  CHEST/LUNG: Clear to auscultation bilaterally, no wheezes  HEART: Regular rate and rhythm; No murmurs  ABDOMEN: Soft, Nontender; Bowel sounds present  EXTREMITIES:  No cyanosis, or edema  SKIN: No rashes, + sutures on scalp clean, dry, intact      LABS:                        10.5   10.41 )-----------( 309      ( 16 Oct 2023 06:26 )             33.7     10-16    143  |  105  |  19.9  ----------------------------<  144<H>  4.0   |  27.0  |  0.67    Ca    9.3      16 Oct 2023 06:26    TPro  6.5<L>  /  Alb  3.6  /  TBili  0.3<L>  /  DBili  x   /  AST  24  /  ALT  39  /  AlkPhos  111  10-16      Urinalysis Basic - ( 16 Oct 2023 06:26 )    Color: x / Appearance: x / SG: x / pH: x  Gluc: 144 mg/dL / Ketone: x  / Bili: x / Urobili: x   Blood: x / Protein: x / Nitrite: x   Leuk Esterase: x / RBC: x / WBC x   Sq Epi: x / Non Sq Epi: x / Bacteria: x          RADIOLOGY & ADDITIONAL TESTS:    CT Head No Cont (10.11.23 @ 09:28) >  1.  Postoperative changes related to a prior left-sided craniotomy with   cranioplasty, grossly stable.  2.  Mixed density subdural hematoma along the left cerebral convexity,   relatively stable.  3.  Improvement of midline shift to the right now measuring approximately   2 mm.  4.  Left MCA territory infarction, grossly stable when compared to prior   imaging.          Imaging Personally Reviewed:  [ ] YES  [ ] NO    Consultant(s) Notes Reviewed:  [ ] YES  [ ] NO    Care Discussed with Consultants/Other Providers [ ] YES  [ ] NO    Plan of Care discussed with Housestaff [ ]YES [ ] NO

## 2023-10-17 LAB
ALBUMIN SERPL ELPH-MCNC: 3.7 G/DL — SIGNIFICANT CHANGE UP (ref 3.3–5.2)
ALBUMIN SERPL ELPH-MCNC: 3.7 G/DL — SIGNIFICANT CHANGE UP (ref 3.3–5.2)
ALP SERPL-CCNC: 100 U/L — SIGNIFICANT CHANGE UP (ref 40–120)
ALP SERPL-CCNC: 100 U/L — SIGNIFICANT CHANGE UP (ref 40–120)
ALT FLD-CCNC: 43 U/L — HIGH
ALT FLD-CCNC: 43 U/L — HIGH
ANION GAP SERPL CALC-SCNC: 13 MMOL/L — SIGNIFICANT CHANGE UP (ref 5–17)
ANION GAP SERPL CALC-SCNC: 13 MMOL/L — SIGNIFICANT CHANGE UP (ref 5–17)
AST SERPL-CCNC: 24 U/L — SIGNIFICANT CHANGE UP
AST SERPL-CCNC: 24 U/L — SIGNIFICANT CHANGE UP
BASOPHILS # BLD AUTO: 0.07 K/UL — SIGNIFICANT CHANGE UP (ref 0–0.2)
BASOPHILS # BLD AUTO: 0.07 K/UL — SIGNIFICANT CHANGE UP (ref 0–0.2)
BASOPHILS NFR BLD AUTO: 0.7 % — SIGNIFICANT CHANGE UP (ref 0–2)
BASOPHILS NFR BLD AUTO: 0.7 % — SIGNIFICANT CHANGE UP (ref 0–2)
BILIRUB SERPL-MCNC: 0.3 MG/DL — LOW (ref 0.4–2)
BILIRUB SERPL-MCNC: 0.3 MG/DL — LOW (ref 0.4–2)
BUN SERPL-MCNC: 19 MG/DL — SIGNIFICANT CHANGE UP (ref 8–20)
BUN SERPL-MCNC: 19 MG/DL — SIGNIFICANT CHANGE UP (ref 8–20)
CALCIUM SERPL-MCNC: 9.1 MG/DL — SIGNIFICANT CHANGE UP (ref 8.4–10.5)
CALCIUM SERPL-MCNC: 9.1 MG/DL — SIGNIFICANT CHANGE UP (ref 8.4–10.5)
CHLORIDE SERPL-SCNC: 100 MMOL/L — SIGNIFICANT CHANGE UP (ref 96–108)
CHLORIDE SERPL-SCNC: 100 MMOL/L — SIGNIFICANT CHANGE UP (ref 96–108)
CO2 SERPL-SCNC: 26 MMOL/L — SIGNIFICANT CHANGE UP (ref 22–29)
CO2 SERPL-SCNC: 26 MMOL/L — SIGNIFICANT CHANGE UP (ref 22–29)
CREAT SERPL-MCNC: 0.64 MG/DL — SIGNIFICANT CHANGE UP (ref 0.5–1.3)
CREAT SERPL-MCNC: 0.64 MG/DL — SIGNIFICANT CHANGE UP (ref 0.5–1.3)
EGFR: 107 ML/MIN/1.73M2 — SIGNIFICANT CHANGE UP
EGFR: 107 ML/MIN/1.73M2 — SIGNIFICANT CHANGE UP
EOSINOPHIL # BLD AUTO: 0.35 K/UL — SIGNIFICANT CHANGE UP (ref 0–0.5)
EOSINOPHIL # BLD AUTO: 0.35 K/UL — SIGNIFICANT CHANGE UP (ref 0–0.5)
EOSINOPHIL NFR BLD AUTO: 3.4 % — SIGNIFICANT CHANGE UP (ref 0–6)
EOSINOPHIL NFR BLD AUTO: 3.4 % — SIGNIFICANT CHANGE UP (ref 0–6)
GLUCOSE SERPL-MCNC: 137 MG/DL — HIGH (ref 70–99)
GLUCOSE SERPL-MCNC: 137 MG/DL — HIGH (ref 70–99)
HCT VFR BLD CALC: 32.2 % — LOW (ref 39–50)
HCT VFR BLD CALC: 32.2 % — LOW (ref 39–50)
HGB BLD-MCNC: 10.2 G/DL — LOW (ref 13–17)
HGB BLD-MCNC: 10.2 G/DL — LOW (ref 13–17)
IMM GRANULOCYTES NFR BLD AUTO: 0.5 % — SIGNIFICANT CHANGE UP (ref 0–0.9)
IMM GRANULOCYTES NFR BLD AUTO: 0.5 % — SIGNIFICANT CHANGE UP (ref 0–0.9)
LYMPHOCYTES # BLD AUTO: 1.88 K/UL — SIGNIFICANT CHANGE UP (ref 1–3.3)
LYMPHOCYTES # BLD AUTO: 1.88 K/UL — SIGNIFICANT CHANGE UP (ref 1–3.3)
LYMPHOCYTES # BLD AUTO: 18.4 % — SIGNIFICANT CHANGE UP (ref 13–44)
LYMPHOCYTES # BLD AUTO: 18.4 % — SIGNIFICANT CHANGE UP (ref 13–44)
MCHC RBC-ENTMCNC: 30.4 PG — SIGNIFICANT CHANGE UP (ref 27–34)
MCHC RBC-ENTMCNC: 30.4 PG — SIGNIFICANT CHANGE UP (ref 27–34)
MCHC RBC-ENTMCNC: 31.7 GM/DL — LOW (ref 32–36)
MCHC RBC-ENTMCNC: 31.7 GM/DL — LOW (ref 32–36)
MCV RBC AUTO: 95.8 FL — SIGNIFICANT CHANGE UP (ref 80–100)
MCV RBC AUTO: 95.8 FL — SIGNIFICANT CHANGE UP (ref 80–100)
MONOCYTES # BLD AUTO: 0.9 K/UL — SIGNIFICANT CHANGE UP (ref 0–0.9)
MONOCYTES # BLD AUTO: 0.9 K/UL — SIGNIFICANT CHANGE UP (ref 0–0.9)
MONOCYTES NFR BLD AUTO: 8.8 % — SIGNIFICANT CHANGE UP (ref 2–14)
MONOCYTES NFR BLD AUTO: 8.8 % — SIGNIFICANT CHANGE UP (ref 2–14)
NEUTROPHILS # BLD AUTO: 6.94 K/UL — SIGNIFICANT CHANGE UP (ref 1.8–7.4)
NEUTROPHILS # BLD AUTO: 6.94 K/UL — SIGNIFICANT CHANGE UP (ref 1.8–7.4)
NEUTROPHILS NFR BLD AUTO: 68.2 % — SIGNIFICANT CHANGE UP (ref 43–77)
NEUTROPHILS NFR BLD AUTO: 68.2 % — SIGNIFICANT CHANGE UP (ref 43–77)
PLATELET # BLD AUTO: 295 K/UL — SIGNIFICANT CHANGE UP (ref 150–400)
PLATELET # BLD AUTO: 295 K/UL — SIGNIFICANT CHANGE UP (ref 150–400)
POTASSIUM SERPL-MCNC: 3.7 MMOL/L — SIGNIFICANT CHANGE UP (ref 3.5–5.3)
POTASSIUM SERPL-MCNC: 3.7 MMOL/L — SIGNIFICANT CHANGE UP (ref 3.5–5.3)
POTASSIUM SERPL-SCNC: 3.7 MMOL/L — SIGNIFICANT CHANGE UP (ref 3.5–5.3)
POTASSIUM SERPL-SCNC: 3.7 MMOL/L — SIGNIFICANT CHANGE UP (ref 3.5–5.3)
PROT SERPL-MCNC: 6.6 G/DL — SIGNIFICANT CHANGE UP (ref 6.6–8.7)
PROT SERPL-MCNC: 6.6 G/DL — SIGNIFICANT CHANGE UP (ref 6.6–8.7)
RBC # BLD: 3.36 M/UL — LOW (ref 4.2–5.8)
RBC # BLD: 3.36 M/UL — LOW (ref 4.2–5.8)
RBC # FLD: 16.5 % — HIGH (ref 10.3–14.5)
RBC # FLD: 16.5 % — HIGH (ref 10.3–14.5)
SODIUM SERPL-SCNC: 139 MMOL/L — SIGNIFICANT CHANGE UP (ref 135–145)
SODIUM SERPL-SCNC: 139 MMOL/L — SIGNIFICANT CHANGE UP (ref 135–145)
WBC # BLD: 10.19 K/UL — SIGNIFICANT CHANGE UP (ref 3.8–10.5)
WBC # BLD: 10.19 K/UL — SIGNIFICANT CHANGE UP (ref 3.8–10.5)
WBC # FLD AUTO: 10.19 K/UL — SIGNIFICANT CHANGE UP (ref 3.8–10.5)
WBC # FLD AUTO: 10.19 K/UL — SIGNIFICANT CHANGE UP (ref 3.8–10.5)

## 2023-10-17 PROCEDURE — 99232 SBSQ HOSP IP/OBS MODERATE 35: CPT

## 2023-10-17 RX ADMIN — CEFTRIAXONE 1000 MILLIGRAM(S): 500 INJECTION, POWDER, FOR SOLUTION INTRAMUSCULAR; INTRAVENOUS at 17:29

## 2023-10-17 RX ADMIN — SENNA PLUS 1 TABLET(S): 8.6 TABLET ORAL at 13:31

## 2023-10-17 RX ADMIN — Medication 500 MILLIGRAM(S): at 13:32

## 2023-10-17 RX ADMIN — ATORVASTATIN CALCIUM 80 MILLIGRAM(S): 80 TABLET, FILM COATED ORAL at 21:30

## 2023-10-17 RX ADMIN — POLYETHYLENE GLYCOL 3350 17 GRAM(S): 17 POWDER, FOR SOLUTION ORAL at 13:32

## 2023-10-17 RX ADMIN — Medication 1 TABLET(S): at 13:32

## 2023-10-17 RX ADMIN — TAMSULOSIN HYDROCHLORIDE 0.8 MILLIGRAM(S): 0.4 CAPSULE ORAL at 21:30

## 2023-10-17 RX ADMIN — APIXABAN 5 MILLIGRAM(S): 2.5 TABLET, FILM COATED ORAL at 17:30

## 2023-10-17 RX ADMIN — APIXABAN 5 MILLIGRAM(S): 2.5 TABLET, FILM COATED ORAL at 06:18

## 2023-10-17 NOTE — CHART NOTE - NSCHARTNOTEFT_GEN_A_CORE
Source: Patient [ ]  Family [ ]   other [x ]EMR    Current Diet: easy to chew, cons CHO, Dash with mod thick liquids with Glucerna shake TID  total feed    Patient reports [ ] nausea  [ ] vomiting [ ] diarrhea [ ] constipation  [ ]chewing problems [ ] swallowing issues  [ ] other:     PO intake:  < 50% [ ]   50-75%  [x ]   %  [ ]  other :    Source for PO intake [ ] Patient [ ] family [ x] chart [ ] staff [ ] other    Enteral /Parenteral Nutrition:     Current Weight: 176.8# 9/1, 179# 8/27  no edema    % Weight Change     Pertinent Medications: MEDICATIONS  (STANDING):  apixaban 5 milliGRAM(s) Oral two times a day  ascorbic acid 500 milliGRAM(s) Oral daily  atorvastatin 80 milliGRAM(s) Oral at bedtime  cefTRIAXone Injectable. 1000 milliGRAM(s) IV Push every 24 hours  multivitamin 1 Tablet(s) Oral daily  polyethylene glycol 3350 17 Gram(s) Oral daily  senna 1 Tablet(s) Oral every 24 hours  tamsulosin 0.8 milliGRAM(s) Oral at bedtime    MEDICATIONS  (PRN):  acetaminophen   IVPB .. 1000 milliGRAM(s) IV Intermittent every 6 hours PRN Severe Pain (7 - 10)  hydrALAZINE Injectable 10 milliGRAM(s) IV Push every 2 hours   labetalol Injectable 10 milliGRAM(s) IV Push every 2 hours PRN Systolic blood pressure >160  midodrine 5 milliGRAM(s) Oral every 8 hours PRN for sbp <100    Pertinent Labs: CBC Full  -  ( 17 Oct 2023 05:30 )  WBC Count : 10.19 K/uL  RBC Count : 3.36 M/uL  Hemoglobin : 10.2 g/dL  Hematocrit : 32.2 %  Platelet Count - Automated : 295 K/uL  Mean Cell Volume : 95.8 fl  Mean Cell Hemoglobin : 30.4 pg  Mean Cell Hemoglobin Concentration : 31.7 gm/dL  Auto Neutrophil # : 6.94 K/uL  Auto Lymphocyte # : 1.88 K/uL  Auto Monocyte # : 0.90 K/uL  Auto Eosinophil # : 0.35 K/uL  Auto Basophil # : 0.07 K/uL  Auto Neutrophil % : 68.2 %  Auto Lymphocyte % : 18.4 %  Auto Monocyte % : 8.8 %  Auto Eosinophil % : 3.4 %  Auto Basophil % : 0.7 %      10-17 Na139 mmol/L Glu 137 mg/dL<H> K+ 3.7 mmol/L Cr  0.64 mg/dL BUN 19.0 mg/dL Phos n/a   Alb 3.7 g/dL PAB n/a           Skin:     Nutrition focused physical exam conducted - found signs of malnutrition [ ]absent [x ]present    Subcutaneous fat loss: [ ] Orbital fat pads region, [ ]Buccal fat region, [ ]Triceps region,  [ ]Ribs region    Muscle wasting: [x ]Temples region, [ ]Clavicle region, [ ]Shoulder region, [ ]Scapula region, [ ]Interosseous region,  [ ]thigh region, [ ]Calf region    Estimated Needs:   [x ] no change since previous assessment  [ ] recalculated:     Current Nutrition Diagnosis:  Pt meets criteria for moderate acute malnutrition related to difficulty swallowing in setting of left acute MCA as evidenced by dysphagia and meeting <75%EER>7days, mild muscle depletion.  Pt is a total feed.  Pt is s/p left cranioplasty with complex closure with plastic surgery on 9/29. SHUN drains removed. Pt with fair to good po intake.  Pt now on easy to chew with mod thick liquids.    Recommendations:   Continue Glucerna shake TID to optimize po intake and provide an additional 220 kcal, 10g protein per serving   Need accurate weight.  Continue diet as ordered  Provide plain greek yogurt for increased protein  Continue Vit C and MVI        Monitoring and Evaluation:   [x ] PO intake [x ] Tolerance to diet prescription [X] Weights  [X] Follow up per protocol [X] Labs:

## 2023-10-17 NOTE — PROGRESS NOTE ADULT - ASSESSMENT
Onset: 3 days ago.     Location / description: small lump on the right side of pelvis, size of a grape and feels hard.   Reports penile discharge and burning with urination.   He has pelvic and lower back pain when lying down.   Also reports chills with a fever. States he knows he has a fever, but when asked what his temp was he could not state.     Precipitating Factors: unknown    Pain Scale (1 - 10), 10 highest: 10/10, burning with urination    Associated Symptoms: cold symptoms    What  improves / worsens symptoms: drinking water dilutes urine and improves the burning senstion    Symptom specific medications: no medication    Recent Care: none per chart review    Disposition: immediate office eval. UC apt set up at the  location for 1135am.       Reason for Disposition  • Side (flank) or lower back pain present    Protocols used: URINARY SYMPTOMS-A-AH       ASSESSMENT: 63M male with PMHx of T2DM, HTN who presented after a fall with right sided weakness, dysarthria and right facial droop. Patient was found to have LM1 occlusion s/p tenecteplase in ED and had mechanical thrombectomy done with TICI 2c. Decompressive craniectomy for midline shift was done on 8/30/2023, SHUN drain was removed on 8/31, and left cranioplasty on 9/29/2023. SAMARA was negative for thrombus. SHUN drain x2 removed. CT head stable compared to prior imaging. On Eliquis for long term anticoagulation. Course complicated by UTI, on Ceftriaxone pending urine sensitivity results.     UTI, complicated/Urinary Retention  - Afebrile, no leukocytosis  - Urinalysis with WBC >50, mod. leukocyte esterase, no nitrites, many bacteria  - Ceftriaxone 1g IV until 10/25/2023  - Urine culture + for gram negative rods  - Sensitivities ------> Pending  - Urinary retention likely due to UTI  - Straight cath done 3x  - Possible longo, pending bladder scan results  - Flomax 0.8 mg    CVA  LM1 Occlusion, Midline Shift  - s/p mechanical thrombectomy with TICI 2c recanalization  - s/p decompressive Craniectomy on 8/30  - s/p - Left cranioplasty on 9/29, SHUN drains removed   - SAMARA negative for PFO, atrial thrombus  - Atorvastatin 80 mg nightly  - Lupus anticoagulant positive, normal ACL and B2G antibodies with repeat testing negative.  - Antiphospholipid syndrome unlikely given negative repeat testing  - Repeat Lupus anticoagulant testing in 12 weeks per hematology  - ILR placement outpatient per cardiology  - s/p Heparin drip  - CT head stable  - NSG recs appreciated  - Continue Eliquis   - Sutures will resorb, Bacitracin daily      Hypokalemia  - Resolved  - Monitor BMP    Klebsiella Bacteremia  - Resolved  - s/p Ceftriaxone, Zosyn, Vancomycin, Cefazolin    Anemia  - H/H stable  - Continue to monitor    HTN  - Maintain normotensive per neurosurgery  - Hydralazine 10 mg IV PRN  - Labetalol 10 mg IV PRN  - Midodrine PRN     T2DM  - A1c 6.8    DVT ppx  - Eliquis 5 mg BID    Dispo: DC to [facility], pending urine sensitivity results

## 2023-10-17 NOTE — PROGRESS NOTE ADULT - SUBJECTIVE AND OBJECTIVE BOX
Patient is a 63y old  Male who presents with a chief complaint of LT M1 Occlusion (16 Oct 2023 11:39)      INTERVAL HPI/OVERNIGHT EVENTS: No acute events overnight. Bladder scan was 331 at 4 AM on 10/17/2023, so patient has not needed straight cath or longo as of yet.     MEDICATIONS  (STANDING):  apixaban 5 milliGRAM(s) Oral two times a day  ascorbic acid 500 milliGRAM(s) Oral daily  atorvastatin 80 milliGRAM(s) Oral at bedtime  cefTRIAXone Injectable. 1000 milliGRAM(s) IV Push every 24 hours  multivitamin 1 Tablet(s) Oral daily  polyethylene glycol 3350 17 Gram(s) Oral daily  senna 1 Tablet(s) Oral every 24 hours  tamsulosin 0.8 milliGRAM(s) Oral at bedtime    MEDICATIONS  (PRN):  acetaminophen   IVPB .. 1000 milliGRAM(s) IV Intermittent every 6 hours PRN Severe Pain (7 - 10)  hydrALAZINE Injectable 10 milliGRAM(s) IV Push every 2 hours   labetalol Injectable 10 milliGRAM(s) IV Push every 2 hours PRN Systolic blood pressure >160  midodrine 5 milliGRAM(s) Oral every 8 hours PRN for sbp <100      Allergies: No Known Allergies        REVIEW OF SYSTEMS: unable to be assessed due to clinical condition      Vital Signs Last 24 Hrs  T(C): 36.7 (17 Oct 2023 05:37), Max: 37.5 (16 Oct 2023 20:05)  T(F): 98.1 (17 Oct 2023 05:37), Max: 99.5 (16 Oct 2023 20:05)  HR: 99 (17 Oct 2023 05:37) (99 - 110)  BP: 107/63 (17 Oct 2023 05:37) (107/63 - 125/74)  RR: 18 (17 Oct 2023 05:37) (18 - 18)  SpO2: 95% (17 Oct 2023 05:37) (93% - 95%)    Parameters below as of 17 Oct 2023 05:37  Patient On (Oxygen Delivery Method): room air      PHYSICAL EXAM:  GENERAL: lying comfortably in bed, pleasant  HEAD:  Atraumatic, Normocephalic  EYES: EOMI, PERRLA, conjunctiva and sclera clear  NECK: Supple, No JVD  NERVOUS SYSTEM: alert, awake, + expressive aphasia, follows commands, mild right facial droop, 5/5 strength in LUE and LLE, + R hemiplegia, + total sensory loss in LUE and LLE, sensation intact in RUE and RLE.   CHEST/LUNG: Clear to auscultation bilaterally, no wheezes  HEART: Regular rate and rhythm; No murmurs  ABDOMEN: Soft, Nontender; Bowel sounds present  EXTREMITIES:  No cyanosis, or edema  SKIN: No rashes, + sutures on scalp clean, dry, intact      LABS:                        10.2   10.19 )-----------( 295      ( 17 Oct 2023 05:30 )             32.2     10-17    139  |  100  |  19.0  ----------------------------<  137<H>  3.7   |  26.0  |  0.64    Ca    9.1      17 Oct 2023 05:30    TPro  6.6  /  Alb  3.7  /  TBili  0.3<L>  /  DBili  x   /  AST  24  /  ALT  43<H>  /  AlkPhos  100  10-17      Urinalysis Basic - ( 17 Oct 2023 05:30 )    Color: x / Appearance: x / SG: x / pH: x  Gluc: 137 mg/dL / Ketone: x  / Bili: x / Urobili: x   Blood: x / Protein: x / Nitrite: x   Leuk Esterase: x / RBC: x / WBC x   Sq Epi: x / Non Sq Epi: x / Bacteria: x          RADIOLOGY & ADDITIONAL TESTS:    CT Head No Cont (10.11.23 @ 09:28) >  1.  Postoperative changes related to a prior left-sided craniotomy with   cranioplasty, grossly stable.  2.  Mixed density subdural hematoma along the left cerebral convexity,   relatively stable.  3.  Improvement of midline shift to the right now measuring approximately   2 mm.  4.  Left MCA territory infarction, grossly stable when compared to prior   imaging.

## 2023-10-17 NOTE — PROGRESS NOTE ADULT - ATTENDING COMMENTS
Admitted with Acute CVA. Was planned for dispo to [facility]. CCB Urinary Retention / UTI.  Urine Culture pending   CTX  Tamsulosin started if continues to retain urine will place longo .  Cont bladder scans

## 2023-10-17 NOTE — CHART NOTE - NSCHARTNOTESELECT_GEN_ALL_CORE
Event Note
Neurointerventional Surgery Post Procedure Note/Event Note
Neurointerventional Surgery Pre Procedure Note/Event Note
Nutrition Services
neurosurgery/Off Service Note
Event Note
Nutrition Services
Transfer Note
Transfer Note
adjustment to Neurochecks/Event Note
removal of epidural SHUN/Event Note
removal of left IJ catheter/Event Note

## 2023-10-18 VITALS
HEART RATE: 110 BPM | DIASTOLIC BLOOD PRESSURE: 74 MMHG | TEMPERATURE: 98 F | SYSTOLIC BLOOD PRESSURE: 122 MMHG | OXYGEN SATURATION: 94 % | RESPIRATION RATE: 18 BRPM

## 2023-10-18 LAB
-  AMIKACIN: SIGNIFICANT CHANGE UP
-  AMIKACIN: SIGNIFICANT CHANGE UP
-  AMOXICILLIN/CLAVULANIC ACID: SIGNIFICANT CHANGE UP
-  AMOXICILLIN/CLAVULANIC ACID: SIGNIFICANT CHANGE UP
-  AMPICILLIN/SULBACTAM: SIGNIFICANT CHANGE UP
-  AMPICILLIN/SULBACTAM: SIGNIFICANT CHANGE UP
-  AMPICILLIN: SIGNIFICANT CHANGE UP
-  AMPICILLIN: SIGNIFICANT CHANGE UP
-  AZTREONAM: SIGNIFICANT CHANGE UP
-  AZTREONAM: SIGNIFICANT CHANGE UP
-  CEFAZOLIN: SIGNIFICANT CHANGE UP
-  CEFAZOLIN: SIGNIFICANT CHANGE UP
-  CEFEPIME: SIGNIFICANT CHANGE UP
-  CEFEPIME: SIGNIFICANT CHANGE UP
-  CEFOXITIN: SIGNIFICANT CHANGE UP
-  CEFOXITIN: SIGNIFICANT CHANGE UP
-  CEFTRIAXONE: SIGNIFICANT CHANGE UP
-  CEFTRIAXONE: SIGNIFICANT CHANGE UP
-  CEFUROXIME: SIGNIFICANT CHANGE UP
-  CEFUROXIME: SIGNIFICANT CHANGE UP
-  CIPROFLOXACIN: SIGNIFICANT CHANGE UP
-  CIPROFLOXACIN: SIGNIFICANT CHANGE UP
-  ERTAPENEM: SIGNIFICANT CHANGE UP
-  ERTAPENEM: SIGNIFICANT CHANGE UP
-  GENTAMICIN: SIGNIFICANT CHANGE UP
-  GENTAMICIN: SIGNIFICANT CHANGE UP
-  IMIPENEM: SIGNIFICANT CHANGE UP
-  IMIPENEM: SIGNIFICANT CHANGE UP
-  LEVOFLOXACIN: SIGNIFICANT CHANGE UP
-  LEVOFLOXACIN: SIGNIFICANT CHANGE UP
-  MEROPENEM: SIGNIFICANT CHANGE UP
-  MEROPENEM: SIGNIFICANT CHANGE UP
-  NITROFURANTOIN: SIGNIFICANT CHANGE UP
-  NITROFURANTOIN: SIGNIFICANT CHANGE UP
-  PIPERACILLIN/TAZOBACTAM: SIGNIFICANT CHANGE UP
-  PIPERACILLIN/TAZOBACTAM: SIGNIFICANT CHANGE UP
-  TOBRAMYCIN: SIGNIFICANT CHANGE UP
-  TOBRAMYCIN: SIGNIFICANT CHANGE UP
-  TRIMETHOPRIM/SULFAMETHOXAZOLE: SIGNIFICANT CHANGE UP
-  TRIMETHOPRIM/SULFAMETHOXAZOLE: SIGNIFICANT CHANGE UP
ALBUMIN SERPL ELPH-MCNC: 3.7 G/DL — SIGNIFICANT CHANGE UP (ref 3.3–5.2)
ALBUMIN SERPL ELPH-MCNC: 3.7 G/DL — SIGNIFICANT CHANGE UP (ref 3.3–5.2)
ALP SERPL-CCNC: 102 U/L — SIGNIFICANT CHANGE UP (ref 40–120)
ALP SERPL-CCNC: 102 U/L — SIGNIFICANT CHANGE UP (ref 40–120)
ALT FLD-CCNC: 45 U/L — HIGH
ALT FLD-CCNC: 45 U/L — HIGH
ANION GAP SERPL CALC-SCNC: 13 MMOL/L — SIGNIFICANT CHANGE UP (ref 5–17)
ANION GAP SERPL CALC-SCNC: 13 MMOL/L — SIGNIFICANT CHANGE UP (ref 5–17)
AST SERPL-CCNC: 26 U/L — SIGNIFICANT CHANGE UP
AST SERPL-CCNC: 26 U/L — SIGNIFICANT CHANGE UP
BASOPHILS # BLD AUTO: 0.08 K/UL — SIGNIFICANT CHANGE UP (ref 0–0.2)
BASOPHILS # BLD AUTO: 0.08 K/UL — SIGNIFICANT CHANGE UP (ref 0–0.2)
BASOPHILS NFR BLD AUTO: 0.8 % — SIGNIFICANT CHANGE UP (ref 0–2)
BASOPHILS NFR BLD AUTO: 0.8 % — SIGNIFICANT CHANGE UP (ref 0–2)
BILIRUB SERPL-MCNC: 0.3 MG/DL — LOW (ref 0.4–2)
BILIRUB SERPL-MCNC: 0.3 MG/DL — LOW (ref 0.4–2)
BUN SERPL-MCNC: 15.7 MG/DL — SIGNIFICANT CHANGE UP (ref 8–20)
BUN SERPL-MCNC: 15.7 MG/DL — SIGNIFICANT CHANGE UP (ref 8–20)
CALCIUM SERPL-MCNC: 9.3 MG/DL — SIGNIFICANT CHANGE UP (ref 8.4–10.5)
CALCIUM SERPL-MCNC: 9.3 MG/DL — SIGNIFICANT CHANGE UP (ref 8.4–10.5)
CHLORIDE SERPL-SCNC: 101 MMOL/L — SIGNIFICANT CHANGE UP (ref 96–108)
CHLORIDE SERPL-SCNC: 101 MMOL/L — SIGNIFICANT CHANGE UP (ref 96–108)
CO2 SERPL-SCNC: 26 MMOL/L — SIGNIFICANT CHANGE UP (ref 22–29)
CO2 SERPL-SCNC: 26 MMOL/L — SIGNIFICANT CHANGE UP (ref 22–29)
CREAT SERPL-MCNC: 0.67 MG/DL — SIGNIFICANT CHANGE UP (ref 0.5–1.3)
CREAT SERPL-MCNC: 0.67 MG/DL — SIGNIFICANT CHANGE UP (ref 0.5–1.3)
CULTURE RESULTS: SIGNIFICANT CHANGE UP
CULTURE RESULTS: SIGNIFICANT CHANGE UP
EGFR: 106 ML/MIN/1.73M2 — SIGNIFICANT CHANGE UP
EGFR: 106 ML/MIN/1.73M2 — SIGNIFICANT CHANGE UP
EOSINOPHIL # BLD AUTO: 0.43 K/UL — SIGNIFICANT CHANGE UP (ref 0–0.5)
EOSINOPHIL # BLD AUTO: 0.43 K/UL — SIGNIFICANT CHANGE UP (ref 0–0.5)
EOSINOPHIL NFR BLD AUTO: 4.4 % — SIGNIFICANT CHANGE UP (ref 0–6)
EOSINOPHIL NFR BLD AUTO: 4.4 % — SIGNIFICANT CHANGE UP (ref 0–6)
GLUCOSE SERPL-MCNC: 134 MG/DL — HIGH (ref 70–99)
GLUCOSE SERPL-MCNC: 134 MG/DL — HIGH (ref 70–99)
HCT VFR BLD CALC: 33.6 % — LOW (ref 39–50)
HCT VFR BLD CALC: 33.6 % — LOW (ref 39–50)
HGB BLD-MCNC: 10.7 G/DL — LOW (ref 13–17)
HGB BLD-MCNC: 10.7 G/DL — LOW (ref 13–17)
IMM GRANULOCYTES NFR BLD AUTO: 0.3 % — SIGNIFICANT CHANGE UP (ref 0–0.9)
IMM GRANULOCYTES NFR BLD AUTO: 0.3 % — SIGNIFICANT CHANGE UP (ref 0–0.9)
LYMPHOCYTES # BLD AUTO: 1.82 K/UL — SIGNIFICANT CHANGE UP (ref 1–3.3)
LYMPHOCYTES # BLD AUTO: 1.82 K/UL — SIGNIFICANT CHANGE UP (ref 1–3.3)
LYMPHOCYTES # BLD AUTO: 18.5 % — SIGNIFICANT CHANGE UP (ref 13–44)
LYMPHOCYTES # BLD AUTO: 18.5 % — SIGNIFICANT CHANGE UP (ref 13–44)
MCHC RBC-ENTMCNC: 30.5 PG — SIGNIFICANT CHANGE UP (ref 27–34)
MCHC RBC-ENTMCNC: 30.5 PG — SIGNIFICANT CHANGE UP (ref 27–34)
MCHC RBC-ENTMCNC: 31.8 GM/DL — LOW (ref 32–36)
MCHC RBC-ENTMCNC: 31.8 GM/DL — LOW (ref 32–36)
MCV RBC AUTO: 95.7 FL — SIGNIFICANT CHANGE UP (ref 80–100)
MCV RBC AUTO: 95.7 FL — SIGNIFICANT CHANGE UP (ref 80–100)
METHOD TYPE: SIGNIFICANT CHANGE UP
METHOD TYPE: SIGNIFICANT CHANGE UP
MONOCYTES # BLD AUTO: 0.95 K/UL — HIGH (ref 0–0.9)
MONOCYTES # BLD AUTO: 0.95 K/UL — HIGH (ref 0–0.9)
MONOCYTES NFR BLD AUTO: 9.7 % — SIGNIFICANT CHANGE UP (ref 2–14)
MONOCYTES NFR BLD AUTO: 9.7 % — SIGNIFICANT CHANGE UP (ref 2–14)
NEUTROPHILS # BLD AUTO: 6.53 K/UL — SIGNIFICANT CHANGE UP (ref 1.8–7.4)
NEUTROPHILS # BLD AUTO: 6.53 K/UL — SIGNIFICANT CHANGE UP (ref 1.8–7.4)
NEUTROPHILS NFR BLD AUTO: 66.3 % — SIGNIFICANT CHANGE UP (ref 43–77)
NEUTROPHILS NFR BLD AUTO: 66.3 % — SIGNIFICANT CHANGE UP (ref 43–77)
ORGANISM # SPEC MICROSCOPIC CNT: SIGNIFICANT CHANGE UP
PLATELET # BLD AUTO: 284 K/UL — SIGNIFICANT CHANGE UP (ref 150–400)
PLATELET # BLD AUTO: 284 K/UL — SIGNIFICANT CHANGE UP (ref 150–400)
POTASSIUM SERPL-MCNC: 4.1 MMOL/L — SIGNIFICANT CHANGE UP (ref 3.5–5.3)
POTASSIUM SERPL-MCNC: 4.1 MMOL/L — SIGNIFICANT CHANGE UP (ref 3.5–5.3)
POTASSIUM SERPL-SCNC: 4.1 MMOL/L — SIGNIFICANT CHANGE UP (ref 3.5–5.3)
POTASSIUM SERPL-SCNC: 4.1 MMOL/L — SIGNIFICANT CHANGE UP (ref 3.5–5.3)
PROT SERPL-MCNC: 6.5 G/DL — LOW (ref 6.6–8.7)
PROT SERPL-MCNC: 6.5 G/DL — LOW (ref 6.6–8.7)
RBC # BLD: 3.51 M/UL — LOW (ref 4.2–5.8)
RBC # BLD: 3.51 M/UL — LOW (ref 4.2–5.8)
RBC # FLD: 16.1 % — HIGH (ref 10.3–14.5)
RBC # FLD: 16.1 % — HIGH (ref 10.3–14.5)
SODIUM SERPL-SCNC: 140 MMOL/L — SIGNIFICANT CHANGE UP (ref 135–145)
SODIUM SERPL-SCNC: 140 MMOL/L — SIGNIFICANT CHANGE UP (ref 135–145)
SPECIMEN SOURCE: SIGNIFICANT CHANGE UP
SPECIMEN SOURCE: SIGNIFICANT CHANGE UP
WBC # BLD: 9.84 K/UL — SIGNIFICANT CHANGE UP (ref 3.8–10.5)
WBC # BLD: 9.84 K/UL — SIGNIFICANT CHANGE UP (ref 3.8–10.5)
WBC # FLD AUTO: 9.84 K/UL — SIGNIFICANT CHANGE UP (ref 3.8–10.5)
WBC # FLD AUTO: 9.84 K/UL — SIGNIFICANT CHANGE UP (ref 3.8–10.5)

## 2023-10-18 PROCEDURE — 85306 CLOT INHIBIT PROT S FREE: CPT

## 2023-10-18 PROCEDURE — 85300 ANTITHROMBIN III ACTIVITY: CPT

## 2023-10-18 PROCEDURE — 87086 URINE CULTURE/COLONY COUNT: CPT

## 2023-10-18 PROCEDURE — 87640 STAPH A DNA AMP PROBE: CPT

## 2023-10-18 PROCEDURE — 82248 BILIRUBIN DIRECT: CPT

## 2023-10-18 PROCEDURE — 82435 ASSAY OF BLOOD CHLORIDE: CPT

## 2023-10-18 PROCEDURE — 76700 US EXAM ABDOM COMPLETE: CPT

## 2023-10-18 PROCEDURE — 83605 ASSAY OF LACTIC ACID: CPT

## 2023-10-18 PROCEDURE — 96372 THER/PROPH/DIAG INJ SC/IM: CPT

## 2023-10-18 PROCEDURE — T1013: CPT

## 2023-10-18 PROCEDURE — 84295 ASSAY OF SERUM SODIUM: CPT

## 2023-10-18 PROCEDURE — C1769: CPT

## 2023-10-18 PROCEDURE — 83735 ASSAY OF MAGNESIUM: CPT

## 2023-10-18 PROCEDURE — C1887: CPT

## 2023-10-18 PROCEDURE — 36415 COLL VENOUS BLD VENIPUNCTURE: CPT

## 2023-10-18 PROCEDURE — 86146 BETA-2 GLYCOPROTEIN ANTIBODY: CPT

## 2023-10-18 PROCEDURE — 80076 HEPATIC FUNCTION PANEL: CPT

## 2023-10-18 PROCEDURE — 87186 SC STD MICRODIL/AGAR DIL: CPT

## 2023-10-18 PROCEDURE — 80202 ASSAY OF VANCOMYCIN: CPT

## 2023-10-18 PROCEDURE — 85379 FIBRIN DEGRADATION QUANT: CPT

## 2023-10-18 PROCEDURE — 86901 BLOOD TYPING SEROLOGIC RH(D): CPT

## 2023-10-18 PROCEDURE — 97112 NEUROMUSCULAR REEDUCATION: CPT

## 2023-10-18 PROCEDURE — 85025 COMPLETE CBC W/AUTO DIFF WBC: CPT

## 2023-10-18 PROCEDURE — 87150 DNA/RNA AMPLIFIED PROBE: CPT

## 2023-10-18 PROCEDURE — 70450 CT HEAD/BRAIN W/O DYE: CPT | Mod: MA

## 2023-10-18 PROCEDURE — 92526 ORAL FUNCTION THERAPY: CPT

## 2023-10-18 PROCEDURE — 0042T: CPT | Mod: MA

## 2023-10-18 PROCEDURE — 82947 ASSAY GLUCOSE BLOOD QUANT: CPT

## 2023-10-18 PROCEDURE — 74230 X-RAY XM SWLNG FUNCJ C+: CPT

## 2023-10-18 PROCEDURE — 85610 PROTHROMBIN TIME: CPT

## 2023-10-18 PROCEDURE — 85027 COMPLETE CBC AUTOMATED: CPT

## 2023-10-18 PROCEDURE — 85613 RUSSELL VIPER VENOM DILUTED: CPT

## 2023-10-18 PROCEDURE — 93325 DOPPLER ECHO COLOR FLOW MAPG: CPT

## 2023-10-18 PROCEDURE — 93005 ELECTROCARDIOGRAM TRACING: CPT

## 2023-10-18 PROCEDURE — 94760 N-INVAS EAR/PLS OXIMETRY 1: CPT

## 2023-10-18 PROCEDURE — 86147 CARDIOLIPIN ANTIBODY EA IG: CPT

## 2023-10-18 PROCEDURE — 92610 EVALUATE SWALLOWING FUNCTION: CPT

## 2023-10-18 PROCEDURE — 94640 AIRWAY INHALATION TREATMENT: CPT

## 2023-10-18 PROCEDURE — 71045 X-RAY EXAM CHEST 1 VIEW: CPT

## 2023-10-18 PROCEDURE — 70496 CT ANGIOGRAPHY HEAD: CPT | Mod: MA

## 2023-10-18 PROCEDURE — 87077 CULTURE AEROBIC IDENTIFY: CPT

## 2023-10-18 PROCEDURE — 80061 LIPID PANEL: CPT

## 2023-10-18 PROCEDURE — 93306 TTE W/DOPPLER COMPLETE: CPT

## 2023-10-18 PROCEDURE — 82553 CREATINE MB FRACTION: CPT

## 2023-10-18 PROCEDURE — 87641 MR-STAPH DNA AMP PROBE: CPT

## 2023-10-18 PROCEDURE — 93320 DOPPLER ECHO COMPLETE: CPT

## 2023-10-18 PROCEDURE — 70498 CT ANGIOGRAPHY NECK: CPT | Mod: MA

## 2023-10-18 PROCEDURE — 84443 ASSAY THYROID STIM HORMONE: CPT

## 2023-10-18 PROCEDURE — 86923 COMPATIBILITY TEST ELECTRIC: CPT

## 2023-10-18 PROCEDURE — 94002 VENT MGMT INPAT INIT DAY: CPT

## 2023-10-18 PROCEDURE — 80053 COMPREHEN METABOLIC PANEL: CPT

## 2023-10-18 PROCEDURE — 82962 GLUCOSE BLOOD TEST: CPT

## 2023-10-18 PROCEDURE — 36430 TRANSFUSION BLD/BLD COMPNT: CPT

## 2023-10-18 PROCEDURE — C1713: CPT

## 2023-10-18 PROCEDURE — 81001 URINALYSIS AUTO W/SCOPE: CPT

## 2023-10-18 PROCEDURE — 74177 CT ABD & PELVIS W/CONTRAST: CPT

## 2023-10-18 PROCEDURE — 61645 PERQ ART M-THROMBECT &/NFS: CPT

## 2023-10-18 PROCEDURE — 85307 ASSAY ACTIVATED PROTEIN C: CPT

## 2023-10-18 PROCEDURE — 85730 THROMBOPLASTIN TIME PARTIAL: CPT

## 2023-10-18 PROCEDURE — 76380 CAT SCAN FOLLOW-UP STUDY: CPT

## 2023-10-18 PROCEDURE — 84100 ASSAY OF PHOSPHORUS: CPT

## 2023-10-18 PROCEDURE — 85014 HEMATOCRIT: CPT

## 2023-10-18 PROCEDURE — C1894: CPT

## 2023-10-18 PROCEDURE — 85018 HEMOGLOBIN: CPT

## 2023-10-18 PROCEDURE — C1760: CPT

## 2023-10-18 PROCEDURE — 80048 BASIC METABOLIC PNL TOTAL CA: CPT

## 2023-10-18 PROCEDURE — 93926 LOWER EXTREMITY STUDY: CPT

## 2023-10-18 PROCEDURE — P9016: CPT

## 2023-10-18 PROCEDURE — 82803 BLOOD GASES ANY COMBINATION: CPT

## 2023-10-18 PROCEDURE — 76377 3D RENDER W/INTRP POSTPROCES: CPT

## 2023-10-18 PROCEDURE — 36600 WITHDRAWAL OF ARTERIAL BLOOD: CPT

## 2023-10-18 PROCEDURE — 97167 OT EVAL HIGH COMPLEX 60 MIN: CPT

## 2023-10-18 PROCEDURE — 84132 ASSAY OF SERUM POTASSIUM: CPT

## 2023-10-18 PROCEDURE — 86900 BLOOD TYPING SEROLOGIC ABO: CPT

## 2023-10-18 PROCEDURE — 87040 BLOOD CULTURE FOR BACTERIA: CPT

## 2023-10-18 PROCEDURE — 94003 VENT MGMT INPAT SUBQ DAY: CPT

## 2023-10-18 PROCEDURE — C1763: CPT

## 2023-10-18 PROCEDURE — 93312 ECHO TRANSESOPHAGEAL: CPT

## 2023-10-18 PROCEDURE — 93970 EXTREMITY STUDY: CPT

## 2023-10-18 PROCEDURE — 83090 ASSAY OF HOMOCYSTEINE: CPT

## 2023-10-18 PROCEDURE — 92611 MOTION FLUOROSCOPY/SWALLOW: CPT

## 2023-10-18 PROCEDURE — 82330 ASSAY OF CALCIUM: CPT

## 2023-10-18 PROCEDURE — 86803 HEPATITIS C AB TEST: CPT

## 2023-10-18 PROCEDURE — 84484 ASSAY OF TROPONIN QUANT: CPT

## 2023-10-18 PROCEDURE — 85303 CLOT INHIBIT PROT C ACTIVITY: CPT

## 2023-10-18 PROCEDURE — 99291 CRITICAL CARE FIRST HOUR: CPT

## 2023-10-18 PROCEDURE — C1889: CPT

## 2023-10-18 PROCEDURE — 84134 ASSAY OF PREALBUMIN: CPT

## 2023-10-18 PROCEDURE — C1757: CPT

## 2023-10-18 PROCEDURE — 85301 ANTITHROMBIN III ANTIGEN: CPT

## 2023-10-18 PROCEDURE — 82550 ASSAY OF CK (CPK): CPT

## 2023-10-18 PROCEDURE — 99239 HOSP IP/OBS DSCHRG MGMT >30: CPT

## 2023-10-18 PROCEDURE — 83036 HEMOGLOBIN GLYCOSYLATED A1C: CPT

## 2023-10-18 PROCEDURE — 97163 PT EVAL HIGH COMPLEX 45 MIN: CPT

## 2023-10-18 PROCEDURE — 97530 THERAPEUTIC ACTIVITIES: CPT

## 2023-10-18 PROCEDURE — 86850 RBC ANTIBODY SCREEN: CPT

## 2023-10-18 PROCEDURE — C9399: CPT

## 2023-10-18 RX ORDER — POLYETHYLENE GLYCOL 3350 17 G/17G
17 POWDER, FOR SOLUTION ORAL
Qty: 0 | Refills: 0 | DISCHARGE
Start: 2023-10-18

## 2023-10-18 RX ORDER — TAMSULOSIN HYDROCHLORIDE 0.4 MG/1
2 CAPSULE ORAL
Qty: 0 | Refills: 0 | DISCHARGE
Start: 2023-10-18

## 2023-10-18 RX ORDER — LABETALOL HCL 100 MG
2 TABLET ORAL
Qty: 0 | Refills: 0 | DISCHARGE
Start: 2023-10-18

## 2023-10-18 RX ORDER — SENNA PLUS 8.6 MG/1
1 TABLET ORAL
Qty: 0 | Refills: 0 | DISCHARGE
Start: 2023-10-18

## 2023-10-18 RX ORDER — CYCLOBENZAPRINE HYDROCHLORIDE 10 MG/1
1 TABLET, FILM COATED ORAL
Refills: 0 | DISCHARGE

## 2023-10-18 RX ORDER — APIXABAN 2.5 MG/1
1 TABLET, FILM COATED ORAL
Qty: 0 | Refills: 0 | DISCHARGE
Start: 2023-10-18

## 2023-10-18 RX ORDER — ASCORBIC ACID 60 MG
1 TABLET,CHEWABLE ORAL
Qty: 0 | Refills: 0 | DISCHARGE
Start: 2023-10-18

## 2023-10-18 RX ORDER — ATORVASTATIN CALCIUM 80 MG/1
1 TABLET, FILM COATED ORAL
Qty: 0 | Refills: 0 | DISCHARGE
Start: 2023-10-18

## 2023-10-18 RX ORDER — MIDODRINE HYDROCHLORIDE 2.5 MG/1
1 TABLET ORAL
Qty: 0 | Refills: 0 | DISCHARGE
Start: 2023-10-18

## 2023-10-18 RX ADMIN — CEFTRIAXONE 1000 MILLIGRAM(S): 500 INJECTION, POWDER, FOR SOLUTION INTRAMUSCULAR; INTRAVENOUS at 14:54

## 2023-10-18 RX ADMIN — APIXABAN 5 MILLIGRAM(S): 2.5 TABLET, FILM COATED ORAL at 06:34

## 2023-10-18 NOTE — PROGRESS NOTE ADULT - ASSESSMENT
ASSESSMENT: 63M male with PMHx of T2DM, HTN who presented after a fall with right sided weakness, dysarthria and right facial droop. Patient was found to have LM1 occlusion s/p tenecteplase in ED and had mechanical thrombectomy done with TICI 2c. Decompressive craniectomy for midline shift was done on 8/30/2023, SHUN drain was removed on 8/31, and left cranioplasty on 9/29/2023. SAMARA was negative for thrombus. SHUN drain x2 removed. CT head stable compared to prior imaging. On Eliquis for long term anticoagulation. Course complicated by UTI, on Ceftriaxone. Cultures positive for Klebsiella pneumoniae, urine sensitivity results are pending.     UTI, complicated/Urinary Retention  - Afebrile, no leukocytosis  - Urinalysis with WBC >50, mod. leukocyte esterase, no nitrites, many bacteria  - Ceftriaxone 1g IV until 10/25/2023  - Urine culture + for Klebsiella pneumoniae  - Sensitivities ------> Pending  - Urinary retention likely due to UTI  - Umaña placed overnight   - Flomax 0.8 mg    CVA  LM1 Occlusion, Midline Shift  - s/p mechanical thrombectomy with TICI 2c recanalization  - s/p decompressive Craniectomy on 8/30  - s/p - Left cranioplasty on 9/29, SHUN drains removed   - SAMARA negative for PFO, atrial thrombus  - Atorvastatin 80 mg nightly  - Lupus anticoagulant positive, normal ACL and B2G antibodies with repeat testing negative.  - Antiphospholipid syndrome unlikely given negative repeat testing  - Repeat Lupus anticoagulant testing in 12 weeks per hematology  - ILR placement outpatient per cardiology  - s/p Heparin drip  - CT head stable  - NSG recs appreciated  - Continue Eliquis   - Sutures will resorb, Bacitracin daily      Hypokalemia  - Resolved  - Monitor BMP    Klebsiella Bacteremia  - Resolved  - s/p Ceftriaxone, Zosyn, Vancomycin, Cefazolin    Anemia  - H/H stable  - Continue to monitor    HTN  - Maintain normotensive per neurosurgery  - Hydralazine 10 mg IV PRN  - Labetalol 10 mg IV PRN  - Midodrine PRN     T2DM  - A1c 6.8    DVT ppx  - Eliquis 5 mg BID    Dispo: DC to [facility], pending urine sensitivity results   suture/staple removal

## 2023-10-18 NOTE — PROGRESS NOTE ADULT - REASON FOR ADMISSION
Cranioplasty
LT M1 Occlusion

## 2023-10-18 NOTE — PROGRESS NOTE ADULT - PROVIDER SPECIALTY LIST ADULT
Brain Injury Medicine
Cardiology
Cardiology
Hospitalist
Internal Medicine
Internal Medicine
Intervent Radiology
NSICU
Neurology
Neurosurgery
Rehab Medicine
Cardiology
Hospitalist
Internal Medicine
Neurology
Neurosurgery
Hospitalist
Internal Medicine
NSICU
Neurology
Neurosurgery
Hospitalist
Internal Medicine
Intervent Radiology
NSICU
NSICU
Neurology
Neurosurgery
Hospitalist
Internal Medicine
Internal Medicine
NSICU
NSICU
Neurosurgery
Hospitalist
NSICU
Cardiology
NSICU

## 2023-10-18 NOTE — PROGRESS NOTE ADULT - NUTRITIONAL ASSESSMENT
This patient has been assessed with a concern for Malnutrition and has been determined to have a diagnosis/diagnoses of Moderate protein-calorie malnutrition.    This patient is being managed with:   Diet Pureed-  Consistent Carbohydrate {Evening Snack} (CSTCHOSN)  Moderately Thick Liquids (MODTHICKLIQS)  Entered: Oct  1 2023 10:52AM  
This patient has been assessed with a concern for Malnutrition and has been determined to have a diagnosis/diagnoses of Moderate protein-calorie malnutrition.    This patient is being managed with:   Diet Pureed-  Consistent Carbohydrate {Evening Snack} (CSTCHOSN)  Moderately Thick Liquids (MODTHICKLIQS)  Supplement Feeding Modality:  Oral  Ensure Enlive Cans or Servings Per Day:  1       Frequency:  Three Times a day  Entered: Oct  8 2023  7:13PM  
This patient has been assessed with a concern for Malnutrition and has been determined to have a diagnosis/diagnoses of Moderate protein-calorie malnutrition.    This patient is being managed with:   Diet Easy to Chew-  Consistent Carbohydrate {No Snacks} (CSTCHO)  DASH/TLC {Sodium & Cholesterol Restricted} (DASH)  Moderately Thick Liquids (MODTHICKLIQS)  Low Sodium  Supplement Feeding Modality:  Oral  Glucerna Shake Cans or Servings Per Day:  3       Frequency:  Daily  Entered: Oct 10 2023 12:17PM  
This patient has been assessed with a concern for Malnutrition and has been determined to have a diagnosis/diagnoses of Moderate protein-calorie malnutrition.    This patient is being managed with:   Diet Easy to Chew-  Consistent Carbohydrate {No Snacks} (CSTCHO)  DASH/TLC {Sodium & Cholesterol Restricted} (DASH)  Moderately Thick Liquids (MODTHICKLIQS)  Low Sodium  Supplement Feeding Modality:  Oral  Glucerna Shake Cans or Servings Per Day:  3       Frequency:  Daily  Entered: Oct 10 2023 12:17PM  
This patient has been assessed with a concern for Malnutrition and has been determined to have a diagnosis/diagnoses of Moderate protein-calorie malnutrition.    This patient is being managed with:   Diet Pureed-  Consistent Carbohydrate {Evening Snack} (CSTCHOSN)  Moderately Thick Liquids (MODTHICKLIQS)  Entered: Oct  1 2023 10:52AM  
This patient has been assessed with a concern for Malnutrition and has been determined to have a diagnosis/diagnoses of Moderate protein-calorie malnutrition.    This patient is being managed with:   Diet Easy to Chew-  Consistent Carbohydrate {No Snacks} (CSTCHO)  DASH/TLC {Sodium & Cholesterol Restricted} (DASH)  Moderately Thick Liquids (MODTHICKLIQS)  Low Sodium  Supplement Feeding Modality:  Oral  Glucerna Shake Cans or Servings Per Day:  3       Frequency:  Daily  Entered: Oct 10 2023 12:17PM  
This patient has been assessed with a concern for Malnutrition and has been determined to have a diagnosis/diagnoses of Moderate protein-calorie malnutrition.    This patient is being managed with:   Diet Pureed-  Consistent Carbohydrate {Evening Snack} (CSTCHOSN)  Moderately Thick Liquids (MODTHICKLIQS)  Entered: Oct  1 2023 10:52AM  
This patient has been assessed with a concern for Malnutrition and has been determined to have a diagnosis/diagnoses of Moderate protein-calorie malnutrition.    This patient is being managed with:   Diet Pureed-  Consistent Carbohydrate {Evening Snack} (CSTCHOSN)  Moderately Thick Liquids (MODTHICKLIQS)  Supplement Feeding Modality:  Oral  Ensure Enlive Cans or Servings Per Day:  1       Frequency:  Three Times a day  Entered: Oct  8 2023  7:13PM  
This patient has been assessed with a concern for Malnutrition and has been determined to have a diagnosis/diagnoses of Moderate protein-calorie malnutrition.    This patient is being managed with:   Diet Pureed-  Consistent Carbohydrate {Evening Snack} (CSTCHOSN)  Moderately Thick Liquids (MODTHICKLIQS)  Supplement Feeding Modality:  Oral  Ensure Enlive Cans or Servings Per Day:  1       Frequency:  Three Times a day  Entered: Oct  8 2023  7:13PM  
This patient has been assessed with a concern for Malnutrition and has been determined to have a diagnosis/diagnoses of Moderate protein-calorie malnutrition.    This patient is being managed with:   Diet Easy to Chew-  Consistent Carbohydrate {No Snacks} (CSTCHO)  DASH/TLC {Sodium & Cholesterol Restricted} (DASH)  Moderately Thick Liquids (MODTHICKLIQS)  Low Sodium  Supplement Feeding Modality:  Oral  Glucerna Shake Cans or Servings Per Day:  3       Frequency:  Daily  Entered: Oct 10 2023 12:17PM  
This patient has been assessed with a concern for Malnutrition and has been determined to have a diagnosis/diagnoses of Moderate protein-calorie malnutrition.    This patient is being managed with:   Diet Pureed-  Consistent Carbohydrate {Evening Snack} (CSTCHOSN)  Moderately Thick Liquids (MODTHICKLIQS)  Entered: Oct  1 2023 10:52AM  
This patient has been assessed with a concern for Malnutrition and has been determined to have a diagnosis/diagnoses of Moderate protein-calorie malnutrition.    This patient is being managed with:   Diet Easy to Chew-  Consistent Carbohydrate {No Snacks} (CSTCHO)  DASH/TLC {Sodium & Cholesterol Restricted} (DASH)  Moderately Thick Liquids (MODTHICKLIQS)  Low Sodium  Supplement Feeding Modality:  Oral  Glucerna Shake Cans or Servings Per Day:  3       Frequency:  Daily  Entered: Oct 10 2023 12:17PM  
This patient has been assessed with a concern for Malnutrition and has been determined to have a diagnosis/diagnoses of Moderate protein-calorie malnutrition.    This patient is being managed with:   Diet Easy to Chew-  Consistent Carbohydrate {No Snacks} (CSTCHO)  DASH/TLC {Sodium & Cholesterol Restricted} (DASH)  Moderately Thick Liquids (MODTHICKLIQS)  Low Sodium  Supplement Feeding Modality:  Oral  Glucerna Shake Cans or Servings Per Day:  3       Frequency:  Daily  Entered: Oct 10 2023 12:17PM  
This patient has been assessed with a concern for Malnutrition and has been determined to have a diagnosis/diagnoses of Moderate protein-calorie malnutrition.    This patient is being managed with:   Diet Pureed-  Consistent Carbohydrate {Evening Snack} (CSTCHOSN)  Moderately Thick Liquids (MODTHICKLIQS)  Entered: Oct  1 2023 10:52AM  
This patient has been assessed with a concern for Malnutrition and has been determined to have a diagnosis/diagnoses of Moderate protein-calorie malnutrition.    This patient is being managed with:   Diet Pureed-  Consistent Carbohydrate {Evening Snack} (CSTCHOSN)  Moderately Thick Liquids (MODTHICKLIQS)  Supplement Feeding Modality:  Oral  Ensure Enlive Cans or Servings Per Day:  1       Frequency:  Three Times a day  Entered: Oct  8 2023  7:13PM

## 2023-10-18 NOTE — PROGRESS NOTE ADULT - SUBJECTIVE AND OBJECTIVE BOX
Patient is a 63y old  Male who presents with a chief complaint of LT M1 Occlusion (17 Oct 2023 09:03)      INTERVAL HPI/OVERNIGHT EVENTS: No acute events overnight. Patient had 420 cc on bladder scan overnight so longo was placed.     MEDICATIONS  (STANDING):  apixaban 5 milliGRAM(s) Oral two times a day  ascorbic acid 500 milliGRAM(s) Oral daily  atorvastatin 80 milliGRAM(s) Oral at bedtime  cefTRIAXone Injectable. 1000 milliGRAM(s) IV Push every 24 hours  multivitamin 1 Tablet(s) Oral daily  polyethylene glycol 3350 17 Gram(s) Oral daily  senna 1 Tablet(s) Oral every 24 hours  tamsulosin 0.8 milliGRAM(s) Oral at bedtime    MEDICATIONS  (PRN):  acetaminophen   IVPB .. 1000 milliGRAM(s) IV Intermittent every 6 hours PRN Severe Pain (7 - 10)  hydrALAZINE Injectable 10 milliGRAM(s) IV Push every 2 hours   labetalol Injectable 10 milliGRAM(s) IV Push every 2 hours PRN Systolic blood pressure >160  midodrine 5 milliGRAM(s) Oral every 8 hours PRN for sbp <100      Allergies: No Known Allergies        REVIEW OF SYSTEMS: unable to be assessed due to clinical condition      Vital Signs Last 24 Hrs  T(C): 36.7 (18 Oct 2023 09:55), Max: 36.9 (17 Oct 2023 20:10)  T(F): 98 (18 Oct 2023 09:55), Max: 98.4 (17 Oct 2023 20:10)  HR: 98 (18 Oct 2023 10:00) (94 - 108)  BP: 123/79 (18 Oct 2023 09:55) (102/68 - 130/88)  RR: 19 (18 Oct 2023 09:55) (18 - 19)  SpO2: 98% (18 Oct 2023 09:55) (94% - 98%)    Parameters below as of 18 Oct 2023 09:55  Patient On (Oxygen Delivery Method): room air      PHYSICAL EXAM:  GENERAL: lying comfortably in bed, pleasant  HEAD:  Atraumatic, Normocephalic  EYES: EOMI, PERRLA, conjunctiva and sclera clear  NECK: Supple, No JVD  NERVOUS SYSTEM: alert, awake, + expressive aphasia, follows commands, + mild right facial droop, 5/5 strength in LUE and LLE, + R hemiplegia, + total sensory loss in LUE and LLE, sensation intact in RUE and RLE.   CHEST/LUNG: Clear to auscultation bilaterally, no wheezes  HEART: Regular rate and rhythm; No murmurs  ABDOMEN: Soft, Nontender; Bowel sounds present  EXTREMITIES:  No cyanosis, or edema  SKIN: No rashes, + sutures on scalp clean, dry, intact      LABS:                        10.7   9.84  )-----------( 284      ( 18 Oct 2023 04:55 )             33.6     10-18    140  |  101  |  15.7  ----------------------------<  134<H>  4.1   |  26.0  |  0.67    Ca    9.3      18 Oct 2023 04:55    TPro  6.5<L>  /  Alb  3.7  /  TBili  0.3<L>  /  DBili  x   /  AST  26  /  ALT  45<H>  /  AlkPhos  102  10-18      Urinalysis Basic - ( 18 Oct 2023 04:55 )    Color: x / Appearance: x / SG: x / pH: x  Gluc: 134 mg/dL / Ketone: x  / Bili: x / Urobili: x   Blood: x / Protein: x / Nitrite: x   Leuk Esterase: x / RBC: x / WBC x   Sq Epi: x / Non Sq Epi: x / Bacteria: x      CAPILLARY BLOOD GLUCOSE          RADIOLOGY & ADDITIONAL TESTS:    CT Head No Cont (10.11.23 @ 09:28) >  1.  Postoperative changes related to a prior left-sided craniotomy with   cranioplasty, grossly stable.  2.  Mixed density subdural hematoma along the left cerebral convexity,   relatively stable.  3.  Improvement of midline shift to the right now measuring approximately   2 mm.  4.  Left MCA territory infarction, grossly stable when compared to prior   imaging.

## 2023-10-27 ENCOUNTER — APPOINTMENT (OUTPATIENT)
Dept: NEUROSURGERY | Facility: CLINIC | Age: 63
End: 2023-10-27
Payer: MEDICAID

## 2023-10-27 VITALS
TEMPERATURE: 98.3 F | BODY MASS INDEX: 32.6 KG/M2 | HEART RATE: 91 BPM | OXYGEN SATURATION: 98 % | DIASTOLIC BLOOD PRESSURE: 79 MMHG | HEIGHT: 63 IN | SYSTOLIC BLOOD PRESSURE: 116 MMHG | WEIGHT: 184 LBS

## 2023-10-27 DIAGNOSIS — I63.513 CEREBRAL INFARCTION DUE TO UNSPECIFIED OCCLUSION OR STENOSIS OF BILATERAL MIDDLE CEREBRAL ARTERIES: ICD-10-CM

## 2023-10-27 PROCEDURE — 99024 POSTOP FOLLOW-UP VISIT: CPT

## 2023-12-14 ENCOUNTER — APPOINTMENT (OUTPATIENT)
Dept: NEUROLOGY | Facility: CLINIC | Age: 63
End: 2023-12-14

## 2023-12-29 ENCOUNTER — APPOINTMENT (OUTPATIENT)
Dept: NEUROSURGERY | Facility: CLINIC | Age: 63
End: 2023-12-29

## 2024-01-01 NOTE — DIETITIAN INITIAL EVALUATION ADULT - NUTRITION CONSULT
SUBJECTIVE:  Fatemeh Tate is a 8 m.o. female here accompanied by mother for Cough and Nasal Congestion    HPI  Parent reports that patient has been sick for about 3-4 days now. No fever. Cough, congestion and runny nose. Seems to be more fussy and irritable. Decreased appetite and activity. More clingy. No vomiting. Some loose stools. Also teething. Normal wet diapers. Has been pulling on both ears, no ear drainage.   Fatemeh's allergies, medications, history, and problem list were updated as appropriate.    Review of Systems   A comprehensive review of symptoms was completed and negative except as noted above.    OBJECTIVE:  Vital signs  Vitals:    12/30/24 1314   Pulse: (!) 138   Temp: 98.8 °F (37.1 °C)   TempSrc: Temporal   SpO2: 100%   Weight: 8.18 kg (18 lb 0.5 oz)        Physical Exam  Vitals and nursing note reviewed.   Constitutional:       General: She is active.      Appearance: She is well-developed.   HENT:      Right Ear: Tympanic membrane and ear canal normal.      Left Ear: Ear canal normal. Tympanic membrane is erythematous and bulging.      Nose: Congestion and rhinorrhea present.      Mouth/Throat:      Mouth: Mucous membranes are moist.      Pharynx: Oropharynx is clear.   Eyes:      Conjunctiva/sclera: Conjunctivae normal.   Cardiovascular:      Rate and Rhythm: Normal rate and regular rhythm.      Pulses: Normal pulses.      Heart sounds: Normal heart sounds.   Pulmonary:      Effort: Pulmonary effort is normal.      Breath sounds: Normal breath sounds.   Abdominal:      General: Abdomen is flat.      Palpations: Abdomen is soft.   Skin:     General: Skin is warm.      Capillary Refill: Capillary refill takes less than 2 seconds.      Findings: No rash.   Neurological:      Mental Status: She is alert.          ASSESSMENT/PLAN:  1. Left otitis media, unspecified otitis media type  -     amoxicillin (AMOXIL) 400 mg/5 mL suspension; Take 4.5 mLs (360 mg total) by mouth 2 (two) times  daily. for 10 days  Dispense: 90 mL; Refill: 0    2. Cough, unspecified type    3. Otalgia of both ears    Amoxicillin for acute left OM with cold symptoms  Supportive care emphasized for cold symptoms  Nasal saline with suctioning, humidifier, steam bath  Encouraged fluids to maintain hydration  Monitor fever trend - Tylenol/Motrin as needed        No results found for this or any previous visit (from the past 24 hours).    Follow Up:  Follow up in about 2 weeks (around 1/13/2025), or if symptoms worsen or fail to improve.         no yes

## 2024-02-28 NOTE — PHYSICAL THERAPY INITIAL EVALUATION ADULT - PLANNED THERAPY INTERVENTIONS, PT EVAL
Mobic 15 mg X14 with 2 refills sent to pharmacy per Dr. Beauchamp. Patient was notified.   
balance training/bed mobility training/motor coordination training/neuromuscular re-education/transfer training
balance training/bed mobility training/gait training/strengthening/stretching/transfer training

## 2024-10-24 NOTE — PROGRESS NOTE ADULT - ASSESSMENT
----- Message from Julian Dunbar MD sent at 10/23/2024  4:18 PM CDT -----  Please let patient know.  The colon biopsies came out fine.  No evidence of active colitis.      Rec remains the same.  Take meds as directed.   Repeat colonoscopy in 2 yrs.   INCOMPLETE  ASSESSMENT: Patient is a 63 year old male with PMHx Hypertension, DM on oral medications who presented c/o stroke-like symptoms. Per son pt was walking outside around 10:15am on 8/27/23 when he suddenly fell. His son helped him up and noted that he was weak on the right side and not acting normally which prompted him to come to the ED. On arrival patient came in with right sided arm weakness, global aphasia, and dysarthria, right sided facial droop. Initial head CT demonstrated wedge like low density in the left putamen and caudate (corpus striatum) consistent with infarct that may be acute and no acute intracranial hemorrhage. Tenecteplase was administered at 12:15 pm. CTA head demonstrated occlusion of left MCA distal M1 segment and CTP showed large mismatch. found to have LM1 occlusion. Patient taken for mechanical thrombectomy where he was found to have LM1 occlusion with TICI 2C recanalization.   Etiology: embolic stroke of undetermined source    NEURO:   -Neurologically with right sided hemiparesis and aphasia. No verbal output  -Repeat head CT on 8/29/23   -Continue close monitoring for neurologic deterioration    - Stroke neuro checks q 1. As per neuro ICU  - Permissive HTN up to . Avoid hypotension and rapid fluctuations    -ANTITHROMBOTIC THERAPY: Pending findings of post tenecteplase and cerebral angiogram 24 hour head CT. If no hemorrhage found can start ASA 81mg daily  -titrate statin to LDL goal less than 70. suggest high intensity statin when medically optimized and no medical contraindications in setting of acute stroke and LDL of 80.  -Obtain MRI Brain w/o  -Dysphagia screen: pass/fail - pending  -Physical therapy/OT/Speech eval/treatment.     -CARDIOVASCULAR: TTE pending, cardiac monitoring w/ telemetry for now, further evaluation pending findings of noted workup                              -HEMATOLOGY: H/H 11.4/32.6.  Monitor for signs and symptoms of further anemia. Platelets 171. patient should have all age and risk appropriate malignancy screenings with PCP or sooner if clinically suspected      DVT ppx: Heparin s.c [] LMWH [] SCD for now. If no hemorrhage found on 24 hour post mechanical thrombectomy can start lovenox.     PULMONARY: CXR clear.  protecting airway, saturating well     RENAL: BUN/Cr 15.9/0.91. monitor urine output, maintain adequate hydration. Noted fullness around right thigh. CT abdomen pelvis ruled out bladder injury.      Na Goal:  135-145    ID: afebrile, leukocytosis 13.18. monitor for si/sx of infection. Started on Zosyn per primary team.      OTHER:  A1C is 6.8%. Suggest endocrine consult. condition and plan of care d/w patient, questions and concerns addressed.     DISPOSITION: Rehab or home depending on PT eval once stable and workup is complete      CORE MEASURES:        Admission NIHSS: 23     Tenecteplase : [x] YES [] NO      LDL/HDL/A1C: 80/39/6.8     Depression Screen- if depression hx and/or present      Statin Therapy: as noted     Dysphagia Screen: [] PASS [] FAIL- pending     Smoking [] YES [x] NO      Afib [] YES [x] NO     Stroke Education [] YES [] NO- ordered and pending    Obtain screening lower extremity venous ultrasound in patients who meet 1 or more of the following criteria as patient is high risk for DVT/PE on admission:   [] History of DVT/PE  []Hypercoagulable states (Factor V Leiden, Cancer, OCP, etc. )  []Prolonged immobility (hemiplegia/hemiparesis/post operative or any other extended immobilization)  [] Transferred from outside facility (Rehab or Long term care)  [] Age </= to 50 INCOMPLETE  ASSESSMENT: Patient is a 63 year old male with PMHx Hypertension, DM on oral medications who presented c/o stroke-like symptoms. Per son pt was walking outside around 10:15am on 8/27/23 when he suddenly fell. His son helped him up and noted that he was weak on the right side and not acting normally which prompted him to come to the ED. On arrival patient came in with right sided arm weakness, global aphasia, and dysarthria, right sided facial droop. Initial head CT demonstrated wedge like low density in the left putamen and caudate (corpus striatum) consistent with infarct that may be acute and no acute intracranial hemorrhage. Tenecteplase was administered at 12:15 pm. CTA head demonstrated occlusion of left MCA distal M1 segment and CTP showed large mismatch. found to have LM1 occlusion. Patient taken for mechanical thrombectomy where he was found to have LM1 occlusion with TICI 2C recanalization.   Etiology: embolic stroke of undetermined source    NEURO:   -Neurologically with right sided hemiparesis and aphasia. No verbal output  -Repeat head CT on 8/29/23 demonstrated continued evolution of large acute left MCA infarct with increased left to right midline shift of(6mm). Petechial hemorrhage cannot be excluded, however there is no large parenchymal hemorrhage   -Continue close monitoring for neurologic deterioration    - Stroke neuro checks q 1. As per neuro ICU  - Permissive HTN up to . Avoid hypotension and rapid fluctuations    -ANTITHROMBOTIC THERAPY: Pending findings of post tenecteplase and cerebral angiogram 24 hour head CT. If no hemorrhage found can start ASA 81mg daily  -titrate statin to LDL goal less than 70. suggest high intensity statin when medically optimized and no medical contraindications in setting of acute stroke and LDL of 80.  -Obtain MRI Brain w/o  -Dysphagia screen: pass/fail - pending  -Physical therapy/OT/Speech eval/treatment.     -CARDIOVASCULAR: TTE pending, cardiac monitoring w/ telemetry for now, further evaluation pending findings of noted workup                              -HEMATOLOGY: H/H 11.4/32.6.  Monitor for signs and symptoms of further anemia. Platelets 171. patient should have all age and risk appropriate malignancy screenings with PCP or sooner if clinically suspected      DVT ppx: Heparin s.c [] LMWH [] SCD for now. If no hemorrhage found on 24 hour post mechanical thrombectomy can start lovenox.     PULMONARY: CXR clear.  protecting airway, saturating well     RENAL: BUN/Cr 15.9/0.91. monitor urine output, maintain adequate hydration. Noted fullness around right thigh. CT abdomen pelvis ruled out bladder injury.      Na Goal:  135-145    ID: afebrile, leukocytosis 13.18. monitor for si/sx of infection. Started on Zosyn per primary team.      OTHER:  A1C is 6.8%. Suggest endocrine consult. condition and plan of care d/w patient, questions and concerns addressed.     DISPOSITION: Rehab or home depending on PT eval once stable and workup is complete      CORE MEASURES:        Admission NIHSS: 23     Tenecteplase : [x] YES [] NO      LDL/HDL/A1C: 80/39/6.8     Depression Screen- if depression hx and/or present      Statin Therapy: as noted     Dysphagia Screen: [] PASS [] FAIL- pending     Smoking [] YES [x] NO      Afib [] YES [x] NO     Stroke Education [] YES [] NO- ordered and pending    Obtain screening lower extremity venous ultrasound in patients who meet 1 or more of the following criteria as patient is high risk for DVT/PE on admission:   [] History of DVT/PE  []Hypercoagulable states (Factor V Leiden, Cancer, OCP, etc. )  []Prolonged immobility (hemiplegia/hemiparesis/post operative or any other extended immobilization)  [] Transferred from outside facility (Rehab or Long term care)  [] Age </= to 50 INCOMPLETE  ASSESSMENT: Patient is a 63 year old male with PMHx Hypertension, DM on oral medications who presented c/o stroke-like symptoms. Per son pt was walking outside around 10:15am on 8/27/23 when he suddenly fell. His son helped him up and noted that he was weak on the right side and not acting normally which prompted him to come to the ED. On arrival patient came in with right sided arm weakness, global aphasia, and dysarthria, right sided facial droop. Initial head CT demonstrated wedge like low density in the left putamen and caudate (corpus striatum) consistent with infarct that may be acute and no acute intracranial hemorrhage. Tenecteplase was administered at 12:15 pm. CTA head demonstrated occlusion of left MCA distal M1 segment and CTP showed large mismatch. found to have LM1 occlusion. Patient taken for mechanical thrombectomy where he was found to have LM1 occlusion with TICI 2C recanalization.   Etiology: embolic stroke of undetermined source    NEURO:   -Neurologically with right sided hemiparesis and aphasia. No verbal output  -Repeat head CT on 8/29/23 demonstrated continued evolution of large acute left MCA infarct with increased left to right midline shift of(6mm). Petechial hemorrhage cannot be excluded, however there is no large parenchymal hemorrhage   -Continue close monitoring for neurologic deterioration    - Stroke neuro checks q 1. As per neuro ICU  - Permissive HTN up to . Avoid hypotension and rapid fluctuations    -ANTITHROMBOTIC THERAPY: Pending findings of post tenecteplase and cerebral angiogram 24 hour head CT. If no hemorrhage found can start ASA 81mg daily  -titrate statin to LDL goal less than 70. suggest high intensity statin when medically optimized and no medical contraindications in setting of acute stroke and LDL of 80.  -Obtain MRI Brain w/o  -Dysphagia screen: pass/fail - pending  -Physical therapy/OT/Speech eval/treatment.     -CARDIOVASCULAR: TTE pending, cardiac monitoring w/ telemetry for now, further evaluation pending findings of noted workup                              -HEMATOLOGY: H/H 11.4/32.6.  Monitor for signs and symptoms of further anemia. Platelets 171. patient should have all age and risk appropriate malignancy screenings with PCP or sooner if clinically suspected      DVT ppx: Heparin s.c [] LMWH [] SCD for now. If no hemorrhage found on 24 hour post mechanical thrombectomy can start lovenox.     PULMONARY: CXR clear.  protecting airway, saturating well     RENAL: BUN/Cr 15.9/0.91. monitor urine output, maintain adequate hydration. Noted fullness around right thigh. CT abdomen pelvis ruled out bladder injury.      Na Goal:  135-145    ID: afebrile, leukocytosis 13.18. monitor for si/sx of infection. Started on Zosyn per primary team due to concern for evolving sepsis in setting of tachycardia, hypotension, and leukocytosis.     OTHER:  A1C is 6.8%. Suggest endocrine consult. condition and plan of care d/w patient, questions and concerns addressed.     DISPOSITION: Rehab or home depending on PT eval once stable and workup is complete      CORE MEASURES:        Admission NIHSS: 23     Tenecteplase : [x] YES [] NO      LDL/HDL/A1C: 80/39/6.8     Depression Screen- if depression hx and/or present      Statin Therapy: as noted     Dysphagia Screen: [] PASS [] FAIL- pending     Smoking [] YES [x] NO      Afib [] YES [x] NO     Stroke Education [] YES [] NO- ordered and pending    Obtain screening lower extremity venous ultrasound in patients who meet 1 or more of the following criteria as patient is high risk for DVT/PE on admission:   [] History of DVT/PE  []Hypercoagulable states (Factor V Leiden, Cancer, OCP, etc. )  []Prolonged immobility (hemiplegia/hemiparesis/post operative or any other extended immobilization)  [] Transferred from outside facility (Rehab or Long term care)  [] Age </= to 50 ASSESSMENT: Patient is a 63 year old male with PMHx Hypertension, DM on oral medications who presented c/o stroke-like symptoms. Per son pt was walking outside around 10:15am on 8/27/23 when he suddenly fell. His son helped him up and noted that he was weak on the right side and not acting normally which prompted him to come to the ED. On arrival patient came in with right sided arm weakness, global aphasia, and dysarthria, right sided facial droop. Initial head CT demonstrated wedge like low density in the left putamen and caudate (corpus striatum) consistent with infarct that may be acute and no acute intracranial hemorrhage. Tenecteplase was administered at 12:15 pm. CTA head demonstrated occlusion of left MCA distal M1 segment and CTP showed large mismatch. found to have LM1 occlusion. Patient taken for mechanical thrombectomy where he was found to have LM1 occlusion with TICI 2C recanalization. Patient under care in the neuro ICU for blaine crani watch in setting of sizable infarction.   Etiology: embolic stroke of undetermined source    NEURO:   -Neurologically with slight improvement, as patient is following few simple midline commands  -Repeat head CT on 8/29/23 demonstrated continued evolution of large acute left MCA infarct with increased left to right midline shift of(6mm). Petechial hemorrhage cannot be excluded, however there is no large parenchymal hemorrhage   -Continue close monitoring for neurologic deterioration    - Stroke neuro checks q 1 as per neuro ICU  - Permissive HTN up to  as pe neuro ICU. Avoid hypotension and rapid fluctuations    -ANTITHROMBOTIC THERAPY: ASA 81mg daily as repeat head CT showed no large parenchymal hemorrhage and neuro sx cleared patient for ASA 81mg daily as well.   -titrate statin to LDL goal less than 70. suggest high intensity statin when medically optimized and no medical contraindications in setting of acute stroke and LDL of 80.  -Obtain MRI Brain w/o  -Dysphagia screen: pass/fail - NGT for now.   -Physical therapy/OT/Speech eval/treatment.     -CARDIOVASCULAR: TTE pending, cardiac monitoring w/ telemetry for now, further evaluation pending findings of noted workup                              -HEMATOLOGY: H/H 11.4/32.6.  Monitor for signs and symptoms of further anemia. Platelets 171. patient should have all age and risk appropriate malignancy screenings with PCP or sooner if clinically suspected      DVT ppx: Heparin s.c [] LMWH [x] repeat head CT showed no large parenchymal hemorrhage and neuro sx cleared patient for LMWH as well.     PULMONARY: CXR clear.  Protecting airway, saturating well     RENAL: BUN/Cr 15.9/0.91. monitor urine output, maintain adequate hydration. Noted fullness on 8/28/23 physical exam around right thigh. CT abdomen pelvis ruled out bladder injury.      Na Goal:  135-145. Sodium levels currently 135. Avoid hyponatremia and rapid fluctuations.     ID: afebrile, leukocytosis 13.18. monitor for si/sx of infection. Zosyn regimen stopped per primary team.    OTHER:  A1C is 6.8%. Suggest endocrine consult. condition and plan of care d/w patient, questions and concerns addressed.     DISPOSITION: Rehab or home depending on PT eval once stable and workup is complete      CORE MEASURES:        Admission NIHSS: 23     Tenecteplase : [x] YES [] NO      LDL/HDL/A1C: 80/39/6.8     Depression Screen- if depression hx and/or present      Statin Therapy: as noted     Dysphagia Screen: [] PASS [] FAIL- pending     Smoking [] YES [x] NO      Afib [] YES [x] NO     Stroke Education [] YES [] NO- ordered and pending    Obtain screening lower extremity venous ultrasound in patients who meet 1 or more of the following criteria as patient is high risk for DVT/PE on admission:   [] History of DVT/PE  []Hypercoagulable states (Factor V Leiden, Cancer, OCP, etc. )  []Prolonged immobility (hemiplegia/hemiparesis/post operative or any other extended immobilization)  [] Transferred from outside facility (Rehab or Long term care)  [] Age </= to 50 ASSESSMENT: Patient is a 63 year old male with PMHx Hypertension, DM on oral medications who presented c/o stroke-like symptoms. Per son pt was walking outside around 10:15am on 8/27/23 when he suddenly fell. His son helped him up and noted that he was weak on the right side and not acting normally which prompted him to come to the ED. On arrival patient came in with right sided arm weakness, global aphasia, and dysarthria, right sided facial droop. Initial head CT demonstrated wedge like low density in the left putamen and caudate (corpus striatum) consistent with infarct that may be acute and no acute intracranial hemorrhage. Tenecteplase was administered at 12:15 pm. CTA head demonstrated occlusion of left MCA distal M1 segment and CTP showed large mismatch. found to have LM1 occlusion. Patient taken for mechanical thrombectomy where he was found to have LM1 occlusion with TICI 2C recanalization. Patient under care in the neuro ICU for blaine crani watch in setting of sizable infarction.   Etiology: embolic stroke of undetermined source    NEURO:   -Neurologically with slight improvement, as patient is following few simple midline commands  -Repeat head CT on 8/29/23 demonstrated continued evolution of large acute left MCA infarct with increased left to right midline shift of(6mm). Petechial hemorrhage cannot be excluded, however there is no large parenchymal hemorrhage   -Continue close monitoring for neurologic deterioration    - Stroke neuro checks q 1 as per neuro ICU  - Permissive HTN up to  as pe neuro ICU. Avoid hypotension and rapid fluctuations    -ANTITHROMBOTIC THERAPY: ASA 81mg daily as repeat head CT showed no large parenchymal hemorrhage and neuro sx cleared patient for ASA 81mg daily as well.   -titrate statin to LDL goal less than 70. suggest high intensity statin when medically optimized and no medical contraindications in setting of acute stroke and LDL of 80.  -Obtain MRI Brain w/o  -Dysphagia screen: Fail. NGT will be placed.  -Physical therapy/OT/Speech eval/treatment.     -CARDIOVASCULAR: TTE pending, cardiac monitoring w/ telemetry for now, further evaluation pending findings of noted workup                              -HEMATOLOGY: H/H 11.4/32.6.  Monitor for signs and symptoms of further anemia. Platelets 171. patient should have all age and risk appropriate malignancy screenings with PCP or sooner if clinically suspected      DVT ppx: Heparin s.c [] LMWH [x] repeat head CT showed no large parenchymal hemorrhage and neuro sx cleared patient for LMWH as well.     PULMONARY: CXR clear.  Protecting airway, saturating well     RENAL: BUN/Cr 15.9/0.91. monitor urine output, maintain adequate hydration. Noted fullness on 8/28/23 physical exam around right thigh. CT abdomen pelvis ruled out bladder injury.      Na Goal:  135-145. Sodium levels currently 135. Avoid hyponatremia and rapid fluctuations.     ID: afebrile, leukocytosis 13.18. monitor for si/sx of infection. Zosyn regimen stopped per primary team.    OTHER:  A1C is 6.8%. Suggest endocrine consult. condition and plan of care d/w patient, questions and concerns addressed.     DISPOSITION: Rehab or home depending on PT eval once stable and workup is complete      CORE MEASURES:        Admission NIHSS: 23     Tenecteplase : [x] YES [] NO      LDL/HDL/A1C: 80/39/6.8     Depression Screen- if depression hx and/or present      Statin Therapy: as noted     Dysphagia Screen: [] PASS [] FAIL     Smoking [] YES [x] NO      Afib [] YES [x] NO     Stroke Education [] YES [] NO- ordered and pending    Obtain screening lower extremity venous ultrasound in patients who meet 1 or more of the following criteria as patient is high risk for DVT/PE on admission:   [] History of DVT/PE  []Hypercoagulable states (Factor V Leiden, Cancer, OCP, etc. )  []Prolonged immobility (hemiplegia/hemiparesis/post operative or any other extended immobilization)  [] Transferred from outside facility (Rehab or Long term care)  [] Age </= to 50

## (undated) DEVICE — ELCTR SUBDERMAL NDL CLASSIC 1.5M X 59" (6 COLOR)

## (undated) DEVICE — DRSG 4 X 8

## (undated) DEVICE — SUT VICRYL 2-0 18" CP-2 UNDYED (POP-OFF)

## (undated) DEVICE — SPONGE SURGICAL STRIP 1/2 X 6"

## (undated) DEVICE — CODMAN RANEY SCALP CLIPS (BLUE) MEDIUM

## (undated) DEVICE — STOPCOCK 3 WAY W SWIVEL MALE LUER LOCK

## (undated) DEVICE — GLV 7.5 PROTEXIS (WHITE)

## (undated) DEVICE — SPONGE SURGICAL STRIP 1" X 6"

## (undated) DEVICE — SUT NUROLON 4-0 8-18" TF (POP-OFF)

## (undated) DEVICE — DRAPE INSTRUMENT POUCH 6.75" X 11"

## (undated) DEVICE — SUT ETHILON 3-0 18" PS-1

## (undated) DEVICE — MIDAS REX MR8 TAPERED SM BORE 2.3MM X 7CM FOOTED

## (undated) DEVICE — CODMAN PERFORATOR 14MM (BLUE)

## (undated) DEVICE — ELCTR BOVIE TIP BLADE INSULATED 4" EDGE

## (undated) DEVICE — SUT VICRYL 0 18" CT-1 UNDYED (POP-OFF)

## (undated) DEVICE — DRSG XEROFORM 1 X 8"

## (undated) DEVICE — SUT ETHILON 4-0 18" PS-2

## (undated) DEVICE — FRAZIER SUCTION TIP 10FR

## (undated) DEVICE — PACK NEURO

## (undated) DEVICE — MIDAS REX LEGEND TAPERED FOOTED SM BORE 2.3MM X 8CM

## (undated) DEVICE — GOWN XXL

## (undated) DEVICE — GLV 8.5 PROTEXIS (WHITE)

## (undated) DEVICE — WARMING BLANKET LOWER ADULT

## (undated) DEVICE — VENODYNE/SCD SLEEVE CALF MEDIUM

## (undated) DEVICE — DRAPE CRANIOTOMY W POUCH 100X104"

## (undated) DEVICE — DRAIN JACKSON PRATT 7MM FLAT FULL W 15 FR TROCAR

## (undated) DEVICE — DRSG KERLIX ROLL 4.5"

## (undated) DEVICE — STAPLER SKIN PROXIMATE

## (undated) DEVICE — SUT VICRYL 0 27" CT-2 UNDYED

## (undated) DEVICE — MARKING PEN W RULER

## (undated) DEVICE — MIDAS REX LEGEND TAPERED FOOTED SM BORE 1.5MM X 8CM

## (undated) DEVICE — PREP DURAPREP 26CC

## (undated) DEVICE — SPONGE SURGICAL STRIP 1/4 X 6"

## (undated) DEVICE — MIDAS REX LEGEND TAPERED SM BORE 2.3MM X 8CM

## (undated) DEVICE — DRSG TELFA 4 X 8

## (undated) DEVICE — AESCULAP SCALPFIX 10 CLIPS

## (undated) DEVICE — DRAIN RESERVOIR FOR JACKSON PRATT 100CC CARDINAL

## (undated) DEVICE — SOL INJ NS 0.9% 1000ML

## (undated) DEVICE — URETERAL CATH RED RUBBER 16FR (ORANGE)

## (undated) DEVICE — DRAPE MICROSCOPE ZEISS

## (undated) DEVICE — TUBING FOR SMOKE EVACUATOR (PURPLE END)

## (undated) DEVICE — SYR CATH TIP 2 OZ

## (undated) DEVICE — ELCTR MONOPOLAR STIMULATOR PROBE FLUSH-TIP

## (undated) DEVICE — TUBING CONNECTING 6MM 20FT

## (undated) DEVICE — DRSG XEROFORM 5 X 9"

## (undated) DEVICE — GLV 7 PROTEXIS (WHITE)

## (undated) DEVICE — DRAPE XL SHEET 77X98"

## (undated) DEVICE — SUT VICRYL 3-0 18" SH (POP-OFF)

## (undated) DEVICE — ELCTR SUBDERMAL NDL 27G X 1/2" WITH TWISTED PAIR